# Patient Record
Sex: FEMALE | Race: OTHER | Employment: UNEMPLOYED | URBAN - METROPOLITAN AREA
[De-identification: names, ages, dates, MRNs, and addresses within clinical notes are randomized per-mention and may not be internally consistent; named-entity substitution may affect disease eponyms.]

---

## 2017-01-19 ENCOUNTER — HOSPITAL ENCOUNTER (EMERGENCY)
Age: 57
Discharge: HOME | End: 2017-01-19
Attending: EMERGENCY MEDICINE
Payer: MEDICARE

## 2017-01-19 ENCOUNTER — APPOINTMENT (OUTPATIENT)
Dept: GENERAL RADIOLOGY | Age: 57
End: 2017-01-19
Attending: EMERGENCY MEDICINE
Payer: MEDICARE

## 2017-01-19 VITALS
HEIGHT: 60 IN | HEART RATE: 69 BPM | RESPIRATION RATE: 14 BRPM | WEIGHT: 199 LBS | TEMPERATURE: 98.8 F | DIASTOLIC BLOOD PRESSURE: 54 MMHG | BODY MASS INDEX: 39.07 KG/M2 | SYSTOLIC BLOOD PRESSURE: 180 MMHG | OXYGEN SATURATION: 99 %

## 2017-01-19 DIAGNOSIS — G89.4 CHRONIC PAIN SYNDROME: ICD-10-CM

## 2017-01-19 DIAGNOSIS — J20.9 ACUTE BRONCHITIS, UNSPECIFIED ORGANISM: Primary | ICD-10-CM

## 2017-01-19 DIAGNOSIS — E83.42 HYPOMAGNESEMIA: ICD-10-CM

## 2017-01-19 DIAGNOSIS — R52 BODY ACHES: ICD-10-CM

## 2017-01-19 LAB
ALBUMIN SERPL BCP-MCNC: 3.8 G/DL (ref 3.5–5)
ALBUMIN/GLOB SERPL: 1.4 {RATIO} (ref 1.1–2.2)
ALP SERPL-CCNC: 78 U/L (ref 45–117)
ALT SERPL-CCNC: 16 U/L (ref 12–78)
AMPHET UR QL SCN: NEGATIVE
ANION GAP BLD CALC-SCNC: 7 MMOL/L (ref 5–15)
APPEARANCE UR: CLEAR
AST SERPL W P-5'-P-CCNC: 9 U/L (ref 15–37)
BACTERIA URNS QL MICRO: ABNORMAL /HPF
BARBITURATES UR QL SCN: NEGATIVE
BASOPHILS # BLD AUTO: 0 K/UL (ref 0–0.1)
BASOPHILS # BLD: 0 % (ref 0–1)
BENZODIAZ UR QL: NEGATIVE
BILIRUB SERPL-MCNC: 0.7 MG/DL (ref 0.2–1)
BILIRUB UR QL: NEGATIVE
BUN SERPL-MCNC: 11 MG/DL (ref 6–20)
BUN/CREAT SERPL: 12 (ref 12–20)
CALCIUM SERPL-MCNC: 8.9 MG/DL (ref 8.5–10.1)
CANNABINOIDS UR QL SCN: NEGATIVE
CHLORIDE SERPL-SCNC: 101 MMOL/L (ref 97–108)
CK MB CFR SERPL CALC: 0.9 % (ref 0–2.5)
CK MB SERPL-MCNC: 1.2 NG/ML (ref 5–25)
CK SERPL-CCNC: 136 U/L (ref 26–192)
CO2 SERPL-SCNC: 29 MMOL/L (ref 21–32)
COCAINE UR QL SCN: NEGATIVE
COLOR UR: ABNORMAL
CREAT SERPL-MCNC: 0.94 MG/DL (ref 0.55–1.02)
DRUG SCRN COMMENT,DRGCM: ABNORMAL
EOSINOPHIL # BLD: 0 K/UL (ref 0–0.4)
EOSINOPHIL NFR BLD: 0 % (ref 0–7)
EPITH CASTS URNS QL MICRO: ABNORMAL /LPF
ERYTHROCYTE [DISTWIDTH] IN BLOOD BY AUTOMATED COUNT: 11.7 % (ref 11.5–14.5)
FLUAV AG NPH QL IA: NEGATIVE
FLUBV AG NOSE QL IA: NEGATIVE
GLOBULIN SER CALC-MCNC: 2.8 G/DL (ref 2–4)
GLUCOSE BLD STRIP.AUTO-MCNC: 332 MG/DL (ref 65–100)
GLUCOSE SERPL-MCNC: 365 MG/DL (ref 65–100)
GLUCOSE UR STRIP.AUTO-MCNC: >1000 MG/DL
HCT VFR BLD AUTO: 30.6 % (ref 35–47)
HGB BLD-MCNC: 10.8 G/DL (ref 11.5–16)
HGB UR QL STRIP: ABNORMAL
KETONES SERPL QL: NEGATIVE
KETONES UR QL STRIP.AUTO: NEGATIVE MG/DL
LACTATE SERPL-SCNC: 2 MMOL/L (ref 0.4–2)
LEUKOCYTE ESTERASE UR QL STRIP.AUTO: ABNORMAL
LIPASE SERPL-CCNC: 100 U/L (ref 73–393)
LYMPHOCYTES # BLD AUTO: 33 % (ref 12–49)
LYMPHOCYTES # BLD: 2.1 K/UL (ref 0.8–3.5)
MAGNESIUM SERPL-MCNC: 1.6 MG/DL (ref 1.6–2.4)
MCH RBC QN AUTO: 30.4 PG (ref 26–34)
MCHC RBC AUTO-ENTMCNC: 35.3 G/DL (ref 30–36.5)
MCV RBC AUTO: 86.2 FL (ref 80–99)
METHADONE UR QL: NEGATIVE
MONOCYTES # BLD: 0.4 K/UL (ref 0–1)
MONOCYTES NFR BLD AUTO: 7 % (ref 5–13)
NEUTS SEG # BLD: 3.7 K/UL (ref 1.8–8)
NEUTS SEG NFR BLD AUTO: 60 % (ref 32–75)
NITRITE UR QL STRIP.AUTO: NEGATIVE
OPIATES UR QL: POSITIVE
PCP UR QL: NEGATIVE
PH UR STRIP: 6 [PH] (ref 5–8)
PLATELET # BLD AUTO: 232 K/UL (ref 150–400)
POTASSIUM SERPL-SCNC: 3.7 MMOL/L (ref 3.5–5.1)
PROT SERPL-MCNC: 6.6 G/DL (ref 6.4–8.2)
PROT UR STRIP-MCNC: 30 MG/DL
RBC # BLD AUTO: 3.55 M/UL (ref 3.8–5.2)
RBC #/AREA URNS HPF: ABNORMAL /HPF (ref 0–5)
SERVICE CMNT-IMP: ABNORMAL
SODIUM SERPL-SCNC: 137 MMOL/L (ref 136–145)
SP GR UR REFRACTOMETRY: 1.02 (ref 1–1.03)
TROPONIN I SERPL-MCNC: <0.04 NG/ML
UA: UC IF INDICATED,UAUC: ABNORMAL
UROBILINOGEN UR QL STRIP.AUTO: 1 EU/DL (ref 0.2–1)
WBC # BLD AUTO: 6.2 K/UL (ref 3.6–11)
WBC URNS QL MICRO: ABNORMAL /HPF (ref 0–4)

## 2017-01-19 PROCEDURE — 85025 COMPLETE CBC W/AUTO DIFF WBC: CPT | Performed by: EMERGENCY MEDICINE

## 2017-01-19 PROCEDURE — 87040 BLOOD CULTURE FOR BACTERIA: CPT | Performed by: EMERGENCY MEDICINE

## 2017-01-19 PROCEDURE — 83605 ASSAY OF LACTIC ACID: CPT | Performed by: EMERGENCY MEDICINE

## 2017-01-19 PROCEDURE — 99285 EMERGENCY DEPT VISIT HI MDM: CPT

## 2017-01-19 PROCEDURE — 82962 GLUCOSE BLOOD TEST: CPT

## 2017-01-19 PROCEDURE — 36415 COLL VENOUS BLD VENIPUNCTURE: CPT | Performed by: EMERGENCY MEDICINE

## 2017-01-19 PROCEDURE — 81001 URINALYSIS AUTO W/SCOPE: CPT | Performed by: EMERGENCY MEDICINE

## 2017-01-19 PROCEDURE — 82009 KETONE BODYS QUAL: CPT | Performed by: EMERGENCY MEDICINE

## 2017-01-19 PROCEDURE — 83690 ASSAY OF LIPASE: CPT | Performed by: EMERGENCY MEDICINE

## 2017-01-19 PROCEDURE — 96375 TX/PRO/DX INJ NEW DRUG ADDON: CPT

## 2017-01-19 PROCEDURE — 80053 COMPREHEN METABOLIC PANEL: CPT | Performed by: EMERGENCY MEDICINE

## 2017-01-19 PROCEDURE — 74011250637 HC RX REV CODE- 250/637: Performed by: EMERGENCY MEDICINE

## 2017-01-19 PROCEDURE — 87086 URINE CULTURE/COLONY COUNT: CPT | Performed by: EMERGENCY MEDICINE

## 2017-01-19 PROCEDURE — 87804 INFLUENZA ASSAY W/OPTIC: CPT | Performed by: EMERGENCY MEDICINE

## 2017-01-19 PROCEDURE — 96365 THER/PROPH/DIAG IV INF INIT: CPT

## 2017-01-19 PROCEDURE — 83735 ASSAY OF MAGNESIUM: CPT | Performed by: EMERGENCY MEDICINE

## 2017-01-19 PROCEDURE — 96361 HYDRATE IV INFUSION ADD-ON: CPT

## 2017-01-19 PROCEDURE — 82550 ASSAY OF CK (CPK): CPT | Performed by: EMERGENCY MEDICINE

## 2017-01-19 PROCEDURE — 74011250636 HC RX REV CODE- 250/636: Performed by: EMERGENCY MEDICINE

## 2017-01-19 PROCEDURE — 84484 ASSAY OF TROPONIN QUANT: CPT | Performed by: EMERGENCY MEDICINE

## 2017-01-19 PROCEDURE — 74011636637 HC RX REV CODE- 636/637: Performed by: EMERGENCY MEDICINE

## 2017-01-19 PROCEDURE — 80307 DRUG TEST PRSMV CHEM ANLYZR: CPT | Performed by: EMERGENCY MEDICINE

## 2017-01-19 PROCEDURE — 93005 ELECTROCARDIOGRAM TRACING: CPT

## 2017-01-19 PROCEDURE — 71010 XR CHEST PORT: CPT

## 2017-01-19 RX ORDER — MORPHINE SULFATE 2 MG/ML
2 INJECTION, SOLUTION INTRAMUSCULAR; INTRAVENOUS
Status: COMPLETED | OUTPATIENT
Start: 2017-01-19 | End: 2017-01-19

## 2017-01-19 RX ORDER — PROMETHAZINE HYDROCHLORIDE 25 MG/1
25 TABLET ORAL
Status: COMPLETED | OUTPATIENT
Start: 2017-01-19 | End: 2017-01-19

## 2017-01-19 RX ORDER — PROMETHAZINE HYDROCHLORIDE 6.25 MG/5ML
6.25 SYRUP ORAL
Qty: 80 ML | Refills: 0 | Status: SHIPPED | OUTPATIENT
Start: 2017-01-19 | End: 2017-01-26

## 2017-01-19 RX ORDER — DOXYCYCLINE HYCLATE 100 MG
100 TABLET ORAL
Status: COMPLETED | OUTPATIENT
Start: 2017-01-19 | End: 2017-01-19

## 2017-01-19 RX ORDER — DOXYCYCLINE HYCLATE 100 MG
100 TABLET ORAL 2 TIMES DAILY
Qty: 20 TAB | Refills: 0 | Status: SHIPPED | OUTPATIENT
Start: 2017-01-19 | End: 2017-01-29

## 2017-01-19 RX ORDER — HYDROMORPHONE HYDROCHLORIDE 1 MG/ML
0.5 INJECTION, SOLUTION INTRAMUSCULAR; INTRAVENOUS; SUBCUTANEOUS
Status: COMPLETED | OUTPATIENT
Start: 2017-01-19 | End: 2017-01-19

## 2017-01-19 RX ORDER — HYDROMORPHONE HYDROCHLORIDE 1 MG/ML
1 INJECTION, SOLUTION INTRAMUSCULAR; INTRAVENOUS; SUBCUTANEOUS
Status: DISCONTINUED | OUTPATIENT
Start: 2017-01-19 | End: 2017-01-19

## 2017-01-19 RX ORDER — SODIUM CHLORIDE 9 MG/ML
150 INJECTION, SOLUTION INTRAVENOUS CONTINUOUS
Status: DISCONTINUED | OUTPATIENT
Start: 2017-01-19 | End: 2017-01-19 | Stop reason: HOSPADM

## 2017-01-19 RX ORDER — MAGNESIUM SULFATE 1 G/100ML
1 INJECTION INTRAVENOUS
Status: COMPLETED | OUTPATIENT
Start: 2017-01-19 | End: 2017-01-19

## 2017-01-19 RX ORDER — PROCHLORPERAZINE EDISYLATE 5 MG/ML
5 INJECTION INTRAMUSCULAR; INTRAVENOUS
Status: COMPLETED | OUTPATIENT
Start: 2017-01-19 | End: 2017-01-19

## 2017-01-19 RX ORDER — DIPHENHYDRAMINE HYDROCHLORIDE 50 MG/ML
25 INJECTION, SOLUTION INTRAMUSCULAR; INTRAVENOUS
Status: COMPLETED | OUTPATIENT
Start: 2017-01-19 | End: 2017-01-19

## 2017-01-19 RX ADMIN — PROCHLORPERAZINE EDISYLATE 5 MG: 5 INJECTION INTRAMUSCULAR; INTRAVENOUS at 05:00

## 2017-01-19 RX ADMIN — PROMETHAZINE HYDROCHLORIDE 25 MG: 25 TABLET ORAL at 16:31

## 2017-01-19 RX ADMIN — DOXYCYCLINE HYCLATE 100 MG: 100 TABLET, COATED ORAL at 16:31

## 2017-01-19 RX ADMIN — Medication 2 MG: at 14:49

## 2017-01-19 RX ADMIN — INSULIN HUMAN 5 UNITS: 100 INJECTION, SOLUTION PARENTERAL at 15:24

## 2017-01-19 RX ADMIN — SODIUM CHLORIDE 150 ML/HR: 900 INJECTION, SOLUTION INTRAVENOUS at 14:55

## 2017-01-19 RX ADMIN — DIPHENHYDRAMINE HYDROCHLORIDE 25 MG: 50 INJECTION INTRAMUSCULAR; INTRAVENOUS at 14:50

## 2017-01-19 RX ADMIN — MAGNESIUM SULFATE IN DEXTROSE 1 G: 10 INJECTION, SOLUTION INTRAVENOUS at 15:25

## 2017-01-19 RX ADMIN — HYDROMORPHONE HYDROCHLORIDE 0.5 MG: 1 INJECTION, SOLUTION INTRAMUSCULAR; INTRAVENOUS; SUBCUTANEOUS at 16:32

## 2017-01-19 NOTE — PROGRESS NOTES
Care manager interviewed patient at bedside. RRAT: 28.    The patient is a 60year old female seen in ED for generalized body aches. Medical history includes CHF, DM, HTN, neuropathy. Her three daughters and grandson are present with her in ED. Verified patient's address and phone number. She lives with her . She has [employer] as well as [employer]. PCP is Marry Minor, has appointment scheduled for this coming Monday 1/23. Per patient she has a walker, cane, hospital bed, and manual wheelchair at home. She is interested in obtaining an electric wheelchair as she states that she is home alone most of the day while her  is working, and sometimes has difficulty moving around her apartment. No other questions or concerns noted by patient today. CM will discuss patient's DME request with primary care office. Care Management Interventions  PCP Verified by CM:  Yes (Maikol Graves)  Mode of Transport at Discharge: Self  Transition of Care Consult (CM Consult): Discharge Planning  Current Support Network: Lives with Spouse  Confirm Follow Up Transport: Self  Plan discussed with Pt/Family/Caregiver: Yes  Discharge Location  Discharge Placement: Home with outpatient services    KEN Ren  [phone]

## 2017-01-19 NOTE — ED NOTES
Discharge instructions and 2 prescriptions reviewed with patient by Dr. Kezia Fenton. Patient alert and oriented and ambulatory out of ED in no apparent distress.

## 2017-01-19 NOTE — ED NOTES
Assumed care of patient from EMS. Patient alert and oriented x4. Patient states \"my whole body. I feel like I have the flu\". Reports vomiting since yesterday. Patient reports right leg pain and lower back pain x1 year that became worse yesterday. History of fibromyalgia. Patient states Nohemy Hui have been running tests and dont know what is wrong with me\". Patient with fentanyl patch and states she took oxycodone around 1230 at home. BS for .

## 2017-01-19 NOTE — DISCHARGE INSTRUCTIONS
Bronchitis: Care Instructions  Your Care Instructions    Bronchitis is inflammation of the bronchial tubes, which carry air to the lungs. The tubes swell and produce mucus, or phlegm. The mucus and inflamed bronchial tubes make you cough. You may have trouble breathing. Most cases of bronchitis are caused by viruses like those that cause colds. Antibiotics usually do not help and they may be harmful. Bronchitis usually develops rapidly and lasts about 2 to 3 weeks in otherwise healthy people. Follow-up care is a key part of your treatment and safety. Be sure to make and go to all appointments, and call your doctor if you are having problems. It's also a good idea to know your test results and keep a list of the medicines you take. How can you care for yourself at home? · Take all medicines exactly as prescribed. Call your doctor if you think you are having a problem with your medicine. · Get some extra rest.  · Take an over-the-counter pain medicine, such as acetaminophen (Tylenol), ibuprofen (Advil, Motrin), or naproxen (Aleve) to reduce fever and relieve body aches. Read and follow all instructions on the label. · Do not take two or more pain medicines at the same time unless the doctor told you to. Many pain medicines have acetaminophen, which is Tylenol. Too much acetaminophen (Tylenol) can be harmful. · Take an over-the-counter cough medicine that contains dextromethorphan to help quiet a dry, hacking cough so that you can sleep. Avoid cough medicines that have more than one active ingredient. Read and follow all instructions on the label. · Breathe moist air from a humidifier, hot shower, or sink filled with hot water. The heat and moisture will thin mucus so you can cough it out. · Do not smoke. Smoking can make bronchitis worse. If you need help quitting, talk to your doctor about stop-smoking programs and medicines. These can increase your chances of quitting for good.   When should you call for help? Call 911 anytime you think you may need emergency care. For example, call if:  · You have severe trouble breathing. Call your doctor now or seek immediate medical care if:  · You have new or worse trouble breathing. · You cough up dark brown or bloody mucus (sputum). · You have a new or higher fever. · You have a new rash. Watch closely for changes in your health, and be sure to contact your doctor if:  · You cough more deeply or more often, especially if you notice more mucus or a change in the color of your mucus. · You are not getting better as expected. Where can you learn more? Go to http://emily-janette.info/. Enter H333 in the search box to learn more about \"Bronchitis: Care Instructions. \"  Current as of: May 23, 2016  Content Version: 11.1  © 1865-8241 [employer]. Care instructions adapted under license by [employer] (which disclaims liability or warranty for this information). If you have questions about a medical condition or this instruction, always ask your healthcare professional. [address] any warranty or liability for your use of this information. Chronic Pain: Care Instructions  Your Care Instructions  Chronic pain is pain that lasts a long time (months or even years) and may or may not have a clear cause. It is different from acute pain, which usually does have a clear cause--like an injury or illness--and gets better over time. Chronic pain:  · Lasts over time but may vary from day to day. · Does not go away despite efforts to end it. · May disrupt your sleep and lead to fatigue. · May cause depression or anxiety. · May make your muscles tense, causing more pain. · Can disrupt your work, hobbies, home life, and relationships with friends and family. Chronic pain is a very real condition. It is not just in your head.  Treatment can help and usually includes several methods used together, such as medicines, physical therapy, exercise, and other treatments. Learning how to relax and changing negative thought patterns can also help you cope. Chronic pain is complex. Taking an active role in your treatment will help you better manage your pain. Tell your doctor if you have trouble dealing with your pain. You may have to try several things before you find what works best for you. Follow-up care is a key part of your treatment and safety. Be sure to make and go to all appointments, and call your doctor if you are having problems. Its also a good idea to know your test results and keep a list of the medicines you take. How can you care for yourself at home? · Pace yourself. Break up large jobs into smaller tasks. Save harder tasks for days when you have less pain, or go back and forth between hard tasks and easier ones. Take rest breaks. · Relax, and reduce stress. Relaxation techniques such as deep breathing or meditation can help. · Keep moving. Gentle, daily exercise can help reduce pain over the long run. Try low- or no-impact exercises such as walking, swimming, and stationary biking. Do stretches to stay flexible. · Try heat, cold packs, and massage. · Get enough sleep. Chronic pain can make you tired and drain your energy. Talk with your doctor if you have trouble sleeping because of pain. · Think positive. Your thoughts can affect your pain level. Do things that you enjoy to distract yourself when you have pain instead of focusing on the pain. See a movie, read a book, listen to music, or spend time with a friend. · If you think you are depressed, talk to your doctor about treatment. · Keep a daily pain diary. Record how your moods, thoughts, sleep patterns, activities, and medicine affect your pain. You may find that your pain is worse during or after certain activities or when you are feeling a certain emotion.  Having a record of your pain can help you and your doctor find the best ways to treat your pain. · Take pain medicines exactly as directed. ¨ If the doctor gave you a prescription medicine for pain, take it as prescribed. ¨ If you are not taking a prescription pain medicine, ask your doctor if you can take an over-the-counter medicine. Reducing constipation caused by pain medicine  · Include fruits, vegetables, beans, and whole grains in your diet each day. These foods are high in fiber. · Drink plenty of fluids, enough so that your urine is light yellow or clear like water. If you have kidney, heart, or liver disease and have to limit fluids, talk with your doctor before you increase the amount of fluids you drink. · If your doctor recommends it, get more exercise. Walking is a good choice. Bit by bit, increase the amount you walk every day. Try for at least 30 minutes on most days of the week. · Schedule time each day for a bowel movement. A daily routine may help. Take your time and do not strain when having a bowel movement. When should you call for help? Call your doctor now or seek immediate medical care if:  · Your pain gets worse or is out of control. · You feel down or blue, or you do not enjoy things like you once did. You may be depressed, which is common in people with chronic pain. Depression can be treated. · You have vomiting or cramps for more than 2 hours. Watch closely for changes in your health, and be sure to contact your doctor if:  · You cannot sleep because of pain. · You are very worried or anxious about your pain. · You have trouble taking your pain medicine. · You have any concerns about your pain medicine. · You have trouble with bowel movements, such as:  ¨ No bowel movement in 3 days. ¨ Blood in the anal area, in your stool, or on the toilet paper. ¨ Diarrhea for more than 24 hours. Where can you learn more? Go to http://emily-janette.info/.   Enter N004 in the search box to learn more about \"Chronic Pain: Care Instructions. \"  Current as of: February 19, 2016  Content Version: 11.1  © 6198-8689 [employer], [facility]. Care instructions adapted under license by [employer] (which disclaims liability or warranty for this information). If you have questions about a medical condition or this instruction, always ask your healthcare professional. [address] any warranty or liability for your use of this information. Learning About Magnesium  What is magnesium? Magnesium is a mineral that plays many important roles in your body. For example, magnesium helps keep your blood pressure regular and your heart rhythm steady. It helps build your bones and teeth. When you're sick, magnesium helps your body fight infections. And magnesium helps produce energy for your body to use. Most adults need 300 to 400 milligrams (mg) of magnesium a day. Magnesium is found in foods, especially nuts, whole grains, dark green vegetables, seafood, and cocoa. It's also available as a supplement. Most people can get enough magnesium through a healthy diet that emphasizes unprocessed foods, including whole grain products. What are some foods that contain magnesium? Magnesium is found in many foods in varying amounts. Sometimes the ingredients added to foods make a difference. A plain bagel contains 8 milligrams (mg) of magnesium. But a multi-grain bagel has 69 mg.   This list shows the magnesium levels in a variety of foods and their portion sizes:  · Almonds, ¼ cup = 97 mg  · Artichoke, 1 = 50 mg  · Avocado, ½ cup = 22 mg  · Banana, 1 cup mashed = 61 mg  · Baked beans, ½ cup = 33 mg  · Beef, ground, 3 oz = 17 mg  · Beef, top sirloin, 3 oz = 20 mg  · Bread, wheat bran, 1 oz = 23 mg  · Chocolate, dark, 1 oz = 41 mg  · Hazelnuts, ¼ cup = 47 mg  · Kale, cooked, 1 cup = 23 mg  · Lima beans, large, ½ cup = 41 mg  · Potatoes, russet, baked, 1 cup = 90 mg  · [place], canned, 3 oz = 27 mg  · Spinach, raw, 1 cup = 24 mg  · Tuna, canned, 3 oz = 29 mg  · [place]), ground, 3 oz = 21 mg  Who may need extra magnesium? Some illnesses and medicines may change how much magnesium you can absorb from food. Or they can cause your body to release more magnesium through urine than it should. Examples of these illnesses include Crohn's disease, celiac disease, and type 2 diabetes. People with these conditions may need to increase the amount of magnesium they consume. Your doctor can tell you if you need more magnesium. Where can you learn more? Go to http://emilyWozityoujanette.info/. Enter I924 in the search box to learn more about \"Learning About Magnesium. \"  Current as of: July 26, 2016  Content Version: 11.1  © 1492-7390 [employer]. Care instructions adapted under license by [employer] (which disclaims liability or warranty for this information). If you have questions about a medical condition or this instruction, always ask your healthcare professional. [place] [address] any warranty or liability for your use of this information.

## 2017-01-19 NOTE — ED PROVIDER NOTES
HPI Comments: Sanjay Benitez is a 60 y.o. female with PMhx significant for asthma, HTN, DM, RA, and stroke who presents via EMS to the ED complaining of flu-like symptoms including generalized body aches, cough, nausea, vomiting, and subjective fever since yesterday. Pt denies any known sick contacts with similar symptoms. She reports additional symptoms of acute on chronic lower back pain and right leg pain for which she is currently treated with Fentanyl patches. She denies any diarrhea. PCP: Chepe Edwards,     There are no other complaints, changes or physical findings at this time. The history is provided by the patient. No  was used. Past Medical History:   Diagnosis Date    Asthma     Asthma     Diabetes mellitus type II     Hypercholesteremia     Hyperlipemia     Hypertension     Restless leg     Restless leg     Rheumatoid arthritis(714.0)     Sleep apnea     Sleep disorder     Stroke ([facility] [address]      2010       Past Surgical History:   Procedure Laterality Date    Hx tonsillectomy           Family History:   Problem Relation Age of Onset    Heart Disease Mother     Diabetes Mother     Hypertension Other        Social History     Social History    Marital status: SINGLE     Spouse name: N/A    Number of children: N/A    Years of education: N/A     Occupational History    Not on file. Social History Main Topics    Smoking status: Never Smoker    Smokeless tobacco: Not on file    Alcohol use No    Drug use: No    Sexual activity: No     Other Topics Concern    Not on file     Social History Narrative         ALLERGIES: Albuterol; Aspirin; and Levaquin [levofloxacin]    Review of Systems   Constitutional: Positive for fever. Negative for chills. HENT: Negative for congestion and rhinorrhea. Eyes: Negative for visual disturbance. Respiratory: Positive for cough. Negative for shortness of breath. Cardiovascular: Negative for chest pain. Gastrointestinal: Positive for nausea and vomiting. Negative for abdominal pain and diarrhea. Genitourinary: Negative for difficulty urinating and dysuria. Musculoskeletal: Positive for back pain (chronic) and myalgias (generalized body aches). Negative for arthralgias and neck pain. Positive for chronic right leg pain. Skin: Negative for color change and rash. Neurological: Negative for dizziness, weakness and headaches. Vitals:    01/19/17 1334 01/19/17 1500 01/19/17 1637   BP: 168/70 164/62 180/54   Pulse: 62 73 69   Resp: 20 18 14   Temp: 98.8 °F (37.1 °C)     SpO2: 100% 100% 99%   Weight: 90.3 kg (199 lb)     Height: 5' (1.524 m)              Physical Exam   Constitutional: She is oriented to person, place, and time. She appears well-developed and well-nourished. In moderate distress secondary to pain. HENT:   Head: Normocephalic and atraumatic. Neck: Normal range of motion. Cardiovascular: Normal rate, regular rhythm, normal heart sounds and intact distal pulses. Pulmonary/Chest: Effort normal and breath sounds normal.   Abdominal: Soft. Bowel sounds are normal. There is tenderness in the epigastric area. Musculoskeletal:   No peripheral edema. Neurological: She is alert and oriented to person, place, and time. Skin: Skin is warm and dry. Nursing note and vitals reviewed.        MDM  Number of Diagnoses or Management Options  Diagnosis management comments: DDx: PNA, diabetic complication, viral illness, acute on chronic pain       Amount and/or Complexity of Data Reviewed  Clinical lab tests: ordered and reviewed  Tests in the radiology section of CPT®: reviewed and ordered  Tests in the medicine section of CPT®: ordered and reviewed  Review and summarize past medical records: yes  Independent visualization of images, tracings, or specimens: yes    Patient Progress  Patient progress: stable        Procedures     EKG interpretation: (Preliminary)  Rhythm: sinus bradycardia; and regular . Rate (approx.): 56; Axis: normal; UT interval: normal; QRS interval: normal ; ST/T wave: ST and T wave abnormality; other: Prolonged QT  Written by CHERRY Sargent, as dictated by Dimas Elkins MD    LABORATORY TESTS:  Recent Results (from the past 12 hour(s))   EKG, 12 LEAD, INITIAL    Collection Time: 01/19/17  1:59 PM   Result Value Ref Range    Ventricular Rate 56 BPM    Atrial Rate 56 BPM    P-R Interval 170 ms    QRS Duration 92 ms    Q-T Interval 504 ms    QTC Calculation (Bezet) 486 ms    Calculated P Axis 38 degrees    Calculated R Axis 12 degrees    Calculated T Axis -129 degrees    Diagnosis       Sinus bradycardia  ST & T wave abnormality, consider inferolateral ischemia  Prolonged QT  Abnormal ECG  When compared with ECG of 25-SEP-2016 09:49,  No significant change was found     CBC WITH AUTOMATED DIFF    Collection Time: 01/19/17  2:24 PM   Result Value Ref Range    WBC 6.2 3.6 - 11.0 K/uL    RBC 3.55 (L) 3.80 - 5.20 M/uL    HGB 10.8 (L) 11.5 - 16.0 g/dL    HCT 30.6 (L) 35.0 - 47.0 %    MCV 86.2 80.0 - 99.0 FL    MCH 30.4 26.0 - 34.0 PG    MCHC 35.3 30.0 - 36.5 g/dL    RDW 11.7 11.5 - 14.5 %    PLATELET 200 127 - 237 K/uL    NEUTROPHILS 60 32 - 75 %    LYMPHOCYTES 33 12 - 49 %    MONOCYTES 7 5 - 13 %    EOSINOPHILS 0 0 - 7 %    BASOPHILS 0 0 - 1 %    ABS. NEUTROPHILS 3.7 1.8 - 8.0 K/UL    ABS. LYMPHOCYTES 2.1 0.8 - 3.5 K/UL    ABS. MONOCYTES 0.4 0.0 - 1.0 K/UL    ABS. EOSINOPHILS 0.0 0.0 - 0.4 K/UL    ABS.  BASOPHILS 0.0 0.0 - 0.1 K/UL   LACTIC ACID, PLASMA    Collection Time: 01/19/17  2:24 PM   Result Value Ref Range    Lactic acid 2.0 0.4 - 2.0 MMOL/L   METABOLIC PANEL, COMPREHENSIVE    Collection Time: 01/19/17  2:24 PM   Result Value Ref Range    Sodium 137 136 - 145 mmol/L    Potassium 3.7 3.5 - 5.1 mmol/L    Chloride 101 97 - 108 mmol/L    CO2 29 21 - 32 mmol/L    Anion gap 7 5 - 15 mmol/L    Glucose 365 (H) 65 - 100 mg/dL    BUN 11 6 - 20 MG/DL Creatinine 0.94 0.55 - 1.02 MG/DL    BUN/Creatinine ratio 12 12 - 20      GFR est AA >60 >60 ml/min/1.73m2    GFR est non-AA >60 >60 ml/min/1.73m2    Calcium 8.9 8.5 - 10.1 MG/DL    Bilirubin, total 0.7 0.2 - 1.0 MG/DL    ALT 16 12 - 78 U/L    AST 9 (L) 15 - 37 U/L    Alk.  phosphatase 78 45 - 117 U/L    Protein, total 6.6 6.4 - 8.2 g/dL    Albumin 3.8 3.5 - 5.0 g/dL    Globulin 2.8 2.0 - 4.0 g/dL    A-G Ratio 1.4 1.1 - 2.2     INFLUENZA A & B AG (RAPID TEST)    Collection Time: 01/19/17  2:24 PM   Result Value Ref Range    Influenza A Antigen NEGATIVE  NEG      Influenza B Antigen NEGATIVE  NEG     LIPASE    Collection Time: 01/19/17  2:24 PM   Result Value Ref Range    Lipase 100 73 - 393 U/L   TROPONIN I    Collection Time: 01/19/17  2:24 PM   Result Value Ref Range    Troponin-I, Qt. <0.04 <0.05 ng/mL   CK W/ CKMB & INDEX    Collection Time: 01/19/17  2:24 PM   Result Value Ref Range     26 - 192 U/L    CK - MB 1.2 <3.6 NG/ML    CK-MB Index 0.9 0 - 2.5     ACETONE/KETONE, QL    Collection Time: 01/19/17  2:24 PM   Result Value Ref Range    Acetone/Ketone serum, Ql. NEGATIVE  NEG        MAGNESIUM    Collection Time: 01/19/17  2:30 PM   Result Value Ref Range    Magnesium 1.6 1.6 - 2.4 mg/dL   DRUG SCREEN, URINE    Collection Time: 01/19/17  3:55 PM   Result Value Ref Range    AMPHETAMINE NEGATIVE  NEG      BARBITURATES NEGATIVE  NEG      BENZODIAZEPINE NEGATIVE  NEG      COCAINE NEGATIVE  NEG      METHADONE NEGATIVE  NEG      OPIATES POSITIVE (A) NEG      PCP(PHENCYCLIDINE) NEGATIVE  NEG      THC (TH-CANNABINOL) NEGATIVE  NEG      Drug screen comment (NOTE)    URINALYSIS W/ REFLEX CULTURE    Collection Time: 01/19/17  3:58 PM   Result Value Ref Range    Color YELLOW/STRAW      Appearance CLEAR CLEAR      Specific gravity 1.020 1.003 - 1.030      pH (UA) 6.0 5.0 - 8.0      Protein 30 (A) NEG mg/dL    Glucose >1000 (A) NEG mg/dL    Ketone NEGATIVE  NEG mg/dL    Bilirubin NEGATIVE  NEG      Blood TRACE (A) NEG Urobilinogen 1.0 0.2 - 1.0 EU/dL    Nitrites NEGATIVE  NEG      Leukocyte Esterase SMALL (A) NEG      WBC 10-20 0 - 4 /hpf    RBC 5-10 0 - 5 /hpf    Epithelial cells MODERATE (A) FEW /lpf    Bacteria 1+ (A) NEG /hpf    UA:UC IF INDICATED URINE CULTURE ORDERED (A) CNI     GLUCOSE, POC    Collection Time: 01/19/17  4:37 PM   Result Value Ref Range    Glucose (POC) 332 (H) 65 - 100 mg/dL    Performed by [address]:  CXR Results  (Last 48 hours)               01/19/17 1357  XR CHEST PORT Final result    Impression:  IMPRESSION:    Left basilar opacity suggesting atelectasis/possible airspace disease and/or   possible pleural effusion. Narrative:  INDICATION:  Chest pain, generalized body aches and vomiting x1 day. COMPARISON:  9/25/2016. FINDINGS:     An AP portable view was obtained of the chest.     The heart is enlarged. Left basilar opacity suggesting atelectasis/possible airspace disease and/or   possible pleural effusion. No pulmonary edema is evident. Degenerative change of the spine is noted. MEDICATIONS GIVEN:  Medications   0.9% sodium chloride infusion (0 mL/hr IntraVENous IV Completed 1/19/17 1653)   prochlorperazine (COMPAZINE) injection 5 mg (5 mg IntraVENous Given 1/19/17 0500)   diphenhydrAMINE (BENADRYL) injection 25 mg (25 mg IntraVENous Given 1/19/17 1450)   morphine injection 2 mg (2 mg IntraVENous Given 1/19/17 1449)   magnesium sulfate 1 g/100 ml IVPB (premix or compounded) (0 g IntraVENous IV Completed 1/19/17 1625)   insulin regular (NOVOLIN R, HUMULIN R) injection 5 Units (5 Units IntraVENous Given 1/19/17 1524)   doxycycline (VIBRA-TABS) tablet 100 mg (100 mg Oral Given 1/19/17 1631)   HYDROmorphone (PF) (DILAUDID) injection 0.5 mg (0.5 mg IntraVENous Given 1/19/17 1632)   promethazine (PHENERGAN) tablet 25 mg (25 mg Oral Given 1/19/17 1631)       IMPRESSION:  1. Acute bronchitis, unspecified organism    2. Body aches    3. Chronic pain syndrome    4. Hypomagnesemia        PLAN:  1. Discharge Medication List as of 1/19/2017  4:37 PM      START taking these medications    Details   doxycycline (VIBRA-TABS) 100 mg tablet Take 1 Tab by mouth two (2) times a day for 10 days. , Normal, Disp-20 Tab, R-0      promethazine (PHENERGAN) 6.25 mg/5 mL syrup Take 5 mL by mouth four (4) times daily as needed for Nausea (congestion, cough) for up to 7 days. , Normal, Disp-80 mL, R-0         CONTINUE these medications which have NOT CHANGED    Details   HUMULIN N 100 unit/mL injection Historical Med, TIMO      carisoprodol (SOMA) 250 mg tablet Historical Med      fentaNYL (DURAGESIC) 25 mcg/hr PATCH 1 Patch by TransDERmal route every seventy-two (72) hours. , Historical Med      valsartan (DIOVAN) 320 mg tablet Take 1 Tab by mouth daily. , Normal, Disp-30 Tab, R-12      DULoxetine (CYMBALTA) 60 mg capsule Take 60 mg by mouth daily. , Historical Med      beclomethasone (QVAR) 80 mcg/actuation inhaler Take 1 Puff by inhalation two (2) times a day., Historical Med      oxyCODONE-acetaminophen (PERCOCET) 7.5-325 mg per tablet Take 1 Tab by mouth three (3) times daily as needed for Pain., Historical Med      docusate sodium (STOOL SOFTENER) 100 mg capsule Take 100 mg by mouth daily. , Historical Med      lubiPROStone (AMITIZA) 24 mcg capsule Take 24 mcg by mouth two (2) times daily (with meals). , Historical Med      ondansetron (ZOFRAN ODT) 4 mg disintegrating tablet Take 4 mg by mouth two (2) times daily as needed for Nausea., Historical Med      ibuprofen (MOTRIN) 800 mg tablet Take 1 Tab by mouth every eight (8) hours as needed for Pain. Take it as scheduled for one week only, No Print, Disp-1 Tab, R-0      furosemide (LASIX) 20 mg tablet Take 1 Tab by mouth daily. , No Print, Disp-30 Tab, R-1      potassium chloride SR (KLOR-CON 10) 10 mEq tablet Take 1 Tab by mouth daily. , No Print, Disp-30 Tab, R-1      lidocaine (LIDODERM) 5 % 3 Patches by TransDERmal route every twelve (12) hours. Apply patch to the affected area for 12 hours a day and remove for 12 hours a day., Historical Med      Liraglutide (VICTOZA) 0.6 mg/0.1 mL (18 mg/3 mL) sub-q pen 1.8 mg by SubCUTAneous route daily. , Historical Med      mometasone-formoterol (DULERA) 200-5 mcg/actuation HFA inhaler Take 2 Puffs by inhalation two (2) times a day., Historical Med      atorvastatin (LIPITOR) 40 mg tablet Take 40 mg by mouth daily. , Historical Med      omalizumab (XOLAIR) 150 mg solr solution 150 mg by SubCUTAneous route every fourteen (14) days. , Historical Med      ezetimibe (ZETIA) 10 mg tablet Take 10 mg by mouth daily. , Historical Med      insulin aspart (NOVOLOG) 100 unit/mL injection by SubCUTAneous route. Indications: sliding, Historical Med      montelukast (SINGULAIR) 10 mg tablet Take 10 mg by mouth nightly., Historical Med      levalbuterol tartrate (XOPENEX HFA) 45 mcg/Actuation inhaler Take 2 Puffs by inhalation four (4) times daily. , Historical Med      levalbuterol hcl (XOPENEX) 0.63 mg/3 mL nebulization 0.63 mg by Nebulization route once as needed., Historical Med      diazePAM (VALIUM) 2 mg tablet Take 1 Tab by mouth every eight (8) hours as needed for Anxiety (spasm). Max Daily Amount: 6 mg., Print, Disp-8 Tab, R-0      VGO 40 kris Historical Med, TIMO      DALTON PEN NEEDLE 32 gauge x 5/32\" ndle Historical Med, TIMO      ONE TOUCH DELICA 33 gauge misc Historical Med, TIMO       insulin pump (PATIENT SUPPLIED) [facility] [place] by SubCUTAneous route as needed., Historical Med           2. Follow-up Information     Follow up With Details Comments Contact Info    Louise Hidalgo DO On 1/23/2017  [facility]  [phone]      [facility] EMERGENCY DEPT  If symptoms worsen [address]        Return to ED if worse     Discharge Note:  4:06 PM  The patient has been re-evaluated and is ready for discharge.  Reviewed available results with patient. Counseled patient on diagnosis and care plan. Patient has expressed understanding, and all questions have been answered. Patient agrees with plan and agrees to follow up as recommended, or return to the ED if their symptoms worsen. Discharge instructions have been provided and explained to the patient, along with reasons to return to the ED. Attestation: This note is prepared by Joao Corbett, acting as Scribe for Steven Baumgarten, MD.    Steven Baumgarten, MD: The scribe's documentation has been prepared under my direction and personally reviewed by me in its entirety. I confirm that the note above accurately reflects all work, treatment, procedures, and medical decision making performed by me.

## 2017-01-21 LAB
ATRIAL RATE: 56 BPM
BACTERIA SPEC CULT: NORMAL
CALCULATED P AXIS, ECG09: 38 DEGREES
CALCULATED R AXIS, ECG10: 12 DEGREES
CALCULATED T AXIS, ECG11: -129 DEGREES
CC UR VC: NORMAL
DIAGNOSIS, 93000: NORMAL
P-R INTERVAL, ECG05: 170 MS
Q-T INTERVAL, ECG07: 504 MS
QRS DURATION, ECG06: 92 MS
QTC CALCULATION (BEZET), ECG08: 486 MS
SERVICE CMNT-IMP: NORMAL
VENTRICULAR RATE, ECG03: 56 BPM

## 2017-01-24 LAB
BACTERIA SPEC CULT: NORMAL
SERVICE CMNT-IMP: NORMAL

## 2017-02-25 ENCOUNTER — HOSPITAL ENCOUNTER (EMERGENCY)
Age: 57
Discharge: HOME | End: 2017-02-25
Attending: EMERGENCY MEDICINE
Payer: MEDICARE

## 2017-02-25 ENCOUNTER — APPOINTMENT (OUTPATIENT)
Dept: GENERAL RADIOLOGY | Age: 57
End: 2017-02-25
Attending: EMERGENCY MEDICINE
Payer: MEDICARE

## 2017-02-25 VITALS
OXYGEN SATURATION: 93 % | BODY MASS INDEX: 36.44 KG/M2 | TEMPERATURE: 98.3 F | SYSTOLIC BLOOD PRESSURE: 150 MMHG | DIASTOLIC BLOOD PRESSURE: 49 MMHG | HEIGHT: 61 IN | HEART RATE: 81 BPM | RESPIRATION RATE: 22 BRPM | WEIGHT: 193 LBS

## 2017-02-25 DIAGNOSIS — E11.65 TYPE 2 DIABETES MELLITUS WITH HYPERGLYCEMIA, UNSPECIFIED LONG TERM INSULIN USE STATUS: ICD-10-CM

## 2017-02-25 DIAGNOSIS — J45.21 MILD INTERMITTENT ASTHMA WITH ACUTE EXACERBATION: Primary | ICD-10-CM

## 2017-02-25 DIAGNOSIS — G89.29 OTHER CHRONIC PAIN: ICD-10-CM

## 2017-02-25 DIAGNOSIS — R11.2 NON-INTRACTABLE VOMITING WITH NAUSEA, UNSPECIFIED VOMITING TYPE: ICD-10-CM

## 2017-02-25 DIAGNOSIS — G57.91 NEUROPATHY OF RIGHT LOWER EXTREMITY: ICD-10-CM

## 2017-02-25 LAB
ALBUMIN SERPL BCP-MCNC: 3.8 G/DL (ref 3.5–5)
ALBUMIN/GLOB SERPL: 1.4 {RATIO} (ref 1.1–2.2)
ALP SERPL-CCNC: 83 U/L (ref 45–117)
ALT SERPL-CCNC: 20 U/L (ref 12–78)
ANION GAP BLD CALC-SCNC: 9 MMOL/L (ref 5–15)
APPEARANCE UR: CLEAR
AST SERPL W P-5'-P-CCNC: 11 U/L (ref 15–37)
ATRIAL RATE: 68 BPM
BACTERIA URNS QL MICRO: NEGATIVE /HPF
BASOPHILS # BLD AUTO: 0 K/UL (ref 0–0.1)
BASOPHILS # BLD: 0 % (ref 0–1)
BILIRUB SERPL-MCNC: 0.5 MG/DL (ref 0.2–1)
BILIRUB UR QL: NEGATIVE
BUN SERPL-MCNC: 12 MG/DL (ref 6–20)
BUN/CREAT SERPL: 16 (ref 12–20)
CALCIUM SERPL-MCNC: 8.9 MG/DL (ref 8.5–10.1)
CALCULATED P AXIS, ECG09: 18 DEGREES
CALCULATED R AXIS, ECG10: -3 DEGREES
CALCULATED T AXIS, ECG11: 164 DEGREES
CHLORIDE SERPL-SCNC: 99 MMOL/L (ref 97–108)
CK MB CFR SERPL CALC: 1.3 % (ref 0–2.5)
CK MB SERPL-MCNC: 1.1 NG/ML (ref 5–25)
CK SERPL-CCNC: 85 U/L (ref 26–192)
CO2 SERPL-SCNC: 27 MMOL/L (ref 21–32)
COLOR UR: ABNORMAL
CREAT SERPL-MCNC: 0.75 MG/DL (ref 0.55–1.02)
DIAGNOSIS, 93000: NORMAL
EOSINOPHIL # BLD: 0 K/UL (ref 0–0.4)
EOSINOPHIL NFR BLD: 1 % (ref 0–7)
EPITH CASTS URNS QL MICRO: ABNORMAL /LPF
ERYTHROCYTE [DISTWIDTH] IN BLOOD BY AUTOMATED COUNT: 12.3 % (ref 11.5–14.5)
GLOBULIN SER CALC-MCNC: 2.8 G/DL (ref 2–4)
GLUCOSE BLD STRIP.AUTO-MCNC: 312 MG/DL (ref 65–100)
GLUCOSE SERPL-MCNC: 395 MG/DL (ref 65–100)
GLUCOSE UR STRIP.AUTO-MCNC: >1000 MG/DL
HCT VFR BLD AUTO: 34.2 % (ref 35–47)
HGB BLD-MCNC: 11.7 G/DL (ref 11.5–16)
HGB UR QL STRIP: NEGATIVE
KETONES SERPL QL: NEGATIVE
KETONES UR QL STRIP.AUTO: 15 MG/DL
LEUKOCYTE ESTERASE UR QL STRIP.AUTO: NEGATIVE
LIPASE SERPL-CCNC: 156 U/L (ref 73–393)
LYMPHOCYTES # BLD AUTO: 26 % (ref 12–49)
LYMPHOCYTES # BLD: 2 K/UL (ref 0.8–3.5)
MCH RBC QN AUTO: 30 PG (ref 26–34)
MCHC RBC AUTO-ENTMCNC: 34.2 G/DL (ref 30–36.5)
MCV RBC AUTO: 87.7 FL (ref 80–99)
MONOCYTES # BLD: 0.5 K/UL (ref 0–1)
MONOCYTES NFR BLD AUTO: 6 % (ref 5–13)
NEUTS SEG # BLD: 5.4 K/UL (ref 1.8–8)
NEUTS SEG NFR BLD AUTO: 67 % (ref 32–75)
NITRITE UR QL STRIP.AUTO: NEGATIVE
P-R INTERVAL, ECG05: 150 MS
PH UR STRIP: 6 [PH] (ref 5–8)
PLATELET # BLD AUTO: 216 K/UL (ref 150–400)
POTASSIUM SERPL-SCNC: 3.9 MMOL/L (ref 3.5–5.1)
PROT SERPL-MCNC: 6.6 G/DL (ref 6.4–8.2)
PROT UR STRIP-MCNC: ABNORMAL MG/DL
Q-T INTERVAL, ECG07: 454 MS
QRS DURATION, ECG06: 92 MS
QTC CALCULATION (BEZET), ECG08: 482 MS
RBC # BLD AUTO: 3.9 M/UL (ref 3.8–5.2)
RBC #/AREA URNS HPF: ABNORMAL /HPF (ref 0–5)
SERVICE CMNT-IMP: ABNORMAL
SODIUM SERPL-SCNC: 135 MMOL/L (ref 136–145)
SP GR UR REFRACTOMETRY: 1.01 (ref 1–1.03)
TROPONIN I SERPL-MCNC: <0.04 NG/ML
UA: UC IF INDICATED,UAUC: ABNORMAL
UROBILINOGEN UR QL STRIP.AUTO: 0.2 EU/DL (ref 0.2–1)
VENTRICULAR RATE, ECG03: 68 BPM
WBC # BLD AUTO: 7.9 K/UL (ref 3.6–11)
WBC URNS QL MICRO: ABNORMAL /HPF (ref 0–4)

## 2017-02-25 PROCEDURE — 82009 KETONE BODYS QUAL: CPT | Performed by: EMERGENCY MEDICINE

## 2017-02-25 PROCEDURE — 83690 ASSAY OF LIPASE: CPT | Performed by: EMERGENCY MEDICINE

## 2017-02-25 PROCEDURE — 84484 ASSAY OF TROPONIN QUANT: CPT | Performed by: EMERGENCY MEDICINE

## 2017-02-25 PROCEDURE — 81001 URINALYSIS AUTO W/SCOPE: CPT | Performed by: EMERGENCY MEDICINE

## 2017-02-25 PROCEDURE — A9270 NON-COVERED ITEM OR SERVICE: HCPCS | Performed by: EMERGENCY MEDICINE

## 2017-02-25 PROCEDURE — 74011636637 HC RX REV CODE- 636/637: Performed by: EMERGENCY MEDICINE

## 2017-02-25 PROCEDURE — 82962 GLUCOSE BLOOD TEST: CPT

## 2017-02-25 PROCEDURE — 71010 XR CHEST PORT: CPT

## 2017-02-25 PROCEDURE — 99285 EMERGENCY DEPT VISIT HI MDM: CPT

## 2017-02-25 PROCEDURE — 96375 TX/PRO/DX INJ NEW DRUG ADDON: CPT

## 2017-02-25 PROCEDURE — 85025 COMPLETE CBC W/AUTO DIFF WBC: CPT | Performed by: EMERGENCY MEDICINE

## 2017-02-25 PROCEDURE — 96361 HYDRATE IV INFUSION ADD-ON: CPT

## 2017-02-25 PROCEDURE — 77030013140 HC MSK NEB VYRM -A

## 2017-02-25 PROCEDURE — 80053 COMPREHEN METABOLIC PANEL: CPT | Performed by: EMERGENCY MEDICINE

## 2017-02-25 PROCEDURE — 96374 THER/PROPH/DIAG INJ IV PUSH: CPT

## 2017-02-25 PROCEDURE — 74011000250 HC RX REV CODE- 250: Performed by: EMERGENCY MEDICINE

## 2017-02-25 PROCEDURE — 74011250636 HC RX REV CODE- 250/636: Performed by: EMERGENCY MEDICINE

## 2017-02-25 PROCEDURE — 82550 ASSAY OF CK (CPK): CPT | Performed by: EMERGENCY MEDICINE

## 2017-02-25 PROCEDURE — 93005 ELECTROCARDIOGRAM TRACING: CPT

## 2017-02-25 PROCEDURE — 94640 AIRWAY INHALATION TREATMENT: CPT

## 2017-02-25 PROCEDURE — 36600 WITHDRAWAL OF ARTERIAL BLOOD: CPT

## 2017-02-25 PROCEDURE — 36415 COLL VENOUS BLD VENIPUNCTURE: CPT | Performed by: EMERGENCY MEDICINE

## 2017-02-25 RX ORDER — HYDROMORPHONE HYDROCHLORIDE 1 MG/ML
1 INJECTION, SOLUTION INTRAMUSCULAR; INTRAVENOUS; SUBCUTANEOUS
Status: COMPLETED | OUTPATIENT
Start: 2017-02-25 | End: 2017-02-25

## 2017-02-25 RX ORDER — LEVALBUTEROL INHALATION SOLUTION 1.25 MG/3ML
1.25 SOLUTION RESPIRATORY (INHALATION)
Status: COMPLETED | OUTPATIENT
Start: 2017-02-25 | End: 2017-02-25

## 2017-02-25 RX ORDER — PROMETHAZINE HYDROCHLORIDE 6.25 MG/5ML
6.25 SYRUP ORAL
Qty: 50 ML | Refills: 0 | Status: SHIPPED | OUTPATIENT
Start: 2017-02-25 | End: 2017-03-01

## 2017-02-25 RX ORDER — PROCHLORPERAZINE EDISYLATE 5 MG/ML
5 INJECTION INTRAMUSCULAR; INTRAVENOUS
Status: COMPLETED | OUTPATIENT
Start: 2017-02-25 | End: 2017-02-25

## 2017-02-25 RX ORDER — DIPHENHYDRAMINE HYDROCHLORIDE 50 MG/ML
25 INJECTION, SOLUTION INTRAMUSCULAR; INTRAVENOUS
Status: COMPLETED | OUTPATIENT
Start: 2017-02-25 | End: 2017-02-25

## 2017-02-25 RX ORDER — DIAZEPAM 10 MG/2ML
5 INJECTION INTRAMUSCULAR
Status: COMPLETED | OUTPATIENT
Start: 2017-02-25 | End: 2017-02-25

## 2017-02-25 RX ADMIN — HUMAN INSULIN 6 UNITS: 100 INJECTION, SOLUTION SUBCUTANEOUS at 10:19

## 2017-02-25 RX ADMIN — DIPHENHYDRAMINE HYDROCHLORIDE 25 MG: 50 INJECTION INTRAMUSCULAR; INTRAVENOUS at 10:07

## 2017-02-25 RX ADMIN — HYDROMORPHONE HYDROCHLORIDE 1 MG: 1 INJECTION, SOLUTION INTRAMUSCULAR; INTRAVENOUS; SUBCUTANEOUS at 11:49

## 2017-02-25 RX ADMIN — PROCHLORPERAZINE EDISYLATE 5 MG: 5 INJECTION INTRAMUSCULAR; INTRAVENOUS at 10:08

## 2017-02-25 RX ADMIN — LEVALBUTEROL HYDROCHLORIDE 1.25 MG: 1.25 SOLUTION RESPIRATORY (INHALATION) at 08:21

## 2017-02-25 RX ADMIN — SODIUM CHLORIDE 250 ML: 900 INJECTION, SOLUTION INTRAVENOUS at 10:17

## 2017-02-25 RX ADMIN — DIAZEPAM 5 MG: 5 INJECTION, SOLUTION INTRAMUSCULAR; INTRAVENOUS at 11:49

## 2017-02-25 NOTE — ED PROVIDER NOTES
HPI Comments: Renata Arrieta is a 59 y.o. female with significant PMHx of asthma, DM type 2, and hypertension, presents via EMS to the ED with c/o acute onset of vomiting x 1 day. Pt notes associated symptoms of dizziness and shortness of breath. Pt states she ate \"2 french fries and I like threw up 20\" prior to onset of symptoms. Pt reports she has Zofran at home however did not notice any relief. Pt states she has multiple inhalers at home though did not use any because she worried about vomiting. Per EMS pt's blood glucose was 399 and pt states she did not take insulin today. Pt reports she has her fentanyl patch on. Pt notes she saw her PCP 4 days ago and her fentanyl dosage was increased from 75 mg to 100 mg every 3 days. Pt denies history of abdominal surgeries, history of bowel obstructions, chest pain, any recent sick contact, tobacco use, EtOH use, or history of gastric ulcers. PCP: Kari Nixon DO      There are no other complaints, changes or physical findings at this time. Written by Jose Nuñez ED Scribe, as dictated by Nilesh Fall MD      The history is provided by the patient and the EMS personnel. Past Medical History:   Diagnosis Date    Asthma     Asthma     Diabetes mellitus type II     Hypercholesteremia     Hyperlipemia     Hypertension     Restless leg     Restless leg     Rheumatoid arthritis(714.0)     Sleep apnea     Sleep disorder     Stroke ([facility] [address]     2010       Past Surgical History:   Procedure Laterality Date    HX TONSILLECTOMY           Family History:   Problem Relation Age of Onset    Heart Disease Mother     Diabetes Mother     Hypertension Other        Social History     Social History    Marital status: SINGLE     Spouse name: N/A    Number of children: N/A    Years of education: N/A     Occupational History    Not on file.      Social History Main Topics    Smoking status: Never Smoker    Smokeless tobacco: Not on file    Alcohol use No    Drug use: No    Sexual activity: No     Other Topics Concern    Not on file     Social History Narrative         ALLERGIES: Albuterol; Aspirin; and Levaquin [levofloxacin]    Review of Systems   Constitutional: Negative for chills and fever. HENT: Negative for congestion and rhinorrhea. Eyes: Negative for visual disturbance. Respiratory: Positive for shortness of breath. Negative for cough. Cardiovascular: Negative for chest pain. Gastrointestinal: Positive for vomiting. Negative for abdominal pain. Genitourinary: Negative for difficulty urinating and dysuria. Musculoskeletal: Negative for arthralgias, back pain and neck pain. Skin: Negative for color change and rash. Neurological: Positive for dizziness. Negative for weakness and headaches. All other systems reviewed and are negative. Patient Vitals for the past 12 hrs:   Temp Pulse Resp BP SpO2   02/25/17 1200 - 81 22 150/49 93 %   02/25/17 1145 - 78 14 139/41 96 %   02/25/17 1130 - 76 14 (!) 154/38 96 %   02/25/17 1115 - 81 11 161/46 99 %   02/25/17 1110 - - - 165/48 98 %   02/25/17 1030 - 82 19 (!) 166/38 99 %   02/25/17 1021 - - - 165/54 100 %   02/25/17 0830 - - - 189/58 100 %   02/25/17 0821 - 72 - 165/54 -   02/25/17 0813 98.3 °F (36.8 °C) (!) 58 16 150/54 100 %              Physical Exam   Constitutional: She is oriented to person, place, and time. She appears well-developed and well-nourished. Speaking in full sentences   Neck: Normal range of motion. Cardiovascular: Normal rate, regular rhythm, normal heart sounds and intact distal pulses. Pulmonary/Chest: Effort normal and breath sounds normal. No respiratory distress. Abdominal: Soft. Bowel sounds are normal. There is tenderness in the epigastric area. Musculoskeletal: She exhibits no edema. Neurological: She is alert and oriented to person, place, and time. Skin: Skin is warm and dry. No jaundice   Nursing note and vitals reviewed. MDM  Number of Diagnoses or Management Options  Diagnosis management comments:     DDx: diabetes complication, dehydration, asthma exacerbation, CHF        Amount and/or Complexity of Data Reviewed  Clinical lab tests: ordered and reviewed  Tests in the radiology section of CPT®: ordered and reviewed  Tests in the medicine section of CPT®: ordered and reviewed  Obtain history from someone other than the patient: yes (EMS)  Independent visualization of images, tracings, or specimens: yes    Patient Progress  Patient progress: stable    ED Course       Procedures    EKG interpretation: (Preliminary) 0945  Rhythm: normal sinus rhythm; and regular . Rate (approx.): 68; Axis: left axis deviation; WI interval: normal; QRS interval: normal ; ST/T wave: T wave inverted; in  Lead: I, II, aVL, V4, V5 and V6; Other findings: no acute changes. LABORATORY TESTS:  Recent Results (from the past 12 hour(s))   CBC WITH AUTOMATED DIFF    Collection Time: 02/25/17  9:17 AM   Result Value Ref Range    WBC 7.9 3.6 - 11.0 K/uL    RBC 3.90 3.80 - 5.20 M/uL    HGB 11.7 11.5 - 16.0 g/dL    HCT 34.2 (L) 35.0 - 47.0 %    MCV 87.7 80.0 - 99.0 FL    MCH 30.0 26.0 - 34.0 PG    MCHC 34.2 30.0 - 36.5 g/dL    RDW 12.3 11.5 - 14.5 %    PLATELET 719 417 - 058 K/uL    NEUTROPHILS 67 32 - 75 %    LYMPHOCYTES 26 12 - 49 %    MONOCYTES 6 5 - 13 %    EOSINOPHILS 1 0 - 7 %    BASOPHILS 0 0 - 1 %    ABS. NEUTROPHILS 5.4 1.8 - 8.0 K/UL    ABS. LYMPHOCYTES 2.0 0.8 - 3.5 K/UL    ABS. MONOCYTES 0.5 0.0 - 1.0 K/UL    ABS. EOSINOPHILS 0.0 0.0 - 0.4 K/UL    ABS.  BASOPHILS 0.0 0.0 - 0.1 K/UL   URINALYSIS W/ REFLEX CULTURE    Collection Time: 02/25/17  9:17 AM   Result Value Ref Range    Color YELLOW/STRAW      Appearance CLEAR CLEAR      Specific gravity 1.010 1.003 - 1.030      pH (UA) 6.0 5.0 - 8.0      Protein TRACE (A) NEG mg/dL    Glucose >1000 (A) NEG mg/dL    Ketone 15 (A) NEG mg/dL    Bilirubin NEGATIVE  NEG      Blood NEGATIVE  NEG      Urobilinogen 0.2 0.2 - 1.0 EU/dL    Nitrites NEGATIVE  NEG      Leukocyte Esterase NEGATIVE  NEG      WBC 0-4 0 - 4 /hpf    RBC 0-5 0 - 5 /hpf    Epithelial cells FEW FEW /lpf    Bacteria NEGATIVE  NEG /hpf    UA:UC IF INDICATED CULTURE NOT INDICATED BY UA RESULT CNI     ACETONE/KETONE, QL    Collection Time: 02/25/17  9:17 AM   Result Value Ref Range    Acetone/Ketone serum, Ql. NEGATIVE  NEG        TROPONIN I    Collection Time: 02/25/17  9:17 AM   Result Value Ref Range    Troponin-I, Qt. <0.04 <0.05 ng/mL   CK W/ CKMB & INDEX    Collection Time: 02/25/17  9:17 AM   Result Value Ref Range    CK 85 26 - 192 U/L    CK - MB 1.1 <3.6 NG/ML    CK-MB Index 1.3 0.0 - 2.5     LIPASE    Collection Time: 02/25/17  9:17 AM   Result Value Ref Range    Lipase 156 73 - 770 U/L   METABOLIC PANEL, COMPREHENSIVE    Collection Time: 02/25/17  9:17 AM   Result Value Ref Range    Sodium 135 (L) 136 - 145 mmol/L    Potassium 3.9 3.5 - 5.1 mmol/L    Chloride 99 97 - 108 mmol/L    CO2 27 21 - 32 mmol/L    Anion gap 9 5 - 15 mmol/L    Glucose 395 (H) 65 - 100 mg/dL    BUN 12 6 - 20 MG/DL    Creatinine 0.75 0.55 - 1.02 MG/DL    BUN/Creatinine ratio 16 12 - 20      GFR est AA >60 >60 ml/min/1.73m2    GFR est non-AA >60 >60 ml/min/1.73m2    Calcium 8.9 8.5 - 10.1 MG/DL    Bilirubin, total 0.5 0.2 - 1.0 MG/DL    ALT (SGPT) 20 12 - 78 U/L    AST (SGOT) 11 (L) 15 - 37 U/L    Alk.  phosphatase 83 45 - 117 U/L    Protein, total 6.6 6.4 - 8.2 g/dL    Albumin 3.8 3.5 - 5.0 g/dL    Globulin 2.8 2.0 - 4.0 g/dL    A-G Ratio 1.4 1.1 - 2.2     EKG, 12 LEAD, INITIAL    Collection Time: 02/25/17  9:45 AM   Result Value Ref Range    Ventricular Rate 68 BPM    Atrial Rate 68 BPM    P-R Interval 150 ms    QRS Duration 92 ms    Q-T Interval 454 ms    QTC Calculation (Bezet) 482 ms    Calculated P Axis 18 degrees    Calculated R Axis -3 degrees    Calculated T Axis 164 degrees    Diagnosis       Normal sinus rhythm  ST & T wave abnormality, consider lateral ischemia  Abnormal ECG  When compared with ECG of 19-JAN-2017 13:59,  T wave inversion no longer evident in Inferior leads     GLUCOSE, POC    Collection Time: 02/25/17 11:10 AM   Result Value Ref Range    Glucose (POC) 312 (H) 65 - 100 mg/dL    Performed by Unkown         IMAGING RESULTS:  Clinical history: Chest pain, diabetes, hypertension  INDICATION: Chest pain and diabetes and hypertension     COMPARISON: 1/19/2017     FINDINGS:   AP semiupright view of the chest is obtained . The cardiopericardial silhouette  is stable. There is no pleural effusion, pneumothorax or focal consolidation  present.      IMPRESSION  IMPRESSION:     No acute thoracic disease.       MEDICATIONS GIVEN:  Medications   sodium chloride 0.9 % bolus infusion 250 mL (0 mL IntraVENous IV Completed 2/25/17 1125)   diphenhydrAMINE (BENADRYL) injection 25 mg (25 mg IntraVENous Given 2/25/17 1007)   prochlorperazine (COMPAZINE) injection 5 mg (5 mg IntraVENous Given 2/25/17 1008)   levalbuterol (XOPENEX) nebulizer soln 1.25 mg/3 mL (1.25 mg Nebulization Given 2/25/17 0821)   insulin regular (NOVOLIN R, HUMULIN R) injection 6 Units (6 Units IntraVENous Given 2/25/17 1019)   diazePAM (VALIUM) injection 5 mg (5 mg IntraVENous Given 2/25/17 1149)   HYDROmorphone (PF) (DILAUDID) injection 1 mg (1 mg IntraVENous Given 2/25/17 1149)       IMPRESSION:  1. Mild intermittent asthma with acute exacerbation    2. Type 2 diabetes mellitus with hyperglycemia, unspecified long term insulin use status ([facility])    3. Neuropathy of right lower extremity    4. Non-intractable vomiting with nausea, unspecified vomiting type    5. Other chronic pain        PLAN:  1. Current Discharge Medication List      START taking these medications    Details   promethazine (PHENERGAN) 6.25 mg/5 mL syrup Take 5 mL by mouth four (4) times daily as needed for Nausea (congestion, cough) for up to 4 days.   Qty: 50 mL, Refills: 0         CONTINUE these medications which have NOT CHANGED    Details   DALTON PEN NEEDLE 32 gauge x 5/32\" ndle       ONE TOUCH DELICA 33 gauge misc       HUMULIN N 100 unit/mL injection       carisoprodol (SOMA) 250 mg tablet       fentaNYL (DURAGESIC) 25 mcg/hr PATCH 1 Patch by TransDERmal route every seventy-two (72) hours. valsartan (DIOVAN) 320 mg tablet Take 1 Tab by mouth daily. Qty: 30 Tab, Refills: 12      DULoxetine (CYMBALTA) 60 mg capsule Take 60 mg by mouth daily. beclomethasone (QVAR) 80 mcg/actuation inhaler Take 1 Puff by inhalation two (2) times a day. oxyCODONE-acetaminophen (PERCOCET) 7.5-325 mg per tablet Take 1 Tab by mouth three (3) times daily as needed for Pain. docusate sodium (STOOL SOFTENER) 100 mg capsule Take 100 mg by mouth daily. lubiPROStone (AMITIZA) 24 mcg capsule Take 24 mcg by mouth two (2) times daily (with meals). ondansetron (ZOFRAN ODT) 4 mg disintegrating tablet Take 4 mg by mouth two (2) times daily as needed for Nausea. ibuprofen (MOTRIN) 800 mg tablet Take 1 Tab by mouth every eight (8) hours as needed for Pain. Take it as scheduled for one week only  Qty: 1 Tab, Refills: 0      furosemide (LASIX) 20 mg tablet Take 1 Tab by mouth daily. Qty: 30 Tab, Refills: 1      potassium chloride SR (KLOR-CON 10) 10 mEq tablet Take 1 Tab by mouth daily. Qty: 30 Tab, Refills: 1      lidocaine (LIDODERM) 5 % 3 Patches by TransDERmal route every twelve (12) hours. Apply patch to the affected area for 12 hours a day and remove for 12 hours a day. mometasone-formoterol (DULERA) 200-5 mcg/actuation HFA inhaler Take 2 Puffs by inhalation two (2) times a day. atorvastatin (LIPITOR) 40 mg tablet Take 40 mg by mouth daily. omalizumab (XOLAIR) 150 mg solr solution 150 mg by SubCUTAneous route every fourteen (14) days. ezetimibe (ZETIA) 10 mg tablet Take 10 mg by mouth daily. insulin aspart (NOVOLOG) 100 unit/mL injection by SubCUTAneous route.  Indications: sliding      montelukast (SINGULAIR) 10 mg tablet Take 10 mg by mouth nightly. diazePAM (VALIUM) 2 mg tablet Take 1 Tab by mouth every eight (8) hours as needed for Anxiety (spasm). Max Daily Amount: 6 mg. Qty: 8 Tab, Refills: 0      levalbuterol tartrate (XOPENEX HFA) 45 mcg/Actuation inhaler Take 2 Puffs by inhalation four (4) times daily. levalbuterol hcl (XOPENEX) 0.63 mg/3 mL nebulization 0.63 mg by Nebulization route once as needed. 2.   Follow-up Information     Follow up With Details Comments Contact Info    [facility] EMERGENCY DEPT  If symptoms worsen [address] [facility] [id number] [place] [address], DO Call in 3 days  [address]  [address] [facility]  [phone]      [facility] Schedule an appointment as soon as possible for a visit  [place]  [phone]        Return to ED if worse     DISCHARGE NOTE  12:38 PM  The patient has been re-evaluated and is ready for discharge. Reviewed available results with patient. Counseled pt on diagnosis and care plan. Pt has expressed understanding, and all questions have been answered. Pt agrees with plan and agrees to F/U as recommended, or return to the ED if their sxs worsen. Discharge instructions have been provided and explained to the pt, along with reasons to return to the ED. Written by Adrienne Santizo, ED Scribe, as dictated by Kavita Simon MD.      This note is prepared by Adrienne Santizo, acting as Scribe for Kavita Simon MD.    Kavita Simon MD : The scribe's documentation has been prepared under my direction and personally reviewed by me in its entirety. I confirm that the note above accurately reflects all work, treatment, procedures, and medical decision making performed by me.

## 2017-02-25 NOTE — ED NOTES
Pt is difficult access. Respiratory at bedside performing art stick for labs. Dr. Paula Man made aware of IV access difficulty.  At this time No IV has been established

## 2017-02-25 NOTE — DISCHARGE INSTRUCTIONS
Learning About Asthma Triggers  What are triggers? When you have asthma, certain things can make your symptoms worse. These are called triggers. They include:  · Cigarette smoke or air pollution. · Things you are allergic to, such as:  ¨ Pollen, mold, or dust mites. ¨ Pet hair, skin, or saliva. · Illnesses, like colds, flu, or pneumonia. · Exercise. · Dry, cold air. How do triggers affect asthma? Triggers can make it harder for your lungs to work as they should and can lead to sudden difficulty breathing and other symptoms. When you are around a trigger, an asthma attack is more likely. If your symptoms are severe, you may need emergency treatment or have to go to the hospital for treatment. If you know what your triggers are and can avoid them, you may be able to prevent asthma attacks, reduce how often you have them, and make them less severe. What can you do to avoid triggers? The first thing is to know your triggers. When you are having symptoms, note the things around you that might be causing them. Then look for patterns in what may be triggering your symptoms. Record your triggers on a piece of paper or in an asthma diary. When you have your list of possible triggers, work with your doctor to find ways to avoid them. You also can check how well your lungs are working by measuring your peak expiratory flow (PEF) throughout the day. Your PEF may drop when you are near things that trigger symptoms. Here are some ways to avoid a few common triggers. · Do not smoke or allow others to smoke around you. If you need help quitting, talk to your doctor about stop-smoking programs and medicines. These can increase your chances of quitting for good. · If there is a lot of pollution, pollen, or dust outside, stay at home and keep your windows closed. Use an air conditioner or air filter in your home. Check your local weather report or newspaper for air quality and pollen reports.   · Get a flu shot every year. Talk to your doctor about getting a pneumococcal shot. Wash your hands often to prevent infections. · Avoid exercising outdoors in cold weather. If you are outdoors in cold weather, wear a scarf around your face and breathe through your nose. How can you manage an asthma attack? · If you have an asthma action plan, follow the plan. In general:  ¨ Use your quick-relief inhaler as directed by your doctor. If your symptoms do not get better after you use your medicine, have someone take you to the emergency room. Call an ambulance if needed. ¨ If your doctor has given you other inhaled medicines or steroid pills, take them as directed. Where can you learn more? Go to Har[employer]  Enter M564 in the search box to learn more about \"Learning About Asthma Triggers. \"   © 5591-6238 [employer]. Care instructions adapted under license by [employer] (which disclaims liability or warranty for this information). This care instruction is for use with your licensed healthcare professional. If you have questions about a medical condition or this instruction, always ask your healthcare professional. [place] [address] any warranty or liability for your use of this information. Content Version: 87.5.157543; Last Revised: February 23, 2012                   Diabetes Sick-Day Plan: Care Instructions  Your Care Instructions  If you have diabetes, many other illnesses can make your blood sugar go up. This can be dangerous. When you are sick with the flu or another illness, your body releases hormones to fight infection. These hormones raise blood sugar levels and make it hard for insulin or other medicines to lower your blood sugar. Work with your doctor to make a plan for what to do on days when you are sick. Follow-up care is a key part of your treatment and safety. Be sure to make and go to all appointments, and call your doctor if you are having problems.  It's also a good idea to know your test results and keep a list of the medicines you take. How can you care for yourself at home? · Work with your doctor to write up a sick-day plan for what to do on days when you are sick. Your blood sugar can go up or down, depending on your illness and whether you can keep food down. Call your doctor when you are sick, to see if you need to adjust your pills or insulin. · Write down the diabetes medicines you have been taking and whether you have changed the dose based on your sick-day plan. Have this information ready when you call your doctor. · Eat your normal types and amounts of food. Drink extra fluids, such as water, broth, and fruit juice, to prevent dehydration. ¨ If your blood sugar level is higher than the blood sugar level your doctor recommends (for example, above 240 milligrams per deciliter [mg/dL]), drink extra liquids that do not contain sugar, such as water or sugar-free cola. ¨ If you cannot eat your usual foods, drink extra liquids, such as soup, sports drinks, or milk. You may also eat food that is gentle on the stomach, such as crackers, gelatin dessert, or applesauce. Try to eat or drink 50 grams of carbohydrate every 3 to 4 hours. For example, 6 saltine crackers, 1 cup (8 ounces) of milk, and ½ cup (4 ounces) of orange juice each contain about 15 grams of carbohydrate. · Check your blood sugar at least every 3 to 4 hours. If it goes up fast, check it more often. And check it even through the night. Take insulin if your doctor told you to do so. If you and your doctor did not have a sick-day plan for taking extra insulin, call him or her for advice. · If you take insulin, check your urine or blood for ketones. This is especially important if your blood sugar is high. · Do not take any over-the-counter medicines, such as pain relievers, decongestants, or herbal products or other natural medicines, without talking with your doctor first.  · Do not drive. If you need to see your doctor or go anywhere else, ask a family member or friend to drive you. When should you call for help? Call 911 anytime you think you may need emergency care. For example, call if:  · You passed out (lost consciousness), or you suddenly become very sleepy or confused. (You may have very low blood sugar.)  · You have symptoms of high blood sugar, such as:  ¨ Blurred vision. ¨ Trouble staying awake or being woken up. ¨ Fast, deep breathing. ¨ Breath that smells fruity. ¨ Belly pain, not feeling hungry, and vomiting. ¨ Feeling confused. Call your doctor now or seek immediate medical care if:  · You are sick and cannot control your blood sugar. · You have been vomiting or have had diarrhea for more than 6 hours. · Your blood sugar stays higher than the level your doctor has set for you. · You have symptoms of low blood sugar, such as:  ¨ Sweating. ¨ Feeling nervous, shaky, and weak. ¨ Extreme hunger and slight nausea. ¨ Dizziness and headache. ¨ Blurred vision. ¨ Confusion. Watch closely for changes in your health, and be sure to contact your doctor if:  · You have a hard time knowing when your blood sugar is low. · You have trouble keeping your blood sugar in the target range. · You often have problems controlling your blood sugar. · You have symptoms of long-term diabetes problems, such as:  ¨ New vision changes. ¨ New pain, numbness, or tingling in your hands or feet. ¨ Skin problems. Where can you learn more? Go to http://emily-janette.info/. Enter J771 in the search box to learn more about \"Diabetes Sick-Day Plan: Care Instructions. \"  Current as of: May 23, 2016  Content Version: 11.1  © 0008-0908 [employer]. Care instructions adapted under license by [employer] (which disclaims liability or warranty for this information).  If you have questions about a medical condition or this instruction, always ask your healthcare professional. [address] any warranty or liability for your use of this information. Diabetic Nephropathy: Care Instructions  Your Care Instructions  Finding out that your kidneys have been damaged can be very distressing. It may have taken you by surprise, since damage to kidneys usually does not cause symptoms early on. It is normal to feel upset and afraid. Having diabetic nephropathy means that for some time you have had high blood sugar, which damages the kidneys. Healthy kidneys keep protein in your blood, where it belongs. Damaged kidneys do not work the way they should. Your kidneys are letting protein pass into your urine. Sometimes diabetic kidney disease can lead to kidney failure. Your doctor will tell you how you might be able to slow damage to your kidneys. In many cases, prompt and regular treatment can prevent kidney failure. You will need to take medicine and may need to make a number of changes in your normal routines. If you can keep your blood sugar and blood pressure under control and take certain medicines, you may reduce your chance of kidney failure. Follow-up care is a key part of your treatment and safety. Be sure to make and go to all appointments, and call your doctor if you are having problems. It's also a good idea to know your test results and keep a list of the medicines you take. How can you care for yourself at home? · Take your medicines exactly as prescribed. It is very important that you take your insulin or other diabetes medicine as your doctor tells you. Call your doctor if you think you are having a problem with your medicine. · Try to keep blood sugar in your target range. ¨ Eat a variety of foods, with carbohydrate spread out in your meals. Your doctor may restrict your protein. A dietitian can help you plan meals. ¨ If your doctor recommends it, get more exercise. Walking is a good choice.  Bit by bit, increase the amount you walk every day. Try for at least 30 minutes on most days of the week. ¨ Check your blood sugar as often as your doctor recommends. · Take and record your blood pressure at home if your doctor tells you to. Learn the importance of the two measures of blood pressure (such as 130 over 80, or 130/80). To take your blood pressure at home:  ¨ Ask your doctor to check your blood pressure monitor. He or she can make sure that it is accurate and that the cuff fits you. Also ask your doctor to watch you to make sure that you are using it right. ¨ Do not eat, use tobacco products, or use medicine known to raise blood pressure (such as some nasal decongestant sprays) before taking your blood pressure. ¨ Avoid taking your blood pressure if you have just exercised or are nervous or upset. Rest at least 15 minutes before taking a reading. · Eat a low-salt diet to help keep your blood pressure in your target range. · Do not smoke. Smoking raises your risk of many health problems, including diabetic nephropathy. If you need help quitting, talk to your doctor about stop-smoking programs and medicines. These can increase your chances of quitting for good. · Do not take ibuprofen, naproxen, or similar medicines, unless your doctor tells you to. These medicines may make kidney problems worse. When should you call for help? Call 911 anytime you think you may need emergency care. For example, call if:  · You passed out (lost consciousness). Call your doctor now or seek immediate medical care if:  · You have not urinated at all in the last 24 hours. · You have trouble urinating or can urinate only very small amounts. · You are confused or have trouble thinking clearly. · You are very thirsty, lightheaded, or dizzy, or you feel like you may pass out (lose consciousness). · You have a weak, fast heartbeat. · You have swelling in your hands or feet. · You have blood in your urine. · You are extremely tired or weak.   Watch closely for changes in your health, and be sure to contact your doctor if you have any problems. Where can you learn more? Go to http://emily-janette.info/. Enter P361 in the search box to learn more about \"Diabetic Nephropathy: Care Instructions. \"  Current as of: April 5, 2016  Content Version: 11.1  © 2006-2016 [employer]. Care instructions adapted under license by [employer] (which disclaims liability or warranty for this information). If you have questions about a medical condition or this instruction, always ask your healthcare professional. [place] [address] any warranty or liability for your use of this information. Nausea and Vomiting: Care Instructions  Your Care Instructions    When you are nauseated, you may feel weak and sweaty and notice a lot of saliva in your mouth. Nausea often leads to vomiting. Most of the time you do not need to worry about nausea and vomiting, but they can be signs of other illnesses. Two common causes of nausea and vomiting are stomach flu and food poisoning. Nausea and vomiting from viral stomach flu will usually start to improve within 24 hours. Nausea and vomiting from food poisoning may last from 12 to 48 hours. The doctor has checked you carefully, but problems can develop later. If you notice any problems or new symptoms, get medical treatment right away. Follow-up care is a key part of your treatment and safety. Be sure to make and go to all appointments, and call your doctor if you are having problems. It's also a good idea to know your test results and keep a list of the medicines you take. How can you care for yourself at home? · To prevent dehydration, drink plenty of fluids, enough so that your urine is light yellow or clear like water. Choose water and other caffeine-free clear liquids until you feel better.  If you have kidney, heart, or liver disease and have to limit fluids, talk with your doctor before you increase the amount of fluids you drink. · Rest in bed until you feel better. · When you are able to eat, try clear soups, mild foods, and liquids until all symptoms are gone for 12 to 48 hours. Other good choices include dry toast, crackers, cooked cereal, and gelatin dessert, such as Jell-O. When should you call for help? Call 911 anytime you think you may need emergency care. For example, call if:  · You passed out (lost consciousness). Call your doctor now or seek immediate medical care if:  · You have symptoms of dehydration, such as:  ¨ Dry eyes and a dry mouth. ¨ Passing only a little dark urine. ¨ Feeling thirstier than usual.  · You have new or worsening belly pain. · You have a new or higher fever. · You vomit blood or what looks like coffee grounds. Watch closely for changes in your health, and be sure to contact your doctor if:  · You have ongoing nausea and vomiting. · Your vomiting is getting worse. · Your vomiting lasts longer than 2 days. · You are not getting better as expected. Where can you learn more? Go to http://emily-janette.info/. Enter 25 [id number] in the search box to learn more about \"Nausea and Vomiting: Care Instructions. \"  Current as of: May 27, 2016  Content Version: 11.1  © 5082-1375 [employer]. Care instructions adapted under license by [employer] (which disclaims liability or warranty for this information). If you have questions about a medical condition or this instruction, always ask your healthcare professional. [place] [address] any warranty or liability for your use of this information.

## 2017-02-25 NOTE — ED NOTES
PIV removed intact and pt & family accepted plan of care. Pt W/C out to car and assisted into vehicle by this writer. Patient (s)  given copy of dc instructions and 1 script(s). Patient(s)  verbalized understanding of instructions and script (s). Patient given a current medication reconciliation form and verbalized understanding of their medications. Patient (s) verbalized understanding of the importance of discussing medications with  his or her physician or clinic when they follow up. Patient alert and oriented and in no acute distress. Pt verbalizes pain scale of 3 out of 10. Patient discharged home ambulatory with family.

## 2017-02-25 NOTE — ED NOTES
According to EMS pt c/o vomiting and SOB. Pt also has chronic rt leg pain. Pt accu check was 399 this am. Pt has not taken insulin r/t vomiting. Emergency Department Nursing Plan of Care       The Nursing Plan of Care is developed from the Nursing assessment and Emergency Department Attending provider initial evaluation. The plan of care may be reviewed in the ED Provider note.     The Plan of Care was developed with the following considerations:   Patient / Family readiness to learn indicated by:verbalized understanding  Persons(s) to be included in education: patient  Barriers to Learning/Limitations:No    Signed     Anahy Smith RN    2/25/2017   8:01 AM

## 2017-04-05 ENCOUNTER — HOSPITAL ENCOUNTER (EMERGENCY)
Age: 57
Discharge: HOME | End: 2017-04-05
Attending: EMERGENCY MEDICINE
Payer: MEDICARE

## 2017-04-05 VITALS
BODY MASS INDEX: 36.44 KG/M2 | RESPIRATION RATE: 18 BRPM | SYSTOLIC BLOOD PRESSURE: 166 MMHG | HEIGHT: 61 IN | DIASTOLIC BLOOD PRESSURE: 80 MMHG | OXYGEN SATURATION: 100 % | TEMPERATURE: 98.5 F | HEART RATE: 81 BPM | WEIGHT: 193 LBS

## 2017-04-05 DIAGNOSIS — R11.2 NON-INTRACTABLE VOMITING WITH NAUSEA, UNSPECIFIED VOMITING TYPE: Primary | ICD-10-CM

## 2017-04-05 LAB
ALBUMIN SERPL BCP-MCNC: 4 G/DL (ref 3.5–5)
ALBUMIN/GLOB SERPL: 1.2 {RATIO} (ref 1.1–2.2)
ALP SERPL-CCNC: 79 U/L (ref 45–117)
ALT SERPL-CCNC: 21 U/L (ref 12–78)
ANION GAP BLD CALC-SCNC: 12 MMOL/L (ref 5–15)
APPEARANCE UR: CLEAR
AST SERPL W P-5'-P-CCNC: 16 U/L (ref 15–37)
BACTERIA URNS QL MICRO: NEGATIVE /HPF
BASOPHILS # BLD AUTO: 0 K/UL (ref 0–0.1)
BASOPHILS # BLD: 0 % (ref 0–1)
BILIRUB SERPL-MCNC: 0.7 MG/DL (ref 0.2–1)
BILIRUB UR QL: NEGATIVE
BUN SERPL-MCNC: 14 MG/DL (ref 6–20)
BUN/CREAT SERPL: 17 (ref 12–20)
CALCIUM SERPL-MCNC: 9.6 MG/DL (ref 8.5–10.1)
CHLORIDE SERPL-SCNC: 100 MMOL/L (ref 97–108)
CO2 SERPL-SCNC: 28 MMOL/L (ref 21–32)
COLOR UR: ABNORMAL
CREAT SERPL-MCNC: 0.84 MG/DL (ref 0.55–1.02)
DEPRECATED S PYO AG THROAT QL EIA: NEGATIVE
EOSINOPHIL # BLD: 0 K/UL (ref 0–0.4)
EOSINOPHIL NFR BLD: 0 % (ref 0–7)
EPITH CASTS URNS QL MICRO: ABNORMAL /LPF
ERYTHROCYTE [DISTWIDTH] IN BLOOD BY AUTOMATED COUNT: 11.6 % (ref 11.5–14.5)
FLUAV AG NPH QL IA: NEGATIVE
FLUBV AG NOSE QL IA: NEGATIVE
GLOBULIN SER CALC-MCNC: 3.4 G/DL (ref 2–4)
GLUCOSE SERPL-MCNC: 219 MG/DL (ref 65–100)
GLUCOSE UR STRIP.AUTO-MCNC: 100 MG/DL
HCT VFR BLD AUTO: 35.4 % (ref 35–47)
HGB BLD-MCNC: 12.7 G/DL (ref 11.5–16)
HGB UR QL STRIP: ABNORMAL
KETONES UR QL STRIP.AUTO: 40 MG/DL
LEUKOCYTE ESTERASE UR QL STRIP.AUTO: NEGATIVE
LYMPHOCYTES # BLD AUTO: 25 % (ref 12–49)
LYMPHOCYTES # BLD: 1.6 K/UL (ref 0.8–3.5)
MCH RBC QN AUTO: 31 PG (ref 26–34)
MCHC RBC AUTO-ENTMCNC: 35.9 G/DL (ref 30–36.5)
MCV RBC AUTO: 86.3 FL (ref 80–99)
MONOCYTES # BLD: 0.4 K/UL (ref 0–1)
MONOCYTES NFR BLD AUTO: 7 % (ref 5–13)
NEUTS SEG # BLD: 4.3 K/UL (ref 1.8–8)
NEUTS SEG NFR BLD AUTO: 68 % (ref 32–75)
NITRITE UR QL STRIP.AUTO: NEGATIVE
PH UR STRIP: 7 [PH] (ref 5–8)
PLATELET # BLD AUTO: 226 K/UL (ref 150–400)
POTASSIUM SERPL-SCNC: 3.8 MMOL/L (ref 3.5–5.1)
PROT SERPL-MCNC: 7.4 G/DL (ref 6.4–8.2)
PROT UR STRIP-MCNC: 100 MG/DL
RBC # BLD AUTO: 4.1 M/UL (ref 3.8–5.2)
RBC #/AREA URNS HPF: ABNORMAL /HPF (ref 0–5)
SODIUM SERPL-SCNC: 140 MMOL/L (ref 136–145)
SP GR UR REFRACTOMETRY: 1.02 (ref 1–1.03)
UA: UC IF INDICATED,UAUC: ABNORMAL
UROBILINOGEN UR QL STRIP.AUTO: 1 EU/DL (ref 0.2–1)
WBC # BLD AUTO: 6.3 K/UL (ref 3.6–11)
WBC URNS QL MICRO: ABNORMAL /HPF (ref 0–4)

## 2017-04-05 PROCEDURE — 96374 THER/PROPH/DIAG INJ IV PUSH: CPT

## 2017-04-05 PROCEDURE — 36415 COLL VENOUS BLD VENIPUNCTURE: CPT | Performed by: PHYSICIAN ASSISTANT

## 2017-04-05 PROCEDURE — 96361 HYDRATE IV INFUSION ADD-ON: CPT

## 2017-04-05 PROCEDURE — 81001 URINALYSIS AUTO W/SCOPE: CPT | Performed by: PHYSICIAN ASSISTANT

## 2017-04-05 PROCEDURE — 85025 COMPLETE CBC W/AUTO DIFF WBC: CPT | Performed by: PHYSICIAN ASSISTANT

## 2017-04-05 PROCEDURE — 80053 COMPREHEN METABOLIC PANEL: CPT | Performed by: PHYSICIAN ASSISTANT

## 2017-04-05 PROCEDURE — 96375 TX/PRO/DX INJ NEW DRUG ADDON: CPT

## 2017-04-05 PROCEDURE — 96376 TX/PRO/DX INJ SAME DRUG ADON: CPT

## 2017-04-05 PROCEDURE — 74011250636 HC RX REV CODE- 250/636: Performed by: PHYSICIAN ASSISTANT

## 2017-04-05 PROCEDURE — 87804 INFLUENZA ASSAY W/OPTIC: CPT | Performed by: PHYSICIAN ASSISTANT

## 2017-04-05 PROCEDURE — 87880 STREP A ASSAY W/OPTIC: CPT | Performed by: PHYSICIAN ASSISTANT

## 2017-04-05 PROCEDURE — 74011250636 HC RX REV CODE- 250/636: Performed by: EMERGENCY MEDICINE

## 2017-04-05 PROCEDURE — 87070 CULTURE OTHR SPECIMN AEROBIC: CPT | Performed by: EMERGENCY MEDICINE

## 2017-04-05 PROCEDURE — 99285 EMERGENCY DEPT VISIT HI MDM: CPT

## 2017-04-05 RX ORDER — SODIUM CHLORIDE 0.9 % (FLUSH) 0.9 %
5-10 SYRINGE (ML) INJECTION EVERY 8 HOURS
Status: DISCONTINUED | OUTPATIENT
Start: 2017-04-05 | End: 2017-04-05 | Stop reason: HOSPADM

## 2017-04-05 RX ORDER — GABAPENTIN 300 MG/1
300 CAPSULE ORAL 3 TIMES DAILY
COMMUNITY
End: 2019-08-27 | Stop reason: SDUPTHER

## 2017-04-05 RX ORDER — PROMETHAZINE HYDROCHLORIDE 25 MG/1
25 TABLET ORAL
Qty: 12 TAB | Refills: 0 | Status: SHIPPED | OUTPATIENT
Start: 2017-04-05 | End: 2018-05-16

## 2017-04-05 RX ORDER — PROCHLORPERAZINE EDISYLATE 5 MG/ML
10 INJECTION INTRAMUSCULAR; INTRAVENOUS
Status: COMPLETED | OUTPATIENT
Start: 2017-04-05 | End: 2017-04-05

## 2017-04-05 RX ORDER — HYDROMORPHONE HYDROCHLORIDE 1 MG/ML
0.2 INJECTION, SOLUTION INTRAMUSCULAR; INTRAVENOUS; SUBCUTANEOUS
Status: COMPLETED | OUTPATIENT
Start: 2017-04-05 | End: 2017-04-05

## 2017-04-05 RX ORDER — MORPHINE SULFATE 2 MG/ML
4 INJECTION, SOLUTION INTRAMUSCULAR; INTRAVENOUS
Status: COMPLETED | OUTPATIENT
Start: 2017-04-05 | End: 2017-04-05

## 2017-04-05 RX ORDER — DIPHENHYDRAMINE HYDROCHLORIDE 50 MG/ML
12.5 INJECTION, SOLUTION INTRAMUSCULAR; INTRAVENOUS
Status: COMPLETED | OUTPATIENT
Start: 2017-04-05 | End: 2017-04-05

## 2017-04-05 RX ORDER — DIPHENHYDRAMINE HYDROCHLORIDE 50 MG/ML
25 INJECTION, SOLUTION INTRAMUSCULAR; INTRAVENOUS
Status: COMPLETED | OUTPATIENT
Start: 2017-04-05 | End: 2017-04-05

## 2017-04-05 RX ORDER — HYDROMORPHONE HYDROCHLORIDE 1 MG/ML
0.25 INJECTION, SOLUTION INTRAMUSCULAR; INTRAVENOUS; SUBCUTANEOUS
Status: COMPLETED | OUTPATIENT
Start: 2017-04-05 | End: 2017-04-05

## 2017-04-05 RX ORDER — PROMETHAZINE HYDROCHLORIDE 25 MG/1
25 TABLET ORAL
Qty: 12 TAB | Refills: 0 | Status: SHIPPED | OUTPATIENT
Start: 2017-04-05 | End: 2017-04-05

## 2017-04-05 RX ORDER — SODIUM CHLORIDE 0.9 % (FLUSH) 0.9 %
5-10 SYRINGE (ML) INJECTION AS NEEDED
Status: DISCONTINUED | OUTPATIENT
Start: 2017-04-05 | End: 2017-04-05 | Stop reason: HOSPADM

## 2017-04-05 RX ADMIN — HYDROMORPHONE HYDROCHLORIDE 0.2 MG: 1 INJECTION, SOLUTION INTRAMUSCULAR; INTRAVENOUS; SUBCUTANEOUS at 15:50

## 2017-04-05 RX ADMIN — Medication 4 MG: at 15:26

## 2017-04-05 RX ADMIN — SODIUM CHLORIDE 1000 ML: 900 INJECTION, SOLUTION INTRAVENOUS at 14:26

## 2017-04-05 RX ADMIN — DIPHENHYDRAMINE HYDROCHLORIDE 12.5 MG: 50 INJECTION INTRAMUSCULAR; INTRAVENOUS at 14:26

## 2017-04-05 RX ADMIN — DIPHENHYDRAMINE HYDROCHLORIDE 25 MG: 50 INJECTION INTRAMUSCULAR; INTRAVENOUS at 15:48

## 2017-04-05 RX ADMIN — HYDROMORPHONE HYDROCHLORIDE 0.25 MG: 1 INJECTION, SOLUTION INTRAMUSCULAR; INTRAVENOUS; SUBCUTANEOUS at 17:01

## 2017-04-05 RX ADMIN — PROCHLORPERAZINE EDISYLATE 10 MG: 5 INJECTION INTRAMUSCULAR; INTRAVENOUS at 14:27

## 2017-04-05 NOTE — ED PROVIDER NOTES
Patient is a 59 y.o. female presenting with vomiting and headaches. Vomiting    Associated symptoms include headaches, arthralgias (chronic burning to Right leg. ) and headaches. Pertinent negatives include no chills, no abdominal pain and no diarrhea. Headache    Associated symptoms include vomiting. Pertinent negatives include no palpitations, no shortness of breath, no weakness, no dizziness and no nausea. To ED with complaints of vomiting since last PM. Multiple episodes of clear vomiting and dry heaves. Was not able to keep down her zofran. Has had episodes of similar vomiting in the past. No recent change in medications. Was not able to keep down her chronic percocet which she takes for her chronic Right leg pain. Notes that she has had multiple test for there leg \"but no one can tell what it is from\". No CP, no Fever, no SOB. Wonders if has influenza as her grandson did last week. Slight all over headache. No head trauma. No vision changes. Past Medical History:   Diagnosis Date    Asthma     Asthma     Diabetes mellitus type II     Hypercholesteremia     Hyperlipemia     Hypertension     Restless leg     Restless leg     [facility]umatoid arthritis ([facility] [address]     Sleep apnea     Sleep disorder     Stroke ([facility] [address]     2010       Past Surgical History:   Procedure Laterality Date    HX TONSILLECTOMY           Family History:   Problem Relation Age of Onset    Heart Disease Mother     Diabetes Mother     Hypertension Other        Social History     Social History    Marital status: SINGLE     Spouse name: N/A    Number of children: N/A    Years of education: N/A     Occupational History    Not on file.      Social History Main Topics    Smoking status: Never Smoker    Smokeless tobacco: Not on file    Alcohol use No    Drug use: No    Sexual activity: No     Other Topics Concern    Not on file     Social History Narrative         ALLERGIES: Albuterol; Aspirin; and Levaquin [levofloxacin]    Review of Systems   Constitutional: Negative for activity change, chills, diaphoresis and fatigue. HENT: Negative for congestion, ear pain, nosebleeds, postnasal drip, sore throat, trouble swallowing and voice change. Eyes: Negative for discharge, itching and visual disturbance. Respiratory: Negative for choking, chest tightness, shortness of breath and wheezing. Cardiovascular: Negative for chest pain, palpitations and leg swelling. Gastrointestinal: Positive for vomiting. Negative for abdominal pain, diarrhea and nausea. Genitourinary: Negative for difficulty urinating, dysuria, flank pain, hematuria and urgency. Musculoskeletal: Positive for arthralgias (chronic burning to Right leg. ). Negative for back pain, neck pain and neck stiffness. Skin: Negative for pallor and rash. Neurological: Positive for headaches. Negative for dizziness, tremors, seizures, syncope, facial asymmetry, speech difficulty and weakness. Psychiatric/Behavioral: Negative for agitation, confusion, self-injury, sleep disturbance and suicidal ideas. All other systems reviewed and are negative. Vitals:    04/05/17 1258 04/05/17 1436 04/05/17 1547   BP: 176/54 161/77 (!) 183/103   Pulse: 73 89 89   Resp: 19 18 18   Temp: 98.5 °F (36.9 °C)     SpO2: 98% 99% 100%   Weight: 87.5 kg (193 lb)     Height: 5' 1\" (1.549 m)              Physical Exam   Constitutional: She is oriented to person, place, and time. She appears well-developed and well-nourished. HENT:   Head: Normocephalic. Right Ear: External ear normal.   Left Ear: External ear normal.   Mouth/Throat: Oropharynx is clear and moist.   Eyes: Pupils are equal, round, and reactive to light. Neck: Normal range of motion. Neck supple. Cardiovascular: Normal rate, regular rhythm and normal heart sounds. Pulmonary/Chest: Effort normal. She has no wheezes. She has no rales. She exhibits no tenderness. Abdominal: Soft.  There is no tenderness. There is no rebound and no guarding. Musculoskeletal: Normal range of motion. Right leg:  Diffuse tenderness with very slight touch. No specific calf tenderness. No erythema or skin lesions  Distal NVI with palp pedal pulses. Lymphadenopathy:     She has no cervical adenopathy. Neurological: She is alert and oriented to person, place, and time. No cranial nerve deficit. Coordination normal.   Skin: Skin is warm and dry. Psychiatric:   Anxious affect. Tearful at times. Cries out in pain at times. Nursing note and vitals reviewed. MDM  Number of Diagnoses or Management Options  Non-intractable vomiting with nausea, unspecified vomiting type:   Diagnosis management comments: DDX: gastroparesis, viral syndrome, gastroenteritis, URI, UTI,     ED Course       Procedures      Seen by Dr. Marisol Espinoza of right leg pain   Fentanyl patch off as of this am per pt.        4:41 PM  Feeling \"a tiny bit\" better. No vomiting in ED  Has had some sips, tolerating well. No nausea  Pain slightly better with Dilaudid. D/W Dr. Lisa Reyna. Labs reviewed. Give another dose pain meds, then home. [facility] LABORATORY TESTS:  Recent Results (from the past 12 hour(s))   CBC WITH AUTOMATED DIFF    Collection Time: 04/05/17  2:10 PM   Result Value Ref Range    WBC 6.3 3.6 - 11.0 K/uL    RBC 4.10 3.80 - 5.20 M/uL    HGB 12.7 11.5 - 16.0 g/dL    HCT 35.4 35.0 - 47.0 %    MCV 86.3 80.0 - 99.0 FL    MCH 31.0 26.0 - 34.0 PG    MCHC 35.9 30.0 - 36.5 g/dL    RDW 11.6 11.5 - 14.5 %    PLATELET 941 561 - 626 K/uL    NEUTROPHILS 68 32 - 75 %    LYMPHOCYTES 25 12 - 49 %    MONOCYTES 7 5 - 13 %    EOSINOPHILS 0 0 - 7 %    BASOPHILS 0 0 - 1 %    ABS. NEUTROPHILS 4.3 1.8 - 8.0 K/UL    ABS. LYMPHOCYTES 1.6 0.8 - 3.5 K/UL    ABS. MONOCYTES 0.4 0.0 - 1.0 K/UL    ABS. EOSINOPHILS 0.0 0.0 - 0.4 K/UL    ABS.  BASOPHILS 0.0 0.0 - 0.1 K/UL   METABOLIC PANEL, COMPREHENSIVE    Collection Time: 04/05/17  2:10 PM   Result Value Ref Range    Sodium 140 136 - 145 mmol/L    Potassium 3.8 3.5 - 5.1 mmol/L    Chloride 100 97 - 108 mmol/L    CO2 28 21 - 32 mmol/L    Anion gap 12 5 - 15 mmol/L    Glucose 219 (H) 65 - 100 mg/dL    BUN 14 6 - 20 MG/DL    Creatinine 0.84 0.55 - 1.02 MG/DL    BUN/Creatinine ratio 17 12 - 20      GFR est AA >60 >60 ml/min/1.73m2    GFR est non-AA >60 >60 ml/min/1.73m2    Calcium 9.6 8.5 - 10.1 MG/DL    Bilirubin, total 0.7 0.2 - 1.0 MG/DL    ALT (SGPT) 21 12 - 78 U/L    AST (SGOT) 16 15 - 37 U/L    Alk.  phosphatase 79 45 - 117 U/L    Protein, total 7.4 6.4 - 8.2 g/dL    Albumin 4.0 3.5 - 5.0 g/dL    Globulin 3.4 2.0 - 4.0 g/dL    A-G Ratio 1.2 1.1 - 2.2     URINALYSIS W/ REFLEX CULTURE    Collection Time: 04/05/17  2:10 PM   Result Value Ref Range    Color YELLOW/STRAW      Appearance CLEAR CLEAR      Specific gravity 1.020 1.003 - 1.030      pH (UA) 7.0 5.0 - 8.0      Protein 100 (A) NEG mg/dL    Glucose 100 (A) NEG mg/dL    Ketone 40 (A) NEG mg/dL    Bilirubin NEGATIVE  NEG      Blood TRACE (A) NEG      Urobilinogen 1.0 0.2 - 1.0 EU/dL    Nitrites NEGATIVE  NEG      Leukocyte Esterase NEGATIVE  NEG      WBC 0-4 0 - 4 /hpf    RBC 0-5 0 - 5 /hpf    Epithelial cells MODERATE (A) FEW /lpf    Bacteria NEGATIVE  NEG /hpf    UA:UC IF INDICATED CULTURE NOT INDICATED BY UA RESULT CNI     INFLUENZA A & B AG (RAPID TEST)    Collection Time: 04/05/17  2:10 PM   Result Value Ref Range    Influenza A Antigen NEGATIVE  NEG      Influenza B Antigen NEGATIVE  NEG     STREP AG SCREEN, GROUP A    Collection Time: 04/05/17  2:10 PM   Result Value Ref Range    Group A Strep Ag ID NEGATIVE  NEG         IMAGING RESULTS:  No orders to display       MEDICATIONS GIVEN:  Medications   sodium chloride 0.9 % bolus infusion 1,000 mL (0 mL IntraVENous IV Completed 4/5/17 5548)   prochlorperazine (COMPAZINE) injection 10 mg (10 mg IntraVENous Given 4/5/17 1427)   diphenhydrAMINE (BENADRYL) injection 12.5 mg (12.5 mg IntraVENous Given 4/5/17 5346)   morphine injection 4 mg (4 mg IntraVENous Given 4/5/17 1526)   diphenhydrAMINE (BENADRYL) injection 25 mg (25 mg IntraVENous Given 4/5/17 1548)   HYDROmorphone (PF) (DILAUDID) injection 0.2 mg (0.2 mg IntraVENous Given 4/5/17 1550)   HYDROmorphone (PF) (DILAUDID) injection 0.25 mg (0.25 mg IntraVENous Given 4/5/17 1701)       IMPRESSION:  1. Non-intractable vomiting with nausea, unspecified vomiting type      Chronic R leg pain      PLAN:  1. Discharge Medication List as of 4/5/2017  4:59 PM      START taking these medications    Details   promethazine (PHENERGAN) 25 mg tablet Take 1 Tab by mouth every six (6) hours as needed for Nausea. , Print, Disp-12 Tab, R-0         CONTINUE these medications which have NOT CHANGED    Details   gabapentin (NEURONTIN) 300 mg capsule Take 300 mg by mouth three (3) times daily. , Historical Med      HUMULIN N 100 unit/mL injection Historical Med, TIMO      fentaNYL (DURAGESIC) 25 mcg/hr PATCH 1 Patch by TransDERmal route every seventy-two (72) hours. , Historical Med      valsartan (DIOVAN) 320 mg tablet Take 1 Tab by mouth daily. , Normal, Disp-30 Tab, R-12      oxyCODONE-acetaminophen (PERCOCET) 7.5-325 mg per tablet Take 1 Tab by mouth three (3) times daily as needed for Pain., Historical Med      lubiPROStone (AMITIZA) 24 mcg capsule Take 24 mcg by mouth two (2) times daily (with meals). , Historical Med      ibuprofen (MOTRIN) 800 mg tablet Take 1 Tab by mouth every eight (8) hours as needed for Pain. Take it as scheduled for one week only, No Print, Disp-1 Tab, R-0      furosemide (LASIX) 20 mg tablet Take 1 Tab by mouth daily. , No Print, Disp-30 Tab, R-1      potassium chloride SR (KLOR-CON 10) 10 mEq tablet Take 1 Tab by mouth daily. , No Print, Disp-30 Tab, R-1      atorvastatin (LIPITOR) 40 mg tablet Take 40 mg by mouth daily. , Historical Med      ezetimibe (ZETIA) 10 mg tablet Take 10 mg by mouth daily. , Historical Med      insulin aspart (NOVOLOG) 100 unit/mL injection by SubCUTAneous route. Indications: sliding, Historical Med      montelukast (SINGULAIR) 10 mg tablet Take 10 mg by mouth nightly., Historical Med      diazePAM (VALIUM) 2 mg tablet Take 1 Tab by mouth every eight (8) hours as needed for Anxiety (spasm). Max Daily Amount: 6 mg., Print, Disp-8 Tab, R-0      DALTON PEN NEEDLE 32 gauge x 5/32\" ndle Historical Med, TIMO      ONE TOUCH DELICA 33 gauge misc Historical Med, TIMO      carisoprodol (SOMA) 250 mg tablet Historical Med      DULoxetine (CYMBALTA) 60 mg capsule Take 60 mg by mouth daily. , Historical Med      beclomethasone (QVAR) 80 mcg/actuation inhaler Take 1 Puff by inhalation two (2) times a day., Historical Med      docusate sodium (STOOL SOFTENER) 100 mg capsule Take 100 mg by mouth daily. , Historical Med      ondansetron (ZOFRAN ODT) 4 mg disintegrating tablet Take 4 mg by mouth two (2) times daily as needed for Nausea., Historical Med      lidocaine (LIDODERM) 5 % 3 Patches by TransDERmal route every twelve (12) hours. Apply patch to the affected area for 12 hours a day and remove for 12 hours a day., Historical Med      mometasone-formoterol (DULERA) 200-5 mcg/actuation HFA inhaler Take 2 Puffs by inhalation two (2) times a day., Historical Med      omalizumab (XOLAIR) 150 mg solr solution 150 mg by SubCUTAneous route every fourteen (14) days. , Historical Med      levalbuterol tartrate (XOPENEX HFA) 45 mcg/Actuation inhaler Take 2 Puffs by inhalation four (4) times daily. , Historical Med      levalbuterol hcl (XOPENEX) 0.63 mg/3 mL nebulization 0.63 mg by Nebulization route once as needed., Historical Med           2. Follow-up Information     Follow up With Details Comments [address], [place]  Follow up with your primary care doctor in the next few days.    [address] [address]  [phone]          Return to ED if worse

## 2017-04-05 NOTE — ED NOTES
Discharge instructions and 1 prescriptions reviewed with patient. Patient verbalizes understanding and denies any questions. Patient alert and oriented and by wheelchair with staff out of ED in no apparent distress.

## 2017-04-05 NOTE — ED NOTES
Assumed care of patient from triage. Patient alert and oriented x4. Patient reports generalized body aches with a headaches and chills that began last PM. Patient also reports vomiting. Denies diarrhea. Patient denies any other complaints at this time. Emergency Department Nursing Plan of Care       The Nursing Plan of Care is developed from the Nursing assessment and Emergency Department Attending provider initial evaluation. The plan of care may be reviewed in the ED Provider note.     The Plan of Care was developed with the following considerations:   Patient / Family readiness to learn indicated by:verbalized understanding  Persons(s) to be included in education: patient  Barriers to Learning/Limitations:No    Signed     Bessy Post, RN    4/5/2017   1:07 PM

## 2017-04-05 NOTE — ED NOTES
Patient has been instructed that they have been given morphine* which contains opioids, benzodiazepines, or other sedating drugs. Patient is aware that they  will need to refrain from driving or operating heavy machinery after taking this medication. Patient also instructed that they need to avoid drinking alcohol and using other products containing opioids, benzodiazepines, or other sedating drugs. Patient verbalized understanding.

## 2017-04-07 LAB
BACTERIA SPEC CULT: NORMAL
SERVICE CMNT-IMP: NORMAL

## 2017-07-21 RX ORDER — VALSARTAN 320 MG/1
TABLET ORAL
Qty: 90 TAB | Refills: 0 | Status: SHIPPED | OUTPATIENT
Start: 2017-07-21 | End: 2018-11-13

## 2018-01-09 ENCOUNTER — HOSPITAL ENCOUNTER (EMERGENCY)
Age: 58
Discharge: LEFT BEFORE DISCHARGE | End: 2018-01-09
Attending: INTERNAL MEDICINE
Payer: MEDICARE

## 2018-01-09 ENCOUNTER — APPOINTMENT (OUTPATIENT)
Dept: GENERAL RADIOLOGY | Age: 58
End: 2018-01-09
Attending: INTERNAL MEDICINE
Payer: MEDICARE

## 2018-01-09 VITALS
TEMPERATURE: 98.7 F | DIASTOLIC BLOOD PRESSURE: 58 MMHG | HEART RATE: 68 BPM | RESPIRATION RATE: 34 BRPM | SYSTOLIC BLOOD PRESSURE: 164 MMHG | OXYGEN SATURATION: 99 % | WEIGHT: 217 LBS | BODY MASS INDEX: 40.97 KG/M2 | HEIGHT: 61 IN

## 2018-01-09 DIAGNOSIS — R06.02 SOB (SHORTNESS OF BREATH): Primary | ICD-10-CM

## 2018-01-09 LAB
ALBUMIN SERPL-MCNC: 3.6 G/DL (ref 3.5–5)
ALBUMIN/GLOB SERPL: 1 {RATIO} (ref 1.1–2.2)
ALP SERPL-CCNC: 90 U/L (ref 45–117)
ALT SERPL-CCNC: 41 U/L (ref 12–78)
ANION GAP SERPL CALC-SCNC: 11 MMOL/L (ref 5–15)
APPEARANCE UR: CLEAR
AST SERPL-CCNC: 25 U/L (ref 15–37)
BACTERIA URNS QL MICRO: NEGATIVE /HPF
BASOPHILS # BLD: 0 K/UL (ref 0–0.1)
BASOPHILS NFR BLD: 0 % (ref 0–1)
BILIRUB SERPL-MCNC: 0.6 MG/DL (ref 0.2–1)
BILIRUB UR QL: NEGATIVE
BUN SERPL-MCNC: 14 MG/DL (ref 6–20)
BUN/CREAT SERPL: 16 (ref 12–20)
CALCIUM SERPL-MCNC: 9.2 MG/DL (ref 8.5–10.1)
CHLORIDE SERPL-SCNC: 104 MMOL/L (ref 97–108)
CK MB CFR SERPL CALC: 1.4 % (ref 0–2.5)
CK MB SERPL-MCNC: 4.1 NG/ML (ref 5–25)
CK SERPL-CCNC: 299 U/L (ref 26–192)
CO2 SERPL-SCNC: 26 MMOL/L (ref 21–32)
COLOR UR: ABNORMAL
CREAT SERPL-MCNC: 0.87 MG/DL (ref 0.55–1.02)
EOSINOPHIL # BLD: 0 K/UL (ref 0–0.4)
EOSINOPHIL NFR BLD: 0 % (ref 0–7)
EPITH CASTS URNS QL MICRO: NORMAL /LPF
ERYTHROCYTE [DISTWIDTH] IN BLOOD BY AUTOMATED COUNT: 12.6 % (ref 11.5–14.5)
GLOBULIN SER CALC-MCNC: 3.5 G/DL (ref 2–4)
GLUCOSE SERPL-MCNC: 158 MG/DL (ref 65–100)
GLUCOSE UR STRIP.AUTO-MCNC: NEGATIVE MG/DL
HCT VFR BLD AUTO: 29 % (ref 35–47)
HGB BLD-MCNC: 9.2 G/DL (ref 11.5–16)
HGB UR QL STRIP: ABNORMAL
KETONES UR QL STRIP.AUTO: NEGATIVE MG/DL
LEUKOCYTE ESTERASE UR QL STRIP.AUTO: NEGATIVE
LYMPHOCYTES # BLD: 1.6 K/UL (ref 0.8–3.5)
LYMPHOCYTES NFR BLD: 24 % (ref 12–49)
MAGNESIUM SERPL-MCNC: 1.7 MG/DL (ref 1.6–2.4)
MCH RBC QN AUTO: 28.6 PG (ref 26–34)
MCHC RBC AUTO-ENTMCNC: 31.7 G/DL (ref 30–36.5)
MCV RBC AUTO: 90.1 FL (ref 80–99)
MONOCYTES # BLD: 0.5 K/UL (ref 0–1)
MONOCYTES NFR BLD: 7 % (ref 5–13)
NEUTS SEG # BLD: 4.6 K/UL (ref 1.8–8)
NEUTS SEG NFR BLD: 69 % (ref 32–75)
NITRITE UR QL STRIP.AUTO: NEGATIVE
PH UR STRIP: 6 [PH] (ref 5–8)
PLATELET # BLD AUTO: 219 K/UL (ref 150–400)
POTASSIUM SERPL-SCNC: 3.3 MMOL/L (ref 3.5–5.1)
PROT SERPL-MCNC: 7.1 G/DL (ref 6.4–8.2)
PROT UR STRIP-MCNC: ABNORMAL MG/DL
RBC # BLD AUTO: 3.22 M/UL (ref 3.8–5.2)
RBC #/AREA URNS HPF: NORMAL /HPF (ref 0–5)
SODIUM SERPL-SCNC: 141 MMOL/L (ref 136–145)
SP GR UR REFRACTOMETRY: <1.005 (ref 1–1.03)
TROPONIN I SERPL-MCNC: <0.04 NG/ML
UROBILINOGEN UR QL STRIP.AUTO: 0.2 EU/DL (ref 0.2–1)
WBC # BLD AUTO: 6.7 K/UL (ref 3.6–11)
WBC URNS QL MICRO: NORMAL /HPF (ref 0–4)

## 2018-01-09 PROCEDURE — 83735 ASSAY OF MAGNESIUM: CPT | Performed by: INTERNAL MEDICINE

## 2018-01-09 PROCEDURE — 81001 URINALYSIS AUTO W/SCOPE: CPT | Performed by: INTERNAL MEDICINE

## 2018-01-09 PROCEDURE — 82553 CREATINE MB FRACTION: CPT | Performed by: INTERNAL MEDICINE

## 2018-01-09 PROCEDURE — 84484 ASSAY OF TROPONIN QUANT: CPT | Performed by: INTERNAL MEDICINE

## 2018-01-09 PROCEDURE — 82550 ASSAY OF CK (CPK): CPT | Performed by: INTERNAL MEDICINE

## 2018-01-09 PROCEDURE — 99283 EMERGENCY DEPT VISIT LOW MDM: CPT

## 2018-01-09 PROCEDURE — 36415 COLL VENOUS BLD VENIPUNCTURE: CPT | Performed by: INTERNAL MEDICINE

## 2018-01-09 PROCEDURE — 96374 THER/PROPH/DIAG INJ IV PUSH: CPT

## 2018-01-09 PROCEDURE — 74011250637 HC RX REV CODE- 250/637: Performed by: INTERNAL MEDICINE

## 2018-01-09 PROCEDURE — 85025 COMPLETE CBC W/AUTO DIFF WBC: CPT | Performed by: INTERNAL MEDICINE

## 2018-01-09 PROCEDURE — 80053 COMPREHEN METABOLIC PANEL: CPT | Performed by: INTERNAL MEDICINE

## 2018-01-09 PROCEDURE — 74011250636 HC RX REV CODE- 250/636: Performed by: INTERNAL MEDICINE

## 2018-01-09 PROCEDURE — 93005 ELECTROCARDIOGRAM TRACING: CPT

## 2018-01-09 RX ORDER — LORAZEPAM 1 MG/1
0.5 TABLET ORAL
Status: COMPLETED | OUTPATIENT
Start: 2018-01-09 | End: 2018-01-09

## 2018-01-09 RX ORDER — IPRATROPIUM BROMIDE AND ALBUTEROL SULFATE 2.5; .5 MG/3ML; MG/3ML
3 SOLUTION RESPIRATORY (INHALATION)
Status: DISCONTINUED | OUTPATIENT
Start: 2018-01-09 | End: 2018-01-09 | Stop reason: HOSPADM

## 2018-01-09 RX ADMIN — METHYLPREDNISOLONE SODIUM SUCCINATE 125 MG: 125 INJECTION, POWDER, FOR SOLUTION INTRAMUSCULAR; INTRAVENOUS at 20:17

## 2018-01-09 RX ADMIN — LORAZEPAM 0.5 MG: 1 TABLET ORAL at 20:17

## 2018-01-09 NOTE — ED NOTES
Pt in with c/o SOB and cough. States that she saw her PCP on Sat and was told that she has a URI. Pt with continued c/o SOB on exertion and laying. States that it is difficult to catch her breath. Non productive cough with generalized body aches.

## 2018-01-09 NOTE — ED PROVIDER NOTES
EMERGENCY DEPARTMENT HISTORY AND PHYSICAL EXAM      Date: 1/9/2018  Patient Name: David Campa    History of Presenting Illness     Chief Complaint   Patient presents with    Shortness of Breath       History Provided By: Patient    HPI: David Campa, 59 y.o. female with PMHx significant for HTN, asthma, hypercholesterolemia, DM, and stroke, presents ambulatory to the ED with cc of progressive SOB with associated chest tightness, dry cough, and constant diffuse body aches since 1/6/18, worse since earlier today. Pt rates her pain 5/10, describes as achy, and denies modifying factors. She reports she was evaluated by her PCP yesterday, dx'd with URI, and rx'd an Abx and nebulizer. Pt states she is unsure whether she was rx'd steroids. She reports history of asthma. Pt notes current tobacco use. She specifically denies N/V/D or fever. PCP: Claudia Ramirez, DO    There are no other complaints, changes, or physical findings at this time. Current Outpatient Prescriptions   Medication Sig Dispense Refill    valsartan (DIOVAN) 320 mg tablet TAKE 1 TABLET BY MOUTH ONCE DAILY 90 Tab 0    gabapentin (NEURONTIN) 300 mg capsule Take 300 mg by mouth three (3) times daily.  promethazine (PHENERGAN) 25 mg tablet Take 1 Tab by mouth every six (6) hours as needed for Nausea. 12 Tab 0    diazePAM (VALIUM) 2 mg tablet Take 1 Tab by mouth every eight (8) hours as needed for Anxiety (spasm). Max Daily Amount: 6 mg. 8 Tab 0    DALTON PEN NEEDLE 32 gauge x 5/32\" ndle       ONE TOUCH DELICA 33 gauge misc       HUMULIN N 100 unit/mL injection       carisoprodol (SOMA) 250 mg tablet       fentaNYL (DURAGESIC) 25 mcg/hr PATCH 1 Patch by TransDERmal route every seventy-two (72) hours.  DULoxetine (CYMBALTA) 60 mg capsule Take 60 mg by mouth daily.  beclomethasone (QVAR) 80 mcg/actuation inhaler Take 1 Puff by inhalation two (2) times a day.       oxyCODONE-acetaminophen (PERCOCET) 7.5-325 mg per tablet Take 1 Tab by mouth three (3) times daily as needed for Pain.  docusate sodium (STOOL SOFTENER) 100 mg capsule Take 100 mg by mouth daily.  lubiPROStone (AMITIZA) 24 mcg capsule Take 24 mcg by mouth two (2) times daily (with meals).  ondansetron (ZOFRAN ODT) 4 mg disintegrating tablet Take 4 mg by mouth two (2) times daily as needed for Nausea.  ibuprofen (MOTRIN) 800 mg tablet Take 1 Tab by mouth every eight (8) hours as needed for Pain. Take it as scheduled for one week only 1 Tab 0    furosemide (LASIX) 20 mg tablet Take 1 Tab by mouth daily. 30 Tab 1    potassium chloride SR (KLOR-CON 10) 10 mEq tablet Take 1 Tab by mouth daily. 30 Tab 1    lidocaine (LIDODERM) 5 % 3 Patches by TransDERmal route every twelve (12) hours. Apply patch to the affected area for 12 hours a day and remove for 12 hours a day.  mometasone-formoterol (DULERA) 200-5 mcg/actuation HFA inhaler Take 2 Puffs by inhalation two (2) times a day.  atorvastatin (LIPITOR) 40 mg tablet Take 40 mg by mouth daily.  omalizumab (XOLAIR) 150 mg solr solution 150 mg by SubCUTAneous route every fourteen (14) days.  ezetimibe (ZETIA) 10 mg tablet Take 10 mg by mouth daily.  insulin aspart (NOVOLOG) 100 unit/mL injection by SubCUTAneous route. Indications: sliding      montelukast (SINGULAIR) 10 mg tablet Take 10 mg by mouth nightly.  levalbuterol tartrate (XOPENEX HFA) 45 mcg/Actuation inhaler Take 2 Puffs by inhalation four (4) times daily.  levalbuterol hcl (XOPENEX) 0.63 mg/3 mL nebulization 0.63 mg by Nebulization route once as needed.          Past History     Past Medical History:  Past Medical History:   Diagnosis Date    Asthma     Asthma     Diabetes mellitus type II     Hypercholesteremia     Hyperlipemia     Hypertension     Restless leg     Restless leg     Rheumatoid arthritis(714.0)     Sleep apnea     Sleep disorder     Stroke ([facility] [address]     2010       Past Surgical History:  Past Surgical History:   Procedure Laterality Date    HX TONSILLECTOMY         Family History:  Family History   Problem Relation Age of Onset    Heart Disease Mother     Diabetes Mother     Hypertension Other        Social History:  Social History   Substance Use Topics    Smoking status: Never Smoker    Smokeless tobacco: Never Used    Alcohol use No       Allergies: Allergies   Allergen Reactions    Levaquin [Levofloxacin] Anaphylaxis    Albuterol Anxiety    Aspirin Rash     Pt can take IBUPROFEN         Review of Systems   Review of Systems   Constitutional: Negative for chills and fever. HENT: Negative for congestion, rhinorrhea, sneezing and sore throat. Eyes: Negative for redness and visual disturbance. Respiratory: Positive for cough, chest tightness and shortness of breath. Cardiovascular: Negative for leg swelling. Gastrointestinal: Negative for abdominal pain, nausea and vomiting. Genitourinary: Negative for difficulty urinating and frequency. Musculoskeletal: Positive for myalgias. Negative for neck stiffness. Skin: Negative for rash. Neurological: Negative for dizziness, syncope, weakness and headaches. Hematological: Negative for adenopathy. All other systems reviewed and are negative. Physical Exam   Physical Exam   Constitutional: She is oriented to person, place, and time. She appears well-developed and well-nourished. Obese   HENT:   Head: Normocephalic and atraumatic. Mouth/Throat: Oropharynx is clear and moist.   Eyes: Conjunctivae and EOM are normal. Pupils are equal, round, and reactive to light. Neck: Normal range of motion. Neck supple. Cardiovascular: Normal rate, regular rhythm and normal heart sounds. Exam reveals no gallop and no friction rub. No murmur heard. Pulmonary/Chest: Effort normal. No respiratory distress. She has wheezes (expiratory). She has no rales. Diminished breath sounds   Abdominal: Soft.  Bowel sounds are normal. She exhibits no distension. There is no tenderness. There is no rebound and no guarding. Abdomen is morbidly obese   Musculoskeletal: Normal range of motion. She exhibits no edema or tenderness. Lymphadenopathy:     She has no cervical adenopathy. Neurological: She is alert and oriented to person, place, and time. She has normal strength. No cranial nerve deficit or sensory deficit. She displays a negative Romberg sign. Coordination and gait normal.   Skin: Skin is warm and dry. No ecchymosis, no lesion and no rash noted. Rash is not urticarial. She is not diaphoretic. No erythema. Psychiatric: She has a normal mood and affect. Nursing note and vitals reviewed. Diagnostic Study Results     Labs -     No results found for this or any previous visit (from the past 12 hour(s)). Medical Decision Making   I am the first provider for this patient. I reviewed the vital signs, available nursing notes, past medical history, past surgical history, family history and social history. Vital Signs-Reviewed the patient's vital signs. No data found. Records Reviewed: Nursing Notes and Old Medical Records    Provider Notes (Medical Decision Making):   DDx: PNA, asthma exacerbation, bronchitis. ED Course:   Initial assessment performed. The patients presenting problems have been discussed, and they are in agreement with the care plan formulated and outlined with them. I have encouraged them to ask questions as they arise throughout their visit. Progress Note:  8:00 PM  Unable to read EKG secondary to artifact. Pt will not allow staff to repeat EKG. Written by CHERRY Stacy, as dictated by Marlene Yao MD.    Progress Note:  8:37 PM  Notified by nursing that the pt abruptly left the department without allowing the MD to finish workup and treatment.    Written by CHERRY Stacy, as dictated by Marlene Yao MD.     Disposition:    8:37 PM  Patient is refusing any further workup/evaluation. Patient is of sound mind and knows the risks/benefits of leaving prior to completion of workup or evaluation. She expresses understanding of the risks of signing out AMA. Diagnosis     Clinical Impression:   1. SOB (shortness of breath)        Attestations: This note is prepared by Nabila Polk, acting as Scribe for Shaina Moon MD.    Shaina Moon MD: The scribe's documentation has been prepared under my direction and personally reviewed by me in its entirety. I confirm that the note above accurately reflects all work, treatment, procedures, and medical decision making performed by me.

## 2018-01-10 NOTE — ED NOTES
Pt also has filled pill box with Monday PM and Tuesday AM pills missing. Presumed to have been taken by the patient.

## 2018-01-10 NOTE — ED NOTES
Pt has multiple medications with 2 bottles in her med bag (Gabapentin 300 mg, Levocetirizine 5 mg & Ibuprofen 800 mg). Pt has a full bottle of Doxycycline 100 mg prescribed on 12/6/17. There is a Rx bottle for Furosemide that has not been filled since 2016 per bottle.

## 2018-01-10 NOTE — ED NOTES
Pt is upset w/ provider and states she wants to go home. Pt states she is not willing to stay b/c the provider is not listening to her. Pt is encouraged to stay for improvement of care, pt refused, provider notified.

## 2018-01-11 LAB
ATRIAL RATE: 84 BPM
CALCULATED R AXIS, ECG10: 49 DEGREES
CALCULATED T AXIS, ECG11: -179 DEGREES
DIAGNOSIS, 93000: NORMAL
Q-T INTERVAL, ECG07: 384 MS
QRS DURATION, ECG06: 82 MS
QTC CALCULATION (BEZET), ECG08: 451 MS
VENTRICULAR RATE, ECG03: 83 BPM

## 2018-01-31 NOTE — DISCHARGE INSTRUCTIONS
Shortness of Breath: Care Instructions  Your Care Instructions  Shortness of breath has many causes. Sometimes conditions such as anxiety can lead to shortness of breath. Some people get mild shortness of breath when they exercise. Trouble breathing also can be a symptom of a serious problem, such as asthma, lung disease, emphysema, heart problems, and pneumonia. If your shortness of breath continues, you may need tests and treatment. Watch for any changes in your breathing and other symptoms. Follow-up care is a key part of your treatment and safety. Be sure to make and go to all appointments, and call your doctor if you are having problems. It's also a good idea to know your test results and keep a list of the medicines you take. How can you care for yourself at home? · Do not smoke or allow others to smoke around you. If you need help quitting, talk to your doctor about stop-smoking programs and medicines. These can increase your chances of quitting for good. · Get plenty of rest and sleep. · Take your medicines exactly as prescribed. Call your doctor if you think you are having a problem with your medicine. · Find healthy ways to deal with stress. ¨ Exercise daily. ¨ Get plenty of sleep. ¨ Eat regularly and well. When should you call for help? Call 911 anytime you think you may need emergency care. For example, call if:  ? · You have severe shortness of breath. ? · You have symptoms of a heart attack. These may include:  ¨ Chest pain or pressure, or a strange feeling in the chest.  ¨ Sweating. ¨ Shortness of breath. ¨ Nausea or vomiting. ¨ Pain, pressure, or a strange feeling in the back, neck, jaw, or upper belly or in one or both shoulders or arms. ¨ Lightheadedness or sudden weakness. ¨ A fast or irregular heartbeat. After you call 911, the  may tell you to chew 1 adult-strength or 2 to 4 low-dose aspirin. Wait for an ambulance. Do not try to drive yourself.    ?Call your doctor now or seek immediate medical care if:  ? · Your shortness of breath gets worse or you start to wheeze. Wheezing is a high-pitched sound when you breathe. ? · You wake up at night out of breath or have to prop your head up on several pillows to breathe. ? · You are short of breath after only light activity or while at rest.   ? Watch closely for changes in your health, and be sure to contact your doctor if:  ? · You do not get better over the next 1 to 2 days. Where can you learn more? Go to http://emily-janette.info/. Enter S780 in the search box to learn more about \"Shortness of Breath: Care Instructions. \"  Current as of: May 12, 2017  Content Version: 11.4  © 7538-9952 [employer]. Care instructions adapted under license by [employer] (which disclaims liability or warranty for this information). If you have questions about a medical condition or this instruction, always ask your healthcare professional. [address] any warranty or liability for your use of this information.

## 2018-05-16 ENCOUNTER — APPOINTMENT (OUTPATIENT)
Dept: MRI IMAGING | Age: 58
End: 2018-05-16
Attending: EMERGENCY MEDICINE
Payer: MEDICARE

## 2018-05-16 ENCOUNTER — HOSPITAL ENCOUNTER (EMERGENCY)
Age: 58
Discharge: HOME | End: 2018-05-16
Attending: EMERGENCY MEDICINE
Payer: MEDICARE

## 2018-05-16 VITALS
DIASTOLIC BLOOD PRESSURE: 77 MMHG | WEIGHT: 205 LBS | OXYGEN SATURATION: 98 % | HEART RATE: 64 BPM | HEIGHT: 61 IN | BODY MASS INDEX: 38.71 KG/M2 | RESPIRATION RATE: 14 BRPM | SYSTOLIC BLOOD PRESSURE: 186 MMHG | TEMPERATURE: 97.6 F

## 2018-05-16 DIAGNOSIS — S09.90XA INJURY OF HEAD, INITIAL ENCOUNTER: Primary | ICD-10-CM

## 2018-05-16 DIAGNOSIS — M48.07 SPINAL STENOSIS OF LUMBOSACRAL REGION: ICD-10-CM

## 2018-05-16 DIAGNOSIS — M47.26 OSTEOARTHRITIS OF SPINE WITH RADICULOPATHY, LUMBAR REGION: ICD-10-CM

## 2018-05-16 DIAGNOSIS — N30.00 ACUTE CYSTITIS WITHOUT HEMATURIA: ICD-10-CM

## 2018-05-16 LAB
APPEARANCE UR: CLEAR
BACTERIA URNS QL MICRO: ABNORMAL /HPF
BILIRUB UR QL: NEGATIVE
COLOR UR: ABNORMAL
EPITH CASTS URNS QL MICRO: ABNORMAL /LPF
GLUCOSE BLD STRIP.AUTO-MCNC: 193 MG/DL (ref 65–100)
GLUCOSE UR STRIP.AUTO-MCNC: NEGATIVE MG/DL
HGB UR QL STRIP: ABNORMAL
KETONES UR QL STRIP.AUTO: NEGATIVE MG/DL
LEUKOCYTE ESTERASE UR QL STRIP.AUTO: ABNORMAL
NITRITE UR QL STRIP.AUTO: NEGATIVE
PH UR STRIP: 5.5 [PH] (ref 5–8)
PROT UR STRIP-MCNC: 100 MG/DL
RBC #/AREA URNS HPF: ABNORMAL /HPF (ref 0–5)
SERVICE CMNT-IMP: ABNORMAL
SP GR UR REFRACTOMETRY: 1.02 (ref 1–1.03)
UR CULT HOLD, URHOLD: NORMAL
UROBILINOGEN UR QL STRIP.AUTO: 0.2 EU/DL (ref 0.2–1)
WBC URNS QL MICRO: ABNORMAL /HPF (ref 0–4)

## 2018-05-16 PROCEDURE — 74011250636 HC RX REV CODE- 250/636: Performed by: EMERGENCY MEDICINE

## 2018-05-16 PROCEDURE — 82962 GLUCOSE BLOOD TEST: CPT

## 2018-05-16 PROCEDURE — 74011250637 HC RX REV CODE- 250/637: Performed by: EMERGENCY MEDICINE

## 2018-05-16 PROCEDURE — 87186 SC STD MICRODIL/AGAR DIL: CPT | Performed by: EMERGENCY MEDICINE

## 2018-05-16 PROCEDURE — 81001 URINALYSIS AUTO W/SCOPE: CPT | Performed by: EMERGENCY MEDICINE

## 2018-05-16 PROCEDURE — 72148 MRI LUMBAR SPINE W/O DYE: CPT

## 2018-05-16 PROCEDURE — 96372 THER/PROPH/DIAG INJ SC/IM: CPT

## 2018-05-16 PROCEDURE — 87077 CULTURE AEROBIC IDENTIFY: CPT | Performed by: EMERGENCY MEDICINE

## 2018-05-16 PROCEDURE — 51798 US URINE CAPACITY MEASURE: CPT

## 2018-05-16 PROCEDURE — 87086 URINE CULTURE/COLONY COUNT: CPT | Performed by: EMERGENCY MEDICINE

## 2018-05-16 PROCEDURE — 99284 EMERGENCY DEPT VISIT MOD MDM: CPT

## 2018-05-16 RX ORDER — OXYCODONE HYDROCHLORIDE 5 MG/1
10 TABLET ORAL ONCE
Status: COMPLETED | OUTPATIENT
Start: 2018-05-16 | End: 2018-05-16

## 2018-05-16 RX ORDER — DIAZEPAM 5 MG/1
2.5 TABLET ORAL ONCE
Status: COMPLETED | OUTPATIENT
Start: 2018-05-16 | End: 2018-05-16

## 2018-05-16 RX ORDER — DEXAMETHASONE SODIUM PHOSPHATE 10 MG/ML
10 INJECTION INTRAMUSCULAR; INTRAVENOUS
Status: COMPLETED | OUTPATIENT
Start: 2018-05-16 | End: 2018-05-16

## 2018-05-16 RX ORDER — CEPHALEXIN 500 MG/1
500 CAPSULE ORAL 3 TIMES DAILY
Qty: 21 CAP | Refills: 0 | Status: SHIPPED | OUTPATIENT
Start: 2018-05-16 | End: 2018-05-23

## 2018-05-16 RX ADMIN — OXYCODONE HYDROCHLORIDE 10 MG: 5 TABLET ORAL at 13:08

## 2018-05-16 RX ADMIN — DEXAMETHASONE SODIUM PHOSPHATE 10 MG: 10 INJECTION, SOLUTION INTRAMUSCULAR; INTRAVENOUS at 13:10

## 2018-05-16 RX ADMIN — DIAZEPAM 2.5 MG: 5 TABLET ORAL at 14:57

## 2018-05-16 NOTE — ED NOTES
Patient resting on the stretcher in no distress and currently without complaints. V/S reassessed. Call bell within reach; will continue to monitor.

## 2018-05-16 NOTE — ED NOTES
Patient is ambulating to the [facility] with assistance from the EDT at this time and will change her depends pad

## 2018-05-16 NOTE — ED TRIAGE NOTES
Patient fell and injured left hip this past week; \"The pain goes from my hip to my knee. \" \"I take Oxycodone and Fentanyl patches anyways. \" Patient has been using her walker and cane, but is able to ambulate at home. Patient arrived very tearful and anxious. \"I have had trouble controlling my bowels for years. \"

## 2018-05-16 NOTE — DISCHARGE INSTRUCTIONS
We hope that we have addressed all of your medical concerns. The examination and treatment you received in the Emergency Department were for an emergent problem and were not intended as complete care. It is important that you follow up with your healthcare provider(s) for ongoing care. If your symptoms worsen or do not improve as expected, and you are unable to reach your usual health care provider(s), you should return to the Emergency Department. Today's healthcare is undergoing tremendous change, and patient satisfaction surveys are one of the many tools to assess the quality of medical care. You may receive a survey from the [facility] regarding your experience in the Emergency Department. I hope that your experience has been completely positive, particularly the medical care that I provided. As such, please participate in the survey; anything less than excellent does not meet my expectations or intentions. Thank you for allowing us to provide you with medical care today. We realize that you have many choices for your emergency care needs. Please choose us in the future for any continued health care needs.       Oseas Gaytan MD    [facility], [facility].   Office: [phone]            Recent Results (from the past 24 hour(s))   GLUCOSE, POC    Collection Time: 05/16/18  1:06 PM   Result Value Ref Range    Glucose (POC) 193 (H) 65 - 100 mg/dL    Performed by Daily Cindy Chou W/MICROSCOPIC    Collection Time: 05/16/18  1:07 PM   Result Value Ref Range    Color YELLOW/STRAW      Appearance CLEAR CLEAR      Specific gravity 1.020 1.003 - 1.030      pH (UA) 5.5 5.0 - 8.0      Protein 100 (A) NEG mg/dL    Glucose NEGATIVE  NEG mg/dL    Ketone NEGATIVE  NEG mg/dL    Bilirubin NEGATIVE  NEG      Blood MODERATE (A) NEG      Urobilinogen 0.2 0.2 - 1.0 EU/dL    Nitrites NEGATIVE  NEG      Leukocyte Esterase MODERATE (A) NEG      WBC 20-50 0 - 4 /hpf    RBC 5-10 0 - 5 /hpf    Epithelial cells FEW FEW /lpf    Bacteria 4+ (A) NEG /hpf   URINE CULTURE HOLD SAMPLE    Collection Time: 05/16/18  1:07 PM   Result Value Ref Range    Urine culture hold        URINE ON HOLD IN MICROBIOLOGY DEPT FOR 3 DAYS. IF UNPRESERVED URINE IS SUBMITTED, IT CANNOT BE USED FOR ADDITIONAL TESTING AFTER 24 HRS, RECOLLECTION WILL BE REQUIRED. Mri Lumb Spine Wo Cont    Result Date: 5/16/2018  EXAM:  MRI LUMB SPINE WO CONT INDICATION:  low back pain raditing to left leg with urinary incontinence. COMPARISON: None TECHNIQUE: MR imaging of the lumbar spine was performed using the following sequences: sagittal T1, T2, STIR;  axial T1, T2. CONTRAST:  None. FINDINGS: There is no evidence for bone marrow replacement. No paraspinal mass or fluid collection. The conus appears unremarkable. The alignment is satisfactory. There is a gentle dextroscoliosis of. L1-L2 show no canal or foraminal compromise. L2-L3 shows some mild central canal stenosis, chronic disc degeneration with central protrusion. Mild facet disease and ligament hypertrophy. Mild bilateral foraminal narrowing no lateralization. L3-L4 show more prominent spinal stenosis with disc protrusion and facet hypertrophy and ligamentum thickening. There is some mild left foraminal narrowing. No other findings. L4-5 show some mild spinal stenosis. Disc bulging facet hypertrophy and ligamentum thickening. There is some mild left foraminal narrowing by spur. L5-S1 show prominent spinal stenosis. There is facet hypertrophy and ligamentum thickening. Foraminal narrowing is prominent on the right. impression: Multilevel spinal stenosis from L2 to S1. Asymmetry of foraminal narrowing as above. Learning About a Closed Head Injury  What is a closed head injury? A closed head injury happens when your head gets hit hard. The strong force of the blow causes your brain to shake in your skull.  This movement can cause the brain to bruise, swell, or tear. Sometimes nerves or blood vessels also get damaged. This can cause bleeding in or around the brain. A concussion is a type of closed head injury. What are the symptoms? If you have a mild concussion, you may have a mild headache or feel \"not quite right. \" These symptoms are common. They usually go away over a few days to 4 weeks. But sometimes after a concussion, you feel like you can't function as well as before the injury. And you have new symptoms. This is called postconcussive syndrome. You may:  · Find it harder to solve problems, think, concentrate, or remember. · Have headaches. · Have changes in your sleep patterns, such as not being able to sleep or sleeping all the time. · Have changes in your personality. · Not be interested in your usual activities. · Feel angry or anxious without a clear reason. · Lose your sense of taste or smell. · Be dizzy, lightheaded, or unsteady. It may be hard to stand or walk. How is a closed head injury treated? Any person who may have a concussion needs to see a doctor. Some people have to stay in the hospital to be watched. Others can go home safely. If you go home, follow your doctor's instructions. He or she will tell you if you need someone to watch you closely for the next 24 hours or longer. Rest is the best treatment. Get plenty of sleep at night. And try to rest during the day. · Avoid activities that are physically or mentally demanding. These include housework, exercise, and schoolwork. And don't play video games, send text messages, or use the computer. You may need to change your school or work schedule to be able to avoid these activities. · Ask your doctor when it's okay to drive, ride a bike, or operate machinery. · Take an over-the-counter pain medicine, such as acetaminophen (Tylenol), ibuprofen (Advil, Motrin), or naproxen (Aleve). Be safe with medicines.  Read and follow all instructions on the label.  · Check with your doctor before you use any other medicines for pain. · Do not drink alcohol or use illegal drugs. They can slow recovery. They can also increase your risk of getting a second head injury. Follow-up care is a key part of your treatment and safety. Be sure to make and go to all appointments, and call your doctor if you are having problems. It's also a good idea to know your test results and keep a list of the medicines you take. Where can you learn more? Go to http://emily-janette.info/. Enter E235 in the search box to learn more about \"Learning About a Closed Head Injury. \"  Current as of: October 14, 2016  Content Version: 11.4  © 9401-2145 [employer]. Care instructions adapted under license by [employer] (which disclaims liability or warranty for this information). If you have questions about a medical condition or this instruction, always ask your healthcare professional. [place] [address] any warranty or liability for your use of this information. Urinary Tract Infection in Women: Care Instructions  Your Care Instructions    A urinary tract infection, or UTI, is a general term for an infection anywhere between the kidneys and the urethra (where urine comes out). Most UTIs are bladder infections. They often cause pain or burning when you urinate. UTIs are caused by bacteria and can be cured with antibiotics. Be sure to complete your treatment so that the infection goes away. Follow-up care is a key part of your treatment and safety. Be sure to make and go to all appointments, and call your doctor if you are having problems. It's also a good idea to know your test results and keep a list of the medicines you take. How can you care for yourself at home? · Take your antibiotics as directed. Do not stop taking them just because you feel better. You need to take the full course of antibiotics.   · Drink extra water and other fluids for the next day or two. This may help wash out the bacteria that are causing the infection. (If you have kidney, heart, or liver disease and have to limit fluids, talk with your doctor before you increase your fluid intake.)  · Avoid drinks that are carbonated or have caffeine. They can irritate the bladder. · Urinate often. Try to empty your bladder each time. · To relieve pain, take a hot bath or lay a heating pad set on low over your lower belly or genital area. Never go to sleep with a heating pad in place. To prevent UTIs  · Drink plenty of water each day. This helps you urinate often, which clears bacteria from your system. (If you have kidney, heart, or liver disease and have to limit fluids, talk with your doctor before you increase your fluid intake.)  · Urinate when you need to. · Urinate right after you have sex. · Change sanitary pads often. · Avoid douches, bubble baths, feminine hygiene sprays, and other feminine hygiene products that have deodorants. · After going to the bathroom, wipe from front to back. When should you call for help? Call your doctor now or seek immediate medical care if:  ? · Symptoms such as fever, chills, nausea, or vomiting get worse or appear for the first time. ? · You have new pain in your back just below your rib cage. This is called flank pain. ? · There is new blood or pus in your urine. ? · You have any problems with your antibiotic medicine. ? Watch closely for changes in your health, and be sure to contact your doctor if:  ? · You are not getting better after taking an antibiotic for 2 days. ? · Your symptoms go away but then come back. Where can you learn more? Go to http://emily-janette.info/. Enter Y371 in the search box to learn more about \"Urinary Tract Infection in Women: Care Instructions. \"  Current as of: May 12, 2017  Content Version: 11.4  © 0356-3480 [employer].  Care instructions adapted under license by [employer] (which disclaims liability or warranty for this information). If you have questions about a medical condition or this instruction, always ask your healthcare professional. [place] [address] any warranty or liability for your use of this information. Urinary Tract Infection in Children: Care Instructions  Your Care Instructions    A urinary tract infection, or UTI, is an infection that can occur anywhere between the kidneys and the urethra (where the urine comes out). Most UTIs are in the bladder. They often cause fever and pain when the child urinates. UTIs must be treated right away in infants and children. An infection that is not treated quickly can lead to kidney infection. Children who take medicine to treat the infection usually heal completely. Follow-up care is a key part of your child's treatment and safety. Be sure to make and go to all appointments, and call your doctor if your child is having problems. It's also a good idea to know your child's test results and keep a list of the medicines your child takes. How can you care for your child at home? · If the doctor prescribed antibiotics for your child, give them as directed. Do not stop using them just because your child feels better. Your child needs to take the full course of antibiotics. · The doctor may also give your child a medicine to ease the burning pain of a UTI. This will often turn the urine red or orange. The urine will return to its normal color after your child stops the medicine. · Try to get your child to drink extra fluids for the next 24 hours. This will help flush bacteria out of the bladder. Do not give your child drinks that have caffeine or that are carbonated. They can make the bladder sore. · Tell your child to urinate often and to empty his or her bladder each time. · A warm bath may help your child feel better.   Preventing future UTIs  · Make sure that your child drinks plenty of water each day. This helps your child urinate often, which clears bacteria from the body. · Encourage your child to urinate as soon as he or she needs to. When should you call for help? Call your doctor now or seek immediate medical care if:  ? · Your child is vomiting and cannot keep the medicine down. ? · Your child cannot urinate at all. ? · Your child has a new or higher fever or chills. ? · Your child gets a new pain in the back just below the rib cage. This is called flank pain. (A very young child will not be able to tell you whether he or she has flank pain.)   ? · Your child's symptoms do not improve, or they go away and then return. These symptoms may include pain or burning when your child urinates; cloudy or discolored urine; a bad smell to the urine; or not being able to pass much urine. ? Watch closely for changes in your child's health, and be sure to contact your doctor if:  ? · Your child does not start to get better within 2 days. Where can you learn more? Go to http://emily-janette.info/. Enter A214 in the search box to learn more about \"Urinary Tract Infection in Children: Care Instructions. \"  Current as of: May 12, 2017  Content Version: 11.4  © 1232-7931 [employer]. Care instructions adapted under license by [employer] (which disclaims liability or warranty for this information). If you have questions about a medical condition or this instruction, always ask your healthcare professional. [place] [address] any warranty or liability for your use of this information. Deciding About Surgery for Lumbar Spinal Stenosis  Deciding About Surgery for Lumbar Spinal Stenosis  What is lumbar spinal stenosis? Lumbar spinal stenosis is the narrowing of the spinal canal in the lower back. This occurs when bone and other tissues grow inside the openings in the spinal bones.  This can squeeze the nerves that branch out from the spinal cord. The squeezing can cause pain, numbness, or weakness. It happens most often in the legs, feet, or buttocks. You may choose to have surgery to ease your symptoms. Or you may try other treatments instead. These include medicines, exercise, and physical therapy. What are key points about this decision? · If your symptoms are mild to moderate, then medicine, physical therapy, and exercise may be all you need. · You may want surgery if you have tried other treatment for a while and your symptoms are still so bad that you can't do your normal activities. · Your symptoms may come back after a few years. You may need surgery again. · Surgery will most likely help leg pain. But it may not help back pain as much. Why might you choose to have surgery? · Surgery can relieve pain. It can also improve how well you can walk. · You have tried other treatment for a while and your symptoms are still so bad that you can't do your normal activities. · Without surgery, your symptoms may still bother you. If symptoms are very painful, they most often will not improve on their own. · Your doctor may advise surgery if you can't control your bladder or bowels as well as you used to. Surgery is also an option if you notice sudden changes in how well you can walk or you are more clumsy than before. Why might you choose not to have surgery? · You may try other treatments to help your symptoms. These include medicine, exercise, and physical therapy. These other treatments work best for people with mild to moderate symptoms. · Risks from surgery include nerve injury and tissue tears. And you could have chronic pain, trouble passing urine, and a spine that is not stable. · All surgery has some risks. These include bleeding, infection, and risks from anesthesia. · You may not be able to return to all of your normal activities for many months. · Your symptoms may come back in a few years.  You may need surgery again. Your decision  Thinking about the facts and your feelings can help you make a decision that is right for you. Be sure you understand the benefits and risks of your options, and think about what else you need to do before you make the decision. Where can you learn more? Go to http://emily-janette.info/. Enter K967 in the search box to learn more about \"Deciding About Surgery for Lumbar Spinal Stenosis. \"  Current as of: March 21, 2017  Content Version: 11.4  © 9443-2793 [employer]wise, Incorporated. Care instructions adapted under license by [employer] (which disclaims liability or warranty for this information). If you have questions about a medical condition or this instruction, always ask your healthcare professional. [address] any warranty or liability for your use of this information.

## 2018-05-16 NOTE — ED NOTES
The patient was discharged home by Dr Brendon Juarez in stable condition. The patient is alert and oriented, in no respiratory distress. The patient's diagnosis, condition and treatment were explained. The patient expressed understanding. A discharge plan has been developed. A  was not involved in the process. Aftercare instructions were given. Pt discharged from the ED via w/c by this RN to the family's car.

## 2018-05-16 NOTE — ED NOTES
[facility] communication provided and informed of purposeful rounding to include collaboration of entire care team; patient acknowledged understanding. Awaiting MRI table to be cleared off for her exam. Patient calm and relaxed and in less pain at this time.  Drank 1 bottle of water

## 2018-05-16 NOTE — ED PROVIDER NOTES
HPI Comments: 60 yo female with hx asthma, RA, stroke, diabetes, fibromyalgia and undiagnosed pain presents with c/o pain in left back down left leg. Reports started 5/14 with pain in left back down left lateral leg to level of the knee. States she did have a fall 1.5 weeks ago as she has neuropathy and fell onto both knees. She did hit her head but did not pass out. Never hit her hip or back. 2 days ago this pain shooting into her left leg began. Reports burning in left leg. Has had neuropathy in the right leg for many years but no left leg issue. She reports new weakness in the left leg as well. She saw pcp 2 days ago and was given a steroid shot but no improvement. She denies fever, injections into her spine. She has had and mri of her spine over 10 years ago. Reports seeing neuro dr Odell [facility]gr[facility] who told her she had neuropathy in her right leg in the past. She reports having 2 episodes of urinary incontinence while sitting on the couch this morning. Reports has happened in the past but not this often. She does have a walker at home but is having increasing difficulty. She takes fentanyl patch and oxycodone for pain at night. pcp is managing pain because she reports her last pain management doctor \"was a quack\" and wanted to stop all of her pain medication. Blood sugar was 99 this morning    pcp is her pain management    Patient is a 59 y.o. female presenting with fall and hip pain. The history is provided by the patient. Fall   Associated symptoms include numbness. Pertinent negatives include no fever, no nausea and no vomiting. Hip Injury    Associated symptoms include numbness and back pain.         Past Medical History:   Diagnosis Date    Asthma     Asthma     Diabetes mellitus type II     Hypercholesteremia     Hyperlipemia     Hypertension     Restless leg     Restless leg     Rheumatoid arthritis(714.0)     Sleep apnea     Sleep disorder     Stroke [facility])     2010 Past Surgical History:   Procedure Laterality Date    HX TONSILLECTOMY           Family History:   Problem Relation Age of Onset    Heart Disease Mother     Diabetes Mother     Hypertension Other        Social History     Social History    Marital status: SINGLE     Spouse name: N/A    Number of children: N/A    Years of education: N/A     Occupational History    Not on file. Social History Main Topics    Smoking status: Never Smoker    Smokeless tobacco: Never Used    Alcohol use No    Drug use: No    Sexual activity: No     Other Topics Concern    Not on file     Social History Narrative         ALLERGIES: Levaquin [levofloxacin]; Albuterol; and Aspirin    Review of Systems   Constitutional: Negative for fever. Gastrointestinal: Negative for nausea and vomiting. Musculoskeletal: Positive for back pain. Neurological: Positive for weakness and numbness. All other systems reviewed and are negative. There were no vitals filed for this visit. Physical Exam   Constitutional: She is oriented to person, place, and time. She appears well-developed and well-nourished. No distress. HENT:   Head: Normocephalic and atraumatic. Eyes: Conjunctivae are normal.   Neck: Normal range of motion. Cardiovascular: Normal rate, regular rhythm, normal heart sounds and intact distal pulses. Exam reveals no friction rub. No murmur heard. Pulmonary/Chest: Effort normal and breath sounds normal. No respiratory distress. She has no wheezes. She has no rales. She exhibits no tenderness. Abdominal: Soft. Bowel sounds are normal. She exhibits no distension. There is no tenderness. There is no rebound and no guarding. Musculoskeletal: Normal range of motion. She exhibits no edema or tenderness.    + midline L spine ttp diffusely, no step off; + ttp left si joint; + slr bilateral LE; 5/5 strength b/l LE; palpable pulses b/l LE   Neurological: She is alert and oriented to person, place, and time. Coordination normal.   Skin: Skin is warm and dry. She is not diaphoretic. No pallor. Psychiatric: Her behavior is normal.   Crying; tearful   Nursing note and vitals reviewed. MDM  Number of Diagnoses or Management Options  Acute cystitis without hematuria:   Injury of head, initial encounter:   Osteoarthritis of spine with radiculopathy, lumbar region:   Spinal stenosis of lumbosacral region:   Diagnosis management comments: Does not sound like AAA. Concern for radiculopathy and disc herniation. With bladder incontinence concern for spinal cord compression. Will order MRI    Bladder scan post void via US as bladder scanner down with minimal residual. Check urine for uti       Amount and/or Complexity of Data Reviewed  Clinical lab tests: ordered and reviewed  Tests in the radiology section of CPT®: ordered and reviewed  Obtain history from someone other than the patient: yes (daughter)    Patient Progress  Patient progress: stable        ED Course       Procedures  2:01 PM  Mri machine down here. Called [facility] x 2 to get scheduled mri time. Awaiting for call back    2:48 PM  Mri up here. Pt reports she is claustrophobic will order low dose valium    4:10 PM  Spoke with Dr Fani Sousa- no cord compression. Spoke with pt and daughter at length and discussed findings of spinal stenosis and bulging discs. She can follow up with ortho spine. Treat uti as can cause elevated blood sugars. Since she has trouble with her glucose, she will call her pcp who gave her a sliding scale to discuss further tonight and to discuss her pain mgmt as well as she will not have enough oxycodone to manage her pain if she takes it more than just nightly. Wrote for narcan but she has it at home. She has a walker and wheelchair. We discussed using the wheelchair and she will be staying with her daughter. Also discussed head injury precautions and reasons to return.  List of pain mgmt providers given                 This note will not be viewable in [address]

## 2018-05-18 LAB
BACTERIA SPEC CULT: ABNORMAL
BACTERIA SPEC CULT: ABNORMAL
CC UR VC: ABNORMAL
SERVICE CMNT-IMP: ABNORMAL

## 2018-11-13 ENCOUNTER — OFFICE VISIT (OUTPATIENT)
Dept: FAMILY MEDICINE CLINIC | Age: 58
End: 2018-11-13

## 2018-11-13 VITALS
SYSTOLIC BLOOD PRESSURE: 122 MMHG | HEIGHT: 61 IN | WEIGHT: 202.8 LBS | DIASTOLIC BLOOD PRESSURE: 64 MMHG | RESPIRATION RATE: 20 BRPM | TEMPERATURE: 98 F | OXYGEN SATURATION: 98 % | BODY MASS INDEX: 38.29 KG/M2 | HEART RATE: 68 BPM

## 2018-11-13 DIAGNOSIS — Z95.5 STATUS POST CORONARY ARTERY STENT PLACEMENT: ICD-10-CM

## 2018-11-13 DIAGNOSIS — E66.01 SEVERE OBESITY (HCC): ICD-10-CM

## 2018-11-13 DIAGNOSIS — I11.0 HYPERTENSIVE HEART DISEASE WITH DIASTOLIC HEART FAILURE (HCC): ICD-10-CM

## 2018-11-13 DIAGNOSIS — I50.30 HYPERTENSIVE HEART DISEASE WITH DIASTOLIC HEART FAILURE (HCC): ICD-10-CM

## 2018-11-13 DIAGNOSIS — Z76.89 ESTABLISHING CARE WITH NEW DOCTOR, ENCOUNTER FOR: Primary | ICD-10-CM

## 2018-11-13 RX ORDER — ISOSORBIDE MONONITRATE 60 MG/1
TABLET, EXTENDED RELEASE ORAL
COMMUNITY
End: 2018-11-14

## 2018-11-13 RX ORDER — PRAMIPEXOLE DIHYDROCHLORIDE 0.5 MG/1
0.5 TABLET ORAL 2 TIMES DAILY
COMMUNITY
End: 2019-09-03 | Stop reason: SDUPTHER

## 2018-11-13 RX ORDER — LEVOCETIRIZINE DIHYDROCHLORIDE 5 MG/1
5 TABLET, FILM COATED ORAL DAILY
COMMUNITY
End: 2019-07-26 | Stop reason: SDUPTHER

## 2018-11-13 RX ORDER — METOPROLOL TARTRATE 50 MG/1
50 TABLET ORAL 2 TIMES DAILY
COMMUNITY
End: 2018-12-21 | Stop reason: SDUPTHER

## 2018-11-13 RX ORDER — PROMETHAZINE HYDROCHLORIDE 25 MG/1
25 TABLET ORAL EVERY 12 HOURS
COMMUNITY
End: 2018-11-13 | Stop reason: ALTCHOICE

## 2018-11-13 RX ORDER — GABAPENTIN 600 MG/1
600 TABLET ORAL 3 TIMES DAILY
COMMUNITY
End: 2019-01-14 | Stop reason: SDUPTHER

## 2018-11-13 RX ORDER — LOSARTAN POTASSIUM 25 MG/1
25 TABLET ORAL DAILY
COMMUNITY
End: 2018-12-21 | Stop reason: SDUPTHER

## 2018-11-13 RX ORDER — ESOMEPRAZOLE MAGNESIUM 40 MG/1
40 CAPSULE, DELAYED RELEASE ORAL DAILY
COMMUNITY
End: 2019-07-20

## 2018-11-13 RX ORDER — DULOXETIN HYDROCHLORIDE 60 MG/1
60 CAPSULE, DELAYED RELEASE ORAL DAILY
COMMUNITY
End: 2019-07-26 | Stop reason: SDUPTHER

## 2018-11-13 RX ORDER — HYDROXYZINE 25 MG/1
25 TABLET, FILM COATED ORAL
COMMUNITY
End: 2019-04-12

## 2018-11-13 RX ORDER — CLOPIDOGREL BISULFATE 75 MG/1
75 TABLET ORAL DAILY
COMMUNITY
End: 2018-11-13 | Stop reason: SDUPTHER

## 2018-11-13 RX ORDER — CLOPIDOGREL BISULFATE 75 MG/1
75 TABLET ORAL DAILY
Qty: 90 TAB | Refills: 1 | Status: SHIPPED | OUTPATIENT
Start: 2018-11-13 | End: 2019-02-20 | Stop reason: SDUPTHER

## 2018-11-13 NOTE — PROGRESS NOTES
Identified pt with two pt identifiers(name and ). Chief Complaint Patient presents with  New Patient Establish new PCP Health Maintenance Due Topic  Hepatitis C Screening  FOOT EXAM Q1   
 MICROALBUMIN Q1   
 EYE EXAM RETINAL OR DILATED Q1   
 DTaP/Tdap/Td series (1 - Tdap)  PAP AKA CERVICAL CYTOLOGY  Shingrix Vaccine Age 50> (1 of 2)  BREAST CANCER SCRN MAMMOGRAM   
 FOBT Q 1 YEAR AGE 50-75  HEMOGLOBIN A1C Q6M   
 LIPID PANEL Q1   
 MEDICARE YEARLY EXAM   
 Influenza Age 5 to Adult Wt Readings from Last 3 Encounters:  
18 205 lb (93 kg) 18 217 lb (98.4 kg) 17 193 lb (87.5 kg) Temp Readings from Last 3 Encounters:  
18 97.6 °F (36.4 °C)  
18 98.7 °F (37.1 °C) 17 98.5 °F (36.9 °C) BP Readings from Last 3 Encounters:  
18 186/77  
18 164/58  
17 166/80 Pulse Readings from Last 3 Encounters:  
18 64  
18 68  
17 81 Learning Assessment: 
:  
 
No flowsheet data found. Depression Screening: 
:  
 
PHQ over the last two weeks 2018 Little interest or pleasure in doing things Not at all Feeling down, depressed, irritable, or hopeless Not at all Total Score PHQ 2 0 Fall Risk Assessment: 
:  
 
No flowsheet data found. Abuse Screening: 
:  
 
No flowsheet data found. Coordination of Care Questionnaire: 
:  
 
1) Have you been to an emergency room, urgent care clinic since your last visit? no  
Hospitalized since your last visit? no          
 
2) Have you seen or consulted any other health care providers outside of [facility] since your last visit? no  (Include any pap smears or colon screenings in this section.) 3) Do you have an Advance Directive on file? no 
Are you interested in receiving information about Advance Directives? no 
 
Reviewed record in preparation for visit and have obtained necessary documentation. Medication reconciliation up to date and corrected with patient at this time.

## 2018-11-13 NOTE — PROGRESS NOTES
CHIEF COMPLAINT:  
Chief Complaint Patient presents with  New Patient Establish new PCP HISTORY OF PRESENT ILLNESS:  
58[facility] She is here to establish care. She has a complicated PMH. She had originally been under the care of Dr. Zack Gonzalez in [place]. The family then moved to [facility] and she had an MI there and required stents (September 2018). She had the first stent placed in the OM and CX. She reports that her anginal equivalent was a sensation of pain in her arms and hands. She never had the classic chest grab pain. In addition, she was found to have a very high Pulmonary IGE level with her pulmonologist. She was started on Xolair. However, due to her move and insurance issues, she has not been on it since July 21st. She is scheduled to see a pulmonologist. Needs to have her IGE levels checked and hopefully, restarted on the Xolair. The patient has been in [place] for just 1-week. She reports that she had fasting labs drawn in [facility] in September. She will bring in those values. The patient also reports a chronic pain syndrome. At one point, she had been on narcotics and is no longer on them. PAST MEDICAL HISTORY: 
Past Medical History:  
Diagnosis Date  Asthma  Diabetes mellitus type II   
 Hypercholesteremia  Hyperlipemia  Hypertension  Restless leg  Rheumatoid arthritis(714.0)  Sleep apnea  Stroke ([address] 2010 PAST SURGICAL HISTORY: 
Past Surgical History:  
Procedure Laterality Date  CHEST SURGERY PROCEDURE UNLISTED  HX CORONARY STENT PLACEMENT Left Sept. 8th and Sept 16, 2018  HX TONSILLECTOMY MEDICATIONS: 
Current Outpatient Medications Medication Sig Dispense Refill  losartan (COZAAR) 25 mg tablet Take  by mouth daily.  clopidogrel (PLAVIX) 75 mg tab Take 75 mg by mouth daily.  pramipexole (MIRAPEX) 0.5 mg tablet Take 0.5 mg by mouth two (2) times a day.  levocetirizine (XYZAL) 5 mg tablet Take 5 mg by mouth daily.  esomeprazole (NEXIUM) 40 mg capsule Take  by mouth daily.  metoprolol tartrate (LOPRESSOR) 50 mg tablet Take  by mouth two (2) times a day.  DULoxetine (CYMBALTA) 60 mg capsule Take 60 mg by mouth daily.  hydrOXYzine HCl (ATARAX) 25 mg tablet Take  by mouth three (3) times daily as needed for Itching.  gabapentin (NEURONTIN) 600 mg tablet Take  by mouth three (3) times daily.  isosorbide mononitrate ER (IMDUR) 60 mg CR tablet Take  by mouth every morning.  gabapentin (NEURONTIN) 300 mg capsule Take 300 mg by mouth three (3) times daily.  DALTON PEN NEEDLE 32 gauge x 5/32\" ndle 43 [facility] 33 gauge misc  HUMULIN N 100 unit/mL injection  atorvastatin (LIPITOR) 40 mg tablet Take 40 mg by mouth daily.  omalizumab (XOLAIR) 150 mg solr solution 150 mg by SubCUTAneous route every fourteen (14) days.  ezetimibe (ZETIA) 10 mg tablet Take 10 mg by mouth daily.  insulin aspart (NOVOLOG) 100 unit/mL injection by SubCUTAneous route. Indications: sliding  montelukast (SINGULAIR) 10 mg tablet Take 10 mg by mouth nightly.  levalbuterol tartrate (XOPENEX HFA) 45 mcg/Actuation inhaler Take 2 Puffs by inhalation four (4) times daily.  levalbuterol hcl (XOPENEX) 0.63 mg/3 mL nebulization 0.63 mg by Nebulization route once as needed.  ondansetron (ZOFRAN ODT) 4 mg disintegrating tablet Take 4 mg by mouth two (2) times daily as needed for Nausea.  ibuprofen (MOTRIN) 800 mg tablet Take 1 Tab by mouth every eight (8) hours as needed for Pain. Take it as scheduled for one week only 1 Tab 0  
 potassium chloride SR (KLOR-CON 10) 10 mEq tablet Take 1 Tab by mouth daily. 30 Tab 1 ALLERGIES: 
Allergies Allergen Reactions  Levaquin [Levofloxacin] Anaphylaxis  Albuterol Anxiety  Aspirin Rash Pt can take IBUPROFEN  
 
 
SOCIAL HISTORY:  
Social History Socioeconomic History  Marital status:  Spouse name: Errol Cowden  Number of children: 3  
 Years of education: Currently in [address] [place] [address]  Highest education level: High school - still in college Social Needs  Financial resource strain: Not on file  Food insecurity - worry: Not on file  Food insecurity - inability: Not on file  Transportation needs - medical: Not on file  Transportation needs - non-medical: Not on file Occupational History  Not on file Tobacco Use  Smoking status: Never Smoker/She is a second hand smoker, however ( smokes).  Smokeless tobacco: Never Used Substance and Sexual Activity  Alcohol use: No  
  Alcohol/week: 2.5 oz Types: 5 Cans of beer per week  Drug use: No  
 Sexual activity: No  
 
 
FAMILY HISTORY:  
Family History Problem Relation Age of Onset  Heart Disease Mother  Diabetes Mother  Hypertension Other REVIEW OF SYSTEMS: 
Review of Systems - Negative except for documented in the HPI. PHYSICAL EXAMINATION: 
/64 (BP 1 Location: Right arm, BP Patient Position: Sitting) Comment: Manual  Pulse 68   Temp 98 °F (36.7 °C) (Oral)   Resp 20   Ht 5' 1\" (1.549 m)   Wt 202 lb 12.8 oz (92 kg)   LMP 01/01/2009   SpO2 98%   BMI 38.32 kg/m² General:  Alert, cooperative, no distress. Head:  Normocephalic, without obvious abnormality, atraumatic. Eyes:  Conjunctivae/corneas clear. Pupils equal, round, reactive to light. Extraocular movements intact. Lungs:   Clear to auscultation bilaterally. Chest wall:  No tenderness or deformity. Heart:  Regular rate and rhythm, S1, S2 normal, no murmur, click, rub, or gallop. Abdomen:   Soft, non-tender. Bowel sounds normal. No masses. No organomegaly. Extremities: Extremities normal, atraumatic, no cyanosis or edema. Pulses: 2+ and symmetric all extremities. Skin: Skin color, texture, turgor normal. No rashes or lesions. Lymph nodes: Cervical, supraclavicular, and axillary nodes normal.  
Neurologic: CNII-XII intact. Normal strength, sensation, and reflexes throughout. LABORATORY DATA: 
None available. IMPRESSION AND PLAN:  
  ICD-10-CM ICD-9-CM 1. Establishing care with new doctor, encounter for Z76.89 V65.8 Anticipatory guidance discussed. Immunizations reviewed HM updated. 2. Hypertensive heart disease with diastolic heart failure (HCC) I11.0 402.91 Stable. No change in medications. I50.30 428.30   
  428.0 3. DM type 2, uncontrolled, with neuropathy ([facility] [address] E11.40 250.62 Stable. Recent labs showed no concerns. E11.65 357.2 4. Status post coronary artery stent placement Z95.5 V45.82 REFERRAL TO CARDIOLOGY 5. Severe obesity ([facility] [address] E66.01 278.01   
 
58[facility]. Her history is outlined as above. Will give a referral to the see cardiology to establish and continue ongoing care s/p CAD and stent placement. I have discussed the diagnosis with the patient and the intended treatment plan as seen in the above orders. The patient has received an after-visit summary and questions were answered concerning future plans. Asked to return should symptoms worsen or not improve with treatment. Any pending labs and studies will be relayed to patient when they become available. Pt verbalizes understanding of plan of care and denies further questions or concerns at this time.

## 2018-11-13 NOTE — PATIENT INSTRUCTIONS
Heart Attack: Care Instructions Your Care Instructions A heart attack (myocardial infarction, or MI) occurs when one or more of the coronary arteries, which supply the heart with oxygen-rich blood, is blocked. A blockage usually occurs when plaque inside the artery breaks open and a blood clot forms in the artery. After a heart attack, you may be worried about your future. Over the next several weeks, your heart will start to heal. Though it can be hard to break old habits, you can prevent another heart attack by making some lifestyle changes and by taking medicines. You may use this information for ideas about what to do at home to speed your recovery. Follow-up care is a key part of your treatment and safety. Be sure to make and go to all appointments, and call your doctor if you are having problems. It's also a good idea to know your test results and keep a list of the medicines you take. How can you care for yourself at home? Activity 
  · Until your doctor says it is okay, do not do strenuous exercise. And do not lift, pull, or push anything heavy. Ask your doctor what types of activities are safe for you.  
  · If your doctor has not set you up with a cardiac rehabilitation (rehab) program, talk to him or her about whether that is right for you. Cardiac rehab includes supervised exercise. It also includes help with diet and lifestyle changes and emotional support. It may reduce your risk of future heart problems.  
  · Increase your activities slowly. Take short rest breaks when you get tired.  
  · If your doctor recommends it, get more exercise. Walking is a good choice. Bit by bit, increase the amount you walk every day. Try for at least 30 minutes on most days of the week. You also may want to swim, bike, or do other activities.  Talk with your doctor before you start an exercise program to make sure it is safe for you.  
  · Ask your doctor when you can drive, go back to work, and do other daily activities again.  
  · You can have sex as soon as you feel ready for it. Often this means when you can easily walk around or climb stairs. Talk with your doctor if you have any concerns. If you are taking nitroglycerin, do not take erection-enhancing medicine such as sildenafil (Viagra), tadalafil (Cialis), or vardenafil (Levitra) .  
 Lifestyle changes 
  · Do not smoke. Smoking increases your risk of another heart attack. If you need help quitting, talk to your doctor about stop-smoking programs and medicines. These can increase your chances of quitting for good.  
  · Eat a heart-healthy diet that is low in saturated fat and salt, and is full of fruits, vegetables and whole-grains. Eat at least two servings of fish each week. You may get more details about how to eat healthy. But these tips can help you get started.  
  · Stay at a healthy weight, or lose weight if you need to. Medicines 
  · Be safe with medicines. Take your medicines exactly as prescribed. Call your doctor if you think you are having a problem with your medicine. You will get more details on the specific medicines your doctor prescribes. Do not stop taking your medicine unless your doctor tells you to. Not taking your medicine might raise your risk of having another heart attack.  
  · You may need several medicines to help lower your risk of another heart attack. These include: ? Blood pressure medicines such as angiotensin-converting enzyme (ACE) inhibitors, ARBs (angiotensin II receptor blockers), and beta-blockers. ? Cholesterol medicine called statins. ? Aspirin and other blood thinners. These prevent blood clots that can cause a heart attack.  
  · If your doctor has given you nitroglycerin, keep it with you at all times. If you have angina symptoms, such as chest pain or pressure, sit down and rest. Take the first dose of nitroglycerin as directed.  If symptoms get worse or are not getting better within 5 minutes, call 911 right away. Stay on the phone. The emergency  will tell you what to do.  
  · Do not take any over-the-counter medicines, vitamins, or herbal products without talking to your doctor first.  
 Staying healthy 
  · Manage other health conditions such as high blood pressure and diabetes.  
  · Avoid colds and flu. Get a pneumococcal vaccine shot. If you have had one before, ask your doctor whether you need another dose. Get the flu vaccine every year. If you must be around people with colds or flu, wash your hands often.  
  · Be sure to tell your doctor about any angina symptoms you have had, even if they went away. Pay attention to your angina symptoms. Know what is typical for you and learn how to control it. Know when to call for help.  
  · Talk to your family, friends, or a counselor about your feelings. It is normal to feel frightened, angry, hopeless, helpless, and even guilty. Talking openly about bad feelings can help you cope. If you have symptoms of depression, talk to your doctor. When should you call for help? Call 911 anytime you think you may need emergency care. For example, call if: 
  · You have symptoms of a heart attack. These may include: 
? Chest pain or pressure, or a strange feeling in the chest. 
? Sweating. ? Shortness of breath. ? Nausea or vomiting. ? Pain, pressure, or a strange feeling in the back, neck, jaw, or upper belly or in one or both shoulders or arms. ? Lightheadedness or sudden weakness. ? A fast or irregular heartbeat. After you call 911, the  may tell you to chew 1 adult-strength or 2 to 4 low-dose aspirin. Wait for an ambulance. Do not try to drive yourself.  
  · You have angina symptoms (such as chest pain or pressure) that do not go away with rest or are not getting better within 5 minutes after you take a dose of nitroglycerin.  
  · You passed out (lost consciousness).  
  · You feel like you are having another heart attack.  Call your doctor now or seek immediate medical care if: 
  · You are having angina symptoms, such as chest pain or pressure, more often than usual, or the symptoms are different or worse than usual.  
  · You have new or increased shortness of breath.  
  · You are dizzy or lightheaded, or you feel like you may faint.  
 Watch closely for changes in your health, and be sure to contact your doctor if you have any problems. Where can you learn more? Go to http://emily-janette.info/. Enter [id number] in the search box to learn more about \"Heart Attack: Care Instructions. \" Current as of: December 6, 2017 Content Version: 11.8 © 8405-1792 Healthwise, [employer]TM) by [employer]. Care instructions adapted under license by [employer] (which disclaims liability or warranty for this information). If you have questions about a medical condition or this instruction, always ask your healthcare professional. [address] any warranty or liability for your use of this information.

## 2018-11-14 ENCOUNTER — APPOINTMENT (OUTPATIENT)
Dept: GENERAL RADIOLOGY | Age: 58
End: 2018-11-14
Attending: EMERGENCY MEDICINE
Payer: MEDICARE

## 2018-11-14 ENCOUNTER — HOSPITAL ENCOUNTER (OUTPATIENT)
Age: 58
Setting detail: OBSERVATION
Discharge: HOME | End: 2018-11-15
Attending: EMERGENCY MEDICINE | Admitting: FAMILY MEDICINE
Payer: MEDICARE

## 2018-11-14 DIAGNOSIS — R07.9 ACUTE CHEST PAIN: Primary | ICD-10-CM

## 2018-11-14 PROBLEM — I25.119 CORONARY ARTERY DISEASE INVOLVING NATIVE CORONARY ARTERY OF NATIVE HEART WITH ANGINA PECTORIS (HCC): Status: ACTIVE | Noted: 2018-11-14

## 2018-11-14 PROBLEM — R73.9 HYPERGLYCEMIA: Status: ACTIVE | Noted: 2018-11-14

## 2018-11-14 LAB
ALBUMIN SERPL-MCNC: 3.2 G/DL (ref 3.5–5)
ALBUMIN/GLOB SERPL: 0.9 {RATIO} (ref 1.1–2.2)
ALP SERPL-CCNC: 126 U/L (ref 45–117)
ALT SERPL-CCNC: 16 U/L (ref 12–78)
ANION GAP SERPL CALC-SCNC: 7 MMOL/L (ref 5–15)
AST SERPL-CCNC: 9 U/L (ref 15–37)
ATRIAL RATE: 72 BPM
ATRIAL RATE: 72 BPM
BASOPHILS # BLD: 0 K/UL (ref 0–0.1)
BASOPHILS NFR BLD: 1 % (ref 0–1)
BILIRUB SERPL-MCNC: 0.4 MG/DL (ref 0.2–1)
BUN SERPL-MCNC: 24 MG/DL (ref 6–20)
BUN/CREAT SERPL: 24 (ref 12–20)
CALCIUM SERPL-MCNC: 9 MG/DL (ref 8.5–10.1)
CALCULATED P AXIS, ECG09: 73 DEGREES
CALCULATED P AXIS, ECG09: 99 DEGREES
CALCULATED R AXIS, ECG10: 13 DEGREES
CALCULATED R AXIS, ECG10: 6 DEGREES
CALCULATED T AXIS, ECG11: -176 DEGREES
CALCULATED T AXIS, ECG11: 64 DEGREES
CHLORIDE SERPL-SCNC: 101 MMOL/L (ref 97–108)
CK SERPL-CCNC: 91 U/L (ref 26–192)
CO2 SERPL-SCNC: 27 MMOL/L (ref 21–32)
COMMENT, HOLDF: NORMAL
CREAT SERPL-MCNC: 1.01 MG/DL (ref 0.55–1.02)
DIAGNOSIS, 93000: NORMAL
DIAGNOSIS, 93000: NORMAL
DIFFERENTIAL METHOD BLD: ABNORMAL
EOSINOPHIL # BLD: 0.1 K/UL (ref 0–0.4)
EOSINOPHIL NFR BLD: 1 % (ref 0–7)
ERYTHROCYTE [DISTWIDTH] IN BLOOD BY AUTOMATED COUNT: 12.4 % (ref 11.5–14.5)
GLOBULIN SER CALC-MCNC: 3.7 G/DL (ref 2–4)
GLUCOSE BLD STRIP.AUTO-MCNC: 178 MG/DL (ref 65–100)
GLUCOSE BLD STRIP.AUTO-MCNC: 295 MG/DL (ref 65–100)
GLUCOSE BLD STRIP.AUTO-MCNC: 323 MG/DL (ref 65–100)
GLUCOSE BLD STRIP.AUTO-MCNC: 353 MG/DL (ref 65–100)
GLUCOSE BLD STRIP.AUTO-MCNC: 77 MG/DL (ref 65–100)
GLUCOSE SERPL-MCNC: 432 MG/DL (ref 65–100)
HCT VFR BLD AUTO: 34.1 % (ref 35–47)
HGB BLD-MCNC: 11.8 G/DL (ref 11.5–16)
IMM GRANULOCYTES # BLD: 0 K/UL (ref 0–0.04)
IMM GRANULOCYTES NFR BLD AUTO: 0 % (ref 0–0.5)
LYMPHOCYTES # BLD: 2.5 K/UL (ref 0.8–3.5)
LYMPHOCYTES NFR BLD: 38 % (ref 12–49)
MCH RBC QN AUTO: 29.6 PG (ref 26–34)
MCHC RBC AUTO-ENTMCNC: 34.6 G/DL (ref 30–36.5)
MCV RBC AUTO: 85.7 FL (ref 80–99)
MONOCYTES # BLD: 0.6 K/UL (ref 0–1)
MONOCYTES NFR BLD: 8 % (ref 5–13)
NEUTS SEG # BLD: 3.5 K/UL (ref 1.8–8)
NEUTS SEG NFR BLD: 52 % (ref 32–75)
NRBC # BLD: 0 K/UL (ref 0–0.01)
NRBC BLD-RTO: 0 PER 100 WBC
P-R INTERVAL, ECG05: 130 MS
P-R INTERVAL, ECG05: 152 MS
PLATELET # BLD AUTO: 237 K/UL (ref 150–400)
PMV BLD AUTO: 9.5 FL (ref 8.9–12.9)
POTASSIUM SERPL-SCNC: 4.3 MMOL/L (ref 3.5–5.1)
PROT SERPL-MCNC: 6.9 G/DL (ref 6.4–8.2)
Q-T INTERVAL, ECG07: 432 MS
Q-T INTERVAL, ECG07: 450 MS
QRS DURATION, ECG06: 88 MS
QRS DURATION, ECG06: 96 MS
QTC CALCULATION (BEZET), ECG08: 473 MS
QTC CALCULATION (BEZET), ECG08: 492 MS
RBC # BLD AUTO: 3.98 M/UL (ref 3.8–5.2)
SAMPLES BEING HELD,HOLD: NORMAL
SERVICE CMNT-IMP: ABNORMAL
SERVICE CMNT-IMP: NORMAL
SODIUM SERPL-SCNC: 135 MMOL/L (ref 136–145)
TROPONIN I BLD-MCNC: <0.04 NG/ML (ref 0–0.08)
TROPONIN I SERPL-MCNC: <0.05 NG/ML
TROPONIN I SERPL-MCNC: <0.05 NG/ML
VENTRICULAR RATE, ECG03: 72 BPM
VENTRICULAR RATE, ECG03: 72 BPM
WBC # BLD AUTO: 6.6 K/UL (ref 3.6–11)

## 2018-11-14 PROCEDURE — 74011636637 HC RX REV CODE- 636/637: Performed by: FAMILY MEDICINE

## 2018-11-14 PROCEDURE — 84484 ASSAY OF TROPONIN QUANT: CPT

## 2018-11-14 PROCEDURE — 74011250637 HC RX REV CODE- 250/637: Performed by: FAMILY MEDICINE

## 2018-11-14 PROCEDURE — C8929 TTE W OR WO FOL WCON,DOPPLER: HCPCS

## 2018-11-14 PROCEDURE — 74011250636 HC RX REV CODE- 250/636: Performed by: INTERNAL MEDICINE

## 2018-11-14 PROCEDURE — 94640 AIRWAY INHALATION TREATMENT: CPT

## 2018-11-14 PROCEDURE — 99285 EMERGENCY DEPT VISIT HI MDM: CPT

## 2018-11-14 PROCEDURE — 71045 X-RAY EXAM CHEST 1 VIEW: CPT

## 2018-11-14 PROCEDURE — 85025 COMPLETE CBC W/AUTO DIFF WBC: CPT

## 2018-11-14 PROCEDURE — 99218 HC RM OBSERVATION: CPT

## 2018-11-14 PROCEDURE — 74011000250 HC RX REV CODE- 250: Performed by: FAMILY MEDICINE

## 2018-11-14 PROCEDURE — 74011250636 HC RX REV CODE- 250/636: Performed by: FAMILY MEDICINE

## 2018-11-14 PROCEDURE — 36415 COLL VENOUS BLD VENIPUNCTURE: CPT

## 2018-11-14 PROCEDURE — 93005 ELECTROCARDIOGRAM TRACING: CPT

## 2018-11-14 PROCEDURE — 96372 THER/PROPH/DIAG INJ SC/IM: CPT

## 2018-11-14 PROCEDURE — 94664 DEMO&/EVAL PT USE INHALER: CPT

## 2018-11-14 PROCEDURE — 96361 HYDRATE IV INFUSION ADD-ON: CPT

## 2018-11-14 PROCEDURE — 96374 THER/PROPH/DIAG INJ IV PUSH: CPT

## 2018-11-14 PROCEDURE — 74011250637 HC RX REV CODE- 250/637: Performed by: EMERGENCY MEDICINE

## 2018-11-14 PROCEDURE — 82550 ASSAY OF CK (CPK): CPT

## 2018-11-14 PROCEDURE — 80053 COMPREHEN METABOLIC PANEL: CPT

## 2018-11-14 PROCEDURE — 82962 GLUCOSE BLOOD TEST: CPT

## 2018-11-14 RX ORDER — SODIUM CHLORIDE 0.9 % (FLUSH) 0.9 %
5-10 SYRINGE (ML) INJECTION EVERY 8 HOURS
Status: DISCONTINUED | OUTPATIENT
Start: 2018-11-14 | End: 2018-11-15 | Stop reason: HOSPADM

## 2018-11-14 RX ORDER — ATORVASTATIN CALCIUM 20 MG/1
40 TABLET, FILM COATED ORAL DAILY
Status: DISCONTINUED | OUTPATIENT
Start: 2018-11-14 | End: 2018-11-15 | Stop reason: HOSPADM

## 2018-11-14 RX ORDER — DEXTROSE 50 % IN WATER (D50W) INTRAVENOUS SYRINGE
12.5-25 AS NEEDED
Status: DISCONTINUED | OUTPATIENT
Start: 2018-11-14 | End: 2018-11-15 | Stop reason: HOSPADM

## 2018-11-14 RX ORDER — INSULIN ASPART 100 [IU]/ML
INJECTION, SOLUTION INTRAVENOUS; SUBCUTANEOUS
COMMUNITY
End: 2018-11-15

## 2018-11-14 RX ORDER — ISOSORBIDE MONONITRATE 30 MG/1
30 TABLET, EXTENDED RELEASE ORAL DAILY
COMMUNITY
End: 2018-11-14

## 2018-11-14 RX ORDER — METOPROLOL TARTRATE 50 MG/1
50 TABLET ORAL 2 TIMES DAILY
Status: DISCONTINUED | OUTPATIENT
Start: 2018-11-14 | End: 2018-11-15 | Stop reason: HOSPADM

## 2018-11-14 RX ORDER — MONTELUKAST SODIUM 10 MG/1
10 TABLET ORAL
Status: DISCONTINUED | OUTPATIENT
Start: 2018-11-14 | End: 2018-11-15 | Stop reason: HOSPADM

## 2018-11-14 RX ORDER — CETIRIZINE HCL 10 MG
10 TABLET ORAL DAILY
Status: DISCONTINUED | OUTPATIENT
Start: 2018-11-15 | End: 2018-11-15 | Stop reason: HOSPADM

## 2018-11-14 RX ORDER — MAGNESIUM SULFATE 100 %
4 CRYSTALS MISCELLANEOUS AS NEEDED
Status: DISCONTINUED | OUTPATIENT
Start: 2018-11-14 | End: 2018-11-15 | Stop reason: HOSPADM

## 2018-11-14 RX ORDER — ACETAMINOPHEN 325 MG/1
650 TABLET ORAL
Status: DISCONTINUED | OUTPATIENT
Start: 2018-11-14 | End: 2018-11-15 | Stop reason: HOSPADM

## 2018-11-14 RX ORDER — INSULIN LISPRO 100 [IU]/ML
INJECTION, SOLUTION INTRAVENOUS; SUBCUTANEOUS
Status: DISCONTINUED | OUTPATIENT
Start: 2018-11-14 | End: 2018-11-15 | Stop reason: HOSPADM

## 2018-11-14 RX ORDER — INSULIN LISPRO 100 [IU]/ML
10 INJECTION, SOLUTION INTRAVENOUS; SUBCUTANEOUS
Status: COMPLETED | OUTPATIENT
Start: 2018-11-14 | End: 2018-11-14

## 2018-11-14 RX ORDER — CLOPIDOGREL BISULFATE 75 MG/1
75 TABLET ORAL DAILY
Status: DISCONTINUED | OUTPATIENT
Start: 2018-11-15 | End: 2018-11-15 | Stop reason: HOSPADM

## 2018-11-14 RX ORDER — ASPIRIN 325 MG
1 TABLET, DELAYED RELEASE (ENTERIC COATED) ORAL
COMMUNITY
Start: 2018-11-10 | End: 2018-11-17

## 2018-11-14 RX ORDER — ISOSORBIDE MONONITRATE 30 MG/1
60 TABLET, EXTENDED RELEASE ORAL
Status: DISCONTINUED | OUTPATIENT
Start: 2018-11-14 | End: 2018-11-15 | Stop reason: HOSPADM

## 2018-11-14 RX ORDER — INSULIN GLARGINE 100 [IU]/ML
27 INJECTION, SOLUTION SUBCUTANEOUS
Status: DISCONTINUED | OUTPATIENT
Start: 2018-11-14 | End: 2018-11-14

## 2018-11-14 RX ORDER — FLUCONAZOLE 100 MG/1
150 TABLET ORAL ONCE
Status: COMPLETED | OUTPATIENT
Start: 2018-11-14 | End: 2018-11-14

## 2018-11-14 RX ORDER — PANTOPRAZOLE SODIUM 40 MG/1
40 TABLET, DELAYED RELEASE ORAL
Status: DISCONTINUED | OUTPATIENT
Start: 2018-11-15 | End: 2018-11-15 | Stop reason: HOSPADM

## 2018-11-14 RX ORDER — PRAMIPEXOLE DIHYDROCHLORIDE 0.25 MG/1
0.5 TABLET ORAL 2 TIMES DAILY
Status: DISCONTINUED | OUTPATIENT
Start: 2018-11-14 | End: 2018-11-15 | Stop reason: HOSPADM

## 2018-11-14 RX ORDER — HEPARIN SODIUM 5000 [USP'U]/ML
5000 INJECTION, SOLUTION INTRAVENOUS; SUBCUTANEOUS EVERY 8 HOURS
Status: DISCONTINUED | OUTPATIENT
Start: 2018-11-14 | End: 2018-11-15 | Stop reason: HOSPADM

## 2018-11-14 RX ORDER — LEVALBUTEROL INHALATION SOLUTION 1.25 MG/3ML
1.25 SOLUTION RESPIRATORY (INHALATION)
Status: DISCONTINUED | OUTPATIENT
Start: 2018-11-14 | End: 2018-11-15 | Stop reason: HOSPADM

## 2018-11-14 RX ORDER — DICLOFENAC SODIUM 10 MG/G
2 GEL TOPICAL
COMMUNITY
End: 2019-07-20

## 2018-11-14 RX ORDER — NITROGLYCERIN 0.4 MG/1
0.4 TABLET SUBLINGUAL
COMMUNITY
End: 2018-12-03 | Stop reason: SDUPTHER

## 2018-11-14 RX ORDER — DULOXETIN HYDROCHLORIDE 30 MG/1
60 CAPSULE, DELAYED RELEASE ORAL DAILY
Status: DISCONTINUED | OUTPATIENT
Start: 2018-11-15 | End: 2018-11-15 | Stop reason: HOSPADM

## 2018-11-14 RX ORDER — SODIUM CHLORIDE 9 MG/ML
135 INJECTION, SOLUTION INTRAVENOUS CONTINUOUS
Status: DISCONTINUED | OUTPATIENT
Start: 2018-11-14 | End: 2018-11-15

## 2018-11-14 RX ORDER — CLOPIDOGREL BISULFATE 75 MG/1
75 TABLET ORAL
Status: COMPLETED | OUTPATIENT
Start: 2018-11-14 | End: 2018-11-14

## 2018-11-14 RX ORDER — ONDANSETRON 4 MG/1
4 TABLET, ORALLY DISINTEGRATING ORAL
Status: DISCONTINUED | OUTPATIENT
Start: 2018-11-14 | End: 2018-11-15 | Stop reason: HOSPADM

## 2018-11-14 RX ORDER — GABAPENTIN 300 MG/1
900 CAPSULE ORAL 3 TIMES DAILY
Status: DISCONTINUED | OUTPATIENT
Start: 2018-11-14 | End: 2018-11-15 | Stop reason: HOSPADM

## 2018-11-14 RX ORDER — SODIUM CHLORIDE 0.9 % (FLUSH) 0.9 %
5-10 SYRINGE (ML) INJECTION AS NEEDED
Status: DISCONTINUED | OUTPATIENT
Start: 2018-11-14 | End: 2018-11-15 | Stop reason: HOSPADM

## 2018-11-14 RX ORDER — INSULIN GLARGINE 100 [IU]/ML
30 INJECTION, SOLUTION SUBCUTANEOUS
Status: DISCONTINUED | OUTPATIENT
Start: 2018-11-14 | End: 2018-11-15 | Stop reason: HOSPADM

## 2018-11-14 RX ORDER — ISOSORBIDE MONONITRATE 60 MG/1
60 TABLET, EXTENDED RELEASE ORAL
COMMUNITY
End: 2019-07-30 | Stop reason: SDUPTHER

## 2018-11-14 RX ORDER — HYDROXYZINE 25 MG/1
25 TABLET, FILM COATED ORAL
Status: DISCONTINUED | OUTPATIENT
Start: 2018-11-14 | End: 2018-11-15 | Stop reason: HOSPADM

## 2018-11-14 RX ADMIN — LEVALBUTEROL HYDROCHLORIDE 1.25 MG: 1.25 SOLUTION RESPIRATORY (INHALATION) at 13:00

## 2018-11-14 RX ADMIN — ISOSORBIDE MONONITRATE 60 MG: 30 TABLET, EXTENDED RELEASE ORAL at 12:14

## 2018-11-14 RX ADMIN — INSULIN GLARGINE 30 UNITS: 100 INJECTION, SOLUTION SUBCUTANEOUS at 22:45

## 2018-11-14 RX ADMIN — LEVALBUTEROL HYDROCHLORIDE 1.25 MG: 1.25 SOLUTION RESPIRATORY (INHALATION) at 21:02

## 2018-11-14 RX ADMIN — HEPARIN SODIUM 5000 UNITS: 5000 INJECTION INTRAVENOUS; SUBCUTANEOUS at 14:31

## 2018-11-14 RX ADMIN — DEXTROSE MONOHYDRATE 25 G: 25 INJECTION, SOLUTION INTRAVENOUS at 22:00

## 2018-11-14 RX ADMIN — SODIUM CHLORIDE 500 ML: 900 INJECTION, SOLUTION INTRAVENOUS at 11:27

## 2018-11-14 RX ADMIN — HEPARIN SODIUM 5000 UNITS: 5000 INJECTION INTRAVENOUS; SUBCUTANEOUS at 22:08

## 2018-11-14 RX ADMIN — ACETAMINOPHEN 650 MG: 325 TABLET, FILM COATED ORAL at 19:41

## 2018-11-14 RX ADMIN — SODIUM CHLORIDE 135 ML/HR: 900 INJECTION, SOLUTION INTRAVENOUS at 17:10

## 2018-11-14 RX ADMIN — FLUCONAZOLE 150 MG: 100 TABLET ORAL at 14:33

## 2018-11-14 RX ADMIN — LEVALBUTEROL HYDROCHLORIDE 1.25 MG: 1.25 SOLUTION RESPIRATORY (INHALATION) at 15:34

## 2018-11-14 RX ADMIN — INSULIN LISPRO 10 UNITS: 100 INJECTION, SOLUTION INTRAVENOUS; SUBCUTANEOUS at 14:30

## 2018-11-14 RX ADMIN — PRAMIPEXOLE DIHYDROCHLORIDE 0.5 MG: 0.25 TABLET ORAL at 22:13

## 2018-11-14 RX ADMIN — INSULIN LISPRO 10 UNITS: 100 INJECTION, SOLUTION INTRAVENOUS; SUBCUTANEOUS at 17:32

## 2018-11-14 RX ADMIN — INSULIN HUMAN 10 UNITS: 100 INJECTION, SOLUTION PARENTERAL at 11:31

## 2018-11-14 RX ADMIN — SODIUM CHLORIDE 135 ML/HR: 900 INJECTION, SOLUTION INTRAVENOUS at 10:49

## 2018-11-14 RX ADMIN — PERFLUTREN 2 ML: 6.52 INJECTION, SUSPENSION INTRAVENOUS at 14:10

## 2018-11-14 RX ADMIN — Medication 10 ML: at 22:14

## 2018-11-14 RX ADMIN — GABAPENTIN 900 MG: 300 CAPSULE ORAL at 22:09

## 2018-11-14 RX ADMIN — METOPROLOL TARTRATE 50 MG: 50 TABLET ORAL at 12:15

## 2018-11-14 RX ADMIN — CLOPIDOGREL BISULFATE 75 MG: 75 TABLET ORAL at 10:31

## 2018-11-14 RX ADMIN — METOPROLOL TARTRATE 50 MG: 50 TABLET ORAL at 17:09

## 2018-11-14 RX ADMIN — GABAPENTIN 900 MG: 300 CAPSULE ORAL at 17:09

## 2018-11-14 RX ADMIN — ATORVASTATIN CALCIUM 40 MG: 20 TABLET, FILM COATED ORAL at 12:14

## 2018-11-14 RX ADMIN — MONTELUKAST SODIUM 10 MG: 10 TABLET, COATED ORAL at 22:13

## 2018-11-14 NOTE — CONSULTS
Reason for Consult: Chest pain. Referring: Krystyna Brunson MD    HPI: Richard Parra is a 59 y.o. female with past medical history significant for hypertension, diabetes, dyslipidemia, CAD, recent non-ST elevation MI in September 2018 was in [place] when she received 3 stents. She has been in [facility] for past 1 week. She is been having symptoms of chest pain off and on and was actually referred to see me in the office on the Friday however this morning she woke up with having moderate intensity anterior retrosternal chest pain with right anterior chest pain associated with it. She is also having shortness of breath however she is short of breath at baseline due to bronchial reactive airways. She has been also having separate symptoms of right leg pain and numbness along with right thumb pain and numbness. She has no symptoms of lightheadedness or dizziness. She administered 3 nitroglycerin tablets and the pain in the chest did not improve therefore she came to the ER. EKG in the ER demonstrated normal sinus rhythm with T wave inversions in the lateral leads. This is unchanged from the previous EKG from 8 months ago. Troponin point-of-care enzymes were negative. Plan:    1. Chest pain/unstable angina: Symptoms are atypical of angina. Will since she had recent history of coronary stent placement in the OM and circumflex we will do 3 sets of troponin. Also check echocardiogram.  If the troponins are negative then further evaluation with a stress test which can be performed as an outpatient. Continue aspirin, Plavix. 2.Hypertension: Blood pressure is poorly controlled. She was hypertensive in the ER with blood pressure in 170-180 range. Resume home medications. Titrate medications as needed for a optimal blood pressure control. 3.  Dyslipidemia: Continue Lipitor. 4.  Diabetes: Her blood sugars were significantly elevated on presentation.   Management per primary team.          Past Medical History:   Diagnosis Date    Asthma     Asthma     Diabetes mellitus type II     Hypercholesteremia     Hyperlipemia     Hypertension     Restless leg     Restless leg     Rheumatoid arthritis(714.0)     Sleep apnea     Sleep disorder     Stroke [facility])     2010            Past Surgical History:   Procedure Laterality Date    CHEST SURGERY PROCEDURE UNLISTED      HX CORONARY STENT PLACEMENT Left Sept. 8th and Sept 16, 2018    HX TONSILLECTOMY               Family History   Problem Relation Age of Onset    Heart Disease Mother     Diabetes Mother     Hypertension Other            Social History     Socioeconomic History    Marital status: SINGLE     Spouse name: Not on file    Number of children: Not on file    Years of education: Not on file    Highest education level: Not on file   Social Needs    Financial resource strain: Not on file    Food insecurity - worry: Not on file    Food insecurity - inability: Not on file   [employer] needs - medical: Not on file   [employer] needs - non-medical: Not on file   Occupational History    Not on file   Tobacco Use    Smoking status: Never Smoker    Smokeless tobacco: Never Used   Substance and Sexual Activity    Alcohol use: No     Alcohol/week: 2.5 oz     Types: 5 Cans of beer per week    Drug use: No    Sexual activity: No   Other Topics Concern    Not on file   Social History Narrative    Not on file         Allergies   Allergen Reactions    Levaquin [Levofloxacin] Anaphylaxis    Albuterol Anxiety    Aspirin Rash     Pt can take IBUPROFEN            Current Facility-Administered Medications   Medication Dose Route Frequency Provider Last Rate Last Dose    levalbuterol (XOPENEX) nebulizer soln 1.25 mg/3 mL  1.25 mg Nebulization Q4H RT Daniel Perez MD   1.25 mg at 11/14/18 1300    0.9% sodium chloride infusion  135 mL/hr IntraVENous CONTINUOUS Daniel Perez  mL/hr at 11/14/18 1049 135 mL/hr at 11/14/18 1049    atorvastatin (LIPITOR) tablet 40 mg  40 mg Oral DAILY Lauren Morton MD   40 mg at 11/14/18 1214    isosorbide mononitrate ER (IMDUR) tablet 60 mg  60 mg Oral 7am Lauren Morton MD   60 mg at 11/14/18 1214    metoprolol tartrate (LOPRESSOR) tablet 50 mg  50 mg Oral BID Lauren Morton MD   50 mg at 11/14/18 1215    [START ON 11/15/2018] clopidogrel (PLAVIX) tablet 75 mg  75 mg Oral DAILY Lauren Morton MD        [START ON 11/15/2018] DULoxetine (CYMBALTA) capsule 60 mg  60 mg Oral DAILY Lauren Morton MD        [START ON 11/15/2018] esomeprazole (NEXIUM) capsule 40 mg  40 mg Oral DAILY Lauren Morton MD        hydrOXYzine HCl (ATARAX) tablet 25 mg  25 mg Oral TID PRN Lauren Morton MD        . PHARMACY TO SUBSTITUTE PER PROTOCOL (Reordered from: levocetirizine (XYZAL) 5 mg tablet)    Per Protocol Lauren Morton MD        montelukast (SINGULAIR) tablet 10 mg  10 mg Oral QHS Lauren Morton MD        pramipexole (MIRAPEX) tablet 0.5 mg  0.5 mg Oral BID Lauren Morton MD        sodium chloride (NS) flush 5-10 mL  5-10 mL IntraVENous Q8H Lauren Morton MD        sodium chloride (NS) flush 5-10 mL  5-10 mL IntraVENous PRN Lauren Morton MD        acetaminophen (TYLENOL) tablet 650 mg  650 mg Oral Q6H PRN Lauren Morton MD        heparin (porcine) injection 5,000 Units  5,000 Units SubCUTAneous Q8H Lauren Morton MD        ondansetron (ZOFRAN ODT) tablet 4 mg  4 mg Oral Q6H PRN Lauren Morton MD        gabapentin (NEURONTIN) capsule 900 mg  900 mg Oral TID Lauren Morton MD         Current Outpatient Medications   Medication Sig Dispense Refill    insulin NPH (HUMULIN N NPH U-100 INSULIN) 100 unit/mL injection 30 Units by SubCUTAneous route Daily (before breakfast).       insulin aspart U-100 (NOVOLOG) 100 unit/mL inpn by SubCUTAneous route Before breakfast, lunch, and dinner. Sliding scale in addition to scheduled dose prior to meals      clotrimazole (MYCELEX) 1 % vaginal cream Insert 1 Applicator into vagina nightly. X 7 days started 11/10      diclofenac (VOLTAREN) 1 % gel Apply 2 g to affected area four (4) times daily as needed (hand pain).  nitroglycerin (NITROSTAT) 0.4 mg SL tablet 0.4 mg by SubLINGual route every five (5) minutes as needed for Chest Pain. Up to 3 doses.  insulin NPH (HUMULIN N NPH U-100 INSULIN) 100 unit/mL injection 70 Units by SubCUTAneous route nightly.  isosorbide mononitrate ER (IMDUR) 60 mg CR tablet Take 60 mg by mouth every morning.  losartan (COZAAR) 25 mg tablet Take 25 mg by mouth daily.  pramipexole (MIRAPEX) 0.5 mg tablet Take 0.5 mg by mouth two (2) times a day.  levocetirizine (XYZAL) 5 mg tablet Take 5 mg by mouth daily.  esomeprazole (NEXIUM) 40 mg capsule Take 40 mg by mouth daily.  metoprolol tartrate (LOPRESSOR) 50 mg tablet Take 50 mg by mouth two (2) times a day.  DULoxetine (CYMBALTA) 60 mg capsule Take 60 mg by mouth daily.  hydrOXYzine HCl (ATARAX) 25 mg tablet Take 25 mg by mouth three (3) times daily as needed for Itching.  gabapentin (NEURONTIN) 600 mg tablet Take 600 mg by mouth three (3) times daily. Takes with 300 mg to make a total dose of 900 mg      clopidogrel (PLAVIX) 75 mg tab Take 1 Tab by mouth daily. 90 Tab 1    gabapentin (NEURONTIN) 300 mg capsule Take 300 mg by mouth three (3) times daily. Takes with 600 mg to make a total dose of 900 mg      atorvastatin (LIPITOR) 40 mg tablet Take 40 mg by mouth daily.  insulin aspart (NOVOLOG) 100 unit/mL injection 20 Units by SubCUTAneous route Before breakfast, lunch, and dinner.  montelukast (SINGULAIR) 10 mg tablet Take 10 mg by mouth nightly.       levalbuterol tartrate (XOPENEX HFA) 45 mcg/Actuation inhaler Take 2 Puffs by inhalation every six (6) hours as needed for Wheezing or Shortness of Breath.  levalbuterol hcl (XOPENEX) 0.63 mg/3 mL nebulization 0.63 mg by Nebulization route every four (4) hours as needed (shortness of breath). ROS:  12 point review of systems was performed. All negative except for HPI     Physical Exam:  Visit Vitals  /40   Pulse 77   Resp 12   Ht 5' 1\" (1.549 m)   Wt 202 lb (91.6 kg)   LMP 01/01/2009   SpO2 97%   BMI 38.17 kg/m²       Gen:  Well-developed, well-nourished, in no acute distress  HEENT:  Pink conjunctivae, PERRL, hearing intact to voice, moist mucous membranes  Neck:  Supple, without masses, thyroid non-tender  Resp:  No accessory muscle use. Bilateral scattered wheezing is present. No rales or rhonchi.   Card:  No murmurs, normal S1, S2 without thrills, bruits or peripheral edema  Abd:  Soft, non-tender, non-distended, normoactive bowel sounds are present, no palpable organomegaly and no detectable hernias  Lymph:  No cervical or inguinal adenopathy  Musc:  No cyanosis or clubbing  Skin:  No rashes or ulcers, skin turgor is good  Neuro:  Cranial nerves are grossly intact, no focal motor weakness, follows commands appropriately  Psych:  Good insight, oriented to person, place and time, alert     Labs:     Lab Results   Component Value Date/Time    WBC 6.6 11/14/2018 09:25 AM    HGB 11.8 11/14/2018 09:25 AM    Hemoglobin (POC) 12.9 10/31/2016 04:34 PM    HCT 34.1 (L) 11/14/2018 09:25 AM    Hematocrit (POC) 38 10/31/2016 04:34 PM    PLATELET 703 36/83/1748 09:25 AM    MCV 85.7 11/14/2018 09:25 AM     Lab Results   Component Value Date/Time    Hemoglobin A1c 8.7 (H) 07/31/2016 04:15 AM    Hemoglobin A1c 9.0 (H) 03/10/2015 05:55 AM    Hemoglobin A1c >12.0 (H) 01/04/2011 03:55 AM    Glucose 432 (H) 11/14/2018 09:25 AM    Glucose (POC) 353 (H) 11/14/2018 12:18 PM    LDL, calculated 145.8 (H) 07/31/2016 04:15 AM    Creatinine (POC) 0.7 10/31/2016 04:34 PM    Creatinine 1.01 11/14/2018 09:25 AM      Lab Results   Component Value Date/Time Cholesterol, total 214 (H) 07/31/2016 04:15 AM    HDL Cholesterol 51 07/31/2016 04:15 AM    LDL, calculated 145.8 (H) 07/31/2016 04:15 AM    Triglyceride 86 07/31/2016 04:15 AM    CHOL/HDL Ratio 4.2 07/31/2016 04:15 AM     Lab Results   Component Value Date/Time    ALT (SGPT) 16 11/14/2018 09:25 AM    AST (SGOT) 9 (L) 11/14/2018 09:25 AM    Alk.  phosphatase 126 (H) 11/14/2018 09:25 AM    Bilirubin, direct 0.1 03/10/2015 05:35 AM    Bilirubin, total 0.4 11/14/2018 09:25 AM    Albumin 3.2 (L) 11/14/2018 09:25 AM    Protein, total 6.9 11/14/2018 09:25 AM    INR 1.1 05/31/2010 01:50 AM    Prothrombin time 11.2 (H) 05/31/2010 01:50 AM    PLATELET 794 62/41/5849 09:25 AM     Lab Results   Component Value Date/Time    INR 1.1 05/31/2010 01:50 AM    INR 1.0 05/25/2010 09:27 PM    INR (POC) 0.9 05/25/2010 09:51 PM    Prothrombin time 11.2 (H) 05/31/2010 01:50 AM    Prothrombin time 10.6 05/25/2010 09:27 PM      Lab Results   Component Value Date/Time    GFR est non-AA 56 (L) 11/14/2018 09:25 AM    GFRNA, POC >60 10/31/2016 04:34 PM    GFR est AA >60 11/14/2018 09:25 AM    GFRAA, POC >60 10/31/2016 04:34 PM    Creatinine 1.01 11/14/2018 09:25 AM    Creatinine (POC) 0.7 10/31/2016 04:34 PM    BUN 24 (H) 11/14/2018 09:25 AM    BUN (POC) 21 (H) 10/31/2016 04:34 PM    Sodium 135 (L) 11/14/2018 09:25 AM    Sodium (POC) 133 (L) 10/31/2016 04:34 PM    Potassium 4.3 11/14/2018 09:25 AM    Potassium (POC) 4.5 10/31/2016 04:34 PM    Chloride 101 11/14/2018 09:25 AM    Chloride (POC) 98 10/31/2016 04:34 PM    CO2 27 11/14/2018 09:25 AM    Magnesium 1.7 01/09/2018 08:05 PM    Phosphorus 3.6 03/10/2015 05:35 AM     No results found for: Glenn CARDOSO, PSAR3, [id number], [id number], PSALT  Lab Results   Component Value Date/Time    TSH 1.29 09/27/2016 04:01 AM    T4, Free 1.0 05/26/2010 04:45 AM      Lab Results   Component Value Date/Time    Glucose 432 (H) 11/14/2018 09:25 AM    Glucose (POC) 353 (H) 11/14/2018 12:18 PM      Lab Results   Component Value Date/Time    CK 85 02/25/2017 09:17 AM    CK - MB 4.1 (H) 01/09/2018 08:05 PM    CK-MB Index 1.4 01/09/2018 08:05 PM    Troponin-I, Qt. <0.04 01/09/2018 08:05 PM    BNP 29 01/01/2011 07:55 AM      Lab Results   Component Value Date/Time    BNP 29 01/01/2011 07:55 AM    BNP 84 09/20/2009 07:50 AM    NT pro- (H) 09/25/2016 09:55 AM    NT pro-BNP 1,177 (H) 07/30/2016 11:45 PM    NT pro- (H) 11/23/2011 12:45 AM      Lab Results   Component Value Date/Time    Sodium 135 (L) 11/14/2018 09:25 AM    Potassium 4.3 11/14/2018 09:25 AM    Chloride 101 11/14/2018 09:25 AM    CO2 27 11/14/2018 09:25 AM    Anion gap 7 11/14/2018 09:25 AM    Glucose 432 (H) 11/14/2018 09:25 AM    BUN 24 (H) 11/14/2018 09:25 AM    Creatinine 1.01 11/14/2018 09:25 AM    BUN/Creatinine ratio 24 (H) 11/14/2018 09:25 AM    GFR est AA >60 11/14/2018 09:25 AM    GFR est non-AA 56 (L) 11/14/2018 09:25 AM    Calcium 9.0 11/14/2018 09:25 AM      Lab Results   Component Value Date/Time    Sodium 135 (L) 11/14/2018 09:25 AM    Potassium 4.3 11/14/2018 09:25 AM    Chloride 101 11/14/2018 09:25 AM    CO2 27 11/14/2018 09:25 AM    Anion gap 7 11/14/2018 09:25 AM    Glucose 432 (H) 11/14/2018 09:25 AM    BUN 24 (H) 11/14/2018 09:25 AM    Creatinine 1.01 11/14/2018 09:25 AM    BUN/Creatinine ratio 24 (H) 11/14/2018 09:25 AM    GFR est AA >60 11/14/2018 09:25 AM    GFR est non-AA 56 (L) 11/14/2018 09:25 AM    Calcium 9.0 11/14/2018 09:25 AM    Bilirubin, total 0.4 11/14/2018 09:25 AM    ALT (SGPT) 16 11/14/2018 09:25 AM    AST (SGOT) 9 (L) 11/14/2018 09:25 AM    Alk.  phosphatase 126 (H) 11/14/2018 09:25 AM    Protein, total 6.9 11/14/2018 09:25 AM    Albumin 3.2 (L) 11/14/2018 09:25 AM    Globulin 3.7 11/14/2018 09:25 AM    A-G Ratio 0.9 (L) 11/14/2018 09:25 AM      Lab Results   Component Value Date/Time    Hemoglobin A1c 8.7 (H) 07/31/2016 04:15 AM         No results for input(s): CPK, CKMB, TROIQ in the last 72 hours.    No lab exists for component: CKQMB, CPKMB        Problem List:     Problem List  Date Reviewed: 11/13/2018          Codes Class Noted    * (Principal) Chest pain ICD-10-CM: R07.9  ICD-9-CM: 786.50  11/14/2018        Hyperglycemia ICD-10-CM: R73.9  ICD-9-CM: 790.29  11/14/2018        Coronary artery disease involving native coronary artery of native heart with angina pectoris ([facility] 75.) ICD-10-CM: I25.119  ICD-9-CM: 414.01, 413.9  11/14/2018        Status post coronary artery stent placement (Chronic) ICD-10-CM: Z95.5  ICD-9-CM: V45.82  11/13/2018        Severe obesity ([facility] ICD-10-CM: E66.01  ICD-9-CM: 278.01  11/13/2018        Neuropathy of right lower extremity ICD-10-CM: G57.91  ICD-9-CM: 355.8  9/27/2016        Neuropathy ICD-10-CM: G62.9  ICD-9-CM: 355.9  9/26/2016        Pericardial effusion(moderate-large) with mild cardiac tamponade--via echo 8/1/16 ICD-10-CM: I31.3, I31.4  ICD-9-CM: 423.9, 423.3  8/1/2016        Chest pain, precordial ICD-10-CM: R07.2  ICD-9-CM: 786.51  8/1/2016        Acute on chronic diastolic CHF (congestive heart failure) (HCC) ICD-10-CM: I50.33  ICD-9-CM: 428.33, 428.0  8/1/2016        Hypertensive heart disease with diastolic heart failure (HCC) ICD-10-CM: I11.0, I50.30  ICD-9-CM: 402.91, 428.30, 428.0  8/1/2016        SOB (shortness of breath) ICD-10-CM: R06.02  ICD-9-CM: 786.05  8/1/2016        Acute respiratory insufficiency ICD-10-CM: R06.89  ICD-9-CM: 518.82  8/1/2016        Obstructive sleep apnea ICD-10-CM: G47.33  ICD-9-CM: 327.23  3/10/2015        TIA (transient ischemic attack) ICD-10-CM: G45.9  ICD-9-CM: 435.9  3/9/2015        Essential hypertension with goal blood pressure less than 130/85 ICD-10-CM: I10  ICD-9-CM: 401.9  3/9/2015        DM type 2, uncontrolled, with neuropathy (HCC) (Chronic) ICD-10-CM: E11.40, E11.65  ICD-9-CM: 250.62, 357.2  3/9/2015        Asthma (Chronic) ICD-10-CM: J45.909  ICD-9-CM: 493.90  3/9/2015        Hyperlipidemia (Chronic) ICD-10-CM: E78.5  ICD-9-CM: 272.4  3/9/2015        Restless leg syndrome (Chronic) ICD-10-CM: G25.81  ICD-9-CM: 333.94  3/9/2015                Chevy Calvillo MD, [facility]

## 2018-11-14 NOTE — ED TRIAGE NOTES
Patient arrives via EMS for pain that started in her shoulders then radiated to her substernal chest. She has hx of MI x 2 in September with two stents placed. She took 3 sublingual nitro tabs this morning without relief. She is alert and oriented on arrival. She has not taken her morning meds yet, and EMS reports BG of 411.

## 2018-11-14 NOTE — PROGRESS NOTES
11/14/2018 
1:01 PM 
Case management note Met with patient to discuss discharge planning. Confirmed demographics. Patient lives with  in 2 story home with 2 steps to enter and 13 within. Patient has walker and cane. [facility]. Patient follows Maddi Blanco for medical management. No NN No advance directive OBS educated, letter signed and placed in chart. Reason for Admission:   Chest pain, hyperglyceia RRAT Score:     29 Resources/supports as identified by patient/family:    
             
Top Challenges facing patient (as identified by patient/family and CM): Finances/Medication cost?      Limited funding Transportation? [facility] Support system or lack thereof?   Living arrangements? Lives with  Self-care/ADLs/Cognition? Can do self care, fair health cognition Current Advanced Directive/Advance Care Plan:  Does not have one Plan for utilizing home health:    Not for this admission Likelihood of readmission: high/red Transition of Care Plan:           Unable to determine at this time   Care Management Interventions PCP Verified by CM: Yes(dr. sanna cruz no nn) Mode of Transport at Discharge: Self Transition of Care Consult (CM Consult): Discharge Planning Current Support Network: Lives with Spouse Confirm Follow Up Transport: Family Plan discussed with Pt/Family/Caregiver: Yes Discharge Location Discharge Placement: Unable to determine at this time Makayla Lam, Terri N Aeln Mosley

## 2018-11-14 NOTE — PROGRESS NOTES
[facility]: MED RECONCILIATION Comments/Recommendations:  
? Patient is awake and alert ? Verifies allergies ? The patient wishes for discharge prescriptions to be sent to [facility] ? The patient reports she takes Humulin N 70 units at bedtime and 30 units in the morning. She reports her fasting blood glucose is usually 100 in the morning but admits to sometimes waking up in the middle of the night with low blood sugar. The patient cannot report how frequently this occurs. ? The patient reports to using at least one nitroglycerin daily since September for chest pain. ? Blood pressure is not monitored at home due to lack of a monitor. Medications added:  
 
? Clotrimazole ? Diclofenac 
? Nitroglycerin Medications removed: · Xolair Medications adjusted: 
 
· Humulin N, Isosorbid mononitrate Allergies: Levaquin [levofloxacin]; Albuterol; and Aspirin Prior to Admission Medications:  
Prior to Admission Medications Prescriptions Last Dose Informant Patient Reported? Taking? DULoxetine (CYMBALTA) 60 mg capsule 11/13/2018 at am Self Yes Yes Sig: Take 60 mg by mouth daily. atorvastatin (LIPITOR) 40 mg tablet 11/13/2018 at am Self Yes Yes Sig: Take 40 mg by mouth daily. clopidogrel (PLAVIX) 75 mg tab 11/13/2018 at am Self No Yes Sig: Take 1 Tab by mouth daily. clotrimazole (MYCELEX) 1 % vaginal cream  Self Yes Yes Sig: Insert 1 Applicator into vagina nightly. X 7 days started 11/10  
diclofenac (VOLTAREN) 1 % gel  Self Yes Yes Sig: Apply 2 g to affected area four (4) times daily as needed (hand pain). esomeprazole (NEXIUM) 40 mg capsule 11/13/2018 at am Self Yes Yes Sig: Take 40 mg by mouth daily. gabapentin (NEURONTIN) 300 mg capsule 11/13/2018 at Unknown time Self Yes Yes Sig: Take 300 mg by mouth three (3) times daily. Takes with 600 mg to make a total dose of 900 mg  
gabapentin (NEURONTIN) 600 mg tablet 11/13/2018 at Unknown time Self Yes Yes Sig: Take 600 mg by mouth three (3) times daily. Takes with 300 mg to make a total dose of 900 mg  
hydrOXYzine HCl (ATARAX) 25 mg tablet  Self Yes Yes Sig: Take 25 mg by mouth three (3) times daily as needed for Itching. insulin NPH (HUMULIN N NPH U-100 INSULIN) 100 unit/mL injection 2018 at am Self Yes Yes Si Units by SubCUTAneous route Daily (before breakfast). insulin NPH (HUMULIN N NPH U-100 INSULIN) 100 unit/mL injection 2018 at 2100 Self Yes Yes Si Units by SubCUTAneous route nightly. insulin aspart (NOVOLOG) 100 unit/mL injection 2018 at Unknown time Self Yes Yes Si Units by SubCUTAneous route Before breakfast, lunch, and dinner. insulin aspart U-100 (NOVOLOG) 100 unit/mL inpn 2018 at Unknown time Self Yes Yes Sig: by SubCUTAneous route Before breakfast, lunch, and dinner. Sliding scale in addition to scheduled dose prior to meals  
isosorbide mononitrate ER (IMDUR) 60 mg CR tablet 2018 at Unknown time  Yes Yes Sig: Take 60 mg by mouth every morning. levalbuterol hcl (XOPENEX) 0.63 mg/3 mL nebulization  Self Yes Yes Si.63 mg by Nebulization route every four (4) hours as needed (shortness of breath). levalbuterol tartrate (XOPENEX HFA) 45 mcg/Actuation inhaler 2018 at Unknown time Self Yes Yes Sig: Take 2 Puffs by inhalation every six (6) hours as needed for Wheezing or Shortness of Breath. levocetirizine (XYZAL) 5 mg tablet 2018 at am Self Yes Yes Sig: Take 5 mg by mouth daily. losartan (COZAAR) 25 mg tablet 2018 at am Self Yes Yes Sig: Take 25 mg by mouth daily. metoprolol tartrate (LOPRESSOR) 50 mg tablet 2018 at Unknown time Self Yes Yes Sig: Take 50 mg by mouth two (2) times a day. montelukast (SINGULAIR) 10 mg tablet 2018 at pm Self Yes Yes Sig: Take 10 mg by mouth nightly. nitroglycerin (NITROSTAT) 0.4 mg SL tablet 2018 at 740am Self Yes Yes Si.4 mg by SubLINGual route every five (5) minutes as needed for Chest Pain. Up to 3 doses. pramipexole (MIRAPEX) 0.5 mg tablet 2018 at am Self Yes Yes Sig: Take 0.5 mg by mouth two (2) times a day. Facility-Administered Medications: None Thank you, Janki Sebastian, PharmD, BCPS

## 2018-11-14 NOTE — ED NOTES
Verbal shift change report given to Luisana (oncoming nurse) by Farrukh Fenton (offgoing nurse). Report included the following information SBAR, Kardex, ED Summary, MAR, Recent Results and Cardiac Rhythm NSR.

## 2018-11-14 NOTE — DIABETES MGMT
DTC Progress Note Recommendations/ Comments: Noted Lantus ordered for this evening. Please continue to titrate dose until fasting BG <180mg/dL. Consult received for assessment of home management, will f/u once pt arrives to floor. Current hospital DM medication:  
-Humalog insulin resistant correction 
-Lantus 30 units at bedtime Chart reviewed on [facility] Vega. Patient is a 59 y.o. female with known history of Type 2 Diabetes on NPH 30 units acB and 70 units HS and Novolog SSI at home. A1c:  
Lab Results Component Value Date/Time Hemoglobin A1c 8.7 (H) 07/31/2016 04:15 AM  
 Hemoglobin A1c 9.0 (H) 03/10/2015 05:55 AM  
 
 
Recent Glucose Results:  
Lab Results Component Value Date/Time  (H) 11/14/2018 09:25 AM  
 GLUCPOC 295 (H) 11/14/2018 02:24 PM  
 GLUCPOC 353 (H) 11/14/2018 12:18 PM  
  
 
Lab Results Component Value Date/Time Creatinine 1.01 11/14/2018 09:25 AM  
 
Estimated Creatinine Clearance: 61.1 mL/min (based on SCr of 1.01 mg/dL). Active Orders Diet DIET DIABETIC CONSISTENT CARB Regular PO intake: No data found. Will continue to follow as needed. Thank you Clem Felix RD, CDE [facility] Office: [phone] Pager: [phone]

## 2018-11-14 NOTE — H&P
Sofiya Garza [address] Office [phone], Fax [phone] History & Physical 
 
Date of admission: 11/14/2018 Patient name: Afshin Vega MRN: [id number] YOB: 1960 Age: 59 y.o. Primary care provider:  Darrel Drake MD  
 
Source of Information: patient and medical records Subjective: Chief complain: Chest Pain History of present illness: Ms. Power Landers is a 59 y.o.  female with a history of CAD with MI x 2 and stents x 3 in 9/18, uncontrolled diabetes mellitus type 2, TIA, Neuropathic pain of the right leg, HLD, Asthma, obesity who is admitted with Atypical chest pain and hyperglycemia. Ms. Power Landers presented to the Emergency Department today complaining of severe intermittent stabbing chest pains starting this morning. Patient reports waking with severe right shoulder pain that radiates to the mid/epigastric chest specifically with coughing. No associated with activity. Tried Nitroglycerin x 3 which did not improve pain after which patient came to ED. Despite reporting blood glucose in the 100-180 range at home, endorses Polyphagia, polydipsia, and polyuria over the last several days and noting increased cough over the last 2 days for which has been taking Xopenex. Patient has missed all morning meds. Patient recently living in [place] and in September 2018 patient had 2 hearts attacks and stated had 3 stents placed. Echo in 2016 with EF of 75% with Grade 3 diastolic dysfunction. Emergency Room Course:  
Patient Vitals for the past 12 hrs: 
 Pulse Resp BP SpO2  
11/14/18 1100 74 23 182/82 94 % 11/14/18 1045 79 18 178/70 95 % 11/14/18 1030 76 16 167/70 99 % 11/14/18 1015 71 13 162/71 98 % 11/14/18 1000 71 15 144/57 94 % 11/14/18 0945 70 10 150/61 96 % 11/14/18 0915 72 15 142/67 99 % 11/14/18 0911  22 140/58 100 % 11/14/18 0909   140/58  Labs: remarkable for blood glucose of 432, Crt 1.01 (BL 0.7-0.87), POC troponin < 0.04, CK 91, Alk phos 126 CXR showed No acute process and stable mild cardiomegaly. Pt was treated with Medications  
levalbuterol (XOPENEX) nebulizer soln 1.25 mg/3 mL (not administered) 0.9% sodium chloride infusion (not administered)  
atorvastatin (LIPITOR) tablet 40 mg (not administered)  
isosorbide mononitrate ER (IMDUR) tablet 60 mg (not administered)  
metoprolol tartrate (LOPRESSOR) tablet 50 mg (not administered)  
insulin regular (NOVOLIN R, HUMULIN R) injection 10 Units (not administered)  
sodium chloride 0.9 % bolus infusion 500 mL (not administered) clopidogrel (PLAVIX) tablet 75 mg (75 mg Oral Given 11/14/18 1031) Allergies Allergen Reactions  Levaquin [Levofloxacin] Anaphylaxis  Albuterol Anxiety  Aspirin Rash Pt can take IBUPROFEN Prior to Admission Medications Prescriptions Last Dose Informant Patient Reported? Taking? DULoxetine (CYMBALTA) 60 mg capsule   Yes No  
Sig: Take 60 mg by mouth daily. HUMULIN N 100 unit/mL injection   Yes No  
DALTON PEN NEEDLE 32 gauge x 5/32\" ndle   Yes No  
ONE TOUCH DELICA 33 gauge misc   Yes No  
atorvastatin (LIPITOR) 40 mg tablet   Yes No  
Sig: Take 40 mg by mouth daily. clopidogrel (PLAVIX) 75 mg tab   No No  
Sig: Take 1 Tab by mouth daily. esomeprazole (NEXIUM) 40 mg capsule   Yes No  
Sig: Take  by mouth daily. gabapentin (NEURONTIN) 300 mg capsule   Yes No  
Sig: Take 300 mg by mouth three (3) times daily. gabapentin (NEURONTIN) 600 mg tablet   Yes No  
Sig: Take  by mouth three (3) times daily. hydrOXYzine HCl (ATARAX) 25 mg tablet   Yes No  
Sig: Take  by mouth three (3) times daily as needed for Itching. insulin aspart (NOVOLOG) 100 unit/mL injection   Yes No  
Sig: by SubCUTAneous route. Indications: sliding  
isosorbide mononitrate ER (IMDUR) 60 mg CR tablet   Yes No  
Sig: Take  by mouth every morning. levalbuterol hcl (XOPENEX) 0.63 mg/3 mL nebulization   Yes No  
Si.63 mg by Nebulization route once as needed. levalbuterol tartrate (XOPENEX HFA) 45 mcg/Actuation inhaler   Yes No  
Sig: Take 2 Puffs by inhalation four (4) times daily. levocetirizine (XYZAL) 5 mg tablet   Yes No  
Sig: Take 5 mg by mouth daily. losartan (COZAAR) 25 mg tablet   Yes No  
Sig: Take  by mouth daily. metoprolol tartrate (LOPRESSOR) 50 mg tablet   Yes No  
Sig: Take  by mouth two (2) times a day. montelukast (SINGULAIR) 10 mg tablet   Yes No  
Sig: Take 10 mg by mouth nightly. omalizumab (XOLAIR) 150 mg solr solution   Yes No  
Si mg by SubCUTAneous route every fourteen (14) days. pramipexole (MIRAPEX) 0.5 mg tablet   Yes No  
Sig: Take 0.5 mg by mouth two (2) times a day. Facility-Administered Medications: None Past Medical History:  
Diagnosis Date  Asthma  Asthma  Diabetes mellitus type II   
 Hypercholesteremia  Hyperlipemia  Hypertension  Restless leg  Restless leg  Rheumatoid arthritis(714.0)  Sleep apnea  Sleep disorder  Stroke ([facility] [address]  Past Surgical History:  
Procedure Laterality Date  CHEST SURGERY PROCEDURE UNLISTED  HX CORONARY STENT PLACEMENT Left  and 2018  HX TONSILLECTOMY    
 
 
  
SOCIAL HISTORY 
 
- Alcohol history: Not at all - Smoking history: Former smoker, smoked 1 ppd x 35 years, quit 13 years ago - Illicit drug history: Not at all - Living arrangement: patient lives with their family. - Ambulates: Independently Preventive history: 
Immunization History Administered Date(s) Administered  Influenza Vaccine (Quad) PF 2016  Influenza Vaccine Whole 10/01/2010  ZZZ-RETIRED (DO NOT USE) Pneumococcal Vaccine (Unspecified Type) 2011 CODE STATUS discussed with the patient/caregivers FULL CODE The following are negative unless bolded. General Fever, chills and unexpected weight change. HENT Ear pain, sinus pressure and sore throat. Eyes Visual disturbance. Respiratory Cough, chest tightness, shortness of breath and wheezing. Cardio Chest pain, palpitations and leg swelling. GI Nausea, vomiting, abd pain, diarrhea, constipation and blood in stool.  Dysuria. Vaginal itching Extremities Myalgias and arthralgias. Skin Color change and pallor. Neuro Weakness and headaches. Behavioral Confusion, nervous, anxious. The remainder of the review of systems was reviewed and is noncontributory. Objective:   
 
Patient Vitals for the past 12 hrs: 
 BP Pulse Resp SpO2 Height Weight 11/14/18 1100 182/82 74 23 94 %    
11/14/18 1045 178/70 79 18 95 %    
11/14/18 1030 167/70 76 16 99 %    
11/14/18 1015 162/71 71 13 98 %    
11/14/18 1000 144/57 71 15 94 %    
11/14/18 0945 150/61 70 10 96 %    
11/14/18 0915 142/67 72 15 99 %    
11/14/18 0911 140/58  22 100 % 5' 1\" (1.549 m) 202 lb (91.6 kg)  
11/14/18 0909 140/58      No data recorded. No intake or output data in the 24 hours ending 11/14/18 1121 O2 Device: Room air Physical Exam 
Visit Vitals /82 Pulse 74 Resp 23 Ht 5' 1\" (1.549 m) Wt 202 lb (91.6 kg) LMP 01/01/2009 SpO2 94% BMI 38.17 kg/m² Vitals Reviewed. General Oriented to person, place, and time and well-developed. Appears well-nourished, no distress. Not diaphoretic. HENT Head Normocephalic and atraumatic. Eyes Conjunctivae are normal, no discharge. No scleral icterus. Nose Nose normal, clear turbinates. Oral Oropharynx is clear and moist. No oropharyngeal exudate. Neck No thyromegaly present. No cervical adenopathy. Cardio Normal rate, regular rhythm. Exam reveals no gallop and no friction rub. No murmur heard. Chest wall tenderness in the mid-chest and right shoulder. Pulmonary Effort normal and breath sounds normal. No respiratory distress. No wheezes, no rales. Abdominal Soft. Bowel sounds normal. No distension. No tenderness.  Deferred. Extremities No edema of lower extremities. No tenderness. Distal pulses intact. Neurological Alert and oriented to person, place, and time. Dermatology Skin is warm and dry. No rash noted. No erythema or pallor. Psychiatric Affect and judgment normal.   
 
 
Data Review Laboratory Data Recent Results (from the past 8 hour(s)) EKG, 12 LEAD, INITIAL Collection Time: 11/14/18  9:12 AM  
Result Value Ref Range Ventricular Rate 72 BPM  
 Atrial Rate 72 BPM  
 P-R Interval 152 ms QRS Duration 88 ms Q-T Interval 432 ms QTC Calculation (Bezet) 473 ms Calculated P Axis 73 degrees Calculated R Axis 13 degrees Calculated T Axis -176 degrees Diagnosis Normal sinus rhythm T wave abnormality, consider inferolateral ischemia Prolonged QT Abnormal ECG When compared with ECG of 09-JAN-2018 19:04, 
Previous ECG has undetermined rhythm, needs review CBC WITH AUTOMATED DIFF Collection Time: 11/14/18  9:25 AM  
Result Value Ref Range WBC 6.6 3.6 - 11.0 K/uL  
 RBC 3.98 3.80 - 5.20 M/uL  
 HGB 11.8 11.5 - 16.0 g/dL HCT 34.1 (L) 35.0 - 47.0 % MCV 85.7 80.0 - 99.0 FL  
 MCH 29.6 26.0 - 34.0 PG  
 MCHC 34.6 30.0 - 36.5 g/dL  
 RDW 12.4 11.5 - 14.5 % PLATELET 329 051 - 551 K/uL MPV 9.5 8.9 - 12.9 FL  
 NRBC 0.0 0  WBC ABSOLUTE NRBC 0.00 0.00 - 0.01 K/uL NEUTROPHILS 52 32 - 75 % LYMPHOCYTES 38 12 - 49 % MONOCYTES 8 5 - 13 % EOSINOPHILS 1 0 - 7 % BASOPHILS 1 0 - 1 % IMMATURE GRANULOCYTES 0 0.0 - 0.5 % ABS. NEUTROPHILS 3.5 1.8 - 8.0 K/UL  
 ABS. LYMPHOCYTES 2.5 0.8 - 3.5 K/UL  
 ABS. MONOCYTES 0.6 0.0 - 1.0 K/UL  
 ABS. EOSINOPHILS 0.1 0.0 - 0.4 K/UL  
 ABS. BASOPHILS 0.0 0.0 - 0.1 K/UL  
 ABS. IMM. GRANS. 0.0 0.00 - 0.04 K/UL  
 DF AUTOMATED METABOLIC PANEL, COMPREHENSIVE Collection Time: 11/14/18  9:25 AM  
Result Value Ref Range Sodium 135 (L) 136 - 145 mmol/L Potassium 4.3 3.5 - 5.1 mmol/L Chloride 101 97 - 108 mmol/L  
 CO2 27 21 - 32 mmol/L Anion gap 7 5 - 15 mmol/L Glucose 432 (H) 65 - 100 mg/dL BUN 24 (H) 6 - 20 MG/DL Creatinine 1.01 0.55 - 1.02 MG/DL  
 BUN/Creatinine ratio 24 (H) 12 - 20 GFR est AA >60 >60 ml/min/1.73m2 GFR est non-AA 56 (L) >60 ml/min/1.73m2 Calcium 9.0 8.5 - 10.1 MG/DL Bilirubin, total 0.4 0.2 - 1.0 MG/DL  
 ALT (SGPT) 16 12 - 78 U/L  
 AST (SGOT) 9 (L) 15 - 37 U/L Alk. phosphatase 126 (H) 45 - 117 U/L Protein, total 6.9 6.4 - 8.2 g/dL Albumin 3.2 (L) 3.5 - 5.0 g/dL Globulin 3.7 2.0 - 4.0 g/dL A-G Ratio 0.9 (L) 1.1 - 2.2 CK W/ REFLX CKMB Collection Time: 11/14/18  9:25 AM  
Result Value Ref Range CK 91 26 - 192 U/L  
SAMPLES BEING HELD Collection Time: 11/14/18  9:25 AM  
Result Value Ref Range SAMPLES BEING HELD red,sst,libra COMMENT Add-on orders for these samples will be processed based on acceptable specimen integrity and analyte stability, which may vary by analyte. POC TROPONIN-I Collection Time: 11/14/18  9:25 AM  
Result Value Ref Range Troponin-I (POC) <0.04 0.00 - 0.08 ng/mL Imaging CXR Results  (Last 48 hours)  
          
 11/14/18 0953  XR CHEST PORT Final result Impression:  IMPRESSION:  
No acute cardiopulmonary disease radiographically. . Stable mild cardiomegaly. .  
   
  
 Narrative:  INDICATION:  Chest Pain EXAM: Chest single view. COMPARISON: 2/25/2017. FINDINGS: A single frontal view of the chest at 0951 hours shows clear lungs. The heart, mediastinum and pulmonary vasculature are stable with mild  
cardiomegaly. .  The bony thorax is unremarkable for age. .  
   
  
  
 
CT Results  (Last 48 hours) None ED EKG interpretation: 11:57 AM 
 Rhythm: normal sinus rhythm; and regular . Rate (approx.): 72; Axis: normal; P wave: normal; QRS interval: prolonged; ST/T wave: non-specific changes; Other findings: abnormal ekg. Assessment and Plan Ms. Humaira Fernandez is a 59 y.o.  female with a history of CAD with MI x 2 and stents x 3 in 9/18, uncontrolled diabetes mellitus type 2, TIA, Neuropathic pain of the right leg, HLD, Asthma, obesity who is admitted with Atypical chest pain and hyperglycemia. Atypical Chest Pain: Atypical presentation patient with very significant cardiac history and recent heart attacks with CAD. ACS rule out and telemetry overnight - Admit to remote telemetry - Trend Troponin 
- Echo - Treatment CAD 
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 Patint reports normal BG but also notes symptomatic recently. Unclear on at home regimen as NPH dosing is very atypical.  Starting with weight based dosing now, will increase Insulin as needed. - Holding Home NPH 
- Given 10 units regular insulin with 500 cc bolus NS 
- Lantus 30 units at bedtime. 
- Insulin Sliding Scale Resistant scale with AC&HS glucose checks. - Will restart Mealtime dosing with mealtime scale as needed - Hypoglycemia protocols ordered. - Diabetic teaching consulted CAD/Hypertension/Diastolic Heart Dysfunction: BP initially normal, trending up, missed home medications this AM 
- holding Losartan  
- Continuing Metoprolol 50 mg BID and Imdur 60 mg.  
- Continue Plavix 75 mg 
- Continue Lipitor 40 mg 
- Will continue to monitor at this time and readjust as BP's trend. At Risk MINH: POA crt 1.01, BL ~0.7-0.8 
- Holding Losartan - MIVF Candida Vaginitis: History of \"Yeast Infection\", likely developed with poor BG control.   
- Diflucan 150 mg x 1 Asthma/Environmental allergies: Increased cough, but no current wheezing, normal O2 on RA. Previously on Xolair shots as well, plans to see Pulmonology soon. - Xopenex Scheduled - Continue Singulair - Monitor O2 Requirement HIEN: CPAP at night Neuropathy: Stable, 
- Contiue Gabapentin 900 mg TID Restless Legs: Stable - Continue Mirapex 0.5 mg BID 
 
GERD: Stable 
- Nexium 40 mg 
 
Depression: Stable - Cymbalta 60 mg daily FEN/GI - Diabetic diet. NS at 135 mL/hr. Activity - Ambulate with assistance DVT prophylaxis - Sub Q Heparin GI prophylaxis - Nexium Disposition - Admit to Telemetry. Plan to d/c to Home. Code Status - Full, discussed with patient / caregivers. Patient Jolie Roque has been be discussed Dr. Shama Burk (Attending Physician). 11:21 AM, 11/14/18 Linda Mckenzie MD 
Family Medicine Resident For Billing Chief Complaint Patient presents with  Chest Pain  Cough Hospital Problems  Date Reviewed: 11/13/2018 Codes Class Noted POA * (Principal) Chest pain ICD-10-CM: R07.9 ICD-9-CM: 786.50  11/14/2018 Yes Hyperglycemia ICD-10-CM: R73.9 ICD-9-CM: 790.29  11/14/2018 Yes Coronary artery disease involving native coronary artery of native heart with angina pectoris ([facility] ICD-10-CM: I25.119 ICD-9-CM: 414.01, 413.9  11/14/2018 Yes DM type 2, uncontrolled, with neuropathy (HCC) (Chronic) ICD-10-CM: E11.40, E11.65 ICD-9-CM: 250.62, 357.2  3/9/2015 Yes Asthma (Chronic) ICD-10-CM: J45.909 ICD-9-CM: 493.90  3/9/2015 Yes Essential hypertension with goal blood pressure less than 130/85 ICD-10-CM: I10 
ICD-9-CM: 401.9  3/9/2015 Yes Hyperlipidemia (Chronic) ICD-10-CM: Y24.9 ICD-9-CM: 272.4  3/9/2015 Yes Severe obesity ([facility] [address] ICD-10-CM: E66.01 
ICD-9-CM: 278.01  11/13/2018 Yes Neuropathy of right lower extremity ICD-10-CM: G57.91 
ICD-9-CM: 355.8  9/27/2016 Yes Hypertensive heart disease with diastolic heart failure (HCC) ICD-10-CM: I11.0, I50.30 ICD-9-CM: 402.91, 428.30, 428.0  8/1/2016 Yes Obstructive sleep apnea ICD-10-CM: G47.33 
ICD-9-CM: 327.23  3/10/2015 Yes Restless leg syndrome (Chronic) ICD-10-CM: G25.81 ICD-9-CM: 333.94  3/9/2015 Yes

## 2018-11-14 NOTE — PROGRESS NOTES
1245- TRANSFER - IN REPORT: 
 
Verbal report received from [facility]) on [employer] [facility]  being received from ED(unit) for routine progression of care Report consisted of patients Situation, Background, Assessment and  
Recommendations(SBAR). Information from the following report(s) SBAR, Kardex, ED Summary, [facility] and Recent Results was reviewed with the receiving nurse. Opportunity for questions and clarification was provided. Assessment completed upon patients arrival to unit and care assumed. 1915- Verbal shift change report given to Lea Gupta (oncoming nurse) by Lavelle Canada (offgoing nurse). Report included the following information SBAR, Kardex, ED Summary and MAR.

## 2018-11-14 NOTE — PROGRESS NOTES
[address] Medicine Residency Attending Addendum: 
 
I was NOT present with the resident during the interview & examination of the patient. I personally interviewed the patient & repeated the critical or key portions of the exam.  I agree with the resident's note with the following additions: Hx: 61yo F with hx multiple MIs and stents in the past, recently moved here and working to get established. Normally CP resolves with nitro but didn't this time. Also with increased cough. Noted to have glucoses in 400's on presentation. Exam:  
VS:  
Patient Vitals for the past 4 hrs: 
 Pulse Resp BP SpO2  
11/14/18 1100 74 23 182/82 94 % 11/14/18 1045 79 18 178/70 95 % 11/14/18 1030 76 16 167/70 99 % 11/14/18 1015 71 13 162/71 98 % 11/14/18 1000 71 15 144/57 94 % 11/14/18 0945 70 10 150/61 96 % 11/14/18 0915 72 15 142/67 99 % 11/14/18 0911  22 140/58 100 % 11/14/18 0909   140/58  GEN: NAD, lying in bed comfortably PSYCH: normal mood and affect Assessment/Plan: 1.  CP, ACS r/o in the setting of CAD: CXR normal, EKG unchanged from prior, trending trops, repeat echo, cardiology consulted and following. 2.  DM2, uncontrolled: correcting with insulin, DM education to see her to clarify what she is taking and when 3. At risk MINH: IVF 4. Vaginal yeast infection: Fluconazole Jonathon 81, DO 11/14/2018

## 2018-11-14 NOTE — ED PROVIDER NOTES
59 y.o. female with past medical history significant for h/o MI x 2, s/p stent placement x 3, asthma, HTN, hypercholesterolemia, DM, rheumatoid arthritis, h/o stroke, who presents to the ED via EMS accompanied by spouse, with chief complaint of substernal chest pain and cough. Pt complains of intermittent substernal chest pain and bilateral shoulder pain that started suddenly ~0700 this morning. She states that episodes of pain lasts for ~2-3 minutes. Reports that the pain radiates into her arms, hands and legs. Pt states that she took sublingual NTG x 3 PTA without relief. Pt notes that she has h/o MI x 2 and states that her current pain is similar to pain felt with second MI, but notes that her current chest pain is worse. She states that she is s/p 3 stent placements, all performed at [facility] in [place], [place]. Documentation from stent placement provided by the patient indicate that one was placed on 9/8/18 in OM and two more were placed on 9/15/18 in Cx. Pt notes that she takes Plavix daily since stent placements after 3rd vein was \"40% blocked\". She denies taking Plavix today PTA. Pt also complains of an unproductive cough that started two days ago. She notes that she has asthma and uses a daily inhaler. EMS reports that patient's BG was \"411\" en route. There are no other acute medical concerns at this time. Review of medical records indicate that the patient last visited the patient last visited the ED in May of 2018 for a head injury. Social hx: Negative for Tobacco use; Negative for EtOH use; Negative for Illicit Drug use PCP: Darrel Drake MD 
 
Note written by Renzo Galvez, as dictated by Nereyda Minaya, DO 9:30 AM 
 
 
The history is provided by the patient, the EMS personnel and medical records. No  was used. Past Medical History:  
Diagnosis Date  Asthma  Asthma  Diabetes mellitus type II   
 Hypercholesteremia  Hyperlipemia  Hypertension  Restless leg  Restless leg  Rheumatoid arthritis(714.0)  Sleep apnea  Sleep disorder  [facility] Past Surgical History:  
Procedure Laterality Date  CHEST SURGERY PROCEDURE UNLISTED  HX CORONARY STENT PLACEMENT Left Sept. 8th and Sept 16, 2018  HX TONSILLECTOMY Family History:  
Problem Relation Age of Onset  Heart Disease Mother  Diabetes Mother  Hypertension Other Social History Socioeconomic History  Marital status: SINGLE Spouse name: Not on file  Number of children: Not on file  Years of education: Not on file  Highest education level: Not on file Social Needs  Financial resource strain: Not on file  Food insecurity - worry: Not on file  Food insecurity - inability: Not on file  Transportation needs - medical: Not on file  Transportation needs - non-medical: Not on file Occupational History  Not on file Tobacco Use  Smoking status: Never Smoker  Smokeless tobacco: Never Used Substance and Sexual Activity  Alcohol use: No  
  Alcohol/week: 2.5 oz Types: 5 Cans of beer per week  Drug use: No  
 Sexual activity: No  
Other Topics Concern  Not on file Social History Narrative  Not on file ALLERGIES: Levaquin [levofloxacin]; Albuterol; and Aspirin Review of Systems Respiratory: Positive for cough. Cardiovascular: Positive for chest pain (substernal). Musculoskeletal: Positive for arthralgias (bilateral shoulders). All other systems reviewed and are negative. Vitals:  
 11/14/18 8128 11/14/18 0911 11/14/18 0915 BP: 140/58 140/58 142/67 Pulse:   72 Resp:  22 15 SpO2:  100% 99% Weight:  91.6 kg (202 lb) Height:  5' 1\" (1.549 m) Physical Exam  
  
Constitutional: Pt is awake and alert. Pt appears well-developed and well-nourished. NAD. Looks uncomfortable.  
HENT:  
 Head: Normocephalic and atraumatic. Nose: Nose normal.  
Mouth/Throat: Oropharynx is clear and moist. No oropharyngeal exudate. Eyes: Conjunctivae and extraocular motions are normal. Pupils are equal, round, and reactive to light. Right eye exhibits no discharge. Left eye exhibits no discharge. No scleral icterus. Neck: No tracheal deviation present. Supple neck. Cardiovascular: Normal rate, regular rhythm, normal heart sounds and intact distal pulses. Exam reveals no gallop and no friction rub. No murmur heard. Pulmonary/Chest: Effort normal and breath sounds normal.  Pt  has no wheezes. Pt  has no rales. Abdominal: Soft. Pt  exhibits no distension and no mass. No tenderness. Pt  has no rebound and no guarding. Musculoskeletal:  Pt  exhibits no edema and no tenderness. Ext: Normal ROM in all four extremities; not tender to palpation; distal pulses are normal, no edema. Neurological:  Pt is alert. nonfocal neuro exam. 
Skin: Skin is warm and dry. Pt  is not diaphoretic. Psychiatric:  Pt  has a normal mood and affect. Behavior is normal.  
Note written by Renzo Nunes, as dictated by Kiran Tobias DO 9:30 AM 
 
MDM Number of Diagnoses or Management Options Critical Care Total time providing critical care: 30-74 minutes 30 minutes Procedures ED EKG interpretation:  # 1 Rhythm: normal sinus rhythm; and regular . Rate (approx.): 72 bpm; ST/T wave: inferolateral ischemia, new since 1/9/18. Note written by Renzo Nunes, as dictated by Kiran Tobias DO 9:30 AM 
 
CONSULT NOTE: 
9:56 AM Kiran Tobias DO spoke with Dr. NEISHA WANG MD, Consult for [facility]. Discussed available diagnostic tests and clinical findings. Dr[facility] the patient.  
 
9:57 AM 
Patient is being admitted to the hospital.  The results of their tests and reasons for their admission have been discussed with them and/or available family. They convey agreement and understanding for the need to be admitted and for their admission diagnosis. Consultation will be made now with the inpatient physician for hospitalization. ED EKG interpretation: # 2 Rhythm: normal sinus rhythm; and regular . Rate (approx.): 72 bpm; ST/T wave: inferiolateral ischemia; No acute abnormalities otherwise. Note written by Renzo Owen, as dictated by Carlin Conway DO 9:58 AM 
 
PROGRESS NOTE: 
10:10 AM 
Reviewed Chest XR. CONSULT NOTE: 
10:43 AM Carlin Conway DO spoke with Dr. Sommer Jean MD, Consult for Cardiology. Discussed available diagnostic tests and clinical findings. Dr. Sommer Jean will evaluate the patient. Admit to hospital 
D/w Dr Sommer Jean, cardiology. Recent Results (from the past 12 hour(s)) EKG, 12 LEAD, INITIAL Collection Time: 11/14/18  9:12 AM  
Result Value Ref Range Ventricular Rate 72 BPM  
 Atrial Rate 72 BPM  
 P-R Interval 152 ms QRS Duration 88 ms Q-T Interval 432 ms QTC Calculation (Bezet) 473 ms Calculated P Axis 73 degrees Calculated R Axis 13 degrees Calculated T Axis -176 degrees Diagnosis Normal sinus rhythm T wave abnormality, consider inferolateral ischemia Prolonged QT Abnormal ECG When compared with ECG of 09-JAN-2018 19:04, 
Previous ECG has undetermined rhythm, needs review CBC WITH AUTOMATED DIFF Collection Time: 11/14/18  9:25 AM  
Result Value Ref Range WBC 6.6 3.6 - 11.0 K/uL  
 RBC 3.98 3.80 - 5.20 M/uL  
 HGB 11.8 11.5 - 16.0 g/dL HCT 34.1 (L) 35.0 - 47.0 % MCV 85.7 80.0 - 99.0 FL  
 MCH 29.6 26.0 - 34.0 PG  
 MCHC 34.6 30.0 - 36.5 g/dL  
 RDW 12.4 11.5 - 14.5 % PLATELET 550 978 - 944 K/uL MPV 9.5 8.9 - 12.9 FL  
 NRBC 0.0 0  WBC ABSOLUTE NRBC 0.00 0.00 - 0.01 K/uL NEUTROPHILS 52 32 - 75 % LYMPHOCYTES 38 12 - 49 % MONOCYTES 8 5 - 13 % EOSINOPHILS 1 0 - 7 % BASOPHILS 1 0 - 1 % IMMATURE GRANULOCYTES 0 0.0 - 0.5 % ABS. NEUTROPHILS 3.5 1.8 - 8.0 K/UL  
 ABS. LYMPHOCYTES 2.5 0.8 - 3.5 K/UL  
 ABS. MONOCYTES 0.6 0.0 - 1.0 K/UL  
 ABS. EOSINOPHILS 0.1 0.0 - 0.4 K/UL  
 ABS. BASOPHILS 0.0 0.0 - 0.1 K/UL  
 ABS. IMM. GRANS. 0.0 0.00 - 0.04 K/UL  
 DF AUTOMATED METABOLIC PANEL, COMPREHENSIVE Collection Time: 11/14/18  9:25 AM  
Result Value Ref Range Sodium 135 (L) 136 - 145 mmol/L Potassium 4.3 3.5 - 5.1 mmol/L Chloride 101 97 - 108 mmol/L  
 CO2 27 21 - 32 mmol/L Anion gap 7 5 - 15 mmol/L Glucose 432 (H) 65 - 100 mg/dL BUN 24 (H) 6 - 20 MG/DL Creatinine 1.01 0.55 - 1.02 MG/DL  
 BUN/Creatinine ratio 24 (H) 12 - 20 GFR est AA >60 >60 ml/min/1.73m2 GFR est non-AA 56 (L) >60 ml/min/1.73m2 Calcium 9.0 8.5 - 10.1 MG/DL Bilirubin, total 0.4 0.2 - 1.0 MG/DL  
 ALT (SGPT) 16 12 - 78 U/L  
 AST (SGOT) 9 (L) 15 - 37 U/L Alk. phosphatase 126 (H) 45 - 117 U/L Protein, total 6.9 6.4 - 8.2 g/dL Albumin 3.2 (L) 3.5 - 5.0 g/dL Globulin 3.7 2.0 - 4.0 g/dL A-G Ratio 0.9 (L) 1.1 - 2.2 CK W/ REFLX CKMB Collection Time: 11/14/18  9:25 AM  
Result Value Ref Range CK 91 26 - 192 U/L  
SAMPLES BEING HELD Collection Time: 11/14/18  9:25 AM  
Result Value Ref Range SAMPLES BEING HELD red,sst,libra COMMENT Add-on orders for these samples will be processed based on acceptable specimen integrity and analyte stability, which may vary by analyte. POC TROPONIN-I Collection Time: 11/14/18  9:25 AM  
Result Value Ref Range Troponin-I (POC) <0.04 0.00 - 0.08 ng/mL

## 2018-11-15 VITALS
OXYGEN SATURATION: 100 % | WEIGHT: 208.34 LBS | SYSTOLIC BLOOD PRESSURE: 150 MMHG | HEIGHT: 61 IN | BODY MASS INDEX: 39.33 KG/M2 | TEMPERATURE: 98 F | DIASTOLIC BLOOD PRESSURE: 72 MMHG | RESPIRATION RATE: 18 BRPM | HEART RATE: 77 BPM

## 2018-11-15 LAB
ALBUMIN SERPL-MCNC: 2.8 G/DL (ref 3.5–5)
ALBUMIN/GLOB SERPL: 0.9 {RATIO} (ref 1.1–2.2)
ALP SERPL-CCNC: 86 U/L (ref 45–117)
ALT SERPL-CCNC: 14 U/L (ref 12–78)
ANION GAP SERPL CALC-SCNC: 7 MMOL/L (ref 5–15)
AST SERPL-CCNC: 20 U/L (ref 15–37)
BILIRUB SERPL-MCNC: 0.4 MG/DL (ref 0.2–1)
BUN SERPL-MCNC: 22 MG/DL (ref 6–20)
BUN/CREAT SERPL: 28 (ref 12–20)
CALCIUM SERPL-MCNC: 8.3 MG/DL (ref 8.5–10.1)
CHLORIDE SERPL-SCNC: 108 MMOL/L (ref 97–108)
CHOLEST SERPL-MCNC: 104 MG/DL
CO2 SERPL-SCNC: 26 MMOL/L (ref 21–32)
COMMENT, HOLDF: NORMAL
CREAT SERPL-MCNC: 0.78 MG/DL (ref 0.55–1.02)
ERYTHROCYTE [DISTWIDTH] IN BLOOD BY AUTOMATED COUNT: 12.6 % (ref 11.5–14.5)
EST. AVERAGE GLUCOSE BLD GHB EST-MCNC: 255 MG/DL
GLOBULIN SER CALC-MCNC: 3.1 G/DL (ref 2–4)
GLUCOSE BLD STRIP.AUTO-MCNC: 238 MG/DL (ref 65–100)
GLUCOSE BLD STRIP.AUTO-MCNC: 401 MG/DL (ref 65–100)
GLUCOSE SERPL-MCNC: 220 MG/DL (ref 65–100)
HBA1C MFR BLD: 10.5 % (ref 4.2–6.3)
HCT VFR BLD AUTO: 29.9 % (ref 35–47)
HDLC SERPL-MCNC: 38 MG/DL
HDLC SERPL: 2.7 {RATIO} (ref 0–5)
HGB BLD-MCNC: 9.9 G/DL (ref 11.5–16)
LDLC SERPL CALC-MCNC: 32.8 MG/DL (ref 0–100)
LIPID PROFILE,FLP: ABNORMAL
MCH RBC QN AUTO: 29.4 PG (ref 26–34)
MCHC RBC AUTO-ENTMCNC: 33.1 G/DL (ref 30–36.5)
MCV RBC AUTO: 88.7 FL (ref 80–99)
NRBC # BLD: 0 K/UL (ref 0–0.01)
NRBC BLD-RTO: 0 PER 100 WBC
PLATELET # BLD AUTO: 179 K/UL (ref 150–400)
PMV BLD AUTO: 9.4 FL (ref 8.9–12.9)
POTASSIUM SERPL-SCNC: 4.1 MMOL/L (ref 3.5–5.1)
PROT SERPL-MCNC: 5.9 G/DL (ref 6.4–8.2)
RBC # BLD AUTO: 3.37 M/UL (ref 3.8–5.2)
SAMPLES BEING HELD,HOLD: NORMAL
SERVICE CMNT-IMP: ABNORMAL
SERVICE CMNT-IMP: ABNORMAL
SODIUM SERPL-SCNC: 141 MMOL/L (ref 136–145)
TRIGL SERPL-MCNC: 166 MG/DL (ref ?–150)
VLDLC SERPL CALC-MCNC: 33.2 MG/DL
WBC # BLD AUTO: 6 K/UL (ref 3.6–11)

## 2018-11-15 PROCEDURE — 90686 IIV4 VACC NO PRSV 0.5 ML IM: CPT | Performed by: FAMILY MEDICINE

## 2018-11-15 PROCEDURE — 96361 HYDRATE IV INFUSION ADD-ON: CPT

## 2018-11-15 PROCEDURE — 82962 GLUCOSE BLOOD TEST: CPT

## 2018-11-15 PROCEDURE — 74011636637 HC RX REV CODE- 636/637: Performed by: FAMILY MEDICINE

## 2018-11-15 PROCEDURE — 74011250636 HC RX REV CODE- 250/636: Performed by: FAMILY MEDICINE

## 2018-11-15 PROCEDURE — 74011250637 HC RX REV CODE- 250/637: Performed by: FAMILY MEDICINE

## 2018-11-15 PROCEDURE — 99218 HC RM OBSERVATION: CPT

## 2018-11-15 PROCEDURE — 83036 HEMOGLOBIN GLYCOSYLATED A1C: CPT

## 2018-11-15 PROCEDURE — 94640 AIRWAY INHALATION TREATMENT: CPT

## 2018-11-15 PROCEDURE — 74011250637 HC RX REV CODE- 250/637: Performed by: STUDENT IN AN ORGANIZED HEALTH CARE EDUCATION/TRAINING PROGRAM

## 2018-11-15 PROCEDURE — 85027 COMPLETE CBC AUTOMATED: CPT

## 2018-11-15 PROCEDURE — 36415 COLL VENOUS BLD VENIPUNCTURE: CPT

## 2018-11-15 PROCEDURE — 80053 COMPREHEN METABOLIC PANEL: CPT

## 2018-11-15 PROCEDURE — 74011000250 HC RX REV CODE- 250: Performed by: FAMILY MEDICINE

## 2018-11-15 PROCEDURE — 80061 LIPID PANEL: CPT

## 2018-11-15 PROCEDURE — 90471 IMMUNIZATION ADMIN: CPT

## 2018-11-15 PROCEDURE — 96372 THER/PROPH/DIAG INJ SC/IM: CPT

## 2018-11-15 RX ORDER — METFORMIN HYDROCHLORIDE 500 MG/1
500 TABLET, EXTENDED RELEASE ORAL
Qty: 30 TAB | Refills: 0 | Status: SHIPPED | OUTPATIENT
Start: 2018-11-15 | End: 2018-11-15 | Stop reason: SDUPTHER

## 2018-11-15 RX ORDER — LOSARTAN POTASSIUM 25 MG/1
25 TABLET ORAL DAILY
Status: DISCONTINUED | OUTPATIENT
Start: 2018-11-15 | End: 2018-11-15 | Stop reason: HOSPADM

## 2018-11-15 RX ORDER — INSULIN GLARGINE 100 [IU]/ML
30 INJECTION, SOLUTION SUBCUTANEOUS
Qty: 1 PEN | Refills: 0 | Status: SHIPPED | OUTPATIENT
Start: 2018-11-15 | End: 2018-11-15 | Stop reason: SDUPTHER

## 2018-11-15 RX ORDER — INSULIN GLARGINE 100 [IU]/ML
30 INJECTION, SOLUTION SUBCUTANEOUS
Qty: 3 PEN | Refills: 0 | Status: SHIPPED | OUTPATIENT
Start: 2018-11-15 | End: 2018-12-10 | Stop reason: SDUPTHER

## 2018-11-15 RX ORDER — METFORMIN HYDROCHLORIDE 500 MG/1
500 TABLET, EXTENDED RELEASE ORAL
Qty: 30 TAB | Refills: 0 | Status: SHIPPED | OUTPATIENT
Start: 2018-11-15 | End: 2018-12-10 | Stop reason: SDUPTHER

## 2018-11-15 RX ADMIN — CETIRIZINE HYDROCHLORIDE 10 MG: 10 TABLET, FILM COATED ORAL at 09:04

## 2018-11-15 RX ADMIN — DULOXETINE HYDROCHLORIDE 60 MG: 30 CAPSULE, DELAYED RELEASE ORAL at 09:04

## 2018-11-15 RX ADMIN — HEPARIN SODIUM 5000 UNITS: 5000 INJECTION INTRAVENOUS; SUBCUTANEOUS at 12:29

## 2018-11-15 RX ADMIN — INSULIN LISPRO 12 UNITS: 100 INJECTION, SOLUTION INTRAVENOUS; SUBCUTANEOUS at 11:30

## 2018-11-15 RX ADMIN — INFLUENZA VIRUS VACCINE 0.5 ML: 15; 15; 15; 15 SUSPENSION INTRAMUSCULAR at 14:25

## 2018-11-15 RX ADMIN — ACETAMINOPHEN 650 MG: 325 TABLET, FILM COATED ORAL at 05:37

## 2018-11-15 RX ADMIN — Medication 10 ML: at 05:38

## 2018-11-15 RX ADMIN — LEVALBUTEROL HYDROCHLORIDE 1.25 MG: 1.25 SOLUTION RESPIRATORY (INHALATION) at 03:08

## 2018-11-15 RX ADMIN — INSULIN LISPRO 4 UNITS: 100 INJECTION, SOLUTION INTRAVENOUS; SUBCUTANEOUS at 09:04

## 2018-11-15 RX ADMIN — LEVALBUTEROL HYDROCHLORIDE 1.25 MG: 1.25 SOLUTION RESPIRATORY (INHALATION) at 00:58

## 2018-11-15 RX ADMIN — ISOSORBIDE MONONITRATE 60 MG: 30 TABLET, EXTENDED RELEASE ORAL at 09:04

## 2018-11-15 RX ADMIN — ATORVASTATIN CALCIUM 40 MG: 20 TABLET, FILM COATED ORAL at 09:04

## 2018-11-15 RX ADMIN — SODIUM CHLORIDE 135 ML/HR: 900 INJECTION, SOLUTION INTRAVENOUS at 03:45

## 2018-11-15 RX ADMIN — METOPROLOL TARTRATE 50 MG: 50 TABLET ORAL at 09:04

## 2018-11-15 RX ADMIN — LOSARTAN POTASSIUM 25 MG: 25 TABLET, FILM COATED ORAL at 09:04

## 2018-11-15 RX ADMIN — PRAMIPEXOLE DIHYDROCHLORIDE 0.5 MG: 0.25 TABLET ORAL at 09:04

## 2018-11-15 RX ADMIN — LEVALBUTEROL HYDROCHLORIDE 1.25 MG: 1.25 SOLUTION RESPIRATORY (INHALATION) at 09:26

## 2018-11-15 RX ADMIN — PANTOPRAZOLE SODIUM 40 MG: 40 TABLET, DELAYED RELEASE ORAL at 09:04

## 2018-11-15 RX ADMIN — HEPARIN SODIUM 5000 UNITS: 5000 INJECTION INTRAVENOUS; SUBCUTANEOUS at 05:33

## 2018-11-15 RX ADMIN — CLOPIDOGREL BISULFATE 75 MG: 75 TABLET ORAL at 09:04

## 2018-11-15 RX ADMIN — GABAPENTIN 900 MG: 300 CAPSULE ORAL at 09:03

## 2018-11-15 RX ADMIN — LEVALBUTEROL HYDROCHLORIDE 1.25 MG: 1.25 SOLUTION RESPIRATORY (INHALATION) at 11:51

## 2018-11-15 NOTE — ROUTINE PROCESS
Bedside and Verbal shift change report given to Steve Grant RN (oncoming nurse) by Akira Das RN (offgoing nurse). Report included the following information SBAR, Kardex, Intake/Output, MAR, Accordion, Recent Results, Cardiac Rhythm SB/SR and Alarm Parameters .

## 2018-11-15 NOTE — DIABETES MGMT
DTC Consult Note Recommendations/ Comments: If appropriate, please consider increasing Lantus dose to 35 units at bedtime. Also please consider adding prandial insulin Humalog 7 units ac tid. Pt has required 34 units of correction over the past 24 hours. Upon further discussion with pt she frequently missed doses of NPH at home, please d/c pt home on Lantus once daily in the morning to aid in compliance. Pt will need the following new rx's at d/c: 
-Lantus solostar insulin pen 
-BD Ultrafine Pen needles [id number] #100 Current hospital DM medication:  
 -Lantus 30 units at bedtime 
-Humalog insulin resistant correction Consult received for:  [x]             Assessment of home management 
              [x]      Medication Recommendations []             Meter/monitoring 
   [x]             Insulin instruction []             New diagnosis []             Outpatient education []             Insulin pump patient []             Insulin infusion 
   []             DKA/HHS Chart reviewed and initial evaluation complete on Huog Bills. Patient is a 59 y.o. female with known history of Type 2 Diabetes on Humulin N 30 units Qam and 70 units Qpm and Novolog SSI + 20 units ac tid at home. Met with pt and spouse for consult. Pt reported that she typically has hypoglycemia 4 out of 7 nights of the week. She reported she has a new PCP but has not had her insulin regimen adjusted. Reviewed hypoglycemia signs/symptoms and treatment with pt as she was not treating her lows appropriately. Pt also stated that there are days where she is \"tired of giving injections\" and will just skip doses. Pt reported she would be more likely to consistently give insulin injections if prescribed long-acting basal insulin in the morning.  Also discussed role of prandial insulin and pt reported she needs to work on giving those injections during the day. Reviewed rotating injection sites and storage of insulin. Pt also reported her current home situation is very stressful as she is currently living with her daughter. Discussed how stress can impact BG control. Reviewed meal planing and decreasing portion sizes. Pt reported that her  also has DM but is very poorly controlled and makes very poor food choices. She stated that this adds to her stress level. Pt and spouse scheduled for outpatient education 12/12/18 @ 9am.  
 
Assessed and instructed patient on the following:  
·  interpretation of lab results, blood sugar goals, complications of diabetes mellitus, hypoglycemia prevention and treatment, illness management, SMBG skills, nutrition, referred to Diabetes Educator, site rotation and use of insulin pen Encouraged the following:  
· dietary modifications: well balanced meals, decreasing portion sizes, eliminating sugar-sweetened beverages · regular blood sugar monitoring: prior to insulin injections · Taking medication as prescribed Provided patient with the following: [x]             Survival skills education materials [x]             Insulin education materials []             CHO counting education materials [x]             Outpatient DTC contact number 
             []             Glucometer Discussed with patient and/or family need for follow up appointment for diabetes management after discharge - scheduled for Survival Skills class 12/12/18 @ 9am.  
  
A1c:  
Lab Results Component Value Date/Time Hemoglobin A1c 10.5 (H) 11/15/2018 03:56 AM  
 
 
Recent Glucose Results:  
Lab Results Component Value Date/Time  (H) 11/15/2018 03:56 AM  
 GLUCPOC 238 (H) 11/15/2018 07:46 AM  
 GLUCPOC 178 (H) 11/14/2018 10:18 PM  
 GLUCPOC 77 11/14/2018 09:56 PM  
  
 
Lab Results Component Value Date/Time  Creatinine 0.78 11/15/2018 03:56 AM  
 
 Estimated Creatinine Clearance: 80.5 mL/min (based on SCr of 0.78 mg/dL). Active Orders Diet DIET DIABETIC CONSISTENT CARB Regular PO intake: No data found. Will continue to follow as needed. Thank you. Julio Mcintyre RD, CDE [facility] Office: [phone] Pager: [phone]

## 2018-11-15 NOTE — PROGRESS NOTES
Problem: Falls - Risk of 
Goal: *Absence of Falls Document Patrice Perez Fall Risk and appropriate interventions in the flowsheet. Outcome: Progressing Towards Goal 
Fall Risk Interventions: 
Mobility Interventions: Assess mobility with egress test, Bed/chair exit alarm, Communicate number of staff needed for ambulation/transfer, OT consult for ADLs, Patient to call before getting OOB, PT Consult for mobility concerns, PT Consult for assist device competence Medication Interventions: Assess postural VS orthostatic hypotension, Bed/chair exit alarm, Evaluate medications/consider consulting pharmacy, Patient to call before getting OOB, Teach patient to arise slowly History of Falls Interventions: Bed/chair exit alarm, Consult care management for discharge planning, Door open when patient unattended, Evaluate medications/consider consulting pharmacy, Investigate reason for fall, Room close to nurse's station

## 2018-11-15 NOTE — PROGRESS NOTES
Cardiology Progress Note [address], [place], [address] [facility] Phone [phone]; Fax [phone] 
 
 
 
11/15/2018 9:51 AM  
 
Admit Date:           2018 Admit Diagnosis:  Chest pain Hyperglycemia :          1960 MRN:          [id number] ASSESSMENT/RECOMMENDATION:  
CAD s/p coronary stents (OM and circumflex): neg troponin x 3, echo shows pEF, will small pericardial effusion. 
- will obtain records from [address] [place] [address] Road plavix/BB/statin and ASA 
-stress test OP  
-consult cardiac rehab for education on diet/exercise Chest pain: resolved, as above Small pericardial effusion noted on echo, no hemodynamic compromise.  
-euvolemic on exam 
 
Hypertension: Blood pressure better today, ranging 130-170. Losartan added 
-cont imdur /BB 
-wt loss Dyslipidemia: Continue Lipitor. Diabetes: Her blood sugars were significantly elevated on presentation. Management per primary team. 
hgba1c 10 Needs tight BP control and Blood glucose control Follow up with Dr Fan Diaz in 2 weeks - will arrange appt Cardiology Attending: 
 
Patient personally seen and examined. All the elements of history and examination were personally performed. Assessment and plan was discussed and agree as written above. Chest pain improved without intervention. Trop negative. Recent full revascularization. Pericardial effusion is small and incidental.  
Stress test can be performed as OP. I will sign off. Chevy Diaz MD, [facility] Future Appointments Date Time Provider Carl Chaoisti 2018  1:40 PM Leti Dotson MD [address] [facility] No intake/output data recorded. Last 3 Recorded Weights in this Encounter 18 0911 18 2315 11/15/18 5753 Weight: 202 lb (91.6 kg) 218 lb 11.1 oz (99.2 kg) 208 lb 5.4 oz (94.5 kg)  
 
 
 
11/13 1901 - 11/15 0700 In: 2532 [P.O.:370; I.V.:2162] Out: 900 [Urine:900] SUBJECTIVE Alcira Ringer denies palpitations, irregular heart beat, SOB, chest pain or LE edema. No lightheadedness or dizziness + right arm and hand pain-chronic Current Facility-Administered Medications Medication Dose Route Frequency  losartan (COZAAR) tablet 25 mg  25 mg Oral DAILY  levalbuterol (XOPENEX) nebulizer soln 1.25 mg/3 mL  1.25 mg Nebulization Q4H RT  
 atorvastatin (LIPITOR) tablet 40 mg  40 mg Oral DAILY  isosorbide mononitrate ER (IMDUR) tablet 60 mg  60 mg Oral 7am  
 metoprolol tartrate (LOPRESSOR) tablet 50 mg  50 mg Oral BID  clopidogrel (PLAVIX) tablet 75 mg  75 mg Oral DAILY  DULoxetine (CYMBALTA) capsule 60 mg  60 mg Oral DAILY  pantoprazole (PROTONIX) tablet 40 mg  40 mg Oral ACB  hydrOXYzine HCl (ATARAX) tablet 25 mg  25 mg Oral TID PRN  
 cetirizine (ZYRTEC) tablet 10 mg  10 mg Oral DAILY  montelukast (SINGULAIR) tablet 10 mg  10 mg Oral QHS  pramipexole (MIRAPEX) tablet 0.5 mg  0.5 mg Oral BID  sodium chloride (NS) flush 5-10 mL  5-10 mL IntraVENous Q8H  
 sodium chloride (NS) flush 5-10 mL  5-10 mL IntraVENous PRN  
 acetaminophen (TYLENOL) tablet 650 mg  650 mg Oral Q6H PRN  
 heparin (porcine) injection 5,000 Units  5,000 Units SubCUTAneous Q8H  
 ondansetron (ZOFRAN ODT) tablet 4 mg  4 mg Oral Q6H PRN  
 gabapentin (NEURONTIN) capsule 900 mg  900 mg Oral TID  insulin lispro (HUMALOG) injection   SubCUTAneous AC&HS  
 glucose chewable tablet 16 g  4 Tab Oral PRN  
 dextrose (D50W) injection syrg 12.5-25 g  12.5-25 g IntraVENous PRN  
 glucagon (GLUCAGEN) injection 1 mg  1 mg IntraMUSCular PRN  
 insulin glargine (LANTUS) injection 30 Units  30 Units SubCUTAneous QHS  influenza vaccine 2018-19 (6 mos+)(PF) (FLUARIX QUAD/FLULAVAL QUAD) injection 0.5 mL  0.5 mL IntraMUSCular PRIOR TO DISCHARGE OBJECTIVE Intake/Output Summary (Last 24 hours) at 11/15/2018 6294 Last data filed at 11/15/2018 3359 Gross per 24 hour Intake 2532 ml Output 900 ml Net 1632 ml Review of Systems - History obtained from the patient AS PER  HPI Telemetry NSR 
 
PHYSICAL EXAM  
  
 
Visit Vitals /76 Pulse 79 Temp 98 °F (36.7 °C) Resp 18 Ht 5' 1\" (1.549 m) Wt 208 lb 5.4 oz (94.5 kg) LMP 01/01/2009 SpO2 98% Breastfeeding? No  
BMI 39.36 kg/m² Gen: Well-developed, morbid obesity , in no acute distress  alert and oriented x 3 HEENT:  Pink conjunctivae, Hearing grossly normal.No scleral icterus or conjunctival, moist mucous membranes Neck: Supple,No JVD Resp: No accessory muscle use, Clear breath sounds, No rales or rhonchi- exam limited d/t body habitus Card: Regular Rate,Rythm,No murmurs, rubs or gallop. No thrills. GI:          soft, non-tender MSK: No cyanosis or clubbing, good capillary refill Skin: No rashes or ulcers, no bruising Neuro:  Cranial nerves are grossly intact, moving all four extremities, no focal deficit, follows commands appropriately Psych:  Good insight, oriented to person, place and time, alert, Nml Affect LE: No edema DATA REVIEW Laboratory and Imaging have been reviewed by me and are notable for Recent Labs 11/14/18 
2150 11/14/18 2815 [facility] TROIQ <0.05 <0.05 Recent Labs 11/15/18 
0356 11/14/18 
0021  135* K 4.1 4.3 CO2 26 27 BUN 22* 24* CREA 0.78 1.01  
* 432* WBC 6.0 6.6 HGB 9.9* 11.8 HCT 29.9* 34.1*  
 237 Jordan Bloom NP

## 2018-11-15 NOTE — ROUTINE PROCESS
TRANSFER - IN REPORT: 
 
Verbal report received from Robbi Hernandez [facility] Igor (name) on Maria E Ponce  being received from ER(unit) for routine progression of care Report consisted of patients Situation, Background, Assessment and  
Recommendations(SBAR). Information from the following report(s) SBAR, Kardex, ED Summary, Intake/Output, MAR, Accordion, Recent Results, Cardiac Rhythm SB/SR and Alarm Parameters  was reviewed with the receiving nurse. Opportunity for questions and clarification was provided. Assessment completed upon patients arrival to unit and care assumed.

## 2018-11-15 NOTE — PROGRESS NOTES
TRANSFER - OUT REPORT: 
 
Verbal report given to Chana Lozoya RN (name) on Lorn Hence  being transferred to [id number] (unit) for routine progression of care Report consisted of patients Situation, Background, Assessment and  
Recommendations(SBAR). Information from the following report(s) SBAR, Kardex, Intake/Output, MAR, Recent Results and Cardiac Rhythm SR was reviewed with the receiving nurse. Lines:  
Peripheral IV 11/14/18 Right Hand (Active) Site Assessment Clean, dry, & intact 11/15/2018 12:00 AM  
Phlebitis Assessment 0 11/15/2018 12:00 AM  
Infiltration Assessment 0 11/15/2018 12:00 AM  
Dressing Status Clean, dry, & intact 11/15/2018 12:00 AM  
Dressing Type Other (comments); Tape 11/14/2018  9:02 PM  
Hub Color/Line Status Blue; Infusing 11/15/2018 12:00 AM  
Action Taken Open ports on tubing capped 11/15/2018 12:00 AM  
Alcohol Cap Used Yes 11/15/2018 12:00 AM  
  
 
Opportunity for questions and clarification was provided. Patient transported with: 
 Monitor Registered Nurse Tech

## 2018-11-15 NOTE — DISCHARGE INSTRUCTIONS
HOME DISCHARGE INSTRUCTIONS    [facility] / [id number] : 1960    Admission date: 2018 Discharge date: 11/15/2018     Please bring this form with you to show your care provider at your follow-up appointment. Primary care provider:  Krystyna Brunson MD    Discharging provider:  Jesika Trotter MD  - Family Medicine Resident  Moose Hamlin DO - Attending, Family Medicine     You have been admitted to the hospital with the following diagnoses:    ACUTE DIAGNOSES:  Chest pain  Hyperglycemia  . . . . . . . . . . . . . . . . . . . . . . . . . . . . . . . . . . . . . . . . . . . . . . . . . . . . . . . . . . . . . . . . . . . . . . . Raza Choudhury FOLLOW-UP CARE RECOMMENDATIONS:    Appointments  Follow-up Information     Follow up With Specialties Details Why Contact Info Additional Information    Eda Philip MD Cardiology, Internal Medicine On 2018 340 pm [place] [phone] [facility]       Dwaine Siemens, NP Nurse Practitioner Go on 2018 Go to appointment at 1:00PM with Aurora Lopes for hospital follow up and Diabetes Management [facility]  [phone]       [facility] DIABETIC TREATMENT Diabetes Go on 2018 Follow up Diabetes Education class 18 @ [address] [phone] Located in the [facility] (off site, off of [facility]). MEDICATION CHANGES:  1. You were prescribed metformin  mg, Please take one tablet daily with breakfast, Discuss with your PCP increasing this medication. 2. You were prescribed Basaglar (Lantus pen) for insulin. Take 30 units before bed. 3. Stop taking NPH and your mealtime Novolog/Aspart    Please follow up with your PCP regardin. Chest pain resolution  2. Diabetic management - New Metformin, New Insulln  3. Consider referral to endocrinologist for diabetic control  4.  Consider cardiac rehab OP for exercise and diet plan  5. You will need to follow up with cardiology for an outpatient stress test    Follow-up tests needed: Blood glucose    Pending test results: At the time of your discharge the following test results are still pending: None. Please make sure you review these results with your outpatient follow-up provider(s). Specific symptoms to watch for: chest pain, shortness of breath, fever, chills, nausea, vomiting, diarrhea, change in mentation, falling, weakness, bleeding. DIET/what to eat:  Diabetic Diet    ACTIVITY:  Activity as tolerated    Wound care: none    Equipment needed:  none    What to do if new or unexpected symptoms occur? If you experience any of the above symptoms (or should other concerns or questions arise after discharge) please call your primary care physician. Return to the emergency room if you cannot get hold of your doctor. · It is very important that you keep your follow-up appointment(s). · Please bring discharge papers, medication list (and/or medication bottles) to your follow-up appointments for review by your outpatient provider(s). · Please check the list of medications and be sure it includes every medication (even non-prescription medications) that your provider wants you to take. · It is important that you take the medication exactly as they are prescribed. · Keep your medication in the bottles provided by the pharmacist and keep a list of the medication names, dosages, and times to be taken in your wallet. · Do not take other medications without consulting your doctor. · If you have any questions about your medications or other instructions, please talk to your nurse or care provider before you leave the hospital.     Information obtained by:     I understand that if any problems occur once I am at home I am to contact my physician. These instructions were explained to me and I had the opportunity to ask questions.     I understand and acknowledge receipt of the instructions indicated above.

## 2018-11-15 NOTE — PROGRESS NOTES
Updated AVS MAR with last dose given. Dr. Clau Rojas provided RX for lantus insulin and metformin ER. All Care Plans and Education are complete. Provided a Medication and Side Effects Guide for patient education.

## 2018-11-15 NOTE — PROGRESS NOTES
Bedside and Verbal shift change report given to Shantal (oncoming nurse) by Alvin Esquivel (offgoing nurse). Report included the following information SBAR, Kardex and Cardiac Rhythm SR/sinus sterling. 0900 patient seen by cardiology, plan for stress patient outpatient. 1100 rounded on patient, patient resting quietly in bed and watching tv.  
 
1150 Patient had a blood sugar of 402, called to notify Family practice Dr Alberto David. Per Dr. Albetro David, give 12 units. 1350 discharge instructions reviewed with patient, IVs discontinued, tele pack taken off and tele notified. 2 prescriptions given to patient. Time for questions and answers provided. Flu shot given to patient.

## 2018-11-15 NOTE — PROGRESS NOTES
[address]ofiya [address]ely [address] Office [phone] Fax [phone] Assessment and Plan Ms. Ty Christiansen is a 59 y.o. female w/ PMHx of CAD with MI x 2 and stents x 3 in 9/18, uncontrolled diabetes mellitus type 2, TIA, Neuropathy, HLD, Asthma, and obesity who is admitted with Atypical chest pain and hyperglycemia. 
  
Atypical Chest Pain: Atypical presentation patient with significant cardiac history and recent heart attacks with CAD. ACS rule out. 
- Telemetry - Troponins negx3, discontinue trend 
- ECHO with EF 55-60% w/ grade 2 dystolic dysfunction 
- Continue plavix - Cardiology Consulted, appreciate recs 
  
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 Home regimen: NPH 30 units ACB and 70 units HS. Hyperglycemic symptoms. 
- Holding Home NPH 
- A1c, lipid panel pending - Lantus 30 units at bedtime - Will develop safe insulin regimen for home given hyperglycemia with hypoglycemia at night 
- Insulin Sliding Scale Resistant scale with AC&HS glucose checks, required 10 units in 24 hours - Hypoglycemia protocols ordered. - Diabetic teaching consulted, appreciate recs 
  
CAD/Hypertension/Diastolic Heart Dysfunction: BP normal POA. This morning 150/53. 
- holding Losartan  
- Continue home Metoprolol and Imdur - Restart losartan now that elevated creatinine resolved. - Continue Plavix, lipitor 
- lipid panel pending - Will continue to monitor at this time and readjust as BP's trend. 
  
At Risk MINH: Resolved. POA crt 1.01, BL ~0.7-0.8. This AM 0.78. 
- Restart Losartan - MIVF 
  
Candida Vaginitis: History of \"Yeast Infection\", likely developed with poor BG control. S/p diflucan dose. 
  
Asthma/Environmental allergies: Increased cough, but no current wheezing, normal O2 on RA. Previously on Omalizumab 
 shots as well, plans to see Pulmonology soon. - Levalbuterol q4h 
- Continue Singulair - Monitor O2 Requirement 
  
HIEN: CPAP at night 
  
Neuropathy: Stable. - Contiue Gabapentin 900 mg TID 
  
Restless Legs: Stable - Continue Mirapex 0.5 mg BID 
  
GERD: Stable 
- hold home nexium - pantoprazole daily while inpatient 
  
Depression: Stable - Cymbalta 60 mg daily 
  
FEN/GI - Diabetic diet. NS at 135 mL/hr. Activity - Ambulate with assistance DVT prophylaxis - Sub Q Heparin GI prophylaxis - Nexium Disposition - Admit to Telemetry. Plan to d/c to Home. Code Status - Full, discussed with patient / caregivers. 
  
Patient Maria E Big Stone has been be discussed Dr. Chaya Ellison, supervising physician. I appreciate the opportunity to participate in the care of this patient, Marianne Fenton MD 
Family Medicine Resident Subjective / Objective Subjective She reports that she is not experiencing chest pain anymore. She complains of hand pain from her IV. Her vaginal itching has improved mildly. Temp (24hrs), Av.2 °F (36.8 °C), Min:98 °F (36.7 °C), Max:98.4 °F (36.9 °C) Objective Respiratory:   O2 Device: Room air Visit Vitals /53 (BP 1 Location: Right arm, BP Patient Position: At rest;Hip tilt, left) Pulse 74 Temp 98 °F (36.7 °C) Resp 18 Ht 5' 1\" (1.549 m) Wt 208 lb 5.4 oz (94.5 kg) SpO2 98% Breastfeeding? No  
BMI 39.36 kg/m² General: No acute distress. Alert. Cooperative. Head: Normocephalic. Atraumatic. Eyes:              Conjunctiva pink. Sclera white. PERRL. Neck: Supple. Normal ROM. No stiffness. Respiratory: CTAB. No w/r/r/c.  
Cardiovascular: RRR. Normal S1,S2. No m/r/g. Pulses 2+ throughout. GI: + bowel sounds. Nontender. No rebound tenderness or guarding. Nondistended. Extremities: No BLE edema. Musculoskeletal: Full ROM in all extremities. Distal pulses intact. Skin: Warm, dry. No rashes. I/O: 
Date 18 07 - 11/15/18 3336 11/15/18 07 - 18 3259 Shift 0332-4722 7268-7498 24 Hour Total 5675-3759 9629-0486 24 Hour Total  
INTAKE  
P.O.  370 370     
  P. O.  370 370     
I. V.(mL/kg/hr)  2162 2162 I.V.  2162 2162 Shift Total(mL/kg)  8804(34.4) 1891(38.9) OUTPUT Urine(mL/kg/hr) 400(0.4) 500 900 Urine Voided 400 500 900 Shift Total(mL/kg) 400(4.4) 500(5.3) 900(9.5) NET -400 2032 1632 Weight (kg) 91.6 94.5 94.5 94.5 94.5 94.5  
 
 
CBC: 
Recent Labs 11/15/18 
0356 11/14/18 
6844 WBC 6.0 6.6 HGB 9.9* 11.8 HCT 29.9* 34.1*  
 237 Metabolic Panel: 
Recent Labs 11/15/18 
0356 11/14/18 
0352  135* K 4.1 4.3  101 CO2 26 27 BUN 22* 24* CREA 0.78 1.01  
* 432* CA 8.3* 9.0 ALB 2.8* 3.2* SGOT 20 9* ALT 14 16 For Billing Chief Complaint Patient presents with  Chest Pain  Cough Hospital Problems  Date Reviewed: 11/13/2018 Codes Class Noted POA * (Principal) Chest pain ICD-10-CM: R07.9 ICD-9-CM: 786.50  11/14/2018 Yes Hyperglycemia ICD-10-CM: R73.9 ICD-9-CM: 790.29  11/14/2018 Yes Coronary artery disease involving native coronary artery of native heart with angina pectoris [facility] ICD-10-CM: I25.119 ICD-9-CM: 414.01, 413.9  11/14/2018 Yes Severe obesity ([facility] ICD-10-CM: E66.01 
ICD-9-CM: 278.01  11/13/2018 Yes Neuropathy of right lower extremity ICD-10-CM: G57.91 
ICD-9-CM: 355.8  9/27/2016 Yes Hypertensive heart disease with diastolic heart failure (HCC) ICD-10-CM: I11.0, I50.30 ICD-9-CM: 402.91, 428.30, 428.0  8/1/2016 Yes Obstructive sleep apnea ICD-10-CM: G47.33 
ICD-9-CM: 327.23  3/10/2015 Yes Essential hypertension with goal blood pressure less than 130/85 ICD-10-CM: I10 
ICD-9-CM: 401.9  3/9/2015 Yes DM type 2, uncontrolled, with neuropathy (HCC) (Chronic) ICD-10-CM: E11.40, E11.65 ICD-9-CM: 250.62, 357.2  3/9/2015 Yes Asthma (Chronic) ICD-10-CM: J45.909 ICD-9-CM: 493.90  3/9/2015 Yes Hyperlipidemia (Chronic) ICD-10-CM: Y08.4 ICD-9-CM: 272.4  3/9/2015 Yes Restless leg syndrome (Chronic) ICD-10-CM: G25.81 ICD-9-CM: 333.94  3/9/2015 Yes

## 2018-11-16 ENCOUNTER — PATIENT OUTREACH (OUTPATIENT)
Dept: FAMILY MEDICINE CLINIC | Age: 58
End: 2018-11-16

## 2018-11-16 NOTE — PROGRESS NOTES
Hospital Discharge Follow-Up Date/Time:  2018 10:11 AM 
 
Patient was admitted to [place] [address] on 18 and discharged on 11/15/18 for chest pain. The physician discharge summary was available at the time of outreach. Patient was contacted within 1 business days of discharge. Top Challenges reviewed with the provider  
none Method of communication with provider :none Inpatient RRAT score: 29 Was this a readmission? no  
Patient stated reason for the readmission: N/A Nurse Navigator (NN) contacted the patient by telephone to perform post hospital discharge assessment. Verified name and  with patient as identifiers. Provided introduction to self, and explanation of the Nurse Navigator role. Reviewed discharge instructions and red flags with patient who verbalized understanding. Patient given an opportunity to ask questions and does not have any further questions or concerns at this time. The patient agrees to contact the PCP office for questions related to their healthcare. NN provided contact information for future reference. Disease Specific:   N/A Summary of patient's top problems: 
1. Uncontrolled diabetes 2. Chest pain 
 
Home Health orders at discharge: none Home Health company: N/A Date of initial visit: N/A Durable Medical Equipment ordered/company: N/A Durable Medical Equipment received: N/A Barriers to care? transportation Advance Care Planning:  
Does patient have an Advance Directive:  not on file Medication(s):  
New Medications at Discharge: lantus, metformin Changed Medications at Discharge: none Discontinued Medications at Discharge:  
 
Medication reconciliation was performed with patient, who verbalizes understanding of administration of home medications. There were no barriers to obtaining medications identified at this time. Referral to Pharm D needed: no  
 
Current Outpatient Medications Medication Sig  
  insulin glargine (LANTUS,BASAGLAR) 100 unit/mL (3 mL) inpn 30 Units by SubCUTAneous route nightly for 30 days.  metFORMIN ER (GLUCOPHAGE XR) 500 mg tablet Take 1 Tab by mouth daily (with breakfast) for 30 days.  clotrimazole (MYCELEX) 1 % vaginal cream Insert 1 Applicator into vagina nightly. X 7 days started 11/10  
 diclofenac (VOLTAREN) 1 % gel Apply 2 g to affected area four (4) times daily as needed (hand pain).  nitroglycerin (NITROSTAT) 0.4 mg SL tablet 0.4 mg by SubLINGual route every five (5) minutes as needed for Chest Pain. Up to 3 doses.  isosorbide mononitrate ER (IMDUR) 60 mg CR tablet Take 60 mg by mouth every morning.  losartan (COZAAR) 25 mg tablet Take 25 mg by mouth daily.  pramipexole (MIRAPEX) 0.5 mg tablet Take 0.5 mg by mouth two (2) times a day.  levocetirizine (XYZAL) 5 mg tablet Take 5 mg by mouth daily.  esomeprazole (NEXIUM) 40 mg capsule Take 40 mg by mouth daily.  metoprolol tartrate (LOPRESSOR) 50 mg tablet Take 50 mg by mouth two (2) times a day.  DULoxetine (CYMBALTA) 60 mg capsule Take 60 mg by mouth daily.  hydrOXYzine HCl (ATARAX) 25 mg tablet Take 25 mg by mouth three (3) times daily as needed for Itching.  gabapentin (NEURONTIN) 600 mg tablet Take 600 mg by mouth three (3) times daily. Takes with 300 mg to make a total dose of 900 mg  clopidogrel (PLAVIX) 75 mg tab Take 1 Tab by mouth daily.  gabapentin (NEURONTIN) 300 mg capsule Take 300 mg by mouth three (3) times daily. Takes with 600 mg to make a total dose of 900 mg  
 atorvastatin (LIPITOR) 40 mg tablet Take 40 mg by mouth daily.  montelukast (SINGULAIR) 10 mg tablet Take 10 mg by mouth nightly.  levalbuterol tartrate (XOPENEX HFA) 45 mcg/Actuation inhaler Take 2 Puffs by inhalation every six (6) hours as needed for Wheezing or Shortness of Breath.   
 levalbuterol hcl (XOPENEX) 0.63 mg/3 mL nebulization 0.63 mg by Nebulization route every four (4) hours as needed (shortness of breath). No current facility-administered medications for this visit. There are no discontinued medications. [facility] follow up appointment(s):  
Future Appointments Date Time Provider Carl Josephine 11/19/2018 11:00 AM Isac Gold NP [facility] SCHED  
11/28/2018  3:40 PM Vince Dotson MD [facility] SCHED  
12/12/2018  9:00 AM SURVIVAL SKILLS CLASS [facility] follow up appointment(s): none [facility]:  n/a  
 
 
Goals None

## 2018-11-16 NOTE — DISCHARGE SUMMARY
[facility] [address] [place] [address]   Office [phone]  Fax [phone]       Discharge / Transfer / Off-Service Note     Name: Kyle Truong MRN: [id number]  Sex: Female   YOB: 1960  Age: 59 y.o. PCP: Lexis Machuca MD     Date of admission: 11/14/2018  Date of discharge/transfer: 11/15/2018    Attending physician at admission: Dr. Amador Dewitt    Attending physician at discharge/transfer: Dr. Amador Dewitt    Resident physician at discharge/transfer: Alley Chavarria MD     Consultants during hospitalization  Dr. López Sun, Cardiology       Admission diagnoses   Chest pain  Hyperglycemia    Recommended follow-up after discharge  1. PCP  2. Endocrinologist  3. Cardiology    Things to follow up on with PCP:  1. Chest pain resolution  2. Diabetic management - New Metformin, New Insulin  3. Consider referral to endocrinologist for diabetic control  4. Consider cardiac rehab OP for exercise and diet plan  5. You will need to follow up with cardiology for an outpatient stress test     Medication Changes:  1. New Medications: metformin  mg at breakfast, Basaglar (lantus pen) 30 units at night  2. Modified Medications: none  3. Discontinued Medications: NPH insulin    History of Present Illness  Per admitting provider, Ms. Wendy Hines is a 59 y.o.  female with a history of CAD with MI x 2 and stents x 3 in 9/18, uncontrolled diabetes mellitus type 2, TIA, Neuropathic pain of the right leg, HLD, Asthma, obesity who is admitted with Atypical chest pain and hyperglycemia. Ms. Wendy Hines presented to the Emergency Department today complaining of severe intermittent stabbing chest pains starting this morning. Patient reports waking with severe right shoulder pain that radiates to the mid/epigastric chest specifically with coughing. No associated with activity. Tried Nitroglycerin x 3 which did not improve pain after which patient came to ED.   Despite reporting blood glucose in the 100-180 range at home, endorses Polyphagia, polydipsia, and polyuria over the last several days and noting increased cough over the last 2 days for which has been taking Xopenex. Patient has missed all morning meds.     Patient recently living in [place] and in September 2018 patient had 2 hearts attacks and stated had 3 stents placed. Echo in 2016 with EF of 75% with Grade 3 diastolic dysfunction. HOSPITAL COURSE    Atypical Chest Pain: Atypical presentation patient with significant cardiac history and recent heart attacks with CAD with stents placed in 9/18.  Admitted for ACS rule out. She was monitored on telemetry while inpatient. Her EKG was with NSR with a T wave abnormality in lateral leads. She had three negative troponin values. She had an ECHO with an EF of 55-60% with grade 2 diastolic dysfunction. Her home plavix was continued while inpatient. Cardiology was consulted and recommended obtaining a stress test outpatient. They also recommended for her to attend OP cardiac rehab for diet and exercise training.     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 Home regimen: NPH 30 units ACB and 70 units HS. Hyperglycemic symptoms. Her home regimen was not entirely clear, but based on history, she felt that she was also taking mealtime insulin NPH sometimes as well. She admitted to not taking her NPH regularly. Her NPH was held while inpatient and discontinued at discharge because she reported multiple hypoglycemic episodes at night. She given 30 units of Lantus at night with a Lispro sliding correction scale. She was given a prescription for metformin  mg to take daily with breakfast. PCP can slowly increase this as needed. She was also prescribed 30 units basaglar (lantus pen) to take at night because this was free at her local [facility]. This regimen is less likely to cause hypoglycemic episodes in the face of intermittent hyperglycemia.    CAD/Hypertension/Diastolic Heart Dysfunction: BP normal POA. Morning of discharge it was 150/53. Her home losartan was initially held due to elevated creatinine, but this was restarted on day of discharge. Her home metoprolol and imdur were continued while inpatient. Her home plavix and lipitor were continued as well. Her lipid panel was WNL other than mildly elevated TG at 166.     At Risk MINH: Resolved. POA crt 1.01, BL ~0.7-0.8. Morning of discharge it was 0.78. Her losartan was initially held due to this, but was restarted day of discharge. She was treated with maintenance fluids.     Candida Vaginitis: History of \"Yeast Infection\", likely developed with poor BG control. She was treated with one dose of diflucan.     Asthma/Environmental allergies: Increased cough, but no current wheezing, normal O2 on RA.  Previously on Omalizumab shots as well, plans to see Pulmonology soon. She was treated with levalbuterol q4h while inpatient. Her home singulair was continued.     HIEN: CPAP at night while inpatient     Neuropathy: Stable. Her home gabapentin was continued while inpatient.     Restless Legs: Stable. Her home mirapex was continued while inpatient.     GERD: Stable. Her home nexium was held and she was given pantoprazole daily while inpatient. Nexium was restarted at discharge.     Depression: Stable. Her home cymbalta was continued while inpatient. Physical exam at discharge:    Vitals Visit Vitals  /72   Pulse 77   Temp 98 °F (36.7 °C)   Resp 18   Ht 5' 1\" (1.549 m)   Wt 208 lb 5.4 oz (94.5 kg)   LMP 01/01/2009   SpO2 100%   Breastfeeding? No   BMI 39.36 kg/m²          Condition at discharge: Stable.     Labs  Recent Labs     11/15/18  0356 11/14/18  0925   WBC 6.0 6.6   HGB 9.9* 11.8   HCT 29.9* 34.1*    237     Recent Labs     11/15/18  0356 11/14/18  0925    135*   K 4.1 4.3    101   CO2 26 27   BUN 22* 24*   CREA 0.78 1.01   * 432*   CA 8.3* 9.0     Recent Labs 11/15/18  0356 11/14/18  0925   SGOT 20 9*   ALT 14 16   AP 86 126*   TBILI 0.4 0.4   TP 5.9* 6.9   ALB 2.8* 3.2*   GLOB 3.1 3.7     Recent Labs     11/15/18  1138 11/15/18  0746 11/14/18  2218 11/14/18  2156 11/14/18  2150 11/14/18  1709 11/14/18  1537  11/14/18  0925   TNIPOC  --   --   --   --   --   --   --   --  <0.04   TROIQ  --   --   --   --  <0.05  --  <0.05  --   --    GLUCPOC 401* 238* 178* 77  --  323*  --    < >  --     < > = values in this interval not displayed. Cultures  · none    Procedures / Diagnostic Studies  · none    Imaging  Results from Hospital Encounter encounter on 11/14/18   XR CHEST PORT    Narrative INDICATION:  Chest Pain     EXAM: Chest single view. COMPARISON: 2/25/2017. FINDINGS: A single frontal view of the chest at 0951 hours shows clear lungs. The heart, mediastinum and pulmonary vasculature are stable with mild  cardiomegaly. .  The bony thorax is unremarkable for age. .      Impression IMPRESSION:  No acute cardiopulmonary disease radiographically. . Stable mild cardiomegaly. Sundeep Cancer Results from [place] encounter on 12/05/10   [address] Mach[address]shmi Evans    Narrative Final Report       ICD Codes / Adm. Diagnosis: 799472   / Wheezing    Examination:  ABDOMEN  - [id number] - Dec  5 2010  7:15PM  Accession No:  [id number]  Reason:  pain      REPORT:  INDICATION: See above. COMPARISON: None. The patient was studied with real time ultrasound in the supine and   decubitus positions. The gallbladder demonstrates no abnormality. The liver is increased in echotexture consistent fatty infiltration or   inflammation. Entire liver cannot be visualized. There is no biliary   dilatation. .  Pancreas cannot be visualized. Renal parenchyma is normal in thickness and echotexture. No hydronephrosis   is seen. The right kidney is 10.9 cm in length. The left kidney is 11.9 cm   in length. The proximal to mid abdominal aorta is visualized and tapers normally.    The spleen is normal.        IMPRESSION:    No evidence of gallstones. The liver is increased in echotexture consistent   fatty infiltration or inflammation. Entire liver cannot be visualized. There is no biliary dilatation. Interpreting/Reading Doctor: Maddi Teresa ([id number])  Transcribed:  on 12/05/2010  Approved: Maddi Teresa ([id number])  12/05/2010             Distribution:  Attending Doctor: Halle Vines                    Results from [place] encounter on 07/30/16   CTA CHEST W WO CONT    Narrative **Final Report**       ICD Codes / Adm. Diagnosis: 924175   / Shortness of Breath  SOB  Examination:  CT CHEST ANGIOGRAPHY  - [id number] - Jul 31 2016  1:23AM  Accession No:  [id number]  Reason:  rule out pulm emb. REPORT:  EXAM:  CT CHEST ANGIOGRAPHY    INDICATION:  Shortness of breath for 3 days. COMPARISON: CT chest angiography on 3/31/2010    TECHNIQUE: Helical thin section chest CT following uneventful intravenous   administration of nonionic contrast 90 mL of isovue 370 according to   departmental PE protocol. Coronal and sagittal reformats were performed. 3D   post processing was performed. CT dose reduction was achieved through the   use of a standardized protocol tailored for this examination and automatic   exposure control for dose modulation. FINDINGS: This is a good quality study for the evaluation of pulmonary   embolism to the first subsegmental arterial level. There is no pulmonary   embolism to this level. Aorta is atherosclerotic without aneurysm. Coronary artery calcific   atherosclerosis is visible. Cardiac size is within normal limits. Small   pericardial effusion is increased. No lymphadenopathy by imaging size   criteria. Small bilateral pleural effusions are new. Bilateral interlobular septal   thickening is greatest in the upper lobes and smooth, most compatible with   interstitial edema.  Mild compressive atelectasis is greatest in the right   lower lobe. No evidence of pneumonia. No pneumothorax. Central airways are   unremarkable. Limited images of the upper abdomen are within normal limits. Osseous   structures are intact. IMPRESSION:   1. No pulmonary embolism. 2. Increased now small pericardial effusion. Consider pericarditis. 2. Small bilateral pleural effusions and mild interstitial pulmonary edema. Signing/Reading Doctor: Vamshi Chris ([id number])    Approved: Vamshi Chris ([id number])  Jul 31 2016  1:35AM                                           No procedure found.       Chronic diagnoses   Problem List as of 11/15/2018 Date Reviewed: 11/13/2018          Codes Class Noted - Resolved    * (Principal) Chest pain ICD-10-CM: R07.9  ICD-9-CM: 786.50  11/14/2018 - Present        Hyperglycemia ICD-10-CM: R73.9  ICD-9-CM: 790.29  11/14/2018 - Present        Coronary artery disease involving native coronary artery of native heart with angina pectoris [facility] ICD-10-CM: I25.119  ICD-9-CM: 414.01, 413.9  11/14/2018 - Present        Status post coronary artery stent placement (Chronic) ICD-10-CM: Z95.5  ICD-9-CM: V45.82  11/13/2018 - Present        Severe obesity ([facility] ICD-10-CM: E66.01  ICD-9-CM: 278.01  11/13/2018 - Present        Neuropathy of right lower extremity ICD-10-CM: G57.91  ICD-9-CM: 355.8  9/27/2016 - Present        Neuropathy ICD-10-CM: G62.9  ICD-9-CM: 355.9  9/26/2016 - Present        Pericardial effusion(moderate-large) with mild cardiac tamponade--via echo 8/1/16 ICD-10-CM: I31.3, I31.4  ICD-9-CM: 423.9, 423.3  8/1/2016 - Present        Chest pain, precordial ICD-10-CM: R07.2  ICD-9-CM: 786.51  8/1/2016 - Present        Acute on chronic diastolic CHF (congestive heart failure) (HCC) ICD-10-CM: I50.33  ICD-9-CM: 428.33, 428.0  8/1/2016 - Present        Hypertensive heart disease with diastolic heart failure (HCC) ICD-10-CM: I11.0, I50.30  ICD-9-CM: 402.91, 428.30, 428.0  8/1/2016 - Present        SOB (shortness of breath) ICD-10-CM: R06.02  ICD-9-CM: 786.05  8/1/2016 - Present        Acute respiratory insufficiency ICD-10-CM: R06.89  ICD-9-CM: 518.82  8/1/2016 - Present        Obstructive sleep apnea ICD-10-CM: G47.33  ICD-9-CM: 327.23  3/10/2015 - Present        TIA (transient ischemic attack) ICD-10-CM: G45.9  ICD-9-CM: 435.9  3/9/2015 - Present        Essential hypertension with goal blood pressure less than 130/85 ICD-10-CM: I10  ICD-9-CM: 401.9  3/9/2015 - Present        DM type 2, uncontrolled, with neuropathy (HCC) (Chronic) ICD-10-CM: E11.40, E11.65  ICD-9-CM: 250.62, 357.2  3/9/2015 - Present        Asthma (Chronic) ICD-10-CM: J45.909  ICD-9-CM: 493.90  3/9/2015 - Present        Hyperlipidemia (Chronic) ICD-10-CM: E78.5  ICD-9-CM: 272.4  3/9/2015 - Present        Restless leg syndrome (Chronic) ICD-10-CM: G25.81  ICD-9-CM: 333.94  3/9/2015 - Present              Discharge/Transfer Medications  Discharge Medication List as of 11/15/2018  2:31 PM      CONTINUE these medications which have CHANGED    Details   insulin glargine (LANTUS,BASAGLAR) 100 unit/mL (3 mL) inpn 30 Units by SubCUTAneous route nightly for 30 days. , Print, Disp-3 Pen, R-0      metFORMIN ER (GLUCOPHAGE XR) 500 mg tablet Take 1 Tab by mouth daily (with breakfast) for 30 days. , Print, Disp-30 Tab, R-0         CONTINUE these medications which have NOT CHANGED    Details   clotrimazole (MYCELEX) 1 % vaginal cream Insert 1 Applicator into vagina nightly. X 7 days started 11/10, Historical Med      diclofenac (VOLTAREN) 1 % gel Apply 2 g to affected area four (4) times daily as needed (hand pain). , Historical Med      nitroglycerin (NITROSTAT) 0.4 mg SL tablet 0.4 mg by SubLINGual route every five (5) minutes as needed for Chest Pain. Up to 3 doses. , Historical Med      isosorbide mononitrate ER (IMDUR) 60 mg CR tablet Take 60 mg by mouth every morning., Historical Med      losartan (COZAAR) 25 mg tablet Take 25 mg by mouth daily. , Historical Med      pramipexole (MIRAPEX) 0.5 mg tablet Take 0.5 mg by mouth two (2) times a day., Historical Med      levocetirizine (XYZAL) 5 mg tablet Take 5 mg by mouth daily. , Historical Med      esomeprazole (NEXIUM) 40 mg capsule Take 40 mg by mouth daily. , Historical Med      metoprolol tartrate (LOPRESSOR) 50 mg tablet Take 50 mg by mouth two (2) times a day., Historical Med      DULoxetine (CYMBALTA) 60 mg capsule Take 60 mg by mouth daily. , Historical Med      hydrOXYzine HCl (ATARAX) 25 mg tablet Take 25 mg by mouth three (3) times daily as needed for Itching., Historical Med      gabapentin (NEURONTIN) 600 mg tablet Take 600 mg by mouth three (3) times daily. Takes with 300 mg to make a total dose of 900 mg, Historical Med      clopidogrel (PLAVIX) 75 mg tab Take 1 Tab by mouth daily. , Normal, Disp-90 Tab, R-1      gabapentin (NEURONTIN) 300 mg capsule Take 300 mg by mouth three (3) times daily. Takes with 600 mg to make a total dose of 900 mg, Historical Med      atorvastatin (LIPITOR) 40 mg tablet Take 40 mg by mouth daily. , Historical Med      montelukast (SINGULAIR) 10 mg tablet Take 10 mg by mouth nightly., Historical Med      levalbuterol tartrate (XOPENEX HFA) 45 mcg/Actuation inhaler Take 2 Puffs by inhalation every six (6) hours as needed for Wheezing or Shortness of Breath., Historical Med      levalbuterol hcl (XOPENEX) 0.63 mg/3 mL nebulization 0.63 mg by Nebulization route every four (4) hours as needed (shortness of breath). , Historical Med         STOP taking these medications       insulin NPH (HUMULIN N NPH U-100 INSULIN) 100 unit/mL injection Comments:   Reason for Stopping:         insulin aspart U-100 (NOVOLOG) 100 unit/mL inpn Comments:   Reason for Stopping:         insulin NPH (HUMULIN N NPH U-100 INSULIN) 100 unit/mL injection Comments:   Reason for Stopping:         insulin aspart (NOVOLOG) 100 unit/mL injection Comments:   Reason for Stopping:                Diet:  Diabetic diet.     Activity:  As tolerated    Disposition: Home or Self Care    Discharge instructions to patient/family  Please seek medical attention for any new or worsening symptoms particularly fever, chest pain, shortness of breath, abdominal pain, nausea, vomiting    Follow up plans/appointments  Follow-up Information     Follow up With Specialties Details Why Contact Info Additional Information    Lisa Rascon MD Cardiology, Internal Medicine On 11/28/2018 340 pm 310 [facility] [address] [facility]       Pita Stiles NP Nurse Practitioner Go on 11/16/2018 Go to appointment at 1:00PM with Franki Resendez for hospital follow up and Diabetes Management [facility]  [phone]       [facility] DIABETIC TREATMENT Diabetes Go on 12/12/2018 Follow up Diabetes Education class 12/12/18 @ [address] [place], [place] [id number]  [phone] Located in the [place] (off site, off of [facility]).     Flori Ambrosio MD Pediatrics, [place] [id number] [facility] MD Raul  Family Medicine Resident       For Billing    Chief Complaint   Patient presents with    Chest Pain   [address] [place] [address] Problems  Date Reviewed: 11/13/2018          Codes Class Noted POA    * (Principal) Chest pain ICD-10-CM: R07.9  ICD-9-CM: 786.50  11/14/2018 Yes        Hyperglycemia ICD-10-CM: R73.9  ICD-9-CM: 790.29  11/14/2018 Yes        Coronary artery disease involving native coronary artery of native heart with angina pectoris [facility]) ICD-10-CM: I25.119  ICD-9-CM: 414.01, 413.9  11/14/2018 Yes        Severe obesity ([facility] ICD-10-CM: E66.01  ICD-9-CM: 278.01  11/13/2018 Yes        Neuropathy of right lower extremity ICD-10-CM: G57.91  ICD-9-CM: 355.8  9/27/2016 Yes        Hypertensive heart disease with diastolic heart failure ([facility] ICD-10-CM: I11.0, I50.30  ICD-9-CM: 402.91, 428.30, 428.0  8/1/2016 Yes        Obstructive sleep apnea ICD-10-CM: G47.33  ICD-9-CM: 327.23  3/10/2015 Yes        Essential hypertension with goal blood pressure less than 130/85 ICD-10-CM: I10  ICD-9-CM: 401.9  3/9/2015 Yes        DM type 2, uncontrolled, with neuropathy (HCC) (Chronic) ICD-10-CM: E11.40, E11.65  ICD-9-CM: 250.62, 357.2  3/9/2015 Yes        Asthma (Chronic) ICD-10-CM: J45.909  ICD-9-CM: 493.90  3/9/2015 Yes        Hyperlipidemia (Chronic) ICD-10-CM: E78.5  ICD-9-CM: 272.4  3/9/2015 Yes        Restless leg syndrome (Chronic) ICD-10-CM: G25.81  ICD-9-CM: 333.94  3/9/2015 Yes

## 2018-11-19 ENCOUNTER — OFFICE VISIT (OUTPATIENT)
Dept: FAMILY MEDICINE CLINIC | Age: 58
End: 2018-11-19

## 2018-11-19 VITALS
BODY MASS INDEX: 39.84 KG/M2 | OXYGEN SATURATION: 97 % | RESPIRATION RATE: 22 BRPM | SYSTOLIC BLOOD PRESSURE: 127 MMHG | WEIGHT: 211 LBS | TEMPERATURE: 98.1 F | HEIGHT: 61 IN | DIASTOLIC BLOOD PRESSURE: 43 MMHG | HEART RATE: 63 BPM

## 2018-11-19 DIAGNOSIS — Z09 HOSPITAL DISCHARGE FOLLOW-UP: ICD-10-CM

## 2018-11-19 DIAGNOSIS — Z12.11 SCREENING FOR COLON CANCER: ICD-10-CM

## 2018-11-19 DIAGNOSIS — J45.909 UNCOMPLICATED ASTHMA, UNSPECIFIED ASTHMA SEVERITY, UNSPECIFIED WHETHER PERSISTENT: Chronic | ICD-10-CM

## 2018-11-19 DIAGNOSIS — Z11.59 ENCOUNTER FOR HEPATITIS C SCREENING TEST FOR LOW RISK PATIENT: ICD-10-CM

## 2018-11-19 PROBLEM — E11.21 TYPE 2 DIABETES WITH NEPHROPATHY (HCC): Status: ACTIVE | Noted: 2018-11-19

## 2018-11-19 LAB
ALBUMIN UR QL STRIP: 150 MG/L
CREATININE, URINE POC: 200 MG/DL
MICROALBUMIN/CREAT RATIO POC: NORMAL MG/G

## 2018-11-19 RX ORDER — NEBULIZER AND COMPRESSOR
1 EACH MISCELLANEOUS AS NEEDED
Qty: 1 EACH | Refills: 0 | Status: SHIPPED | OUTPATIENT
Start: 2018-11-19

## 2018-11-19 RX ORDER — PEN NEEDLE, DIABETIC 32GX 5/32"
NEEDLE, DISPOSABLE MISCELLANEOUS
Refills: 0 | COMMUNITY
Start: 2018-09-09 | End: 2019-01-21 | Stop reason: SDUPTHER

## 2018-11-19 RX ORDER — INSULIN PUMP SYRINGE, 3 ML
EACH MISCELLANEOUS
Qty: 1 KIT | Refills: 0 | Status: SHIPPED | OUTPATIENT
Start: 2018-11-19 | End: 2018-12-05

## 2018-11-19 RX ORDER — LANCETS
EACH MISCELLANEOUS
Qty: 1 EACH | Refills: 11 | Status: SHIPPED | OUTPATIENT
Start: 2018-11-19

## 2018-11-19 NOTE — PATIENT INSTRUCTIONS

## 2018-11-19 NOTE — PROGRESS NOTES
HISTORY OF PRESENT ILLNESS  Henrik Suarez is a 59 y.o. female. HPI   Pt presents with \"transition of care\"  Pt was admitted to [address] [place] [address] from 11/14- 11/15, due to chest pain and hyperglycemia. Hospital Course:  Atypical Chest Pain:   Admitted for ACS rule out. She was monitored on telemetry while inpatient. Her EKG was with NSR with a T wave abnormality in lateral leads. She had three negative troponin values. She had an ECHO with an EF of 55-60% with grade 2 diastolic dysfunction. Her home plavix was continued while inpatient. Cardiology was consulted and recommended obtaining a stress test outpatient.      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 Home regimen: NPH 30 units ACB and 70 units HS. Hyperglycemic symptoms. Her home regimen was not entirely clear. She was started on 30 units of Lantus at night with a Lispro sliding correction scale. She was given a prescription for metformin  mg to take daily with breakfast. She was also prescribed 30 units basaglar (lantus pen) to take at night.      CAD/Hypertension/Diastolic Heart Dysfunction: BP normal POA. Morning of discharge it was 150/53. Her home losartan was initially held due to elevated creatinine, but this was restarted on day of discharge. Her home metoprolol and imdur were continued while inpatient. Her home plavix and lipitor were continued as well. Her lipid panel was WNL other than mildly elevated TG at 166.        Asthma/Environmental allergies: Increased cough, but no current wheezing, normal O2 on RA.  Previously on Omalizumab shots as well, plans to see Pulmonology soon. She was treated with levalbuterol q4h while inpatient. Her home singulair was continued.     Pt states that she has been doing pretty well since being home. No chest pain has been noted. She has been taking new medications as prescribed, with no negative side effects.   Pt is unsure how blood sugars have been running, as she does not have a blood glucose meter anymore. She is requesting new order for this at this time. Pt continues to deal with leg nerve pain, and now notes that she is having pain in her hands. Pt has an appointment with cardiology and pulmnology this month. Review of Systems   Constitutional: Negative for fever. HENT: Negative for congestion. Respiratory: Negative for cough. Gastrointestinal: Negative for diarrhea and vomiting. Musculoskeletal: Positive for joint pain. Physical Exam   Constitutional: She is oriented to person, place, and time. She appears well-developed and well-nourished. HENT:   Head: Normocephalic and atraumatic. Neck: Normal range of motion. Neck supple. Cardiovascular: Normal rate, regular rhythm and normal heart sounds. Pulmonary/Chest: Effort normal and breath sounds normal.   Lymphadenopathy:     She has no cervical adenopathy. Neurological: She is alert and oriented to person, place, and time. Skin: Skin is warm and dry. Psychiatric: She has a normal mood and affect. Her behavior is normal.       ASSESSMENT and PLAN    ICD-10-CM ICD-9-CM    1. Encounter for hepatitis C screening test for low risk patient Z11.59 V73.89 HEPATITIS C AB   2. Screening for colon cancer Z12.11 V76.51 OCCULT BLOOD IMMUNOASSAY,DIAGNOSTIC   3. DM type 2, uncontrolled, with neuropathy (HCC) E11.40 250.62 AMB POC URINE, MICROALBUMIN, SEMIQUANT (3 RESULTS)    E11.65 357.2 CBC WITH AUTOMATED DIFF      METABOLIC PANEL, COMPREHENSIVE      Lancets misc      glucose blood VI test strips (ASCENSIA AUTODISC VI, ONE TOUCH ULTRA TEST VI) strip      Blood-Glucose Meter monitoring kit   4. Uncomplicated asthma, unspecified asthma severity, unspecified whether persistent J45.909 493.90 Nebulizer & Compressor machine     Informed patient that we will notify her when labs return.   Educated about continuing medications as prescribed and emphasized the importance of monitoring blood sugars. Educated that I have sent new monitor and lancets to pharmacy, and she should monitor as prescribed. Emphasized importance of following up with pulmonology and cardiology, as previously scheduled in a couple of weeks. Pt informed to return to office with worsening of symptoms, or PRN with any questions or concerns. Pt verbalizes understanding of plan of care and denies further questions or concerns at this time.

## 2018-11-19 NOTE — PROGRESS NOTES
Identified pt with two pt identifiers(name and ). Chief Complaint   Patient presents with   [facility] Follow Up     was admitted to [facility] from -11/15/18 for hyperglycemia and chest pain    Hand Pain     10/10 pain in right hand that has been occurring since she was admitted to the hospital        Health Maintenance Due   Topic    Hepatitis C Screening     FOOT EXAM Q1     MICROALBUMIN Q1     EYE EXAM RETINAL OR DILATED Q1     DTaP/Tdap/Td series (1 - Tdap)    PAP AKA CERVICAL CYTOLOGY     Shingrix Vaccine Age 50> (1 of 2)    BREAST CANCER SCRN MAMMOGRAM     FOBT Q 1 YEAR AGE 50-75     MEDICARE YEARLY EXAM        Wt Readings from Last 3 Encounters:   18 211 lb (95.7 kg)   11/15/18 208 lb 5.4 oz (94.5 kg)   18 202 lb 12.8 oz (92 kg)     Temp Readings from Last 3 Encounters:   18 98.1 °F (36.7 °C) (Oral)   11/15/18 98 °F (36.7 °C)   18 98 °F (36.7 °C) (Oral)     BP Readings from Last 3 Encounters:   18 127/43   11/15/18 150/72   18 122/64     Pulse Readings from Last 3 Encounters:   18 63   11/15/18 77   18 68         Learning Assessment:  :     No flowsheet data found. Depression Screening:  :     PHQ over the last two weeks 2018   Little interest or pleasure in doing things Not at all   Feeling down, depressed, irritable, or hopeless Not at all   Total Score PHQ 2 0       Fall Risk Assessment:  :     No flowsheet data found. Abuse Screening:  :     No flowsheet data found. Coordination of Care Questionnaire:  :     1) Have you been to an emergency room, urgent care clinic since your last visit? yes   Hospitalized since your last visit?  yes, was admitted to [facility] from -11/15/18 for hyperglycemia and chest pain             2) Have you seen or consulted any other health care providers outside of [address] [place] [address] since your last visit? no  (Include any pap smears or colon screenings in this section.)    3) Do you have an Advance Directive on file? no  Are you interested in receiving information about Advance Directives? no    Patient is accompanied by self. Reviewed record in preparation for visit and have obtained necessary documentation. Medication reconciliation up to date and corrected with patient at this time.

## 2018-11-20 LAB
ALBUMIN SERPL-MCNC: 3.9 G/DL (ref 3.6–4.8)
ALBUMIN/GLOB SERPL: 1.7 {RATIO} (ref 1.2–2.2)
ALP SERPL-CCNC: 94 IU/L (ref 39–117)
ALT SERPL-CCNC: 23 IU/L (ref 0–32)
AST SERPL-CCNC: 12 IU/L (ref 0–40)
BASOPHILS # BLD AUTO: 0 X10E3/UL (ref 0–0.2)
BASOPHILS NFR BLD AUTO: 0 %
BILIRUB SERPL-MCNC: 0.3 MG/DL (ref 0–1.2)
BUN SERPL-MCNC: 17 MG/DL (ref 8–27)
BUN/CREAT SERPL: 18 (ref 12–28)
CALCIUM SERPL-MCNC: 8.9 MG/DL (ref 8.7–10.3)
CHLORIDE SERPL-SCNC: 102 MMOL/L (ref 96–106)
CO2 SERPL-SCNC: 28 MMOL/L (ref 20–29)
CREAT SERPL-MCNC: 0.94 MG/DL (ref 0.57–1)
EOSINOPHIL # BLD AUTO: 0.1 X10E3/UL (ref 0–0.4)
EOSINOPHIL NFR BLD AUTO: 1 %
ERYTHROCYTE [DISTWIDTH] IN BLOOD BY AUTOMATED COUNT: 14.3 % (ref 12.3–15.4)
GLOBULIN SER CALC-MCNC: 2.3 G/DL (ref 1.5–4.5)
GLUCOSE SERPL-MCNC: 337 MG/DL (ref 65–99)
HCT VFR BLD AUTO: 34.6 % (ref 34–46.6)
HCV AB S/CO SERPL IA: <0.1 S/CO RATIO (ref 0–0.9)
HGB BLD-MCNC: 11.6 G/DL (ref 11.1–15.9)
IMM GRANULOCYTES # BLD: 0 X10E3/UL (ref 0–0.1)
IMM GRANULOCYTES NFR BLD: 0 %
LYMPHOCYTES # BLD AUTO: 2.2 X10E3/UL (ref 0.7–3.1)
LYMPHOCYTES NFR BLD AUTO: 38 %
MCH RBC QN AUTO: 29.7 PG (ref 26.6–33)
MCHC RBC AUTO-ENTMCNC: 33.5 G/DL (ref 31.5–35.7)
MCV RBC AUTO: 89 FL (ref 79–97)
MONOCYTES # BLD AUTO: 0.4 X10E3/UL (ref 0.1–0.9)
MONOCYTES NFR BLD AUTO: 7 %
NEUTROPHILS # BLD AUTO: 3.1 X10E3/UL (ref 1.4–7)
NEUTROPHILS NFR BLD AUTO: 54 %
PLATELET # BLD AUTO: 238 X10E3/UL (ref 150–379)
POTASSIUM SERPL-SCNC: 4.3 MMOL/L (ref 3.5–5.2)
PROT SERPL-MCNC: 6.2 G/DL (ref 6–8.5)
RBC # BLD AUTO: 3.9 X10E6/UL (ref 3.77–5.28)
SODIUM SERPL-SCNC: 142 MMOL/L (ref 134–144)
WBC # BLD AUTO: 5.8 X10E3/UL (ref 3.4–10.8)

## 2018-11-20 NOTE — PROGRESS NOTES
Patient was advised and verbalized understanding. She reports she is taking her insulin now since she now has a meter.

## 2018-11-20 NOTE — PROGRESS NOTES
Please call patient and let her know that her labs returned. Glucose is still VERY high! I know she stated that she was not doing her insulin as prescribed, as she did not have a blood sugar meter. Please ensure that she is taking all her medications as prescribed including insulin. Should monitor blood sugar, and should return if blood sugars are remaining elevated.   Should return in 3 months for office visit and fasting labs  Thanks

## 2018-11-21 ENCOUNTER — TELEPHONE (OUTPATIENT)
Dept: FAMILY MEDICINE CLINIC | Age: 58
End: 2018-11-21

## 2018-11-21 NOTE — TELEPHONE ENCOUNTER
Forwarded from call center. .. Pt advises that [facility] cannot fill the order for a nebulizer. [facility] advised pt to send Rx to a medical supply store. Pt would like Rx sent to [facility] (fax: [phone]). [facility] requested the Rx as well as visit notes to fill the order.

## 2018-11-25 LAB — HEMOCCULT STL QL IA: NEGATIVE

## 2018-11-28 ENCOUNTER — APPOINTMENT (OUTPATIENT)
Dept: GENERAL RADIOLOGY | Age: 58
End: 2018-11-28
Attending: EMERGENCY MEDICINE
Payer: MEDICARE

## 2018-11-28 ENCOUNTER — OFFICE VISIT (OUTPATIENT)
Dept: CARDIOLOGY CLINIC | Age: 58
End: 2018-11-28

## 2018-11-28 ENCOUNTER — HOSPITAL ENCOUNTER (EMERGENCY)
Age: 58
Discharge: HOME | End: 2018-11-28
Attending: EMERGENCY MEDICINE
Payer: MEDICARE

## 2018-11-28 VITALS
DIASTOLIC BLOOD PRESSURE: 71 MMHG | HEART RATE: 74 BPM | SYSTOLIC BLOOD PRESSURE: 188 MMHG | OXYGEN SATURATION: 100 % | RESPIRATION RATE: 18 BRPM

## 2018-11-28 VITALS
HEART RATE: 68 BPM | HEIGHT: 61 IN | BODY MASS INDEX: 40.4 KG/M2 | WEIGHT: 214 LBS | DIASTOLIC BLOOD PRESSURE: 60 MMHG | OXYGEN SATURATION: 98 % | SYSTOLIC BLOOD PRESSURE: 130 MMHG

## 2018-11-28 DIAGNOSIS — I50.33 ACUTE ON CHRONIC DIASTOLIC CHF (CONGESTIVE HEART FAILURE) (HCC): ICD-10-CM

## 2018-11-28 DIAGNOSIS — I25.119 CORONARY ARTERY DISEASE INVOLVING NATIVE CORONARY ARTERY OF NATIVE HEART WITH ANGINA PECTORIS (HCC): ICD-10-CM

## 2018-11-28 DIAGNOSIS — I50.30 HYPERTENSIVE HEART DISEASE WITH DIASTOLIC HEART FAILURE (HCC): ICD-10-CM

## 2018-11-28 DIAGNOSIS — I10 ESSENTIAL HYPERTENSION WITH GOAL BLOOD PRESSURE LESS THAN 130/85: ICD-10-CM

## 2018-11-28 DIAGNOSIS — R06.02 SOB (SHORTNESS OF BREATH): Primary | ICD-10-CM

## 2018-11-28 DIAGNOSIS — J20.9 ACUTE BRONCHITIS, UNSPECIFIED ORGANISM: Primary | ICD-10-CM

## 2018-11-28 DIAGNOSIS — I11.0 HYPERTENSIVE HEART DISEASE WITH DIASTOLIC HEART FAILURE (HCC): ICD-10-CM

## 2018-11-28 LAB
ALBUMIN SERPL-MCNC: 3.3 G/DL (ref 3.5–5)
ALBUMIN/GLOB SERPL: 0.9 {RATIO} (ref 1.1–2.2)
ALP SERPL-CCNC: 107 U/L (ref 45–117)
ALT SERPL-CCNC: 18 U/L (ref 12–78)
ANION GAP SERPL CALC-SCNC: 7 MMOL/L (ref 5–15)
AST SERPL-CCNC: 12 U/L (ref 15–37)
BASOPHILS # BLD: 0 K/UL (ref 0–0.1)
BASOPHILS NFR BLD: 1 % (ref 0–1)
BILIRUB SERPL-MCNC: 0.3 MG/DL (ref 0.2–1)
BUN SERPL-MCNC: 21 MG/DL (ref 6–20)
BUN/CREAT SERPL: 19 (ref 12–20)
CALCIUM SERPL-MCNC: 9.1 MG/DL (ref 8.5–10.1)
CHLORIDE SERPL-SCNC: 103 MMOL/L (ref 97–108)
CO2 SERPL-SCNC: 30 MMOL/L (ref 21–32)
CREAT SERPL-MCNC: 1.13 MG/DL (ref 0.55–1.02)
DIFFERENTIAL METHOD BLD: ABNORMAL
EOSINOPHIL # BLD: 0.1 K/UL (ref 0–0.4)
EOSINOPHIL NFR BLD: 1 % (ref 0–7)
ERYTHROCYTE [DISTWIDTH] IN BLOOD BY AUTOMATED COUNT: 13.1 % (ref 11.5–14.5)
GLOBULIN SER CALC-MCNC: 3.8 G/DL (ref 2–4)
GLUCOSE SERPL-MCNC: 244 MG/DL (ref 65–100)
HCT VFR BLD AUTO: 34.2 % (ref 35–47)
HGB BLD-MCNC: 11.6 G/DL (ref 11.5–16)
IMM GRANULOCYTES # BLD: 0 K/UL (ref 0–0.04)
IMM GRANULOCYTES NFR BLD AUTO: 1 % (ref 0–0.5)
LYMPHOCYTES # BLD: 2.5 K/UL (ref 0.8–3.5)
LYMPHOCYTES NFR BLD: 38 % (ref 12–49)
MCH RBC QN AUTO: 29.9 PG (ref 26–34)
MCHC RBC AUTO-ENTMCNC: 33.9 G/DL (ref 30–36.5)
MCV RBC AUTO: 88.1 FL (ref 80–99)
MONOCYTES # BLD: 0.5 K/UL (ref 0–1)
MONOCYTES NFR BLD: 8 % (ref 5–13)
NEUTS SEG # BLD: 3.4 K/UL (ref 1.8–8)
NEUTS SEG NFR BLD: 52 % (ref 32–75)
NRBC # BLD: 0 K/UL (ref 0–0.01)
NRBC BLD-RTO: 0 PER 100 WBC
PLATELET # BLD AUTO: 217 K/UL (ref 150–400)
PMV BLD AUTO: 8.9 FL (ref 8.9–12.9)
POTASSIUM SERPL-SCNC: 4 MMOL/L (ref 3.5–5.1)
PROT SERPL-MCNC: 7.1 G/DL (ref 6.4–8.2)
RBC # BLD AUTO: 3.88 M/UL (ref 3.8–5.2)
SODIUM SERPL-SCNC: 140 MMOL/L (ref 136–145)
TROPONIN I SERPL-MCNC: <0.05 NG/ML
WBC # BLD AUTO: 6.5 K/UL (ref 3.6–11)

## 2018-11-28 PROCEDURE — 96360 HYDRATION IV INFUSION INIT: CPT

## 2018-11-28 PROCEDURE — 71046 X-RAY EXAM CHEST 2 VIEWS: CPT

## 2018-11-28 PROCEDURE — 36415 COLL VENOUS BLD VENIPUNCTURE: CPT

## 2018-11-28 PROCEDURE — 77030013140 HC MSK NEB VYRM -A

## 2018-11-28 PROCEDURE — 99284 EMERGENCY DEPT VISIT MOD MDM: CPT

## 2018-11-28 PROCEDURE — 80053 COMPREHEN METABOLIC PANEL: CPT

## 2018-11-28 PROCEDURE — 84484 ASSAY OF TROPONIN QUANT: CPT

## 2018-11-28 PROCEDURE — 85025 COMPLETE CBC W/AUTO DIFF WBC: CPT

## 2018-11-28 PROCEDURE — 94640 AIRWAY INHALATION TREATMENT: CPT

## 2018-11-28 PROCEDURE — 74011000250 HC RX REV CODE- 250: Performed by: PHYSICIAN ASSISTANT

## 2018-11-28 PROCEDURE — 74011250636 HC RX REV CODE- 250/636: Performed by: PHYSICIAN ASSISTANT

## 2018-11-28 PROCEDURE — 93005 ELECTROCARDIOGRAM TRACING: CPT

## 2018-11-28 RX ORDER — BENZONATATE 100 MG/1
100 CAPSULE ORAL
Qty: 20 CAP | Refills: 0 | Status: SHIPPED | OUTPATIENT
Start: 2018-11-28 | End: 2018-12-20 | Stop reason: ALTCHOICE

## 2018-11-28 RX ORDER — LEVALBUTEROL INHALATION SOLUTION 1.25 MG/3ML
1.25 SOLUTION RESPIRATORY (INHALATION)
Status: COMPLETED | OUTPATIENT
Start: 2018-11-28 | End: 2018-11-28

## 2018-11-28 RX ORDER — LEVALBUTEROL TARTRATE 45 UG/1
2 AEROSOL, METERED ORAL
Qty: 1 INHALER | Refills: 0 | Status: SHIPPED | OUTPATIENT
Start: 2018-11-28 | End: 2018-12-13 | Stop reason: SDUPTHER

## 2018-11-28 RX ORDER — IPRATROPIUM BROMIDE AND ALBUTEROL SULFATE 2.5; .5 MG/3ML; MG/3ML
3 SOLUTION RESPIRATORY (INHALATION)
Status: DISCONTINUED | OUTPATIENT
Start: 2018-11-28 | End: 2018-11-28 | Stop reason: HOSPADM

## 2018-11-28 RX ADMIN — SODIUM CHLORIDE 1000 ML: 900 INJECTION, SOLUTION INTRAVENOUS at 17:56

## 2018-11-28 RX ADMIN — LEVALBUTEROL HYDROCHLORIDE 1.25 MG: 1.25 SOLUTION RESPIRATORY (INHALATION) at 18:21

## 2018-11-28 NOTE — ED TRIAGE NOTES
Pt has history of asthma, reports a hacking dry cough and wheezing that has worsened over the past several days. Denies fever. +history of asthma

## 2018-11-28 NOTE — ED PROVIDER NOTES
59 y.o. female with past medical history significant for CAD (3 stents placed 09/2018), Asthma, HTN, hypercholesteremia, DM, sleep apnea, and CVA who presents from home with chief complaint of wheezing and coughing. Patient complains of nonproductive cough described as \"hacking\", wheezing, shortness of breath, and chest pain. She has been taking her inhaler with no relief over past three days. She spoke with Dr. Bella Corrales who thinks patient needs a nebulizer treatment. Patient denies any recent steroid use or past intubation. She says she has been admitted to ICU for asthma treatment about three years ago. Patient denies any fever. There are no other acute medical concerns at this time. Social hx: Patient denies use of tobacco, EtOH, or illicit drugs. PCP: Yaz Garcia MD 
 
Note written by Renzo Romero, as dictated by Corey Barry, DO 3:49 PM 
 
 
 
 
  
 
Past Medical History:  
Diagnosis Date  Asthma  Asthma  Diabetes mellitus type II   
 Hypercholesteremia  Hyperlipemia  Hypertension  Restless leg  Restless leg  Rheumatoid arthritis(714.0)  Sleep apnea  Sleep disorder  Stroke ([facility] [address] 2010 Past Surgical History:  
Procedure Laterality Date  CHEST SURGERY PROCEDURE UNLISTED  HX CORONARY STENT PLACEMENT Left Sept. 8th and Sept 16, 2018  HX TONSILLECTOMY Family History:  
Problem Relation Age of Onset  Heart Disease Mother  Diabetes Mother  Hypertension Other  No Known Problems Father Social History Socioeconomic History  Marital status: SINGLE Spouse name: Not on file  Number of children: Not on file  Years of education: Not on file  Highest education level: Not on file Social Needs  Financial resource strain: Not on file  Food insecurity - worry: Not on file  Food insecurity - inability: Not on file  Transportation needs - medical: Not on file  Transportation needs - non-medical: Not on file Occupational History  Not on file Tobacco Use  Smoking status: Never Smoker  Smokeless tobacco: Never Used Substance and Sexual Activity  Alcohol use: No  
  Alcohol/week: 2.5 oz Types: 5 Cans of beer per week  Drug use: No  
 Sexual activity: No  
Other Topics Concern  Not on file Social History Narrative  Not on file ALLERGIES: Levaquin [levofloxacin]; Albuterol; and Aspirin Review of Systems Constitutional: Negative for activity change, appetite change, chills and fever. HENT: Negative for congestion, rhinorrhea, sinus pressure, sneezing and sore throat. Eyes: Negative for photophobia and visual disturbance. Respiratory: Positive for cough, shortness of breath and wheezing. Cardiovascular: Positive for chest pain. Gastrointestinal: Negative for abdominal pain, blood in stool, constipation, diarrhea, nausea and vomiting. Genitourinary: Negative for difficulty urinating, dysuria, flank pain, frequency, hematuria, menstrual problem, urgency, vaginal bleeding and vaginal discharge. Musculoskeletal: Negative for arthralgias, back pain, myalgias and neck pain. Skin: Negative for rash and wound. Neurological: Negative for syncope, weakness, numbness and headaches. Psychiatric/Behavioral: Negative for self-injury and suicidal ideas. All other systems reviewed and are negative. There were no vitals filed for this visit. Physical Exam  
Constitutional: She is oriented to person, place, and time. She appears well-developed and well-nourished. She is active. Non-toxic appearance. No distress. HENT:  
Head: Normocephalic and atraumatic. Eyes: Conjunctivae are normal. Pupils are equal, round, and reactive to light. Right eye exhibits no discharge. Left eye exhibits no discharge. Neck: Normal range of motion and full passive range of motion without pain. No tracheal tenderness present. Cardiovascular: Normal rate, regular rhythm, normal heart sounds, intact distal pulses and normal pulses. Exam reveals no gallop and no friction rub. No murmur heard. Pulmonary/Chest: Effort normal and breath sounds normal. No respiratory distress. She has no wheezes. She has no rales. She exhibits no tenderness. No increased wob Abdominal: Soft. Bowel sounds are normal. She exhibits no distension. There is no tenderness. There is no rebound and no guarding. Musculoskeletal: Normal range of motion. She exhibits no edema or tenderness. Neurological: She is alert and oriented to person, place, and time. She has normal strength. No cranial nerve deficit or sensory deficit. Coordination normal.  
Skin: Skin is warm, dry and intact. Capillary refill takes less than 2 seconds. No abrasion and no rash noted. She is not diaphoretic. No erythema. Psychiatric: She has a normal mood and affect. Her speech is normal and behavior is normal. Cognition and memory are normal.  
Nursing note and vitals reviewed. MDM Number of Diagnoses or Management Options Acute bronchitis, unspecified organism:  
Diagnosis management comments:  
Ddx: bronchitis, acute URI, viral syndrome, asthma with exacerbation Amount and/or Complexity of Data Reviewed Clinical lab tests: ordered and reviewed Tests in the radiology section of CPT®: reviewed and ordered Review and summarize past medical records: yes Discuss the patient with other providers: yes Patient Progress Patient progress: stable Procedures The patient was seen by the supervising physician who was the provider in triage. I discussed patient's PMH, exam findings as well as careplan with the attending who agrees with care plan. Nae Alonzo PA-C Patient feels better after Xopenex neb; still no wheezing, no increased wob. She is inquiring about discharge.  D/W Dr. Kathryn Hauser, due to fluctuating blood sugars and only on insulin nightly, no wheezing, O2 sats 100%, do not feel it is justified to start prednisone at this time. She is well-appearing. Will refill Xopenex inhaler, give tessalon perles and have PCP f/u. Renay Chavis PA-C 
 
 
LABORATORY TESTS: 
Recent Results (from the past 12 hour(s)) CBC WITH AUTOMATED DIFF Collection Time: 11/28/18  4:04 PM  
Result Value Ref Range WBC 6.5 3.6 - 11.0 K/uL  
 RBC 3.88 3.80 - 5.20 M/uL  
 HGB 11.6 11.5 - 16.0 g/dL HCT 34.2 (L) 35.0 - 47.0 % MCV 88.1 80.0 - 99.0 FL  
 MCH 29.9 26.0 - 34.0 PG  
 MCHC 33.9 30.0 - 36.5 g/dL  
 RDW 13.1 11.5 - 14.5 % PLATELET 675 379 - 420 K/uL MPV 8.9 8.9 - 12.9 FL  
 NRBC 0.0 0  WBC ABSOLUTE NRBC 0.00 0.00 - 0.01 K/uL NEUTROPHILS 52 32 - 75 % LYMPHOCYTES 38 12 - 49 % MONOCYTES 8 5 - 13 % EOSINOPHILS 1 0 - 7 % BASOPHILS 1 0 - 1 % IMMATURE GRANULOCYTES 1 (H) 0.0 - 0.5 % ABS. NEUTROPHILS 3.4 1.8 - 8.0 K/UL  
 ABS. LYMPHOCYTES 2.5 0.8 - 3.5 K/UL  
 ABS. MONOCYTES 0.5 0.0 - 1.0 K/UL  
 ABS. EOSINOPHILS 0.1 0.0 - 0.4 K/UL  
 ABS. BASOPHILS 0.0 0.0 - 0.1 K/UL  
 ABS. IMM. GRANS. 0.0 0.00 - 0.04 K/UL  
 DF AUTOMATED METABOLIC PANEL, COMPREHENSIVE Collection Time: 11/28/18  4:04 PM  
Result Value Ref Range Sodium 140 136 - 145 mmol/L Potassium 4.0 3.5 - 5.1 mmol/L Chloride 103 97 - 108 mmol/L  
 CO2 30 21 - 32 mmol/L Anion gap 7 5 - 15 mmol/L Glucose 244 (H) 65 - 100 mg/dL BUN 21 (H) 6 - 20 MG/DL Creatinine 1.13 (H) 0.55 - 1.02 MG/DL  
 BUN/Creatinine ratio 19 12 - 20 GFR est AA 60 (L) >60 ml/min/1.73m2 GFR est non-AA 49 (L) >60 ml/min/1.73m2 Calcium 9.1 8.5 - 10.1 MG/DL Bilirubin, total 0.3 0.2 - 1.0 MG/DL  
 ALT (SGPT) 18 12 - 78 U/L  
 AST (SGOT) 12 (L) 15 - 37 U/L Alk. phosphatase 107 45 - 117 U/L Protein, total 7.1 6.4 - 8.2 g/dL Albumin 3.3 (L) 3.5 - 5.0 g/dL Globulin 3.8 2.0 - 4.0 g/dL A-G Ratio 0.9 (L) 1.1 - 2.2 TROPONIN I Collection Time: 11/28/18  4:04 PM  
Result Value Ref Range Troponin-I, Qt. <0.05 <0.05 ng/mL EKG, 12 LEAD, INITIAL Collection Time: 11/28/18  5:27 PM  
Result Value Ref Range Ventricular Rate 62 BPM  
 Atrial Rate 62 BPM  
 P-R Interval 158 ms QRS Duration 86 ms  
 Q-T Interval 456 ms  
 QTC Calculation (Bezet) 462 ms Calculated P Axis 99 degrees Calculated R Axis 29 degrees Calculated T Axis -98 degrees Diagnosis Normal sinus rhythm ST & T wave abnormality, consider inferolateral ischemia Prolonged QT Abnormal ECG When compared with ECG of 14-NOV-2018 09:54, Inverted T waves have replaced nonspecific T wave abnormality in Inferior  
leads MEDICATIONS GIVEN: 
Medications  
albuterol-ipratropium (DUO-NEB) 2.5 MG-0.5 MG/3 ML (3 mL Nebulization Refused 11/28/18 1728) levalbuterol (XOPENEX) nebulizer soln 1.25 mg/3 mL (1.25 mg Nebulization Given 11/28/18 1821)  
sodium chloride 0.9 % bolus infusion 1,000 mL (1,000 mL IntraVENous New Bag 11/28/18 1756) DISCHARGE NOTE: 
7:49 PM 
The patient's results have been reviewed with them and/or available family. Patient and/or family verbally conveyed their understanding and agreement of the patient's signs, symptoms, diagnosis, treatment and prognosis and additionally agree to follow up as recommended in the discharge instructions or to return to the Emergency Room should their condition change prior to their follow-up appointment. The patient/family verbally agrees with the care-plan and verbally conveys that all of their questions have been answered. The discharge instructions have also been provided to the patient and/or family with some educational information regarding the patient's diagnosis as well a list of reasons why the patient would want to return to the ER prior to their follow-up appointment, should their condition change. Plan: 
1. F/U with PCP 2. Rx xopenx, tessalon 3. Return precautions discussed and advised to return to ER if worse

## 2018-11-28 NOTE — PROGRESS NOTES
Patient has been coughing due to asthma. Every time she coughs her chest heart.    Shortness of breath     Visit Vitals  /60 (BP 1 Location: Left arm, BP Patient Position: Sitting)   Pulse 68   Ht 5' 1\" (1.549 m)   Wt 214 lb (97.1 kg)   SpO2 98%   BMI 40.43 kg/m²

## 2018-11-28 NOTE — PROGRESS NOTES
Office Follow-up    NAME: Brian Almeida   :  1960  MRM:  [id number]    Date:  2018            Assessment:     Problem List  Date Reviewed: 2018          Codes Class Noted    Type 2 diabetes with nephropathy ([facility] ICD-10-CM: E11.21  ICD-9-CM: 250.40, 583.81  2018        Chest pain ICD-10-CM: R07.9  ICD-9-CM: 786.50  2018        Hyperglycemia ICD-10-CM: R73.9  ICD-9-CM: 790.29  2018        Coronary artery disease involving native coronary artery of native heart with angina pectoris ([facility] ICD-10-CM: I25.119  ICD-9-CM: 414.01, 413.9  2018        Status post coronary artery stent placement (Chronic) ICD-10-CM: Z95.5  ICD-9-CM: V45.82  2018        Severe obesity ([facility] ICD-10-CM: E66.01  ICD-9-CM: 278.01  2018        Neuropathy of right lower extremity ICD-10-CM: G57.91  ICD-9-CM: 355.8  2016        Neuropathy ICD-10-CM: G62.9  ICD-9-CM: 355.9  2016        Pericardial effusion(moderate-large) with mild cardiac tamponade--via echo 16 ICD-10-CM: I31.3, I31.4  ICD-9-CM: 423.9, 423.3  2016        Chest pain, precordial ICD-10-CM: R07.2  ICD-9-CM: 786.51  2016        Acute on chronic diastolic CHF (congestive heart failure) (HCC) ICD-10-CM: I50.33  ICD-9-CM: 428.33, 428.0  2016        Hypertensive heart disease with diastolic heart failure (HCC) ICD-10-CM: I11.0, I50.30  ICD-9-CM: 402.91, 428.30, 428.0  2016        SOB (shortness of breath) ICD-10-CM: R06.02  ICD-9-CM: 786.05  2016        Acute respiratory insufficiency ICD-10-CM: R06.89  ICD-9-CM: 518.82  2016        Obstructive sleep apnea ICD-10-CM: G47.33  ICD-9-CM: 327.23  3/10/2015        TIA (transient ischemic attack) ICD-10-CM: G45.9  ICD-9-CM: 435.9  3/9/2015        Essential hypertension with goal blood pressure less than 130/85 ICD-10-CM: I10  ICD-9-CM: 401.9  3/9/2015        DM type 2, uncontrolled, with neuropathy (HCC) (Chronic) ICD-10-CM: E11.40, E11.65  ICD-9-CM: 250.62, 357.2  3/9/2015        Asthma (Chronic) ICD-10-CM: J45.909  ICD-9-CM: 493.90  3/9/2015        Hyperlipidemia (Chronic) ICD-10-CM: E78.5  ICD-9-CM: 272.4  3/9/2015        Restless leg syndrome (Chronic) ICD-10-CM: G25.81  ICD-9-CM: 333.94  3/9/2015                 Plan:     1. Cough/ SOB: Likely URI and Asthma related. She will need to be evaluated in ER therefore we are sending her toER. 2. CAD/ S/p Stent OM, LCX [place]: Medical management. Continue ASA, Plavix, Bb, statins. Stress test once she is recovered from SOB/ Asthma exacerbation. 3. Small pericardial Effusoin: Eucolemic on exam.   4. HTN: BP better after adding Losartan. 5. Dyslipidemia: Continue Lipitor. 6. See Dr. Ronaldo Hamlin in 1 month. Subjective:     Darius Soria, a 59y.o. year-old with past medical history significant for hypertension, diabetes, dyslipidemia, CAD, recent non-ST elevation MI in September 2018 was in [place] when she received 3 stents. She was recently admitted to the [facility] 2 weeks ago with symptoms of chest pain and uncontrolled HTN. Her chest pain was felt to be MSK related. Her BP was controlled and she was [facility] home. She returns today for f/u. She has been having chest pain off and on at rest and at exertion worse with coughing. The pain is focal and anterior left upper chest. From past 3 days she is having coughing and is SOB. Inhalers are not working. Exam:     Physical Exam:  Visit Vitals  /60 (BP 1 Location: Left arm, BP Patient Position: Sitting)   Pulse 68   Ht 5' 1\" (1.549 m)   Wt 214 lb (97.1 kg)   LMP 01/01/2009   SpO2 98%   BMI 40.43 kg/m²     General appearance - alert, Coughing, SOB, Tearful due to severe coughing.    Mental status - affect appropriate to mood  Eyes - sclera anicteric, moist mucous membranes  Neck - supple, no significant adenopathy  Chest - clear to auscultation, no wheezes, rales or rhonchi  Heart - normal rate, regular rhythm, normal S1, S2, no murmurs, rubs, clicks or gallops  Abdomen - soft, nontender, nondistended, no masses or organomegaly  Extremities - peripheral pulses normal, no pedal edema  Skin - normal coloration  no rashes    Medications:     Current Outpatient Medications   Medication Sig    DALTON PEN NEEDLE 32 gauge x 5/32\" ndle USE TWICE DAILY    Lancets misc To use to check blood sugar three times a day. Dx:E11.40    glucose blood VI test strips (ASCENSIA AUTODISC VI, ONE TOUCH ULTRA TEST VI) strip To use to check blood sugar three times a day. Dx: E11.40    Blood-Glucose Meter monitoring kit To use to check blood sugar three times a day. Dx: E11.40    insulin glargine (LANTUS,BASAGLAR) 100 unit/mL (3 mL) inpn 30 Units by SubCUTAneous route nightly for 30 days.  metFORMIN ER (GLUCOPHAGE XR) 500 mg tablet Take 1 Tab by mouth daily (with breakfast) for 30 days.  diclofenac (VOLTAREN) 1 % gel Apply 2 g to affected area four (4) times daily as needed (hand pain).  nitroglycerin (NITROSTAT) 0.4 mg SL tablet 0.4 mg by SubLINGual route every five (5) minutes as needed for Chest Pain. Up to 3 doses.  isosorbide mononitrate ER (IMDUR) 60 mg CR tablet Take 60 mg by mouth every morning.  losartan (COZAAR) 25 mg tablet Take 25 mg by mouth daily.  pramipexole (MIRAPEX) 0.5 mg tablet Take 0.5 mg by mouth two (2) times a day.  levocetirizine (XYZAL) 5 mg tablet Take 5 mg by mouth daily.  esomeprazole (NEXIUM) 40 mg capsule Take 40 mg by mouth daily.  metoprolol tartrate (LOPRESSOR) 50 mg tablet Take 50 mg by mouth two (2) times a day.  DULoxetine (CYMBALTA) 60 mg capsule Take 60 mg by mouth daily.  hydrOXYzine HCl (ATARAX) 25 mg tablet Take 25 mg by mouth three (3) times daily as needed for Itching.  gabapentin (NEURONTIN) 600 mg tablet Take 600 mg by mouth three (3) times daily. Takes with 300 mg to make a total dose of 900 mg    clopidogrel (PLAVIX) 75 mg tab Take 1 Tab by mouth daily.     gabapentin (NEURONTIN) 300 mg capsule Take 300 mg by mouth three (3) times daily. Takes with 600 mg to make a total dose of 900 mg    atorvastatin (LIPITOR) 40 mg tablet Take 40 mg by mouth daily.  montelukast (SINGULAIR) 10 mg tablet Take 10 mg by mouth nightly.  levalbuterol tartrate (XOPENEX HFA) 45 mcg/Actuation inhaler Take 2 Puffs by inhalation every six (6) hours as needed for Wheezing or Shortness of Breath.  Nebulizer & Compressor machine 1 Each by Does Not Apply route as needed.  levalbuterol hcl (XOPENEX) 0.63 mg/3 mL nebulization 0.63 mg by Nebulization route every four (4) hours as needed (shortness of breath). No current facility-administered medications for this visit. Diagnostic Data Review:       No specialty comments available. Lab Review:     Lab Results   Component Value Date/Time    Cholesterol, total 104 11/15/2018 12:40 AM    HDL Cholesterol 38 11/15/2018 12:40 AM    LDL, calculated 32.8 11/15/2018 12:40 AM    Triglyceride 166 (H) 11/15/2018 12:40 AM    CHOL/HDL Ratio 2.7 11/15/2018 12:40 AM     Lab Results   Component Value Date/Time    Creatinine (POC) 0.7 10/31/2016 04:34 PM    Creatinine 0.94 11/19/2018 11:43 AM     Lab Results   Component Value Date/Time    BUN 17 11/19/2018 11:43 AM    BUN (POC) 21 (H) 10/31/2016 04:34 PM     Lab Results   Component Value Date/Time    Potassium 4.3 11/19/2018 11:43 AM     Lab Results   Component Value Date/Time    Hemoglobin A1c 10.5 (H) 11/15/2018 03:56 AM     Lab Results   Component Value Date/Time    Hemoglobin (POC) 12.9 10/31/2016 04:34 PM    HGB 11.6 11/19/2018 11:43 AM     Lab Results   Component Value Date/Time    PLATELET 943 16/01/0890 11:43 AM     No results for input(s): CPK, CKMB, TROIQ in the last 72 hours. No lab exists for component: CKQMB, CPKMB             ___________________________________________________    Maria Guadalupe Vang.  Kyra Seth MD, [facility]

## 2018-11-29 LAB
ATRIAL RATE: 62 BPM
CALCULATED P AXIS, ECG09: 99 DEGREES
CALCULATED R AXIS, ECG10: 29 DEGREES
CALCULATED T AXIS, ECG11: -98 DEGREES
DIAGNOSIS, 93000: NORMAL
P-R INTERVAL, ECG05: 158 MS
Q-T INTERVAL, ECG07: 456 MS
QRS DURATION, ECG06: 86 MS
QTC CALCULATION (BEZET), ECG08: 462 MS
VENTRICULAR RATE, ECG03: 62 BPM

## 2018-11-29 NOTE — DISCHARGE INSTRUCTIONS
Bronchitis: Care Instructions  Your Care Instructions    Bronchitis is inflammation of the bronchial tubes, which carry air to the lungs. The tubes swell and produce mucus, or phlegm. The mucus and inflamed bronchial tubes make you cough. You may have trouble breathing. Most cases of bronchitis are caused by viruses like those that cause colds. Antibiotics usually do not help and they may be harmful. Bronchitis usually develops rapidly and lasts about 2 to 3 weeks in otherwise healthy people. Follow-up care is a key part of your treatment and safety. Be sure to make and go to all appointments, and call your doctor if you are having problems. It's also a good idea to know your test results and keep a list of the medicines you take. How can you care for yourself at home? · Take all medicines exactly as prescribed. Call your doctor if you think you are having a problem with your medicine. · Get some extra rest.  · Take an over-the-counter pain medicine, such as acetaminophen (Tylenol), ibuprofen (Advil, Motrin), or naproxen (Aleve) to reduce fever and relieve body aches. Read and follow all instructions on the label. · Do not take two or more pain medicines at the same time unless the doctor told you to. Many pain medicines have acetaminophen, which is Tylenol. Too much acetaminophen (Tylenol) can be harmful. · Take an over-the-counter cough medicine that contains dextromethorphan to help quiet a dry, hacking cough so that you can sleep. Avoid cough medicines that have more than one active ingredient. Read and follow all instructions on the label. · Breathe moist air from a humidifier, hot shower, or sink filled with hot water. The heat and moisture will thin mucus so you can cough it out. · Do not smoke. Smoking can make bronchitis worse. If you need help quitting, talk to your doctor about stop-smoking programs and medicines. These can increase your chances of quitting for good.   When should you call for help? Call 911 anytime you think you may need emergency care. For example, call if:    · You have severe trouble breathing.    Call your doctor now or seek immediate medical care if:    · You have new or worse trouble breathing.     · You cough up dark brown or bloody mucus (sputum).     · You have a new or higher fever.     · You have a new rash.    Watch closely for changes in your health, and be sure to contact your doctor if:    · You cough more deeply or more often, especially if you notice more mucus or a change in the color of your mucus.     · You are not getting better as expected. Where can you learn more? Go to http://emily-janette.info/. Enter H333 in the search box to learn more about \"Bronchitis: Care Instructions. \"  Current as of: December 6, 2017  Content Version: 11.8  © 0854-8062 [employer]. Care instructions adapted under license by [employer] (which disclaims liability or warranty for this information). If you have questions about a medical condition or this instruction, always ask your healthcare professional. [address] any warranty or liability for your use of this information.

## 2018-12-03 DIAGNOSIS — J45.40 MODERATE PERSISTENT ASTHMA WITHOUT COMPLICATION: Primary | Chronic | ICD-10-CM

## 2018-12-03 RX ORDER — NEBULIZER AND COMPRESSOR
1 EACH MISCELLANEOUS AS NEEDED
Qty: 1 EACH | Refills: 0 | Status: SHIPPED | OUTPATIENT
Start: 2018-12-03 | End: 2018-12-04 | Stop reason: SDUPTHER

## 2018-12-04 DIAGNOSIS — J45.40 MODERATE PERSISTENT ASTHMA WITHOUT COMPLICATION: Chronic | ICD-10-CM

## 2018-12-04 RX ORDER — NEBULIZER AND COMPRESSOR
EACH MISCELLANEOUS
Qty: 1 EACH | Refills: 0 | Status: SHIPPED | OUTPATIENT
Start: 2018-12-04 | End: 2018-12-05 | Stop reason: SDUPTHER

## 2018-12-04 RX ORDER — NITROGLYCERIN 0.4 MG/1
0.4 TABLET SUBLINGUAL
Qty: 30 TAB | Refills: 2 | Status: SHIPPED | OUTPATIENT
Start: 2018-12-04 | End: 2019-04-15 | Stop reason: SDUPTHER

## 2018-12-04 NOTE — TELEPHONE ENCOUNTER
Sourav Lopez [phone] and [phone] and representative asked that we fax over script noting frequency and duration of need, the chart notes and demographics. They will handle it from there and will contact the pt once is it approved.

## 2018-12-05 ENCOUNTER — OFFICE VISIT (OUTPATIENT)
Dept: FAMILY MEDICINE CLINIC | Age: 58
End: 2018-12-05

## 2018-12-05 VITALS
WEIGHT: 218 LBS | BODY MASS INDEX: 41.16 KG/M2 | RESPIRATION RATE: 20 BRPM | TEMPERATURE: 98.4 F | SYSTOLIC BLOOD PRESSURE: 147 MMHG | HEART RATE: 68 BPM | OXYGEN SATURATION: 98 % | HEIGHT: 61 IN | DIASTOLIC BLOOD PRESSURE: 68 MMHG

## 2018-12-05 DIAGNOSIS — H92.01 RIGHT EAR PAIN: ICD-10-CM

## 2018-12-05 DIAGNOSIS — J40 BRONCHITIS: Primary | ICD-10-CM

## 2018-12-05 RX ORDER — CEFDINIR 300 MG/1
300 CAPSULE ORAL 2 TIMES DAILY
Qty: 20 CAP | Refills: 0 | Status: SHIPPED | OUTPATIENT
Start: 2018-12-05 | End: 2018-12-15

## 2018-12-05 NOTE — PATIENT INSTRUCTIONS
Bronchitis: Care Instructions  Your Care Instructions    Bronchitis is inflammation of the bronchial tubes, which carry air to the lungs. The tubes swell and produce mucus, or phlegm. The mucus and inflamed bronchial tubes make you cough. You may have trouble breathing. Most cases of bronchitis are caused by viruses like those that cause colds. Antibiotics usually do not help and they may be harmful. Bronchitis usually develops rapidly and lasts about 2 to 3 weeks in otherwise healthy people. Follow-up care is a key part of your treatment and safety. Be sure to make and go to all appointments, and call your doctor if you are having problems. It's also a good idea to know your test results and keep a list of the medicines you take. How can you care for yourself at home? · Take all medicines exactly as prescribed. Call your doctor if you think you are having a problem with your medicine. · Get some extra rest.  · Take an over-the-counter pain medicine, such as acetaminophen (Tylenol), ibuprofen (Advil, Motrin), or naproxen (Aleve) to reduce fever and relieve body aches. Read and follow all instructions on the label. · Do not take two or more pain medicines at the same time unless the doctor told you to. Many pain medicines have acetaminophen, which is Tylenol. Too much acetaminophen (Tylenol) can be harmful. · Take an over-the-counter cough medicine that contains dextromethorphan to help quiet a dry, hacking cough so that you can sleep. Avoid cough medicines that have more than one active ingredient. Read and follow all instructions on the label. · Breathe moist air from a humidifier, hot shower, or sink filled with hot water. The heat and moisture will thin mucus so you can cough it out. · Do not smoke. Smoking can make bronchitis worse. If you need help quitting, talk to your doctor about stop-smoking programs and medicines. These can increase your chances of quitting for good.   When should you call for help? Call 911 anytime you think you may need emergency care. For example, call if:    · You have severe trouble breathing.    Call your doctor now or seek immediate medical care if:    · You have new or worse trouble breathing.     · You cough up dark brown or bloody mucus (sputum).     · You have a new or higher fever.     · You have a new rash.    Watch closely for changes in your health, and be sure to contact your doctor if:    · You cough more deeply or more often, especially if you notice more mucus or a change in the color of your mucus.     · You are not getting better as expected. Where can you learn more? Go to http://emily-janette.info/. Enter H333 in the search box to learn more about \"Bronchitis: Care Instructions. \"  Current as of: December 6, 2017  Content Version: 11.8  © 2074-1543 [employer]. Care instructions adapted under license by [employer] (which disclaims liability or warranty for this information). If you have questions about a medical condition or this instruction, always ask your healthcare professional. [address] any warranty or liability for your use of this information.

## 2018-12-05 NOTE — PROGRESS NOTES
Identified pt with two pt identifiers(name and ). Chief Complaint   Patient presents with    Ear Pain     right ear    Headache    Cough     Sx occurring over the past week. Reports she was advised it was bronchitis but was not prescribed antibx to tx sx and only prescribed a cough medication that would cost her $10 out of pocket. Pt did not  cough medication as she did not have the money the day it was prescribed.  Shortness of Breath        Health Maintenance Due   Topic    FOOT EXAM Q1     EYE EXAM RETINAL OR DILATED Q1     DTaP/Tdap/Td series (1 - Tdap)    PAP AKA CERVICAL CYTOLOGY     Shingrix Vaccine Age 50> (1 of 2)    BREAST CANCER SCRN MAMMOGRAM     MEDICARE YEARLY EXAM        Wt Readings from Last 3 Encounters:   18 218 lb (98.9 kg)   18 214 lb (97.1 kg)   18 211 lb (95.7 kg)     Temp Readings from Last 3 Encounters:   18 98.4 °F (36.9 °C) (Oral)   18 98.1 °F (36.7 °C) (Oral)   11/15/18 98 °F (36.7 °C)     BP Readings from Last 3 Encounters:   18 147/68   18 188/71   18 130/60     Pulse Readings from Last 3 Encounters:   18 68   18 74   18 68         Learning Assessment:  :     No flowsheet data found. Depression Screening:  :     PHQ over the last two weeks 2018   Little interest or pleasure in doing things Not at all   Feeling down, depressed, irritable, or hopeless Not at all   Total Score PHQ 2 0       Fall Risk Assessment:  :     No flowsheet data found. Abuse Screening:  :     No flowsheet data found. Coordination of Care Questionnaire:  :     1) Have you been to an emergency room, urgent care clinic since your last visit?  yes   Hospitalized since your last visit? no             2) Have you seen or consulted any other health care providers outside of [facility] since your last visit? no  (Include any pap smears or colon screenings in this section.)    3) Do you have an Advance Directive on file? no  Are you interested in receiving information about Advance Directives? no    Patient is accompanied by self. Reviewed record in preparation for visit and have obtained necessary documentation. Medication reconciliation up to date and corrected with patient at this time.

## 2018-12-05 NOTE — PROGRESS NOTES
HISTORY OF PRESENT ILLNESS  Sarah Haider is a 59 y.o. female. HPI  Pt presents with \"ear pain and headache\"    Symptoms started on 11/28, with a cough, for which she went to the ER for. She was told that her symptoms were viral, and would improve with time. They gave her a neb treatment, and cough medicine. Pt states that she continues to cough, and feels like Harika Blackwell is hacking up mucous. Nasal congestion  Right ear pain  Headache  NO fever    OTC: none  Review of Systems   Constitutional: Negative for fever. HENT: Positive for congestion and ear pain. Respiratory: Positive for cough and sputum production. Gastrointestinal: Negative for diarrhea and vomiting. Physical Exam   Constitutional: She is oriented to person, place, and time. She appears well-developed and well-nourished. HENT:   Head: Normocephalic and atraumatic. Right Ear: Hearing, external ear and ear canal normal. Tympanic membrane is erythematous. Left Ear: Hearing, tympanic membrane, external ear and ear canal normal.   Nose: Mucosal edema and rhinorrhea present. Mouth/Throat: Oropharynx is clear and moist.   Neck: Normal range of motion. Neck supple. Cardiovascular: Normal rate, regular rhythm and normal heart sounds. Pulmonary/Chest: Effort normal. She has no wheezes. She has rhonchi in the right lower field and the left lower field. She has rales. Lymphadenopathy:     She has no cervical adenopathy. Neurological: She is alert and oriented to person, place, and time. Skin: Skin is warm and dry. Psychiatric: She has a normal mood and affect. Her behavior is normal.       ASSESSMENT and PLAN    ICD-10-CM ICD-9-CM    1. Bronchitis J40 490 cefdinir (OMNICEF) 300 mg capsule   2. Right ear pain H92.01 388.70 cefdinir (OMNICEF) 300 mg capsule     Informed patient that I have sent medication to the pharmacy, and she should take as prescribed.   Educated about staying well hydrated, and treating fever as needed. Should use nebulizer as prescribed. Pt informed to return to office with worsening of symptoms, or PRN with any questions or concerns. Pt verbalizes understanding of plan of care and denies further questions or concerns at this time.

## 2018-12-12 ENCOUNTER — HOSPITAL ENCOUNTER (OUTPATIENT)
Dept: DIABETES SERVICES | Age: 58
Discharge: HOME | End: 2018-12-12
Payer: MEDICARE

## 2018-12-12 DIAGNOSIS — E11.9 DIABETES MELLITUS WITHOUT COMPLICATION (HCC): ICD-10-CM

## 2018-12-12 PROCEDURE — G0109 DIAB MANAGE TRN IND/GROUP: HCPCS | Performed by: DIETITIAN, REGISTERED

## 2018-12-12 RX ORDER — METFORMIN HYDROCHLORIDE 500 MG/1
500 TABLET, EXTENDED RELEASE ORAL
Qty: 30 TAB | Refills: 0 | Status: SHIPPED | OUTPATIENT
Start: 2018-12-12 | End: 2019-01-11

## 2018-12-12 RX ORDER — INSULIN GLARGINE 100 [IU]/ML
30 INJECTION, SOLUTION SUBCUTANEOUS
Qty: 3 PEN | Refills: 0 | Status: SHIPPED | OUTPATIENT
Start: 2018-12-12 | End: 2019-01-19 | Stop reason: SDUPTHER

## 2018-12-13 RX ORDER — METFORMIN HYDROCHLORIDE 500 MG/1
500 TABLET, EXTENDED RELEASE ORAL
Qty: 30 TAB | Refills: 0 | OUTPATIENT
Start: 2018-12-13

## 2018-12-13 RX ORDER — LEVALBUTEROL TARTRATE 45 UG/1
2 AEROSOL, METERED ORAL
Qty: 3 INHALER | Refills: 0 | Status: SHIPPED | OUTPATIENT
Start: 2018-12-13 | End: 2019-02-20 | Stop reason: SDUPTHER

## 2018-12-13 NOTE — TELEPHONE ENCOUNTER
Dr. Jose Francisco Barger you erx the metformin for qty# 27 yesterday, however, it has been one month since she rec'd the inhaler.

## 2018-12-18 RX ORDER — GLUCOSAM/CHON-MSM1/C/MANG/BOSW 500-416.6
TABLET ORAL
Qty: 100 LANCET | Refills: 2 | Status: SHIPPED | OUTPATIENT
Start: 2018-12-18 | End: 2019-02-20

## 2018-12-20 ENCOUNTER — OFFICE VISIT (OUTPATIENT)
Dept: FAMILY MEDICINE CLINIC | Age: 58
End: 2018-12-20

## 2018-12-20 VITALS
HEART RATE: 64 BPM | BODY MASS INDEX: 41.16 KG/M2 | OXYGEN SATURATION: 97 % | TEMPERATURE: 98.1 F | HEIGHT: 61 IN | RESPIRATION RATE: 22 BRPM | DIASTOLIC BLOOD PRESSURE: 80 MMHG | WEIGHT: 218 LBS | SYSTOLIC BLOOD PRESSURE: 148 MMHG

## 2018-12-20 DIAGNOSIS — N64.4 BREAST PAIN, RIGHT: ICD-10-CM

## 2018-12-20 DIAGNOSIS — Z23 NEED FOR TDAP VACCINATION: ICD-10-CM

## 2018-12-20 DIAGNOSIS — J45.40 MODERATE PERSISTENT ASTHMA WITHOUT COMPLICATION: Primary | Chronic | ICD-10-CM

## 2018-12-20 DIAGNOSIS — E11.21 TYPE 2 DIABETES WITH NEPHROPATHY (HCC): ICD-10-CM

## 2018-12-20 DIAGNOSIS — Z23 NEED FOR SHINGLES VACCINE: ICD-10-CM

## 2018-12-20 RX ORDER — LEVALBUTEROL INHALATION SOLUTION 0.63 MG/3ML
0.63 SOLUTION RESPIRATORY (INHALATION)
Qty: 100 NEBULE | Refills: 2 | Status: SHIPPED | OUTPATIENT
Start: 2018-12-20

## 2018-12-20 NOTE — PATIENT INSTRUCTIONS
Breast Pain: Care Instructions  Your Care Instructions    Breast tenderness and pain may come and go with your monthly periods (cyclic), or it may not follow any pattern (noncyclic). Breast pain is rarely caused by a serious health problem. You may need tests to find the cause. Follow-up care is a key part of your treatment and safety. Be sure to make and go to all appointments, and call your doctor if you are having problems. It's also a good idea to know your test results and keep a list of the medicines you take. How can you care for yourself at home? · If your doctor gave you medicine, take it exactly as prescribed. Call your doctor if you think you are having a problem with your medicine. · Take an over-the-counter pain medicine, such as acetaminophen (Tylenol), ibuprofen (Advil, Motrin), or naproxen (Aleve), to relieve pain and swelling. Read and follow all instructions on the label. · Do not take two or more pain medicines at the same time unless the doctor told you to. Many pain medicines have acetaminophen, which is Tylenol. Too much acetaminophen (Tylenol) can be harmful. · Wear a supportive bra, such as a sports bra or a jog bra. · Cut down on the amount of fat in your diet. If you need help planning healthy meals, see a dietitian. · Get at least 30 minutes of exercise on most days of the week. Walking is a good choice. You also may want to do other activities, such as running, swimming, cycling, or playing tennis or team sports. · Keep a healthy sleep pattern. Go to bed at the same time every night, and get up at the same time every day. When should you call for help? Call your doctor now or seek immediate medical care if:    · You have new changes in a breast, such as:  ? A lump or thickening in your breast or armpit. ? A change in the breast's size or shape. ? Skin changes, such as dimples or puckers. ? Nipple discharge.   ? A change in the color or feel of the skin of your breast or the darker area around the nipple (areola). ? A change in the shape of the nipple (it may look like it's being pulled into the breast).     · You have symptoms of a breast infection, such as:  ? Increased pain, swelling, redness, or warmth around a breast.  ? Red streaks extending from the breast.  ? Pus draining from a breast.  ? A fever.    Watch closely for changes in your health, and be sure to contact your doctor if:    · Your breast pain does not get better after 1 week.     · You have a lump or thickening in your breast or armpit. Where can you learn more? Go to http://emily-janette.info/. Enter Z102 in the search box to learn more about \"Breast Pain: Care Instructions. \"  Current as of: November 20, 2017  Content Version: 11.8  © 2879-5175 [employer]. Care instructions adapted under license by [employer] (which disclaims liability or warranty for this information). If you have questions about a medical condition or this instruction, always ask your healthcare professional. [address] any warranty or liability for your use of this information.

## 2018-12-20 NOTE — PROGRESS NOTES
HISTORY OF PRESENT ILLNESS  Junior Vee is a 59 y.o. female. HPI  Pt presents with \"breast and hand pain\"    Pt states that the breast pain came on all of a sudden, 3 days ago, in her right breast.  Pt states that the pain is a sharp pain. Right breast is tender to the touch  Nipple is painful in the right breast  There is no nipple discharge, or redness to the breast  Pt states that it has been over a year since she had a mammogram.  No fever    In addition, patient continues to have right hand pain. This has been a chronic issue, and patient is on multiple medications to aid in neurologic pain. Review of Systems   Constitutional: Negative for fever. HENT: Negative for congestion. Respiratory: Negative for cough. Gastrointestinal: Negative for diarrhea and vomiting. Musculoskeletal: Positive for joint pain. Physical Exam   Constitutional: She is oriented to person, place, and time. She appears well-developed and well-nourished. HENT:   Head: Normocephalic and atraumatic. Cardiovascular: Normal rate, regular rhythm and normal heart sounds. Pulmonary/Chest: Effort normal and breath sounds normal. Right breast exhibits tenderness. Right breast exhibits no inverted nipple, no mass, no nipple discharge and no skin change. Breasts are symmetrical.       Neurological: She is alert and oriented to person, place, and time. Skin: Skin is warm and dry. Psychiatric: She has a normal mood and affect. Her behavior is normal.       ASSESSMENT and PLAN    ICD-10-CM ICD-9-CM    1. Moderate persistent asthma without complication J13.36 221.29 levalbuterol (XOPENEX) 0.63 mg/3 mL nebu   2. Breast pain, right N64.4 611.71 YUMIKO MAMMO RT DX INCL CAD      US BREAST RT COMPLETE 4 QUAD   3. Type 2 diabetes with nephropathy (HCC) E11.21 250.40 REFERRAL TO OPHTHALMOLOGY     583.81    4. Need for shingles vaccine Z23 V04.89 varicella-zoster recombinant, PF, (SHINGRIX, PF,) 50 mcg/0.5 mL susr injection   5.  Need for Tdap vaccination Z23 V06.1 diph,Pertuss,Acell,,Tet Vac-PF (ADACEL) 2 Lf-(2.5-5-3-5 mcg)-5Lf/0.5 mL susp     Informed patient that we need to figure out what is causing breast pain, and tests ordered. Educated about calling for this today. Educated about following up in our office in 1-2 months, fasting, for office visit and labs. May need to refer to rheum for multiple joint pain? Pt informed to return to office with worsening of symptoms, or PRN with any questions or concerns. Pt verbalizes understanding of plan of care and denies further questions or concerns at this time.

## 2018-12-26 RX ORDER — METOPROLOL TARTRATE 50 MG/1
TABLET ORAL
Qty: 60 TAB | Refills: 1 | Status: SHIPPED | OUTPATIENT
Start: 2018-12-26 | End: 2019-03-13 | Stop reason: SDUPTHER

## 2018-12-26 RX ORDER — LOSARTAN POTASSIUM 25 MG/1
TABLET ORAL
Qty: 30 TAB | Refills: 1 | Status: SHIPPED | OUTPATIENT
Start: 2018-12-26 | End: 2019-02-20

## 2018-12-31 ENCOUNTER — HOSPITAL ENCOUNTER (OUTPATIENT)
Dept: MAMMOGRAPHY | Age: 58
Discharge: HOME | End: 2018-12-31
Attending: NURSE PRACTITIONER
Payer: MEDICARE

## 2018-12-31 DIAGNOSIS — N64.4 BREAST PAIN, RIGHT: ICD-10-CM

## 2018-12-31 PROCEDURE — 77067 SCR MAMMO BI INCL CAD: CPT

## 2018-12-31 PROCEDURE — 77066 DX MAMMO INCL CAD BI: CPT

## 2019-01-01 ENCOUNTER — HOSPITAL ENCOUNTER (EMERGENCY)
Age: 59
Discharge: HOME | End: 2019-01-01
Attending: EMERGENCY MEDICINE
Payer: MEDICARE

## 2019-01-01 ENCOUNTER — APPOINTMENT (OUTPATIENT)
Dept: GENERAL RADIOLOGY | Age: 59
End: 2019-01-01
Attending: EMERGENCY MEDICINE
Payer: MEDICARE

## 2019-01-01 VITALS
HEIGHT: 60 IN | RESPIRATION RATE: 18 BRPM | WEIGHT: 210 LBS | TEMPERATURE: 98 F | BODY MASS INDEX: 41.23 KG/M2 | SYSTOLIC BLOOD PRESSURE: 134 MMHG | OXYGEN SATURATION: 98 % | HEART RATE: 82 BPM | DIASTOLIC BLOOD PRESSURE: 98 MMHG

## 2019-01-01 DIAGNOSIS — R73.9 HYPERGLYCEMIA: ICD-10-CM

## 2019-01-01 DIAGNOSIS — N64.4 BREAST PAIN IN FEMALE: Primary | ICD-10-CM

## 2019-01-01 LAB
ALBUMIN SERPL-MCNC: 3.2 G/DL (ref 3.5–5)
ALBUMIN/GLOB SERPL: 0.9 {RATIO} (ref 1.1–2.2)
ALP SERPL-CCNC: 123 U/L (ref 45–117)
ALT SERPL-CCNC: <6 U/L (ref 12–78)
ANION GAP SERPL CALC-SCNC: 8 MMOL/L (ref 5–15)
AST SERPL-CCNC: 8 U/L (ref 15–37)
BASOPHILS # BLD: 0 K/UL (ref 0–0.1)
BASOPHILS NFR BLD: 0 % (ref 0–1)
BILIRUB SERPL-MCNC: 0.5 MG/DL (ref 0.2–1)
BUN SERPL-MCNC: 20 MG/DL (ref 6–20)
BUN/CREAT SERPL: 19 (ref 12–20)
CALCIUM SERPL-MCNC: 9.1 MG/DL (ref 8.5–10.1)
CHLORIDE SERPL-SCNC: 100 MMOL/L (ref 97–108)
CO2 SERPL-SCNC: 28 MMOL/L (ref 21–32)
CREAT SERPL-MCNC: 1.03 MG/DL (ref 0.55–1.02)
D DIMER PPP FEU-MCNC: 0.34 MG/L FEU (ref 0–0.65)
DIFFERENTIAL METHOD BLD: ABNORMAL
EOSINOPHIL # BLD: 0.1 K/UL (ref 0–0.4)
EOSINOPHIL NFR BLD: 1 % (ref 0–7)
ERYTHROCYTE [DISTWIDTH] IN BLOOD BY AUTOMATED COUNT: 12.6 % (ref 11.5–14.5)
GLOBULIN SER CALC-MCNC: 3.7 G/DL (ref 2–4)
GLUCOSE SERPL-MCNC: 477 MG/DL (ref 65–100)
HCT VFR BLD AUTO: 34 % (ref 35–47)
HGB BLD-MCNC: 11.8 G/DL (ref 11.5–16)
LYMPHOCYTES # BLD: 2.2 K/UL (ref 0.8–3.5)
LYMPHOCYTES NFR BLD: 37 % (ref 12–49)
MCH RBC QN AUTO: 29.8 PG (ref 26–34)
MCHC RBC AUTO-ENTMCNC: 34.7 G/DL (ref 30–36.5)
MCV RBC AUTO: 85.9 FL (ref 80–99)
MONOCYTES # BLD: 0.5 K/UL (ref 0–1)
MONOCYTES NFR BLD: 9 % (ref 5–13)
NEUTS SEG # BLD: 3.1 K/UL (ref 1.8–8)
NEUTS SEG NFR BLD: 53 % (ref 32–75)
PLATELET # BLD AUTO: 206 K/UL (ref 150–400)
PMV BLD AUTO: 9.4 FL (ref 8.9–12.9)
POTASSIUM SERPL-SCNC: 3.6 MMOL/L (ref 3.5–5.1)
PROT SERPL-MCNC: 6.9 G/DL (ref 6.4–8.2)
RBC # BLD AUTO: 3.96 M/UL (ref 3.8–5.2)
SODIUM SERPL-SCNC: 136 MMOL/L (ref 136–145)
TROPONIN I SERPL-MCNC: <0.05 NG/ML
WBC # BLD AUTO: 5.9 K/UL (ref 3.6–11)
XXWBCSUS: 0

## 2019-01-01 PROCEDURE — 96374 THER/PROPH/DIAG INJ IV PUSH: CPT

## 2019-01-01 PROCEDURE — 85379 FIBRIN DEGRADATION QUANT: CPT

## 2019-01-01 PROCEDURE — 74011250636 HC RX REV CODE- 250/636: Performed by: EMERGENCY MEDICINE

## 2019-01-01 PROCEDURE — 36415 COLL VENOUS BLD VENIPUNCTURE: CPT

## 2019-01-01 PROCEDURE — 74011000250 HC RX REV CODE- 250: Performed by: EMERGENCY MEDICINE

## 2019-01-01 PROCEDURE — 71045 X-RAY EXAM CHEST 1 VIEW: CPT

## 2019-01-01 PROCEDURE — 80053 COMPREHEN METABOLIC PANEL: CPT

## 2019-01-01 PROCEDURE — 99285 EMERGENCY DEPT VISIT HI MDM: CPT

## 2019-01-01 PROCEDURE — 84484 ASSAY OF TROPONIN QUANT: CPT

## 2019-01-01 PROCEDURE — 96361 HYDRATE IV INFUSION ADD-ON: CPT

## 2019-01-01 PROCEDURE — 96375 TX/PRO/DX INJ NEW DRUG ADDON: CPT

## 2019-01-01 PROCEDURE — 85025 COMPLETE CBC W/AUTO DIFF WBC: CPT

## 2019-01-01 RX ORDER — DIPHENHYDRAMINE HYDROCHLORIDE 50 MG/ML
12.5 INJECTION, SOLUTION INTRAMUSCULAR; INTRAVENOUS ONCE
Status: COMPLETED | OUTPATIENT
Start: 2019-01-01 | End: 2019-01-01

## 2019-01-01 RX ORDER — PROCHLORPERAZINE EDISYLATE 5 MG/ML
10 INJECTION INTRAMUSCULAR; INTRAVENOUS
Status: COMPLETED | OUTPATIENT
Start: 2019-01-01 | End: 2019-01-01

## 2019-01-01 RX ORDER — ONDANSETRON 2 MG/ML
4 INJECTION INTRAMUSCULAR; INTRAVENOUS
Status: COMPLETED | OUTPATIENT
Start: 2019-01-01 | End: 2019-01-01

## 2019-01-01 RX ORDER — KETOROLAC TROMETHAMINE 30 MG/ML
30 INJECTION, SOLUTION INTRAMUSCULAR; INTRAVENOUS
Status: COMPLETED | OUTPATIENT
Start: 2019-01-01 | End: 2019-01-01

## 2019-01-01 RX ORDER — LIDOCAINE 4 G/100G
1 PATCH TOPICAL EVERY 24 HOURS
Status: DISCONTINUED | OUTPATIENT
Start: 2019-01-01 | End: 2019-01-01 | Stop reason: HOSPADM

## 2019-01-01 RX ADMIN — ONDANSETRON 4 MG: 2 INJECTION INTRAMUSCULAR; INTRAVENOUS at 04:31

## 2019-01-01 RX ADMIN — PROCHLORPERAZINE EDISYLATE 10 MG: 5 INJECTION INTRAMUSCULAR; INTRAVENOUS at 05:27

## 2019-01-01 RX ADMIN — KETOROLAC TROMETHAMINE 30 MG: 30 INJECTION, SOLUTION INTRAMUSCULAR at 04:32

## 2019-01-01 RX ADMIN — SODIUM CHLORIDE 500 ML: 900 INJECTION, SOLUTION INTRAVENOUS at 04:27

## 2019-01-01 RX ADMIN — DIPHENHYDRAMINE HYDROCHLORIDE 12.5 MG: 50 INJECTION INTRAMUSCULAR; INTRAVENOUS at 05:26

## 2019-01-01 NOTE — ED PROVIDER NOTES
Thomas Moura is a 58 yo F with right breast pain. Patient states that pain started 2 weeks ago. She saw her PCP on 12/20 for this and was referred for Mammogram which she had yesterday. Patient states that the pain is sharp, is located in her right nipple and radiates to her back and comes and goes but has been constant since yesterday morning and it is causing her to have a headache and nausea and is keeping her from being able to sleep. Patient is diabetic and is on Lantus which she is supposed to use at bedtime but she states that she has not used her lantus in the past 2 days because she has not been able to sleep. Past Medical History:  
Diagnosis Date  Asthma  Asthma  Diabetes mellitus type II   
 Hypercholesteremia  Hyperlipemia  Hypertension  Restless leg  Restless leg  Rheumatoid arthritis(714.0)  Sleep apnea  Sleep disorder  Stroke ([facility] [address] 2010 Past Surgical History:  
Procedure Laterality Date  CHEST SURGERY PROCEDURE UNLISTED  HX CORONARY STENT PLACEMENT Left Sept. 8th and Sept 16, 2018  HX TONSILLECTOMY Family History:  
Problem Relation Age of Onset  Heart Disease Mother  Diabetes Mother  Hypertension Other  No Known Problems Father Social History Socioeconomic History  Marital status: SINGLE Spouse name: Not on file  Number of children: Not on file  Years of education: Not on file  Highest education level: Not on file Social Needs  Financial resource strain: Not on file  Food insecurity - worry: Not on file  Food insecurity - inability: Not on file  Transportation needs - medical: Not on file  Transportation needs - non-medical: Not on file Occupational History  Not on file Tobacco Use  Smoking status: Never Smoker  Smokeless tobacco: Never Used Substance and Sexual Activity  Alcohol use: No  
  Alcohol/week: 2.5 oz  
 Types: 5 Cans of beer per week  Drug use: No  
 Sexual activity: No  
Other Topics Concern  Not on file Social History Narrative  Not on file ALLERGIES: Levaquin [levofloxacin]; Albuterol; and Aspirin Review of Systems Constitutional: Negative for fever. HENT: Negative for sore throat. Eyes: Negative for visual disturbance. Respiratory: Negative for cough. Cardiovascular: Positive for chest pain (right breast). Gastrointestinal: Negative for abdominal pain. Genitourinary: Negative for dysuria. Musculoskeletal: Negative for back pain. Skin: Negative for rash. Neurological: Negative for headaches. Vitals:  
 01/01/19 0422 BP: 185/63 Pulse: 87 Resp: 24 Temp: 99.4 °F (37.4 °C) SpO2: 100% Weight: 95.3 kg (210 lb) Height: 5' (1.524 m) Physical Exam  
Constitutional: She appears well-developed and well-nourished. She appears distressed (due to pain, very tearful, hunching over). HENT:  
Head: Normocephalic and atraumatic. Mouth/Throat: Oropharynx is clear and moist.  
Eyes: Conjunctivae and EOM are normal.  
Neck: Normal range of motion and phonation normal.  
Cardiovascular: Normal rate, regular rhythm and intact distal pulses. Pulses: 
     Dorsalis pedis pulses are 2+ on the right side, and 2+ on the left side. Pulmonary/Chest: Effort normal. No respiratory distress. She has no wheezes. She has no rhonchi. She has no rales. Right breast exhibits tenderness (superior medial quadrant, no erythema). Right breast exhibits no inverted nipple, no mass, no nipple discharge and no skin change. Left breast exhibits no inverted nipple, no mass, no nipple discharge and no skin change. Breasts are symmetrical. There is no breast swelling. Abdominal: She exhibits no distension. Musculoskeletal: Normal range of motion. She exhibits no tenderness. Neurological: She is alert. She is not disoriented. She exhibits normal muscle tone. Skin: Skin is warm and dry. Capillary refill takes less than 2 seconds. No erythema. Nursing note and vitals reviewed. ED EKG interpretation: 
Rhythm: normal sinus rhythm; and regular . Rate (approx.): 84; Axis: normal; P wave: normal; QRS interval: normal ; ST/T wave: T wave inverted; in  Lead: aVL , V5  and V6 ; Other findings: unchanged from previous ekg on 11/28/2018. This EKG was interpreted by Jayla Sneed MD,ED Provider. MDM 
  
5:15 AM 
Patient reassessed and states that headache and right breast pain somewhat improved after toradol and zofran but remains painful. Lidocaine patch, compazine and benadryl ordered. Discussed with patient need to take lantus as prescribed and not skip doses. Procedures

## 2019-01-01 NOTE — ED TRIAGE NOTES
Pt presents with family with complaint of rt sided chest pain x 2 wks. Pt arrived to the room by wheelchair holding her chest. Pt states that her chest pain worsen yesterday. Pt had cardiac stents placed in September. Pt states that she gets a headache and nausea when she has chest painDr. Margarete Hobby at bedside. Call bell within reach.

## 2019-01-01 NOTE — DISCHARGE INSTRUCTIONS
Breast Pain: Care Instructions  Your Care Instructions    Breast tenderness and pain may come and go with your monthly periods (cyclic), or it may not follow any pattern (noncyclic). Breast pain is rarely caused by a serious health problem. You may need tests to find the cause. Follow-up care is a key part of your treatment and safety. Be sure to make and go to all appointments, and call your doctor if you are having problems. It's also a good idea to know your test results and keep a list of the medicines you take. How can you care for yourself at home? · If your doctor gave you medicine, take it exactly as prescribed. Call your doctor if you think you are having a problem with your medicine. · Take an over-the-counter pain medicine, such as acetaminophen (Tylenol), ibuprofen (Advil, Motrin), or naproxen (Aleve), to relieve pain and swelling. Read and follow all instructions on the label. · Do not take two or more pain medicines at the same time unless the doctor told you to. Many pain medicines have acetaminophen, which is Tylenol. Too much acetaminophen (Tylenol) can be harmful. · Wear a supportive bra, such as a sports bra or a jog bra. · Cut down on the amount of fat in your diet. If you need help planning healthy meals, see a dietitian. · Get at least 30 minutes of exercise on most days of the week. Walking is a good choice. You also may want to do other activities, such as running, swimming, cycling, or playing tennis or team sports. · Keep a healthy sleep pattern. Go to bed at the same time every night, and get up at the same time every day. When should you call for help? Call your doctor now or seek immediate medical care if:    · You have new changes in a breast, such as:  ? A lump or thickening in your breast or armpit. ? A change in the breast's size or shape. ? Skin changes, such as dimples or puckers. ? Nipple discharge.   ? A change in the color or feel of the skin of your breast or the darker area around the nipple (areola). ? A change in the shape of the nipple (it may look like it's being pulled into the breast).     · You have symptoms of a breast infection, such as:  ? Increased pain, swelling, redness, or warmth around a breast.  ? Red streaks extending from the breast.  ? Pus draining from a breast.  ? A fever.    Watch closely for changes in your health, and be sure to contact your doctor if:    · Your breast pain does not get better after 1 week.     · You have a lump or thickening in your breast or armpit. Where can you learn more? Go to http://emily-janette.info/. Enter O019 in the search box to learn more about \"Breast Pain: Care Instructions. \"  Current as of: November 20, 2017  Content Version: 11.8  © 9121-7757 [employer]. Care instructions adapted under license by [employer] (which disclaims liability or warranty for this information). If you have questions about a medical condition or this instruction, always ask your healthcare professional. [address] any warranty or liability for your use of this information.

## 2019-01-01 NOTE — ED NOTES
IV Access established and blood samples sent to lab for ordered testing by Venkat Richards RN. Patient tolerated well. Patient updated regarding plan of care and associated time constraints. Patient verbalized good understanding. Will continue to monitor. Call bell within reach.

## 2019-01-01 NOTE — ED NOTES
The patient was discharged home by   in stable condition. The patient is alert and oriented, in no respiratory distress and discharge vital signs obtained. The patient's diagnosis, condition and treatment were explained. The patient expressed understanding. No prescriptions given. No work/school note given. A discharge plan has been developed. A  was not involved in the process. Aftercare instructions were given. Pt ambulatory out of the ED with steady gait. All personal items and discharge papers in hand upon departure from ED. pts  will be driving home.

## 2019-01-01 NOTE — ED NOTES
Pt resting comfortably at this time. Given warm blankets and repositioned for comfort. Rating pain 5/10 and reports to be feeling better.  at bedside providing emotional support.

## 2019-01-14 ENCOUNTER — OFFICE VISIT (OUTPATIENT)
Dept: FAMILY MEDICINE CLINIC | Age: 59
End: 2019-01-14

## 2019-01-14 VITALS
RESPIRATION RATE: 20 BRPM | WEIGHT: 206.4 LBS | TEMPERATURE: 98 F | OXYGEN SATURATION: 99 % | BODY MASS INDEX: 40.52 KG/M2 | HEIGHT: 60 IN | SYSTOLIC BLOOD PRESSURE: 160 MMHG | HEART RATE: 98 BPM | DIASTOLIC BLOOD PRESSURE: 66 MMHG

## 2019-01-14 DIAGNOSIS — E11.65 TYPE 2 DIABETES MELLITUS WITH HYPERGLYCEMIA, UNSPECIFIED WHETHER LONG TERM INSULIN USE (HCC): Primary | ICD-10-CM

## 2019-01-14 DIAGNOSIS — G47.33 OSA (OBSTRUCTIVE SLEEP APNEA): ICD-10-CM

## 2019-01-14 DIAGNOSIS — R19.8 CHANGE IN BOWEL MOVEMENT: ICD-10-CM

## 2019-01-14 DIAGNOSIS — F41.9 ANXIETY AND DEPRESSION: ICD-10-CM

## 2019-01-14 DIAGNOSIS — M79.2 NEUROPATHIC PAIN: ICD-10-CM

## 2019-01-14 DIAGNOSIS — F32.A ANXIETY AND DEPRESSION: ICD-10-CM

## 2019-01-14 LAB — HBA1C MFR BLD HPLC: 11.9 %

## 2019-01-14 RX ORDER — ACETAMINOPHEN AND CODEINE PHOSPHATE 300; 30 MG/1; MG/1
1 TABLET ORAL
Qty: 7 TAB | Refills: 0 | Status: SHIPPED | OUTPATIENT
Start: 2019-01-14 | End: 2019-07-20

## 2019-01-14 RX ORDER — SERTRALINE HYDROCHLORIDE 25 MG/1
25 TABLET, FILM COATED ORAL DAILY
Qty: 90 TAB | Refills: 1 | Status: SHIPPED | OUTPATIENT
Start: 2019-01-14 | End: 2019-02-20

## 2019-01-14 RX ORDER — METFORMIN HYDROCHLORIDE 1000 MG/1
1000 TABLET ORAL 2 TIMES DAILY WITH MEALS
Qty: 180 TAB | Refills: 1 | Status: SHIPPED | OUTPATIENT
Start: 2019-01-14 | End: 2019-02-20

## 2019-01-14 NOTE — PROGRESS NOTES
Identified pt with two pt identifiers(name and ). Chief Complaint   Patient presents with    Generalized Body Aches     Complains of pain all over her body and not sleeping. Health Maintenance Due   Topic    FOOT EXAM Q1     EYE EXAM RETINAL OR DILATED     PAP AKA CERVICAL CYTOLOGY     Shingrix Vaccine Age 50> (1 of 2)    MEDICARE YEARLY EXAM        Wt Readings from Last 3 Encounters:   19 210 lb (95.3 kg)   18 218 lb (98.9 kg)   18 218 lb (98.9 kg)     Temp Readings from Last 3 Encounters:   19 98 °F (36.7 °C)   18 98.1 °F (36.7 °C) (Oral)   18 98.4 °F (36.9 °C) (Oral)     BP Readings from Last 3 Encounters:   19 (!) 134/98   18 148/80   18 147/68     Pulse Readings from Last 3 Encounters:   19 82   18 64   18 68         Learning Assessment:  :     Learning Assessment 2018   PRIMARY LEARNER Patient   HIGHEST LEVEL OF EDUCATION - PRIMARY LEARNER  SOME COLLEGE   BARRIERS PRIMARY LEARNER VISUAL   CO-LEARNER CAREGIVER No   PRIMARY LANGUAGE ENGLISH   LEARNER PREFERENCE PRIMARY DEMONSTRATION     LISTENING     READING   ANSWERED BY patient   RELATIONSHIP SELF       Depression Screening:  :     PHQ over the last two weeks 2019   Little interest or pleasure in doing things Nearly every day   Feeling down, depressed, irritable, or hopeless Several days   Total Score PHQ 2 4       Fall Risk Assessment:  :     No flowsheet data found. Abuse Screening:  :     No flowsheet data found. Coordination of Care Questionnaire:  :     1) Have you been to an emergency room, urgent care clinic since your last visit? yes   Hospitalized since your last visit? no             2) Have you seen or consulted any other health care providers outside of [address] [place] [address] since your last visit? no  (Include any pap smears or colon screenings in this section.)    3) Do you have an Advance Directive on file?  yes  Are you interested in receiving information about Advance Directives? no    Reviewed record in preparation for visit and have obtained necessary documentation. Medication reconciliation up to date and corrected with patient at this time.

## 2019-01-14 NOTE — PROGRESS NOTES
Chief Complaint:  Chief Complaint   Patient presents with    Generalized Body Aches     Complains of pain all over her body and not sleeping. History of Present Illness:  She is a 59 y.o. female who presents with worsening pain especially in her hands and entire body. Not sleeping well. She has HIEN and had a CPAP machine, but because of the move, she has not been using it. She also has type II DM. She does not check her BS. She has been on Metformin 500 mgs EXR and Lantus 30 U at bedtime. I note that she was admitted to [facility] 1-day after her first visit with us for chest pain and body aches. The patient has not follow up with us post hospitalization. The patient is tearful and stating that \"no body talks to me nicely in my home. My daughters are always nasty to me. \" She is tearful and has some flight of idea. \"Lately, I don't go to the ER because they make mistakes and I am the one who has to pay for it. \"    She reports that she has been diagnosed with anxiety in the past. She denies being depressed. She had been on Wellbutrin in the past. She did not feel like it worked. The patient reports that she was in [facility] for 4-days. She apparently had a psychotic episode. Because she was taking Neurontin and Lyrica at the same time. Lyrica was stopped and she is now on Neurontin. Reviewed PmHx, RxHx, FmHx, SocHx, AllgHx and updated and dated in the chart.     Patient Active Problem List    Diagnosis    Type 2 diabetes with nephropathy ([address]    Chest pain    Hyperglycemia    Coronary artery disease involving native coronary artery of native heart with angina pectoris ([address]    Status post coronary artery stent placement    Severe obesity (HCC)    Neuropathy of right lower extremity    Neuropathy    Pericardial effusion(moderate-large) with mild cardiac tamponade--via echo 8/1/16    Chest pain, precordial    Acute on chronic diastolic CHF (congestive heart failure) ([address]    Hypertensive heart disease with diastolic heart failure (HCC)    SOB (shortness of breath)    Acute respiratory insufficiency    Obstructive sleep apnea    TIA (transient ischemic attack)    Essential hypertension with goal blood pressure less than 130/85    DM type 2, uncontrolled, with neuropathy (HCC)    Asthma    Hyperlipidemia    Restless leg syndrome       Review of Systems - negative except as listed above in the HPI    Objective:     Vitals:    01/14/19 1322   BP: 160/66   Pulse: 98   Resp: 20   Temp: 98 °F (36.7 °C)   TempSrc: Oral   SpO2: 99%   Weight: 206 lb 6.4 oz (93.6 kg)   Height: 5' (1.524 m)     Physical Examination:   General appearance - overweight, anxious and crying  Mental status - depressed mood  Chest - clear to auscultation, no wheezes, rales or rhonchi, symmetric air entry  Heart - normal rate, regular rhythm, normal S1, S2, no murmurs, rubs, clicks or gallops  Neurological - alert, oriented, normal speech, no focal findings or movement disorder noted  Musculoskeletal - abnormal active range of motion of upper extremities and lower extremeties, abnormal passive range of motion of upper and lower body. Extremities - peripheral pulses normal, no pedal edema, no clubbing or cyanosis  Skin - normal coloration and turgor, no rashes, no suspicious skin lesions noted    LABS:  Results for orders placed or performed in visit on 01/14/19   AMB POC HEMOGLOBIN A1C   Result Value Ref Range    Hemoglobin A1c (POC) 11.9 %       Assessment/ Plan:   58F with type II DM, poorly controlled. She also has HIEN and morbid obesity. The patient is not compliant with checking her BS and does not have a CPAP machine. Cannot find it since the move. The patient is crying and repeatedly discussing concerns in her family and the hardships that she has had all of her life. The patient is clearly anxious and depressed and there is a psychiatric overlay present as well. We discussed compliance with medication.  I have increased her Metformin to 1000 mgs BID and continued with the Lantus 30 U. She needs to be seen by endocrinology, sleep medicine and GI. ICD-10-CM ICD-9-CM    1. Type 2 diabetes mellitus with hyperglycemia, unspecified whether long term insulin use (HCC) E11.65 250.00 AMB POC HEMOGLOBIN A1C      REFERRAL TO ENDOCRINOLOGY   2. Change in bowel movement R19.8 787.99 REFERRAL TO GASTROENTEROLOGY   3. Neuropathic pain M79.2 729.2 acetaminophen-codeine (TYLENOL #3) 300-30 mg per tablet   4. Anxiety and depression F41.9 300.00 sertraline (ZOLOFT) 25 mg tablet    F32.9 311    5. HIEN (obstructive sleep apnea) G47.33 327.23 REFERRAL TO SLEEP STUDIES     There have been some probable medication, dietary and lifestyle compliance issues here. I have discussed with her the great importance of following the treatment plan exactly as directed in order to achieve a good medical outcome. reviewed diet, exercise and weight control  cardiovascular risk and specific lipid/LDL goals reviewed  reviewed medications and side effects in detail  specific diabetic recommendations: low cholesterol diet, weight control and daily exercise discussed, home glucose monitoring emphasized, foot care discussed and Podiatry visits discussed, annual eye examinations at Ophthalmology discussed, glycohemoglobin and other lab monitoring discussed and long term diabetic complications discussed.'  I have discussed the diagnosis with the patient and the intended treatment plan as seen in the above orders. The patient has received an after-visit summary and questions were answered concerning future plans. Asked to return should symptoms worsen or not improve with treatment. Any pending labs and studies will be relayed to patient when they become available. Pt verbalizes understanding of plan of care and denies further questions or concerns at this time.      Follow-up Disposition:  Return in about 1 month (around 2/14/2019), or if symptoms worsen or fail to improve.     Akhil Lopez MD

## 2019-01-21 RX ORDER — INSULIN GLARGINE 100 [IU]/ML
INJECTION, SOLUTION SUBCUTANEOUS
Qty: 5 ADJUSTABLE DOSE PRE-FILLED PEN SYRINGE | Refills: 3 | Status: SHIPPED | OUTPATIENT
Start: 2019-01-21 | End: 2019-02-20 | Stop reason: SDUPTHER

## 2019-01-21 RX ORDER — PEN NEEDLE, DIABETIC 32GX 5/32"
NEEDLE, DISPOSABLE MISCELLANEOUS
Qty: 100 PEN NEEDLE | Refills: 1 | Status: SHIPPED | OUTPATIENT
Start: 2019-01-21 | End: 2019-03-22 | Stop reason: SDUPTHER

## 2019-01-29 ENCOUNTER — HOSPITAL ENCOUNTER (OUTPATIENT)
Dept: DIABETES SERVICES | Age: 59
Discharge: HOME | End: 2019-01-29
Payer: MEDICARE

## 2019-01-29 DIAGNOSIS — E11.9 DIABETES MELLITUS WITHOUT COMPLICATION (HCC): ICD-10-CM

## 2019-01-29 PROCEDURE — G0109 DIAB MANAGE TRN IND/GROUP: HCPCS | Performed by: DIETITIAN, REGISTERED

## 2019-01-29 NOTE — DIABETES MGMT
01/29/19 Thank you for your kind referral. Your patient Luciano Andrews, attended Session #1 at [address] [place] [address] where the following topics were covered today . * Describing diabetes disease process and treatment options * Incorporating nutrition management into their lifestyle * Monitoring blood glucose and other parameters and interpreting and using the results       for self management decision making. * Preventing detecting and treating acute complications * Incorporating physical activity into their lifestyle * Using medications safely and for the maximum therapeutic effectiveness * Developing personal strategies to promote health and behavior changes * Developing personal strategies to address psychosocial issues and concerns Data from first visit: 
Weight: 1/29/2019 202.2# HgbA1c: 11/15/2018 10.5% 1/14/2019 11.9% Increased risk for diabetes: 5.7-6.4%, Diabetes >6.4% Glycemic control for adults with diabetes: < 7% Elderly or multiple medical conditions <8% Random blood glucose: 1/29/2019 2 Hrs Post-Lunch 204 mg/dl Meter given: owns a [employer] Goal(s) set : Goal 1: Make better food choices - Cut down on my coke to 3 a day Pt expresses concern about high BS but has stopped checking her BS due to frustration. She was encouraged to resume checking BS and needs to d/c all sweet beverages. She reports  also has DM and his A1c is also elevated so no good support system. She seems willing to make changes. She may benefit from an oral agent to help further bring her BS down such as glimepiride if her renal function allows Your patient will have two (2) additional appointments to complete the ordered education. Their next visit is scheduled for 2/11/2019 We look forward to assisting your patient in meeting their self- management goals. If you have any questions please do not hesitate to call the [facility] at [phone] Sincerely, Lesly Pritchett RD , CDE 
 
[address] [place]arda gustafson [facility]  
[phone]

## 2019-02-07 ENCOUNTER — TELEPHONE (OUTPATIENT)
Dept: FAMILY MEDICINE CLINIC | Age: 59
End: 2019-02-07

## 2019-02-07 NOTE — TELEPHONE ENCOUNTER
Rachel, with [facility] is stating the patient has been changed to Metformin twice a day. Pt has had diarrhea for two weeks and now pt has stopped taking it. She would like a call back to discuss a possible change.  Phone: [phone] ext: [phone]

## 2019-02-08 ENCOUNTER — TELEPHONE (OUTPATIENT)
Dept: FAMILY MEDICINE CLINIC | Age: 59
End: 2019-02-08

## 2019-02-08 NOTE — TELEPHONE ENCOUNTER
Tha Copeland is calling to check status of [employer] for incontinence supplies.   Please call at [phone]

## 2019-02-08 NOTE — TELEPHONE ENCOUNTER
[place] will know if this has been sent. She is out of the office today and will return on Monday.

## 2019-02-11 ENCOUNTER — HOSPITAL ENCOUNTER (OUTPATIENT)
Dept: DIABETES SERVICES | Age: 59
Discharge: HOME | End: 2019-02-11
Attending: INTERNAL MEDICINE
Payer: MEDICARE

## 2019-02-11 DIAGNOSIS — E11.9 DIABETES MELLITUS WITHOUT COMPLICATION (HCC): ICD-10-CM

## 2019-02-11 PROCEDURE — G0109 DIAB MANAGE TRN IND/GROUP: HCPCS | Performed by: DIETITIAN, REGISTERED

## 2019-02-14 NOTE — TELEPHONE ENCOUNTER
Mikie ramon/ [facility] called to check on the status of incontinence supplies for pt. Best contact: [phone] (verbal order may be done).     Fax: [phone]

## 2019-02-19 NOTE — TELEPHONE ENCOUNTER
Voice message left at [facility] extension, relayed message from MD.  Request a call back if she has further questions after 5pm today.

## 2019-02-20 ENCOUNTER — OFFICE VISIT (OUTPATIENT)
Dept: FAMILY MEDICINE CLINIC | Age: 59
End: 2019-02-20

## 2019-02-20 VITALS
HEIGHT: 60 IN | TEMPERATURE: 98.2 F | DIASTOLIC BLOOD PRESSURE: 76 MMHG | RESPIRATION RATE: 20 BRPM | HEART RATE: 65 BPM | SYSTOLIC BLOOD PRESSURE: 134 MMHG | WEIGHT: 202.8 LBS | BODY MASS INDEX: 39.82 KG/M2 | OXYGEN SATURATION: 98 %

## 2019-02-20 DIAGNOSIS — E55.9 VITAMIN D DEFICIENCY: ICD-10-CM

## 2019-02-20 DIAGNOSIS — I25.119 CORONARY ARTERY DISEASE INVOLVING NATIVE CORONARY ARTERY OF NATIVE HEART WITH ANGINA PECTORIS (HCC): ICD-10-CM

## 2019-02-20 DIAGNOSIS — I50.33 ACUTE ON CHRONIC DIASTOLIC CHF (CONGESTIVE HEART FAILURE) (HCC): ICD-10-CM

## 2019-02-20 DIAGNOSIS — J45.40 MODERATE PERSISTENT ASTHMA WITHOUT COMPLICATION: ICD-10-CM

## 2019-02-20 DIAGNOSIS — I50.30 HYPERTENSIVE HEART DISEASE WITH DIASTOLIC HEART FAILURE (HCC): ICD-10-CM

## 2019-02-20 DIAGNOSIS — E11.21 TYPE 2 DIABETES WITH NEPHROPATHY (HCC): Primary | ICD-10-CM

## 2019-02-20 DIAGNOSIS — I11.0 HYPERTENSIVE HEART DISEASE WITH DIASTOLIC HEART FAILURE (HCC): ICD-10-CM

## 2019-02-20 RX ORDER — INSULIN GLARGINE 100 [IU]/ML
30 INJECTION, SOLUTION SUBCUTANEOUS
Qty: 15 ADJUSTABLE DOSE PRE-FILLED PEN SYRINGE | Refills: 3 | Status: SHIPPED | OUTPATIENT
Start: 2019-02-20 | End: 2019-03-22 | Stop reason: SDUPTHER

## 2019-02-20 RX ORDER — TRIAMTERENE CAPSULES 50 MG/1
50 CAPSULE ORAL 2 TIMES DAILY
Qty: 180 CAP | Refills: 1 | Status: SHIPPED | OUTPATIENT
Start: 2019-02-20 | End: 2019-03-22 | Stop reason: SDUPTHER

## 2019-02-20 RX ORDER — LEVALBUTEROL TARTRATE 45 UG/1
2 AEROSOL, METERED ORAL
Qty: 3 INHALER | Refills: 3 | Status: SHIPPED | OUTPATIENT
Start: 2019-02-20 | End: 2019-03-22 | Stop reason: SDUPTHER

## 2019-02-20 RX ORDER — SERTRALINE HYDROCHLORIDE 25 MG/1
TABLET, FILM COATED ORAL
Refills: 1 | COMMUNITY
Start: 2019-01-14 | End: 2019-02-20 | Stop reason: SDUPTHER

## 2019-02-20 RX ORDER — CLOPIDOGREL BISULFATE 75 MG/1
75 TABLET ORAL DAILY
Qty: 90 TAB | Refills: 1 | Status: SHIPPED | OUTPATIENT
Start: 2019-02-20 | End: 2019-03-22 | Stop reason: SDUPTHER

## 2019-02-20 RX ORDER — SERTRALINE HYDROCHLORIDE 25 MG/1
25 TABLET, FILM COATED ORAL DAILY
Qty: 90 TAB | Refills: 1 | Status: SHIPPED | OUTPATIENT
Start: 2019-02-20 | End: 2019-03-22 | Stop reason: SDUPTHER

## 2019-02-20 NOTE — ASSESSMENT & PLAN NOTE
Stable, based on history, physical exam and review of pertinent labs, studies and medications; meds reconciled; continue current treatment plan. Key Antihyperglycemic Medications   
    
  
 insulin glargine (LANTUS SOLOSTAR U-100 INSULIN) 100 unit/mL (3 mL) inpn  (Taking) 30 Units by SubCUTAneous route nightly for 30 days. dapagliflozin (FARXIGA) 10 mg tab tablet  (Taking) Take 1 Tab by mouth daily for 30 days. Other Key Diabetic Medications   
    
  
 gabapentin (NEURONTIN) 300 mg capsule  (Taking) Take 300 mg by mouth three (3) times daily. Takes with 600 mg to make a total dose of 900 mg  
 atorvastatin (LIPITOR) 40 mg tablet  (Taking) Take 40 mg by mouth daily. Lab Results Component Value Date/Time Hemoglobin A1c 10.5 11/15/2018 03:56 AM  
 Hemoglobin A1c (POC) 11.9 01/14/2019 02:05 PM  
 Glucose 477 01/01/2019 04:24 AM  
 Creatinine 1.03 01/01/2019 04:24 AM  
 Microalbumin/creat ratio (POC)  11/19/2018 01:18 PM  
 Cholesterol, total 104 11/15/2018 12:40 AM  
 HDL Cholesterol 38 11/15/2018 12:40 AM  
 LDL, calculated 32.8 11/15/2018 12:40 AM  
 Triglyceride 166 11/15/2018 12:40 AM  
 
Diabetic Foot and Eye Exam HM Status Topic Date Due  
 Diabetic Foot Care  02/19/1968 [facility]  02/19/1970

## 2019-02-20 NOTE — ASSESSMENT & PLAN NOTE
Key CAD CHF Meds   
    
  
 clopidogrel (PLAVIX) 75 mg tab  (Taking) Take 1 Tab by mouth daily. triamterene (DYRENIUM) 50 mg capsule  (Taking) Take 1 Cap by mouth two (2) times a day for 30 days. metoprolol tartrate (LOPRESSOR) 50 mg tablet  (Taking) TAKE ONE TABLET BY MOUTH TWICE DAILY  
 nitroglycerin (NITROSTAT) 0.4 mg SL tablet  (Taking) 1 Tab by SubLINGual route every five (5) minutes as needed for Chest Pain. Up to 3 doses. isosorbide mononitrate ER (IMDUR) 60 mg CR tablet  (Taking) Take 60 mg by mouth every morning. atorvastatin (LIPITOR) 40 mg tablet  (Taking) Take 40 mg by mouth daily. Lab Results Component Value Date/Time  Sodium 136 01/01/2019 04:24 AM  
 Potassium 3.6 01/01/2019 04:24 AM  
 Cholesterol, total 104 11/15/2018 12:40 AM  
 HDL Cholesterol 38 11/15/2018 12:40 AM  
 LDL, calculated 32.8 11/15/2018 12:40 AM  
 Triglyceride 166 11/15/2018 12:40 AM

## 2019-02-20 NOTE — ASSESSMENT & PLAN NOTE
Stable, based on history, physical exam and review of pertinent labs, studies and medications; meds reconciled; continue current treatment plan. Key CAD CHF Meds   
    
  
 clopidogrel (PLAVIX) 75 mg tab  (Taking) Take 1 Tab by mouth daily. triamterene (DYRENIUM) 50 mg capsule  (Taking) Take 1 Cap by mouth two (2) times a day for 30 days. metoprolol tartrate (LOPRESSOR) 50 mg tablet  (Taking) TAKE ONE TABLET BY MOUTH TWICE DAILY  
 nitroglycerin (NITROSTAT) 0.4 mg SL tablet  (Taking) 1 Tab by SubLINGual route every five (5) minutes as needed for Chest Pain. Up to 3 doses. isosorbide mononitrate ER (IMDUR) 60 mg CR tablet  (Taking) Take 60 mg by mouth every morning. atorvastatin (LIPITOR) 40 mg tablet  (Taking) Take 40 mg by mouth daily. Lab Results Component Value Date/Time  Sodium 136 01/01/2019 04:24 AM  
 Potassium 3.6 01/01/2019 04:24 AM  
 Cholesterol, total 104 11/15/2018 12:40 AM  
 HDL Cholesterol 38 11/15/2018 12:40 AM  
 LDL, calculated 32.8 11/15/2018 12:40 AM  
 Triglyceride 166 11/15/2018 12:40 AM

## 2019-02-20 NOTE — PATIENT INSTRUCTIONS
Diabetes Foot Health: Care Instructions Your Care Instructions When you have diabetes, your feet need extra care and attention. Diabetes can damage the nerve endings and blood vessels in your feet, making you less likely to notice when your feet are injured. Diabetes also limits your body's ability to fight infection and get blood to areas that need it. If you get a minor foot injury, it could become an ulcer or a serious infection. With good foot care, you can prevent most of these problems. Caring for your feet can be quick and easy. Most of the care can be done when you are bathing or getting ready for bed. Follow-up care is a key part of your treatment and safety. Be sure to make and go to all appointments, and call your doctor if you are having problems. It's also a good idea to know your test results and keep a list of the medicines you take. How can you care for yourself at home? · Keep your blood sugar close to normal by watching what and how much you eat, monitoring blood sugar, taking medicines if prescribed, and getting regular exercise. · Do not smoke. Smoking affects blood flow and can make foot problems worse. If you need help quitting, talk to your doctor about stop-smoking programs and medicines. These can increase your chances of quitting for good. · Eat a diet that is low in fats. High fat intake can cause fat to build up in your blood vessels and decrease blood flow. · Inspect your feet daily for blisters, cuts, cracks, or sores. If you cannot see well, use a mirror or have someone help you. · Take care of your feet: 
? Wash your feet every day. Use warm (not hot) water. Check the water temperature with your wrists or other part of your body, not your feet. ? Dry your feet well. Pat them dry. Do not rub the skin on your feet too hard. Dry well between your toes. If the skin on your feet stays moist, bacteria or a fungus can grow, which can lead to infection. ? Keep your skin soft. Use moisturizing skin cream to keep the skin on your feet soft and prevent calluses and cracks. But do not put the cream between your toes, and stop using any cream that causes a rash. ? Clean underneath your toenails carefully. Do not use a sharp object to clean underneath your toenails. Use the blunt end of a nail file or other rounded tool. ? Trim and file your toenails straight across to prevent ingrown toenails. Use a nail clipper, not scissors. Use an emery board to smooth the edges. · Change socks daily. Socks without seams are best, because seams often rub the feet. You can find socks for people with diabetes from specialty catalogs. · Look inside your shoes every day for things like gravel or torn linings, which could cause blisters or sores. · Buy shoes that fit well: 
? Look for shoes that have plenty of space around the toes. This helps prevent bunions and blisters. ? Try on shoes while wearing the kind of socks you will usually wear with the shoes. ? Avoid plastic shoes. They may rub your feet and cause blisters. Good shoes should be made of materials that are flexible and breathable, such as leather or cloth. ? Break in new shoes slowly by wearing them for no more than an hour a day for several days. Take extra time to check your feet for red areas, blisters, or other problems after you wear new shoes. · Do not go barefoot. Do not wear sandals, and do not wear shoes with very thin soles. Thin soles are easy to puncture. They also do not protect your feet from hot pavement or cold weather. · Have your doctor check your feet during each visit. If you have a foot problem, see your doctor. Do not try to treat an early foot problem at home. Home remedies or treatments that you can buy without a prescription (such as corn removers) can be harmful. · Always get early treatment for foot problems. A minor irritation can lead to a major problem if not properly cared for early. When should you call for help? Call your doctor now or seek immediate medical care if: 
  · You have a foot sore, an ulcer or break in the skin that is not healing after 4 days, bleeding corns or calluses, or an ingrown toenail.  
  · You have blue or black areas, which can mean bruising or blood flow problems.  
  · You have peeling skin or tiny blisters between your toes or cracking or oozing of the skin.  
  · You have a fever for more than 24 hours and a foot sore.  
  · You have new numbness or tingling in your feet that does not go away after you move your feet or change positions.  
  · You have unexplained or unusual swelling of the foot or ankle.  
 Watch closely for changes in your health, and be sure to contact your doctor if: 
  · You cannot do proper foot care. Where can you learn more? Go to http://emily-janette.info/. Enter A739 in the search box to learn more about \"Diabetes Foot Health: Care Instructions. \" Current as of: July 25, 2018 Content Version: 11.9 © 2016-0751 [employer]. Care instructions adapted under license by [employer] (which disclaims liability or warranty for this information). If you have questions about a medical condition or this instruction, always ask your healthcare professional. [address] any warranty or liability for your use of this information.

## 2019-02-20 NOTE — PROGRESS NOTES
Chief Complaint: 
Chief Complaint Patient presents with  Follow Up Chronic Condition Follow up for diabetes History of Present Illness: 
She is a 62 y.o. female who presents for follow up of her type II DM. I note that she also has seen GI for ongoing issues with diarrhea and stool changes. Unfortunately, the metformin exacerbated this and so she just can't take it. We will need to switch to a different diabetic medication. In addition, she has been evaluated by Ophthalmology and has cataracts. Plan is for extraction sometime in March or April. Also, she does have a sleep study scheduled for March. Today, she needs some follow up labs and a repeat HBA1C. Reviewed PmHx, RxHx, FmHx, SocHx, AllgHx and updated and dated in the chart. Patient Active Problem List  
 Diagnosis  Type 2 diabetes with nephropathy ([address]  Chest pain  Hyperglycemia  Coronary artery disease involving native coronary artery of native heart with angina pectoris ([address]  Status post coronary artery stent placement  Severe obesity ([address]  Neuropathy of right lower extremity  Neuropathy  Pericardial effusion(moderate-large) with mild cardiac tamponade--via echo 8/1/16  Chest pain, precordial  
 Acute on chronic diastolic CHF (congestive heart failure) ([address]  Hypertensive heart disease with diastolic heart failure ([address]  SOB (shortness of breath)  Acute respiratory insufficiency  Obstructive sleep apnea  TIA (transient ischemic attack)  Essential hypertension with goal blood pressure less than 130/85  DM type 2, uncontrolled, with neuropathy ([address]  Asthma  Hyperlipidemia  Restless leg syndrome Review of Systems - negative except as listed above in the HPI Objective:  
 
Vitals:  
 02/20/19 6622 BP: 134/76 Pulse: 65 Resp: 20 Temp: 98.2 °F (36.8 °C) TempSrc: Oral  
SpO2: 98% Weight: 202 lb 12.8 oz (92 kg) Height: 5' (1.524 m) Physical Examination:  
General appearance - alert, well appearing, and in no distress and overweight Mental status - alert, oriented to person, place, and time Chest - clear to auscultation, no wheezes, rales or rhonchi, symmetric air entry Heart - normal rate, regular rhythm, normal S1, S2, no murmurs, rubs, clicks or gallops Neurological - alert, oriented, normal speech, no focal findings or movement disorder noted Musculoskeletal - no joint tenderness, deformity or swelling Extremities - peripheral pulses normal, no pedal edema, no clubbing or cyanosis Skin - normal coloration and turgor, no rashes, no suspicious skin lesions noted Assessment/ Plan:  
Diagnoses and all orders for this visit: 
 
1. Type 2 diabetes with nephropathy (HCC) 
-     insulin glargine (LANTUS SOLOSTAR U-100 INSULIN) 100 unit/mL (3 mL) inpn; 30 Units by SubCUTAneous route nightly for 30 days. -     dapagliflozin (FARXIGA) 10 mg tab tablet; Take 1 Tab by mouth daily for 30 days. 
-     HEMOGLOBIN A1C WITH EAG 
-     CBC WITH AUTOMATED DIFF 
-     METABOLIC PANEL, COMPREHENSIVE 2. Coronary artery disease involving native coronary artery of native heart with angina pectoris ([facility] -     clopidogrel (PLAVIX) 75 mg tab; Take 1 Tab by mouth daily. 
-     LIPID PANEL 3. Moderate persistent asthma without complication 
-     levalbuterol tartrate (XOPENEX HFA) 45 mcg/actuation inhaler; Take 2 Puffs by inhalation every four (4) hours as needed for Wheezing or Shortness of Breath. 4. Vitamin D deficiency 
-     VITAMIN D, 25 HYDROXY 5. DM type 2, uncontrolled, with neuropathy ([facility] Assessment & Plan: 
Stable, based on history, physical exam and review of pertinent labs, studies and medications; meds reconciled; continue current treatment plan. Key Antihyperglycemic Medications   
    
  
 insulin glargine (LANTUS SOLOSTAR U-100 INSULIN) 100 unit/mL (3 mL) inpn  (Taking) 30 Units by SubCUTAneous route nightly for 30 days. dapagliflozin (FARXIGA) 10 mg tab tablet  (Taking) Take 1 Tab by mouth daily for 30 days. Other Key Diabetic Medications   
    
  
 gabapentin (NEURONTIN) 300 mg capsule  (Taking) Take 300 mg by mouth three (3) times daily. Takes with 600 mg to make a total dose of 900 mg  
 atorvastatin (LIPITOR) 40 mg tablet  (Taking) Take 40 mg by mouth daily. Lab Results Component Value Date/Time Hemoglobin A1c 10.5 11/15/2018 03:56 AM  
 Hemoglobin A1c (POC) 11.9 01/14/2019 02:05 PM  
 Glucose 477 01/01/2019 04:24 AM  
 Creatinine 1.03 01/01/2019 04:24 AM  
 Microalbumin/creat ratio (POC)  11/19/2018 01:18 PM  
 Cholesterol, total 104 11/15/2018 12:40 AM  
 HDL Cholesterol 38 11/15/2018 12:40 AM  
 LDL, calculated 32.8 11/15/2018 12:40 AM  
 Triglyceride 166 11/15/2018 12:40 AM  
 
Diabetic Foot and Eye Exam HM Status Topic Date Due  
 Diabetic Foot Care  02/19/1968 [facility] Exam  02/19/1968 6. Acute on chronic diastolic CHF (congestive heart failure) ([facility] Assessment & Plan: 
Stable, based on history, physical exam and review of pertinent labs, studies and medications; meds reconciled; continue current treatment plan. Key CAD CHF Meds   
    
  
 clopidogrel (PLAVIX) 75 mg tab  (Taking) Take 1 Tab by mouth daily. triamterene (DYRENIUM) 50 mg capsule  (Taking) Take 1 Cap by mouth two (2) times a day for 30 days. metoprolol tartrate (LOPRESSOR) 50 mg tablet  (Taking) TAKE ONE TABLET BY MOUTH TWICE DAILY  
 nitroglycerin (NITROSTAT) 0.4 mg SL tablet  (Taking) 1 Tab by SubLINGual route every five (5) minutes as needed for Chest Pain. Up to 3 doses. isosorbide mononitrate ER (IMDUR) 60 mg CR tablet  (Taking) Take 60 mg by mouth every morning. atorvastatin (LIPITOR) 40 mg tablet  (Taking) Take 40 mg by mouth daily. Lab Results Component Value Date/Time  Sodium 136 01/01/2019 04:24 AM  
 Potassium 3.6 01/01/2019 04:24 AM  
 Cholesterol, total 104 11/15/2018 12:40 AM  
 HDL Cholesterol 38 11/15/2018 12:40 AM  
 LDL, calculated 32.8 11/15/2018 12:40 AM  
 Triglyceride 166 11/15/2018 12:40 AM  
 
 
 
7. Hypertensive heart disease with diastolic heart failure ([facility] Assessment & Plan: 
 
Key CAD CHF Meds   
    
  
 clopidogrel (PLAVIX) 75 mg tab  (Taking) Take 1 Tab by mouth daily. triamterene (DYRENIUM) 50 mg capsule  (Taking) Take 1 Cap by mouth two (2) times a day for 30 days. metoprolol tartrate (LOPRESSOR) 50 mg tablet  (Taking) TAKE ONE TABLET BY MOUTH TWICE DAILY  
 nitroglycerin (NITROSTAT) 0.4 mg SL tablet  (Taking) 1 Tab by SubLINGual route every five (5) minutes as needed for Chest Pain. Up to 3 doses. isosorbide mononitrate ER (IMDUR) 60 mg CR tablet  (Taking) Take 60 mg by mouth every morning. atorvastatin (LIPITOR) 40 mg tablet  (Taking) Take 40 mg by mouth daily. Lab Results Component Value Date/Time Sodium 136 01/01/2019 04:24 AM  
 Potassium 3.6 01/01/2019 04:24 AM  
 Cholesterol, total 104 11/15/2018 12:40 AM  
 HDL Cholesterol 38 11/15/2018 12:40 AM  
 LDL, calculated 32.8 11/15/2018 12:40 AM  
 Triglyceride 166 11/15/2018 12:40 AM  
 
 
 
Other orders 
-     triamterene (DYRENIUM) 50 mg capsule; Take 1 Cap by mouth two (2) times a day for 30 days. -     sertraline (ZOLOFT) 25 mg tablet; Take 1 Tab by mouth daily for 90 days. -     CVD REPORT 
-     DIABETES PATIENT EDUCATION Follow-up Disposition: 
Return in about 4 months (around 6/20/2019), or if symptoms worsen or fail to improve. Tristin Márquez I have discussed the diagnosis with the patient and the intended treatment plan as seen in the above orders. The patient has received an after-visit summary and questions were answered concerning future plans. Asked to return should symptoms worsen or not improve with treatment. Any pending labs and studies will be relayed to patient when they become available.   
 
Pt verbalizes understanding of plan of care and denies further questions or concerns at this time. Deb Reilly MD 
 
Patient Instructions Diabetes Foot Health: Care Instructions Your Care Instructions When you have diabetes, your feet need extra care and attention. Diabetes can damage the nerve endings and blood vessels in your feet, making you less likely to notice when your feet are injured. Diabetes also limits your body's ability to fight infection and get blood to areas that need it. If you get a minor foot injury, it could become an ulcer or a serious infection. With good foot care, you can prevent most of these problems. Caring for your feet can be quick and easy. Most of the care can be done when you are bathing or getting ready for bed. Follow-up care is a key part of your treatment and safety. Be sure to make and go to all appointments, and call your doctor if you are having problems. It's also a good idea to know your test results and keep a list of the medicines you take. How can you care for yourself at home? · Keep your blood sugar close to normal by watching what and how much you eat, monitoring blood sugar, taking medicines if prescribed, and getting regular exercise. · Do not smoke. Smoking affects blood flow and can make foot problems worse. If you need help quitting, talk to your doctor about stop-smoking programs and medicines. These can increase your chances of quitting for good. · Eat a diet that is low in fats. High fat intake can cause fat to build up in your blood vessels and decrease blood flow. · Inspect your feet daily for blisters, cuts, cracks, or sores. If you cannot see well, use a mirror or have someone help you. · Take care of your feet: 
? Wash your feet every day. Use warm (not hot) water. Check the water temperature with your wrists or other part of your body, not your feet. ? Dry your feet well. Pat them dry. Do not rub the skin on your feet too hard. Dry well between your toes.  If the skin on your feet stays moist, bacteria or a fungus can grow, which can lead to infection. ? Keep your skin soft. Use moisturizing skin cream to keep the skin on your feet soft and prevent calluses and cracks. But do not put the cream between your toes, and stop using any cream that causes a rash. ? Clean underneath your toenails carefully. Do not use a sharp object to clean underneath your toenails. Use the blunt end of a nail file or other rounded tool. ? Trim and file your toenails straight across to prevent ingrown toenails. Use a nail clipper, not scissors. Use an emery board to smooth the edges. · Change socks daily. Socks without seams are best, because seams often rub the feet. You can find socks for people with diabetes from specialty catalogs. · Look inside your shoes every day for things like gravel or torn linings, which could cause blisters or sores. · Buy shoes that fit well: 
? Look for shoes that have plenty of space around the toes. This helps prevent bunions and blisters. ? Try on shoes while wearing the kind of socks you will usually wear with the shoes. ? Avoid plastic shoes. They may rub your feet and cause blisters. Good shoes should be made of materials that are flexible and breathable, such as leather or cloth. ? Break in new shoes slowly by wearing them for no more than an hour a day for several days. Take extra time to check your feet for red areas, blisters, or other problems after you wear new shoes. · Do not go barefoot. Do not wear sandals, and do not wear shoes with very thin soles. Thin soles are easy to puncture. They also do not protect your feet from hot pavement or cold weather. · Have your doctor check your feet during each visit. If you have a foot problem, see your doctor. Do not try to treat an early foot problem at home. Home remedies or treatments that you can buy without a prescription (such as corn removers) can be harmful. · Always get early treatment for foot problems. A minor irritation can lead to a major problem if not properly cared for early. When should you call for help? Call your doctor now or seek immediate medical care if: 
  · You have a foot sore, an ulcer or break in the skin that is not healing after 4 days, bleeding corns or calluses, or an ingrown toenail.  
  · You have blue or black areas, which can mean bruising or blood flow problems.  
  · You have peeling skin or tiny blisters between your toes or cracking or oozing of the skin.  
  · You have a fever for more than 24 hours and a foot sore.  
  · You have new numbness or tingling in your feet that does not go away after you move your feet or change positions.  
  · You have unexplained or unusual swelling of the foot or ankle.  
 Watch closely for changes in your health, and be sure to contact your doctor if: 
  · You cannot do proper foot care. Where can you learn more? Go to http://emily-janette.info/. Enter A739 in the search box to learn more about \"Diabetes Foot Health: Care Instructions. \" Current as of: July 25, 2018 Content Version: 11.9 © 7687-4425 [employer]. Care instructions adapted under license by [employer] (which disclaims liability or warranty for this information). If you have questions about a medical condition or this instruction, always ask your healthcare professional. [place] [address] any warranty or liability for your use of this information.

## 2019-02-20 NOTE — PROGRESS NOTES
Identified pt with two pt identifiers(name and ). Chief Complaint Patient presents with  Follow Up Chronic Condition Follow up for diabetes Health Maintenance Due Topic  FOOT EXAM Q1   
 EYE EXAM RETINAL OR DILATED  PAP AKA CERVICAL CYTOLOGY  Shingrix Vaccine Age 50> (1 of 2)  MEDICARE YEARLY EXAM   
 
 
Wt Readings from Last 3 Encounters:  
19 206 lb 6.4 oz (93.6 kg) 19 210 lb (95.3 kg) 18 218 lb (98.9 kg) Temp Readings from Last 3 Encounters:  
19 98 °F (36.7 °C) (Oral) 19 98 °F (36.7 °C)  
18 98.1 °F (36.7 °C) (Oral) BP Readings from Last 3 Encounters:  
19 160/66  
19 (!) 134/98  
18 148/80 Pulse Readings from Last 3 Encounters:  
19 98  
19 82  
18 64 Learning Assessment: 
:  
 
Learning Assessment 2018 PRIMARY LEARNER Patient HIGHEST LEVEL OF EDUCATION - PRIMARY LEARNER  SOME COLLEGE  
BARRIERS PRIMARY LEARNER VISUAL  
CO-LEARNER CAREGIVER No  
PRIMARY LANGUAGE ENGLISH  
LEARNER PREFERENCE PRIMARY DEMONSTRATION  
  LISTENING  
  READING  
ANSWERED BY patient RELATIONSHIP SELF Depression Screening: 
:  
 
3 most recent PHQ Screens 2019 Little interest or pleasure in doing things Not at all Feeling down, depressed, irritable, or hopeless Not at all Total Score PHQ 2 0 Fall Risk Assessment: 
:  
 
No flowsheet data found. Abuse Screening: 
:  
 
No flowsheet data found. Coordination of Care Questionnaire: 
:  
 
1) Have you been to an emergency room, urgent care clinic since your last visit? no  
Hospitalized since your last visit? no          
 
2) Have you seen or consulted any other health care providers outside of [address] [place] [address] since your last visit? yes Ophthalmology and GI  (Include any pap smears or colon screenings in this section.) 3) Do you have an Advance Directive on file? yes Are you interested in receiving information about Advance Directives? no 
 
Reviewed record in preparation for visit and have obtained necessary documentation. Medication reconciliation up to date and corrected with patient at this time.

## 2019-02-21 LAB
25(OH)D3+25(OH)D2 SERPL-MCNC: 9.8 NG/ML (ref 30–100)
ALBUMIN SERPL-MCNC: 3.6 G/DL (ref 3.6–4.8)
ALBUMIN/GLOB SERPL: 1.5 {RATIO} (ref 1.2–2.2)
ALP SERPL-CCNC: 84 IU/L (ref 39–117)
ALT SERPL-CCNC: 10 IU/L (ref 0–32)
AST SERPL-CCNC: 12 IU/L (ref 0–40)
BASOPHILS # BLD AUTO: 0 X10E3/UL (ref 0–0.2)
BASOPHILS NFR BLD AUTO: 0 %
BILIRUB SERPL-MCNC: 0.3 MG/DL (ref 0–1.2)
BUN SERPL-MCNC: 10 MG/DL (ref 8–27)
BUN/CREAT SERPL: 13 (ref 12–28)
CALCIUM SERPL-MCNC: 9.2 MG/DL (ref 8.7–10.3)
CHLORIDE SERPL-SCNC: 102 MMOL/L (ref 96–106)
CHOLEST SERPL-MCNC: 129 MG/DL (ref 100–199)
CO2 SERPL-SCNC: 27 MMOL/L (ref 20–29)
CREAT SERPL-MCNC: 0.75 MG/DL (ref 0.57–1)
EOSINOPHIL # BLD AUTO: 0.1 X10E3/UL (ref 0–0.4)
EOSINOPHIL NFR BLD AUTO: 1 %
ERYTHROCYTE [DISTWIDTH] IN BLOOD BY AUTOMATED COUNT: 14 % (ref 12.3–15.4)
EST. AVERAGE GLUCOSE BLD GHB EST-MCNC: 209 MG/DL
GLOBULIN SER CALC-MCNC: 2.4 G/DL (ref 1.5–4.5)
GLUCOSE SERPL-MCNC: 82 MG/DL (ref 65–99)
HBA1C MFR BLD: 8.9 % (ref 4.8–5.6)
HCT VFR BLD AUTO: 35.9 % (ref 34–46.6)
HDLC SERPL-MCNC: 42 MG/DL
HGB BLD-MCNC: 11.5 G/DL (ref 11.1–15.9)
IMM GRANULOCYTES # BLD AUTO: 0 X10E3/UL (ref 0–0.1)
IMM GRANULOCYTES NFR BLD AUTO: 0 %
INTERPRETATION, 910389: NORMAL
LDLC SERPL CALC-MCNC: 66 MG/DL (ref 0–99)
LYMPHOCYTES # BLD AUTO: 2.2 X10E3/UL (ref 0.7–3.1)
LYMPHOCYTES NFR BLD AUTO: 43 %
Lab: NORMAL
MCH RBC QN AUTO: 28.4 PG (ref 26.6–33)
MCHC RBC AUTO-ENTMCNC: 32 G/DL (ref 31.5–35.7)
MCV RBC AUTO: 89 FL (ref 79–97)
MONOCYTES # BLD AUTO: 0.4 X10E3/UL (ref 0.1–0.9)
MONOCYTES NFR BLD AUTO: 8 %
NEUTROPHILS # BLD AUTO: 2.5 X10E3/UL (ref 1.4–7)
NEUTROPHILS NFR BLD AUTO: 48 %
PLATELET # BLD AUTO: 305 X10E3/UL (ref 150–379)
POTASSIUM SERPL-SCNC: 4.1 MMOL/L (ref 3.5–5.2)
PROT SERPL-MCNC: 6 G/DL (ref 6–8.5)
RBC # BLD AUTO: 4.05 X10E6/UL (ref 3.77–5.28)
SODIUM SERPL-SCNC: 145 MMOL/L (ref 134–144)
TRIGL SERPL-MCNC: 105 MG/DL (ref 0–149)
VLDLC SERPL CALC-MCNC: 21 MG/DL (ref 5–40)
WBC # BLD AUTO: 5.3 X10E3/UL (ref 3.4–10.8)

## 2019-03-01 ENCOUNTER — OFFICE VISIT (OUTPATIENT)
Dept: CARDIOLOGY CLINIC | Age: 59
End: 2019-03-01

## 2019-03-01 VITALS
BODY MASS INDEX: 40.44 KG/M2 | DIASTOLIC BLOOD PRESSURE: 78 MMHG | HEIGHT: 60 IN | RESPIRATION RATE: 16 BRPM | HEART RATE: 78 BPM | SYSTOLIC BLOOD PRESSURE: 180 MMHG | WEIGHT: 206 LBS

## 2019-03-01 DIAGNOSIS — I25.119 CORONARY ARTERY DISEASE INVOLVING NATIVE CORONARY ARTERY OF NATIVE HEART WITH ANGINA PECTORIS (HCC): ICD-10-CM

## 2019-03-01 DIAGNOSIS — I50.30 HYPERTENSIVE HEART DISEASE WITH DIASTOLIC HEART FAILURE (HCC): ICD-10-CM

## 2019-03-01 DIAGNOSIS — I11.0 HYPERTENSIVE HEART DISEASE WITH DIASTOLIC HEART FAILURE (HCC): ICD-10-CM

## 2019-03-01 DIAGNOSIS — I50.33 ACUTE ON CHRONIC DIASTOLIC CHF (CONGESTIVE HEART FAILURE) (HCC): Primary | Chronic | ICD-10-CM

## 2019-03-01 DIAGNOSIS — R07.9 CHEST PAIN, UNSPECIFIED TYPE: Primary | ICD-10-CM

## 2019-03-01 RX ORDER — MELATONIN
1000 DAILY
COMMUNITY
Start: 2019-07-26

## 2019-03-01 RX ORDER — LANOLIN ALCOHOL/MO/W.PET/CERES
CREAM (GRAM) TOPICAL
COMMUNITY
End: 2019-08-30 | Stop reason: SDUPTHER

## 2019-03-01 NOTE — PROGRESS NOTES
Office Follow-up    NAME: Paula Cullen   :  1960  MRM:  [id number]    Date:  3/1/2019            Assessment:     Problem List  Date Reviewed: 3/1/2019          Codes Class Noted    Type 2 diabetes with nephropathy ([facility] ICD-10-CM: E11.21  ICD-9-CM: 250.40, 583.81  2018        Chest pain ICD-10-CM: R07.9  ICD-9-CM: 786.50  2018        Hyperglycemia ICD-10-CM: R73.9  ICD-9-CM: 790.29  2018        Coronary artery disease involving native coronary artery of native heart with angina pectoris ([facility] ICD-10-CM: I25.119  ICD-9-CM: 414.01, 413.9  2018        Status post coronary artery stent placement (Chronic) ICD-10-CM: Z95.5  ICD-9-CM: V45.82  2018        Severe obesity ([facility] ICD-10-CM: E66.01  ICD-9-CM: 278.01  2018        Neuropathy of right lower extremity ICD-10-CM: G57.91  ICD-9-CM: 355.8  2016        Neuropathy ICD-10-CM: G62.9  ICD-9-CM: 355.9  2016        Pericardial effusion(moderate-large) with mild cardiac tamponade--via echo 16 ICD-10-CM: I31.3, I31.4  ICD-9-CM: 423.9, 423.3  2016        Chest pain, precordial ICD-10-CM: R07.2  ICD-9-CM: 786.51  2016        Acute on chronic diastolic CHF (congestive heart failure) (HCC) (Chronic) ICD-10-CM: I50.33  ICD-9-CM: 428.33, 428.0  2016        Hypertensive heart disease with diastolic heart failure (HCC) ICD-10-CM: I11.0, I50.30  ICD-9-CM: 402.91, 428.30, 428.0  2016        SOB (shortness of breath) ICD-10-CM: R06.02  ICD-9-CM: 786.05  2016        Acute respiratory insufficiency ICD-10-CM: R06.89  ICD-9-CM: 518.82  2016        Obstructive sleep apnea ICD-10-CM: G47.33  ICD-9-CM: 327.23  3/10/2015        TIA (transient ischemic attack) ICD-10-CM: G45.9  ICD-9-CM: 435.9  3/9/2015        Essential hypertension with goal blood pressure less than 130/85 ICD-10-CM: I10  ICD-9-CM: 401.9  3/9/2015        DM type 2, uncontrolled, with neuropathy (HCC) ICD-10-CM: E11.40, E11.65  ICD-9-CM: 250.62, 357.2  3/9/2015        Asthma (Chronic) ICD-10-CM: J45.909  ICD-9-CM: 493.90  3/9/2015        Hyperlipidemia (Chronic) ICD-10-CM: E78.5  ICD-9-CM: 272.4  3/9/2015        Restless leg syndrome (Chronic) ICD-10-CM: G25.81  ICD-9-CM: 333.94  3/9/2015                 Plan:     1. CAD/ S/p Stent OM, LCX [place] 2018/chest pain: Now having symptoms of chest pain as well as in the prior admission she had chest pain as well at which time we want to do a stress test however could not be done because of significant asthma exacerbation. We will proceed with a Lexiscan stress nuclear study if she can do that. Continue aspirin, Plavix, beta-blockers, statins. 2. Small pericardial Effusoin: This was on a previous echocardiogram study of November 14, 2018. Currently euvolemic on exam.   3. HTN: BP better after adding Losartan. 4. Dyslipidemia: Continue Lipitor. 5. Preop evaluation: Further recommendations of preop clearance for colonoscopy after the stress test results. Subjective:     Mayela Wolfe, a 62y.o. year-old with past medical history significant for hypertension, diabetes, dyslipidemia, CAD, recent non-ST elevation MI in September 2018 was in [place] when she received 3 stents. She is here for evaluation and follow-up. She also needs preop clearance for her colonoscopy. Last night she had mild chest pain lasting for a few minutes. The pain was anterior chest wall nonradiating, not associated with any dyspnea or diaphoresis. She has pretty significant history of asthma and required hospital admission in November 2018. EKG in our office today demonstrated normal sinus rhythm with septal infarct pattern with inferior lateral T wave inversions consider possible anterolateral ischemia and inferolateral ischemia. These are unchanged from the previous EKG of November 2018.     Exam:     Physical Exam:  Visit Vitals  /78 (BP 1 Location: Right arm, BP Patient Position: Sitting)   Pulse 78   Resp 16   Ht 5' (1.524 m)   Wt 206 lb (93.4 kg)   LMP 01/01/2009 (Within Months)   BMI 40.23 kg/m²     General appearance - alert, \  Mental status - affect appropriate to mood  Eyes - sclera anicteric, moist mucous membranes  Neck - supple, no significant adenopathy  Chest - clear to auscultation, no wheezes, rales or rhonchi  Heart - normal rate, regular rhythm, normal S1, S2, no murmurs, rubs, clicks or gallops  Abdomen - soft, nontender, nondistended, no masses or organomegaly  Extremities - peripheral pulses normal, no pedal edema  Skin - normal coloration  no rashes    Medications:     Current Outpatient Medications   Medication Sig    melatonin 3 mg tablet Take  by mouth.  cholecalciferol (VITAMIN D3) 1,000 unit tablet Take  by mouth daily.  clopidogrel (PLAVIX) 75 mg tab Take 1 Tab by mouth daily.  insulin glargine (LANTUS SOLOSTAR U-100 INSULIN) 100 unit/mL (3 mL) inpn 30 Units by SubCUTAneous route nightly for 30 days.  levalbuterol tartrate (XOPENEX HFA) 45 mcg/actuation inhaler Take 2 Puffs by inhalation every four (4) hours as needed for Wheezing or Shortness of Breath.  dapagliflozin (FARXIGA) 10 mg tab tablet Take 1 Tab by mouth daily for 30 days.  sertraline (ZOLOFT) 25 mg tablet Take 1 Tab by mouth daily for 90 days.  glucose blood VI test strips (TRUE METRIX GLUCOSE TEST STRIP) strip TO USE TO CHECK BLOOD SUGAR THREE TIMES A DAY. DX: E11.40    DALTON PEN NEEDLE 32 gauge x 5/32\" ndle USE TWICE DAILY    metoprolol tartrate (LOPRESSOR) 50 mg tablet TAKE ONE TABLET BY MOUTH TWICE DAILY    levalbuterol (XOPENEX) 0.63 mg/3 mL nebu 3 mL by Nebulization route every four (4) hours as needed (shortness of breath).  nitroglycerin (NITROSTAT) 0.4 mg SL tablet 1 Tab by SubLINGual route every five (5) minutes as needed for Chest Pain. Up to 3 doses.  Lancets misc To use to check blood sugar three times a day.   Dx:E11.40    Nebulizer & Compressor machine 1 Each by Does Not Apply route as needed.  diclofenac (VOLTAREN) 1 % gel Apply 2 g to affected area four (4) times daily as needed (hand pain).  isosorbide mononitrate ER (IMDUR) 60 mg CR tablet Take 60 mg by mouth every morning.  pramipexole (MIRAPEX) 0.5 mg tablet Take 0.5 mg by mouth two (2) times a day.  levocetirizine (XYZAL) 5 mg tablet Take 5 mg by mouth daily.  DULoxetine (CYMBALTA) 60 mg capsule Take 60 mg by mouth daily.  hydrOXYzine HCl (ATARAX) 25 mg tablet Take 25 mg by mouth three (3) times daily as needed for Itching.  gabapentin (NEURONTIN) 300 mg capsule Take 300 mg by mouth three (3) times daily. Takes with 600 mg to make a total dose of 900 mg    atorvastatin (LIPITOR) 40 mg tablet Take 40 mg by mouth daily.  montelukast (SINGULAIR) 10 mg tablet Take 10 mg by mouth nightly.  triamterene (DYRENIUM) 50 mg capsule Take 1 Cap by mouth two (2) times a day for 30 days. (Patient not taking: Reported on 3/1/2019)    acetaminophen-codeine (TYLENOL #3) 300-30 mg per tablet Take 1 Tab by mouth every four (4) hours as needed for Pain. Max Daily Amount: 6 Tabs. (Patient not taking: Reported on 3/1/2019)    esomeprazole (NEXIUM) 40 mg capsule Take 40 mg by mouth daily. No current facility-administered medications for this visit. Diagnostic Data Review:       No specialty comments available.       Lab Review:     Lab Results   Component Value Date/Time    Cholesterol, total 129 02/20/2019 10:44 AM    HDL Cholesterol 42 02/20/2019 10:44 AM    LDL, calculated 66 02/20/2019 10:44 AM    Triglyceride 105 02/20/2019 10:44 AM    CHOL/HDL Ratio 2.7 11/15/2018 12:40 AM     Lab Results   Component Value Date/Time    Creatinine (POC) 0.7 10/31/2016 04:34 PM    Creatinine 0.75 02/20/2019 10:44 AM     Lab Results   Component Value Date/Time    BUN 10 02/20/2019 10:44 AM    BUN (POC) 21 (H) 10/31/2016 04:34 PM     Lab Results   Component Value Date/Time    Potassium 4.1 02/20/2019 10:44 AM     Lab Results   Component Value Date/Time    Hemoglobin A1c 8.9 (H) 02/20/2019 10:44 AM     Lab Results   Component Value Date/Time    Hemoglobin (POC) 12.9 10/31/2016 04:34 PM    HGB 11.5 02/20/2019 10:44 AM     Lab Results   Component Value Date/Time    PLATELET 324 80/20/3404 10:44 AM     No results for input(s): CPK, CKMB, TROIQ in the last 72 hours. No lab exists for component: CKQMB, CPKMB             ___________________________________________________    Nat Rosario.  Trisha Louie MD, [facility]

## 2019-03-01 NOTE — PROGRESS NOTES
Chief Complaint   Patient presents with    Surgical Clearance     Colonoscopy, [facility] - Dr. Deborah Gunn - Date TBD     Visit Vitals  /78 (BP 1 Location: Right arm, BP Patient Position: Sitting)   Pulse 78   Resp 16   Ht 5' (1.524 m)   Wt 206 lb (93.4 kg)   LMP 01/01/2009 (Within Months)   BMI 40.23 kg/m²

## 2019-03-07 ENCOUNTER — TELEPHONE (OUTPATIENT)
Dept: FAMILY MEDICINE CLINIC | Age: 59
End: 2019-03-07

## 2019-03-07 NOTE — TELEPHONE ENCOUNTER
Pt stated glucose blood VI test strips (TRUE METRIX GLUCOSE TEST STRIP) is wrong and requesting the ONE TOUCH ULTRA instead

## 2019-03-13 RX ORDER — METOPROLOL TARTRATE 50 MG/1
TABLET ORAL
Qty: 60 TAB | Refills: 5 | Status: SHIPPED | OUTPATIENT
Start: 2019-03-13 | End: 2019-03-22 | Stop reason: SDUPTHER

## 2019-03-14 ENCOUNTER — OFFICE VISIT (OUTPATIENT)
Dept: SLEEP MEDICINE | Age: 59
End: 2019-03-14

## 2019-03-14 VITALS
HEIGHT: 61 IN | DIASTOLIC BLOOD PRESSURE: 66 MMHG | BODY MASS INDEX: 38.21 KG/M2 | WEIGHT: 202.4 LBS | SYSTOLIC BLOOD PRESSURE: 139 MMHG | OXYGEN SATURATION: 94 % | HEART RATE: 64 BPM

## 2019-03-14 DIAGNOSIS — G47.33 OSA (OBSTRUCTIVE SLEEP APNEA): Primary | ICD-10-CM

## 2019-03-14 DIAGNOSIS — F51.01 PRIMARY INSOMNIA: ICD-10-CM

## 2019-03-14 RX ORDER — LANOLIN ALCOHOL/MO/W.PET/CERES
1000 CREAM (GRAM) TOPICAL DAILY
Status: ON HOLD | COMMUNITY
End: 2019-07-20 | Stop reason: CLARIF

## 2019-03-14 NOTE — PROGRESS NOTES
[address] [facility]. [place], [address]  Tel.  [phone]  Fax. [address] [facility] [address]  [place], [address] [facility]  Tel.  [phone]  Fax. [phone] [address] [facility] Ricarda Singer   Tel.  [phone]  Fax. [phone]       Chief Complaint       Chief Complaint   Patient presents with    Sleep Problem     NO_ref by Dr. Drew Mccoy for air       HPI      Natacha Longoria is a  62 y. o.female seen for evaluation of a sleep disorder  . She has a history of difficulty with sleep initiation and maintenance. She reports having problems falling asleep and notes that she may stay awake for, potentially, several days. Recently she has been taking melatonin at bedtime with apparent benefit. She continues to experience problems with frequent awakening. He has been told of snoring; she may awaken with a gasp or snort. Often if she awakened she has difficulty returning to sleep. In general, she is tired on awakening and throughout the day. When she is able to sleep she will awaken frequently. She notes that she may doze if she is seated and inactive such as reading or watching TV, as a passenger, after lunch. She notes she may awaken with a gasp or snort, she reports leg kicking or twitching, vivid dreaming, sleep talking. She denies sleep paralysis or cataplexy. The patient has not undergone diagnostic testing for the current problems. [place] Sleepiness Score: 20       Allergies   Allergen Reactions    Levaquin [Levofloxacin] Anaphylaxis    Albuterol Anxiety    Aspirin Rash     Pt can take IBUPROFEN    Metformin Diarrhea       Current Outpatient Medications   Medication Sig Dispense Refill    cyanocobalamin (VITAMIN B-12) 1,000 mcg tablet Take 1,000 mcg by mouth daily.       metoprolol tartrate (LOPRESSOR) 50 mg tablet TAKE ONE TABLET BY MOUTH TWICE DAILY 60 Tab 5    glucose blood VI test strips (ASCENSIA AUTODISC VI, ONE TOUCH ULTRA TEST VI) strip Check BS 2 x per day. E11.9 Type II DM with hyperglycemia. 100 Strip 3    melatonin 3 mg tablet Take  by mouth.  clopidogrel (PLAVIX) 75 mg tab Take 1 Tab by mouth daily. 90 Tab 1    insulin glargine (LANTUS SOLOSTAR U-100 INSULIN) 100 unit/mL (3 mL) inpn 30 Units by SubCUTAneous route nightly for 30 days. 15 Adjustable Dose Pre-filled Pen Syringe 3    levalbuterol tartrate (XOPENEX HFA) 45 mcg/actuation inhaler Take 2 Puffs by inhalation every four (4) hours as needed for Wheezing or Shortness of Breath. 3 Inhaler 3    dapagliflozin (FARXIGA) 10 mg tab tablet Take 1 Tab by mouth daily for 30 days. 90 Tab 1    sertraline (ZOLOFT) 25 mg tablet Take 1 Tab by mouth daily for 90 days. 90 Tab 1    DALTON PEN NEEDLE 32 gauge x 5/32\" ndle USE TWICE DAILY 100 Pen Needle 1    acetaminophen-codeine (TYLENOL #3) 300-30 mg per tablet Take 1 Tab by mouth every four (4) hours as needed for Pain. Max Daily Amount: 6 Tabs. 7 Tab 0    levalbuterol (XOPENEX) 0.63 mg/3 mL nebu 3 mL by Nebulization route every four (4) hours as needed (shortness of breath). 100 Nebule 2    nitroglycerin (NITROSTAT) 0.4 mg SL tablet 1 Tab by SubLINGual route every five (5) minutes as needed for Chest Pain. Up to 3 doses. 30 Tab 2    Lancets misc To use to check blood sugar three times a day. Dx:E11.40 1 Each 11    Nebulizer & Compressor machine 1 Each by Does Not Apply route as needed. 1 Each 0    isosorbide mononitrate ER (IMDUR) 60 mg CR tablet Take 60 mg by mouth every morning.  pramipexole (MIRAPEX) 0.5 mg tablet Take 0.5 mg by mouth two (2) times a day.  levocetirizine (XYZAL) 5 mg tablet Take 5 mg by mouth daily.  esomeprazole (NEXIUM) 40 mg capsule Take 40 mg by mouth daily.  DULoxetine (CYMBALTA) 60 mg capsule Take 60 mg by mouth daily.  hydrOXYzine HCl (ATARAX) 25 mg tablet Take 25 mg by mouth three (3) times daily as needed for Itching.       gabapentin (NEURONTIN) 300 mg capsule Take 300 mg by mouth three (3) times daily. Takes with 600 mg to make a total dose of 900 mg      atorvastatin (LIPITOR) 40 mg tablet Take 40 mg by mouth daily.  montelukast (SINGULAIR) 10 mg tablet Take 10 mg by mouth nightly.  cholecalciferol (VITAMIN D3) 1,000 unit tablet Take  by mouth daily.  triamterene (DYRENIUM) 50 mg capsule Take 1 Cap by mouth two (2) times a day for 30 days. (Patient not taking: Reported on 3/1/2019) 180 Cap 1    diclofenac (VOLTAREN) 1 % gel Apply 2 g to affected area four (4) times daily as needed (hand pain). She  has a past medical history of Asthma, Asthma, Asthma, Borderline high cholesterol, Cataracts, both eyes, Diabetes mellitus type II, Heart disease, Hypercholesteremia, Hyperlipemia, Hypertension, Neuropathy, Restless leg, Restless leg, Rheumatoid arthritis(714.0), Sleep apnea, Sleep disorder, and Stroke ([facility] [address] She  has a past surgical history that includes hx tonsillectomy; pr chest surgery procedure unlisted; and hx coronary stent placement (Left, Sept. 8th and Sept 16, 2018). She family history includes Diabetes in her mother; Heart Disease in her mother; Hypertension in an other family member; No Known Problems in her father. She  reports that  has never smoked. she has never used smokeless tobacco. She reports that she does not drink alcohol or use drugs. Review of Systems:  Review of Systems   Constitutional: Negative for chills and fever. HENT: Negative for hearing loss. Eyes: Positive for blurred vision. Negative for double vision. Respiratory: Negative for cough and shortness of breath. Cardiovascular: Negative for chest pain and palpitations. Gastrointestinal: Negative for abdominal pain and heartburn. Genitourinary: Negative for frequency and urgency. Musculoskeletal: Positive for back pain, joint pain and neck pain. Skin: Negative for itching and rash. Neurological: Negative for dizziness and headaches. Psychiatric/Behavioral: Positive for depression. The patient is nervous/anxious. Objective:     Visit Vitals  /66 (BP 1 Location: Left arm)   Pulse 64   Ht 5' 1\" (1.549 m)   Wt 202 lb 6.4 oz (91.8 kg)   LMP 01/01/2009 (Within Months)   SpO2 94%   BMI 38.24 kg/m²     Body mass index is 38.24 kg/m². General:   Conversant, cooperative   Eyes:  Pupils equal and reactive, no nystagmus   Oropharynx:   Mallampati score IV,  tongue scalloped   Tonsils:      Neck:   No carotid bruits; Neck circ. in \"inches\": 15.5   Chest/Lungs:  Clear on auscultation    CVS:  Normal rate, regular rhythm   Skin:  Warm to touch; no obvious rashes   Neuro:  Speech fluent, face symmetrical, tongue movement normal   Psych:  Normal affect,  normal countenance        Assessment:       ICD-10-CM ICD-9-CM    1. HIEN (obstructive sleep apnea) G47.33 327.23 POLYSOMNOGRAPHY 1 NIGHT   2. Primary insomnia F51.01 307.42      History of difficulties with sleep initiation and maintenance. She notes sleep initiation has become less of a problem since starting melatonin. She reports snoring, frequent awakening, nonrestorative sleep and daytime fatigue consistent with sleep disordered breathing. She does have a small oral airway. She will be evaluated with a sleep study. Results will be reviewed with her. Plan:     Orders Placed This Encounter    [address] POLYSOMNOGRAPHY (1st NIGHT STUDY) SPLIT IF MEETS CRITERIA     Standing Status:   Future     Standing Expiration Date:   9/14/2019     Order Specific Question:   Reason for Exam     Answer:   Snoring, nonrestorative sleep, daytime fatigue       * Patient has a history and examination consistent with the diagnosis of sleep apnea. * Sleep testing was ordered for initial evaluation. * She was provided information on sleep apnea including corresponding risk factors and the importance of proper treatment. * Treatment options if indicated were reviewed today.       Instructions:  o The patient would benefit from weight reduction measures. o Do not engage in activities requiring a normal degree of alertness if fatigue is present. o The patient understands that untreated or undertreated sleep apnea could impair judgement and the ability to function normally during the day.  o Call or return if symptoms worsen or persist.          Raad Rivera MD, [facility]  Electronically signed 03/14/19       This note was created using voice recognition software. Despite editing, there may be syntax errors. This note will not be viewable in [address]

## 2019-03-14 NOTE — PATIENT INSTRUCTIONS

## 2019-03-22 DIAGNOSIS — I25.119 CORONARY ARTERY DISEASE INVOLVING NATIVE CORONARY ARTERY OF NATIVE HEART WITH ANGINA PECTORIS (HCC): ICD-10-CM

## 2019-03-22 DIAGNOSIS — E11.21 TYPE 2 DIABETES WITH NEPHROPATHY (HCC): ICD-10-CM

## 2019-03-22 DIAGNOSIS — J45.40 MODERATE PERSISTENT ASTHMA WITHOUT COMPLICATION: ICD-10-CM

## 2019-03-22 RX ORDER — TRIAMTERENE CAPSULES 50 MG/1
50 CAPSULE ORAL 2 TIMES DAILY
Qty: 180 CAP | Refills: 1 | Status: SHIPPED | OUTPATIENT
Start: 2019-03-22 | End: 2019-04-21

## 2019-03-22 RX ORDER — LEVALBUTEROL TARTRATE 45 UG/1
2 AEROSOL, METERED ORAL
Qty: 3 INHALER | Refills: 3 | Status: SHIPPED | OUTPATIENT
Start: 2019-03-22

## 2019-03-22 RX ORDER — INSULIN GLARGINE 100 [IU]/ML
30 INJECTION, SOLUTION SUBCUTANEOUS
Qty: 15 ADJUSTABLE DOSE PRE-FILLED PEN SYRINGE | Refills: 3 | Status: SHIPPED | OUTPATIENT
Start: 2019-03-22 | End: 2019-04-21

## 2019-03-22 RX ORDER — SERTRALINE HYDROCHLORIDE 25 MG/1
25 TABLET, FILM COATED ORAL DAILY
Qty: 90 TAB | Refills: 1 | Status: SHIPPED | OUTPATIENT
Start: 2019-03-22 | End: 2019-06-20

## 2019-03-22 RX ORDER — PEN NEEDLE, DIABETIC 32GX 5/32"
NEEDLE, DISPOSABLE MISCELLANEOUS
Qty: 100 PEN NEEDLE | Refills: 1 | Status: SHIPPED | OUTPATIENT
Start: 2019-03-22 | End: 2019-09-20 | Stop reason: SDUPTHER

## 2019-03-22 RX ORDER — CLOPIDOGREL BISULFATE 75 MG/1
75 TABLET ORAL DAILY
Qty: 90 TAB | Refills: 1 | Status: SHIPPED | OUTPATIENT
Start: 2019-03-22 | End: 2020-01-08

## 2019-03-22 RX ORDER — METOPROLOL TARTRATE 50 MG/1
TABLET ORAL
Qty: 60 TAB | Refills: 5 | Status: SHIPPED | OUTPATIENT
Start: 2019-03-22

## 2019-03-28 ENCOUNTER — HOSPITAL ENCOUNTER (OUTPATIENT)
Dept: DIABETES SERVICES | Age: 59
Discharge: HOME | End: 2019-03-28
Attending: INTERNAL MEDICINE
Payer: MEDICARE

## 2019-03-28 DIAGNOSIS — E11.9 DIABETES MELLITUS WITHOUT COMPLICATION (HCC): ICD-10-CM

## 2019-03-28 PROCEDURE — G0109 DIAB MANAGE TRN IND/GROUP: HCPCS | Performed by: DIETITIAN, REGISTERED

## 2019-03-28 RX ORDER — METOPROLOL TARTRATE 50 MG/1
TABLET ORAL
Qty: 60 TAB | Refills: 1 | Status: SHIPPED | OUTPATIENT
Start: 2019-03-28 | End: 2019-07-18 | Stop reason: SDUPTHER

## 2019-03-28 NOTE — DIABETES MGMT
03/28/19 Thank you for your kind referral. Your patient, Vero Pringle has completed his/her personal initial comprehensive education plan. The education plan included the following topics: Healthy Eating, Being Active, Taking Medicines, Monitoring, Reducing Risks, Healthy Coping and Problem Solving. Data from visit: 
 
Weight: 1/29/2019 202.2#; 3/28/2019 204.6 # HgbA1c: 11/15/2018 10.5%, 3/28/2019 7.8 % Increased risk for diabetes: 5.7-6.4 %, Diabetes: >6.4% Glycemic control for adults with diabetes: <7% Elderly or multiple medical conditions: <8% Your patient achieved and continued the following goal(s) from their first class: Goal 1: Make better food choices - Cut down on my coke to 3 a day Education Learning Outcomes for All Education Topics(see above): Demonstrated Competency Your patient has been invited to attend Class 4 six months from now to continue learning about their diabetes. This class will focus on heart healthy eating. If you have any questions, please do not hesitate to call the [facility] at [phone] Sincerely, Sergei Barrera CDE 
 
[address] [place], [place] Phone: [phone] Fax [phone]

## 2019-04-12 RX ORDER — DILTIAZEM HYDROCHLORIDE 120 MG/1
120 CAPSULE, COATED, EXTENDED RELEASE ORAL DAILY
Qty: 30 CAP | Refills: 0 | Status: SHIPPED | OUTPATIENT
Start: 2019-04-12 | End: 2019-04-12

## 2019-04-15 ENCOUNTER — TELEPHONE (OUTPATIENT)
Dept: CARDIOLOGY CLINIC | Age: 59
End: 2019-04-15

## 2019-04-15 NOTE — TELEPHONE ENCOUNTER
Patient would like discuss why Dr. Denisha Friedman prescribed Diltiazem if she is already taking bp medication. Please call back at [phone].

## 2019-04-16 RX ORDER — LANCETS 30 GAUGE
EACH MISCELLANEOUS
Qty: 100 LANCET | Refills: 5 | Status: SHIPPED | OUTPATIENT
Start: 2019-04-16

## 2019-04-16 RX ORDER — NITROGLYCERIN 0.4 MG/1
TABLET SUBLINGUAL
Qty: 30 TAB | Refills: 0 | Status: SHIPPED | OUTPATIENT
Start: 2019-04-16 | End: 2019-05-15 | Stop reason: SDUPTHER

## 2019-04-17 NOTE — TELEPHONE ENCOUNTER
Return call placed to pt (IDx2). Advised that Dr. Nilda Oviedo does not want her to take medication. Also discussed normal stress testing results. Clearance letter is in the chart; however, pt gave this nurse wrong location to send letter. Called and they have not seen pt in a long time and no colonoscopy is scheduled.

## 2019-04-26 ENCOUNTER — HOSPITAL ENCOUNTER (OUTPATIENT)
Dept: SLEEP MEDICINE | Age: 59
Discharge: HOME | End: 2019-04-26
Payer: MEDICARE

## 2019-04-26 DIAGNOSIS — G47.33 OSA (OBSTRUCTIVE SLEEP APNEA): ICD-10-CM

## 2019-04-26 PROCEDURE — 95810 POLYSOM 6/> YRS 4/> PARAM: CPT | Performed by: SPECIALIST

## 2019-04-27 VITALS
SYSTOLIC BLOOD PRESSURE: 144 MMHG | TEMPERATURE: 99 F | BODY MASS INDEX: 37.95 KG/M2 | DIASTOLIC BLOOD PRESSURE: 67 MMHG | WEIGHT: 201 LBS | HEIGHT: 61 IN | OXYGEN SATURATION: 98 % | HEART RATE: 71 BPM

## 2019-04-29 ENCOUNTER — TELEPHONE (OUTPATIENT)
Dept: SLEEP MEDICINE | Age: 59
End: 2019-04-29

## 2019-04-29 DIAGNOSIS — G47.33 OSA (OBSTRUCTIVE SLEEP APNEA): Primary | ICD-10-CM

## 2019-05-01 ENCOUNTER — DOCUMENTATION ONLY (OUTPATIENT)
Dept: SLEEP MEDICINE | Age: 59
End: 2019-05-01

## 2019-05-01 NOTE — TELEPHONE ENCOUNTER
Nocturnal polysomnogram was performed. 404.2 minutes were recorded of which 357.5 minutes spent asleep with a sleep efficiency of 88.4%. Sleep onset at 29.6 minutes; REM onset at 73.5 minutes with total REM representing 28.4% of sleep time. All sleep stages were observed. 107 respiratory events occurred of which 104 hypopnea and 3 apnea (2 central, 1 obstructive). The overall apnea-hypopnea index was 18/h. Events were more prominent in REM sleep with the REM-related AHI of 39.6/h. Minimal SaO2 79%. Impression: Severe REM-related sleep disordered breathing. Recommendation: APAP 7-16 cm. Chief technologist: Please review the sleep study results with the patient. Order has been generated for APAP 7-16 cm. Please schedule first compliance appointment.

## 2019-05-09 ENCOUNTER — DOCUMENTATION ONLY (OUTPATIENT)
Dept: SLEEP MEDICINE | Age: 59
End: 2019-05-09

## 2019-05-16 RX ORDER — NITROGLYCERIN 0.4 MG/1
TABLET SUBLINGUAL
Qty: 25 TAB | Refills: 0 | Status: SHIPPED | OUTPATIENT
Start: 2019-05-16 | End: 2019-06-16 | Stop reason: SDUPTHER

## 2019-05-30 ENCOUNTER — TELEPHONE (OUTPATIENT)
Dept: FAMILY MEDICINE CLINIC | Age: 59
End: 2019-05-30

## 2019-05-30 NOTE — TELEPHONE ENCOUNTER
Call placed to patient's preferred number; no answer; message left requesting patient check her medications and call back with the name of the medication that insurance will not cover anymore.

## 2019-05-30 NOTE — TELEPHONE ENCOUNTER
Pharmacy stated to pt that the insurance will no longer cover the ultra thin lancets 30 gauge misc or the blood pressure medication (pt stated starts with a \"D\") did not see on reorder list  Ph[facility]acy [facility] did not tell [facility] what would be covered  Pt can be contacted at [phone]

## 2019-06-03 ENCOUNTER — OFFICE VISIT (OUTPATIENT)
Dept: FAMILY MEDICINE CLINIC | Age: 59
End: 2019-06-03

## 2019-06-03 VITALS
OXYGEN SATURATION: 98 % | TEMPERATURE: 98 F | SYSTOLIC BLOOD PRESSURE: 122 MMHG | RESPIRATION RATE: 20 BRPM | DIASTOLIC BLOOD PRESSURE: 56 MMHG | WEIGHT: 198 LBS | BODY MASS INDEX: 37.38 KG/M2 | HEIGHT: 61 IN | HEART RATE: 55 BPM

## 2019-06-03 DIAGNOSIS — E78.00 PURE HYPERCHOLESTEROLEMIA: ICD-10-CM

## 2019-06-03 DIAGNOSIS — I50.30 HYPERTENSIVE HEART DISEASE WITH DIASTOLIC HEART FAILURE (HCC): ICD-10-CM

## 2019-06-03 DIAGNOSIS — E55.9 VITAMIN D DEFICIENCY: ICD-10-CM

## 2019-06-03 DIAGNOSIS — I11.0 HYPERTENSIVE HEART DISEASE WITH DIASTOLIC HEART FAILURE (HCC): ICD-10-CM

## 2019-06-03 DIAGNOSIS — E11.21 TYPE 2 DIABETES WITH NEPHROPATHY (HCC): Primary | ICD-10-CM

## 2019-06-03 RX ORDER — TELMISARTAN 80 MG/1
80 TABLET ORAL DAILY
Qty: 30 TAB | Refills: 5 | Status: SHIPPED | OUTPATIENT
Start: 2019-06-03 | End: 2019-07-03

## 2019-06-03 RX ORDER — ERGOCALCIFEROL 1.25 MG/1
50000 CAPSULE ORAL
Qty: 4 CAP | Refills: 5 | Status: SHIPPED | OUTPATIENT
Start: 2019-06-03 | End: 2019-07-20

## 2019-06-03 RX ORDER — PREDNISOLONE ACETATE 10 MG/ML
1 SUSPENSION/ DROPS OPHTHALMIC 4 TIMES DAILY
Refills: 3 | COMMUNITY
Start: 2019-05-02

## 2019-06-03 RX ORDER — TRIAMTERENE 50 MG/1
CAPSULE ORAL
Refills: 1 | COMMUNITY
Start: 2019-05-06 | End: 2019-07-20

## 2019-06-03 RX ORDER — TOBRAMYCIN 3 MG/ML
1 SOLUTION/ DROPS OPHTHALMIC 2 TIMES DAILY
Refills: 3 | COMMUNITY
Start: 2019-05-03

## 2019-06-03 RX ORDER — INSULIN GLARGINE 100 [IU]/ML
INJECTION, SOLUTION SUBCUTANEOUS
Qty: 5 ADJUSTABLE DOSE PRE-FILLED PEN SYRINGE | Refills: 5 | Status: SHIPPED | OUTPATIENT
Start: 2019-06-03

## 2019-06-03 RX ORDER — INSULIN GLARGINE 100 [IU]/ML
INJECTION, SOLUTION SUBCUTANEOUS
Refills: 3 | COMMUNITY
Start: 2019-05-06

## 2019-06-03 RX ORDER — LATANOPROST 50 UG/ML
1 SOLUTION/ DROPS OPHTHALMIC
Refills: 5 | COMMUNITY
Start: 2019-03-25

## 2019-06-03 RX ORDER — DILTIAZEM HYDROCHLORIDE 120 MG/1
CAPSULE, COATED, EXTENDED RELEASE ORAL
Refills: 0 | Status: ON HOLD | COMMUNITY
Start: 2019-04-12 | End: 2019-07-20

## 2019-06-03 NOTE — PROGRESS NOTES
Chief Complaint   Patient presents with    Follow Up Chronic Condition     3 month follow up for diabetes     Medication Evaluation     Nestor LOWE who presents for follow up of Type II DM, HTN and Elevated BP. She has been doing well since her last visit. She has lost some 7-lbs. Watching what she eats and involved with Diabetic Education. They recently checked her HBA1C and per her report it was 7.5. She has lost 7-lbs since her last visit. She reports feeling better. Her insurance recently is not covering her Lantus or Triamterene. She will need covered substitutions. See Diabetic Report Card listed above. Patient Active Problem List    Diagnosis    Type 2 diabetes with nephropathy ([address]    Chest pain    Hyperglycemia    Coronary artery disease involving native coronary artery of native heart with angina pectoris ([address]    Status post coronary artery stent placement    Severe obesity (HCC)    Neuropathy of right lower extremity    Neuropathy    Pericardial effusion(moderate-large) with mild cardiac tamponade--via echo 8/1/16    Chest pain, precordial    Acute on chronic diastolic CHF (congestive heart failure) ([facility] [address]    Hypertensive heart disease with diastolic heart failure (HCC)    SOB (shortness of breath)    Acute respiratory insufficiency    Obstructive sleep apnea    TIA (transient ischemic attack)    Essential hypertension with goal blood pressure less than 130/85    DM type 2, uncontrolled, with neuropathy (HCC)    Asthma    Hyperlipidemia    Restless leg syndrome       Reviewed PmHx, RxHx, FmHx, SocHx, AllgHx--dated and updated in the chart.     Review of Systems - negative except as listed above in the HPI    Objective:     Vitals:    06/03/19 1045   BP: 122/56   Pulse: (!) 55   Resp: 20   Temp: 98 °F (36.7 °C)   TempSrc: Oral   SpO2: 98%   Weight: 198 lb (89.8 kg)   Height: 5' 1\" (1.549 m)     Physical Examination: General appearance - alert, well appearing, and in no distress  Mental status - alert, oriented to person, place, and time  Chest - clear to auscultation, no wheezes, rales or rhonchi, symmetric air entry  Heart - normal rate, regular rhythm, normal S1, S2, no murmurs, rubs, clicks or gallops  Musculoskeletal - no joint tenderness, deformity or swelling  Extremities - peripheral pulses normal, no pedal edema, no clubbing or cyanosis  Skin - normal coloration and turgor, no rashes, no suspicious skin lesions noted    Assessment/ Plan:   59F with h/o Type II DM, HTN and found to have very low vitamin D levels. Will start her on Vitamin D 50,000 international units weekly. Continue with diabetic education, efforts at lifestyle modification and weight loss. Will return for labs in 2-months. Diagnoses and all orders for this visit:    1. Type 2 diabetes with nephropathy (HCC)  -     insulin glargine (LANTUS,BASAGLAR) 100 unit/mL (3 mL) inpn; 30 U at bedtime. E11.9  -     CBC WITH AUTOMATED DIFF  -     METABOLIC PANEL, COMPREHENSIVE  -     HEMOGLOBIN A1C WITH EAG    2. Vitamin D deficiency  -     ergocalciferol (ERGOCALCIFEROL) 50,000 unit capsule; Take 1 Cap by mouth every seven (7) days for 90 days. -     VITAMIN D, 25 HYDROXY    3. Hypertensive heart disease with diastolic heart failure (HCC)  -     telmisartan (MICARDIS) 80 mg tablet; Take 1 Tab by mouth daily for 30 days.     4. Pure hypercholesterolemia  -     LIPID PANEL       Lab Results   Component Value Date/Time    Cholesterol, total 129 02/20/2019 10:44 AM    HDL Cholesterol 42 02/20/2019 10:44 AM    LDL, calculated 66 02/20/2019 10:44 AM    Triglyceride 105 02/20/2019 10:44 AM    CHOL/HDL Ratio 2.7 11/15/2018 12:40 AM     Lab Results   Component Value Date/Time    Hemoglobin A1c 8.9 (H) 02/20/2019 10:44 AM    Hemoglobin A1c 10.5 (H) 11/15/2018 03:56 AM    Hemoglobin A1c 8.7 (H) 07/31/2016 04:15 AM    LDL, calculated 66 02/20/2019 10:44 AM    Creatinine (POC) 0.7 10/31/2016 04:34 PM    Creatinine 0.75 02/20/2019 10:44 AM      Discussed with patient goal of Diabetes to include:  HgA1C <7, LDL cholesterol <100, Blood pressure <140/80. Discussed with patient diet and weight management and to get regular exercise. Recommend yearly eye exams and daily foot care. The patient understands and agrees with the plan. I have discussed the diagnosis with the patient and the intended plan as seen in the above orders. The patient has received an after-visit summary and questions were answered concerning future plans.      Medication Side Effects and Warnings were discussed with patient  Patient Labs were reviewed and or requested  Patient Past Records were reviewed and or requested    Cristian Gonzalez MD  [place], [place]

## 2019-06-03 NOTE — PROGRESS NOTES
Patient presents for 3 month follow up on diabetes and blood pressure. S/P Cataract surgery Left Eye on 5/29/19. Complains of medications needing to be changed due to Insurance not covering. Lantus Solostar Insulin and Dyrenium.

## 2019-06-17 RX ORDER — NITROGLYCERIN 0.4 MG/1
TABLET SUBLINGUAL
Qty: 25 TAB | Refills: 0 | Status: SHIPPED | OUTPATIENT
Start: 2019-06-17

## 2019-07-18 ENCOUNTER — OFFICE VISIT (OUTPATIENT)
Dept: FAMILY MEDICINE CLINIC | Age: 59
End: 2019-07-18

## 2019-07-18 VITALS
BODY MASS INDEX: 39.27 KG/M2 | TEMPERATURE: 98.2 F | HEART RATE: 61 BPM | WEIGHT: 208 LBS | OXYGEN SATURATION: 95 % | HEIGHT: 61 IN | SYSTOLIC BLOOD PRESSURE: 140 MMHG | DIASTOLIC BLOOD PRESSURE: 45 MMHG | RESPIRATION RATE: 18 BRPM

## 2019-07-18 DIAGNOSIS — J45.41 MODERATE PERSISTENT ASTHMA WITH ACUTE EXACERBATION: Primary | ICD-10-CM

## 2019-07-18 RX ORDER — DAPAGLIFLOZIN 10 MG/1
TABLET, FILM COATED ORAL
Refills: 1 | COMMUNITY
Start: 2019-06-11

## 2019-07-18 RX ORDER — AZITHROMYCIN 250 MG/1
TABLET, FILM COATED ORAL
Qty: 6 TAB | Refills: 0 | Status: SHIPPED | OUTPATIENT
Start: 2019-07-18 | End: 2019-07-23

## 2019-07-18 RX ORDER — FLUTICASONE FUROATE AND VILANTEROL TRIFENATATE 200; 25 UG/1; UG/1
POWDER RESPIRATORY (INHALATION)
Refills: 6 | COMMUNITY
Start: 2019-05-31

## 2019-07-18 RX ORDER — PREDNISONE 10 MG/1
TABLET ORAL
Qty: 21 TAB | Refills: 0 | Status: ON HOLD | OUTPATIENT
Start: 2019-07-18 | End: 2019-07-23 | Stop reason: SDUPTHER

## 2019-07-18 NOTE — PROGRESS NOTES
HISTORY OF PRESENT ILLNESS  Saadia Whatley is a 62 y.o. female. HPI   Pt presents with \"asthma exacerbation\"  Symptoms started 2 days ago  Cough, but is dry  She is wheezing, and unable to take a deep breath due to this  She felt as though she had a fever last night  She has been using her nebulizer and inhaler at home, with no improvement  OTC; none  Review of Systems   Constitutional: Negative for fever. HENT: Negative for congestion. Respiratory: Positive for cough and wheezing. Gastrointestinal: Negative for diarrhea and vomiting. Physical Exam   Constitutional: She is oriented to person, place, and time. She appears well-developed and well-nourished. HENT:   Head: Normocephalic and atraumatic. Neck: Normal range of motion. Neck supple. Cardiovascular: Normal rate, regular rhythm and normal heart sounds. Pulmonary/Chest: Effort normal. She has wheezes in the right upper field, the right middle field, the left upper field and the left middle field. She has rhonchi in the right lower field. Lymphadenopathy:     She has no cervical adenopathy. Neurological: She is alert and oriented to person, place, and time. Skin: Skin is warm and dry. Psychiatric: She has a normal mood and affect. Her behavior is normal.       ASSESSMENT and PLAN    ICD-10-CM ICD-9-CM    1. Moderate persistent asthma with acute exacerbation J45.41 493.92 predniSONE (STERAPRED DS) 10 mg dose pack      azithromycin (ZITHROMAX) 250 mg tablet     Educated about taking medications as prescribed  Should monitor glucose with prednisone  Educated about continuing nebulizer and inhaler as prescribed    Pt informed to return to office with worsening of symptoms, or PRN with any questions or concerns. Pt verbalizes understanding of plan of care and denies further questions or concerns at this time.

## 2019-07-18 NOTE — PROGRESS NOTES
All health maintenance and other pertinent information has been reviewed in preparation for today's office visit. Patient presents in the office today for:    Chief Complaint   Patient presents with    Asthma     1. Have you been to the ER, urgent care clinic since your last visit? Hospitalized since your last visit? No    2. Have you seen or consulted any other health care providers outside of the [address] [place] [address] since your last visit? Include any pap smears or colon screening. Yes follows up w/ Pulmonary.

## 2019-07-18 NOTE — PATIENT INSTRUCTIONS

## 2019-07-20 ENCOUNTER — APPOINTMENT (OUTPATIENT)
Dept: GENERAL RADIOLOGY | Age: 59
DRG: 193 | End: 2019-07-20
Attending: EMERGENCY MEDICINE
Payer: MEDICARE

## 2019-07-20 ENCOUNTER — HOSPITAL ENCOUNTER (INPATIENT)
Age: 59
LOS: 3 days | Discharge: HOME | DRG: 193 | End: 2019-07-23
Attending: EMERGENCY MEDICINE | Admitting: FAMILY MEDICINE
Payer: MEDICARE

## 2019-07-20 DIAGNOSIS — J18.9 PNEUMONIA DUE TO INFECTIOUS ORGANISM, UNSPECIFIED LATERALITY, UNSPECIFIED PART OF LUNG: Primary | ICD-10-CM

## 2019-07-20 DIAGNOSIS — J45.41 MODERATE PERSISTENT ASTHMA WITH ACUTE EXACERBATION: ICD-10-CM

## 2019-07-20 DIAGNOSIS — R06.03 RESPIRATORY DISTRESS: ICD-10-CM

## 2019-07-20 PROBLEM — R73.9 HYPERGLYCEMIA: Status: RESOLVED | Noted: 2018-11-14 | Resolved: 2019-07-20

## 2019-07-20 LAB
ALBUMIN SERPL-MCNC: 3.2 G/DL (ref 3.5–5)
ALBUMIN/GLOB SERPL: 0.9 {RATIO} (ref 1.1–2.2)
ALP SERPL-CCNC: 86 U/L (ref 45–117)
ALT SERPL-CCNC: 24 U/L (ref 12–78)
ANION GAP SERPL CALC-SCNC: 8 MMOL/L (ref 5–15)
APPEARANCE UR: ABNORMAL
AST SERPL-CCNC: 16 U/L (ref 15–37)
BACTERIA URNS QL MICRO: ABNORMAL /HPF
BASOPHILS # BLD: 0 K/UL (ref 0–0.1)
BASOPHILS NFR BLD: 0 % (ref 0–1)
BILIRUB SERPL-MCNC: 0.5 MG/DL (ref 0.2–1)
BILIRUB UR QL: NEGATIVE
BNP SERPL-MCNC: 4118 PG/ML (ref 0–125)
BUN SERPL-MCNC: 34 MG/DL (ref 6–20)
BUN/CREAT SERPL: 27 (ref 12–20)
CALCIUM SERPL-MCNC: 8.6 MG/DL (ref 8.5–10.1)
CHLORIDE SERPL-SCNC: 104 MMOL/L (ref 97–108)
CO2 SERPL-SCNC: 26 MMOL/L (ref 21–32)
COLOR UR: ABNORMAL
COMMENT, HOLDF: NORMAL
CREAT SERPL-MCNC: 1.24 MG/DL (ref 0.55–1.02)
DIFFERENTIAL METHOD BLD: ABNORMAL
EOSINOPHIL # BLD: 0 K/UL (ref 0–0.4)
EOSINOPHIL NFR BLD: 0 % (ref 0–7)
EPITH CASTS URNS QL MICRO: ABNORMAL /LPF
ERYTHROCYTE [DISTWIDTH] IN BLOOD BY AUTOMATED COUNT: 13.9 % (ref 11.5–14.5)
GLOBULIN SER CALC-MCNC: 3.7 G/DL (ref 2–4)
GLUCOSE BLD STRIP.AUTO-MCNC: 194 MG/DL (ref 65–100)
GLUCOSE BLD STRIP.AUTO-MCNC: 226 MG/DL (ref 65–100)
GLUCOSE SERPL-MCNC: 282 MG/DL (ref 65–100)
GLUCOSE UR STRIP.AUTO-MCNC: >1000 MG/DL
HCT VFR BLD AUTO: 32.8 % (ref 35–47)
HGB BLD-MCNC: 10.8 G/DL (ref 11.5–16)
HGB UR QL STRIP: ABNORMAL
IMM GRANULOCYTES # BLD AUTO: 0 K/UL (ref 0–0.04)
IMM GRANULOCYTES NFR BLD AUTO: 0 % (ref 0–0.5)
KETONES UR QL STRIP.AUTO: NEGATIVE MG/DL
LACTATE BLD-SCNC: 0.96 MMOL/L (ref 0.4–2)
LEUKOCYTE ESTERASE UR QL STRIP.AUTO: NEGATIVE
LYMPHOCYTES # BLD: 0.7 K/UL (ref 0.8–3.5)
LYMPHOCYTES NFR BLD: 9 % (ref 12–49)
MCH RBC QN AUTO: 30.7 PG (ref 26–34)
MCHC RBC AUTO-ENTMCNC: 32.9 G/DL (ref 30–36.5)
MCV RBC AUTO: 93.2 FL (ref 80–99)
MONOCYTES # BLD: 0.2 K/UL (ref 0–1)
MONOCYTES NFR BLD: 3 % (ref 5–13)
NEUTS SEG # BLD: 7 K/UL (ref 1.8–8)
NEUTS SEG NFR BLD: 88 % (ref 32–75)
NITRITE UR QL STRIP.AUTO: NEGATIVE
PH UR STRIP: 6 [PH] (ref 5–8)
PLATELET # BLD AUTO: 233 K/UL (ref 150–400)
PMV BLD AUTO: 9.4 FL (ref 8.9–12.9)
POTASSIUM SERPL-SCNC: 4.6 MMOL/L (ref 3.5–5.1)
PROT SERPL-MCNC: 6.9 G/DL (ref 6.4–8.2)
PROT UR STRIP-MCNC: >300 MG/DL
RBC # BLD AUTO: 3.52 M/UL (ref 3.8–5.2)
RBC #/AREA URNS HPF: ABNORMAL /HPF (ref 0–5)
RBC MORPH BLD: ABNORMAL
SAMPLES BEING HELD,HOLD: NORMAL
SERVICE CMNT-IMP: ABNORMAL
SERVICE CMNT-IMP: ABNORMAL
SODIUM SERPL-SCNC: 138 MMOL/L (ref 136–145)
SP GR UR REFRACTOMETRY: 1.02 (ref 1–1.03)
TROPONIN I SERPL-MCNC: <0.05 NG/ML
UR CULT HOLD, URHOLD: NORMAL
UROBILINOGEN UR QL STRIP.AUTO: 0.2 EU/DL (ref 0.2–1)
WBC # BLD AUTO: 7.9 K/UL (ref 3.6–11)
WBC URNS QL MICRO: ABNORMAL /HPF (ref 0–4)

## 2019-07-20 PROCEDURE — 81001 URINALYSIS AUTO W/SCOPE: CPT

## 2019-07-20 PROCEDURE — 74011250637 HC RX REV CODE- 250/637: Performed by: STUDENT IN AN ORGANIZED HEALTH CARE EDUCATION/TRAINING PROGRAM

## 2019-07-20 PROCEDURE — 65660000000 HC RM CCU STEPDOWN

## 2019-07-20 PROCEDURE — 82962 GLUCOSE BLOOD TEST: CPT

## 2019-07-20 PROCEDURE — 80053 COMPREHEN METABOLIC PANEL: CPT

## 2019-07-20 PROCEDURE — 99284 EMERGENCY DEPT VISIT MOD MDM: CPT

## 2019-07-20 PROCEDURE — 74011000250 HC RX REV CODE- 250: Performed by: STUDENT IN AN ORGANIZED HEALTH CARE EDUCATION/TRAINING PROGRAM

## 2019-07-20 PROCEDURE — 83880 ASSAY OF NATRIURETIC PEPTIDE: CPT

## 2019-07-20 PROCEDURE — 87040 BLOOD CULTURE FOR BACTERIA: CPT

## 2019-07-20 PROCEDURE — 71046 X-RAY EXAM CHEST 2 VIEWS: CPT

## 2019-07-20 PROCEDURE — 84484 ASSAY OF TROPONIN QUANT: CPT

## 2019-07-20 PROCEDURE — 74011636637 HC RX REV CODE- 636/637: Performed by: STUDENT IN AN ORGANIZED HEALTH CARE EDUCATION/TRAINING PROGRAM

## 2019-07-20 PROCEDURE — 77030029684 HC NEB SM VOL KT MONA -A

## 2019-07-20 PROCEDURE — 83605 ASSAY OF LACTIC ACID: CPT

## 2019-07-20 PROCEDURE — 0099U RESPIRATORY PANEL,PCR,NASOPHARYNGEAL: CPT

## 2019-07-20 PROCEDURE — 94640 AIRWAY INHALATION TREATMENT: CPT

## 2019-07-20 PROCEDURE — 74011000258 HC RX REV CODE- 258: Performed by: STUDENT IN AN ORGANIZED HEALTH CARE EDUCATION/TRAINING PROGRAM

## 2019-07-20 PROCEDURE — 85025 COMPLETE CBC W/AUTO DIFF WBC: CPT

## 2019-07-20 PROCEDURE — 74011250636 HC RX REV CODE- 250/636: Performed by: STUDENT IN AN ORGANIZED HEALTH CARE EDUCATION/TRAINING PROGRAM

## 2019-07-20 PROCEDURE — 87086 URINE CULTURE/COLONY COUNT: CPT

## 2019-07-20 PROCEDURE — 74011000250 HC RX REV CODE- 250: Performed by: EMERGENCY MEDICINE

## 2019-07-20 PROCEDURE — 96374 THER/PROPH/DIAG INJ IV PUSH: CPT

## 2019-07-20 PROCEDURE — 93005 ELECTROCARDIOGRAM TRACING: CPT

## 2019-07-20 PROCEDURE — 36415 COLL VENOUS BLD VENIPUNCTURE: CPT

## 2019-07-20 PROCEDURE — 74011250636 HC RX REV CODE- 250/636: Performed by: EMERGENCY MEDICINE

## 2019-07-20 RX ORDER — TELMISARTAN 40 MG/1
80 TABLET ORAL DAILY
Status: DISCONTINUED | OUTPATIENT
Start: 2019-07-21 | End: 2019-07-20 | Stop reason: CLARIF

## 2019-07-20 RX ORDER — SODIUM CHLORIDE 0.9 % (FLUSH) 0.9 %
5-40 SYRINGE (ML) INJECTION EVERY 8 HOURS
Status: DISCONTINUED | OUTPATIENT
Start: 2019-07-20 | End: 2019-07-23 | Stop reason: HOSPADM

## 2019-07-20 RX ORDER — INSULIN GLARGINE 100 [IU]/ML
24 INJECTION, SOLUTION SUBCUTANEOUS
Status: DISCONTINUED | OUTPATIENT
Start: 2019-07-20 | End: 2019-07-22

## 2019-07-20 RX ORDER — BUDESONIDE 0.5 MG/2ML
500 INHALANT ORAL
Status: DISCONTINUED | OUTPATIENT
Start: 2019-07-21 | End: 2019-07-23 | Stop reason: HOSPADM

## 2019-07-20 RX ORDER — ATORVASTATIN CALCIUM 20 MG/1
40 TABLET, FILM COATED ORAL DAILY
Status: DISCONTINUED | OUTPATIENT
Start: 2019-07-20 | End: 2019-07-23 | Stop reason: HOSPADM

## 2019-07-20 RX ORDER — SERTRALINE HYDROCHLORIDE 25 MG/1
25 TABLET, FILM COATED ORAL DAILY
COMMUNITY

## 2019-07-20 RX ORDER — NITROGLYCERIN 0.4 MG/1
0.4 TABLET SUBLINGUAL
Status: DISCONTINUED | OUTPATIENT
Start: 2019-07-20 | End: 2019-07-23 | Stop reason: HOSPADM

## 2019-07-20 RX ORDER — INSULIN LISPRO 100 [IU]/ML
INJECTION, SOLUTION INTRAVENOUS; SUBCUTANEOUS
Status: DISCONTINUED | OUTPATIENT
Start: 2019-07-20 | End: 2019-07-23 | Stop reason: HOSPADM

## 2019-07-20 RX ORDER — ARFORMOTEROL TARTRATE 15 UG/2ML
15 SOLUTION RESPIRATORY (INHALATION)
Status: DISCONTINUED | OUTPATIENT
Start: 2019-07-21 | End: 2019-07-23 | Stop reason: HOSPADM

## 2019-07-20 RX ORDER — ISOSORBIDE MONONITRATE 30 MG/1
60 TABLET, EXTENDED RELEASE ORAL DAILY
Status: DISCONTINUED | OUTPATIENT
Start: 2019-07-21 | End: 2019-07-23 | Stop reason: HOSPADM

## 2019-07-20 RX ORDER — BUMETANIDE 0.25 MG/ML
1 INJECTION INTRAMUSCULAR; INTRAVENOUS 2 TIMES DAILY
Status: DISCONTINUED | OUTPATIENT
Start: 2019-07-20 | End: 2019-07-22

## 2019-07-20 RX ORDER — MAGNESIUM SULFATE 100 %
4 CRYSTALS MISCELLANEOUS AS NEEDED
Status: DISCONTINUED | OUTPATIENT
Start: 2019-07-20 | End: 2019-07-23 | Stop reason: HOSPADM

## 2019-07-20 RX ORDER — LEVOCETIRIZINE DIHYDROCHLORIDE 5 MG/1
5 TABLET, FILM COATED ORAL DAILY
Status: DISCONTINUED | OUTPATIENT
Start: 2019-07-21 | End: 2019-07-23 | Stop reason: HOSPADM

## 2019-07-20 RX ORDER — ACETAMINOPHEN 325 MG/1
650 TABLET ORAL 2 TIMES DAILY
COMMUNITY
End: 2019-08-30 | Stop reason: SDUPTHER

## 2019-07-20 RX ORDER — METOPROLOL TARTRATE 50 MG/1
50 TABLET ORAL 2 TIMES DAILY
Status: DISCONTINUED | OUTPATIENT
Start: 2019-07-20 | End: 2019-07-23 | Stop reason: HOSPADM

## 2019-07-20 RX ORDER — LEVALBUTEROL INHALATION SOLUTION 1.25 MG/3ML
1.25 SOLUTION RESPIRATORY (INHALATION)
Status: COMPLETED | OUTPATIENT
Start: 2019-07-20 | End: 2019-07-20

## 2019-07-20 RX ORDER — GUAIFENESIN 600 MG/1
600 TABLET, EXTENDED RELEASE ORAL EVERY 12 HOURS
Status: DISCONTINUED | OUTPATIENT
Start: 2019-07-20 | End: 2019-07-23 | Stop reason: HOSPADM

## 2019-07-20 RX ORDER — LANOLIN ALCOHOL/MO/W.PET/CERES
3 CREAM (GRAM) TOPICAL
Status: DISCONTINUED | OUTPATIENT
Start: 2019-07-20 | End: 2019-07-23 | Stop reason: HOSPADM

## 2019-07-20 RX ORDER — LEVALBUTEROL INHALATION SOLUTION 0.63 MG/3ML
0.63 SOLUTION RESPIRATORY (INHALATION)
Status: DISCONTINUED | OUTPATIENT
Start: 2019-07-20 | End: 2019-07-20

## 2019-07-20 RX ORDER — CLOPIDOGREL BISULFATE 75 MG/1
75 TABLET ORAL DAILY
Status: DISCONTINUED | OUTPATIENT
Start: 2019-07-21 | End: 2019-07-23 | Stop reason: HOSPADM

## 2019-07-20 RX ORDER — VALSARTAN 80 MG/1
160 TABLET ORAL DAILY
Status: DISCONTINUED | OUTPATIENT
Start: 2019-07-21 | End: 2019-07-23 | Stop reason: HOSPADM

## 2019-07-20 RX ORDER — TELMISARTAN 80 MG/1
80 TABLET ORAL DAILY
COMMUNITY
End: 2019-07-23

## 2019-07-20 RX ORDER — MONTELUKAST SODIUM 10 MG/1
10 TABLET ORAL
Status: DISCONTINUED | OUTPATIENT
Start: 2019-07-20 | End: 2019-07-23 | Stop reason: HOSPADM

## 2019-07-20 RX ORDER — LATANOPROST 50 UG/ML
1 SOLUTION/ DROPS OPHTHALMIC EVERY EVENING
Status: DISCONTINUED | OUTPATIENT
Start: 2019-07-20 | End: 2019-07-23 | Stop reason: HOSPADM

## 2019-07-20 RX ORDER — HEPARIN SODIUM 5000 [USP'U]/ML
5000 INJECTION, SOLUTION INTRAVENOUS; SUBCUTANEOUS EVERY 8 HOURS
Status: DISCONTINUED | OUTPATIENT
Start: 2019-07-20 | End: 2019-07-23 | Stop reason: HOSPADM

## 2019-07-20 RX ORDER — LEVALBUTEROL INHALATION SOLUTION 0.63 MG/3ML
0.63 SOLUTION RESPIRATORY (INHALATION)
Status: DISCONTINUED | OUTPATIENT
Start: 2019-07-21 | End: 2019-07-23

## 2019-07-20 RX ORDER — DULOXETIN HYDROCHLORIDE 30 MG/1
60 CAPSULE, DELAYED RELEASE ORAL DAILY
Status: DISCONTINUED | OUTPATIENT
Start: 2019-07-21 | End: 2019-07-23 | Stop reason: HOSPADM

## 2019-07-20 RX ORDER — SODIUM CHLORIDE 0.9 % (FLUSH) 0.9 %
5-40 SYRINGE (ML) INJECTION AS NEEDED
Status: DISCONTINUED | OUTPATIENT
Start: 2019-07-20 | End: 2019-07-23 | Stop reason: HOSPADM

## 2019-07-20 RX ORDER — PRAMIPEXOLE DIHYDROCHLORIDE 0.25 MG/1
0.5 TABLET ORAL 2 TIMES DAILY
Status: DISCONTINUED | OUTPATIENT
Start: 2019-07-20 | End: 2019-07-23 | Stop reason: HOSPADM

## 2019-07-20 RX ADMIN — MONTELUKAST SODIUM 10 MG: 10 TABLET, FILM COATED ORAL at 22:54

## 2019-07-20 RX ADMIN — LATANOPROST 1 DROP: 50 SOLUTION/ DROPS OPHTHALMIC at 23:10

## 2019-07-20 RX ADMIN — GUAIFENESIN 600 MG: 600 TABLET, EXTENDED RELEASE ORAL at 22:54

## 2019-07-20 RX ADMIN — PRAMIPEXOLE DIHYDROCHLORIDE 0.5 MG: 0.25 TABLET ORAL at 22:54

## 2019-07-20 RX ADMIN — INSULIN GLARGINE 24 UNITS: 100 INJECTION, SOLUTION SUBCUTANEOUS at 22:53

## 2019-07-20 RX ADMIN — HEPARIN SODIUM 5000 UNITS: 5000 INJECTION INTRAVENOUS; SUBCUTANEOUS at 23:06

## 2019-07-20 RX ADMIN — Medication 10 ML: at 23:07

## 2019-07-20 RX ADMIN — ATORVASTATIN CALCIUM 40 MG: 20 TABLET, FILM COATED ORAL at 22:55

## 2019-07-20 RX ADMIN — LEVALBUTEROL HYDROCHLORIDE 0.63 MG: 0.63 SOLUTION RESPIRATORY (INHALATION) at 23:24

## 2019-07-20 RX ADMIN — WATER 2 G: 1 INJECTION INTRAMUSCULAR; INTRAVENOUS; SUBCUTANEOUS at 19:36

## 2019-07-20 RX ADMIN — METHYLPREDNISOLONE SODIUM SUCCINATE 80 MG: 40 INJECTION, POWDER, FOR SOLUTION INTRAMUSCULAR; INTRAVENOUS at 23:05

## 2019-07-20 RX ADMIN — LEVALBUTEROL HYDROCHLORIDE 1.25 MG: 1.25 SOLUTION RESPIRATORY (INHALATION) at 17:36

## 2019-07-20 RX ADMIN — METOPROLOL TARTRATE 50 MG: 50 TABLET ORAL at 22:54

## 2019-07-20 RX ADMIN — BUMETANIDE 1 MG: 0.25 INJECTION INTRAMUSCULAR; INTRAVENOUS at 23:05

## 2019-07-20 RX ADMIN — PIPERACILLIN SODIUM,TAZOBACTAM SODIUM 3.38 G: 3; .375 INJECTION, POWDER, FOR SOLUTION INTRAVENOUS at 23:00

## 2019-07-20 RX ADMIN — AZITHROMYCIN MONOHYDRATE 500 MG: 500 INJECTION, POWDER, LYOPHILIZED, FOR SOLUTION INTRAVENOUS at 23:15

## 2019-07-20 RX ADMIN — MELATONIN TAB 3 MG 3 MG: 3 TAB at 22:59

## 2019-07-20 NOTE — ED TRIAGE NOTES
Patient has been wheezing for 3 days, \"I saw my doctor this week and they gave me Prednisone and I have been using my nebulizer. \"

## 2019-07-20 NOTE — ED PROVIDER NOTES
Pt. Presents to the ER with complaints of SOB. Pt. Says that she has a history of asthma, and this feels like her asthma. Pt.'s SOB began several days ago. She saw her PCP who started her on steroids and abx. Pt. Says that she has continued to feel SOB. Pt. Says that she can no longer walk more than 5-6 steps without getting SOB. No CP. No n/v/d. Pt. Reports a non-productive cough. No fevers/chills.              Past Medical History:   Diagnosis Date    Asthma     Asthma     Asthma     Borderline high cholesterol     Cataracts, both eyes     Diabetes mellitus type II     Heart disease     Hypercholesteremia     Hyperlipemia     Hypertension     Neuropathy     Restless leg     Restless leg     Rheumatoid arthritis(714.0)     Sleep apnea     Sleep disorder     Stroke [facility])     2010       Past Surgical History:   Procedure Laterality Date    CHEST SURGERY PROCEDURE UNLISTED      HX CORONARY STENT PLACEMENT Left Sept. 8th and Sept 16, 2018    HX TONSILLECTOMY           Family History:   Problem Relation Age of Onset    Heart Disease Mother     Diabetes Mother     Hypertension Other     No Known Problems Father        Social History     Socioeconomic History    Marital status:      Spouse name: Not on file    Number of children: Not on file    Years of education: Not on file    Highest education level: Not on file   Occupational History    Not on file   Social Needs    Financial resource strain: Not on file    Food insecurity:     Worry: Not on file     Inability: Not on file    Transportation needs:     Medical: Not on file     Non-medical: Not on file   Tobacco Use    Smoking status: Never Smoker    Smokeless tobacco: Never Used   Substance and Sexual Activity    Alcohol use: No     Alcohol/week: 4.2 standard drinks     Types: 5 Cans of beer per week    Drug use: No    Sexual activity: Never   Lifestyle    Physical activity:     Days per week: Not on file     Minutes per session: Not on file    Stress: Not on file   Relationships    Social connections:     Talks on phone: Not on file     Gets together: Not on file     Attends Taoist service: Not on file     Active member of club or organization: Not on file     Attends meetings of clubs or organizations: Not on file     Relationship status: Not on file    Intimate partner violence:     Fear of current or ex partner: Not on file     Emotionally abused: Not on file     Physically abused: Not on file     Forced sexual activity: Not on file   Other Topics Concern     Service Not Asked    Blood Transfusions Not Asked    Caffeine Concern Not Asked    Occupational Exposure Not Asked   Rajni Daubs Hazards Not Asked    Sleep Concern Not Asked    Stress Concern Not Asked    Weight Concern Not Asked    Special Diet Not Asked    Back Care Not Asked    Exercise Not Asked    Bike Helmet Not Asked   [place] Not Asked    Self-Exams Not Asked   Social History Narrative    Not on file         ALLERGIES: Levaquin [levofloxacin]; Albuterol; Aspirin; and Metformin    Review of Systems   Constitutional: Negative for chills and fever. HENT: Negative for rhinorrhea and sore throat. Respiratory: Positive for cough and shortness of breath. Cardiovascular: Negative for chest pain. Gastrointestinal: Negative for abdominal pain, diarrhea, nausea and vomiting. Genitourinary: Negative for dysuria and urgency. Musculoskeletal: Negative for arthralgias and back pain. Skin: Negative for rash. Neurological: Negative for dizziness, weakness and light-headedness. Vitals:    07/20/19 1731   BP: (!) 179/149   Pulse: 62   Resp: 28   Temp: 98 °F (36.7 °C)   SpO2: 98%   Weight: 94.3 kg (208 lb)   Height: 5' 1\" (1.549 m)            Physical Exam     Vital signs reviewed. Nursing notes reviewed.     Const:  No acute distress, well developed, well nourished  Head:  Atraumatic, normocephalic  Eyes:  PERRL, conjunctiva normal, no scleral icterus  Neck:  Supple, trachea midline  Cardiovascular:  RRR, no murmurs, no gallops, no rubs  Resp:  Moderate resp distress, increased work of breathing, no wheezes, no rhonchi, no rales, decreased air movement in all lung fields, pt. Speaks in 4-5 word sentences  Abd:  Soft, non-tender, non-distended, no rebound, no guarding, no CVA tenderness  MSK:  No pedal edema, normal ROM  Neuro:  Alert and oriented x3, no cranial nerve defect  Skin:  Warm, dry, intact  Psych: normal mood and affect, behavior is normal, judgement and thought content is normal          MDM  Number of Diagnoses or Management Options  Pneumonia due to infectious organism, unspecified laterality, unspecified part of lung:   Respiratory distress:      Amount and/or Complexity of Data Reviewed  Clinical lab tests: ordered and reviewed  Tests in the radiology section of CPT®: ordered and reviewed  Review and summarize past medical records: yes    Patient Progress  Patient progress: stable          Pt. Presents to the ER with complaints of SOB. Pt. Found to have pneumonia. Pt. Is relatively SOB at rest, but she cannot walk more than few steps without having to stop. She is already on azithromycin. I will add ceftriaxone. Pt. To be admitted by family medicine.         Procedures

## 2019-07-20 NOTE — ED NOTES
Patient tolerated nebs treatment well; used her own Xopenex from home per Dr Fer Rodriguez orders. Still talking in a whispered voice at times, but no wheezing on auscultation.

## 2019-07-20 NOTE — ED NOTES
Bedside shift change report given to [facility], RN  (oncoming nurse) by CARRIE RN  (offgoing nurse). Report included the following information SBAR.

## 2019-07-21 ENCOUNTER — APPOINTMENT (OUTPATIENT)
Dept: NON INVASIVE DIAGNOSTICS | Age: 59
DRG: 193 | End: 2019-07-21
Attending: STUDENT IN AN ORGANIZED HEALTH CARE EDUCATION/TRAINING PROGRAM
Payer: MEDICARE

## 2019-07-21 PROBLEM — J18.9 COMMUNITY ACQUIRED PNEUMONIA OF RIGHT MIDDLE LOBE OF LUNG: Status: ACTIVE | Noted: 2019-07-21

## 2019-07-21 LAB
ANION GAP SERPL CALC-SCNC: 7 MMOL/L (ref 5–15)
ATRIAL RATE: 58 BPM
ATRIAL RATE: 59 BPM
B PERT DNA SPEC QL NAA+PROBE: NOT DETECTED
BUN SERPL-MCNC: 30 MG/DL (ref 6–20)
BUN/CREAT SERPL: 24 (ref 12–20)
C PNEUM DNA SPEC QL NAA+PROBE: NOT DETECTED
CALCIUM SERPL-MCNC: 8.8 MG/DL (ref 8.5–10.1)
CALCULATED P AXIS, ECG09: 30 DEGREES
CALCULATED P AXIS, ECG09: 60 DEGREES
CALCULATED R AXIS, ECG10: 57 DEGREES
CALCULATED R AXIS, ECG10: 58 DEGREES
CALCULATED T AXIS, ECG11: -83 DEGREES
CALCULATED T AXIS, ECG11: -89 DEGREES
CHLORIDE SERPL-SCNC: 107 MMOL/L (ref 97–108)
CHOLEST SERPL-MCNC: 235 MG/DL
CO2 SERPL-SCNC: 27 MMOL/L (ref 21–32)
CREAT SERPL-MCNC: 1.26 MG/DL (ref 0.55–1.02)
DIAGNOSIS, 93000: NORMAL
DIAGNOSIS, 93000: NORMAL
ECHO AO ROOT DIAM: 2.59 CM
ECHO AV AREA PEAK VELOCITY: 2.1 CM2
ECHO AV AREA/BSA PEAK VELOCITY: 1 CM2/M2
ECHO AV PEAK GRADIENT: 6.8 MMHG
ECHO AV PEAK VELOCITY: 130.2 CM/S
ECHO EST RA PRESSURE: 3 MMHG
ECHO LA AREA 4C: 14.6 CM2
ECHO LA MAJOR AXIS: 4.99 CM
ECHO LA TO AORTIC ROOT RATIO: 1.93
ECHO LA VOL 2C: 69.96 ML (ref 22–52)
ECHO LA VOL 4C: 36.71 ML (ref 22–52)
ECHO LA VOL BP: 56.1 ML (ref 22–52)
ECHO LA VOL/BSA BIPLANE: 28.97 ML/M2 (ref 16–28)
ECHO LA VOLUME INDEX A2C: 36.12 ML/M2 (ref 16–28)
ECHO LA VOLUME INDEX A4C: 18.95 ML/M2 (ref 16–28)
ECHO LV E' LATERAL VELOCITY: 4.2 CENTIMETER/SECOND
ECHO LV E' SEPTAL VELOCITY: 4.56 CENTIMETER/SECOND
ECHO LV INTERNAL DIMENSION DIASTOLIC: 4.96 CM (ref 3.9–5.3)
ECHO LV INTERNAL DIMENSION SYSTOLIC: 2.67 CM
ECHO LV IVSD: 1.42 CM (ref 0.6–0.9)
ECHO LV MASS 2D: 302.9 G (ref 67–162)
ECHO LV MASS INDEX 2D: 156.4 G/M2 (ref 43–95)
ECHO LV POSTERIOR WALL DIASTOLIC: 1.14 CM (ref 0.6–0.9)
ECHO LVOT DIAM: 1.84 CM
ECHO LVOT PEAK GRADIENT: 4.3 MMHG
ECHO LVOT PEAK VELOCITY: 103.61 CM/S
ECHO MV A VELOCITY: 46.67 CM/S
ECHO MV AREA PHT: 3.8 CM2
ECHO MV E DECELERATION TIME (DT): 200.2 MS
ECHO MV E VELOCITY: 65.12 CM/S
ECHO MV E/A RATIO: 1.4
ECHO MV PRESSURE HALF TIME (PHT): 58.1 MS
ECHO MV REGURGITANT PEAK GRADIENT: 74.1 MMHG
ECHO MV REGURGITANT PEAK VELOCITY: 430.37 CM/S
ECHO PULMONARY ARTERY SYSTOLIC PRESSURE (PASP): 28.5 MMHG
ECHO PV MAX VELOCITY: 91.97 CM/S
ECHO PV PEAK GRADIENT: 3.4 MMHG
ECHO RIGHT VENTRICULAR SYSTOLIC PRESSURE (RVSP): 28.5 MMHG
ECHO RV INTERNAL DIMENSION: 3.09 CM
ECHO RV TAPSE: 1.84 CM (ref 1.5–2)
ECHO TV REGURGITANT MAX VELOCITY: 252.56 CM/S
ECHO TV REGURGITANT PEAK GRADIENT: 25.5 MMHG
ERYTHROCYTE [DISTWIDTH] IN BLOOD BY AUTOMATED COUNT: 13.9 % (ref 11.5–14.5)
EST. AVERAGE GLUCOSE BLD GHB EST-MCNC: 148 MG/DL
FLUAV H1 2009 PAND RNA SPEC QL NAA+PROBE: NOT DETECTED
FLUAV H1 RNA SPEC QL NAA+PROBE: NOT DETECTED
FLUAV H3 RNA SPEC QL NAA+PROBE: NOT DETECTED
FLUAV SUBTYP SPEC NAA+PROBE: NOT DETECTED
FLUBV RNA SPEC QL NAA+PROBE: NOT DETECTED
GLUCOSE BLD STRIP.AUTO-MCNC: 244 MG/DL (ref 65–100)
GLUCOSE BLD STRIP.AUTO-MCNC: 254 MG/DL (ref 65–100)
GLUCOSE BLD STRIP.AUTO-MCNC: 340 MG/DL (ref 65–100)
GLUCOSE BLD STRIP.AUTO-MCNC: 359 MG/DL (ref 65–100)
GLUCOSE SERPL-MCNC: 294 MG/DL (ref 65–100)
HADV DNA SPEC QL NAA+PROBE: NOT DETECTED
HBA1C MFR BLD: 6.8 % (ref 4.2–6.3)
HCOV 229E RNA SPEC QL NAA+PROBE: NOT DETECTED
HCOV HKU1 RNA SPEC QL NAA+PROBE: NOT DETECTED
HCOV NL63 RNA SPEC QL NAA+PROBE: NOT DETECTED
HCOV OC43 RNA SPEC QL NAA+PROBE: NOT DETECTED
HCT VFR BLD AUTO: 33.5 % (ref 35–47)
HDLC SERPL-MCNC: 58 MG/DL
HDLC SERPL: 4.1 {RATIO} (ref 0–5)
HGB BLD-MCNC: 10.7 G/DL (ref 11.5–16)
HMPV RNA SPEC QL NAA+PROBE: NOT DETECTED
HPIV1 RNA SPEC QL NAA+PROBE: NOT DETECTED
HPIV2 RNA SPEC QL NAA+PROBE: NOT DETECTED
HPIV3 RNA SPEC QL NAA+PROBE: NOT DETECTED
HPIV4 RNA SPEC QL NAA+PROBE: NOT DETECTED
LDLC SERPL CALC-MCNC: 137.8 MG/DL (ref 0–100)
LIPID PROFILE,FLP: ABNORMAL
M PNEUMO DNA SPEC QL NAA+PROBE: NOT DETECTED
MAGNESIUM SERPL-MCNC: 2.5 MG/DL (ref 1.6–2.4)
MCH RBC QN AUTO: 29.9 PG (ref 26–34)
MCHC RBC AUTO-ENTMCNC: 31.9 G/DL (ref 30–36.5)
MCV RBC AUTO: 93.6 FL (ref 80–99)
NRBC # BLD: 0 K/UL (ref 0–0.01)
NRBC BLD-RTO: 0 PER 100 WBC
P-R INTERVAL, ECG05: 160 MS
P-R INTERVAL, ECG05: 162 MS
PHOSPHATE SERPL-MCNC: 3.8 MG/DL (ref 2.6–4.7)
PLATELET # BLD AUTO: 231 K/UL (ref 150–400)
PMV BLD AUTO: 9.4 FL (ref 8.9–12.9)
POTASSIUM SERPL-SCNC: 3.9 MMOL/L (ref 3.5–5.1)
PROCALCITONIN SERPL-MCNC: <0.1 NG/ML
Q-T INTERVAL, ECG07: 466 MS
Q-T INTERVAL, ECG07: 474 MS
QRS DURATION, ECG06: 86 MS
QRS DURATION, ECG06: 86 MS
QTC CALCULATION (BEZET), ECG08: 457 MS
QTC CALCULATION (BEZET), ECG08: 469 MS
RBC # BLD AUTO: 3.58 M/UL (ref 3.8–5.2)
RSV RNA SPEC QL NAA+PROBE: NOT DETECTED
RV+EV RNA SPEC QL NAA+PROBE: NOT DETECTED
SERVICE CMNT-IMP: ABNORMAL
SODIUM SERPL-SCNC: 141 MMOL/L (ref 136–145)
TRIGL SERPL-MCNC: 196 MG/DL (ref ?–150)
TROPONIN I SERPL-MCNC: <0.05 NG/ML
TROPONIN I SERPL-MCNC: <0.05 NG/ML
TSH SERPL DL<=0.05 MIU/L-ACNC: 1 UIU/ML (ref 0.36–3.74)
VENTRICULAR RATE, ECG03: 58 BPM
VENTRICULAR RATE, ECG03: 59 BPM
VLDLC SERPL CALC-MCNC: 39.2 MG/DL
WBC # BLD AUTO: 7 K/UL (ref 3.6–11)

## 2019-07-21 PROCEDURE — 97161 PT EVAL LOW COMPLEX 20 MIN: CPT | Performed by: PHYSICAL THERAPIST

## 2019-07-21 PROCEDURE — 83735 ASSAY OF MAGNESIUM: CPT

## 2019-07-21 PROCEDURE — 36415 COLL VENOUS BLD VENIPUNCTURE: CPT

## 2019-07-21 PROCEDURE — 74011250636 HC RX REV CODE- 250/636: Performed by: STUDENT IN AN ORGANIZED HEALTH CARE EDUCATION/TRAINING PROGRAM

## 2019-07-21 PROCEDURE — 82962 GLUCOSE BLOOD TEST: CPT

## 2019-07-21 PROCEDURE — 97116 GAIT TRAINING THERAPY: CPT | Performed by: PHYSICAL THERAPIST

## 2019-07-21 PROCEDURE — 94640 AIRWAY INHALATION TREATMENT: CPT

## 2019-07-21 PROCEDURE — 80048 BASIC METABOLIC PNL TOTAL CA: CPT

## 2019-07-21 PROCEDURE — 84484 ASSAY OF TROPONIN QUANT: CPT

## 2019-07-21 PROCEDURE — 74011250636 HC RX REV CODE- 250/636: Performed by: FAMILY MEDICINE

## 2019-07-21 PROCEDURE — 74011250637 HC RX REV CODE- 250/637: Performed by: STUDENT IN AN ORGANIZED HEALTH CARE EDUCATION/TRAINING PROGRAM

## 2019-07-21 PROCEDURE — 85027 COMPLETE CBC AUTOMATED: CPT

## 2019-07-21 PROCEDURE — 97165 OT EVAL LOW COMPLEX 30 MIN: CPT

## 2019-07-21 PROCEDURE — 74011000250 HC RX REV CODE- 250: Performed by: STUDENT IN AN ORGANIZED HEALTH CARE EDUCATION/TRAINING PROGRAM

## 2019-07-21 PROCEDURE — 80061 LIPID PANEL: CPT

## 2019-07-21 PROCEDURE — C8929 TTE W OR WO FOL WCON,DOPPLER: HCPCS

## 2019-07-21 PROCEDURE — 84145 PROCALCITONIN (PCT): CPT

## 2019-07-21 PROCEDURE — 84443 ASSAY THYROID STIM HORMONE: CPT

## 2019-07-21 PROCEDURE — 74011636637 HC RX REV CODE- 636/637: Performed by: STUDENT IN AN ORGANIZED HEALTH CARE EDUCATION/TRAINING PROGRAM

## 2019-07-21 PROCEDURE — 84100 ASSAY OF PHOSPHORUS: CPT

## 2019-07-21 PROCEDURE — 65660000000 HC RM CCU STEPDOWN

## 2019-07-21 PROCEDURE — 83036 HEMOGLOBIN GLYCOSYLATED A1C: CPT

## 2019-07-21 PROCEDURE — 97535 SELF CARE MNGMENT TRAINING: CPT

## 2019-07-21 PROCEDURE — 74011000258 HC RX REV CODE- 258: Performed by: STUDENT IN AN ORGANIZED HEALTH CARE EDUCATION/TRAINING PROGRAM

## 2019-07-21 RX ORDER — ACETAMINOPHEN 325 MG/1
650 TABLET ORAL
Status: DISCONTINUED | OUTPATIENT
Start: 2019-07-21 | End: 2019-07-23 | Stop reason: HOSPADM

## 2019-07-21 RX ORDER — SERTRALINE HYDROCHLORIDE 25 MG/1
25 TABLET, FILM COATED ORAL DAILY
Status: DISCONTINUED | OUTPATIENT
Start: 2019-07-21 | End: 2019-07-23 | Stop reason: HOSPADM

## 2019-07-21 RX ORDER — HYDRALAZINE HYDROCHLORIDE 20 MG/ML
10 INJECTION INTRAMUSCULAR; INTRAVENOUS
Status: DISCONTINUED | OUTPATIENT
Start: 2019-07-21 | End: 2019-07-23 | Stop reason: HOSPADM

## 2019-07-21 RX ADMIN — PRAMIPEXOLE DIHYDROCHLORIDE 0.5 MG: 0.25 TABLET ORAL at 08:38

## 2019-07-21 RX ADMIN — ATORVASTATIN CALCIUM 40 MG: 20 TABLET, FILM COATED ORAL at 21:37

## 2019-07-21 RX ADMIN — LEVALBUTEROL HYDROCHLORIDE 0.63 MG: 0.63 SOLUTION RESPIRATORY (INHALATION) at 23:50

## 2019-07-21 RX ADMIN — METOPROLOL TARTRATE 50 MG: 50 TABLET ORAL at 21:43

## 2019-07-21 RX ADMIN — INSULIN LISPRO 4 UNITS: 100 INJECTION, SOLUTION INTRAVENOUS; SUBCUTANEOUS at 17:29

## 2019-07-21 RX ADMIN — LEVALBUTEROL HYDROCHLORIDE 0.63 MG: 0.63 SOLUTION RESPIRATORY (INHALATION) at 07:49

## 2019-07-21 RX ADMIN — METHYLPREDNISOLONE SODIUM SUCCINATE 80 MG: 40 INJECTION, POWDER, FOR SOLUTION INTRAMUSCULAR; INTRAVENOUS at 21:42

## 2019-07-21 RX ADMIN — BUDESONIDE 500 MCG: 0.5 INHALANT RESPIRATORY (INHALATION) at 19:19

## 2019-07-21 RX ADMIN — LEVALBUTEROL HYDROCHLORIDE 0.63 MG: 0.63 SOLUTION RESPIRATORY (INHALATION) at 15:57

## 2019-07-21 RX ADMIN — PIPERACILLIN SODIUM,TAZOBACTAM SODIUM 3.38 G: 3; .375 INJECTION, POWDER, FOR SOLUTION INTRAVENOUS at 06:38

## 2019-07-21 RX ADMIN — GUAIFENESIN 600 MG: 600 TABLET, EXTENDED RELEASE ORAL at 21:39

## 2019-07-21 RX ADMIN — INSULIN LISPRO 5 UNITS: 100 INJECTION, SOLUTION INTRAVENOUS; SUBCUTANEOUS at 21:41

## 2019-07-21 RX ADMIN — GUAIFENESIN 600 MG: 600 TABLET, EXTENDED RELEASE ORAL at 08:38

## 2019-07-21 RX ADMIN — BUDESONIDE 500 MCG: 0.5 INHALANT RESPIRATORY (INHALATION) at 07:50

## 2019-07-21 RX ADMIN — HYDRALAZINE HYDROCHLORIDE 10 MG: 20 INJECTION INTRAMUSCULAR; INTRAVENOUS at 01:43

## 2019-07-21 RX ADMIN — HEPARIN SODIUM 5000 UNITS: 5000 INJECTION INTRAVENOUS; SUBCUTANEOUS at 21:39

## 2019-07-21 RX ADMIN — LEVALBUTEROL HYDROCHLORIDE 0.63 MG: 0.63 SOLUTION RESPIRATORY (INHALATION) at 19:19

## 2019-07-21 RX ADMIN — BUMETANIDE 1 MG: 0.25 INJECTION INTRAMUSCULAR; INTRAVENOUS at 08:44

## 2019-07-21 RX ADMIN — METOPROLOL TARTRATE 50 MG: 50 TABLET ORAL at 08:37

## 2019-07-21 RX ADMIN — BUMETANIDE 1 MG: 0.25 INJECTION INTRAMUSCULAR; INTRAVENOUS at 17:28

## 2019-07-21 RX ADMIN — PRAMIPEXOLE DIHYDROCHLORIDE 0.5 MG: 0.25 TABLET ORAL at 21:47

## 2019-07-21 RX ADMIN — DULOXETINE HYDROCHLORIDE 60 MG: 30 CAPSULE, DELAYED RELEASE ORAL at 08:38

## 2019-07-21 RX ADMIN — HEPARIN SODIUM 5000 UNITS: 5000 INJECTION INTRAVENOUS; SUBCUTANEOUS at 06:00

## 2019-07-21 RX ADMIN — INSULIN LISPRO 3 UNITS: 100 INJECTION, SOLUTION INTRAVENOUS; SUBCUTANEOUS at 12:31

## 2019-07-21 RX ADMIN — HEPARIN SODIUM 5000 UNITS: 5000 INJECTION INTRAVENOUS; SUBCUTANEOUS at 13:29

## 2019-07-21 RX ADMIN — SERTRALINE HYDROCHLORIDE 25 MG: 25 TABLET ORAL at 11:00

## 2019-07-21 RX ADMIN — PIPERACILLIN SODIUM,TAZOBACTAM SODIUM 3.38 G: 3; .375 INJECTION, POWDER, FOR SOLUTION INTRAVENOUS at 13:31

## 2019-07-21 RX ADMIN — AZITHROMYCIN MONOHYDRATE 500 MG: 500 INJECTION, POWDER, LYOPHILIZED, FOR SOLUTION INTRAVENOUS at 21:47

## 2019-07-21 RX ADMIN — INSULIN GLARGINE 24 UNITS: 100 INJECTION, SOLUTION SUBCUTANEOUS at 21:40

## 2019-07-21 RX ADMIN — Medication 10 ML: at 06:39

## 2019-07-21 RX ADMIN — MELATONIN TAB 3 MG 3 MG: 3 TAB at 22:35

## 2019-07-21 RX ADMIN — MONTELUKAST SODIUM 10 MG: 10 TABLET, FILM COATED ORAL at 21:46

## 2019-07-21 RX ADMIN — LATANOPROST 1 DROP: 50 SOLUTION/ DROPS OPHTHALMIC at 21:41

## 2019-07-21 RX ADMIN — CLOPIDOGREL BISULFATE 75 MG: 75 TABLET ORAL at 08:38

## 2019-07-21 RX ADMIN — ISOSORBIDE MONONITRATE 60 MG: 30 TABLET, EXTENDED RELEASE ORAL at 08:38

## 2019-07-21 RX ADMIN — LEVALBUTEROL HYDROCHLORIDE 0.63 MG: 0.63 SOLUTION RESPIRATORY (INHALATION) at 11:53

## 2019-07-21 RX ADMIN — PERFLUTREN 2 ML: 6.52 INJECTION, SUSPENSION INTRAVENOUS at 08:22

## 2019-07-21 RX ADMIN — METHYLPREDNISOLONE SODIUM SUCCINATE 80 MG: 40 INJECTION, POWDER, FOR SOLUTION INTRAMUSCULAR; INTRAVENOUS at 08:44

## 2019-07-21 RX ADMIN — Medication 10 ML: at 21:47

## 2019-07-21 RX ADMIN — VALSARTAN 160 MG: 80 TABLET, FILM COATED ORAL at 08:38

## 2019-07-21 RX ADMIN — LEVALBUTEROL HYDROCHLORIDE 0.63 MG: 0.63 SOLUTION RESPIRATORY (INHALATION) at 04:02

## 2019-07-21 RX ADMIN — PIPERACILLIN SODIUM,TAZOBACTAM SODIUM 3.38 G: 3; .375 INJECTION, POWDER, FOR SOLUTION INTRAVENOUS at 23:19

## 2019-07-21 RX ADMIN — Medication 10 ML: at 13:31

## 2019-07-21 RX ADMIN — INSULIN LISPRO 2 UNITS: 100 INJECTION, SOLUTION INTRAVENOUS; SUBCUTANEOUS at 08:45

## 2019-07-21 NOTE — PROGRESS NOTES
0115  /86, HR 62. Call to family practice. Order rec'd for hydralazine IV prn    0143  Hydralazine given per prn order    0244  164/63 HR 60    0730  Bedside and Verbal shift change report given to Iris Crane (oncoming nurse) by Sohan Ortiz (offgoing nurse). Report included the following information SBAR, Kardex, Accordion and Recent Results.

## 2019-07-21 NOTE — H&P
[address] [place] [address]   Office [phone]  Fax [phone]       Admission H&P     Name: David Holm MRN: [id number]  Sex: Female   YOB: 1960  Age: 62 y.o. PCP: Yaw Chaudhary MD     Source of Information: patient, medical records    Chief complaint: Shortness of breath    History of Present Illness  David Holm is a 62 y.o. female with PMH of CHF (EF 55-60%, 2018), asthma, CAD, hypercholesterolemia, DM with chronic nephropathy, HTN, sleep apnea, neuropathy, restless leg syndrome and obesity, presented to [facility] THE [facility] ED for complaints of SOB for 5 days (since Tuesday 7/16). Pt saw her PCP 2 days later (Thursday) who prescribed prednisone and Azithromycin. She presented to the ED today after her shortness of breath persisted. Pt has tried nebs and her inhaler w/o relief. She endorses SOB when walking 3-5 steps, 1x fever & chills (Tmax 101.1, Thursday), a non-productive cough and worsened orthopnea (3 pillows instead of baseline 1). On Tuesday, she babysat her 3 yo grandson who has been sick with a possible viral infection. She denies wheezing/N/V/D/dysuria/leg swelling and endorses chronic stool incontinence. She has been eating salty foods lately (chinese food last night and canned salmon today) but she remains compliant with her medications. Her cardiologist is Dr. Shantell Johnson (neg nuclear stress test on 4/19, LVEF 52%). She was on Lasix in the past was it was d/c'd by PCP last year. ER course:   vitals - bp 179/149, P62, RR28, T98F, SpO2 98% on RA  EKG- sinus bradycardia w/o changes from previous (11/18), QTc 457  Labs- Trop neg, proBNP 4118, lactate 0.96, BUN 34, Cr 1.24 (baseline 1.03,1.13), CBC unremarkable, H/H stable, UA (1+ bacteria, moderate epithelial cells)  Imaging: CXR- enlarged cardiac silhouette, new lingular R midd lobe airspace disease    Treatment: 1x neb (levalbuterol 1.25 mg) and ceftriaxone was added to azithromycin regimen.     Past Medical History:   Diagnosis Date    Asthma     Asthma     Asthma     Borderline high cholesterol     Cataracts, both eyes     Diabetes mellitus type II     Heart disease     Hypercholesteremia     Hyperlipemia     Hypertension     Neuropathy     Restless leg     Restless leg     Rheumatoid arthritis(714.0)     Sleep apnea     Sleep disorder     Stroke [facility])     2010      Patient Vitals for the past 12 hrs:   Temp Pulse Resp BP SpO2   07/20/19 2040 97.9 °F (36.6 °C)  20 183/87 99 %   07/20/19 2020  60 16 172/68 99 %   07/20/19 2000  (!) 59 15 183/63 97 %   07/20/19 1945  (!) 59 16 176/64 98 %   07/20/19 1934  (!) 59 18 177/63 97 %   07/20/19 1906  60 20 187/83 98 %   07/20/19 1736  (!) 58  169/53    07/20/19 1731 98 °F (36.7 °C) 62 28 (!) 179/149 98 %       Home Medications   Prior to Admission medications    Medication Sig Start Date End Date Taking? Authorizing Provider   telmisartan (MICARDIS) 80 mg tablet Take 80 mg by mouth daily. Yes Other, Phys, MD   FARXIGA 10 mg tab tablet TAKE 1 TABLET BY MOUTH EVERY DAY 6/11/19  Yes Provider, Historical   BREO ELLIPTA 200-25 mcg/dose inhaler TAKE 1 PUFF BY MOUTH EVERY DAY 5/31/19  Yes Provider, Historical   predniSONE (STERAPRED DS) 10 mg dose pack See administration instruction per 10mg dose pack 7/18/19  Yes Roxy Gold R, MARY   azithromycin (ZITHROMAX) 250 mg tablet Take 2 tablets today, then take 1 tablet daily 7/18/19 7/23/19 Yes Roxy Gold NP   LANTUS SOLOSTAR U-100 INSULIN 100 unit/mL (3 mL) inpn INJECT 30 UNITS BY SUBCUTANEOUS ROUTE NIGHTLY FOR 30 DAYS. 5/6/19  Yes Provider, Historical   dilTIAZem CD (CARDIZEM CD) 120 mg ER capsule TAKE 1 CAPSULE BY MOUTH EVERY DAY 4/12/19  Yes Provider, Historical   latanoprost (XALATAN) 0.005 % ophthalmic solution INSTILL 1 DROP INTO BOTH EYES AT BEDTIME 3/25/19  Yes Provider, Historical   insulin glargine (LANTUS,BASAGLAR) 100 unit/mL (3 mL) inpn 30 U at bedtime.  E11.9 6/3/19  Yes Melissa Jurado, MD   ULTRA THIN LANCETS 30 gauge misc TO USE TO CHECK BLOOD SUGAR THREE TIMES A DAY. DX:E11.40 4/16/19  Yes Darius Moreira MD   DALTON PEN NEEDLE 32 gauge x 5/32\" ndle USE TWICE DAILY 3/22/19  Yes Darius Moreira MD   clopidogrel (PLAVIX) 75 mg tab Take 1 Tab by mouth daily. Indications: treatment to prevent a heart attack 3/22/19  Yes Darius Moreira MD   levalbuterol tartrate [facility]) 45 mcg/actuation inhaler Take 2 Puffs by inhalation every four (4) hours as needed for Wheezing or Shortness of Breath. 3/22/19  Yes Darius Moreira MD   metoprolol tartrate (LOPRESSOR) 50 mg tablet TAKE ONE TABLET BY MOUTH TWICE DAILY 3/22/19  Yes Darius Moreira MD   cyanocobalamin (VITAMIN B-12) 1,000 mcg tablet Take 1,000 mcg by mouth daily. Yes Provider, Historical   glucose blood VI test strips (ASCENSIA AUTODISC VI, ONE TOUCH ULTRA TEST VI) strip Check BS 2 x per day. E11.9 Type II DM with hyperglycemia. 3/8/19  Yes Darius Moreira MD   melatonin 3 mg tablet Take  by mouth. Yes Provider, Historical   cholecalciferol (VITAMIN D3) 1,000 unit tablet Take  by mouth daily. Yes Provider, Historical   levalbuterol (XOPENEX) 0.63 mg/3 mL nebu 3 mL by Nebulization route every four (4) hours as needed (shortness of breath). 12/20/18  Yes Mel Gold NP   Lancets misc To use to check blood sugar three times a day. Dx:E11.40 11/19/18  Yes Mel Gold NP   Nebulizer & Compressor machine 1 Each by Does Not Apply route as needed. 11/19/18  Yes Roxy Gold NP   isosorbide mononitrate ER (IMDUR) 60 mg CR tablet Take 60 mg by mouth every morning. Yes Provider, Historical   pramipexole (MIRAPEX) 0.5 mg tablet Take 0.5 mg by mouth two (2) times a day. Yes Provider, Historical   levocetirizine (XYZAL) 5 mg tablet Take 5 mg by mouth daily. Yes Provider, Historical   DULoxetine (CYMBALTA) 60 mg capsule Take 60 mg by mouth daily.    Yes Provider, Historical   gabapentin (NEURONTIN) 300 mg capsule Take 300 mg by mouth three (3) times daily. Takes with 600 mg to make a total dose of 900 mg   Yes Other, MD Daya   atorvastatin (LIPITOR) 40 mg tablet Take 40 mg by mouth daily. Yes Rajni, MD Daya   montelukast (SINGULAIR) 10 mg tablet Take 10 mg by mouth nightly.    Yes Provider, Historical   nitroglycerin (NITROSTAT) 0.4 mg SL tablet DISSOLVE 1 TABLET UNDER TONGUE EVERY 5 MIN AS NEEDED FOR CHEST PAIN FOR UP TO 3 DOSES 6/17/19   Maria Alejandra Feldman MD   tobramycin (TOBREX) 0.3 % ophthalmic solution USE 1 DROP INTO AFFECTED EYE FOUR TIMES A DAY AS DIRECTED START 3 DAYS BEFORE AND MORNING OF SURGERY 5/3/19   Provider, Historical   prednisoLONE acetate (PRED FORTE) 1 % ophthalmic suspension USE 1 DROP IN AFFECTED EYE EVERY 2 HOURS WHILE AWAKE START 2 HOURS AFTER SURGERY SHAKE BEFORE USE 5/2/19   Provider, Historical       Allergies  Allergies   Allergen Reactions    Levaquin [Levofloxacin] Anaphylaxis    Albuterol Anxiety    Aspirin Rash     Pt can take IBUPROFEN    Metformin Diarrhea       Past Medical History:   Diagnosis Date    Asthma     Asthma     Asthma     Borderline high cholesterol     Cataracts, both eyes     Diabetes mellitus type II     Heart disease     Hypercholesteremia     Hyperlipemia     Hypertension     Neuropathy     Restless leg     Restless leg     Rheumatoid arthritis(714.0)     Sleep apnea     Sleep disorder     Stroke [facility])     2010       Past Surgical History:   Procedure Laterality Date    CHEST SURGERY PROCEDURE UNLISTED      HX CORONARY STENT PLACEMENT Left Sept. 8th and Sept 16, 2018    HX TONSILLECTOMY         Family History   Problem Relation Age of Onset    Heart Disease Mother     Diabetes Mother     Hypertension Other     No Known Problems Father        Social History  Social History     Socioeconomic History    Marital status:      Spouse name: Not on file    Number of children: Not on file    Years of education: Not on file    Highest education level: Not on file   Occupational History    Not on file   Social Needs    Financial resource strain: Not on file    Food insecurity:     Worry: Not on file     Inability: Not on file    Transportation needs:     Medical: Not on file     Non-medical: Not on file   Tobacco Use    Smoking status: Never Smoker    Smokeless tobacco: Never Used   Substance and Sexual Activity    Alcohol use: No     Alcohol/week: 4.2 standard drinks     Types: 5 Cans of beer per week    Drug use: No    Sexual activity: Never   Lifestyle    Physical activity:     Days per week: Not on file     Minutes per session: Not on file    Stress: Not on file   Relationships    Social connections:     Talks on phone: Not on file     Gets together: Not on file     Attends Religion service: Not on file     Active member of club or organization: Not on file     Attends meetings of clubs or organizations: Not on file     Relationship status: Not on file    Intimate partner violence:     Fear of current or ex partner: Not on file     Emotionally abused: Not on file     Physically abused: Not on file     Forced sexual activity: Not on file   Other Topics Concern     Service Not Asked    Blood Transfusions Not Asked    Caffeine Concern Not Asked    Occupational Exposure Not Asked   Juani Puller Hazards Not Asked    Sleep Concern Not Asked    Stress Concern Not Asked    Weight Concern Not Asked    Special Diet Not Asked    Back Care Not Asked    Exercise Not Asked    Bike Helmet Not Asked   [address] Not Asked    Self-Exams Not Asked   Social History Narrative    Not on file       Alcohol history: Social  Smoking history: Non-smoker  Illicit drug history: Not at all    Living arrangement: patient lives with their family.   Ambulates: Independently     Review of Systems: (bolded are positive):   General +fever, chills (on Thursday), changes in weight, changes in appetite   HEENT Negative for hearing loss, earache, congestion, sore throat   CV Negative for chest pain, palpitations, edema   Respiratory + dry cough, +shortness of breath, neg wheezing   GI Negative for change in bowel habits, abdominal pain, black or bloody stools, nausea or vomiting    Negative for frequency, dysuria, hematuria, vaginal discharge   MSK +B/L leg pain (R>L)   Breast Negative for breast lumps, nipple discharge, galactorrhea   Skin Negative for itching, rash, hives   Neuro Negative for dizziness, headache, confusion, weakness   Psych Negative for anxiety, depression, change in mood   Heme/lymph Negative for bleeding, bruising, pallor     Physical Exam  Objective    O2 Device: Room air     General:   Alert, cooperative, no acute distress   Head:   Atraumatic   Eyes:   Conjunctivae clear   ENT:  Oral mucosa normal   Neck:  Supple, trachea midline, no adenopathy   No JVD   Back:    No CVA tenderness    Lungs:   fair air movement B/L, bibasilar rales, no wheezes/rhonchi    Heart:   Regular rhythm, no murmur   Abdomen:    Soft, non-tender   No masses or organomegaly    Extremities:  No edema or DVT signs +RLE neuropathy    Skin:  Warm and dry    No rashes or lesions   Neurologic:  Oriented   No focal deficits         Laboratory Data  Recent Results (from the past 8 hour(s))   CBC WITH AUTOMATED DIFF    Collection Time: 07/20/19  5:41 PM   Result Value Ref Range    WBC 7.9 3.6 - 11.0 K/uL    RBC 3.52 (L) 3.80 - 5.20 M/uL    HGB 10.8 (L) 11.5 - 16.0 g/dL    HCT 32.8 (L) 35.0 - 47.0 %    MCV 93.2 80.0 - 99.0 FL    MCH 30.7 26.0 - 34.0 PG    MCHC 32.9 30.0 - 36.5 g/dL    RDW 13.9 11.5 - 14.5 %    PLATELET 978 509 - 394 K/uL    MPV 9.4 8.9 - 12.9 FL    NEUTROPHILS 88 (H) 32 - 75 %    LYMPHOCYTES 9 (L) 12 - 49 %    MONOCYTES 3 (L) 5 - 13 %    EOSINOPHILS 0 0 - 7 %    BASOPHILS 0 0 - 1 %    IMMATURE GRANULOCYTES 0 0.0 - 0.5 %    ABS. NEUTROPHILS 7.0 1.8 - 8.0 K/UL    ABS. LYMPHOCYTES 0.7 (L) 0.8 - 3.5 K/UL    ABS. MONOCYTES 0.2 0.0 - 1.0 K/UL    ABS.  EOSINOPHILS 0.0 0.0 - 0.4 K/UL    ABS. BASOPHILS 0.0 0.0 - 0.1 K/UL    ABS. IMM. GRANS. 0.0 0.00 - 0.04 K/UL    DF MANUAL      RBC COMMENTS ANISOCYTOSIS  1+       METABOLIC PANEL, COMPREHENSIVE    Collection Time: 07/20/19  5:41 PM   Result Value Ref Range    Sodium 138 136 - 145 mmol/L    Potassium 4.6 3.5 - 5.1 mmol/L    Chloride 104 97 - 108 mmol/L    CO2 26 21 - 32 mmol/L    Anion gap 8 5 - 15 mmol/L    Glucose 282 (H) 65 - 100 mg/dL    BUN 34 (H) 6 - 20 MG/DL    Creatinine 1.24 (H) 0.55 - 1.02 MG/DL    BUN/Creatinine ratio 27 (H) 12 - 20      GFR est AA 53 (L) >60 ml/min/1.73m2    GFR est non-AA 44 (L) >60 ml/min/1.73m2    Calcium 8.6 8.5 - 10.1 MG/DL    Bilirubin, total 0.5 0.2 - 1.0 MG/DL    ALT (SGPT) 24 12 - 78 U/L    AST (SGOT) 16 15 - 37 U/L    Alk.  phosphatase 86 45 - 117 U/L    Protein, total 6.9 6.4 - 8.2 g/dL    Albumin 3.2 (L) 3.5 - 5.0 g/dL    Globulin 3.7 2.0 - 4.0 g/dL    A-G Ratio 0.9 (L) 1.1 - 2.2     NT-PRO BNP    Collection Time: 07/20/19  5:41 PM   Result Value Ref Range    NT pro-BNP 4,118 (H) 0 - 125 PG/ML   TROPONIN I    Collection Time: 07/20/19  5:41 PM   Result Value Ref Range    Troponin-I, Qt. <0.05 <0.05 ng/mL   EKG, 12 LEAD, INITIAL    Collection Time: 07/20/19  5:45 PM   Result Value Ref Range    Ventricular Rate 58 BPM    Atrial Rate 58 BPM    P-R Interval 160 ms    QRS Duration 86 ms    Q-T Interval 466 ms    QTC Calculation (Bezet) 457 ms    Calculated P Axis 30 degrees    Calculated R Axis 57 degrees    Calculated T Axis -83 degrees    Diagnosis       Sinus bradycardia  Septal infarct , age undetermined  T wave abnormality, consider inferolateral ischemia  Abnormal ECG  When compared with ECG of 28-NOV-2018 17:27,  No significant change was found     SAMPLES BEING HELD    Collection Time: 07/20/19  5:47 PM   Result Value Ref Range    SAMPLES BEING HELD 1 RED 1 BLUE     COMMENT        Add-on orders for these samples will be processed based on acceptable specimen integrity and analyte stability, which may vary by analyte. POC LACTIC ACID    Collection Time: 07/20/19  7:12 PM   Result Value Ref Range    Lactic Acid (POC) 0.96 0.40 - 2.00 mmol/L   URINALYSIS W/MICROSCOPIC    Collection Time: 07/20/19  7:28 PM   Result Value Ref Range    Color Light Yellow      Appearance HAZY (A) CLEAR      Specific gravity 1.020 1.003 - 1.030      pH (UA) 6.0 5.0 - 8.0      Protein >300 (A) NEG mg/dL    Glucose >1,000 (A) NEG mg/dL    Ketone NEGATIVE  NEG mg/dL    Bilirubin NEGATIVE  NEG      Blood MODERATE (A) NEG      Urobilinogen 0.2 0.2 - 1.0 EU/dL    Nitrites NEGATIVE  NEG      Leukocyte Esterase NEGATIVE  NEG      WBC 0-4 0 - 4 /hpf    RBC 5-10 0 - 5 /hpf    Epithelial cells MODERATE (A) FEW /lpf    Bacteria 1+ (A) NEG /hpf   URINE CULTURE HOLD SAMPLE    Collection Time: 07/20/19  7:28 PM   Result Value Ref Range    Urine culture hold        URINE ON HOLD IN MICROBIOLOGY DEPT FOR 3 DAYS. IF UNPRESERVED URINE IS SUBMITTED, IT CANNOT BE USED FOR ADDITIONAL TESTING AFTER 24 HRS, RECOLLECTION WILL BE REQUIRED. GLUCOSE, POC    Collection Time: 07/20/19  8:19 PM   Result Value Ref Range    Glucose (POC) 226 (H) 65 - 100 mg/dL    Performed by Donovan Luo        Imaging  CXR Results  (Last 48 hours)               07/20/19 1832  XR CHEST PA LAT Final result    Impression:  Impression:   1. Enlarged cardiac silhouette, new lingular and right middle lobe airspace   disease           Narrative:  INDICATION:  sob        EXAM: Chest 2 views. Comparison:1/1/2019       Findings: Cardiac silhouette is enlarged. Pulmonary vasculature is not engorged. 2. Bibasilar airspace disease within the lingula and right middle lobe. No   pneumothorax. No effusion.                    Assessment and Plan     Taina Hightower is a 62 y.o. female w/ PMH of CHF (EF 55-60%, 2018), asthma, CAD, hypercholesterolemia, DM with chronic nephropathy, HTN, sleep apnea, neuropathy, restless leg syndrome and obesity, who is admitted for Acute non specified respiratory failure 2/2 CAP vs acute HFpEF exacerbation vs Asthma exacerbation. 1. CAP:   -admit to inpatient on tele, w/ strict I/O, VS per unit   -IV abx (zosyn + azithromycin)   -PRN O2 for hypoxia, PRN mucinex   -aspiration and droplet precautions   -RVP, procalcitonin   -pending urine, blood, and sputum cx   -daily AM labs    2. Acute CHF Exacerbation: EF 55-65% in 11/2018 likely 2/2 to non compliance.    -Cardiology consulted. f/u recomendations    -Diurese with bumex 1mg BID   -strict I/O w/ daily weights.   -pending ECHO,Trending trop, TSH, mag and Phos   -hold diltiazem for now   -continue isosorbide mononitrate, valsartan and metropolol   -Monitor BP and Cr closely    -daily AM labs     3. Acute Exacerbation of moderate persistent asthma: likely 2/2 to PNA s/p 2 days of tx with prednisone and Azithromycin   -solumedrol 80 BID and Xopenex q4hrs RT   -continue home Singulair, Xyzal, Breo (substituted with brovan pulmicort)   -PRN O2 to maintain sats >90%    4. DM w/ Nephropathy: stable    -POA glucose 226   -POC glucose checks, SSI ACHS   -BUN 34, Cr 1.24 (baseline ~1)   -continue to monitor strict I/O   -daily AM labs   -pending HA1c     5. HTN: stable   -metoprolol 50 mg   -continue valsartan 160 mg   -continue to monitor vitals      6. CAD: stable, stent placement in Sept. 2018   -continue clopidogrel 75 mg   -continue metoprolol and valsartan     7. HLD: stable    -continue atorvastatin   -pending lipid panel      8. Neuropathy: stable   -continue Cymbalta 60 mg    9. Sleep Apnea: stable     10. Restless Leg Syndrome: stable   -continue pramipexole     11. Cataracts: stable, recent eye surgery in June   -continue Latanoprost   -Tobramycin and prednisolone eye drops     12. Obesity Body mass index is 39.3 kg/m². -Encouraging lifestyle modifications and further follow up outpatient. FEN/GI - Cardiac diet.   Activity - Ambulate with assistance  DVT prophylaxis - Sub Q Heparin  GI prophylaxis - Not indicated at this time  Fall prophylaxis - Not indicated at this time. Disposition - Admit to Telemetry. Plan to d/c to Home. Consulting PT/OT  Code Status - Full, discussed with patient / caregivers.   Next of Kady Martin Name and Contact: Denice Anaya (daughter) [phone]    Patient Leon Ward will be discussed Dr. Kari Evans.     9:01 PM, 07/20/19  Racheal Estrada MD  Family Medicine Resident       For Billing    Chief Complaint   Patient presents with    [facility]  Date Reviewed: 7/18/2019          Codes Class Noted POA    CAP (community acquired pneumonia) ICD-10-CM: J18.9  ICD-9-CM: 486  7/20/2019 Unknown        Type 2 diabetes with nephropathy ([facility] ICD-10-CM: E11.21  ICD-9-CM: 250.40, 583.81  11/19/2018 Yes        Chest pain ICD-10-CM: R07.9  ICD-9-CM: 786.50  11/14/2018 Yes        Coronary artery disease involving native coronary artery of native heart with angina pectoris ([facility] ICD-10-CM: I25.119  ICD-9-CM: 414.01, 413.9  11/14/2018 Yes        Status post coronary artery stent placement (Chronic) ICD-10-CM: Z95.5  ICD-9-CM: V45.82  11/13/2018 Yes        Severe obesity ([facility] ICD-10-CM: E66.01  ICD-9-CM: 278.01  11/13/2018 Yes        Neuropathy of right lower extremity ICD-10-CM: G57.91  ICD-9-CM: 355.8  9/27/2016 Yes        Pericardial effusion(moderate-large) with mild cardiac tamponade--via echo 8/1/16 ICD-10-CM: I31.3, I31.4  ICD-9-CM: 423.9, 423.3  8/1/2016 Yes        Acute on chronic diastolic CHF (congestive heart failure) ([facility] (Chronic) ICD-10-CM: I50.33  ICD-9-CM: 428.33, 428.0  8/1/2016 Yes        Obstructive sleep apnea ICD-10-CM: G47.33  ICD-9-CM: 327.23  3/10/2015 Yes        TIA (transient ischemic attack) ICD-10-CM: G45.9  ICD-9-CM: 435.9  3/9/2015 Yes        Essential hypertension with goal blood pressure less than 130/85 ICD-10-CM: I10  ICD-9-CM: 401.9  3/9/2015 Yes        DM type 2, uncontrolled, with neuropathy ([facility] ICD-10-CM: E11.40, E11.65  ICD-9-CM: 250.62, 357.2  3/9/2015 Yes        Asthma (Chronic) ICD-10-CM: J45.909  ICD-9-CM: 493.90  3/9/2015 Yes        Hyperlipidemia (Chronic) ICD-10-CM: E78.5  ICD-9-CM: 272.4  3/9/2015 Yes        Restless leg syndrome (Chronic) ICD-10-CM: G25.81  ICD-9-CM: 333.94  3/9/2015 Yes

## 2019-07-21 NOTE — PROGRESS NOTES
0700- Bedside and Verbal shift change report given to CINDY Akers (oncoming nurse) by Mayra Hankins RN (offgoing nurse). Report included the following information SBAR, Kardex, ED Summary, Intake/Output, MAR, Recent Results, Med Rec Status and Cardiac Rhythm NSR.     0800- ECHO at bedside    0824- ECHO complete    0846- Passed morning meds, patient states she also takes sertraline 25mg and Farxiga 10mg in the morning. Will follow up with [facility]. 4515- Per FP, will not start Fentress while inpatient but will continue SSI to manage blood sugars. Most likely will resume sertraline after FP reviews meds. Awaiting new orders    1000- Sertraline added to [facility]        1900- Bedside and Verbal shift change report given to Marci (oncoming nurse) by Chris Thao RN (offgoing nurse). Report included the following information SBAR, Kardex, ED Summary, Intake/Output, MAR, Recent Results, Med Rec Status and Cardiac Rhythm NSR.

## 2019-07-21 NOTE — PROGRESS NOTES
Problem: Mobility Impaired (Adult and Pediatric)  Goal: *Acute Goals and Plan of Care (Insert Text)  Description  Physical Therapy Goals  Initiated 7/21/2019  1. Patient will move from supine to sit and sit to supine  in bed with modified independence within 7 day(s). 2.  Patient will transfer from bed to chair and chair to bed with modified independence using the least restrictive device within 7 day(s). 3.  Patient will perform sit to stand with modified independence within 7 day(s). 4.  Patient will ambulate with modified independence for 150 feet with the least restrictive device within 7 day(s). 5.  Patient will ascend/descend 1 flight of stairs with single handrail(s) with minimal assistance/contact guard assist within 7 day(s). Outcome: Progressing Towards Goal  PHYSICAL THERAPY EVALUATION  Patient: Carlotta Schmitt (33 y.o. female)  Date: 7/21/2019  Primary Diagnosis: CAP (community acquired pneumonia) [J18.9]  CAP (community acquired pneumonia) [J18.9]        Precautions: Visual impairment s/p recent cataract sx, Droplet Precautions  Contact, Other (comment)    ASSESSMENT :  Based on the objective data described below, the patient presents with visual impairment due to recent eye sx, with difficulty reading fine print, but able to see large print, TV, and clock without difficulty. Has not noted difficulty in depth discrepancies. She reports that she has 8 hr personal care assistance, and has several DME to include scooter, rollator, SPC. She is received ad mahnaz up in her room after completing toileting on BSC. Proceeded with ambulation in the corridors initially with HHA, but required RW which we secured for latter distance return to her room. Limited by general fatigue, and ORTEGA ( 9/10) with low level activity. Vital signs hemodynamically acceptable HR 67--70 bpm and /69-->176/78 . Returned to bedside without incidence, all needs met.  Anticipate no further DME or skilled services requirement at discharge as long as pt progressing toward her functional baseline. Patient will benefit from skilled intervention to address the above impairments. Patients rehabilitation potential is considered to be Good  Factors which may influence rehabilitation potential include:   ? None noted  ? Mental ability/status  ? Medical condition  ? Home/family situation and support systems  ? Safety awareness  ? Pain tolerance/management  ? Other: Temporary Vision impairment     PLAN :  Recommendations and Planned Interventions:  ?           Bed Mobility Training             ? Neuromuscular Re-Education  ? Transfer Training                   ? Orthotic/Prosthetic Training  ? Gait Training                         ? Modalities  ? Therapeutic Exercises           ? Edema Management/Control  ? Therapeutic Activities            ? Patient and Family Training/Education  ? Other (comment):    Frequency/Duration: Patient will be followed by physical therapy  5 times a week to address goals.   Discharge Recommendations: Home   Further Equipment Recommendations for Discharge: None new      SUBJECTIVE:   Patient stated My eye sight and shortness of breath are the most limiting right now    OBJECTIVE DATA SUMMARY:   HISTORY:    Past Medical History:   Diagnosis Date    Asthma     Asthma     Asthma     Borderline high cholesterol     Cataracts, both eyes     Diabetes mellitus type II     Heart disease     Hypercholesteremia     Hyperlipemia     Hypertension     Neuropathy     Restless leg     Restless leg     Rheumatoid arthritis(714.0)     Sleep apnea     Sleep disorder     Stroke [facility])     2010     Past Surgical History:   Procedure Laterality Date    CHEST SURGERY PROCEDURE UNLISTED      HX CORONARY STENT PLACEMENT Left Sept. 8th and Sept 16, 2018    HX TONSILLECTOMY       Prior Level of Function/Home Situation: Mod Independent with in home care x 8 hr daily. Personal factors and/or comorbidities impacting plan of care: See above. Home Situation  Home Environment: Private residence  # Steps to Enter: 0  Wheelchair Ramp: No  One/Two Story Residence: Two story  # of Interior Steps: 14  Lift Chair Available: No  Living Alone: No  Support Systems: Family member(s), Spouse/Significant Other/Partner  Patient Expects to be Discharged to[de-identified] Private residence  Current DME Used/Available at Home: [employer], rollator, Cane, straight, Wheelchair, Wheelchair, power  Tub or Shower Type: Tub/Shower combination    EXAMINATION/PRESENTATION/DECISION MAKING:   Critical Behavior:  Neurologic State: Alert  Orientation Level: Oriented X4  Cognition: Follows commands, Appropriate for age attention/concentration, Appropriate decision making  Safety/Judgement: Awareness of environment  Hearing: Auditory  Auditory Impairment: None  Skin:  intact exposed areas  Edema: non significant  Range Of Motion: WFL's   Strength:  WFL's    Tone & Sensation: Inact      Vision: Impaired as noted due to recent eye surgery     Functional Mobility:  Bed Mobility: Received ad mahnaz standing in her room on arrival, pt left at the EOB sitting with comfort. Scooting: Independent  Transfers:  Sit to Stand: Contact guard assistance;Stand-by assistance  Stand to Sit: Contact guard assistance     Balance:   Sitting: Intact  Standing: Intact; With support  Ambulation/Gait Training:  Distance (ft): 80 Feet (ft)  Assistive Device: Gait belt;Walker, rolling  Ambulation - Level of Assistance: Moderate assistance;Contact guard assistance     Gait Description (WDL): Exceptions to WDL  Gait Abnormalities: Step to gait  Base of Support: Narrowed   Speed/Kathreine: Pace decreased (<100 feet/min); Slow  Step Length: Left shortened;Right shortened      Stairs:TBA         Functional Measure:  Barthel Index:    Bathin  Bladder: 10  Bowels: 10  Groomin  Dressing: 10  Feeding: 10  Mobility: 10  Stairs: 5  Toilet Use: 10  Transfer (Bed to Chair and Back): 15  Total: 90/100       Percentage of impairment   0%   1-19%   20-39%   40-59%   60-79%   80-99%   100%   Barthel Score 0-100 100 99-80 79-60 59-40 20-39 1-19   0     The Barthel ADL Index: Guidelines  1. The index should be used as a record of what a patient does, not as a record of what a patient could do. 2. The main aim is to establish degree of independence from any help, physical or verbal, however minor and for whatever reason. 3. The need for supervision renders the patient not independent. 4. A patient's performance should be established using the best available evidence. Asking the patient, friends/relatives and nurses are the usual sources, but direct observation and common sense are also important. However direct testing is not needed. 5. Usually the patient's performance over the preceding 24-48 hours is important, but occasionally longer periods will be relevant. 6. Middle categories imply that the patient supplies over 50 per cent of the effort. 7. Use of aids to be independent is allowed. Ellis Miller., Barthel, D.W. (0860). Functional evaluation: the Barthel Index. [address] [facility] (14)2. Asael Leyva julia Michael, [email or web address], Tanna Lamb., [facility]., [place], [address] [place] [address] (1999). Measuring the change indisability after inpatient rehabilitation; comparison of the responsiveness of the Barthel Index and Functional [place] Measure. Journal of Neurology, Neurosurgery, and Psychiatry, 66(4), 987-617. Petty Botello, N.J.A, JAYDA Nunes, & Domo Davis, MNateA. (2004.) Assessment of post-stroke quality of life in cost-effectiveness studies: The usefulness of the Barthel Index and the EuroQoL-5D.  Quality of Life Research, 15, 195-           Physical Therapy Evaluation Charge Determination   History Examination Presentation Decision-Making   LOW Complexity : Zero comorbidities / personal factors that will impact the outcome / POC MEDIUM Complexity : 3 Standardized tests and measures addressing body structure, function, activity limitation and / or participation in recreation  MEDIUM Complexity : Evolving with changing characteristics  MEDIUM Complexity : FOTO score of 26-74      Based on the above components, the patient evaluation is determined to be of the following complexity level: MEDIUM    Pain:  Pain Scale 1: Numeric (0 - 10)  Pain Intensity 1: 0     Activity Tolerance: Tolerated activity fairly with early onset of fatigue due to limited mobility and elevated dyspnea with low level activity participation. Please refer to the flowsheet for vital signs taken during this treatment. After treatment:   ?         Patient left in no apparent distress sitting up at EOB  ? Patient left in no apparent distress in bed  ? Call bell left within reach  ? Nursing notified  ? Caregiver present  ? Bed alarm activated    COMMUNICATION/EDUCATION:   The patients plan of care was discussed with: Registered Nurse. ?         Fall prevention education was provided and the patient/caregiver indicated understanding. ? Patient/family have participated as able in goal setting and plan of care. ?         Patient/family agree to work toward stated goals and plan of care. ?         Patient understands intent and goals of therapy, but is neutral about his/her participation. ? Patient is unable to participate in goal setting and plan of care.     Thank you for this referral.  Albertina Lopez, PT   Time Calculation: 20 mins

## 2019-07-21 NOTE — CONSULTS
Reason for Consult: CHF    HPI: Ritu Serra is a 62 y.o. female with past medical history significant for hypertension, diabetes, dyslipidemia, CAD, history of non-ST elevation MI in September 2018 in [place], 3 stents, history of significant asthma, sleep apnea, diabetes with chronic neuropathy, restless leg syndrome and morbid obesity. She presented to the ER with symptoms of shortness of breath from last 5 to 6 days. She was given prednisone and azithromycin by Dr. Austin Painter but symptoms did not improve. Symptoms are associated with fever and chills. There is non-productive cough with orthopnea. She is been noncompliant with the diet but has been compliant with the medications. EKG at the time of presentation demonstrated sinus rhythm with septal Q waves with inferolateral T wave inversions. Nuclear stress test in April 2019 demonstrated no significant ischemia with LVEF of 52%. Echocardiogram from November 2018 demonstrated EF of 55 to 60%, with LVH and small circumferential pericardial effusion. Chest x-ray on this admission demonstrated enlarged cardiac silhouette with new lingular and right middle lobe airspace disease. Echocardiogram on today's admission demonstrated large pericardial effusion mostly posterior without any chamber compression or tamponade. Next      Serum creatinine is 1.26, potassium 3.9, troponins were negative. Hemoglobin 10.7, platelet 527. NT proBNP was 4111. Plan:     1. Diastolic congestive heart failure: Known LVEF is normal.  Blood pressure has been significantly high. Tight blood pressure control will help with diastolic heart failure. Compliant with medications however not compliant with diet. Low-salt diet is recommended. At home she was not taking Bumex. She has been started on Bumex 1 mg IV twice daily. Continue. Continue Diovan, Lopressor and isosorbide mononitrate. Titrate medications. Potentially can increase the Diovan to 320 mg p.o. daily.  Monitor creatinine. 2.  Large pericardial effusion: This is a new finding on today's echocardiogram.  She had a small pericardial effusion previously. There is no chamber compression. There is no tamponade features. Likely reactive to the pneumonia as well as heart failure. We will continue to monitor. No indication for pericardiocentesis. 3.  CAD status post PCI 3 stents in September 2018: Continue Plavix. Continue isosorbide mononitrate. She will also be on aspirin. 4.  Hypertension: Blood pressure is poorly controlled. Uptitrating medications as above. Past Medical History:   Diagnosis Date    Asthma     Asthma     Asthma     Borderline high cholesterol     Cataracts, both eyes     Diabetes mellitus type II     Heart disease     Hypercholesteremia     Hyperlipemia     Hypertension     Neuropathy     Restless leg     Restless leg     Rheumatoid arthritis(714.0)     Sleep apnea     Sleep disorder     Stroke [facility])     2010            Past Surgical History:   Procedure Laterality Date    CHEST SURGERY PROCEDURE UNLISTED      HX CORONARY STENT PLACEMENT Left Sept. 8th and Sept 16, 2018    HX TONSILLECTOMY               Family History   Problem Relation Age of Onset    Heart Disease Mother     Diabetes Mother     Hypertension Other     No Known Problems Father            Social History     Socioeconomic History    Marital status:      Spouse name: Not on file    Number of children: Not on file    Years of education: Not on file    Highest education level: Not on file   Occupational History    Not on file   Social Needs    Financial resource strain: Not on file    Food insecurity:     Worry: Not on file     Inability: Not on file    Transportation needs:     Medical: Not on file     Non-medical: Not on file   Tobacco Use    Smoking status: Never Smoker    Smokeless tobacco: Never Used   Substance and Sexual Activity    Alcohol use:  No Alcohol/week: 4.2 standard drinks     Types: 5 Cans of beer per week    Drug use: No    Sexual activity: Never   Lifestyle    Physical activity:     Days per week: Not on file     Minutes per session: Not on file    Stress: Not on file   Relationships    Social connections:     Talks on phone: Not on file     Gets together: Not on file     Attends Zoroastrianism service: Not on file     Active member of club or organization: Not on file     Attends meetings of clubs or organizations: Not on file     Relationship status: Not on file    Intimate partner violence:     Fear of current or ex partner: Not on file     Emotionally abused: Not on file     Physically abused: Not on file     Forced sexual activity: Not on file   Other Topics Concern     Service Not Asked    Blood Transfusions Not Asked    Caffeine Concern Not Asked    Occupational Exposure Not Asked   Carol Larisa Hazards Not Asked    Sleep Concern Not Asked    Stress Concern Not Asked    Weight Concern Not Asked    Special Diet Not Asked    Back Care Not Asked    Exercise Not Asked    Bike Helmet Not Asked    Seat Belt Not Asked    Self-Exams Not Asked   Social History Narrative    Not on file         Allergies   Allergen Reactions    Levaquin [Levofloxacin] Anaphylaxis    Albuterol Anxiety    Aspirin Rash     Pt can take IBUPROFEN    Metformin Diarrhea            Current Facility-Administered Medications   Medication Dose Route Frequency Provider Last Rate Last Dose    hydrALAZINE (APRESOLINE) 20 mg/mL injection 10 mg  10 mg IntraVENous Q6H PRN Drea Torre MD   10 mg at 07/21/19 0143    acetaminophen (TYLENOL) tablet 650 mg  650 mg Oral Q6H PRN Filomena Mena MD        sertraline (ZOLOFT) tablet 25 mg  25 mg Oral DAILY Filomena Mena MD        atorvastatin (LIPITOR) tablet 40 mg  40 mg Oral DAILY Johan Tolentino MD   40 mg at 07/20/19 7809    clopidogrel (PLAVIX) tablet 75 mg  75 mg Oral DAILY Vinita Rendon MD 75 mg at 07/21/19 0838    DULoxetine (CYMBALTA) capsule 60 mg  60 mg Oral DAILY Johan Tolentino MD   60 mg at 07/21/19 0838    insulin glargine (LANTUS) injection 24 Units  24 Units SubCUTAneous QHS Johan Tolentino MD   24 Units at 07/20/19 2253    isosorbide mononitrate ER (IMDUR) tablet 60 mg  60 mg Oral DAILY Johan Tolentino MD   60 mg at 07/21/19 0838    latanoprost (XALATAN) 0.005 % ophthalmic solution 1 Drop  1 Drop Both Eyes QPM Johan Tolentino MD   1 Drop at 07/20/19 2310    levocetirizine (XYZAL) tablet 5 mg (Patient Supplied)  5 mg Oral DAILY Johan Tolentino MD   Stopped at 07/21/19 0900    melatonin tablet 3 mg  3 mg Oral QHS PRN Johan Tolentino MD   3 mg at 07/20/19 2259    metoprolol tartrate (LOPRESSOR) tablet 50 mg  50 mg Oral BID Johan Tolentino MD   50 mg at 07/21/19 0837    montelukast (SINGULAIR) tablet 10 mg  10 mg Oral QHS Johan Tolentino MD   10 mg at 07/20/19 2254    nitroglycerin (NITROSTAT) tablet 0.4 mg  0.4 mg SubLINGual Q5MIN PRN Johan Tolentino MD        pramipexole (MIRAPEX) tablet 0.5 mg  0.5 mg Oral BID Johan Tolentino MD   0.5 mg at 07/21/19 0838    sodium chloride (NS) flush 5-40 mL  5-40 mL IntraVENous Q8H Johan Tolentino MD   10 mL at 07/21/19 0639    sodium chloride (NS) flush 5-40 mL  5-40 mL IntraVENous PRN Johan Tolentino MD        piperacillin-tazobactam (ZOSYN) 3.375 g in 0.9% sodium chloride (MBP/ADV) 100 mL  3.375 g IntraVENous Q8H Johan Tolentino MD 25 mL/hr at 07/21/19 0638 3.375 g at 07/21/19 0638    azithromycin (ZITHROMAX) 500 mg in 0.9% sodium chloride (MBP/ADV) 250 mL  500 mg IntraVENous Q24H Johan Tolentino  mL/hr at 07/20/19 2315 500 mg at 07/20/19 2315    insulin lispro (HUMALOG) injection   SubCUTAneous QID WITH MEALS Johan Tolentino MD   2 Units at 07/21/19 0845    glucose chewable tablet 16 g  4 Tab Oral PRN Johan Tolentino MD        dextrose 10 % infusion   IntraVENous PRN Carlota Higgins MD        glucagon (GLUCAGEN) injection 1 mg  1 mg IntraMUSCular PRN Johan Tolentino MD        heparin (porcine) injection 5,000 Units  5,000 Units SubCUTAneous Q8H Johan Tolentino MD   5,000 Units at 07/21/19 0600    arformoterol (BROVANA) neb solution 15 mcg  15 mcg Nebulization BID RT Johan Tolentino MD        And    budesonide (PULMICORT) 500 mcg/2 ml nebulizer suspension  500 mcg Nebulization BID RT Johan Tolentino MD   500 mcg at 07/21/19 0750    valsartan (DIOVAN) tablet 160 mg  160 mg Oral DAILY Johan Tolentino MD   160 mg at 07/21/19 0838    guaiFENesin ER (MUCINEX) tablet 600 mg  600 mg Oral Q12H Johan Tolentino MD   600 mg at 07/21/19 0838    methylPREDNISolone (PF) (Solu-MEDROL) injection 80 mg  80 mg IntraVENous Q12H Drea Torre MD   80 mg at 07/21/19 0844    levalbuterol (XOPENEX) nebulizer soln 0.63 mg/3 mL  0.63 mg Nebulization Q4H RT Drea Torre MD   0.63 mg at 07/21/19 0749    bumetanide (BUMEX) injection 1 mg  1 mg IntraVENous BID Drea Torre MD   1 mg at 07/21/19 0844        ROS:  12 point review of systems was performed. All negative except for HPI     Physical Exam:  Visit Vitals  /65 (BP 1 Location: Right arm, BP Patient Position: Post activity)   Pulse 66   Temp 97.6 °F (36.4 °C)   Resp 18   Ht 5' 1\" (1.549 m)   Wt 212 lb 1.3 oz (96.2 kg)   LMP 01/01/2009 (Within Months)   SpO2 97%   Breastfeeding?  Unknown   BMI 40.07 kg/m²       Gen:  Well-developed, well-nourished, in no acute distress  HEENT:  Pink conjunctivae, PERRL, hearing intact to voice, moist mucous membranes  Neck:  Supple, without masses, thyroid non-tender  Resp:  No accessory muscle use, clear breath sounds without wheezes rales or rhonchi  Card:  No murmurs, normal S1, S2 without thrills, bruits or peripheral edema  Abd:  Soft, non-tender, non-distended, normoactive bowel sounds are present, no palpable organomegaly and no detectable hernias  Lymph:  No cervical or inguinal adenopathy  Musc:  No cyanosis or clubbing  Skin:  No rashes or ulcers, skin turgor is good  Neuro:  Cranial nerves are grossly intact, no focal motor weakness, follows commands appropriately  Psych:  Good insight, oriented to person, place and time, alert     Labs:     Lab Results   Component Value Date/Time    WBC 7.0 07/21/2019 12:05 AM    HGB 10.7 (L) 07/21/2019 12:05 AM    Hemoglobin (POC) 12.9 10/31/2016 04:34 PM    HCT 33.5 (L) 07/21/2019 12:05 AM    Hematocrit (POC) 38 10/31/2016 04:34 PM    PLATELET 138 24/95/4247 12:05 AM    MCV 93.6 07/21/2019 12:05 AM     Lab Results   Component Value Date/Time    Hemoglobin A1c 6.8 (H) 07/21/2019 12:05 AM    Hemoglobin A1c 8.9 (H) 02/20/2019 10:44 AM    Hemoglobin A1c 10.5 (H) 11/15/2018 03:56 AM    Glucose 294 (H) 07/21/2019 12:05 AM    Glucose (POC) 244 (H) 07/21/2019 07:13 AM    LDL, calculated 137.8 (H) 07/21/2019 12:05 AM    Creatinine (POC) 0.7 10/31/2016 04:34 PM    Creatinine 1.26 (H) 07/21/2019 12:05 AM      Lab Results   Component Value Date/Time    Cholesterol, total 235 (H) 07/21/2019 12:05 AM    HDL Cholesterol 58 07/21/2019 12:05 AM    LDL, calculated 137.8 (H) 07/21/2019 12:05 AM    Triglyceride 196 (H) 07/21/2019 12:05 AM    CHOL/HDL Ratio 4.1 07/21/2019 12:05 AM     Lab Results   Component Value Date/Time    ALT (SGPT) 24 07/20/2019 05:41 PM    AST (SGOT) 16 07/20/2019 05:41 PM    Alk.  phosphatase 86 07/20/2019 05:41 PM    Bilirubin, direct 0.1 03/10/2015 05:35 AM    Bilirubin, total 0.5 07/20/2019 05:41 PM    Albumin 3.2 (L) 07/20/2019 05:41 PM    Protein, total 6.9 07/20/2019 05:41 PM    INR 1.1 05/31/2010 01:50 AM    Prothrombin time 11.2 (H) 05/31/2010 01:50 AM    PLATELET 604 85/78/5607 12:05 AM     Lab Results   Component Value Date/Time    INR 1.1 05/31/2010 01:50 AM    INR 1.0 05/25/2010 09:27 PM    INR (POC) 0.9 05/25/2010 09:51 PM    Prothrombin time 11.2 (H) 05/31/2010 01:50 AM    Prothrombin time 10.6 05/25/2010 09:27 PM      Lab Results   Component Value Date/Time    GFR est non-AA 43 (L) 07/21/2019 12:05 AM    GFRNA, POC >60 10/31/2016 04:34 PM    GFR est AA 52 (L) 07/21/2019 12:05 AM    GFRAA, POC >60 10/31/2016 04:34 PM    Creatinine 1.26 (H) 07/21/2019 12:05 AM    Creatinine (POC) 0.7 10/31/2016 04:34 PM    BUN 30 (H) 07/21/2019 12:05 AM    BUN (POC) 21 (H) 10/31/2016 04:34 PM    Sodium 141 07/21/2019 12:05 AM    Sodium (POC) 133 (L) 10/31/2016 04:34 PM    Potassium 3.9 07/21/2019 12:05 AM    Potassium (POC) 4.5 10/31/2016 04:34 PM    Chloride 107 07/21/2019 12:05 AM    Chloride (POC) 98 10/31/2016 04:34 PM    CO2 27 07/21/2019 12:05 AM    Magnesium 2.5 (H) 07/21/2019 12:05 AM    Phosphorus 3.8 07/21/2019 12:05 AM     No results found for: PSA, Miriam Garcia, PSAR3, [id number], [id number], PSALT  Lab Results   Component Value Date/Time    TSH 1.00 07/21/2019 12:05 AM    T4, Free 1.0 05/26/2010 04:45 AM      Lab Results   Component Value Date/Time    Glucose 294 (H) 07/21/2019 12:05 AM    Glucose (POC) 244 (H) 07/21/2019 07:13 AM      Lab Results   Component Value Date/Time    CK 85 02/25/2017 09:17 AM    CK - MB 4.1 (H) 01/09/2018 08:05 PM    CK-MB Index 1.4 01/09/2018 08:05 PM    Troponin-I, Qt. <0.05 07/21/2019 06:50 AM    BNP 29 01/01/2011 07:55 AM      Lab Results   Component Value Date/Time    BNP 29 01/01/2011 07:55 AM    BNP 84 09/20/2009 07:50 AM    NT pro-BNP 4,118 (H) 07/20/2019 05:41 PM    NT pro- (H) 09/25/2016 09:55 AM    NT pro-BNP 1,177 (H) 07/30/2016 11:45 PM    NT pro- (H) 11/23/2011 12:45 AM      Lab Results   Component Value Date/Time    Sodium 141 07/21/2019 12:05 AM    Potassium 3.9 07/21/2019 12:05 AM    Chloride 107 07/21/2019 12:05 AM    CO2 27 07/21/2019 12:05 AM    Anion gap 7 07/21/2019 12:05 AM    Glucose 294 (H) 07/21/2019 12:05 AM    BUN 30 (H) 07/21/2019 12:05 AM    Creatinine 1.26 (H) 07/21/2019 12:05 AM    BUN/Creatinine ratio 24 (H) 07/21/2019 12:05 AM    GFR est AA 52 (L) 07/21/2019 12:05 AM    GFR est non-AA 43 (L) 07/21/2019 12:05 AM    Calcium 8.8 07/21/2019 12:05 AM      Lab Results   Component Value Date/Time    Sodium 141 07/21/2019 12:05 AM    Potassium 3.9 07/21/2019 12:05 AM    Chloride 107 07/21/2019 12:05 AM    CO2 27 07/21/2019 12:05 AM    Anion gap 7 07/21/2019 12:05 AM    Glucose 294 (H) 07/21/2019 12:05 AM    BUN 30 (H) 07/21/2019 12:05 AM    Creatinine 1.26 (H) 07/21/2019 12:05 AM    BUN/Creatinine ratio 24 (H) 07/21/2019 12:05 AM    GFR est AA 52 (L) 07/21/2019 12:05 AM    GFR est non-AA 43 (L) 07/21/2019 12:05 AM    Calcium 8.8 07/21/2019 12:05 AM    Bilirubin, total 0.5 07/20/2019 05:41 PM    ALT (SGPT) 24 07/20/2019 05:41 PM    AST (SGOT) 16 07/20/2019 05:41 PM    Alk.  phosphatase 86 07/20/2019 05:41 PM    Protein, total 6.9 07/20/2019 05:41 PM    Albumin 3.2 (L) 07/20/2019 05:41 PM    Globulin 3.7 07/20/2019 05:41 PM    A-G Ratio 0.9 (L) 07/20/2019 05:41 PM      Lab Results   Component Value Date/Time    Hemoglobin A1c 6.8 (H) 07/21/2019 12:05 AM    Hemoglobin A1c (POC) 11.9 01/14/2019 02:05 PM         Recent Labs     07/21/19  0650   TROIQ <0.05           Problem List:     Problem List  Date Reviewed: 7/18/2019          Codes Class Noted    Community acquired pneumonia of right middle lobe of lung ([facility] ICD-10-CM: J18.1  ICD-9-CM: 032  7/21/2019        * (Principal) CAP (community acquired pneumonia) ICD-10-CM: J18.9  ICD-9-CM: 486  7/20/2019        Type 2 diabetes with nephropathy ([facility] ICD-10-CM: E11.21  ICD-9-CM: 250.40, 583.81  11/19/2018        Chest pain ICD-10-CM: R07.9  ICD-9-CM: 786.50  11/14/2018        Coronary artery disease involving native coronary artery of native heart with angina pectoris ([facility] ICD-10-CM: I25.119  ICD-9-CM: 414.01, 413.9  11/14/2018        Status post coronary artery stent placement (Chronic) ICD-10-CM: Z95.5  ICD-9-CM: V45.82  11/13/2018        Severe obesity ([facility] ICD-10-CM: E66.01  ICD-9-CM: 278.01  11/13/2018        Neuropathy of right lower extremity ICD-10-CM: G57.91  ICD-9-CM: 355.8  9/27/2016        Pericardial effusion(moderate-large) with mild cardiac tamponade--via echo 8/1/16 ICD-10-CM: I31.3, I31.4  ICD-9-CM: 423.9, 423.3  8/1/2016        Acute on chronic diastolic CHF (congestive heart failure) (HCC) (Chronic) ICD-10-CM: I50.33  ICD-9-CM: 428.33, 428.0  8/1/2016        Obstructive sleep apnea ICD-10-CM: G47.33  ICD-9-CM: 327.23  3/10/2015        TIA (transient ischemic attack) ICD-10-CM: G45.9  ICD-9-CM: 435.9  3/9/2015        Essential hypertension with goal blood pressure less than 130/85 ICD-10-CM: I10  ICD-9-CM: 401.9  3/9/2015        DM type 2, uncontrolled, with neuropathy ([facility] ICD-10-CM: E11.40, E11.65  ICD-9-CM: 250.62, 357.2  3/9/2015        Asthma (Chronic) ICD-10-CM: J45.909  ICD-9-CM: 493.90  3/9/2015        Hyperlipidemia (Chronic) ICD-10-CM: E78.5  ICD-9-CM: 272.4  3/9/2015        Restless leg syndrome (Chronic) ICD-10-CM: G25.81  ICD-9-CM: 333.94  3/9/2015                Chevy Ingram MD, [facility]

## 2019-07-21 NOTE — PROGRESS NOTES
[address] [place] [address]   Office [phone]  Fax [phone]          Assessment and Plan     Kee Smith is a 62 y.o. female with PMH of CHF (EF 55-60%, 2018), asthma, CAD, hypercholesterolemia, DM with chronic nephropathy, HTN, sleep apnea, neuropathy, restless leg syndrome and obesity, admitted for acute non specified respiratory failure 2/2 CAP vs acute HFpEF exacerbation vs Asthma exacerbation. She has spent 1 night(s) in the hospital.    24 Hour Events: No acute events. 1. CAP:              -admit to inpatient on tele, w/ strict I/O, VS per unit              -IV abx (zosyn + azithromycin)              -PRN O2 for hypoxia, PRN mucinex              -aspiration and droplet precautions              -RVP, procalcitonin              -pending urine, blood, and sputum cx              -daily AM labs     2. Acute CHF Exacerbation: EF 55-65% in 11/2018 likely 2/2 to non compliance.               -Cardiology consulted. f/u recomendations               -Diurese with bumex 1mg BID              -strict I/O w/ daily weights.              -pending ECHO,Trending trop, TSH, mag and Phos              -hold diltiazem for now              -continue isosorbide mononitrate, valsartan and metropolol              -Monitor BP and Creatinine closely               -daily AM labs      3. Acute Exacerbation of moderate persistent asthma: likely 2/2 to PNA s/p 2 days of tx with prednisone and Azithromycin              - solumedrol 80 BID and Xopenex q4hrs RT              - continue home Singulair, Xyzal, Breo (substituted with brovan pulmicort)              -PRN O2 to maintain sats >90%     4. DM w/ Nephropathy: stable               -POA glucose 226              -POC glucose checks, SSI ACHS              -BUN 34, Cr 1.24 (baseline ~1)              -continue to monitor strict I/O              -daily AM labs              -pending HA1c      5.  HTN: stable              -metoprolol 50 mg              -continue valsartan 160 mg              -continue to monitor vitals       6. CAD: stable, stent placement in 2018              -continue clopidogrel 75 mg   -continue metoprolol and valsartan      7. HLD: stable               -continue atorvastatin              -pending lipid panel       8. Neuropathy: stable              -continue Cymbalta 60 mg     9. Sleep Apnea: stable      10. Restless Leg Syndrome: stable              -continue pramipexole      11. Cataracts: stable, recent eye surgery in               -continue Latanoprost              -Tobramycin and prednisolone eye drops      12. Obesity Body mass index is 39.3 kg/m². -Encouraging lifestyle modifications and further follow up outpatient. FEN/GI - Cardiac diet. Activity - Ambulate with assistance  DVT prophylaxis - Sub Q Heparin  GI prophylaxis - Not indicated at this time  Fall prophylaxis - Not indicated at this time. Unit - Telemetry  Admission Status - Admitted  Disposition - Plan to d/c to Home. PT/OT  Code Status - Full    I appreciate the opportunity to participate in the care of this patient,  Moose Leonard MD  Family Medicine Resident         Subjective / Objective     Subjective : Pt is doing well this AM. SOB better than before. No acute complaints. No CP/palp/wheezing/fever/chills/dysuria/weakness.     Temp (24hrs), Av.9 °F (36.6 °C), Min:97.9 °F (36.6 °C), Max:98 °F (36.7 °C)     Objective  Respiratory:   O2 Device: Room air     I/O:  Date 19 07 - 19 0659 19 07 - 19 0659   Shift 1195-5353 6717-7788 24 Hour Total 0359-7627 9858-1941 24 Hour Total   INTAKE   I.V.(mL/kg/hr)  100 100        Volume (piperacillin-tazobactam (ZOSYN) 3.375 g in 0.9% sodium chloride (MBP/ADV) 100 mL)  100 100      Shift Total(mL/kg)  100(1) 100(1)      OUTPUT   Urine(mL/kg/hr)  2400 2400        Urine Voided  2400 2400      Shift Total(mL/kg)  2400(24.8) 2400(24.8)      NET  -2300 -2300      Weight (kg) 94.3 96.7 96.7 96.7 96.7 96.7       Inpatient Medications  Current Facility-Administered Medications   Medication Dose Route Frequency    hydrALAZINE (APRESOLINE) 20 mg/mL injection 10 mg  10 mg IntraVENous Q6H PRN    atorvastatin (LIPITOR) tablet 40 mg  40 mg Oral DAILY    clopidogrel (PLAVIX) tablet 75 mg  75 mg Oral DAILY    DULoxetine (CYMBALTA) capsule 60 mg  60 mg Oral DAILY    insulin glargine (LANTUS) injection 24 Units  24 Units SubCUTAneous QHS    isosorbide mononitrate ER (IMDUR) tablet 60 mg  60 mg Oral DAILY    latanoprost (XALATAN) 0.005 % ophthalmic solution 1 Drop  1 Drop Both Eyes QPM    levocetirizine (XYZAL) tablet 5 mg (Patient Supplied)  5 mg Oral DAILY    melatonin tablet 3 mg  3 mg Oral QHS PRN    metoprolol tartrate (LOPRESSOR) tablet 50 mg  50 mg Oral BID    montelukast (SINGULAIR) tablet 10 mg  10 mg Oral QHS    nitroglycerin (NITROSTAT) tablet 0.4 mg  0.4 mg SubLINGual Q5MIN PRN    pramipexole (MIRAPEX) tablet 0.5 mg  0.5 mg Oral BID    sodium chloride (NS) flush 5-40 mL  5-40 mL IntraVENous Q8H    sodium chloride (NS) flush 5-40 mL  5-40 mL IntraVENous PRN    piperacillin-tazobactam (ZOSYN) 3.375 g in 0.9% sodium chloride (MBP/ADV) 100 mL  3.375 g IntraVENous Q8H    azithromycin (ZITHROMAX) 500 mg in 0.9% sodium chloride (MBP/ADV) 250 mL  500 mg IntraVENous Q24H    insulin lispro (HUMALOG) injection   SubCUTAneous QID WITH MEALS    glucose chewable tablet 16 g  4 Tab Oral PRN    dextrose 10 % infusion   IntraVENous PRN    glucagon (GLUCAGEN) injection 1 mg  1 mg IntraMUSCular PRN    heparin (porcine) injection 5,000 Units  5,000 Units SubCUTAneous Q8H    arformoterol (BROVANA) neb solution 15 mcg  15 mcg Nebulization BID RT    And    budesonide (PULMICORT) 500 mcg/2 ml nebulizer suspension  500 mcg Nebulization BID RT    valsartan (DIOVAN) tablet 160 mg  160 mg Oral DAILY    guaiFENesin ER (MUCINEX) tablet 600 mg  600 mg Oral Q12H    methylPREDNISolone (PF) (Solu-MEDROL) injection 80 mg  80 mg IntraVENous Q12H    levalbuterol (XOPENEX) nebulizer soln 0.63 mg/3 mL  0.63 mg Nebulization Q4H RT    bumetanide (BUMEX) injection 1 mg  1 mg IntraVENous BID         Allergies  Allergies   Allergen Reactions    Levaquin [Levofloxacin] Anaphylaxis    Albuterol Anxiety    Aspirin Rash     Pt can take IBUPROFEN    Metformin Diarrhea         CBC:  Recent Labs     07/21/19  0005 07/20/19  1741   WBC 7.0 7.9   HGB 10.7* 10.8*   HCT 33.5* 32.8*    500       Metabolic Panel:  Recent Labs     07/21/19  0005 07/20/19  1741    138   K 3.9 4.6    104   CO2 27 26   BUN 30* 34*   CREA 1.26* 1.24*   * 282*   CA 8.8 8.6   MG 2.5*  --    PHOS 3.8  --    ALB  --  3.2*   SGOT  --  16   ALT  --  24         Physical Exam  General:   Alert, cooperative, no acute distress   Head:   Atraumatic   Eyes:   Conjunctivae clear   ENT:  Oral mucosa normal   Neck:  Supple, trachea midline, no adenopathy   No JVD   Back:    No CVA tenderness    Lungs:   Clear to auscultation bilaterally    Heart:   Regular rhythm, no murmur   Abdomen:    Soft, non-tender   No masses or organomegaly    Extremities:  No edema or DVT signs   Skin:  Warm and dry    No rashes or lesions   Neurologic:  Oriented   No focal deficits         Imaging/procedures: CXR indicates \"Enlarged cardiac silhouette, new lingular and right middle lobe airspace disease\". EKG: sinus bradycardia, QTc 457 ms.  Unchanged EKG from previous       For Billing    Chief Complaint   Patient presents with    Shortness of [address] Problems  Date Reviewed: 7/18/2019          Codes Class Noted POA    CAP (community acquired pneumonia) ICD-10-CM: J18.9  ICD-9-CM: 486  7/20/2019 Unknown        Type 2 diabetes with nephropathy ([facility] ICD-10-CM: E11.21  ICD-9-CM: 250.40, 583.81  11/19/2018 Yes        Chest pain ICD-10-CM: R07.9  ICD-9-CM: 786.50  11/14/2018 Yes        Coronary artery disease involving native coronary artery of native heart with angina pectoris [facility]) ICD-10-CM: I25.119  ICD-9-CM: 414.01, 413.9  11/14/2018 Yes        Status post coronary artery stent placement (Chronic) ICD-10-CM: Z95.5  ICD-9-CM: V45.82  11/13/2018 Yes        Severe obesity ([facility] ICD-10-CM: E66.01  ICD-9-CM: 278.01  11/13/2018 Yes        Neuropathy of right lower extremity ICD-10-CM: G57.91  ICD-9-CM: 355.8  9/27/2016 Yes        Pericardial effusion(moderate-large) with mild cardiac tamponade--via echo 8/1/16 ICD-10-CM: I31.3, I31.4  ICD-9-CM: 423.9, 423.3  8/1/2016 Yes        Acute on chronic diastolic CHF (congestive heart failure) (HCC) (Chronic) ICD-10-CM: I50.33  ICD-9-CM: 428.33, 428.0  8/1/2016 Yes        Obstructive sleep apnea ICD-10-CM: G47.33  ICD-9-CM: 327.23  3/10/2015 Yes        TIA (transient ischemic attack) ICD-10-CM: G45.9  ICD-9-CM: 435.9  3/9/2015 Yes        Essential hypertension with goal blood pressure less than 130/85 ICD-10-CM: I10  ICD-9-CM: 401.9  3/9/2015 Yes        DM type 2, uncontrolled, with neuropathy ([facility] ICD-10-CM: E11.40, E11.65  ICD-9-CM: 250.62, 357.2  3/9/2015 Yes        Asthma (Chronic) ICD-10-CM: J45.909  ICD-9-CM: 493.90  3/9/2015 Yes        Hyperlipidemia (Chronic) ICD-10-CM: E78.5  ICD-9-CM: 272.4  3/9/2015 Yes        Restless leg syndrome (Chronic) ICD-10-CM: G25.81  ICD-9-CM: 333.94  3/9/2015 Yes

## 2019-07-21 NOTE — ROUTINE PROCESS
TRANSFER - OUT REPORT:    Verbal report given to Rich(name) on An Li  being transferred to [facility] (unit) for routine progression of care       Report consisted of patients Situation, Background, Assessment and   Recommendations(SBAR). Information from the following report(s) SBAR, Kardex, ED Summary, Intake/Output, MAR, Accordion, Recent Results, Med Rec Status and Cardiac Rhythm SB was reviewed with the receiving nurse. Lines:   Peripheral IV 07/20/19 Right Wrist (Active)   Site Assessment Clean, dry, & intact 7/20/2019  5:53 PM   Phlebitis Assessment 1 7/20/2019  5:53 PM   Infiltration Assessment 0 7/20/2019  5:53 PM   Dressing Type Transparent 7/20/2019  5:53 PM   Hub Color/Line Status Flushed 7/20/2019  5:53 PM       Peripheral IV 07/20/19 Left Hand (Active)   Site Assessment Clean, dry, & intact 7/20/2019  7:21 PM   Phlebitis Assessment 0 7/20/2019  7:21 PM   Infiltration Assessment 0 7/20/2019  7:21 PM   Dressing Status Clean, dry, & intact 7/20/2019  7:21 PM   Dressing Type Transparent 7/20/2019  7:21 PM   Hub Color/Line Status Pink;Flushed 7/20/2019  7:21 PM   Action Taken Blood drawn 7/20/2019  7:21 PM   Alcohol Cap Used Yes 7/20/2019  7:21 PM        Opportunity for questions and clarification was provided.       Awaiting patient arrival.

## 2019-07-21 NOTE — PROGRESS NOTES
[address] [facility] [address] [place] [address]   Office [phone]  Fax [phone]          Assessment and Plan     Taina Hightower is a 62 y.o. female with PMH of CHF (EF 55-60%, 2018), asthma, CAD, hypercholesterolemia, DM with chronic nephropathy, HTN, sleep apnea, neuropathy, restless leg syndrome and obesity, admitted for acute non specified respiratory failure 2/2 CAP vs acute HFpEF exacerbation vs Asthma exacerbation. She has spent 2 night(s) in the hospital.    24 Hour Events: No acute events. Acute CHF Exacerbation 2/2 CAP: EF 55-65% in 11/2018 likely 2/2 to non compliance. BNP 4118. Trop <0.05 x 3, TSH 1.00, Mg 2.4, Phos 4.0  -Cardiology consulted, appreciate recs - rec continue bumex 1mg IV BID, continue diovan, lopressor, and isosorbide mononitrate  -Diurese with bumex 1mg BID  -strict I/O  -daily weights  -ECHO showed LV EF 61-65%, no regional wall motion abnormality; pericardial effusion measuring 22mm, no tamponade or compression (cardio rec watching for now)  -hold diltiazem for now   -continue isosorbide mononitrate, valsartan and metropolol  -Monitor BP and Creatinine closely   -daily AM labs     CAP: CXR on admission showed new lingular and R middle lobe PNA. RVP negative, procalcitonin <0.1  -IV abx - received 2 doses of zosyn and azithromycin - will discontinue zosyn today  -PRN O2 for hypoxia, PRN mucinex  -blood culture NGTD  -pending urine culture and sputum cx  -daily AM labs    Loose stools  Pt complains of 4-5 loose stools per day that are not preceded by any urgency.  She states this affects her life.  -try immodium and probiotic today     Acute Exacerbation of moderate persistent asthma: likely 2/2 to PNA s/p 2 days of tx with prednisone and Azithromycin  - solumedrol 80 BID and Xopenex q4hrs RT  - continue home Singulair, Xyzal, Breo (substituted with brovan pulmicort)  -PRN O2 to maintain sats >90%     DM w/ Nephropathy: stable, Cr 1.24 POA (baseline ~1) Cr 1.43  -POA glucose 226  -POC glucose checks, SSI ACHS  -continue to monitor strict I/O  -daily AM labs  -HgA1C 6.8     HTN: (stable) elevated this morning - /73  -metoprolol 50 mg  -continue valsartan 160 mg  -continue to monitor vitals       CAD: stable, stent placement in 2018  -continue clopidogrel 75 mg  -continue metoprolol and valsartan      HLD: stable   -continue atorvastatin  -pending lipid panel       Neuropathy: stable  -continue Cymbalta 60 mg     Restless Leg Syndrome: stable  -continue pramipexole      Cataracts: stable, recent eye surgery in   -continue Latanoprost  -Tobramycin and prednisolone eye drops      Obesity Body mass index is 39.3 kg/m². -Encouraging lifestyle modifications and further follow up outpatient. FEN/GI - Cardiac diet. Activity - Ambulate with assistance  DVT prophylaxis - Sub Q Heparin  GI prophylaxis - Not indicated at this time  Fall prophylaxis - Not indicated at this time. Disposition - Plan to d/c to Home. PT/OT  Code Status - Full    I appreciate the opportunity to participate in the care of this patient,  Sunshine Martines DO  Family Medicine Resident         Subjective / Objective     Subjective:  Symptoms:  Improved. She reports shortness of breath, cough and diarrhea. No chest pain or chest pressure. Diet:  No nausea or vomiting. Pt is doing well this AM. SOB better than before. No acute complaints. No CP/palp/wheezing/fever/chills/dysuria/weakness. Temp (24hrs), Av °F (36.7 °C), Min:97.6 °F (36.4 °C), Max:98.2 °F (36.8 °C)     Objective:  General Appearance:  Comfortable and in no acute distress. Vital signs: (most recent): Blood pressure 162/73, pulse 65, temperature 98 °F (36.7 °C), resp. rate 18, height 5' 1\" (1.549 m), weight 212 lb 1.3 oz (96.2 kg), last menstrual period 2009, SpO2 97 %, unknown if currently breastfeeding. No fever. Lungs:  Breath sounds clear to auscultation. She is not in respiratory distress. No wheezes.     Heart: Normal rate. Regular rhythm. S1 normal and S2 normal.    Abdomen: Abdomen is soft and non-distended. There is no abdominal tenderness. Respiratory:   O2 Device: Room air     I/O:  Date 07/21/19 0700 - 07/22/19 0659 07/22/19 0700 - 07/23/19 0659   Shift 8710-0418 9202-5771 24 Hour Total 8372-1670 0182-7786 24 Hour Total   INTAKE   P.O.  480 480        P. O.  480 480      I. V.(mL/kg/hr)  200(0.2) 200(0.1) 450  450     Volume (dextrose 10 % infusion)  0 0 0  0     Volume (azithromycin (ZITHROMAX) 500 mg in 0.9% sodium chloride (MBP/ADV) 250 mL)  0 0 250  250     Volume (piperacillin-tazobactam (ZOSYN) 3.375 g in 0.9% sodium chloride (MBP/ADV) 100 mL)  200 200 200  200   Shift Total(mL/kg)  680(7.1) 680(7.1) 450(4.7)  450(4.7)   OUTPUT   Urine(mL/kg/hr) 400(0.3) 2000(1.7) 2400(1)        Urine Voided 400 2000 2400        Urine Occurrence(s) 2 x  2 x      Stool           Stool Occurrence(s) 2 x  2 x      Shift Total(mL/kg) 400(4.2) 2000(20.8) 2400(24.9)      NET -400 -1320 -1720 450  450   Weight (kg) 96.2 96.2 96.2 95 95 95       Inpatient Medications  Current Facility-Administered Medications   Medication Dose Route Frequency    loperamide (IMODIUM) capsule 4 mg  4 mg Oral ONCE PRN    hydrALAZINE (APRESOLINE) 20 mg/mL injection 10 mg  10 mg IntraVENous Q6H PRN    acetaminophen (TYLENOL) tablet 650 mg  650 mg Oral Q6H PRN    sertraline (ZOLOFT) tablet 25 mg  25 mg Oral DAILY    atorvastatin (LIPITOR) tablet 40 mg  40 mg Oral DAILY    clopidogrel (PLAVIX) tablet 75 mg  75 mg Oral DAILY    DULoxetine (CYMBALTA) capsule 60 mg  60 mg Oral DAILY    insulin glargine (LANTUS) injection 24 Units  24 Units SubCUTAneous QHS    isosorbide mononitrate ER (IMDUR) tablet 60 mg  60 mg Oral DAILY    latanoprost (XALATAN) 0.005 % ophthalmic solution 1 Drop  1 Drop Both Eyes QPM    levocetirizine (XYZAL) tablet 5 mg (Patient Supplied)  5 mg Oral DAILY    melatonin tablet 3 mg  3 mg Oral QHS PRN    metoprolol tartrate (LOPRESSOR) tablet 50 mg  50 mg Oral BID    montelukast (SINGULAIR) tablet 10 mg  10 mg Oral QHS    nitroglycerin (NITROSTAT) tablet 0.4 mg  0.4 mg SubLINGual Q5MIN PRN    pramipexole (MIRAPEX) tablet 0.5 mg  0.5 mg Oral BID    sodium chloride (NS) flush 5-40 mL  5-40 mL IntraVENous Q8H    sodium chloride (NS) flush 5-40 mL  5-40 mL IntraVENous PRN    piperacillin-tazobactam (ZOSYN) 3.375 g in 0.9% sodium chloride (MBP/ADV) 100 mL  3.375 g IntraVENous Q8H    azithromycin (ZITHROMAX) 500 mg in 0.9% sodium chloride (MBP/ADV) 250 mL  500 mg IntraVENous Q24H    insulin lispro (HUMALOG) injection   SubCUTAneous QID WITH MEALS    glucose chewable tablet 16 g  4 Tab Oral PRN    dextrose 10 % infusion   IntraVENous PRN    glucagon (GLUCAGEN) injection 1 mg  1 mg IntraMUSCular PRN    heparin (porcine) injection 5,000 Units  5,000 Units SubCUTAneous Q8H    arformoterol (BROVANA) neb solution 15 mcg  15 mcg Nebulization BID RT    And    budesonide (PULMICORT) 500 mcg/2 ml nebulizer suspension  500 mcg Nebulization BID RT    valsartan (DIOVAN) tablet 160 mg  160 mg Oral DAILY    guaiFENesin ER (MUCINEX) tablet 600 mg  600 mg Oral Q12H    methylPREDNISolone (PF) (Solu-MEDROL) injection 80 mg  80 mg IntraVENous Q12H    levalbuterol (XOPENEX) nebulizer soln 0.63 mg/3 mL  0.63 mg Nebulization Q4H RT    bumetanide (BUMEX) injection 1 mg  1 mg IntraVENous BID     Allergies  Allergies   Allergen Reactions    Levaquin [Levofloxacin] Anaphylaxis    Albuterol Anxiety    Aspirin Rash     Pt can take IBUPROFEN    Metformin Diarrhea     CBC:  Recent Labs     07/22/19  0031 07/21/19  0005 07/20/19  1741   WBC 8.1 7.0 7.9   HGB 10.3* 10.7* 10.8*   HCT 31.8* 33.5* 32.8*    231 850       Metabolic Panel:  Recent Labs     07/22/19  0031 07/21/19  0005 07/20/19  1741    141 138   K 3.9 3.9 4.6    107 104   CO2 27 27 26   BUN 38* 30* 34*   CREA 1.43* 1.26* 1.24*   * 294* 282*   CA 8.5 8.8 8.6   MG 2.4 2.5*  --    PHOS 4.0 3.8  --    ALB  --   --  3.2*   SGOT  --   --  16   ALT  --   --  24          For Billing    Chief Complaint   Patient presents with    Shortness of [address] Problems  Date Reviewed: 7/18/2019          Codes Class Noted POA    Community acquired pneumonia of right middle lobe of lung ([facility] ICD-10-CM: J18.1  ICD-9-CM: 311  7/21/2019 Unknown        * (Principal) CAP (community acquired pneumonia) ICD-10-CM: J18.9  ICD-9-CM: 486  7/20/2019 Unknown        Type 2 diabetes with nephropathy ([facility] ICD-10-CM: E11.21  ICD-9-CM: 250.40, 583.81  11/19/2018 Yes        Chest pain ICD-10-CM: R07.9  ICD-9-CM: 786.50  11/14/2018 Yes        Coronary artery disease involving native coronary artery of native heart with angina pectoris ([facility] ICD-10-CM: I25.119  ICD-9-CM: 414.01, 413.9  11/14/2018 Yes        Status post coronary artery stent placement (Chronic) ICD-10-CM: Z95.5  ICD-9-CM: V45.82  11/13/2018 Yes        Severe obesity ([facility] ICD-10-CM: E66.01  ICD-9-CM: 278.01  11/13/2018 Yes        Neuropathy of right lower extremity ICD-10-CM: G57.91  ICD-9-CM: 355.8  9/27/2016 Yes        Pericardial effusion(moderate-large) with mild cardiac tamponade--via echo 8/1/16 ICD-10-CM: I31.3, I31.4  ICD-9-CM: 423.9, 423.3  8/1/2016 Yes        Acute on chronic diastolic CHF (congestive heart failure) (HCC) (Chronic) ICD-10-CM: I50.33  ICD-9-CM: 428.33, 428.0  8/1/2016 Yes        Obstructive sleep apnea ICD-10-CM: G47.33  ICD-9-CM: 327.23  3/10/2015 Yes        TIA (transient ischemic attack) ICD-10-CM: G45.9  ICD-9-CM: 435.9  3/9/2015 Yes        Essential hypertension with goal blood pressure less than 130/85 ICD-10-CM: I10  ICD-9-CM: 401.9  3/9/2015 Yes        DM type 2, uncontrolled, with neuropathy ([facility] ICD-10-CM: E11.40, E11.65  ICD-9-CM: 250.62, 357.2  3/9/2015 Yes        Asthma (Chronic) ICD-10-CM: J45.909  ICD-9-CM: 493.90  3/9/2015 Yes        Hyperlipidemia (Chronic) ICD-10-CM: E78.5  ICD-9-CM: 272.4  3/9/2015 Yes        Restless leg syndrome (Chronic) ICD-10-CM: G25.81  ICD-9-CM: 333.94  3/9/2015 Yes

## 2019-07-21 NOTE — ED NOTES
Bedside verbal report given to [facility] with [facility]. Transfer Assessment: Patient A&O x4 and in no distress. Physical re-examination reveals improvement in pts condition with reassessment of vital signs completed at the time of admission transfer.

## 2019-07-21 NOTE — ED NOTES
[facility] arrived. Pt c/o mid chest tightness 5/10. Dr Adrianna Savage at bedside and EKG done and POC Accu check. Pt stated tightness resolved and lasted ~ 5 minutes.

## 2019-07-21 NOTE — PROGRESS NOTES
Change of Shift Report:    1920: Bedside and Verbal shift change report given to [address] [place] [address] Avenue, RN (oncoming nurse) by Shanice Gibson RN (offgoing nurse). Report included the following information SBAR, Kardex, MAR, Accordion, Recent Results and Cardiac Rhythm NSR. Shift Summary:    1929:  Patient's vitals taken and patient assessment completed. Patient AOX4. No complaints of pain. 2123:  Spoke to Dr. Velasquez Self regarding patient's elevated BG of 359. MD stated to give 5 units. 2147:  Patient's scheduled meds given. 2325:  Patient's vitals taken. 0031:  Patient's labs drawn and sent to lab. 0410:  Patient's vitals taken. Patient complaining of 4/10 pain in hands. Patient given 650 mg PRN Tylenol. End of Shift Report:    0720: Bedside and Verbal shift change report given to Barron Levin RN and [facility], RN (oncoming nurse) by [facility] [address], RN (offgoing nurse). Report included the following information SBAR, Kardex, ED Summary, MAR, Accordion, Recent Results and Cardiac Rhythm NSR.

## 2019-07-21 NOTE — PROGRESS NOTES
[facility]   Senior Resident Admission Note    CC: SOB     HPI:  Sarah Rashid is a 62 y.o. female withh/o HFpEF and asthma who presents to the ER complaining of SOB  associated with a non productive cough for 6days. Tmax 101 with chills 5 days ago. Gets SOB with walking up 5 steps. Tonio Szymanski has a viral infection 7 days ago. She has beeing eating more salty ( chinese food today), but compliant with medication. Patient saw her PCP on 7/18 treated with asthma exacerbation  ROS positive orthopnea, PND. Neg for leg swelling or CP    [facility] ED course : 98F, 169/53, 62, 28, 98% RA  CXR with cardiomegaly and right middle lobe and lingular airspace disease  WBC: 7.9, Cr: 1.24 ( BL ~ 1.0). UA 1+ bacteria, moderate epithelial cess  ProBNP 4118, trop neg, ECG unchanged. QTc:457  Patient was treated with Ceftriaxone 2 g and Xopenex    Patient seen at bedsite    PE :   in mild acute respiratory distress, but very pleasant, laughing, ambulates fro and back thebathroom  Heart : RRR, no m/g/r, no leg swelling, no JVD  Lung : Fair air movement, bibasilar rales but no wheeze or ronchi    Patient is admitted for Acute non specified respiratory failure 2/2 CAP vs acute HFpEF exacerbation vs Asthma exacerbation . Chart reviewed. A/P:     Community acquired Pneumonia:   - Admit to telemetry IV antibiotics: zosyn and azithro, o2 prn for hypoxia,mucinex, aspiration and droplet precautions. RVP, procalcitonin, blood culture times, sputum gram stain and culture    Acute exacerbation with HFpEF: EF 55-65% in 11/2018 likely secondary to non compliance. - Cardiology consulted; follow up Travessa Circe 852 with bumex 1mg bid, strict IO Daily weights. - Obtain ECHO,Trend troponins, TSH, mag and Phos  - hold diltiazem for now, continue Telmisartan and Coreg.  Monitor BP and Creatinine closely     Acute Exacerbation of moderate persistent asthma: likely 2/2 to PNA s/p 2 days of treatment with prednisone and Zpack    Solumedrol 80 bid  and Xopenex q4hrs RT, continue home Singulair, juaquin, Breo ( substituted with brovan pulmicort)  - Oxygen prn to maintain sats >90%      Patient seen, examined, and discussed with Dr. Mikel Morales (PGY-1). For the remaining assessment and plan of other medical problems please refer to Dr. Lowell Echols H&P for more details. Pt discussed with on-call attending physician    Leo Torre MD  Family Medicine Resident  9:31 PM

## 2019-07-21 NOTE — PROGRESS NOTES
Problem: Risk for Spread of Infection  Goal: Prevent transmission of infectious organism to others  Description  Prevent the transmission of infectious organisms to other patients, staff members, and visitors.   Outcome: Progressing Towards Goal     Problem: Patient Education:  Go to Education Activity  Goal: Patient/Family Education  Outcome: Progressing Towards Goal     Problem: Patient Education: Go to Patient Education Activity  Goal: Patient/Family Education  Outcome: Progressing Towards Goal     Problem: Heart Failure: Day 1  Goal: Activity/Safety  Outcome: Progressing Towards Goal  Goal: Consults, if ordered  Outcome: Progressing Towards Goal  Goal: Diagnostic Test/Procedures  Outcome: Progressing Towards Goal  Goal: Nutrition/Diet  Outcome: Progressing Towards Goal  Goal: Medications  Outcome: Progressing Towards Goal  Goal: Respiratory  Outcome: Progressing Towards Goal  Goal: Treatments/Interventions/Procedures  Outcome: Progressing Towards Goal  Goal: Psychosocial  Outcome: Progressing Towards Goal  Goal: *Oxygen saturation within defined limits  Outcome: Progressing Towards Goal  Goal: *Hemodynamically stable  Outcome: Progressing Towards Goal  Goal: *Optimal pain control at patient's stated goal  Outcome: Progressing Towards Goal  Goal: *Anxiety reduced or absent  Outcome: Progressing Towards Goal

## 2019-07-21 NOTE — PROGRESS NOTES
Problem: Risk for Spread of Infection  Goal: Prevent transmission of infectious organism to others  Description  Prevent the transmission of infectious organisms to other patients, staff members, and visitors.   Outcome: Progressing Towards Goal     Problem: Patient Education:  Go to Education Activity  Goal: Patient/Family Education  Outcome: Progressing Towards Goal     Problem: Patient Education: Go to Patient Education Activity  Goal: Patient/Family Education  Outcome: Progressing Towards Goal     Problem: Heart Failure: Day 1  Goal: Off Pathway (Use only if patient is Off Pathway)  Outcome: Progressing Towards Goal  Goal: Activity/Safety  Outcome: Progressing Towards Goal  Goal: Consults, if ordered  Outcome: Progressing Towards Goal  Goal: Diagnostic Test/Procedures  Outcome: Progressing Towards Goal  Goal: Nutrition/Diet  Outcome: Progressing Towards Goal  Goal: Discharge Planning  Outcome: Progressing Towards Goal  Goal: Medications  Outcome: Progressing Towards Goal  Goal: Respiratory  Outcome: Progressing Towards Goal  Goal: Treatments/Interventions/Procedures  Outcome: Progressing Towards Goal  Goal: Psychosocial  Outcome: Progressing Towards Goal  Goal: *Oxygen saturation within defined limits  Outcome: Progressing Towards Goal  Goal: *Hemodynamically stable  Outcome: Progressing Towards Goal  Goal: *Optimal pain control at patient's stated goal  Outcome: Progressing Towards Goal  Goal: *Anxiety reduced or absent  Outcome: Progressing Towards Goal     Problem: Heart Failure: Day 2  Goal: Off Pathway (Use only if patient is Off Pathway)  Outcome: Progressing Towards Goal  Goal: Activity/Safety  Outcome: Progressing Towards Goal  Goal: Consults, if ordered  Outcome: Progressing Towards Goal  Goal: Diagnostic Test/Procedures  Outcome: Progressing Towards Goal  Goal: Nutrition/Diet  Outcome: Progressing Towards Goal  Goal: Discharge Planning  Outcome: Progressing Towards Goal  Goal: Medications  Outcome: Progressing Towards Goal  Goal: Respiratory  Outcome: Progressing Towards Goal  Goal: Treatments/Interventions/Procedures  Outcome: Progressing Towards Goal  Goal: Psychosocial  Outcome: Progressing Towards Goal  Goal: *Oxygen saturation within defined limits  Outcome: Progressing Towards Goal  Goal: *Hemodynamically stable  Outcome: Progressing Towards Goal  Goal: *Optimal pain control at patient's stated goal  Outcome: Progressing Towards Goal  Goal: *Anxiety reduced or absent  Outcome: Progressing Towards Goal  Goal: *Demonstrates progressive activity  Outcome: Progressing Towards Goal     Problem: Heart Failure: Day 3  Goal: Off Pathway (Use only if patient is Off Pathway)  Outcome: Progressing Towards Goal  Goal: Activity/Safety  Outcome: Progressing Towards Goal  Goal: Diagnostic Test/Procedures  Outcome: Progressing Towards Goal  Goal: Nutrition/Diet  Outcome: Progressing Towards Goal  Goal: Discharge Planning  Outcome: Progressing Towards Goal  Goal: Medications  Outcome: Progressing Towards Goal  Goal: Respiratory  Outcome: Progressing Towards Goal  Goal: Treatments/Interventions/Procedures  Outcome: Progressing Towards Goal  Goal: Psychosocial  Outcome: Progressing Towards Goal  Goal: *Oxygen saturation within defined limits  Outcome: Progressing Towards Goal  Goal: *Hemodynamically stable  Outcome: Progressing Towards Goal  Goal: *Optimal pain control at patient's stated goal  Outcome: Progressing Towards Goal  Goal: *Anxiety reduced or absent  Outcome: Progressing Towards Goal  Goal: *Demonstrates progressive activity  Outcome: Progressing Towards Goal     Problem: Heart Failure: Day 4  Goal: Off Pathway (Use only if patient is Off Pathway)  Outcome: Progressing Towards Goal  Goal: Activity/Safety  Outcome: Progressing Towards Goal  Goal: Diagnostic Test/Procedures  Outcome: Progressing Towards Goal  Goal: Nutrition/Diet  Outcome: Progressing Towards Goal  Goal: Discharge Planning  Outcome: Progressing Towards Goal  Goal: Medications  Outcome: Progressing Towards Goal  Goal: Respiratory  Outcome: Progressing Towards Goal  Goal: Treatments/Interventions/Procedures  Outcome: Progressing Towards Goal  Goal: Psychosocial  Outcome: Progressing Towards Goal  Goal: *Oxygen saturation within defined limits  Outcome: Progressing Towards Goal  Goal: *Hemodynamically stable  Outcome: Progressing Towards Goal  Goal: *Optimal pain control at patient's stated goal  Outcome: Progressing Towards Goal  Goal: *Anxiety reduced or absent  Outcome: Progressing Towards Goal  Goal: *Demonstrates progressive activity  Outcome: Progressing Towards Goal     Problem: Heart Failure: Day 5  Goal: Off Pathway (Use only if patient is Off Pathway)  Outcome: Progressing Towards Goal  Goal: Activity/Safety  Outcome: Progressing Towards Goal  Goal: Diagnostic Test/Procedures  Outcome: Progressing Towards Goal  Goal: Nutrition/Diet  Outcome: Progressing Towards Goal  Goal: Discharge Planning  Outcome: Progressing Towards Goal  Goal: Medications  Outcome: Progressing Towards Goal  Goal: Respiratory  Outcome: Progressing Towards Goal  Goal: Treatments/Interventions/Procedures  Outcome: Progressing Towards Goal  Goal: Psychosocial  Outcome: Progressing Towards Goal     Problem: Heart Failure: Discharge Outcomes  Goal: *Demonstrates ability to perform prescribed activity without shortness of breath or discomfort  Outcome: Progressing Towards Goal  Goal: *Left ventricular function assessment completed prior to or during stay, or planned for post-discharge  Outcome: Progressing Towards Goal  Goal: *ACEI prescribed if LVEF less than 40% and no contraindications or ARB prescribed  Outcome: Progressing Towards Goal  Goal: *Verbalizes understanding and describes prescribed diet  Outcome: Progressing Towards Goal  Goal: *Verbalizes understanding/describes prescribed medications  Outcome: Progressing Towards Goal  Goal: *Describes available resources and support systems  Description  (eg: Home Health, Palliative Care, Advanced Medical Directive)  Outcome: Progressing Towards Goal  Goal: *Describes smoking cessation resources  Outcome: Progressing Towards Goal  Goal: *Understands and describes signs and symptoms to report to providers(Stroke Metric)  Outcome: Progressing Towards Goal  Goal: *Describes/verbalizes understanding of follow-up/return appt  Description  (eg: to physicians, diabetes treatment coordinator, and other resources  Outcome: Progressing Towards Goal  Goal: *Describes importance of continuing daily weights and changes to report to physician  Outcome: Progressing Towards Goal

## 2019-07-22 LAB
ANION GAP SERPL CALC-SCNC: 6 MMOL/L (ref 5–15)
BACTERIA SPEC CULT: NORMAL
BUN SERPL-MCNC: 38 MG/DL (ref 6–20)
BUN/CREAT SERPL: 27 (ref 12–20)
CALCIUM SERPL-MCNC: 8.5 MG/DL (ref 8.5–10.1)
CC UR VC: NORMAL
CHLORIDE SERPL-SCNC: 105 MMOL/L (ref 97–108)
CO2 SERPL-SCNC: 27 MMOL/L (ref 21–32)
CREAT SERPL-MCNC: 1.43 MG/DL (ref 0.55–1.02)
ERYTHROCYTE [DISTWIDTH] IN BLOOD BY AUTOMATED COUNT: 13.6 % (ref 11.5–14.5)
GLUCOSE BLD STRIP.AUTO-MCNC: 268 MG/DL (ref 65–100)
GLUCOSE BLD STRIP.AUTO-MCNC: 281 MG/DL (ref 65–100)
GLUCOSE BLD STRIP.AUTO-MCNC: 292 MG/DL (ref 65–100)
GLUCOSE BLD STRIP.AUTO-MCNC: 403 MG/DL (ref 65–100)
GLUCOSE SERPL-MCNC: 288 MG/DL (ref 65–100)
HCT VFR BLD AUTO: 31.8 % (ref 35–47)
HGB BLD-MCNC: 10.3 G/DL (ref 11.5–16)
MAGNESIUM SERPL-MCNC: 2.4 MG/DL (ref 1.6–2.4)
MCH RBC QN AUTO: 30.2 PG (ref 26–34)
MCHC RBC AUTO-ENTMCNC: 32.4 G/DL (ref 30–36.5)
MCV RBC AUTO: 93.3 FL (ref 80–99)
NRBC # BLD: 0 K/UL (ref 0–0.01)
NRBC BLD-RTO: 0 PER 100 WBC
PHOSPHATE SERPL-MCNC: 4 MG/DL (ref 2.6–4.7)
PLATELET # BLD AUTO: 228 K/UL (ref 150–400)
PMV BLD AUTO: 9.2 FL (ref 8.9–12.9)
POTASSIUM SERPL-SCNC: 3.9 MMOL/L (ref 3.5–5.1)
RBC # BLD AUTO: 3.41 M/UL (ref 3.8–5.2)
SERVICE CMNT-IMP: ABNORMAL
SERVICE CMNT-IMP: NORMAL
SODIUM SERPL-SCNC: 138 MMOL/L (ref 136–145)
WBC # BLD AUTO: 8.1 K/UL (ref 3.6–11)

## 2019-07-22 PROCEDURE — 74011250637 HC RX REV CODE- 250/637: Performed by: STUDENT IN AN ORGANIZED HEALTH CARE EDUCATION/TRAINING PROGRAM

## 2019-07-22 PROCEDURE — 94640 AIRWAY INHALATION TREATMENT: CPT

## 2019-07-22 PROCEDURE — 84100 ASSAY OF PHOSPHORUS: CPT

## 2019-07-22 PROCEDURE — 80048 BASIC METABOLIC PNL TOTAL CA: CPT

## 2019-07-22 PROCEDURE — 74011636637 HC RX REV CODE- 636/637: Performed by: STUDENT IN AN ORGANIZED HEALTH CARE EDUCATION/TRAINING PROGRAM

## 2019-07-22 PROCEDURE — 74011000250 HC RX REV CODE- 250: Performed by: STUDENT IN AN ORGANIZED HEALTH CARE EDUCATION/TRAINING PROGRAM

## 2019-07-22 PROCEDURE — 74011250636 HC RX REV CODE- 250/636: Performed by: STUDENT IN AN ORGANIZED HEALTH CARE EDUCATION/TRAINING PROGRAM

## 2019-07-22 PROCEDURE — 82962 GLUCOSE BLOOD TEST: CPT

## 2019-07-22 PROCEDURE — 74011636637 HC RX REV CODE- 636/637: Performed by: FAMILY MEDICINE

## 2019-07-22 PROCEDURE — 97530 THERAPEUTIC ACTIVITIES: CPT

## 2019-07-22 PROCEDURE — 36415 COLL VENOUS BLD VENIPUNCTURE: CPT

## 2019-07-22 PROCEDURE — 65660000000 HC RM CCU STEPDOWN

## 2019-07-22 PROCEDURE — 74011000258 HC RX REV CODE- 258: Performed by: STUDENT IN AN ORGANIZED HEALTH CARE EDUCATION/TRAINING PROGRAM

## 2019-07-22 PROCEDURE — 85027 COMPLETE CBC AUTOMATED: CPT

## 2019-07-22 PROCEDURE — 94760 N-INVAS EAR/PLS OXIMETRY 1: CPT

## 2019-07-22 PROCEDURE — 74011250637 HC RX REV CODE- 250/637: Performed by: NURSE PRACTITIONER

## 2019-07-22 PROCEDURE — 83735 ASSAY OF MAGNESIUM: CPT

## 2019-07-22 RX ORDER — INSULIN LISPRO 100 [IU]/ML
9 INJECTION, SOLUTION INTRAVENOUS; SUBCUTANEOUS ONCE
Status: COMPLETED | OUTPATIENT
Start: 2019-07-22 | End: 2019-07-22

## 2019-07-22 RX ORDER — INSULIN GLARGINE 100 [IU]/ML
30 INJECTION, SOLUTION SUBCUTANEOUS
Status: DISCONTINUED | OUTPATIENT
Start: 2019-07-22 | End: 2019-07-23 | Stop reason: HOSPADM

## 2019-07-22 RX ORDER — BUMETANIDE 1 MG/1
1 TABLET ORAL DAILY
Status: DISCONTINUED | OUTPATIENT
Start: 2019-07-23 | End: 2019-07-23 | Stop reason: HOSPADM

## 2019-07-22 RX ORDER — LOPERAMIDE HYDROCHLORIDE 2 MG/1
4 CAPSULE ORAL
Status: COMPLETED | OUTPATIENT
Start: 2019-07-22 | End: 2019-07-22

## 2019-07-22 RX ORDER — AMLODIPINE BESYLATE 5 MG/1
5 TABLET ORAL DAILY
Status: DISCONTINUED | OUTPATIENT
Start: 2019-07-22 | End: 2019-07-23 | Stop reason: HOSPADM

## 2019-07-22 RX ADMIN — METOPROLOL TARTRATE 50 MG: 50 TABLET ORAL at 21:33

## 2019-07-22 RX ADMIN — INSULIN LISPRO 3 UNITS: 100 INJECTION, SOLUTION INTRAVENOUS; SUBCUTANEOUS at 17:44

## 2019-07-22 RX ADMIN — INSULIN LISPRO 2 UNITS: 100 INJECTION, SOLUTION INTRAVENOUS; SUBCUTANEOUS at 21:34

## 2019-07-22 RX ADMIN — CLOPIDOGREL BISULFATE 75 MG: 75 TABLET ORAL at 08:59

## 2019-07-22 RX ADMIN — PIPERACILLIN SODIUM,TAZOBACTAM SODIUM 3.38 G: 3; .375 INJECTION, POWDER, FOR SOLUTION INTRAVENOUS at 05:01

## 2019-07-22 RX ADMIN — VALSARTAN 160 MG: 80 TABLET, FILM COATED ORAL at 08:59

## 2019-07-22 RX ADMIN — LEVALBUTEROL HYDROCHLORIDE 0.63 MG: 0.63 SOLUTION RESPIRATORY (INHALATION) at 20:23

## 2019-07-22 RX ADMIN — HEPARIN SODIUM 5000 UNITS: 5000 INJECTION INTRAVENOUS; SUBCUTANEOUS at 21:33

## 2019-07-22 RX ADMIN — AMLODIPINE BESYLATE 5 MG: 5 TABLET ORAL at 12:09

## 2019-07-22 RX ADMIN — INSULIN LISPRO 3 UNITS: 100 INJECTION, SOLUTION INTRAVENOUS; SUBCUTANEOUS at 09:00

## 2019-07-22 RX ADMIN — BUDESONIDE 500 MCG: 0.5 INHALANT RESPIRATORY (INHALATION) at 20:23

## 2019-07-22 RX ADMIN — METHYLPREDNISOLONE SODIUM SUCCINATE 80 MG: 40 INJECTION, POWDER, FOR SOLUTION INTRAMUSCULAR; INTRAVENOUS at 09:00

## 2019-07-22 RX ADMIN — LEVALBUTEROL HYDROCHLORIDE 0.63 MG: 0.63 SOLUTION RESPIRATORY (INHALATION) at 04:22

## 2019-07-22 RX ADMIN — Medication 10 ML: at 21:35

## 2019-07-22 RX ADMIN — Medication 1 CAPSULE: at 08:59

## 2019-07-22 RX ADMIN — MELATONIN TAB 3 MG 3 MG: 3 TAB at 21:45

## 2019-07-22 RX ADMIN — LEVALBUTEROL HYDROCHLORIDE 0.63 MG: 0.63 SOLUTION RESPIRATORY (INHALATION) at 07:56

## 2019-07-22 RX ADMIN — LOPERAMIDE HYDROCHLORIDE 4 MG: 2 CAPSULE ORAL at 08:59

## 2019-07-22 RX ADMIN — INSULIN LISPRO 9 UNITS: 100 INJECTION, SOLUTION INTRAVENOUS; SUBCUTANEOUS at 12:09

## 2019-07-22 RX ADMIN — MONTELUKAST SODIUM 10 MG: 10 TABLET, FILM COATED ORAL at 21:33

## 2019-07-22 RX ADMIN — AZITHROMYCIN MONOHYDRATE 500 MG: 500 INJECTION, POWDER, LYOPHILIZED, FOR SOLUTION INTRAVENOUS at 21:32

## 2019-07-22 RX ADMIN — LATANOPROST 1 DROP: 50 SOLUTION/ DROPS OPHTHALMIC at 21:35

## 2019-07-22 RX ADMIN — Medication 10 ML: at 05:05

## 2019-07-22 RX ADMIN — GUAIFENESIN 600 MG: 600 TABLET, EXTENDED RELEASE ORAL at 21:33

## 2019-07-22 RX ADMIN — ATORVASTATIN CALCIUM 40 MG: 20 TABLET, FILM COATED ORAL at 21:33

## 2019-07-22 RX ADMIN — ACETAMINOPHEN 650 MG: 325 TABLET ORAL at 04:32

## 2019-07-22 RX ADMIN — HEPARIN SODIUM 5000 UNITS: 5000 INJECTION INTRAVENOUS; SUBCUTANEOUS at 05:05

## 2019-07-22 RX ADMIN — INSULIN GLARGINE 30 UNITS: 100 INJECTION, SOLUTION SUBCUTANEOUS at 21:34

## 2019-07-22 RX ADMIN — SERTRALINE HYDROCHLORIDE 25 MG: 25 TABLET ORAL at 08:59

## 2019-07-22 RX ADMIN — BUDESONIDE 500 MCG: 0.5 INHALANT RESPIRATORY (INHALATION) at 07:56

## 2019-07-22 RX ADMIN — HEPARIN SODIUM 5000 UNITS: 5000 INJECTION INTRAVENOUS; SUBCUTANEOUS at 14:41

## 2019-07-22 RX ADMIN — PRAMIPEXOLE DIHYDROCHLORIDE 0.5 MG: 0.25 TABLET ORAL at 08:59

## 2019-07-22 RX ADMIN — PRAMIPEXOLE DIHYDROCHLORIDE 0.5 MG: 0.25 TABLET ORAL at 21:33

## 2019-07-22 RX ADMIN — Medication 10 ML: at 14:41

## 2019-07-22 RX ADMIN — DULOXETINE HYDROCHLORIDE 60 MG: 30 CAPSULE, DELAYED RELEASE ORAL at 09:00

## 2019-07-22 RX ADMIN — BUMETANIDE 1 MG: 0.25 INJECTION INTRAMUSCULAR; INTRAVENOUS at 09:00

## 2019-07-22 RX ADMIN — ISOSORBIDE MONONITRATE 60 MG: 30 TABLET, EXTENDED RELEASE ORAL at 08:59

## 2019-07-22 RX ADMIN — LEVALBUTEROL HYDROCHLORIDE 0.63 MG: 0.63 SOLUTION RESPIRATORY (INHALATION) at 11:27

## 2019-07-22 RX ADMIN — METOPROLOL TARTRATE 50 MG: 50 TABLET ORAL at 08:59

## 2019-07-22 RX ADMIN — GUAIFENESIN 600 MG: 600 TABLET, EXTENDED RELEASE ORAL at 09:00

## 2019-07-22 NOTE — PROGRESS NOTES
Cardiology Progress Note                             [address] [place] [address]. [address], [address] [facility]                                 Phone [phone]; Fax [phone]        2019 10:33 AM     Admit Date:           2019  Admit Diagnosis:  CAP (community acquired pneumonia) [J18.9]  CAP (community acquired pneumonia) [J18.9]  :          1960   MRN:          [id number]   ASSESSMENT/RECOMMENDATION:   Diastolic congestive heart failure: Known LVEF is normal.  Blood pressure has been significantly high. Tight blood pressure control will help with diastolic heart failure. Compliant with medications however not compliant with diet. Low-salt diet is recommended. At home she was not taking Bumex.    -She has been started on Bumex 1 mg IV twice daily,  -2.4 L, creatinine up to 1.4 - will hold this evenings. Start PO tomorrow.    -Continue Diovan, Lopressor and isosorbide mononitrate. Titrate medications. will hold on increasing Diovan w/ rise in creatinine. Add norvasc for now.        Large pericardial effusion: New finding on today's echocardiogram.  She had a small pericardial effusion previously. There is no chamber compression. There is no tamponade features. Likely reactive to the pneumonia as well as heart failure. We will continue to monitor. No indication for pericardiocentesis. Hemodynamically stable.      CAD status post PCI 3 stents in 2018: Continue Plavix/statin. Continue isosorbide mononitrate. ASA allergy?     Hypertension: Blood pressure 160's this AM- plan as above . Uptitrating medications as above. Cardiology Attending:    Patient personally seen and examined. All the elements of history and examination were personally performed. Assessment and plan was discussed and agree as written above. Chevy Dotson MD, [facility] [place]                  2076 - 1900  In: 069 [P.O.:460]  Out: -     Last 3 Recorded Weights in this Encounter    07/21/19 0449 07/21/19 0731 07/22/19 0701   Weight: 212 lb (96.2 kg) 212 lb 1.3 oz (96.2 kg) 209 lb 6.4 oz (95 kg)         07/20 1901 - 07/22 0700  In: 2080 [P.O.:1080; I.V.:1000]  Out: 5300 [Urine:5300]    SUBJECTIVE               Safia Donny denies palpitations, irregular heart beat,  chest pain or LE edema.   + SOB / fatigue   No lightheadedness or dizziness        Current Facility-Administered Medications   Medication Dose Route Frequency    lactobac ac& pc-s.therm-b.anim (DAMARIS Q/RISAQUAD)  1 Cap Oral DAILY    hydrALAZINE (APRESOLINE) 20 mg/mL injection 10 mg  10 mg IntraVENous Q6H PRN    acetaminophen (TYLENOL) tablet 650 mg  650 mg Oral Q6H PRN    sertraline (ZOLOFT) tablet 25 mg  25 mg Oral DAILY    atorvastatin (LIPITOR) tablet 40 mg  40 mg Oral DAILY    clopidogrel (PLAVIX) tablet 75 mg  75 mg Oral DAILY    DULoxetine (CYMBALTA) capsule 60 mg  60 mg Oral DAILY    insulin glargine (LANTUS) injection 24 Units  24 Units SubCUTAneous QHS    isosorbide mononitrate ER (IMDUR) tablet 60 mg  60 mg Oral DAILY    latanoprost (XALATAN) 0.005 % ophthalmic solution 1 Drop  1 Drop Both Eyes QPM    levocetirizine (XYZAL) tablet 5 mg (Patient Supplied)  5 mg Oral DAILY    melatonin tablet 3 mg  3 mg Oral QHS PRN    metoprolol tartrate (LOPRESSOR) tablet 50 mg  50 mg Oral BID    montelukast (SINGULAIR) tablet 10 mg  10 mg Oral QHS    nitroglycerin (NITROSTAT) tablet 0.4 mg  0.4 mg SubLINGual Q5MIN PRN    pramipexole (MIRAPEX) tablet 0.5 mg  0.5 mg Oral BID    sodium chloride (NS) flush 5-40 mL  5-40 mL IntraVENous Q8H    sodium chloride (NS) flush 5-40 mL  5-40 mL IntraVENous PRN    azithromycin (ZITHROMAX) 500 mg in 0.9% sodium chloride (MBP/ADV) 250 mL  500 mg IntraVENous Q24H    insulin lispro (HUMALOG) injection   SubCUTAneous QID WITH MEALS    glucose chewable tablet 16 g  4 Tab Oral PRN    dextrose 10 % infusion IntraVENous PRN    glucagon (GLUCAGEN) injection 1 mg  1 mg IntraMUSCular PRN    heparin (porcine) injection 5,000 Units  5,000 Units SubCUTAneous Q8H    arformoterol (BROVANA) neb solution 15 mcg  15 mcg Nebulization BID RT    And    budesonide (PULMICORT) 500 mcg/2 ml nebulizer suspension  500 mcg Nebulization BID RT    valsartan (DIOVAN) tablet 160 mg  160 mg Oral DAILY    guaiFENesin ER (MUCINEX) tablet 600 mg  600 mg Oral Q12H    levalbuterol (XOPENEX) nebulizer soln 0.63 mg/3 mL  0.63 mg Nebulization Q4H RT    bumetanide (BUMEX) injection 1 mg  1 mg IntraVENous BID      OBJECTIVE               Intake/Output Summary (Last 24 hours) at 7/22/2019 1033  Last data filed at 7/22/2019 0859  Gross per 24 hour   Intake 1590 ml   Output 2000 ml   Net -410 ml       Review of Systems - History obtained from the patient AS PER  HPI    Telemetry NSR    PHYSICAL EXAM        Visit Vitals  /70 (BP 1 Location: Left arm, BP Patient Position: At rest)   Pulse 76   Temp 97.7 °F (36.5 °C)   Resp 20   Ht 5' 1\" (1.549 m)   Wt 209 lb 6.4 oz (95 kg)   LMP 01/01/2009 (Within Months)   SpO2 99%   Breastfeeding? Unknown   BMI 39.57 kg/m²       Gen: Well-developed, obese, in no acute distress  alert and oriented x 3  HEENT:  Pink conjunctivae, Hearing grossly normal.No scleral icterus or conjunctival, moist mucous membranes  Neck: Supple,No JVD  Resp: No accessory muscle use, diminished bases/poor air movement   Card: Regular Rate,Rythm, No murmurs, rubs or gallop. No thrills.    GI:          soft, non-tender   MSK: No cyanosis or clubbing, good capillary refill  Skin: No rashes or ulcers, no bruising  Neuro:  Cranial nerves are grossly intact, moving all four extremities, no focal deficit, follows commands appropriately  Psych:  Good insight, oriented to person, place and time, alert, Nml Affect  LE: No edema       DATA REVIEW            Laboratory and Imaging have been reviewed by me and are notable for  Recent Labs 07/21/19  0650 07/21/19  0005 07/20/19  1741   TROIQ <0.05 <0.05 <0.05     Recent Labs     07/22/19  0031 07/21/19  0005 07/20/19  1741    141 138   K 3.9 3.9 4.6   CO2 27 27 26   BUN 38* 30* 34*   CREA 1.43* 1.26* 1.24*   * 294* 282*   PHOS 4.0 3.8  --    MG 2.4 2.5*  --    WBC 8.1 7.0 7.9   HGB 10.3* 10.7* 10.8*   HCT 31.8* 33.5* 32.8*    [address], NP

## 2019-07-22 NOTE — DISCHARGE SUMMARY
[address] [facility] [address] [place] [address]   Office [phone]  Fax [phone]       Discharge / Transfer / Off-Service Note     Name: Terence Barker MRN: [id number]  Sex: Female   YOB: 1960  Age: 62 y.o. PCP: Barry Armendariz MD     Date of admission: 7/20/2019  Date of discharge/transfer: 7/23/2019    Attending physician at admission: Dr. Bibiana Hampton    Attending physician at discharge/transfer: Dr. Bibiana Hampton    Resident physician at discharge/transfer: Maryam Smiley DO, PGY1     Consultants during hospitalization  Cardiology     Admission diagnoses   CAP (community acquired pneumonia) [J18.9]  CAP (community acquired pneumonia) [J18.9]    Recommended follow-up after discharge  1. PCP  2. Cardiology  3. Gastroenterology    Things to follow up on with PCP:  · Coordination of care with cardiology for your congestive heart failure  · Coordination of care with gastroenterology for your chronic loose stools     Medication Changes:  1. New Medications:  · bumex 1mg daily  · mucinex 600mg BID prn  · norvasc 5mg daily  · Valsartan 160mg daily  2. Modified Medications: None  3. Discontinued Medications:  · micardis 80mg tablet    History of Present Illness  Per admitting provider, \"Martine Sloan is a 62 y.o. female with PMH of CHF (EF 55-60%, 2018), asthma, CAD, hypercholesterolemia, DM with chronic nephropathy, HTN, sleep apnea, neuropathy, restless leg syndrome and obesity, presented to [facility] ED for complaints of SOB for 5 days (since Tuesday 7/16). Pt saw her PCP 2 days later (Thursday) who prescribed prednisone and Azithromycin. She presented to the ED today after her shortness of breath persisted. Pt has tried nebs and her inhaler w/o relief. She endorses SOB when walking 3-5 steps, 1x fever & chills (Tmax 101.1, Thursday), a non-productive cough and worsened orthopnea (3 pillows instead of baseline 1).  On Tuesday, she babysat her 3 yo grandson who has been sick with a possible viral infection. She denies wheezing/N/V/D/dysuria/leg swelling and endorses chronic stool incontinence. She has been eating salty foods lately (chinese food last night and canned salmon today) but she remains compliant with her medications. Her cardiologist is Dr. Genny Conde (neg nuclear stress test on 4/19, LVEF 52%). She was on Lasix in the past was it was d/c'd by PCP last year. \"    Yoly Kc is a 59yo female with a PMH of CHF, asthma, CAD, hypercholesterolemia, DM with chronic nephropathy, HTN, sleep apnea, neuropathy, restless leg syndrome, and obesity was admitted into the Family Medicine Service from 7/20/2019 to 7/23/2019 for shortness of breath. During the course of this admission, the following conditions were addressed/managed; Acute CHF Exacerbation 2/2 CAP: (improved) NYHA Class 3. BNP POA 4118. Trop <0.05 x 3, TSH 1.00, Mg 2.4, Phos 4.0. ECHO (7/21/2019) showed LV EF 61-65%, no regional wall motion abnormality; pericardial effusion measuring 22mm, no tamponade or compression. Cardio recommended close monitoring and OP repeat ECHO. Pt diuresed with bumex 1mg IV BID. Continued home Imdur 60mg daily and lopressor 50mg BID. Discontinued home micardis 80mg tablet. -start bumex 1mg daily  -start valsartan 160mg tablet daily  -start norvasc 5mg daily  -follow up with cardiology for repeat ECHO     CAP: (improved) CXR on admission showed new lingular and R middle lobe PNA. RVP negative, procalcitonin <0.1. Blood culture showed no growth so far. Urine culture showed no significant growth. Given 2 doses of zosyn and 3 doses of azithromycin 500mg for a total of 3 days of treatment. Patient feeling a lot better on day of discharge.  -continue mucinex 600mg BID prn     Loose stools (stable) Pt complains of 4-5 loose stools per day that are not preceded by any urgency. She states this affects her life. Tried immodium and probiotic without success.   -follow up with gastroenterology outpatient for further workup and management.     Acute Exacerbation of moderate persistent asthma (improved): likely 2/2 to PNA s/p 2 days of tx with Prednisone and Azithromycin. Continued home singulair, xyzal, and breo. Given Xopenex q4hrs RT. Patient's breathing improved throughout hospital course. -Continue home prednisone dose pack. -follow up with PCP to ensure continued improvement. DM (chronic) w/ Nephropathy (improved), POA glucose 226. Glucose on discharge 185. HgA1C (7/21/2019) 6.8. Cr 1.24 POA (baseline ~1) Cr on discharge 1. 18.     HTN: (stable) /149 POA. BP on discharge 136/50. Continued home Imdur 60mg daily and lopressor 50mg BID. -start valsartan 160mg tablet daily  -start norvasc 5mg daily    CAD: (stable) Stent placement in Sept. 2018. Continued home clopidogrel 75 mg.     HLD: (stable) Lipid panel (7/21/2019): Total Cholesterol 235,Triglyceride 196, , HDL 58. Continued home atorvastatin 40mg daily. Neuropathy: (stable) Continued home Cymbalta 60 mg.     Restless Leg Syndrome: (stable) Continued pramipexole.      Cataracts: (stable) Recent eye surgery in June. Continue Latanoprost, Tobramycin, and prednisolone eye drops.     Obesity Body mass index is 39.3 kg/m². -Encouraging lifestyle modifications and further follow up outpatient. Physical exam at discharge:    Vitals Reviewed. General Oriented to person, place, and time and well-developed. Appears well-nourished, no distress. Not diaphoretic. HENT Head Normocephalic and atraumatic. Eyes Conjunctivae are normal, no discharge. No scleral icterus. Nose Nose normal, clear turbinates. Oral Oropharynx is clear and moist. No oropharyngeal exudate. Neck No thyromegaly present. No cervical adenopathy. Cardio Normal rate, regular rhythm. Exam reveals no gallop and no friction rub. No murmur heard. No chest wall tenderness. Pulmonary Effort normal and breath sounds normal. No respiratory distress. No wheezes, no rales. Abdominal Soft. Bowel sounds normal. No distension. No tenderness.  Deferred. Extremities No edema of lower extremities. No tenderness. Distal pulses intact. Neurological Alert and oriented to person, place, and time. Dermatology Skin is warm and dry. No rash noted. No erythema or pallor. Psychiatric Affect and judgment normal.        Condition at discharge: Stable. Labs  Recent Labs     07/23/19  0357 07/22/19  0031 07/21/19  0005   WBC 9.2 8.1 7.0   HGB 10.5* 10.3* 10.7*   HCT 32.5* 31.8* 33.5*    228 231     Recent Labs     07/23/19  0357 07/22/19  0031 07/21/19  0005    138 141   K 4.9 3.9 3.9    105 107   CO2 29 27 27   BUN 39* 38* 30*   CREA 1.18* 1.43* 1.26*   * 288* 294*   CA 8.5 8.5 8.8   MG 2.6* 2.4 2.5*   PHOS 3.7 4.0 3.8     Recent Labs     07/20/19  1741   SGOT 16   ALT 24   AP 86   TBILI 0.5   TP 6.9   ALB 3.2*   GLOB 3.7     Recent Labs     07/23/19  1113 07/23/19  0619 07/22/19  2031 07/22/19  1603 07/22/19  1140  07/21/19  0650 07/21/19  0005  07/20/19  1741   TROIQ  --   --   --   --   --   --  <0.05 <0.05  --  <0.05   GLUCPOC 261* 156* 292* 281* 403*   < >  --   --    < >  --     < > = values in this interval not displayed. Cultures  · Blood culture (7/20/2019) - NGTD  · Urine culture (7/20/2019) - no significant growth  · Respiratory Viral Panel (7/20/2019) - negative    Procedures / Diagnostic Studies  · None    Imaging  Results from [place] encounter on 07/20/19   XR CHEST PA LAT    Narrative INDICATION:  sob     EXAM: Chest 2 views. Comparison:1/1/2019    Findings: Cardiac silhouette is enlarged. Pulmonary vasculature is not engorged. 2. Bibasilar airspace disease within the lingula and right middle lobe. No  pneumothorax. No effusion. Impression Impression:  1.  Enlarged cardiac silhouette, new lingular and right middle lobe airspace  disease            Chronic diagnoses   Problem List as of 7/23/2019 Date Reviewed: 7/18/2019          Codes Class Noted - Resolved    Community acquired pneumonia of right middle lobe of lung ([facility] ICD-10-CM: J18.1  ICD-9-CM: 413  7/21/2019 - Present        * (Principal) CAP (community acquired pneumonia) ICD-10-CM: J18.9  ICD-9-CM: 486  7/20/2019 - Present        Type 2 diabetes with nephropathy ([facility] ICD-10-CM: E11.21  ICD-9-CM: 250.40, 583.81  11/19/2018 - Present        Chest pain ICD-10-CM: R07.9  ICD-9-CM: 786.50  11/14/2018 - Present        Coronary artery disease involving native coronary artery of native heart with angina pectoris ([facility] ICD-10-CM: I25.119  ICD-9-CM: 414.01, 413.9  11/14/2018 - Present        Status post coronary artery stent placement (Chronic) ICD-10-CM: Z95.5  ICD-9-CM: V45.82  11/13/2018 - Present        Severe obesity ([facility] ICD-10-CM: E66.01  ICD-9-CM: 278.01  11/13/2018 - Present        Neuropathy of right lower extremity ICD-10-CM: G57.91  ICD-9-CM: 355.8  9/27/2016 - Present        Pericardial effusion(moderate-large) with mild cardiac tamponade--via echo 8/1/16 ICD-10-CM: I31.3, I31.4  ICD-9-CM: 423.9, 423.3  8/1/2016 - Present        Acute on chronic diastolic CHF (congestive heart failure) (HCC) (Chronic) ICD-10-CM: I50.33  ICD-9-CM: 428.33, 428.0  8/1/2016 - Present        Obstructive sleep apnea ICD-10-CM: G47.33  ICD-9-CM: 327.23  3/10/2015 - Present        TIA (transient ischemic attack) ICD-10-CM: G45.9  ICD-9-CM: 435.9  3/9/2015 - Present        Essential hypertension with goal blood pressure less than 130/85 ICD-10-CM: I10  ICD-9-CM: 401.9  3/9/2015 - Present        DM type 2, uncontrolled, with neuropathy (HCC) ICD-10-CM: E11.40, E11.65  ICD-9-CM: 250.62, 357.2  3/9/2015 - Present        Asthma (Chronic) ICD-10-CM: J45.909  ICD-9-CM: 493.90  3/9/2015 - Present        Hyperlipidemia (Chronic) ICD-10-CM: E78.5  ICD-9-CM: 272.4  3/9/2015 - Present        Restless leg syndrome (Chronic) ICD-10-CM: G25.81  ICD-9-CM: 333.94  3/9/2015 - Present        RESOLVED: Hyperglycemia ICD-10-CM: R73.9  ICD-9-CM: 790.29  11/14/2018 - 7/20/2019        RESOLVED: Neuropathy ICD-10-CM: G62.9  ICD-9-CM: 355.9  9/26/2016 - 7/20/2019        RESOLVED: Chest pain, precordial ICD-10-CM: R07.2  ICD-9-CM: 786.51  8/1/2016 - 7/20/2019        RESOLVED: Hypertensive heart disease with diastolic heart failure (HCC) ICD-10-CM: I11.0, I50.30  ICD-9-CM: 402.91, 428.30, 428.0  8/1/2016 - 7/20/2019        RESOLVED: SOB (shortness of breath) ICD-10-CM: R06.02  ICD-9-CM: 786.05  8/1/2016 - 7/20/2019        RESOLVED: Acute respiratory insufficiency ICD-10-CM: R06.89  ICD-9-CM: 518.82  8/1/2016 - 7/20/2019              Discharge/Transfer Medications  Current Discharge Medication List      START taking these medications    Details   bumetanide (BUMEX) 1 mg tablet Take 1 Tab by mouth daily. Qty: 30 Tab, Refills: 0      guaiFENesin ER (MUCINEX) 600 mg ER tablet Take 1 Tab by mouth every twelve (12) hours. Qty: 20 Tab, Refills: 0      amLODIPine (NORVASC) 5 mg tablet Take 1 Tab by mouth daily. Qty: 30 Tab, Refills: 0      valsartan (DIOVAN) 160 mg tablet Take 1 Tab by mouth daily. Qty: 30 Tab, Refills: 0         CONTINUE these medications which have NOT CHANGED    Details   acetaminophen (TYLENOL) 325 mg tablet Take 650 mg by mouth two (2) times a day. Indications: pain associated with arthritis      sertraline (ZOLOFT) 25 mg tablet Take 25 mg by mouth daily. Indications: major depressive disorder      FARXIGA 10 mg tab tablet TAKE 1 TABLET BY MOUTH EVERY DAY  Refills: 1      BREO ELLIPTA 200-25 mcg/dose inhaler TAKE 1 PUFF BY MOUTH EVERY DAY  Refills: 6      !! LANTUS SOLOSTAR U-100 INSULIN 100 unit/mL (3 mL) inpn INJECT 30 UNITS BY SUBCUTANEOUS ROUTE NIGHTLY FOR 30 DAYS. Refills: 3      prednisoLONE acetate (PRED FORTE) 1 % ophthalmic suspension Administer 1 Drop to both eyes four (4) times daily.   Refills: 3      latanoprost (XALATAN) 0.005 % ophthalmic solution Administer 1 Drop to left eye nightly. Refills: 5      !! insulin glargine (LANTUS,BASAGLAR) 100 unit/mL (3 mL) inpn 30 U at bedtime. E11.9  Qty: 5 Adjustable Dose Pre-filled Pen Syringe, Refills: 5    Associated Diagnoses: Type 2 diabetes with nephropathy ([address]      ! ! ULTRA THIN LANCETS 30 gauge misc TO USE TO CHECK BLOOD SUGAR THREE TIMES A DAY. DX:E11.40  Qty: 100 Lancet, Refills: 5      DALTON PEN NEEDLE 32 gauge x 5/32\" ndle USE TWICE DAILY  Qty: 100 Pen Needle, Refills: 1      clopidogrel (PLAVIX) 75 mg tab Take 1 Tab by mouth daily. Indications: treatment to prevent a heart attack  Qty: 90 Tab, Refills: 1    Associated Diagnoses: Coronary artery disease involving native coronary artery of native heart with angina pectoris (HCC)      levalbuterol tartrate (XOPENEX HFA) 45 mcg/actuation inhaler Take 2 Puffs by inhalation every four (4) hours as needed for Wheezing or Shortness of Breath. Qty: 3 Inhaler, Refills: 3    Associated Diagnoses: Moderate persistent asthma without complication      metoprolol tartrate (LOPRESSOR) 50 mg tablet TAKE ONE TABLET BY MOUTH TWICE DAILY  Qty: 60 Tab, Refills: 5      glucose blood VI test strips (ASCENSIA AUTODISC VI, ONE TOUCH ULTRA TEST VI) strip Check BS 2 x per day. E11.9 Type II DM with hyperglycemia. Qty: 100 Strip, Refills: 3      melatonin 3 mg tablet Take  by mouth. cholecalciferol (VITAMIN D3) 1,000 unit tablet Take  by mouth daily. levalbuterol (XOPENEX) 0.63 mg/3 mL nebu 3 mL by Nebulization route every four (4) hours as needed (shortness of breath). Qty: 100 Nebule, Refills: 2    Associated Diagnoses: Moderate persistent asthma without complication      !! Lancets misc To use to check blood sugar three times a day. Dx:E11.40  Qty: 1 Each, Refills: 11    Associated Diagnoses: DM type 2, uncontrolled, with neuropathy ([facility]      Nebulizer & Compressor machine 1 Each by Does Not Apply route as needed.   Qty: 1 Each, Refills: 0    Associated Diagnoses: Uncomplicated asthma, unspecified asthma severity, unspecified whether persistent      isosorbide mononitrate ER (IMDUR) 60 mg CR tablet Take 60 mg by mouth every morning. pramipexole (MIRAPEX) 0.5 mg tablet Take 0.5 mg by mouth two (2) times a day. levocetirizine (XYZAL) 5 mg tablet Take 5 mg by mouth daily. DULoxetine (CYMBALTA) 60 mg capsule Take 60 mg by mouth daily. gabapentin (NEURONTIN) 300 mg capsule Take 300 mg by mouth three (3) times daily. Takes with 600 mg to make a total dose of 900 mg      atorvastatin (LIPITOR) 40 mg tablet Take 40 mg by mouth daily. montelukast (SINGULAIR) 10 mg tablet Take 10 mg by mouth nightly. nitroglycerin (NITROSTAT) 0.4 mg SL tablet DISSOLVE 1 TABLET UNDER TONGUE EVERY 5 MIN AS NEEDED FOR CHEST PAIN FOR UP TO 3 DOSES  Qty: 25 Tab, Refills: 0      tobramycin (TOBREX) 0.3 % ophthalmic solution Administer 1 Drop to both eyes two (2) times a day. Refills: 3       !! - Potential duplicate medications found. Please discuss with provider. STOP taking these medications       telmisartan (MICARDIS) 80 mg tablet Comments:   Reason for Stopping:         azithromycin (ZITHROMAX) 250 mg tablet Comments:   Reason for Stopping:         predniSONE (STERAPRED DS) 10 mg dose pack Comments:   Reason for Stopping:                Diet:  Cardiac diet.     Activity:  As tolerated    Disposition: Home or Self Care    Discharge instructions to patient/family  Please seek medical attention for any new or worsening symptoms particularly fever, chest pain, shortness of breath, abdominal pain, nausea, vomiting    Follow up plans/appointments  Follow-up Information     Follow up With Specialties Details Why Contact Info    Tonia Cohen MD Pediatrics, Family Practice On 7/25/2019 Follow up with PCP on 7/25/2019 at [address] [place], [place] [phone]      Pt own medication   Please return to patient at discharge     Ritesh Stiles MD Gastroenterology In 2 weeks Follow up for outpatient gastroenterology workup for loose stools.  [address] Gibbons DO  Family Medicine Resident     For Billing    Chief Complaint   Patient presents with    Shortness of [facility] Problems  Date Reviewed: 7/18/2019          Codes Class Noted POA    Community acquired pneumonia of right middle lobe of lung ([facility] ICD-10-CM: J18.1  ICD-9-CM: 257  7/21/2019 Unknown        * (Principal) CAP (community acquired pneumonia) ICD-10-CM: J18.9  ICD-9-CM: 486  7/20/2019 Unknown        Type 2 diabetes with nephropathy ([facility] ICD-10-CM: E11.21  ICD-9-CM: 250.40, 583.81  11/19/2018 Yes        Chest pain ICD-10-CM: R07.9  ICD-9-CM: 786.50  11/14/2018 Yes        Coronary artery disease involving native coronary artery of native heart with angina pectoris ([facility] ICD-10-CM: I25.119  ICD-9-CM: 414.01, 413.9  11/14/2018 Yes        Status post coronary artery stent placement (Chronic) ICD-10-CM: Z95.5  ICD-9-CM: V45.82  11/13/2018 Yes        Severe obesity ([facility] ICD-10-CM: E66.01  ICD-9-CM: 278.01  11/13/2018 Yes        Neuropathy of right lower extremity ICD-10-CM: G57.91  ICD-9-CM: 355.8  9/27/2016 Yes        Pericardial effusion(moderate-large) with mild cardiac tamponade--via echo 8/1/16 ICD-10-CM: I31.3, I31.4  ICD-9-CM: 423.9, 423.3  8/1/2016 Yes        Acute on chronic diastolic CHF (congestive heart failure) (HCC) (Chronic) ICD-10-CM: I50.33  ICD-9-CM: 428.33, 428.0  8/1/2016 Yes        Obstructive sleep apnea ICD-10-CM: G47.33  ICD-9-CM: 327.23  3/10/2015 Yes        TIA (transient ischemic attack) ICD-10-CM: G45.9  ICD-9-CM: 435.9  3/9/2015 Yes        Essential hypertension with goal blood pressure less than 130/85 ICD-10-CM: I10  ICD-9-CM: 401.9  3/9/2015 Yes        DM type 2, uncontrolled, with neuropathy ([facility] ICD-10-CM: E11.40, E11.65  ICD-9-CM: 250.62, 357.2  3/9/2015 Yes        Asthma (Chronic) ICD-10-CM: J45.909  ICD-9-CM: 493.90  3/9/2015 Yes        Hyperlipidemia (Chronic) ICD-10-CM: E78.5  ICD-9-CM: 272.4  3/9/2015 Yes        Restless leg syndrome (Chronic) ICD-10-CM: G25.81  ICD-9-CM: 333.94  3/9/2015 Yes

## 2019-07-22 NOTE — PROGRESS NOTES
Physical  Therapy    1000 chart reviewed, spoke with RN. Pt received sitting EOB, pt reports increase SOB after walking into BR, requesting PT return at later time. O2 sat 96% on room air, dyspnea noted    Radha Landaverde.  Rafaela Jean-Baptiste

## 2019-07-22 NOTE — DIABETES MGMT
[facility]    [facility] Progress Note    Recommendations/ Comments: Chart reviewed for extreme hyperglycemia, likely due to IV steroids. Noted last dose of Solu-medrol administered this morning at 0900.  mg/dL today; pre-lunch 403 mg/dL. Patient has received 21 units of lispro correction in past 24 hours. Noted Lantus increasing tonight to home dose. If appropriate, please consider: Add consistent carb to current diet order for postprandial BG management. Current hospital DM medication:   Lispro high sensitivity correction scale  Lantus 30 units nightly, dose increased today    Chart reviewed on [facility]. Patient is a 62 y.o. female with known Type 2 Diabetes on oral agent (monotherapy): Farxiga 10 mg daily  insulin injections: Lantus : 30 units nightly at home. A1c:   Lab Results   Component Value Date/Time    Hemoglobin A1c 6.8 (H) 07/21/2019 12:05 AM    Hemoglobin A1c 8.9 (H) 02/20/2019 10:44 AM       Recent Glucose Results:   Lab Results   Component Value Date/Time     (H) 07/22/2019 12:31 AM    GLUCPOC 403 (H) 07/22/2019 11:40 AM    GLUCPOC 268 (H) 07/22/2019 06:15 AM    GLUCPOC 359 (H) 07/21/2019 08:41 PM        Lab Results   Component Value Date/Time    Creatinine 1.43 (H) 07/22/2019 12:31 AM     Estimated Creatinine Clearance: 43.5 mL/min (A) (based on SCr of 1.43 mg/dL (H)). Active Orders   Diet    DIET CARDIAC Regular        PO intake:   Patient Vitals for the past 72 hrs:   % Diet Eaten   07/22/19 1441 30 %   07/22/19 0859 100 %       Will continue to follow as needed.     Thank you  Niesha Ramos, RN  Office [phone]  Pager [phone]          Time spent: 6 min

## 2019-07-22 NOTE — PROGRESS NOTES
Problem: Mobility Impaired (Adult and Pediatric)  Goal: *Acute Goals and Plan of Care (Insert Text)  Description  Physical Therapy Goals  Initiated 7/21/2019  1. Patient will move from supine to sit and sit to supine  in bed with modified independence within 7 day(s). 2.  Patient will transfer from bed to chair and chair to bed with modified independence using the least restrictive device within 7 day(s). 3.  Patient will perform sit to stand with modified independence within 7 day(s). 4.  Patient will ambulate with modified independence for 150 feet with the least restrictive device within 7 day(s). 5.  Patient will ascend/descend 1 flight of stairs with single handrail(s) with minimal assistance/contact guard assist within 7 day(s). Outcome: Progressing Towards Goal  Note:   PHYSICAL THERAPY TREATMENT  Patient: Goldy Clinton (31 y.o. female)  Date: 7/22/2019  Diagnosis: CAP (community acquired pneumonia) [J18.9]  CAP (community acquired pneumonia) [J18.9] CAP (community acquired pneumonia)       Precautions: Contact, Other (comment)  Chart, physical therapy assessment, plan of care and goals were reviewed. ASSESSMENT:  Pt received sitting EOB, using tablet, reporting unchanged vision, requesting BR. CGA for functional tranfers and gait training using RW. Increased SOB and cough with limited activity. Somewhat impulsive with tranfers. Pt declined additional gait training secondary to SOB and requesting to return to bed. O2 sat  on room air. Progression toward goals:  ?    Improving appropriately and progressing toward goals  ? Improving slowly and progressing toward goals  ? Not making progress toward goals and plan of care will be adjusted     PLAN:  Patient continues to benefit from skilled intervention to address the above impairments. Continue treatment per established plan of care.   Discharge Recommendations:  Home Health vs none with caregiver assist  Further Equipment Recommendations for Discharge:  none      SUBJECTIVE:   Patient stated i get so breathless.     OBJECTIVE DATA SUMMARY:   Critical Behavior:  Neurologic State: Alert  Orientation Level: Oriented X4  Cognition: Appropriate decision making, Follows commands  Safety/Judgement: Awareness of environment  Functional Mobility Training:  Bed Mobility:        Sit to Supine: Modified independent  Scooting: Independent        Transfers:  Sit to Stand: Contact guard assistance;Stand-by assistance  Stand to Sit: Stand-by assistance                             Balance:  Sitting: Intact  Standing: With support; Intact  Ambulation/Gait Training:  Distance (ft): 20 Feet (ft)  Assistive Device: Walker, rolling;Gait belt  Ambulation - Level of Assistance: Contact guard assistance        Gait Abnormalities: Decreased step clearance        Base of Support: Narrowed     Speed/Katherine: Pace decreased (<100 feet/min)                       Stairs:              Neuro Re-Education:    Therapeutic Exercises:   Pain:  Pain Scale 1: Numeric (0 - 10)  Pain Intensity 1: 0  Pain Location 1: Hand  Pain Orientation 1: Left;Right  Pain Description 1: Aching;Cramping     Activity Tolerance:   fair  Please refer to the flowsheet for vital signs taken during this treatment. After treatment:   ?    Patient left in no apparent distress sitting up in chair  ? Patient left in no apparent distress in bed  ? Call bell left within reach  ? Nursing notified  ? Caregiver present  ?     Bed alarm activated    COMMUNICATION/COLLABORATION:   The patients plan of care was discussed with: Registered Nurse    Karin Aschoff   Time Calculation: 14 mins

## 2019-07-22 NOTE — PROGRESS NOTES
Occupational Therapy Note:  Chart reviewed. Attempted to see patient however she declined d/t increased fatigue. She was requesting to rest and agreeable to OT follow-up next tx day. Thank you.   Moose Tripp, OTR/L

## 2019-07-22 NOTE — PROGRESS NOTES
0730 - Bedside and Verbal shift change report given to [employer] (oncoming nurse) by Juan F Guy RN (offgoing nurse). Report included the following information SBAR, Kardex, MAR, Accordion, Recent Results and Cardiac Rhythm NSR.     1158 - Family practice notified about blood sugar of 403 prior to lunch.  Dr. Ariana Crawley ordered 9 units humalog Order RB&V.

## 2019-07-22 NOTE — NURSE NAVIGATOR
Chart reviewed by Heart Failure Nurse Navigator. Heart Failure database completed. EF:   61 to 65% moderately dilated LA, large pericardial effusion. ACEi/ARB/ARNi: Valsartan 160 mg daily. BB: Metoprolol tartrate 50 mg BID. Aldosterone Antagonist: Not indicated. Imdur 60 mg daily. Obstructive Sleep Apnea Screening:   STOP-BANG score:   Referred to Sleep Medicine:     CRT Not indicated. NYHA Functional Class Documentation of NYHA class requested via provider message on [employer]. Heart Failure Teach Back in Patient Education. Heart Failure Avoiding Triggers on Discharge Instructions. Cardiologist: Dr. Evelyne Donahue discharge follow up phone call to be made within 48-72 hours of discharge.

## 2019-07-22 NOTE — PROGRESS NOTES
Bedside and Verbal shift change report given to Fan Martinez RN(oncoming nurse) by Cecily Barillas(offgoing nurse). Report included the following information SBAR, Kardex, Intake/Output, Accordion and Recent Results. SHIFT REPORT:            Bedside and Verbal shift change report given to ---- (oncoming nurse) by Fan Martinez RN  (offgoing nurse). Report included the following information SBAR, Kardex, Intake/Output, Accordion and Recent Results.

## 2019-07-22 NOTE — PROGRESS NOTES
7/22/2019  12:28 PM  Reason for Admission:  SOB    Pt  is a 62 y.o. female with PMH of CHF (EF 55-60%, 2018), asthma, CAD, hypercholesterolemia, DM with chronic nephropathy, HTN, sleep apnea, neuropathy, restless leg syndrome and obesity, presented to [facility] IN THE [facility] ED for complaints of SOB for 5 days (since Tuesday 7/16). Pt saw her PCP 2 days later (Thursday) who prescribed prednisone and Azithromycin. She presented to the ED today after her shortness of breath persisted. RRAT Score: 35                 Resources/supports as identified by patient/family:   Pt has supportive family she lies with, has personal care aides that assit 8 hours/day, 7 days/week                Top Challenges facing patient (as identified by patient/family and CM): Finances/Medication cost?    Coverage through [employer] [facility], [place]                [place]?  relies on medicaid transport or family              Support system or lack thereof? Pt has supportive family who she lives with, has aides  8 hours/day 7 days/week                     Living arrangements? Pt lives w/ 3 Dtrs, NACHO and 3 grandson sin tri-level home w/ basement bed/bath           Self-care/ADLs/Cognition? Pt has PC aides 8 hours/Day , 7 Days/Week          Current Advanced Directive/Advance Care Plan:  Pt has ACP, Dtr Иван Ca (C) [phone] is surrogate decision maker                          Plan for utilizing home health: pt has not had recent [facility]                      Transition of Care Plan:                [address]; admission for medical management  2. CM to follow  3. D/C when stable to home Plan A : Home Health w/ caregiver, family assist and PCP f/u  Plan B: Home when stable w/ caregiver and family assist, PCP f/u.     Care Management Interventions  PCP Verified by CM: Geoffrey Donahue MD, no NN)  Last Visit to PCP: 07/18/19  Palliative Care Criteria Met (RRAT>21 & CHF Dx)?: No  Mode of Transport at Discharge: Self(Family will transport)  Physical Therapy Consult: Yes  Occupational Therapy Consult: Yes  Speech Therapy Consult: No  Current Support Network: Relative's Home(pt lives w/ 3 Dtrs in pvt residence, reports to be ambulatory w/ rollator, requires assistance w/ all ADLs, relies on Medicaid or family for trnasport)  Confirm Follow Up Transport: Other (see comment)(Medicare transport or family)  Discharge Location  Discharge Placement: Home with home health    CM met w/ pt to begin d/c planning, charted demographics verified, pt lives w/ her 3 Dtrs, and 3 grandsons, NACHO   PCP: Tj May MD, no NN  [facility]: No recent history  DME: nebulizer, rollator, CPaP  Rx: has coverage through [address] Medicaid uses [facility] [address]

## 2019-07-23 VITALS
SYSTOLIC BLOOD PRESSURE: 164 MMHG | TEMPERATURE: 97.9 F | HEIGHT: 61 IN | DIASTOLIC BLOOD PRESSURE: 64 MMHG | HEART RATE: 79 BPM | BODY MASS INDEX: 37.84 KG/M2 | OXYGEN SATURATION: 98 % | WEIGHT: 200.4 LBS | RESPIRATION RATE: 18 BRPM

## 2019-07-23 LAB
ANION GAP SERPL CALC-SCNC: 2 MMOL/L (ref 5–15)
BUN SERPL-MCNC: 39 MG/DL (ref 6–20)
BUN/CREAT SERPL: 33 (ref 12–20)
CALCIUM SERPL-MCNC: 8.5 MG/DL (ref 8.5–10.1)
CHLORIDE SERPL-SCNC: 106 MMOL/L (ref 97–108)
CO2 SERPL-SCNC: 29 MMOL/L (ref 21–32)
CREAT SERPL-MCNC: 1.18 MG/DL (ref 0.55–1.02)
ERYTHROCYTE [DISTWIDTH] IN BLOOD BY AUTOMATED COUNT: 13.9 % (ref 11.5–14.5)
GLUCOSE BLD STRIP.AUTO-MCNC: 156 MG/DL (ref 65–100)
GLUCOSE BLD STRIP.AUTO-MCNC: 261 MG/DL (ref 65–100)
GLUCOSE BLD STRIP.AUTO-MCNC: 376 MG/DL (ref 65–100)
GLUCOSE SERPL-MCNC: 185 MG/DL (ref 65–100)
HCT VFR BLD AUTO: 32.5 % (ref 35–47)
HGB BLD-MCNC: 10.5 G/DL (ref 11.5–16)
MAGNESIUM SERPL-MCNC: 2.6 MG/DL (ref 1.6–2.4)
MCH RBC QN AUTO: 30.1 PG (ref 26–34)
MCHC RBC AUTO-ENTMCNC: 32.3 G/DL (ref 30–36.5)
MCV RBC AUTO: 93.1 FL (ref 80–99)
NRBC # BLD: 0 K/UL (ref 0–0.01)
NRBC BLD-RTO: 0 PER 100 WBC
PHOSPHATE SERPL-MCNC: 3.7 MG/DL (ref 2.6–4.7)
PLATELET # BLD AUTO: 251 K/UL (ref 150–400)
PMV BLD AUTO: 9.2 FL (ref 8.9–12.9)
POTASSIUM SERPL-SCNC: 4.9 MMOL/L (ref 3.5–5.1)
RBC # BLD AUTO: 3.49 M/UL (ref 3.8–5.2)
SERVICE CMNT-IMP: ABNORMAL
SODIUM SERPL-SCNC: 137 MMOL/L (ref 136–145)
WBC # BLD AUTO: 9.2 K/UL (ref 3.6–11)

## 2019-07-23 PROCEDURE — 74011000250 HC RX REV CODE- 250: Performed by: FAMILY MEDICINE

## 2019-07-23 PROCEDURE — 74011250637 HC RX REV CODE- 250/637: Performed by: NURSE PRACTITIONER

## 2019-07-23 PROCEDURE — 74011250637 HC RX REV CODE- 250/637: Performed by: STUDENT IN AN ORGANIZED HEALTH CARE EDUCATION/TRAINING PROGRAM

## 2019-07-23 PROCEDURE — 74011000250 HC RX REV CODE- 250: Performed by: STUDENT IN AN ORGANIZED HEALTH CARE EDUCATION/TRAINING PROGRAM

## 2019-07-23 PROCEDURE — 83735 ASSAY OF MAGNESIUM: CPT

## 2019-07-23 PROCEDURE — 74011636637 HC RX REV CODE- 636/637: Performed by: STUDENT IN AN ORGANIZED HEALTH CARE EDUCATION/TRAINING PROGRAM

## 2019-07-23 PROCEDURE — 94760 N-INVAS EAR/PLS OXIMETRY 1: CPT

## 2019-07-23 PROCEDURE — 84100 ASSAY OF PHOSPHORUS: CPT

## 2019-07-23 PROCEDURE — 36415 COLL VENOUS BLD VENIPUNCTURE: CPT

## 2019-07-23 PROCEDURE — 94640 AIRWAY INHALATION TREATMENT: CPT

## 2019-07-23 PROCEDURE — 82962 GLUCOSE BLOOD TEST: CPT

## 2019-07-23 PROCEDURE — 85027 COMPLETE CBC AUTOMATED: CPT

## 2019-07-23 PROCEDURE — 80048 BASIC METABOLIC PNL TOTAL CA: CPT

## 2019-07-23 PROCEDURE — 74011636637 HC RX REV CODE- 636/637: Performed by: FAMILY MEDICINE

## 2019-07-23 PROCEDURE — 74011250636 HC RX REV CODE- 250/636: Performed by: STUDENT IN AN ORGANIZED HEALTH CARE EDUCATION/TRAINING PROGRAM

## 2019-07-23 RX ORDER — VALSARTAN 160 MG/1
160 TABLET ORAL DAILY
Qty: 30 TAB | Refills: 0 | Status: SHIPPED | OUTPATIENT
Start: 2019-07-24 | End: 2019-08-14 | Stop reason: SDUPTHER

## 2019-07-23 RX ORDER — AZITHROMYCIN 250 MG/1
500 TABLET, FILM COATED ORAL DAILY
Status: DISCONTINUED | OUTPATIENT
Start: 2019-07-23 | End: 2019-07-23 | Stop reason: HOSPADM

## 2019-07-23 RX ORDER — PREDNISONE 10 MG/1
TABLET ORAL
Qty: 21 TAB | Refills: 0 | Status: SHIPPED | OUTPATIENT
Start: 2019-07-23 | End: 2019-07-23

## 2019-07-23 RX ORDER — INSULIN LISPRO 100 [IU]/ML
8 INJECTION, SOLUTION INTRAVENOUS; SUBCUTANEOUS ONCE
Status: COMPLETED | OUTPATIENT
Start: 2019-07-23 | End: 2019-07-23

## 2019-07-23 RX ORDER — AMLODIPINE BESYLATE 5 MG/1
5 TABLET ORAL DAILY
Qty: 30 TAB | Refills: 0 | Status: SHIPPED | OUTPATIENT
Start: 2019-07-24 | End: 2019-08-14 | Stop reason: SDUPTHER

## 2019-07-23 RX ORDER — PREDNISONE 20 MG/1
60 TABLET ORAL
Status: COMPLETED | OUTPATIENT
Start: 2019-07-23 | End: 2019-07-23

## 2019-07-23 RX ORDER — LEVALBUTEROL INHALATION SOLUTION 0.63 MG/3ML
0.63 SOLUTION RESPIRATORY (INHALATION)
Status: DISCONTINUED | OUTPATIENT
Start: 2019-07-23 | End: 2019-07-23 | Stop reason: HOSPADM

## 2019-07-23 RX ORDER — BUMETANIDE 1 MG/1
1 TABLET ORAL DAILY
Qty: 30 TAB | Refills: 0 | Status: SHIPPED | OUTPATIENT
Start: 2019-07-24 | End: 2019-08-14 | Stop reason: SDUPTHER

## 2019-07-23 RX ORDER — GUAIFENESIN 600 MG/1
600 TABLET, EXTENDED RELEASE ORAL EVERY 12 HOURS
Qty: 20 TAB | Refills: 0 | Status: SHIPPED | OUTPATIENT
Start: 2019-07-23

## 2019-07-23 RX ADMIN — SERTRALINE HYDROCHLORIDE 25 MG: 25 TABLET ORAL at 08:28

## 2019-07-23 RX ADMIN — DULOXETINE HYDROCHLORIDE 60 MG: 30 CAPSULE, DELAYED RELEASE ORAL at 08:27

## 2019-07-23 RX ADMIN — PREDNISONE 60 MG: 20 TABLET ORAL at 08:28

## 2019-07-23 RX ADMIN — CLOPIDOGREL BISULFATE 75 MG: 75 TABLET ORAL at 08:27

## 2019-07-23 RX ADMIN — INSULIN LISPRO 3 UNITS: 100 INJECTION, SOLUTION INTRAVENOUS; SUBCUTANEOUS at 12:05

## 2019-07-23 RX ADMIN — HEPARIN SODIUM 5000 UNITS: 5000 INJECTION INTRAVENOUS; SUBCUTANEOUS at 05:41

## 2019-07-23 RX ADMIN — LEVALBUTEROL HYDROCHLORIDE 0.63 MG: 0.63 SOLUTION RESPIRATORY (INHALATION) at 13:33

## 2019-07-23 RX ADMIN — Medication 10 ML: at 17:01

## 2019-07-23 RX ADMIN — Medication 1 CAPSULE: at 09:10

## 2019-07-23 RX ADMIN — BUDESONIDE 500 MCG: 0.5 INHALANT RESPIRATORY (INHALATION) at 07:33

## 2019-07-23 RX ADMIN — Medication 10 ML: at 05:41

## 2019-07-23 RX ADMIN — INSULIN LISPRO 8 UNITS: 100 INJECTION, SOLUTION INTRAVENOUS; SUBCUTANEOUS at 17:00

## 2019-07-23 RX ADMIN — VALSARTAN 160 MG: 80 TABLET, FILM COATED ORAL at 08:27

## 2019-07-23 RX ADMIN — METOPROLOL TARTRATE 50 MG: 50 TABLET ORAL at 08:28

## 2019-07-23 RX ADMIN — LEVALBUTEROL HYDROCHLORIDE 0.63 MG: 0.63 SOLUTION RESPIRATORY (INHALATION) at 00:18

## 2019-07-23 RX ADMIN — BUMETANIDE 1 MG: 1 TABLET ORAL at 08:27

## 2019-07-23 RX ADMIN — AMLODIPINE BESYLATE 5 MG: 5 TABLET ORAL at 08:27

## 2019-07-23 RX ADMIN — HYDRALAZINE HYDROCHLORIDE 10 MG: 20 INJECTION INTRAMUSCULAR; INTRAVENOUS at 05:47

## 2019-07-23 RX ADMIN — PRAMIPEXOLE DIHYDROCHLORIDE 0.5 MG: 0.25 TABLET ORAL at 08:28

## 2019-07-23 RX ADMIN — ISOSORBIDE MONONITRATE 60 MG: 30 TABLET, EXTENDED RELEASE ORAL at 08:28

## 2019-07-23 RX ADMIN — GUAIFENESIN 600 MG: 600 TABLET, EXTENDED RELEASE ORAL at 08:27

## 2019-07-23 RX ADMIN — LEVALBUTEROL HYDROCHLORIDE 0.63 MG: 0.63 SOLUTION RESPIRATORY (INHALATION) at 07:33

## 2019-07-23 NOTE — PROGRESS NOTES
07/23/19     Have been working on find a [facility] that takes patient's ins. Dinh . [facility], [facility], [facility], [facility], [facility], [facility]. do not accept patient's Ins. Sent to [facility] who are now running the ins to see if they are in network.     KEN Barnes

## 2019-07-23 NOTE — PROGRESS NOTES
[address] [facility] [address] [place] [address]   Office [phone]  Fax [phone]          Assessment and Plan     An Li is a 62 y.o. female with PMH of CHF (EF 55-60%, 2018), asthma, CAD, hypercholesterolemia, DM with chronic nephropathy, HTN, sleep apnea, neuropathy, restless leg syndrome and obesity, admitted for acute non specified respiratory failure 2/2 CAP vs acute HFpEF exacerbation vs Asthma exacerbation. She has spent 3 night(s) in the hospital.    24 Hour Events: No acute events. Acute CHF Exacerbation 2/2 CAP: NYHA Class 3. BNP 4118. Trop <0.05 x 3, TSH 1.00, Mg 2.4, Phos 4.0 ECHO showed LV EF 61-65%, no regional wall motion abnormality; pericardial effusion measuring 22mm, no tamponade or compression (cardio rec watching for now). -Cardiology consulted, appreciate recs - rec continue bumex 1mg IV BID, continue diovan, lopressor, and isosorbide mononitrate  -Diuresed with bumex 1mg BID; switch to bumex 1mg po once daily today  -strict I/O; daily weights  -hold diltiazem for now   -continue isosorbide mononitrate, valsartan and metropolol  -Add norvasc  -Monitor BP and Creatinine closely   -daily AM labs     CAP: CXR on admission showed new lingular and R middle lobe PNA. RVP negative, procalcitonin <0.1, Blood culture NGTD. -IV abx - received 2 doses of zosyn and azithromycin - discontinued zosyn  -PRN O2 for hypoxia, PRN mucinex  -urine culture showed <10,000 colonies, no growth  -pending sputum cx  -daily AM labs    Loose stools  Pt complains of 4-5 loose stools per day that are not preceded by any urgency.  She states this affects her life.  -continue immodium and probiotic     Acute Exacerbation of moderate persistent asthma: likely 2/2 to PNA s/p 2 days of tx with prednisone and Azithromycin  - prednisone 60mg once today - will do long taper  -continue Xopenex q4hrs RT  - continue home Singulair, Xyzal, Breo (substituted with brovan pulmicort)  -PRN O2 to maintain sats >90%     DM w/ Nephropathy: stable, Cr 1.24 POA (baseline ~1) Cr 1.18 today (improving)  -POA glucose 226  -POC glucose checks, SSI ACHS  -continue to monitor strict I/O  -daily AM labs  -HgA1C 6.8     HTN: (stable) elevated this morning - /70  -metoprolol 50 mg  -continue valsartan 160 mg  -continue to monitor vitals       CAD: stable, stent placement in 2018  -continue clopidogrel 75 mg  -continue metoprolol and valsartan      HLD: stable   -continue atorvastatin  -pending lipid panel       Neuropathy: stable  -continue Cymbalta 60 mg     Restless Leg Syndrome: stable  -continue pramipexole      Cataracts: stable, recent eye surgery in   -continue Latanoprost  -Tobramycin and prednisolone eye drops      Obesity Body mass index is 39.3 kg/m². -Encouraging lifestyle modifications and further follow up outpatient. FEN/GI - Cardiac diet. Activity - Ambulate with assistance  DVT prophylaxis - Sub Q Heparin  GI prophylaxis - Not indicated at this time  Fall prophylaxis - Not indicated at this time. Disposition - Plan to d/c to Home. PT/OT  Code Status - Full    I appreciate the opportunity to participate in the care of this patient,  Ritu Munoz DO  Family Medicine Resident         Subjective / Objective     Subjective:  Symptoms:  Improved. She reports shortness of breath and diarrhea. No cough, chest pain or chest pressure. (Improving). Diet:  No nausea or vomiting. Pt is doing well this AM. SOB better than before. No acute complaints. No CP/palp/wheezing/fever/chills/dysuria/weakness. Temp (24hrs), Av.9 °F (36.6 °C), Min:97.6 °F (36.4 °C), Max:98.4 °F (36.9 °C)     Objective:  General Appearance:  Comfortable and in no acute distress. Vital signs: (most recent): Blood pressure 171/70, pulse 67, temperature 97.9 °F (36.6 °C), resp. rate 18, height 5' 1\" (1.549 m), weight 209 lb 6.4 oz (95 kg), last menstrual period 2009, SpO2 96 %, unknown if currently breastfeeding.   No fever.    Lungs:  Breath sounds clear to auscultation. She is not in respiratory distress. No wheezes. Heart: Normal rate. Regular rhythm. S1 normal and S2 normal.    Abdomen: Abdomen is soft and non-distended. There is no abdominal tenderness. Respiratory:   O2 Device: Room air     I/O:  Date 07/22/19 0700 - 07/23/19 0659 07/23/19 0700 - 07/24/19 0659   Shift 6629-3107 2089-4731 24 Hour Total 1467-1672 8400-0502 24 Hour Total   INTAKE   P.O. 1020  1020        P. O. 1020  1020      I. V.(mL/kg/hr) 450(0.4)  450        Volume (dextrose 10 % infusion) 0  0        Volume (azithromycin (ZITHROMAX) 500 mg in 0.9% sodium chloride (MBP/ADV) 250 mL) 250  250        Volume (piperacillin-tazobactam (ZOSYN) 3.375 g in 0.9% sodium chloride (MBP/ADV) 100 mL) 200  200      Shift Total(mL/kg) 1470(15.5)  1470(15.5)      OUTPUT   Urine(mL/kg/hr) 2000(1.8)  2000        Urine Voided 2000 2000        Urine Occurrence(s) 1 x  1 x      Stool           Stool Occurrence(s) 5 x  5 x      Shift Total(mL/kg) 2000(21.1)  2000(21.1)      NET -530  -530      Weight (kg) 95 95 95 95 95 95       Inpatient Medications  Current Facility-Administered Medications   Medication Dose Route Frequency    levalbuterol (XOPENEX) nebulizer soln 0.63 mg/3 mL  0.63 mg Nebulization Q6H RT    predniSONE (DELTASONE) tablet 60 mg  60 mg Oral DAILY WITH BREAKFAST    lactobac ac& pc-s.therm-b.anim (DAMARIS Q/RISAQUAD)  1 Cap Oral DAILY    amLODIPine (NORVASC) tablet 5 mg  5 mg Oral DAILY    bumetanide (BUMEX) tablet 1 mg  1 mg Oral DAILY    insulin glargine (LANTUS) injection 30 Units  30 Units SubCUTAneous QHS    hydrALAZINE (APRESOLINE) 20 mg/mL injection 10 mg  10 mg IntraVENous Q6H PRN    acetaminophen (TYLENOL) tablet 650 mg  650 mg Oral Q6H PRN    sertraline (ZOLOFT) tablet 25 mg  25 mg Oral DAILY    atorvastatin (LIPITOR) tablet 40 mg  40 mg Oral DAILY    clopidogrel (PLAVIX) tablet 75 mg  75 mg Oral DAILY    DULoxetine (CYMBALTA) capsule 60 mg  60 mg Oral DAILY    isosorbide mononitrate ER (IMDUR) tablet 60 mg  60 mg Oral DAILY    latanoprost (XALATAN) 0.005 % ophthalmic solution 1 Drop  1 Drop Both Eyes QPM    levocetirizine (XYZAL) tablet 5 mg (Patient Supplied)  5 mg Oral DAILY    melatonin tablet 3 mg  3 mg Oral QHS PRN    metoprolol tartrate (LOPRESSOR) tablet 50 mg  50 mg Oral BID    montelukast (SINGULAIR) tablet 10 mg  10 mg Oral QHS    nitroglycerin (NITROSTAT) tablet 0.4 mg  0.4 mg SubLINGual Q5MIN PRN    pramipexole (MIRAPEX) tablet 0.5 mg  0.5 mg Oral BID    sodium chloride (NS) flush 5-40 mL  5-40 mL IntraVENous Q8H    sodium chloride (NS) flush 5-40 mL  5-40 mL IntraVENous PRN    azithromycin (ZITHROMAX) 500 mg in 0.9% sodium chloride (MBP/ADV) 250 mL  500 mg IntraVENous Q24H    insulin lispro (HUMALOG) injection   SubCUTAneous QID WITH MEALS    glucose chewable tablet 16 g  4 Tab Oral PRN    dextrose 10 % infusion   IntraVENous PRN    glucagon (GLUCAGEN) injection 1 mg  1 mg IntraMUSCular PRN    heparin (porcine) injection 5,000 Units  5,000 Units SubCUTAneous Q8H    arformoterol (BROVANA) neb solution 15 mcg  15 mcg Nebulization BID RT    And    budesonide (PULMICORT) 500 mcg/2 ml nebulizer suspension  500 mcg Nebulization BID RT    valsartan (DIOVAN) tablet 160 mg  160 mg Oral DAILY    guaiFENesin ER (MUCINEX) tablet 600 mg  600 mg Oral Q12H     Allergies  Allergies   Allergen Reactions    Levaquin [Levofloxacin] Anaphylaxis    Albuterol Anxiety    Aspirin Rash     Pt can take IBUPROFEN    Metformin Diarrhea     CBC:  Recent Labs     07/23/19  0357 07/22/19  0031 07/21/19  0005   WBC 9.2 8.1 7.0   HGB 10.5* 10.3* 10.7*   HCT 32.5* 31.8* 33.5*    228 552       Metabolic Panel:  Recent Labs     07/23/19  0357 07/22/19  0031 07/21/19  0005 07/20/19  1741    138 141 138   K 4.9 3.9 3.9 4.6    105 107 104   CO2 29 27 27 26   BUN 39* 38* 30* 34*   CREA 1.18* 1.43* 1.26* 1.24*   * 288* 294* 282*   CA 8.5 8.5 8.8 8.6   MG 2.6* 2.4 2.5*  --    PHOS 3.7 4.0 3.8  --    ALB  --   --   --  3.2*   SGOT  --   --   --  16   ALT  --   --   --  24          For Billing    Chief Complaint   Patient presents with    Shortness of [address] Problems  Date Reviewed: 7/18/2019          Codes Class Noted POA    Community acquired pneumonia of right middle lobe of lung ([facility] ICD-10-CM: J18.1  ICD-9-CM: 211  7/21/2019 Unknown        * (Principal) CAP (community acquired pneumonia) ICD-10-CM: J18.9  ICD-9-CM: 118  7/20/2019 Unknown        Type 2 diabetes with nephropathy ([facility].) ICD-10-CM: E11.21  ICD-9-CM: 250.40, 583.81  11/19/2018 Yes        Chest pain ICD-10-CM: R07.9  ICD-9-CM: 786.50  11/14/2018 Yes        Coronary artery disease involving native coronary artery of native heart with angina pectoris ([facility] ICD-10-CM: I25.119  ICD-9-CM: 414.01, 413.9  11/14/2018 Yes        Status post coronary artery stent placement (Chronic) ICD-10-CM: Z95.5  ICD-9-CM: V45.82  11/13/2018 Yes        Severe obesity ([place] [address].) ICD-10-CM: E66.01  ICD-9-CM: 278.01  11/13/2018 Yes        Neuropathy of right lower extremity ICD-10-CM: G57.91  ICD-9-CM: 355.8  9/27/2016 Yes        Pericardial effusion(moderate-large) with mild cardiac tamponade--via echo 8/1/16 ICD-10-CM: I31.3, I31.4  ICD-9-CM: 423.9, 423.3  8/1/2016 Yes        Acute on chronic diastolic CHF (congestive heart failure) ([facility] (Chronic) ICD-10-CM: I50.33  ICD-9-CM: 428.33, 428.0  8/1/2016 Yes        Obstructive sleep apnea ICD-10-CM: G47.33  ICD-9-CM: 327.23  3/10/2015 Yes        TIA (transient ischemic attack) ICD-10-CM: G45.9  ICD-9-CM: 435.9  3/9/2015 Yes        Essential hypertension with goal blood pressure less than 130/85 ICD-10-CM: I10  ICD-9-CM: 401.9  3/9/2015 Yes        DM type 2, uncontrolled, with neuropathy ([facility] ICD-10-CM: E11.40, E11.65  ICD-9-CM: 250.62, 357.2  3/9/2015 Yes        Asthma (Chronic) ICD-10-CM: J45.909  ICD-9-CM: 493.90  3/9/2015 Yes Hyperlipidemia (Chronic) ICD-10-CM: E78.5  ICD-9-CM: 272.4  3/9/2015 Yes        Restless leg syndrome (Chronic) ICD-10-CM: G25.81  ICD-9-CM: 333.94  3/9/2015 Yes

## 2019-07-23 NOTE — PROGRESS NOTES
1300 Bedside shift change report given to JOSE Bey RN (oncoming nurse) by IGGY Dorado RN (offgoing nurse). Report included the following information SBAR, Kardex, ED Summary, Procedure Summary, Intake/Output, MAR, Accordion, Recent Results, Med Rec Status and Cardiac Rhythm NSR. Primary Nurse Tapan Frazier RN and IGGY Hager performed a dual skin assessment on this patient No impairment noted  Alejandro score is 21.  1640 MD notified of patient's blood sugar of 367. New orders were given to give a total of 8 units to cover patient. [phone] Patient and family were given discharge instructions and verbalized understanding. IV were removed and patient e-signed discharge instructions. I copy of patient's discharge instructions were given to her along with written prescriptions. [address] Patient discharged home with family at this time.

## 2019-07-23 NOTE — DISCHARGE INSTRUCTIONS
Patient Education    Avoiding Triggers With Heart Failure: Care Instructions  Your Care Instructions    Triggers are anything that make your heart failure flare up. A flare-up is also called \"sudden heart failure\" or \"acute heart failure. \" When you have a flare-up, fluid builds up in your lungs, and you have problems breathing. You might need to go to the hospital. By watching for changes in your condition and avoiding triggers, you can prevent heart failure flare-ups. Follow-up care is a key part of your treatment and safety. Be sure to make and go to all appointments, and call your doctor if you are having problems. It's also a good idea to know your test results and keep a list of the medicines you take. How can you care for yourself at home? Watch for changes in your weight and condition  · Weigh yourself without clothing at the same time each day. Record your weight. Call your doctor if you have sudden weight gain, such as more than 2 to 3 pounds in a day or 5 pounds in a week. (Your doctor may suggest a different range of weight gain.) A sudden weight gain may mean that your heart failure is getting worse. · Keep a daily record of your symptoms. Write down any changes in how you feel, such as new shortness of breath, cough, or problems eating. Also record if your ankles are more swollen than usual and if you feel more tired than usual. Note anything that you ate or did that could have triggered these changes. Limit sodium  Sodium causes your body to hold on to extra water. This may cause your heart failure symptoms to get worse. People get most of their sodium from processed foods. Fast food and restaurant meals also tend to be very high in sodium. · Your doctor may suggest that you limit sodium to 2,000 milligrams (mg) a day or less. That is less than 1 teaspoon of salt a day, including all the salt you eat in cooking or in packaged foods. · Read food labels on cans and food packages.  They tell you how much sodium you get in one serving. Check the serving size. If you eat more than one serving, you are getting more sodium. · Be aware that sodium can come in forms other than salt, including monosodium glutamate (MSG), sodium citrate, and sodium bicarbonate (baking soda). MSG is often added to Asian food. You can sometimes ask for food without MSG or salt. · Slowly reducing salt will help you adjust to the taste. Take the salt shaker off the table. · Flavor your food with garlic, lemon juice, onion, vinegar, herbs, and spices instead of salt. Do not use soy sauce, steak sauce, onion salt, garlic salt, mustard, or ketchup on your food, unless it is labeled \"low-sodium\" or \"low-salt. \"  · Make your own salad dressings, sauces, and ketchup without adding salt. · Use fresh or frozen ingredients, instead of canned ones, whenever you can. Choose low-sodium canned goods. · Eat less processed food and food from restaurants, including fast food. Exercise as directed  Moderate, regular exercise is very good for your heart. It improves your blood flow and helps control your weight. But too much exercise can stress your heart and cause a heart failure flare-up. · Check with your doctor before you start an exercise program.  · Walking is an easy way to get exercise. Start out slowly. Gradually increase the length and pace of your walk. Swimming, riding a bike, and using a treadmill are also good forms of exercise. · When you exercise, watch for signs that your heart is working too hard. You are pushing yourself too hard if you cannot talk while you are exercising. If you become short of breath or dizzy or have chest pain, stop, sit down, and rest.  · Do not exercise when you do not feel well. Take medicines correctly  · Take your medicines exactly as prescribed. Call your doctor if you think you are having a problem with your medicine. · Make a list of all the medicines you take.  Include those prescribed to you by other doctors and any over-the-counter medicines, vitamins, or supplements you take. Take this list with you when you go to any doctor. · Take your medicines at the same time every day. It may help you to post a list of all the medicines you take every day and what time of day you take them. · Make taking your medicine as simple as you can. Plan times to take your medicines when you are doing other things, such as eating a meal or getting ready for bed. This will make it easier to remember to take your medicines. · Get organized. Use helpful tools, such as daily or weekly pill containers. When should you call for help? Call 911 if you have symptoms of sudden heart failure such as:    · You have severe trouble breathing.     · You cough up pink, foamy mucus.     · You have a new irregular or rapid heartbeat.    Call your doctor now or seek immediate medical care if:    · You have new or increased shortness of breath.     · You are dizzy or lightheaded, or you feel like you may faint.     · You have sudden weight gain, such as more than 2 to 3 pounds in a day or 5 pounds in a week. (Your doctor may suggest a different range of weight gain.)     · You have increased swelling in your legs, ankles, or feet.     · You are suddenly so tired or weak that you cannot do your usual activities.    Watch closely for changes in your health, and be sure to contact your doctor if you develop new symptoms. Where can you learn more? Go to http://emily-janette.info/. Enter B249 in the search box to learn more about \"Avoiding Triggers With Heart Failure: Care Instructions. \"  Current as of: July 22, 2018  Content Version: 12.1  © 0824-3349 [employer]. Care instructions adapted under license by [employer] (which disclaims liability or warranty for this information).  If you have questions about a medical condition or this instruction, always ask your healthcare professional. [employer] disclaims any warranty or liability for your use of this information. HOME DISCHARGE INSTRUCTIONS    Lane Bneitez / [id number] : 1960    Admission date: 2019 Discharge date: 2019     Please bring this form with you to show your care provider at your follow-up appointment. Primary care provider:  Isidra Choi MD    Discharging provider:  Carmelina Mills DO  - Family Medicine Resident  Dr. Arpit Mcadams - Attending, Family Medicine     You have been admitted to the hospital with the following diagnoses:    ACUTE DIAGNOSES:  CAP (community acquired pneumonia) [J18.9]  CAP (community acquired pneumonia) [J18.9]  . . . . . . . . . . . . . . . . . . . . . . . . . . . . . . . . . . . . . . . . . . . . . . . . . . . . . . . . . . . . . . . . . . . . . . . [place] FOLLOW-UP CARE RECOMMENDATIONS:    Appointments  Follow-up Information     Follow up With Specialties Details Why Contact Info    Isidra Choi MD Pediatrics, Family Practice On 2019 Follow up with PCP on 2019 at [address] [place] [address]  [phone]      Pt own medication   Please return to patient at discharge     Siria Ervin MD Gastroenterology In 2 weeks Follow up for outpatient gastroenterology workup for loose stools. [address] [place] [address]  [phone]             Please follow up with your PCP regarding:  · Coordination of care with cardiology for your congestive heart failure  · Coordination of care with gastroenterology for your chronic loose stools      MEDICATION CHANGES:  1. START BUMEX 1MG (1 TABLET) DAILY  2. START MUCINEX 600MG (1 TABLET) TWICE DAILY AS NEEDED  3. START NORVASC 5MG (1 TABLET) DAILY  4. START VALSARTAN 160MG (1 TABLET) DAILY  5. STOP TAKING MICARDIS 80MG TABLET  6. CONTINUE OTHER HOME MEDICATIONS AS PRESCRIBED    Follow-up tests needed: None    Pending test results:    At the time of your discharge the following test results are still pending: blood culture final result. Please make sure you review these results with your outpatient follow-up provider(s). Specific symptoms to watch for: chest pain, shortness of breath, fever, chills, nausea, vomiting, diarrhea, change in mentation, falling, weakness, bleeding. DIET/what to eat:  Cardiac Diet    ACTIVITY:  Activity as tolerated    Wound care: None    Equipment needed:  None    What to do if new or unexpected symptoms occur? If you experience any of the above symptoms (or should other concerns or questions arise after discharge) please call your primary care physician. Return to the emergency room if you cannot get hold of your doctor. · It is very important that you keep your follow-up appointment(s). · Please bring discharge papers, medication list (and/or medication bottles) to your follow-up appointments for review by your outpatient provider(s). · Please check the list of medications and be sure it includes every medication (even non-prescription medications) that your provider wants you to take. · It is important that you take the medication exactly as they are prescribed. · Keep your medication in the bottles provided by the pharmacist and keep a list of the medication names, dosages, and times to be taken in your wallet. · Do not take other medications without consulting your doctor. · If you have any questions about your medications or other instructions, please talk to your nurse or care provider before you leave the hospital.     Information obtained by:     I understand that if any problems occur once I am at home I am to contact my physician. These instructions were explained to me and I had the opportunity to ask questions. I understand and acknowledge receipt of the instructions indicated above.

## 2019-07-23 NOTE — PROGRESS NOTES
Leverette Cogan, MD    [address] [place], [address] [facility]    Office [phone],FWW [phone]  Pager [phone]    2019     Admit Date: 2019    Admit Diagnosis: CAP (community acquired pneumonia) [J18.9];CAP (community acquired pneumonia) [J18.9]    NAME: Corine Marc   :  1960     MRN: [id number]     Assessment/Plan:    Pneumonia  Acute on chronic heart failure with preserved ejection fraction  Pericardial effusion-no echo/clinical signs of tamponade physiology. History of CAD-PCI/stent placement 2018  Hypertension/diabetes mellitus/sleep apnea    Plan:  On p.o. Bumex 1mg daily. On Diovan/metoprolol/Imdur/Norvasc. Will need repeat echo as outpatient. Followed by [facility] - advised to make appt with primary cardiologist on DC           19   ECHO ADULT COMPLETE 2019    Narrative · Left Ventricle: Normal cavity size and systolic function (ejection   fraction normal). Moderate concentric hypertrophy. Estimated left   ventricular ejection fraction is 61 - 65%. No regional wall motion   abnormality noted. · Left Atrium: Moderately dilated left atrium. · Pericardium: Large circumferential but mostly posterior pericardial   effusion measuring 22 mm. No chamner compression. No echo features of   temponade. Signed by: Bell John MD     Will see prn. Plz call if needed      Please do not hesitate to contact us with questions or concerns. See note below for details.     Leverette Cogan, MD      Subjective:    Dyspnea improved    ROS:  (bold if positive, if negative)             Current Facility-Administered Medications   Medication Dose Route Frequency    levalbuterol (XOPENEX) nebulizer soln 0.63 mg/3 mL  0.63 mg Nebulization Q6H RT    lactobac ac& pc-s.therm-b.anim (DAMARIS Q/RISAQUAD)  1 Cap Oral DAILY    amLODIPine (NORVASC) tablet 5 mg  5 mg Oral DAILY    bumetanide (BUMEX) tablet 1 mg  1 mg Oral DAILY    insulin glargine (LANTUS) injection 30 Units  30 Units SubCUTAneous QHS    hydrALAZINE (APRESOLINE) 20 mg/mL injection 10 mg  10 mg IntraVENous Q6H PRN    acetaminophen (TYLENOL) tablet 650 mg  650 mg Oral Q6H PRN    sertraline (ZOLOFT) tablet 25 mg  25 mg Oral DAILY    atorvastatin (LIPITOR) tablet 40 mg  40 mg Oral DAILY    clopidogrel (PLAVIX) tablet 75 mg  75 mg Oral DAILY    DULoxetine (CYMBALTA) capsule 60 mg  60 mg Oral DAILY    isosorbide mononitrate ER (IMDUR) tablet 60 mg  60 mg Oral DAILY    latanoprost (XALATAN) 0.005 % ophthalmic solution 1 Drop  1 Drop Both Eyes QPM    levocetirizine (XYZAL) tablet 5 mg (Patient Supplied)  5 mg Oral DAILY    melatonin tablet 3 mg  3 mg Oral QHS PRN    metoprolol tartrate (LOPRESSOR) tablet 50 mg  50 mg Oral BID    montelukast (SINGULAIR) tablet 10 mg  10 mg Oral QHS    nitroglycerin (NITROSTAT) tablet 0.4 mg  0.4 mg SubLINGual Q5MIN PRN    pramipexole (MIRAPEX) tablet 0.5 mg  0.5 mg Oral BID    sodium chloride (NS) flush 5-40 mL  5-40 mL IntraVENous Q8H    sodium chloride (NS) flush 5-40 mL  5-40 mL IntraVENous PRN    azithromycin (ZITHROMAX) 500 mg in 0.9% sodium chloride (MBP/ADV) 250 mL  500 mg IntraVENous Q24H    insulin lispro (HUMALOG) injection   SubCUTAneous QID WITH MEALS    glucose chewable tablet 16 g  4 Tab Oral PRN    dextrose 10 % infusion   IntraVENous PRN    glucagon (GLUCAGEN) injection 1 mg  1 mg IntraMUSCular PRN    heparin (porcine) injection 5,000 Units  5,000 Units SubCUTAneous Q8H    arformoterol (BROVANA) neb solution 15 mcg  15 mcg Nebulization BID RT    And    budesonide (PULMICORT) 500 mcg/2 ml nebulizer suspension  500 mcg Nebulization BID RT    valsartan (DIOVAN) tablet 160 mg  160 mg Oral DAILY    guaiFENesin ER (MUCINEX) tablet 600 mg  600 mg Oral Q12H       Physical Exam:  Visit Vitals  /52 (BP 1 Location: Left arm, BP Patient Position: At rest) Comment: Simultaneous filing.  User may not have seen previous data.   Pulse 64 Comment: Simultaneous filing. User may not have seen previous data. Temp 97.8 °F (36.6 °C)   Resp 18 Comment: Simultaneous filing. User may not have seen previous data. Ht 5' 1\" (1.549 m)   Wt 200 lb 6.4 oz (90.9 kg)   LMP 01/01/2009 (Within Months)   SpO2 97%   Breastfeeding? Unknown   BMI 37.87 kg/m²      O2 Device: Room air      Telemetry:     Gen: Well-developed, well-nourished, in no acute distress  Neck: Supple,No JVD, No Carotid Bruit,   Resp: No accessory muscle use, Clear breath sounds, No rales or rhonchi  Card: Regular Rate,Rythm,Normal S1, S2, No murmurs, rubs or gallop. No thrills.    Abd:  Soft, non-tender, non-distended,BS+,   LE: No edema    EKG:        Cxray:    LABS:        Lab Results   Component Value Date/Time    WBC 9.2 07/23/2019 03:57 AM    HGB 10.5 (L) 07/23/2019 03:57 AM    HCT 32.5 (L) 07/23/2019 03:57 AM    PLATELET 566 41/81/3304 03:57 AM    MCV 93.1 07/23/2019 03:57 AM     Lab Results   Component Value Date/Time    Sodium 137 07/23/2019 03:57 AM    Potassium 4.9 07/23/2019 03:57 AM    Chloride 106 07/23/2019 03:57 AM    CO2 29 07/23/2019 03:57 AM    Anion gap 2 (L) 07/23/2019 03:57 AM    Glucose 185 (H) 07/23/2019 03:57 AM    BUN 39 (H) 07/23/2019 03:57 AM    Creatinine 1.18 (H) 07/23/2019 03:57 AM    BUN/Creatinine ratio 33 (H) 07/23/2019 03:57 AM    GFR est AA 56 (L) 07/23/2019 03:57 AM    GFR est non-AA 47 (L) 07/23/2019 03:57 AM    Calcium 8.5 07/23/2019 03:57 AM     Lab Results   Component Value Date/Time    CK 85 02/25/2017 09:17 AM    CK-MB Index 1.4 01/09/2018 08:05 PM    Troponin-I, Qt. <0.05 07/21/2019 06:50 AM    BNP 29 01/01/2011 07:55 AM     Lab Results   Component Value Date/Time    aPTT 29.7 05/31/2010 01:50 AM     Lab Results   Component Value Date/Time    INR 1.1 05/31/2010 01:50 AM    INR 1.0 05/25/2010 09:27 PM    INR (POC) 0.9 05/25/2010 09:51 PM    Prothrombin time 11.2 (H) 05/31/2010 01:50 AM    Prothrombin time 10.6 05/25/2010 09:27 PM     Lab Results   Component Value Date/Time    BNP 29 01/01/2011 07:55 AM    BNP 84 09/20/2009 07:50 AM       Care Plan discussed with:   Angelina Castillo MD

## 2019-07-23 NOTE — PROGRESS NOTES
Bedside and Verbal shift change report given to Venecia Thomas RN (oncoming nurse) by Nurys Dumont RN (offgoing nurse). Report included the following information. SBAR, Kardex, MAR and Cardiac Rhythm nsr    0700 - Patient in bed watching TV on her laptop. Patient without complaints of pain. Encouraged patient to call for assistance. Bedside and Verbal shift change report given to Stefano Underwood RN (oncoming nurse) by Neel Boyd RN (offgoing nurse). Report included the following information SBAR, Kardex, MAR and Cardiac Rhythm nsr   .

## 2019-07-23 NOTE — PROGRESS NOTES
Discharge order written. All Care Plans and Education are complete. CM is working on setting up home health. Dr. Charito Lucero provided RX for diovan, norvasc, mucinex, and bumex. Provided a Medication and Side Effects Guide for patient education. Ride is expected by 7 p.m.

## 2019-07-23 NOTE — PROGRESS NOTES
4:36 PM  [employer] has accepted the patient. Patient has been notified. Medicare letter has been signed and is on the pt's chart. Pt is ready for discharge from cm standpoint. KEN Vasquez      4:24 PM  [facility] does not accept the pt's insurance. I have sent a referral to [address] [place] [address] in [employer]. KEN Vasquez    1:15 PM  Patient has chosen [facility]. Order and AVS have been sent in [employer]. I have not heard back yet if they can accept. KEN Vasquez    I met with the patient and discussed home health consult. Patient would like to discuss with her daughter to make sure that it is \"ok\" with her. I will follow up with her later.  KEN Vasquez

## 2019-07-23 NOTE — PROGRESS NOTES
[facility] Dosing Services: 7/23/2019      Summary:  Does the patient meet all criteria for IV to PO switch as listed below? Y/N Y  If no, list:      Is the patient excluded from IV to PO automatic switch based on criteria listed below? Y/N Y  If yes, list:      Is change from IV to PO indicated and completed with documentation in progress note using smart[employer]?  Y/N Y    Criteria for IV to PO switch - Antibiotics  Received IV therapy for at least 48 hours AND meets criteria for both 2 and 3 below  Functioning GI tract  Taking scheduled oral medications  Tolerating tube feeds at goal rate or a full liquid, soft, or regular diet   Clinically Stable  Afebrile (temperature < 100.4°F or 38°C) for at least 24 hours  White blood count is trending down toward normal range      Exclusion Criteria  Patient is being treated for endocarditis, osteomyelitis, fungemia, mediastinitis, meningitis, brain abscess, orbital cellulitis, endophthalimitis, necrotizing facititis, or bacteremia  (antibiotics only)   NG tube with continous suction  Severe/persistent nausea or vomiting  Malabsorption syndromes, partial or total gastrectomy, short bowel syndrome, ileus, or GI obstruction  Active GI bleeding  Hemodynamically unstable (e.g. receiving vasopressor therapy)  Patients with neutropenia (ANC < 1500)  Organ transplant patients  Patients with HIV/AIDS undergoing treatment for PCP (for sulfamethoxazole/trimethoprim)   Patients < 25years old      Medication(s) Azithromycin   Oral Meds?   (Y/N) Y   Diet:  Soft  OR  Reg?  (Y/N)    TEMP 97.8 °F (36.6 °C)   WBC Lab Results   Component Value Date/Time    WBC 9.2 07/23/2019 03:57 AM               No S/S Infection   Pt able to swallow    Hemodynamically  stable                    (Y/N) Y   Pharmacist Notes     Will give 2 more days to complete 5 days of therapy

## 2019-07-23 NOTE — DIABETES MGMT
[facility]    DTC Progress Note    Recommendations/ Comments: Chart reviewed for variable BG. Hyperglycemia yesterday consistently above 250 mg/dL, likely due to last dose IV steroids. Patient transitioned to oral prednisone today.  mg/dL today, with pre-lunch hyperglycemia excursion of 261 mg/dL. Patient is on a cardiac diet. If appropriate, please consider:   1. Add consistent carb to current diet order for postprandial BG management. 2. DTC will monitor trends on oral steroid course. Current hospital DM medication:   Lispro high sensitivity correction scale  Lantus 30 units nightly    Chart reviewed on [facility] Porter. Patient is a 62 y.o. female with known Type 2 Diabetes on oral agent (monotherapy): Farxiga 10 mg daily  insulin injections: Lantus : 30 units nightly at home. A1c:   Lab Results   Component Value Date/Time    Hemoglobin A1c 6.8 (H) 07/21/2019 12:05 AM    Hemoglobin A1c 8.9 (H) 02/20/2019 10:44 AM       Recent Glucose Results:   Lab Results   Component Value Date/Time     (H) 07/23/2019 03:57 AM    GLUCPOC 261 (H) 07/23/2019 11:13 AM    GLUCPOC 156 (H) 07/23/2019 06:19 AM    GLUCPOC 292 (H) 07/22/2019 08:31 PM        Lab Results   Component Value Date/Time    Creatinine 1.18 (H) 07/23/2019 03:57 AM     Estimated Creatinine Clearance: 51.4 mL/min (A) (based on SCr of 1.18 mg/dL (H)). Active Orders   Diet    DIET CARDIAC Regular        PO intake:   Patient Vitals for the past 72 hrs:   % Diet Eaten   07/22/19 1743 100 %   07/22/19 1441 30 %   07/22/19 0859 100 %       Will continue to follow as needed.     Thank you  Sandoval Torres, RN  Office [phone]  Pager [phone]          Time spent: 6 min

## 2019-07-23 NOTE — NURSE NAVIGATOR
Met with patient at bedside. Introduced self and role of HF NN. Assessed patient current understanding of her medical issues. Patient has good basic understanding of her asthma, pericardial effusion, and previous MI. Described diastolic heart failure, symptoms of HF, and relationship to fluid congestion. Patient identified with most of the symptoms on the HF magnet. Patient states she has been on furosemide in the past and is familiar with daily weight. Reviewed weight parameters and early notification of provider with patient. Reviewed medications-new medication bumex, and medication safety. Patient uses weekly pill box and has aide assistance at home. Patient knows name and purpose of her medications. Reviewed additional HF self care measures including sodium limited diet and guidelines for fluid intake. Patient identified fluid intake as her challenge and noted she has been drinking 12 diet sodas a day as well as up to 6 bottled anton. Additional time spent on relationship of this amount of fluid intake on fluid retention and symptoms. Patient states she keeps a dry mouth and we discussed non fluid ways to alleviate this. Patient given Living with HF Eduction Book, HF calendar, and HF magnet. Opportunity for additional questions given. Patient confirmed she sees Dr. Savannah Moore for cardiology-which chart reflects. She needs 5 days to arrange Medicaid transportation.   Appointment scheduled and placed on AVS.

## 2019-07-23 NOTE — PROGRESS NOTES
Physical therapy    1430 Pt received in bed, anticipating discharge and declining to participate with physical therapy at this time. Pt reports she will have her caregiver 8 hr/ per day. Recommend HHPT to improve functional mobility and improve activity toleracne  To maximize independence. Maco Jean-Baptiste

## 2019-07-23 NOTE — PROGRESS NOTES
07/23/19 0251   Chart and Patient  Assessment   Pulmonary History 0   Surgical History 1   Chest X-ray 2   Respiratory Pattern 0   Mental Status 0   Breath Sounds 2   Cough 0   Level of Activity/Mobility 1   Respiratory Assessment Total Score 6

## 2019-07-23 NOTE — PROGRESS NOTES
Patient would like communication of their results via:        Chau    Cell Phone:   Telephone Information:   Mobile [phone]     Okay to leave a message containing results? Yes     SHIFT CHANGE:  1930 Bedside and Verbal shift change report given to Melissa HARMAN (oncoming nurse) by Reina Hardy (offgoing nurse). Report included the following information SBAR, Kardex, MAR and Recent Results. SHIFT SUMMARY:  7161  Patient's blood pressure 815 systolic, hydralazine given as prescribed. Blood pressure reduced upon rechecks. Patient Vitals for the past 4 hrs:   Temp Pulse Resp BP SpO2   07/23/19 0733     97 %   07/23/19 0709 97.8 °F (36.6 °C) 64 18 151/52 94 %   07/23/19 0615  65  153/68    07/23/19 0600  65  109/76    07/23/19 0550  60  144/70    07/23/19 0547    181/67    07/23/19 0544  59  181/67          0740  Spoke with family practice about patient's reported 9 incidents of loose stool. Patient was in tears this morning in frustration. Bulk laxative suggested, MD's will follow up with evaluation. END OF SHIFT REPORT:  1023  Bedside and Verbal shift change report given to Norberto Malone (oncoming nurse) by Nataly Terry (offgoing nurse). Report included the following information SBAR, Kardex, MAR and Recent Results.

## 2019-07-24 ENCOUNTER — PATIENT OUTREACH (OUTPATIENT)
Dept: FAMILY MEDICINE CLINIC | Age: 59
End: 2019-07-24

## 2019-07-24 ENCOUNTER — HOME HEALTH ADMISSION (OUTPATIENT)
Dept: HOME HEALTH SERVICES | Facility: HOME HEALTH | Age: 59
End: 2019-07-24
Payer: MEDICARE

## 2019-07-24 RX ORDER — GABAPENTIN 300 MG/1
300 CAPSULE ORAL 3 TIMES DAILY
Status: CANCELLED | OUTPATIENT
Start: 2019-07-24

## 2019-07-25 ENCOUNTER — PATIENT OUTREACH (OUTPATIENT)
Dept: FAMILY MEDICINE CLINIC | Age: 59
End: 2019-07-25

## 2019-07-25 ENCOUNTER — HOSPITAL ENCOUNTER (OUTPATIENT)
Dept: GENERAL RADIOLOGY | Age: 59
Discharge: HOME | End: 2019-07-25
Attending: PHYSICIAN ASSISTANT
Payer: MEDICAID

## 2019-07-25 DIAGNOSIS — J18.9 UNRESOLVED PNEUMONIA: ICD-10-CM

## 2019-07-25 PROCEDURE — 71046 X-RAY EXAM CHEST 2 VIEWS: CPT

## 2019-07-25 NOTE — PROGRESS NOTES
Hospital Discharge Follow-Up      Date/Time:  2019 9:47 AM    Patient was admitted to [facility] on 19 and discharged on 19 for CAP. The physician discharge summary was available at the time of outreach. Patient was contacted within 2 business days of discharge. Top Challenges reviewed with the provider   Coordination of care with cardiology for congestive hear failure  Coordination of care with gastroenterology for chronic loose stools       Method of communication with provider :none    Inpatient RRAT score:35  Was this a readmission? no   Patient stated reason for the readmission: N/A    Nurse Navigator (NN) contacted the patient by telephone to perform post hospital discharge assessment. Verified name and  with patient as identifiers. Provided introduction to self, and explanation of the Nurse Navigator role. Reviewed discharge instructions and red flags with patient who verbalized understanding. Patient given an opportunity to ask questions and does not have any further questions or concerns at this time. The patient agrees to contact the PCP office for questions related to their healthcare. NN provided contact information for future reference. Disease Specific:   N/A    Summary of patient's top problems: PER MD NOTES    1. Acute CHF Exacerbation 2/2 CAP: (improved) NYHA Class 3. BNP POA 4118. Trop <0.05 x 3, TSH 1.00, Mg 2.4, Phos 4.0. ECHO (2019) showed LV EF 61-65%, no regional wall motion abnormality; pericardial effusion measuring 22mm, no tamponade or compression. Cardio recommended close monitoring and OP repeat ECHO. Pt diuresed with bumex 1mg IV BID. Continued home Imdur 60mg daily and lopressor 50mg BID. Discontinued home micardis 80mg tablet. -start bumex 1mg daily, start valsartan 160mg tablet daily, -start norvasc 5mg daily  -follow up with cardiology for repeat ECHO     2.  CAP: (improved) CXR on admission showed new lingular and R middle lobe PNA. RVP negative, procalcitonin <0.1. Blood culture showed no growth so far. Urine culture showed no significant growth. Given 2 doses of zosyn and 3 doses of azithromycin 500mg for a total of 3 days of treatment. Patient feeling a lot better on day of discharge.  -continue mucinex 600mg BID prn    3. Loose stools (stable) Pt complains of 4-5 loose stools per day that are not preceded by any urgency. She states this affects her life. Tried immodium and probiotic without success. -follow up with gastroenterology outpatient for further workup and management.  [employer] orders at discharge:  PT, will evaluated for OT needs  [address]: [facility]  Date of initial visit: 7/25/19    Durable Medical Equipment ordered/company: none  Durable Medical Equipment received: none    Barriers to care? transportation    Advance Care Planning:   Does patient have an Advance Directive:  reviewed and current     Medication(s):   New Medications at Discharge: mucinex,  Norvasc, diovan  Changed Medications at Discharge: none  Discontinued Medications at Discharge: Micardis, zithromax, prednisone dose pack    Medication reconciliation was performed with patient, who verbalizes understanding of administration of home medications. There were barriers to obtaining medications identified at this time. Insurance would not cover pts mucinex, pt states she has other cough medicine if needed. Referral to Pharm D needed: no     Current Outpatient Medications   Medication Sig    bumetanide (BUMEX) 1 mg tablet Take 1 Tab by mouth daily.  amLODIPine (NORVASC) 5 mg tablet Take 1 Tab by mouth daily.  valsartan (DIOVAN) 160 mg tablet Take 1 Tab by mouth daily.  acetaminophen (TYLENOL) 325 mg tablet Take 650 mg by mouth two (2) times a day. Indications: pain associated with arthritis    sertraline (ZOLOFT) 25 mg tablet Take 25 mg by mouth daily.  Indications: major depressive disorder    FARXIGA 10 mg tab tablet TAKE 1 TABLET BY MOUTH EVERY DAY    BREO ELLIPTA 200-25 mcg/dose inhaler TAKE 1 PUFF BY MOUTH EVERY DAY    nitroglycerin (NITROSTAT) 0.4 mg SL tablet DISSOLVE 1 TABLET UNDER TONGUE EVERY 5 MIN AS NEEDED FOR CHEST PAIN FOR UP TO 3 DOSES    tobramycin (TOBREX) 0.3 % ophthalmic solution Administer 1 Drop to both eyes two (2) times a day.  prednisoLONE acetate (PRED FORTE) 1 % ophthalmic suspension Administer 1 Drop to both eyes four (4) times daily.  latanoprost (XALATAN) 0.005 % ophthalmic solution Administer 1 Drop to left eye nightly.  insulin glargine (LANTUS,BASAGLAR) 100 unit/mL (3 mL) inpn 30 U at bedtime. E11.9    DALTON PEN NEEDLE 32 gauge x 5/32\" ndle USE TWICE DAILY    clopidogrel (PLAVIX) 75 mg tab Take 1 Tab by mouth daily. Indications: treatment to prevent a heart attack    levalbuterol tartrate (XOPENEX HFA) 45 mcg/actuation inhaler Take 2 Puffs by inhalation every four (4) hours as needed for Wheezing or Shortness of Breath.  metoprolol tartrate (LOPRESSOR) 50 mg tablet TAKE ONE TABLET BY MOUTH TWICE DAILY    melatonin 3 mg tablet Take  by mouth.  cholecalciferol (VITAMIN D3) 1,000 unit tablet Take  by mouth daily.  levalbuterol (XOPENEX) 0.63 mg/3 mL nebu 3 mL by Nebulization route every four (4) hours as needed (shortness of breath).  Lancets misc To use to check blood sugar three times a day. Dx:E11.40    Nebulizer & Compressor machine 1 Each by Does Not Apply route as needed.  isosorbide mononitrate ER (IMDUR) 60 mg CR tablet Take 60 mg by mouth every morning.  pramipexole (MIRAPEX) 0.5 mg tablet Take 0.5 mg by mouth two (2) times a day.  levocetirizine (XYZAL) 5 mg tablet Take 5 mg by mouth daily.  DULoxetine (CYMBALTA) 60 mg capsule Take 60 mg by mouth daily.  gabapentin (NEURONTIN) 300 mg capsule Take 300 mg by mouth three (3) times daily.  Takes with 600 mg to make a total dose of 900 mg    atorvastatin (LIPITOR) 40 mg tablet Take 40 mg by mouth daily.  montelukast (SINGULAIR) 10 mg tablet Take 10 mg by mouth nightly.  guaiFENesin ER (MUCINEX) 600 mg ER tablet Take 1 Tab by mouth every twelve (12) hours.  LANTUS SOLOSTAR U-100 INSULIN 100 unit/mL (3 mL) inpn INJECT 30 UNITS BY SUBCUTANEOUS ROUTE NIGHTLY FOR 30 DAYS.  ULTRA THIN LANCETS 30 gauge misc TO USE TO CHECK BLOOD SUGAR THREE TIMES A DAY. DX:E11.40    glucose blood VI test strips (ASCENSIA AUTODISC VI, ONE TOUCH ULTRA TEST VI) strip Check BS 2 x per day. E11.9 Type II DM with hyperglycemia. No current facility-administered medications for this visit. There are no discontinued medications. [facility] follow up appointment(s):   Future Appointments   Date Time Provider Carl Burton   7/31/2019  2:40 PM Lyric Dotson MD [facility] SCHED   8/1/2019 10:45 AM Mya Mosher MD [address] [address]   8/6/2019 11:00 AM Oseas Tamayo  [facility]   9/12/2019  9:00 AM DIABETES CLASS #4 [facility]      Non-[facility] follow up appointment(s):   [facility]:  information provided as a resource       Goals      Understands red flags post discharge. 7/24/19- pt aware to monitor for fever, SOB, cough with increased sputum or change of color, will contact pulmonologist or PCP with concerns.  CW

## 2019-07-25 NOTE — PROGRESS NOTES
2803- CTN called  associated to make appointment for pt,  8/6/19 MARY Castillo- at [facility] Location- [address],  [address] [address] [phone]- This NN contacted [facility] to see when pts f/u appointment was today and who pt was seeing, was informed to that pt only had a scheduled appointment for an injection. 1055- CTN contacted [facility] to see if pt was in their coverage area, and pt is and they would be able to see pt today. 1057- CTN contacted pt who states she just had her injection and was still at pulmonary [facility], pt states she is feeling SOB CTN could hear pt was SOB. This CTN advised pt to speak with them as they have same day appointments to see if she could get in to see them now since she was already there and feeling SOB. Pt was able to get an appointment right away with MARY Delcid. Will follow up with pt later today.

## 2019-07-26 ENCOUNTER — HOME CARE VISIT (OUTPATIENT)
Dept: SCHEDULING | Facility: HOME HEALTH | Age: 59
End: 2019-07-26
Payer: MEDICARE

## 2019-07-26 ENCOUNTER — PATIENT OUTREACH (OUTPATIENT)
Dept: FAMILY MEDICINE CLINIC | Age: 59
End: 2019-07-26

## 2019-07-26 PROCEDURE — G0151 HHCP-SERV OF PT,EA 15 MIN: HCPCS

## 2019-07-26 PROCEDURE — 400013 HH SOC

## 2019-07-27 LAB
BACTERIA SPEC CULT: NORMAL
SERVICE CMNT-IMP: NORMAL

## 2019-07-28 VITALS
DIASTOLIC BLOOD PRESSURE: 68 MMHG | SYSTOLIC BLOOD PRESSURE: 142 MMHG | TEMPERATURE: 98.2 F | HEART RATE: 60 BPM | RESPIRATION RATE: 18 BRPM | OXYGEN SATURATION: 98 %

## 2019-07-29 ENCOUNTER — PATIENT OUTREACH (OUTPATIENT)
Dept: FAMILY MEDICINE CLINIC | Age: 59
End: 2019-07-29

## 2019-07-30 ENCOUNTER — HOME CARE VISIT (OUTPATIENT)
Dept: SCHEDULING | Facility: HOME HEALTH | Age: 59
End: 2019-07-30
Payer: MEDICARE

## 2019-07-30 VITALS
OXYGEN SATURATION: 97 % | HEART RATE: 57 BPM | RESPIRATION RATE: 18 BRPM | TEMPERATURE: 98.2 F | DIASTOLIC BLOOD PRESSURE: 68 MMHG | SYSTOLIC BLOOD PRESSURE: 130 MMHG

## 2019-07-30 PROCEDURE — G0157 HHC PT ASSISTANT EA 15: HCPCS

## 2019-07-30 RX ORDER — ISOSORBIDE MONONITRATE 60 MG/1
60 TABLET, EXTENDED RELEASE ORAL
Qty: 90 TAB | Refills: 1 | Status: SHIPPED | OUTPATIENT
Start: 2019-07-30 | End: 2019-08-01 | Stop reason: SDUPTHER

## 2019-07-30 RX ORDER — ATORVASTATIN CALCIUM 40 MG/1
TABLET, FILM COATED ORAL
Qty: 90 TAB | Refills: 3 | Status: SHIPPED | OUTPATIENT
Start: 2019-07-30

## 2019-07-30 RX ORDER — DULOXETIN HYDROCHLORIDE 60 MG/1
CAPSULE, DELAYED RELEASE ORAL
Qty: 90 CAP | Refills: 3 | Status: SHIPPED | OUTPATIENT
Start: 2019-07-30

## 2019-07-30 RX ORDER — LEVOCETIRIZINE DIHYDROCHLORIDE 5 MG/1
TABLET, FILM COATED ORAL
Qty: 90 TAB | Refills: 0 | Status: SHIPPED | OUTPATIENT
Start: 2019-07-30 | End: 2019-08-01 | Stop reason: SDUPTHER

## 2019-07-31 ENCOUNTER — OFFICE VISIT (OUTPATIENT)
Dept: CARDIOLOGY CLINIC | Age: 59
End: 2019-07-31

## 2019-07-31 ENCOUNTER — TELEPHONE (OUTPATIENT)
Dept: FAMILY MEDICINE CLINIC | Age: 59
End: 2019-07-31

## 2019-07-31 VITALS
HEART RATE: 63 BPM | BODY MASS INDEX: 38.51 KG/M2 | DIASTOLIC BLOOD PRESSURE: 60 MMHG | OXYGEN SATURATION: 98 % | SYSTOLIC BLOOD PRESSURE: 110 MMHG | RESPIRATION RATE: 18 BRPM | HEIGHT: 61 IN | WEIGHT: 204 LBS

## 2019-07-31 DIAGNOSIS — I25.119 CORONARY ARTERY DISEASE INVOLVING NATIVE CORONARY ARTERY OF NATIVE HEART WITH ANGINA PECTORIS (HCC): ICD-10-CM

## 2019-07-31 DIAGNOSIS — I50.33 ACUTE ON CHRONIC DIASTOLIC CHF (CONGESTIVE HEART FAILURE) (HCC): ICD-10-CM

## 2019-07-31 DIAGNOSIS — M79.2 NEUROPATHIC PAIN: Primary | ICD-10-CM

## 2019-07-31 DIAGNOSIS — I31.39 PERICARDIAL EFFUSION: Primary | ICD-10-CM

## 2019-07-31 RX ORDER — GABAPENTIN 600 MG/1
TABLET ORAL
Qty: 90 TAB | Refills: 1 | Status: SHIPPED | OUTPATIENT
Start: 2019-07-31 | End: 2019-08-01 | Stop reason: SDUPTHER

## 2019-07-31 NOTE — PROGRESS NOTES
Office Follow-up    NAME: Alcira Irizarry   :  1960  MRM:  [id number]    Date:  2019            Assessment:     Problem List  Date Reviewed: 2019          Codes Class Noted    Community acquired pneumonia of right middle lobe of lung ([facility] ICD-10-CM: J18.1  ICD-9-CM: 823  2019        CAP (community acquired pneumonia) ICD-10-CM: J18.9  ICD-9-CM: 424  2019        Type 2 diabetes with nephropathy ([facility] ICD-10-CM: E11.21  ICD-9-CM: 250.40, 583.81  2018        Chest pain ICD-10-CM: R07.9  ICD-9-CM: 786.50  2018        Coronary artery disease involving native coronary artery of native heart with angina pectoris ([facility] ICD-10-CM: I25.119  ICD-9-CM: 414.01, 413.9  2018        Status post coronary artery stent placement (Chronic) ICD-10-CM: Z95.5  ICD-9-CM: V45.82  2018        Severe obesity ([facility] ICD-10-CM: E66.01  ICD-9-CM: 278.01  2018        Neuropathy of right lower extremity ICD-10-CM: G57.91  ICD-9-CM: 355.8  2016        Pericardial effusion(moderate-large) with mild cardiac tamponade--via echo 16 ICD-10-CM: I31.3, I31.4  ICD-9-CM: 423.9, 423.3  2016        Acute on chronic diastolic CHF (congestive heart failure) (HCC) (Chronic) ICD-10-CM: I50.33  ICD-9-CM: 428.33, 428.0  2016        Obstructive sleep apnea ICD-10-CM: G47.33  ICD-9-CM: 327.23  3/10/2015        TIA (transient ischemic attack) ICD-10-CM: G45.9  ICD-9-CM: 435.9  3/9/2015        Essential hypertension with goal blood pressure less than 130/85 ICD-10-CM: I10  ICD-9-CM: 401.9  3/9/2015        DM type 2, uncontrolled, with neuropathy ([facility] ICD-10-CM: E11.40, E11.65  ICD-9-CM: 250.62, 357.2  3/9/2015        Asthma (Chronic) ICD-10-CM: J45.909  ICD-9-CM: 493.90  3/9/2015        Hyperlipidemia (Chronic) ICD-10-CM: E78.5  ICD-9-CM: 272.4  3/9/2015        Restless leg syndrome (Chronic) ICD-10-CM: G25.81  ICD-9-CM: 333.94  3/9/2015                 Plan:     1. Dyspnea on exertion:  This is multifactorial.  She had recent pneumonia and is currently recovering. She also had acute on chronic diastolic congestive heart failure. She is currently on Bumex. She has been diuresing. Continue to monitor. 2. CAD/ S/p Stent OM, LCX [place] Sept 2018/chest pain: Continue aspirin, Plavix, beta-blockers, statins. 3. S pericardial effusion: This used to be small but most recent echocardiogram from July 21 in the hospital demonstrated large 22 mm pericardial effusion in the posterior region. No tamponade features. We will continue to monitor. Performed a limited echocardiogram in 1 month. T  4. HTN: Blood pressure controlled. Continue isosorbide mononitrate, Bumex, amlodipine, Diovan. 5. Dyslipidemia: Continue Lipitor. 6. Preop evaluation: See me again in 1 month with same-day limited echocardiogram.                        Subjective:     Felipe Recinos, a 62y.o. year-old with past medical history significant for hypertension, diabetes, dyslipidemia, CAD, recent non-ST elevation MI in September 2018 was in [place] when she received 3 stents. She has pretty significant history of asthma and required hospital admission in November 2018. She was admitted to the hospital last week with symptoms of increasing shortness of breath, possible diastolic congestive heart failure, hypertension and pneumonia. She was treated with antibiotics. While in the hospital she had an echocardiogram which demonstrated a large posterior pericardial effusion. She is known to have chronic small pericardial effusion. She did not have any significant chamber compression or tamponade. She is returning today for follow-up. She has improved but she continues to have coughing spells.     Exam:     Physical Exam:  Visit Vitals  /60 (BP 1 Location: Left arm, BP Patient Position: Sitting)   Pulse 63   Resp 18   Ht 5' 1\" (1.549 m)   Wt 204 lb (92.5 kg)   LMP 01/01/2009 (Within Months)   SpO2 98%   BMI 38.55 kg/m² General appearance - alert, \  Mental status - affect appropriate to mood  Eyes - sclera anicteric, moist mucous membranes  Neck - supple, no significant adenopathy  Chest - clear to auscultation, no wheezes, rales or rhonchi  Heart - normal rate, regular rhythm, normal S1, S2, no murmurs, rubs, clicks or gallops  Abdomen - soft, nontender, nondistended, no masses or organomegaly  Extremities - peripheral pulses normal, no pedal edema  Skin - normal coloration  no rashes    Medications:     Current Outpatient Medications   Medication Sig    gabapentin (NEURONTIN) 600 mg tablet TAKE 1 TABLET BY MOUTH THREE TIMES A DAY    atorvastatin (LIPITOR) 40 mg tablet TAKE 1 TABLET BY MOUTH EVERY DAY    DULoxetine (CYMBALTA) 60 mg capsule TAKE 1 CAPSULE BY MOUTH EVERY DAY    levocetirizine (XYZAL) 5 mg tablet TAKE 1 TABLET BY MOUTH IN THE EVENING    isosorbide mononitrate ER (IMDUR) 60 mg CR tablet Take 1 Tab by mouth every morning.  melatonin 10 mg tab Take 1 Tab by mouth nightly.  acetaminophen (TYLENOL EXTRA STRENGTH) 500 mg tablet Take 1,000 mg by mouth two (2) times daily as needed for Pain.  nepafenac (ILEVRO) 0.3 % drps Apply 1 Drop to eye nightly. to left eye    bumetanide (BUMEX) 1 mg tablet Take 1 Tab by mouth daily.  amLODIPine (NORVASC) 5 mg tablet Take 1 Tab by mouth daily.  valsartan (DIOVAN) 160 mg tablet Take 1 Tab by mouth daily.  sertraline (ZOLOFT) 25 mg tablet Take 25 mg by mouth daily. Indications: major depressive disorder    FARXIGA 10 mg tab tablet TAKE 1 TABLET BY MOUTH EVERY DAY    BREO ELLIPTA 200-25 mcg/dose inhaler TAKE 1 PUFF BY MOUTH EVERY DAY    nitroglycerin (NITROSTAT) 0.4 mg SL tablet DISSOLVE 1 TABLET UNDER TONGUE EVERY 5 MIN AS NEEDED FOR CHEST PAIN FOR UP TO 3 DOSES    tobramycin (TOBREX) 0.3 % ophthalmic solution Administer 1 Drop to both eyes two (2) times a day.     prednisoLONE acetate (PRED FORTE) 1 % ophthalmic suspension Administer 1 Drop to both eyes four (4) times daily.  latanoprost (XALATAN) 0.005 % ophthalmic solution Administer 1 Drop to left eye nightly.  insulin glargine (LANTUS,BASAGLAR) 100 unit/mL (3 mL) inpn 30 U at bedtime. E11.9    ULTRA THIN LANCETS 30 gauge misc TO USE TO CHECK BLOOD SUGAR THREE TIMES A DAY. DX:E11.40    DALTON PEN NEEDLE 32 gauge x 5/32\" ndle USE TWICE DAILY    clopidogrel (PLAVIX) 75 mg tab Take 1 Tab by mouth daily. Indications: treatment to prevent a heart attack    levalbuterol tartrate (XOPENEX HFA) 45 mcg/actuation inhaler Take 2 Puffs by inhalation every four (4) hours as needed for Wheezing or Shortness of Breath.  metoprolol tartrate (LOPRESSOR) 50 mg tablet TAKE ONE TABLET BY MOUTH TWICE DAILY    glucose blood VI test strips (ASCENSIA AUTODISC VI, ONE TOUCH ULTRA TEST VI) strip Check BS 2 x per day. E11.9 Type II DM with hyperglycemia.  cholecalciferol (VITAMIN D3) 1,000 unit tablet Take 1,000 Units by mouth daily.  levalbuterol (XOPENEX) 0.63 mg/3 mL nebu 3 mL by Nebulization route every four (4) hours as needed (shortness of breath).  Lancets misc To use to check blood sugar three times a day. Dx:E11.40    Nebulizer & Compressor machine 1 Each by Does Not Apply route as needed.  pramipexole (MIRAPEX) 0.5 mg tablet Take 0.5 mg by mouth two (2) times a day.  montelukast (SINGULAIR) 10 mg tablet Take 10 mg by mouth nightly.  gabapentin (NEURONTIN) 300 mg capsule Take 600 mg by mouth three (3) times daily.  guaiFENesin ER (MUCINEX) 600 mg ER tablet Take 1 Tab by mouth every twelve (12) hours. (Patient not taking: Reported on 7/31/2019)    acetaminophen (TYLENOL) 325 mg tablet Take 650 mg by mouth two (2) times a day. Indications: pain associated with arthritis    LANTUS SOLOSTAR U-100 INSULIN 100 unit/mL (3 mL) inpn INJECT 30 UNITS BY SUBCUTANEOUS ROUTE NIGHTLY FOR 30 DAYS.  melatonin 3 mg tablet Take  by mouth.     gabapentin (NEURONTIN) 300 mg capsule Take 300 mg by mouth three (3) times daily. Takes with 600 mg to make a total dose of 900 mg     No current facility-administered medications for this visit. Diagnostic Data Review:       7/21/19: ECHO- LVEF 61-65% , NWMA, Mod concentric hypertrophy; Large circumferential but mostly posterior pericardial effusion measuring 22 mm. No chamner compression. No echo features of temponade. 4/10/19: NUC STRESS- Nml without ischemia or infarction on pharmacological stress test; LVEF 52%. No EKG changes of ischemia on pharmacological stress test.    11/14/18: ECHO- LVEF 55 % to 60 %, NWMA, G2DD; small pericardial effusion    9/16/18: CATH- One-vessel arteriosclerotic heart disease. Nml LV. No MR. Stent x1 circumflex. Stent x1 Obtuse marginal 1. Syntax = 6. Lab Review:     Lab Results   Component Value Date/Time    Cholesterol, total 235 (H) 07/21/2019 12:05 AM    HDL Cholesterol 58 07/21/2019 12:05 AM    LDL, calculated 137.8 (H) 07/21/2019 12:05 AM    Triglyceride 196 (H) 07/21/2019 12:05 AM    CHOL/HDL Ratio 4.1 07/21/2019 12:05 AM     Lab Results   Component Value Date/Time    Creatinine (POC) 0.7 10/31/2016 04:34 PM    Creatinine 1.18 (H) 07/23/2019 03:57 AM     Lab Results   Component Value Date/Time    BUN 39 (H) 07/23/2019 03:57 AM    BUN (POC) 21 (H) 10/31/2016 04:34 PM     Lab Results   Component Value Date/Time    Potassium 4.9 07/23/2019 03:57 AM     Lab Results   Component Value Date/Time    Hemoglobin A1c 6.8 (H) 07/21/2019 12:05 AM     Lab Results   Component Value Date/Time    Hemoglobin (POC) 12.9 10/31/2016 04:34 PM    HGB 10.5 (L) 07/23/2019 03:57 AM     Lab Results   Component Value Date/Time    PLATELET 944 01/26/0984 03:57 AM     No results for input(s): CPK, CKMB, TROIQ in the last 72 hours. No lab exists for component: CKQMB, CPKMB             ___________________________________________________    Haja Cortez.  Nevin Logan MD, [facility]

## 2019-07-31 NOTE — PROGRESS NOTES
Identified pt with two pt identifiers(name and ). Reviewed record in preparation for visit and have obtained necessary documentation. Chief Complaint   Patient presents with    CHF     f/u         Visit Vitals  /60 (BP 1 Location: Left arm, BP Patient Position: Sitting)   Pulse 63   Resp 18   Ht 5' 1\" (1.549 m)   Wt 204 lb (92.5 kg)   SpO2 98%   BMI 38.55 kg/m²       Health Maintenance Due   Topic    FOOT EXAM Q1     EYE EXAM RETINAL OR DILATED     PAP AKA CERVICAL CYTOLOGY     Shingrix Vaccine Age 50> (1 of 2)    Pneumococcal 0-64 years (1 of 1 - PPSV23)    MEDICARE YEARLY EXAM        Med Reconciliation: Completed    Coordination of Care Questionnaire:  :   1) Have you been to an emergency room, urgent care, or hospitalized since your last visit? If yes, where when, and reason for visit? Yes, Pt admitted to [facility] on 19, please see encounters. 2. Have seen or consulted any other health care provider since your last visit? If yes, where when, and reason for visit? No         Patient is accompanied by self I have received verbal consent from Kevin Benson to discuss any/all medical information while they are present in the room.

## 2019-08-01 ENCOUNTER — OFFICE VISIT (OUTPATIENT)
Dept: FAMILY MEDICINE CLINIC | Age: 59
End: 2019-08-01

## 2019-08-01 VITALS
BODY MASS INDEX: 38.93 KG/M2 | HEIGHT: 61 IN | SYSTOLIC BLOOD PRESSURE: 108 MMHG | RESPIRATION RATE: 20 BRPM | HEART RATE: 55 BPM | TEMPERATURE: 98 F | WEIGHT: 206.2 LBS | OXYGEN SATURATION: 96 % | DIASTOLIC BLOOD PRESSURE: 50 MMHG

## 2019-08-01 DIAGNOSIS — R21 RASH OF PERINEUM: ICD-10-CM

## 2019-08-01 DIAGNOSIS — R30.9 PAIN WITH URINATION: ICD-10-CM

## 2019-08-01 DIAGNOSIS — E11.65 CONTROLLED TYPE 2 DIABETES MELLITUS WITH HYPERGLYCEMIA, UNSPECIFIED WHETHER LONG TERM INSULIN USE (HCC): ICD-10-CM

## 2019-08-01 DIAGNOSIS — I50.30 HYPERTENSIVE HEART DISEASE WITH DIASTOLIC HEART FAILURE (HCC): Primary | ICD-10-CM

## 2019-08-01 DIAGNOSIS — B37.2 CANDIDAL INTERTRIGO: ICD-10-CM

## 2019-08-01 DIAGNOSIS — I11.0 HYPERTENSIVE HEART DISEASE WITH DIASTOLIC HEART FAILURE (HCC): Primary | ICD-10-CM

## 2019-08-01 DIAGNOSIS — J45.40 MODERATE PERSISTENT ASTHMA WITHOUT COMPLICATION: ICD-10-CM

## 2019-08-01 DIAGNOSIS — M79.2 NEUROPATHIC PAIN: ICD-10-CM

## 2019-08-01 LAB
ALBUMIN UR QL STRIP: 150 MG/L
BILIRUB UR QL STRIP: NEGATIVE
CREATININE, URINE POC: 10 MG/DL
GLUCOSE UR-MCNC: ABNORMAL MG/DL
KETONES P FAST UR STRIP-MCNC: NEGATIVE MG/DL
MICROALBUMIN/CREAT RATIO POC: >300 MG/G
PH UR STRIP: 5.5 [PH] (ref 4.6–8)
PROT UR QL STRIP: ABNORMAL
SP GR UR STRIP: 1.01 (ref 1–1.03)
UA UROBILINOGEN AMB POC: ABNORMAL (ref 0.2–1)
URINALYSIS CLARITY POC: CLEAR
URINALYSIS COLOR POC: YELLOW
URINE BLOOD POC: ABNORMAL
URINE LEUKOCYTES POC: ABNORMAL
URINE NITRITES POC: NEGATIVE

## 2019-08-01 RX ORDER — GABAPENTIN 600 MG/1
TABLET ORAL
Qty: 90 TAB | Refills: 1 | Status: SHIPPED | OUTPATIENT
Start: 2019-08-01 | End: 2019-09-25 | Stop reason: SDUPTHER

## 2019-08-01 RX ORDER — NYSTATIN 100000 [USP'U]/G
POWDER TOPICAL 4 TIMES DAILY
Qty: 60 G | Refills: 5 | Status: SHIPPED | OUTPATIENT
Start: 2019-08-01

## 2019-08-01 RX ORDER — FLUCONAZOLE 150 MG/1
150 TABLET ORAL DAILY
Qty: 1 TAB | Refills: 0 | Status: SHIPPED | OUTPATIENT
Start: 2019-08-01 | End: 2019-08-02

## 2019-08-01 RX ORDER — LEVOCETIRIZINE DIHYDROCHLORIDE 5 MG/1
TABLET, FILM COATED ORAL
Qty: 90 TAB | Refills: 0 | Status: SHIPPED | OUTPATIENT
Start: 2019-08-01

## 2019-08-01 RX ORDER — ISOSORBIDE MONONITRATE 60 MG/1
60 TABLET, EXTENDED RELEASE ORAL
Qty: 90 TAB | Refills: 1 | Status: SHIPPED | OUTPATIENT
Start: 2019-08-01

## 2019-08-01 NOTE — PATIENT INSTRUCTIONS
Avoiding Triggers With Heart Failure: Care Instructions Your Care Instructions Triggers are anything that make your heart failure flare up. A flare-up is also called \"sudden heart failure\" or \"acute heart failure. \" When you have a flare-up, fluid builds up in your lungs, and you have problems breathing. You might need to go to the hospital. By watching for changes in your condition and avoiding triggers, you can prevent heart failure flare-ups. Follow-up care is a key part of your treatment and safety. Be sure to make and go to all appointments, and call your doctor if you are having problems. It's also a good idea to know your test results and keep a list of the medicines you take. How can you care for yourself at home? Watch for changes in your weight and condition · Weigh yourself without clothing at the same time each day. Record your weight. Call your doctor if you have sudden weight gain, such as more than 2 to 3 pounds in a day or 5 pounds in a week. (Your doctor may suggest a different range of weight gain.) A sudden weight gain may mean that your heart failure is getting worse. · Keep a daily record of your symptoms. Write down any changes in how you feel, such as new shortness of breath, cough, or problems eating. Also record if your ankles are more swollen than usual and if you feel more tired than usual. Note anything that you ate or did that could have triggered these changes. Limit sodium Sodium causes your body to hold on to extra water. This may cause your heart failure symptoms to get worse. People get most of their sodium from processed foods. Fast food and restaurant meals also tend to be very high in sodium. · Your doctor may suggest that you limit sodium to 2,000 milligrams (mg) a day or less. That is less than 1 teaspoon of salt a day, including all the salt you eat in cooking or in packaged foods. · Read food labels on cans and food packages.  They tell you how much sodium you get in one serving. Check the serving size. If you eat more than one serving, you are getting more sodium. · Be aware that sodium can come in forms other than salt, including monosodium glutamate (MSG), sodium citrate, and sodium bicarbonate (baking soda). MSG is often added to Asian food. You can sometimes ask for food without MSG or salt. · Slowly reducing salt will help you adjust to the taste. Take the salt shaker off the table. · Flavor your food with garlic, lemon juice, onion, vinegar, herbs, and spices instead of salt. Do not use soy sauce, steak sauce, onion salt, garlic salt, mustard, or ketchup on your food, unless it is labeled \"low-sodium\" or \"low-salt. \" 
· Make your own salad dressings, sauces, and ketchup without adding salt. · Use fresh or frozen ingredients, instead of canned ones, whenever you can. Choose low-sodium canned goods. · Eat less processed food and food from restaurants, including fast food. Exercise as directed Moderate, regular exercise is very good for your heart. It improves your blood flow and helps control your weight. But too much exercise can stress your heart and cause a heart failure flare-up. · Check with your doctor before you start an exercise program. 
· Walking is an easy way to get exercise. Start out slowly. Gradually increase the length and pace of your walk. Swimming, riding a bike, and using a treadmill are also good forms of exercise. · When you exercise, watch for signs that your heart is working too hard. You are pushing yourself too hard if you cannot talk while you are exercising. If you become short of breath or dizzy or have chest pain, stop, sit down, and rest. 
· Do not exercise when you do not feel well. Take medicines correctly · Take your medicines exactly as prescribed. Call your doctor if you think you are having a problem with your medicine. · Make a list of all the medicines you take.  Include those prescribed to you by other doctors and any over-the-counter medicines, vitamins, or supplements you take. Take this list with you when you go to any doctor. · Take your medicines at the same time every day. It may help you to post a list of all the medicines you take every day and what time of day you take them. · Make taking your medicine as simple as you can. Plan times to take your medicines when you are doing other things, such as eating a meal or getting ready for bed. This will make it easier to remember to take your medicines. · Get organized. Use helpful tools, such as daily or weekly pill containers. When should you call for help? Call 911 if you have symptoms of sudden heart failure such as: 
  · You have severe trouble breathing.  
  · You cough up pink, foamy mucus.  
  · You have a new irregular or rapid heartbeat.  
 Call your doctor now or seek immediate medical care if: 
  · You have new or increased shortness of breath.  
  · You are dizzy or lightheaded, or you feel like you may faint.  
  · You have sudden weight gain, such as more than 2 to 3 pounds in a day or 5 pounds in a week. (Your doctor may suggest a different range of weight gain.)  
  · You have increased swelling in your legs, ankles, or feet.  
  · You are suddenly so tired or weak that you cannot do your usual activities.  
 Watch closely for changes in your health, and be sure to contact your doctor if you develop new symptoms. Where can you learn more? Go to http://emily-janette.info/. Enter Q845 in the search box to learn more about \"Avoiding Triggers With Heart Failure: Care Instructions. \" Current as of: July 22, 2018 Content Version: 12.1 © 4917-8785 [employer]. Care instructions adapted under license by [employer] (which disclaims liability or warranty for this information).  If you have questions about a medical condition or this instruction, always ask your healthcare professional. [place] [address] any warranty or liability for your use of this information.

## 2019-08-01 NOTE — PROGRESS NOTES
Patient presents for hospital follow up and complains of continued coughing; severe rash to periarea that causes much burning pain with urination. Urine specimen collected.

## 2019-08-02 ENCOUNTER — HOME CARE VISIT (OUTPATIENT)
Dept: SCHEDULING | Facility: HOME HEALTH | Age: 59
End: 2019-08-02
Payer: MEDICARE

## 2019-08-02 PROCEDURE — G0157 HHC PT ASSISTANT EA 15: HCPCS

## 2019-08-02 NOTE — PROGRESS NOTES
Goals      Understands red flags post discharge. 7/29/19-spoke with pt today,  Still continues to have a lot of coughing, states \"I feel like I am using my inhaler more than usual, will continue to closely monitor pt. CW    7/26/19- [facility],  Per pt x-ray was taken and lungs are clear, pt states she is still not feeling well has cough,  Is taking Tesson DM,  Is resting. This CTN has given pt direct number for [facility] if she is not improving,  Has a PCP appointment for 8/1/19. Pt has my direct contact information. Will follow up with pt on 7/29/19 to see if she is improving. CW    7/24/19- pt aware to monitor for fever, SOB, cough with increased sputum or change of color, will contact pulmonologist or PCP with concerns.  CW

## 2019-08-04 VITALS
SYSTOLIC BLOOD PRESSURE: 124 MMHG | TEMPERATURE: 97.8 F | RESPIRATION RATE: 18 BRPM | OXYGEN SATURATION: 97 % | DIASTOLIC BLOOD PRESSURE: 60 MMHG | HEART RATE: 83 BPM

## 2019-08-05 ENCOUNTER — TELEPHONE (OUTPATIENT)
Dept: FAMILY MEDICINE CLINIC | Age: 59
End: 2019-08-05

## 2019-08-05 ENCOUNTER — HOME CARE VISIT (OUTPATIENT)
Dept: SCHEDULING | Facility: HOME HEALTH | Age: 59
End: 2019-08-05
Payer: MEDICARE

## 2019-08-05 VITALS
TEMPERATURE: 97 F | HEART RATE: 63 BPM | OXYGEN SATURATION: 95 % | SYSTOLIC BLOOD PRESSURE: 138 MMHG | RESPIRATION RATE: 16 BRPM | DIASTOLIC BLOOD PRESSURE: 72 MMHG

## 2019-08-05 DIAGNOSIS — Z92.89 HOSPITALIZATION WITHIN LAST 30 DAYS: ICD-10-CM

## 2019-08-05 DIAGNOSIS — F41.9 ANXIETY AND DEPRESSION: ICD-10-CM

## 2019-08-05 DIAGNOSIS — F32.A ANXIETY AND DEPRESSION: ICD-10-CM

## 2019-08-05 DIAGNOSIS — M79.2 NEUROPATHIC PAIN: Primary | ICD-10-CM

## 2019-08-05 LAB
ALBUMIN SERPL-MCNC: 4 G/DL (ref 3.6–4.8)
ALBUMIN/GLOB SERPL: 1.6 {RATIO} (ref 1.2–2.2)
ALP SERPL-CCNC: 81 IU/L (ref 39–117)
ALT SERPL-CCNC: 20 IU/L (ref 0–32)
AST SERPL-CCNC: 14 IU/L (ref 0–40)
BACTERIA UR CULT: ABNORMAL
BASOPHILS # BLD AUTO: 0.1 X10E3/UL (ref 0–0.2)
BASOPHILS NFR BLD AUTO: 1 %
BILIRUB SERPL-MCNC: 0.4 MG/DL (ref 0–1.2)
BUN SERPL-MCNC: 23 MG/DL (ref 8–27)
BUN/CREAT SERPL: 23 (ref 12–28)
CALCIUM SERPL-MCNC: 9.3 MG/DL (ref 8.7–10.3)
CHLORIDE SERPL-SCNC: 103 MMOL/L (ref 96–106)
CO2 SERPL-SCNC: 26 MMOL/L (ref 20–29)
CREAT SERPL-MCNC: 1.01 MG/DL (ref 0.57–1)
EOSINOPHIL # BLD AUTO: 0.1 X10E3/UL (ref 0–0.4)
EOSINOPHIL NFR BLD AUTO: 1 %
ERYTHROCYTE [DISTWIDTH] IN BLOOD BY AUTOMATED COUNT: 14.1 % (ref 12.3–15.4)
GLOBULIN SER CALC-MCNC: 2.5 G/DL (ref 1.5–4.5)
GLUCOSE SERPL-MCNC: 116 MG/DL (ref 65–99)
HCT VFR BLD AUTO: 37.9 % (ref 34–46.6)
HGB BLD-MCNC: 11.9 G/DL (ref 11.1–15.9)
IMM GRANULOCYTES # BLD AUTO: 0 X10E3/UL (ref 0–0.1)
IMM GRANULOCYTES NFR BLD AUTO: 0 %
LYMPHOCYTES # BLD AUTO: 1.9 X10E3/UL (ref 0.7–3.1)
LYMPHOCYTES NFR BLD AUTO: 27 %
MCH RBC QN AUTO: 29.6 PG (ref 26.6–33)
MCHC RBC AUTO-ENTMCNC: 31.4 G/DL (ref 31.5–35.7)
MCV RBC AUTO: 94 FL (ref 79–97)
MONOCYTES # BLD AUTO: 0.6 X10E3/UL (ref 0.1–0.9)
MONOCYTES NFR BLD AUTO: 8 %
NEUTROPHILS # BLD AUTO: 4.5 X10E3/UL (ref 1.4–7)
NEUTROPHILS NFR BLD AUTO: 63 %
OTHER ANTIBIOTIC SUSC ISLT: ABNORMAL
PLATELET # BLD AUTO: 253 X10E3/UL (ref 150–450)
POTASSIUM SERPL-SCNC: 4.3 MMOL/L (ref 3.5–5.2)
PROT SERPL-MCNC: 6.5 G/DL (ref 6–8.5)
RBC # BLD AUTO: 4.02 X10E6/UL (ref 3.77–5.28)
SODIUM SERPL-SCNC: 143 MMOL/L (ref 134–144)
WBC # BLD AUTO: 7.1 X10E3/UL (ref 3.4–10.8)

## 2019-08-05 PROCEDURE — G0157 HHC PT ASSISTANT EA 15: HCPCS

## 2019-08-05 NOTE — TELEPHONE ENCOUNTER
mirlande bueno with [facility] would like a order for pt to get medical social work to come out an meet with pt about community resources and help with problems    Send order to Fax # [phone]

## 2019-08-06 ENCOUNTER — PATIENT OUTREACH (OUTPATIENT)
Dept: FAMILY MEDICINE CLINIC | Age: 59
End: 2019-08-06

## 2019-08-07 ENCOUNTER — PATIENT OUTREACH (OUTPATIENT)
Dept: FAMILY MEDICINE CLINIC | Age: 59
End: 2019-08-07

## 2019-08-07 ENCOUNTER — HOME CARE VISIT (OUTPATIENT)
Dept: SCHEDULING | Facility: HOME HEALTH | Age: 59
End: 2019-08-07
Payer: MEDICARE

## 2019-08-07 ENCOUNTER — OFFICE VISIT (OUTPATIENT)
Dept: FAMILY MEDICINE CLINIC | Age: 59
End: 2019-08-07

## 2019-08-07 VITALS
BODY MASS INDEX: 39.27 KG/M2 | HEIGHT: 61 IN | RESPIRATION RATE: 20 BRPM | WEIGHT: 208 LBS | SYSTOLIC BLOOD PRESSURE: 130 MMHG | DIASTOLIC BLOOD PRESSURE: 82 MMHG | HEART RATE: 88 BPM

## 2019-08-07 DIAGNOSIS — T14.8XXA ABRASION: Primary | ICD-10-CM

## 2019-08-07 PROCEDURE — G0157 HHC PT ASSISTANT EA 15: HCPCS

## 2019-08-07 RX ORDER — MUPIROCIN 20 MG/G
OINTMENT TOPICAL DAILY
Qty: 22 G | Refills: 0 | Status: SHIPPED | OUTPATIENT
Start: 2019-08-07

## 2019-08-07 NOTE — PROGRESS NOTES
Goals      Understands red flags post discharge. 8/6/19- pt called today crying stating that she will miss her GI appointment as arranged medicaid transportation did not come to pick her up. This CTN contacted [facility] to advise and rebook pts appointment. [facility] and [facility] to make an appointment. Pt states that due to continued frequent stool incontinence she is in severe pain not alleviated by any over the counter treatments. Will continue to monitor. Pt has no additional symptoms of PNA. CW    7/29/19-spoke with pt today,  Still continues to have a lot of coughing, states \"I feel like I am using my inhaler more than usual, will continue to closely monitor pt. CW    7/26/19- [facility],  Per pt x-ray was taken and lungs are clear, pt states she is still not feeling well has cough,  Is taking Tesson DM,  Is resting. This CTN has given pt direct number for [facility] if she is not improving,  Has a PCP appointment for 8/1/19. Pt has my direct contact information. Will follow up with pt on 7/29/19 to see if she is improving. CW    7/24/19- pt aware to monitor for fever, SOB, cough with increased sputum or change of color, will contact pulmonologist or PCP with concerns. Pt stating that she is also dealing with fecal incontinence that has been an ongoing issue, will be following up with [facility].   FIDE saleh

## 2019-08-07 NOTE — PROGRESS NOTES
HISTORY OF PRESENT ILLNESS  Amelia Anderson is a 62 y.o. female. HPI  Pt presents with \"sore on buttocks\"    Pt states that she was seen by Dr Magdy Elizondo about a week ago, after being in the hospital. Dr Magdy Elizondo diagnosed her with a yeast infection, which she believed to be caused by all of the antibiotics that she was on in the hospital.  Pt states that this rash has improved, but patient now has a sore in the crack of her buttocks. Pt states that she has been having loose stools, and believes that this could be cause for the sore. Pt has [facility] PT coming to the house, and they looked at the area and informed patient that she should get [facility] Nurse to come to house, to assess and treat the sore as needed. Pt is here today requesting this order. Review of Systems   Constitutional: Negative for fever. HENT: Negative for congestion. Gastrointestinal: Negative for diarrhea and vomiting. Physical Exam   Constitutional: She is oriented to person, place, and time. She appears well-developed and well-nourished. HENT:   Head: Normocephalic and atraumatic. Cardiovascular: Normal rate, regular rhythm and normal heart sounds. Pulmonary/Chest: Effort normal and breath sounds normal.   Neurological: She is alert and oriented to person, place, and time. Skin: Skin is warm and dry. Rash noted. Psychiatric: She has a normal mood and affect. Her behavior is normal.       ASSESSMENT and PLAN    ICD-10-CM ICD-9-CM    1. Abrasion T14. 8XXA 919.0 REFERRAL TO HOME HEALTH      mupirocin (BACTROBAN) 2 % ointment     Educated about applying ointment as prescribed  Order placed for nursing, per patient request    Pt informed to return to office with worsening of symptoms, or PRN with any questions or concerns. Pt verbalizes understanding of plan of care and denies further questions or concerns at this time.

## 2019-08-07 NOTE — PATIENT INSTRUCTIONS
Scrapes (Abrasions): Care Instructions  Your Care Instructions  Scrapes (abrasions) are wounds where your skin has been rubbed or torn off. Most scrapes do not go deep into the skin, but some may remove several layers of skin. Scrapes usually don't bleed much, but they may ooze pinkish fluid. Scrapes on the head or face may appear worse than they are. They may bleed a lot because of the good blood supply to this area. Most scrapes heal well and may not need a bandage. They usually heal within 3 to 7 days. A large, deep scrape may take 1 to 2 weeks or longer to heal. A scab may form on some scrapes. Follow-up care is a key part of your treatment and safety. Be sure to make and go to all appointments, and call your doctor if you are having problems. It's also a good idea to know your test results and keep a list of the medicines you take. How can you care for yourself at home? · If your doctor told you how to care for your wound, follow your doctor's instructions. If you did not get instructions, follow this general advice:  ? Wash the scrape with clean water 2 times a day. Don't use hydrogen peroxide or alcohol, which can slow healing. ? You may cover the scrape with a thin layer of petroleum jelly, such as Vaseline, and a nonstick bandage. ? Apply more petroleum jelly and replace the bandage as needed. · Prop up the injured area on a pillow anytime you sit or lie down during the next 3 days. Try to keep it above the level of your heart. This will help reduce swelling. · Be safe with medicines. Take pain medicines exactly as directed. ? If the doctor gave you a prescription medicine for pain, take it as prescribed. ? If you are not taking a prescription pain medicine, ask your doctor if you can take an over-the-counter medicine. When should you call for help?   Call your doctor now or seek immediate medical care if:    · You have signs of infection, such as:  ? Increased pain, swelling, warmth, or redness around the scrape. ? Red streaks leading from the scrape. ? Pus draining from the scrape. ? A fever.     · The scrape starts to bleed, and blood soaks through the bandage. Oozing small amounts of blood is normal.    Watch closely for changes in your health, and be sure to contact your doctor if the scrape is not getting better each day. Where can you learn more? Go to http://emily-janette.info/. Enter A374 in the search box to learn more about \"Scrapes (Abrasions): Care Instructions. \"  Current as of: September 23, 2018  Content Version: 12.1  © 2096-5723 [employer], [employer]. Care instructions adapted under license by [employer] (which disclaims liability or warranty for this information). If you have questions about a medical condition or this instruction, always ask your healthcare professional. [place] [address] any warranty or liability for your use of this information.

## 2019-08-08 VITALS
DIASTOLIC BLOOD PRESSURE: 68 MMHG | RESPIRATION RATE: 18 BRPM | OXYGEN SATURATION: 99 % | SYSTOLIC BLOOD PRESSURE: 120 MMHG | HEART RATE: 57 BPM | TEMPERATURE: 97.4 F

## 2019-08-10 ENCOUNTER — HOME CARE VISIT (OUTPATIENT)
Dept: SCHEDULING | Facility: HOME HEALTH | Age: 59
End: 2019-08-10
Payer: MEDICARE

## 2019-08-10 PROCEDURE — G0299 HHS/HOSPICE OF RN EA 15 MIN: HCPCS

## 2019-08-11 VITALS
TEMPERATURE: 98.7 F | HEART RATE: 62 BPM | SYSTOLIC BLOOD PRESSURE: 128 MMHG | OXYGEN SATURATION: 98 % | RESPIRATION RATE: 16 BRPM | DIASTOLIC BLOOD PRESSURE: 60 MMHG

## 2019-08-12 ENCOUNTER — HOME CARE VISIT (OUTPATIENT)
Dept: HOME HEALTH SERVICES | Facility: HOME HEALTH | Age: 59
End: 2019-08-12
Payer: MEDICARE

## 2019-08-12 ENCOUNTER — HOME CARE VISIT (OUTPATIENT)
Dept: SCHEDULING | Facility: HOME HEALTH | Age: 59
End: 2019-08-12
Payer: MEDICARE

## 2019-08-12 VITALS
RESPIRATION RATE: 16 BRPM | SYSTOLIC BLOOD PRESSURE: 110 MMHG | HEART RATE: 58 BPM | TEMPERATURE: 98.9 F | DIASTOLIC BLOOD PRESSURE: 68 MMHG | OXYGEN SATURATION: 98 %

## 2019-08-12 PROCEDURE — G0157 HHC PT ASSISTANT EA 15: HCPCS

## 2019-08-12 PROCEDURE — A4216 STERILE WATER/SALINE, 10 ML: HCPCS

## 2019-08-12 PROCEDURE — G0155 HHCP-SVS OF CSW,EA 15 MIN: HCPCS

## 2019-08-12 PROCEDURE — A6212 FOAM DRG <=16 SQ IN W/BORDER: HCPCS

## 2019-08-13 ENCOUNTER — HOME CARE VISIT (OUTPATIENT)
Dept: SCHEDULING | Facility: HOME HEALTH | Age: 59
End: 2019-08-13
Payer: MEDICARE

## 2019-08-13 ENCOUNTER — DOCUMENTATION ONLY (OUTPATIENT)
Dept: FAMILY MEDICINE CLINIC | Age: 59
End: 2019-08-13

## 2019-08-13 PROCEDURE — G0300 HHS/HOSPICE OF LPN EA 15 MIN: HCPCS

## 2019-08-13 NOTE — PROGRESS NOTES
Fax received from [facility] AND [facility] for wound care supplies. Chart reviewed as patient is unknown to me. Per chart review she has [facility] wound care for buttock wound and supplies were ordered by [facility]. Will sign order as Dr. Erika Pennington is out of the office.

## 2019-08-14 ENCOUNTER — PATIENT OUTREACH (OUTPATIENT)
Dept: FAMILY MEDICINE CLINIC | Age: 59
End: 2019-08-14

## 2019-08-14 ENCOUNTER — TELEPHONE (OUTPATIENT)
Dept: FAMILY MEDICINE CLINIC | Age: 59
End: 2019-08-14

## 2019-08-14 DIAGNOSIS — N30.00 ACUTE CYSTITIS WITHOUT HEMATURIA: Primary | ICD-10-CM

## 2019-08-14 RX ORDER — AMLODIPINE BESYLATE 5 MG/1
TABLET ORAL
Qty: 30 TAB | Refills: 0 | Status: SHIPPED | OUTPATIENT
Start: 2019-08-14 | End: 2019-08-24 | Stop reason: SDUPTHER

## 2019-08-14 RX ORDER — AMOXICILLIN 500 MG/1
500 CAPSULE ORAL 2 TIMES DAILY
Qty: 20 CAP | Refills: 0 | Status: SHIPPED | OUTPATIENT
Start: 2019-08-14 | End: 2019-08-24

## 2019-08-14 RX ORDER — VALSARTAN 160 MG/1
TABLET ORAL
Qty: 30 TAB | Refills: 0 | Status: SHIPPED | OUTPATIENT
Start: 2019-08-14 | End: 2019-08-24 | Stop reason: SDUPTHER

## 2019-08-14 RX ORDER — BUMETANIDE 1 MG/1
TABLET ORAL
Qty: 30 TAB | Refills: 0 | Status: SHIPPED | OUTPATIENT
Start: 2019-08-14 | End: 2019-08-24 | Stop reason: SDUPTHER

## 2019-08-14 NOTE — TELEPHONE ENCOUNTER
Please advise pt her urine CX from 8/1 still + for bacteria in urine. I will order amoxicillin for this.

## 2019-08-15 ENCOUNTER — HOME CARE VISIT (OUTPATIENT)
Dept: SCHEDULING | Facility: HOME HEALTH | Age: 59
End: 2019-08-15
Payer: MEDICARE

## 2019-08-15 VITALS
HEART RATE: 77 BPM | DIASTOLIC BLOOD PRESSURE: 76 MMHG | OXYGEN SATURATION: 98 % | TEMPERATURE: 98.2 F | SYSTOLIC BLOOD PRESSURE: 160 MMHG | RESPIRATION RATE: 18 BRPM

## 2019-08-15 VITALS — TEMPERATURE: 98.2 F | RESPIRATION RATE: 16 BRPM | OXYGEN SATURATION: 99 % | HEART RATE: 65 BPM

## 2019-08-15 PROCEDURE — G0300 HHS/HOSPICE OF LPN EA 15 MIN: HCPCS

## 2019-08-15 NOTE — TELEPHONE ENCOUNTER
Spoke with patient and relayed message. Voiced that pharmacist said she has an allergy to Amoxicillin. Patient states, she has taken it before and never had a reaction to it.   There is no mention of it in her allergy list.

## 2019-08-16 ENCOUNTER — HOME CARE VISIT (OUTPATIENT)
Dept: SCHEDULING | Facility: HOME HEALTH | Age: 59
End: 2019-08-16
Payer: MEDICARE

## 2019-08-16 VITALS
OXYGEN SATURATION: 99 % | DIASTOLIC BLOOD PRESSURE: 64 MMHG | HEART RATE: 59 BPM | RESPIRATION RATE: 16 BRPM | TEMPERATURE: 97.9 F | SYSTOLIC BLOOD PRESSURE: 120 MMHG

## 2019-08-16 PROCEDURE — G0157 HHC PT ASSISTANT EA 15: HCPCS

## 2019-08-17 ENCOUNTER — HOME CARE VISIT (OUTPATIENT)
Dept: SCHEDULING | Facility: HOME HEALTH | Age: 59
End: 2019-08-17
Payer: MEDICARE

## 2019-08-17 VITALS
DIASTOLIC BLOOD PRESSURE: 70 MMHG | HEART RATE: 61 BPM | OXYGEN SATURATION: 98 % | SYSTOLIC BLOOD PRESSURE: 130 MMHG | TEMPERATURE: 98.2 F | RESPIRATION RATE: 18 BRPM

## 2019-08-17 PROCEDURE — G0300 HHS/HOSPICE OF LPN EA 15 MIN: HCPCS

## 2019-08-19 ENCOUNTER — HOME CARE VISIT (OUTPATIENT)
Dept: SCHEDULING | Facility: HOME HEALTH | Age: 59
End: 2019-08-19
Payer: MEDICARE

## 2019-08-19 VITALS
OXYGEN SATURATION: 99 % | SYSTOLIC BLOOD PRESSURE: 133 MMHG | HEART RATE: 57 BPM | WEIGHT: 206 LBS | BODY MASS INDEX: 38.92 KG/M2 | RESPIRATION RATE: 16 BRPM | DIASTOLIC BLOOD PRESSURE: 68 MMHG | TEMPERATURE: 98.1 F

## 2019-08-19 PROCEDURE — G0299 HHS/HOSPICE OF RN EA 15 MIN: HCPCS

## 2019-08-21 ENCOUNTER — HOME CARE VISIT (OUTPATIENT)
Dept: SCHEDULING | Facility: HOME HEALTH | Age: 59
End: 2019-08-21
Payer: MEDICARE

## 2019-08-21 VITALS
HEART RATE: 63 BPM | OXYGEN SATURATION: 97 % | DIASTOLIC BLOOD PRESSURE: 60 MMHG | RESPIRATION RATE: 16 BRPM | SYSTOLIC BLOOD PRESSURE: 120 MMHG | TEMPERATURE: 98.1 F

## 2019-08-21 VITALS
OXYGEN SATURATION: 99 % | DIASTOLIC BLOOD PRESSURE: 68 MMHG | SYSTOLIC BLOOD PRESSURE: 136 MMHG | TEMPERATURE: 98.2 F | HEART RATE: 64 BPM | RESPIRATION RATE: 16 BRPM

## 2019-08-21 PROCEDURE — G0300 HHS/HOSPICE OF LPN EA 15 MIN: HCPCS

## 2019-08-21 PROCEDURE — G0157 HHC PT ASSISTANT EA 15: HCPCS

## 2019-08-22 ENCOUNTER — HOME CARE VISIT (OUTPATIENT)
Dept: SCHEDULING | Facility: HOME HEALTH | Age: 59
End: 2019-08-22
Payer: MEDICARE

## 2019-08-22 VITALS
SYSTOLIC BLOOD PRESSURE: 160 MMHG | OXYGEN SATURATION: 99 % | DIASTOLIC BLOOD PRESSURE: 72 MMHG | RESPIRATION RATE: 16 BRPM | HEART RATE: 66 BPM | TEMPERATURE: 97.9 F

## 2019-08-22 PROCEDURE — G0151 HHCP-SERV OF PT,EA 15 MIN: HCPCS

## 2019-08-23 ENCOUNTER — HOME CARE VISIT (OUTPATIENT)
Dept: SCHEDULING | Facility: HOME HEALTH | Age: 59
End: 2019-08-23
Payer: MEDICARE

## 2019-08-23 VITALS
RESPIRATION RATE: 16 BRPM | BODY MASS INDEX: 39.49 KG/M2 | TEMPERATURE: 98 F | DIASTOLIC BLOOD PRESSURE: 71 MMHG | OXYGEN SATURATION: 99 % | HEART RATE: 54 BPM | WEIGHT: 209 LBS | SYSTOLIC BLOOD PRESSURE: 136 MMHG

## 2019-08-23 PROCEDURE — G0300 HHS/HOSPICE OF LPN EA 15 MIN: HCPCS

## 2019-08-27 ENCOUNTER — HOME CARE VISIT (OUTPATIENT)
Dept: SCHEDULING | Facility: HOME HEALTH | Age: 59
End: 2019-08-27
Payer: MEDICARE

## 2019-08-27 VITALS
TEMPERATURE: 98.2 F | HEART RATE: 66 BPM | DIASTOLIC BLOOD PRESSURE: 70 MMHG | SYSTOLIC BLOOD PRESSURE: 136 MMHG | RESPIRATION RATE: 16 BRPM

## 2019-08-27 DIAGNOSIS — G25.81 RESTLESS LEG SYNDROME: Primary | Chronic | ICD-10-CM

## 2019-08-27 PROCEDURE — G0300 HHS/HOSPICE OF LPN EA 15 MIN: HCPCS

## 2019-08-27 RX ORDER — VALSARTAN 160 MG/1
TABLET ORAL
Qty: 30 TAB | Refills: 0 | Status: SHIPPED | OUTPATIENT
Start: 2019-08-27

## 2019-08-27 RX ORDER — GABAPENTIN 300 MG/1
300 CAPSULE ORAL 3 TIMES DAILY
Qty: 90 CAP | Refills: 1 | Status: SHIPPED | OUTPATIENT
Start: 2019-08-27 | End: 2019-11-25

## 2019-08-27 RX ORDER — BUMETANIDE 1 MG/1
TABLET ORAL
Qty: 30 TAB | Refills: 0 | Status: SHIPPED | OUTPATIENT
Start: 2019-08-27

## 2019-08-27 RX ORDER — AMLODIPINE BESYLATE 5 MG/1
TABLET ORAL
Qty: 30 TAB | Refills: 0 | Status: SHIPPED | OUTPATIENT
Start: 2019-08-27 | End: 2019-09-24 | Stop reason: SDUPTHER

## 2019-08-27 NOTE — TELEPHONE ENCOUNTER
[facility] faxed stating \"insurance does NOT cover true metrix. Asking for One Touch in replacement. \"

## 2019-08-29 ENCOUNTER — HOME CARE VISIT (OUTPATIENT)
Dept: SCHEDULING | Facility: HOME HEALTH | Age: 59
End: 2019-08-29
Payer: MEDICARE

## 2019-08-29 ENCOUNTER — HOME CARE VISIT (OUTPATIENT)
Dept: HOME HEALTH SERVICES | Facility: HOME HEALTH | Age: 59
End: 2019-08-29
Payer: MEDICARE

## 2019-08-29 PROCEDURE — G0300 HHS/HOSPICE OF LPN EA 15 MIN: HCPCS

## 2019-08-30 ENCOUNTER — OFFICE VISIT (OUTPATIENT)
Dept: CARDIOLOGY CLINIC | Age: 59
End: 2019-08-30

## 2019-08-30 VITALS
HEART RATE: 60 BPM | HEIGHT: 61 IN | SYSTOLIC BLOOD PRESSURE: 126 MMHG | WEIGHT: 212 LBS | RESPIRATION RATE: 16 BRPM | BODY MASS INDEX: 40.02 KG/M2 | DIASTOLIC BLOOD PRESSURE: 68 MMHG | OXYGEN SATURATION: 98 %

## 2019-08-30 DIAGNOSIS — I25.119 CORONARY ARTERY DISEASE INVOLVING NATIVE CORONARY ARTERY OF NATIVE HEART WITH ANGINA PECTORIS (HCC): ICD-10-CM

## 2019-08-30 DIAGNOSIS — E66.01 SEVERE OBESITY (HCC): ICD-10-CM

## 2019-08-30 DIAGNOSIS — I31.39 PERICARDIAL EFFUSION WITH CARDIAC TAMPONADE: Primary | ICD-10-CM

## 2019-08-30 DIAGNOSIS — I31.4 PERICARDIAL EFFUSION WITH CARDIAC TAMPONADE: Primary | ICD-10-CM

## 2019-08-30 DIAGNOSIS — I26.99 PULMONARY EMBOLISM WITHOUT ACUTE COR PULMONALE, UNSPECIFIED CHRONICITY, UNSPECIFIED PULMONARY EMBOLISM TYPE (HCC): ICD-10-CM

## 2019-08-30 DIAGNOSIS — E11.21 TYPE 2 DIABETES WITH NEPHROPATHY (HCC): ICD-10-CM

## 2019-08-30 NOTE — PROGRESS NOTES
Chief Complaint   Patient presents with    Follow-up    Coronary Artery Disease     Stent    Shortness of Breath     ORTEGA    Hypertension     Visit Vitals  /68   Pulse 60   Resp 16   Ht 5' 1\" (1.549 m)   Wt 212 lb (96.2 kg)   SpO2 98%   BMI 40.06 kg/m²     Patient presents in office without complaint.      Recent Hospital Visit 7/20-7/23 CAP at [facility]

## 2019-08-30 NOTE — PROGRESS NOTES
Office Follow-up    NAME: Lane Benitez   :  1960  MRM:  [id number]    Date:  2019            Assessment:     Problem List  Date Reviewed: 2019          Codes Class Noted    Community acquired pneumonia of right middle lobe of lung ([facility] ICD-10-CM: J18.1  ICD-9-CM: 975  2019        CAP (community acquired pneumonia) ICD-10-CM: J18.9  ICD-9-CM: 470  2019        Type 2 diabetes with nephropathy ([facility] ICD-10-CM: E11.21  ICD-9-CM: 250.40, 583.81  2018        Chest pain ICD-10-CM: R07.9  ICD-9-CM: 786.50  2018        Coronary artery disease involving native coronary artery of native heart with angina pectoris ([facility] ICD-10-CM: I25.119  ICD-9-CM: 414.01, 413.9  2018        Status post coronary artery stent placement (Chronic) ICD-10-CM: Z95.5  ICD-9-CM: V45.82  2018        Severe obesity ([facility] ICD-10-CM: E66.01  ICD-9-CM: 278.01  2018        Neuropathy of right lower extremity ICD-10-CM: G57.91  ICD-9-CM: 355.8  2016        Pericardial effusion(moderate-large) with mild cardiac tamponade--via echo 16 ICD-10-CM: I31.3, I31.4  ICD-9-CM: 423.9, 423.3  2016        Acute on chronic diastolic CHF (congestive heart failure) (HCC) (Chronic) ICD-10-CM: I50.33  ICD-9-CM: 428.33, 428.0  2016        Obstructive sleep apnea ICD-10-CM: G47.33  ICD-9-CM: 327.23  3/10/2015        TIA (transient ischemic attack) ICD-10-CM: G45.9  ICD-9-CM: 435.9  3/9/2015        Essential hypertension with goal blood pressure less than 130/85 ICD-10-CM: I10  ICD-9-CM: 401.9  3/9/2015        DM type 2, uncontrolled, with neuropathy ([facility] ICD-10-CM: E11.40, E11.65  ICD-9-CM: 250.62, 357.2  3/9/2015        Asthma (Chronic) ICD-10-CM: J45.909  ICD-9-CM: 493.90  3/9/2015        Hyperlipidemia (Chronic) ICD-10-CM: E78.5  ICD-9-CM: 272.4  3/9/2015        Restless leg syndrome (Chronic) ICD-10-CM: G25.81  ICD-9-CM: 333.94  3/9/2015                 Plan:     1. Dyspnea on exertion:  This is multifactorial.  This could be due to chronic diastolic congestive heart failure, history of asthma, obesity, deconditioning. Continue diuretics. We will also check pulmonary embolism with a CT angiogram which was not performed anytime recently. 2. CAD/ S/p Stent OM, LCX [place] Sept 2018/chest pain: Continue aspirin, Plavix, beta-blockers, statins. 3. pericardial effusion: No change in pericardial effusion size compared to July 2019. Continue to observe. 4. HTN: Blood pressure controlled. Continue isosorbide mononitrate, Bumex, amlodipine, Diovan. 5. Dyslipidemia: Continue Lipitor. 6. Follow-up: See me again in 3 months. Subjective:     Terence Barker, a 62y.o. year-old with past medical history significant for hypertension, diabetes, dyslipidemia, CAD, recent non-ST elevation MI in September 2018 was in [place] when she received 3 stents. She has significant history of asthma requiring recurrent hospitalization in the past.  Most recently she was hospitalized about 2 or 3 months ago with a pneumonia. She has been taking Solaice IV infusions and pulmonary associates and has kept her asthma exacerbations. While she was in the hospital recently in July 2019 she underwent an echocardiogram which demonstrated moderate to large pericardial effusion in the posterior segment. She is known to have chronic small pericardial effusion in the past.  She is returning today for follow-up. Continues to have shortness of breath on exertion. Denies any chest pain. Her oxygen saturations on rest are normal.  She had a limited echocardiogram today. She wants to fly to [place] next week.         Exam:     Physical Exam:  Visit Vitals  /68   Pulse 60   Resp 16   Ht 5' 1\" (1.549 m)   Wt 212 lb (96.2 kg)   LMP 01/01/2009 (Within Months)   SpO2 98%   BMI 40.06 kg/m²     General appearance - alert, \  Mental status - affect appropriate to mood  Eyes - sclera anicteric, moist mucous membranes  Neck - supple, no significant adenopathy  Chest - clear to auscultation, no wheezes, rales or rhonchi  Heart - normal rate, regular rhythm, normal S1, S2, no murmurs, rubs, clicks or gallops  Abdomen - soft, nontender, nondistended, no masses or organomegaly  Extremities - peripheral pulses normal, no pedal edema  Skin - normal coloration  no rashes    Medications:     Current Outpatient Medications   Medication Sig    amLODIPine (NORVASC) 5 mg tablet TAKE 1 TABLET BY MOUTH EVERY DAY    valsartan (DIOVAN) 160 mg tablet TAKE 1 TABLET BY MOUTH EVERY DAY    bumetanide (BUMEX) 1 mg tablet TAKE 1 TABLET BY MOUTH EVERY DAY    gabapentin (NEURONTIN) 300 mg capsule Take 1 Cap by mouth three (3) times daily for 90 days. Max Daily Amount: 900 mg. Takes with 600 mg to make a total dose of 900 mg    mupirocin (BACTROBAN) 2 % ointment Apply  to affected area daily.  gabapentin (NEURONTIN) 600 mg tablet TAKE 1 TABLET BY MOUTH THREE TIMES A DAY    isosorbide mononitrate ER (IMDUR) 60 mg CR tablet Take 1 Tab by mouth every morning.  levocetirizine (XYZAL) 5 mg tablet TAKE 1 TABLET BY MOUTH IN THE EVENING    nystatin (MYCOSTATIN) powder Apply  to affected area four (4) times daily.  atorvastatin (LIPITOR) 40 mg tablet TAKE 1 TABLET BY MOUTH EVERY DAY    DULoxetine (CYMBALTA) 60 mg capsule TAKE 1 CAPSULE BY MOUTH EVERY DAY    gabapentin (NEURONTIN) 300 mg capsule Take 600 mg by mouth three (3) times daily.  melatonin 10 mg tab Take 1 Tab by mouth nightly.  acetaminophen (TYLENOL EXTRA STRENGTH) 500 mg tablet Take 1,000 mg by mouth two (2) times daily as needed for Pain.  nepafenac (ILEVRO) 0.3 % drps Apply 1 Drop to eye nightly. to left eye    guaiFENesin ER (MUCINEX) 600 mg ER tablet Take 1 Tab by mouth every twelve (12) hours.  sertraline (ZOLOFT) 25 mg tablet Take 25 mg by mouth daily.  Indications: major depressive disorder    FARXIGA 10 mg tab tablet TAKE 1 TABLET BY MOUTH EVERY DAY    BREO ELLIPTA 200-25 mcg/dose inhaler TAKE 1 PUFF BY MOUTH EVERY DAY    nitroglycerin (NITROSTAT) 0.4 mg SL tablet DISSOLVE 1 TABLET UNDER TONGUE EVERY 5 MIN AS NEEDED FOR CHEST PAIN FOR UP TO 3 DOSES    LANTUS SOLOSTAR U-100 INSULIN 100 unit/mL (3 mL) inpn INJECT 30 UNITS BY SUBCUTANEOUS ROUTE NIGHTLY FOR 30 DAYS.  tobramycin (TOBREX) 0.3 % ophthalmic solution Administer 1 Drop to both eyes two (2) times a day.  prednisoLONE acetate (PRED FORTE) 1 % ophthalmic suspension Administer 1 Drop to both eyes four (4) times daily.  latanoprost (XALATAN) 0.005 % ophthalmic solution Administer 1 Drop to left eye nightly.  insulin glargine (LANTUS,BASAGLAR) 100 unit/mL (3 mL) inpn 30 U at bedtime. E11.9    ULTRA THIN LANCETS 30 gauge misc TO USE TO CHECK BLOOD SUGAR THREE TIMES A DAY. DX:E11.40    DALTON PEN NEEDLE 32 gauge x 5/32\" ndle USE TWICE DAILY    clopidogrel (PLAVIX) 75 mg tab Take 1 Tab by mouth daily. Indications: treatment to prevent a heart attack    levalbuterol tartrate (XOPENEX HFA) 45 mcg/actuation inhaler Take 2 Puffs by inhalation every four (4) hours as needed for Wheezing or Shortness of Breath.  metoprolol tartrate (LOPRESSOR) 50 mg tablet TAKE ONE TABLET BY MOUTH TWICE DAILY    glucose blood VI test strips (ASCENSIA AUTODISC VI, ONE TOUCH ULTRA TEST VI) strip Check BS 2 x per day. E11.9 Type II DM with hyperglycemia.  cholecalciferol (VITAMIN D3) 1,000 unit tablet Take 1,000 Units by mouth daily.  levalbuterol (XOPENEX) 0.63 mg/3 mL nebu 3 mL by Nebulization route every four (4) hours as needed (shortness of breath).  Lancets misc To use to check blood sugar three times a day. Dx:E11.40    Nebulizer & Compressor machine 1 Each by Does Not Apply route as needed.  pramipexole (MIRAPEX) 0.5 mg tablet Take 0.5 mg by mouth two (2) times a day.  montelukast (SINGULAIR) 10 mg tablet Take 10 mg by mouth nightly. No current facility-administered medications for this visit. Diagnostic Data Review:       7/21/19: ECHO- LVEF 61-65% , NWMA, Mod concentric hypertrophy; Large circumferential but mostly posterior pericardial effusion measuring 22 mm. No chamner compression. No echo features of temponade. 4/10/19: NUC STRESS- Nml without ischemia or infarction on pharmacological stress test; LVEF 52%. No EKG changes of ischemia on pharmacological stress test.    11/14/18: ECHO- LVEF 55 % to 60 %, NWMA, G2DD; small pericardial effusion    9/16/18: CATH- One-vessel arteriosclerotic heart disease. Nml LV. No MR. Stent x1 circumflex. Stent x1 Obtuse marginal 1. Syntax = 6. Lab Review:     Lab Results   Component Value Date/Time    Cholesterol, total 235 (H) 07/21/2019 12:05 AM    HDL Cholesterol 58 07/21/2019 12:05 AM    LDL, calculated 137.8 (H) 07/21/2019 12:05 AM    Triglyceride 196 (H) 07/21/2019 12:05 AM    CHOL/HDL Ratio 4.1 07/21/2019 12:05 AM     Lab Results   Component Value Date/Time    Creatinine (POC) 0.7 10/31/2016 04:34 PM    Creatinine 1.01 (H) 08/01/2019 12:04 PM     Lab Results   Component Value Date/Time    BUN 23 08/01/2019 12:04 PM    BUN (POC) 21 (H) 10/31/2016 04:34 PM     Lab Results   Component Value Date/Time    Potassium 4.3 08/01/2019 12:04 PM     Lab Results   Component Value Date/Time    Hemoglobin A1c 6.8 (H) 07/21/2019 12:05 AM     Lab Results   Component Value Date/Time    Hemoglobin (POC) 12.9 10/31/2016 04:34 PM    HGB 11.9 08/01/2019 12:04 PM     Lab Results   Component Value Date/Time    PLATELET 256 09/49/0178 12:04 PM     No results for input(s): CPK, CKMB, TROIQ in the last 72 hours. No lab exists for component: CKQMB, CPKMB             ___________________________________________________    Hudson Post MD, [facility]

## 2019-08-31 VITALS
RESPIRATION RATE: 16 BRPM | DIASTOLIC BLOOD PRESSURE: 71 MMHG | OXYGEN SATURATION: 99 % | SYSTOLIC BLOOD PRESSURE: 134 MMHG | TEMPERATURE: 98 F | HEART RATE: 71 BPM

## 2019-09-03 ENCOUNTER — OFFICE VISIT (OUTPATIENT)
Dept: FAMILY MEDICINE CLINIC | Age: 59
End: 2019-09-03

## 2019-09-03 VITALS
OXYGEN SATURATION: 96 % | BODY MASS INDEX: 39.35 KG/M2 | RESPIRATION RATE: 20 BRPM | WEIGHT: 208.4 LBS | HEART RATE: 86 BPM | DIASTOLIC BLOOD PRESSURE: 60 MMHG | TEMPERATURE: 98 F | SYSTOLIC BLOOD PRESSURE: 136 MMHG | HEIGHT: 61 IN

## 2019-09-03 DIAGNOSIS — N30.00 ACUTE CYSTITIS WITHOUT HEMATURIA: Primary | ICD-10-CM

## 2019-09-03 LAB
BILIRUB UR QL STRIP: NEGATIVE
GLUCOSE UR-MCNC: ABNORMAL MG/DL
KETONES P FAST UR STRIP-MCNC: NEGATIVE MG/DL
PH UR STRIP: 5.5 [PH] (ref 4.6–8)
PROT UR QL STRIP: ABNORMAL
SP GR UR STRIP: 1.01 (ref 1–1.03)
UA UROBILINOGEN AMB POC: ABNORMAL (ref 0.2–1)
URINALYSIS CLARITY POC: CLEAR
URINALYSIS COLOR POC: ABNORMAL
URINE BLOOD POC: ABNORMAL
URINE LEUKOCYTES POC: ABNORMAL
URINE NITRITES POC: NEGATIVE

## 2019-09-03 RX ORDER — PRAMIPEXOLE DIHYDROCHLORIDE 0.5 MG/1
0.5 TABLET ORAL 2 TIMES DAILY
Qty: 90 TAB | Refills: 1 | Status: SHIPPED | OUTPATIENT
Start: 2019-09-03

## 2019-09-03 RX ORDER — MONTELUKAST SODIUM 10 MG/1
10 TABLET ORAL
Qty: 90 TAB | Refills: 1 | Status: SHIPPED | OUTPATIENT
Start: 2019-09-03

## 2019-09-03 NOTE — PATIENT INSTRUCTIONS
DASH Diet: Care Instructions  Your Care Instructions    The DASH diet is an eating plan that can help lower your blood pressure. DASH stands for Dietary Approaches to Stop Hypertension. Hypertension is high blood pressure. The DASH diet focuses on eating foods that are high in calcium, potassium, and magnesium. These nutrients can lower blood pressure. The foods that are highest in these nutrients are fruits, vegetables, low-fat dairy products, nuts, seeds, and legumes. But taking calcium, potassium, and magnesium supplements instead of eating foods that are high in those nutrients does not have the same effect. The DASH diet also includes whole grains, fish, and poultry. The DASH diet is one of several lifestyle changes your doctor may recommend to lower your high blood pressure. Your doctor may also want you to decrease the amount of sodium in your diet. Lowering sodium while following the DASH diet can lower blood pressure even further than just the DASH diet alone. Follow-up care is a key part of your treatment and safety. Be sure to make and go to all appointments, and call your doctor if you are having problems. It's also a good idea to know your test results and keep a list of the medicines you take. How can you care for yourself at home? Following the DASH diet  · Eat 4 to 5 servings of fruit each day. A serving is 1 medium-sized piece of fruit, ½ cup chopped or canned fruit, 1/4 cup dried fruit, or 4 ounces (½ cup) of fruit juice. Choose fruit more often than fruit juice. · Eat 4 to 5 servings of vegetables each day. A serving is 1 cup of lettuce or raw leafy vegetables, ½ cup of chopped or cooked vegetables, or 4 ounces (½ cup) of vegetable juice. Choose vegetables more often than vegetable juice. · Get 2 to 3 servings of low-fat and fat-free dairy each day. A serving is 8 ounces of milk, 1 cup of yogurt, or 1 ½ ounces of cheese. · Eat 6 to 8 servings of grains each day.  A serving is 1 slice of bread, 1 ounce of dry cereal, or ½ cup of cooked rice, pasta, or cooked cereal. Try to choose whole-grain products as much as possible. · Limit lean meat, poultry, and fish to 2 servings each day. A serving is 3 ounces, about the size of a deck of cards. · Eat 4 to 5 servings of nuts, seeds, and legumes (cooked dried beans, lentils, and split peas) each week. A serving is 1/3 cup of nuts, 2 tablespoons of seeds, or ½ cup of cooked beans or peas. · Limit fats and oils to 2 to 3 servings each day. A serving is 1 teaspoon of vegetable oil or 2 tablespoons of salad dressing. · Limit sweets and added sugars to 5 servings or less a week. A serving is 1 tablespoon jelly or jam, ½ cup sorbet, or 1 cup of lemonade. · Eat less than 2,300 milligrams (mg) of sodium a day. If you limit your sodium to 1,500 mg a day, you can lower your blood pressure even more. Tips for success  · Start small. Do not try to make dramatic changes to your diet all at once. You might feel that you are missing out on your favorite foods and then be more likely to not follow the plan. Make small changes, and stick with them. Once those changes become habit, add a few more changes. · Try some of the following:  ? Make it a goal to eat a fruit or vegetable at every meal and at snacks. This will make it easy to get the recommended amount of fruits and vegetables each day. ? Try yogurt topped with fruit and nuts for a snack or healthy dessert. ? Add lettuce, tomato, cucumber, and onion to sandwiches. ? Combine a ready-made pizza crust with low-fat mozzarella cheese and lots of vegetable toppings. Try using tomatoes, squash, spinach, broccoli, carrots, cauliflower, and onions. ? Have a variety of cut-up vegetables with a low-fat dip as an appetizer instead of chips and dip. ? Sprinkle sunflower seeds or chopped almonds over salads. Or try adding chopped walnuts or almonds to cooked vegetables.   ? Try some vegetarian meals using beans and peas. Add garbanzo or kidney beans to salads. Make burritos and tacos with mashed fitzgerald beans or black beans. Where can you learn more? Go to http://emily-janette.info/. Enter I041 in the search box to learn more about \"DASH Diet: Care Instructions. \"  Current as of: July 22, 2018  Content Version: 12.1  © 4564-4108 Healthwise, [employer]. Care instructions adapted under license by [employer] (which disclaims liability or warranty for this information). If you have questions about a medical condition or this instruction, always ask your healthcare professional. [address] any warranty or liability for your use of this information.

## 2019-09-03 NOTE — PROGRESS NOTES
Please let patient know that her UA dip in the office today showed possible continued infection. We are sending for culture. She may need an antibiotic and should let us know the best phone number to contact her in [place]. Very important.

## 2019-09-03 NOTE — PROGRESS NOTES
Chief Complaint:  Chief Complaint   Patient presents with    Follow Up Chronic Condition     1 month follow up  and medication refills       History of Present Illness:  Ms. Virginia Timmons is a 61yo female with a PMH of CHF, asthma, CAD, hypercholesterolemia, DM with chronic nephropathy, HTN, sleep apnea, neuropathy, restless leg syndrome, and obesity was admitted into the Family Medicine Service from 7/20/2019 to 7/23/2019 for shortness of breath. During the course of the admission she was treated for HF and presumed PNA. She also was found to have UTI and treated with appropriate antibiotics. Will check to make sure that her urine has cleared. She denies any burning with urination. It is unclear if she was treated for her most recent culture on 8/1/2019, but in reviewing the record, she was still on antibiotics then and so, she may not have completely have cleared the infection. She is leaving for [place]. She was seen by Gin Sandoval, NP for a sore between the buttocks folds. This is getting better. Visiting nurse has been using a mixture of A&D, Cornstarch and this has really helped. She continues to require Nystatin for yeast intertrigo. Bowels are stable. She also saw Dr. Sarbjit Lutz (cardiology). No changes were made to her regimen. Reviewed PmHx, RxHx, FmHx, SocHx, AllgHx and updated and dated in the chart.     Patient Active Problem List    Diagnosis    Community acquired pneumonia of right middle lobe of lung ([address]    CAP (community acquired pneumonia)    Type 2 diabetes with nephropathy ([address]    Chest pain    Coronary artery disease involving native coronary artery of native heart with angina pectoris ([address]    Status post coronary artery stent placement    Severe obesity ([address]    Neuropathy of right lower extremity    Pericardial effusion(moderate-large) with mild cardiac tamponade--via echo 8/1/16    Acute on chronic diastolic CHF (congestive heart failure) (HCC)    Obstructive sleep apnea    TIA (transient ischemic attack)    Essential hypertension with goal blood pressure less than 130/85    DM type 2, uncontrolled, with neuropathy (HCC)    Asthma    Hyperlipidemia    Restless leg syndrome       Review of Systems - negative except as listed above in the HPI    Objective:     Vitals:    09/03/19 1035   BP: 136/60   Pulse: 86   Resp: 20   Temp: 98 °F (36.7 °C)   TempSrc: Oral   SpO2: 96%   Weight: 208 lb 6.4 oz (94.5 kg)   Height: 5' 1\" (1.549 m)     Physical Examination:   General appearance - alert, well appearing, and in no distress and overweight  Mental status - alert, oriented to person, place, and time  Chest - clear to auscultation, no wheezes, rales or rhonchi, symmetric air entry  Heart - normal rate, regular rhythm, normal S1, S2, no murmurs, rubs, clicks or gallops  Back exam - antalgic gait, limited range of motion, pain with motion noted during exam  Skin - normal coloration and turgor, no rashes, no suspicious skin lesions noted  Lesion between buttocks cheeks has healed. Assessment/ Plan:     Diagnoses and all orders for this visit:    1. Acute cystitis without hematuria  -     AMB POC URINALYSIS DIP STICK AUTO W/O MICRO    Other orders  -     pramipexole (MIRAPEX) 0.5 mg tablet; Take 1 Tab by mouth two (2) times a day. -     montelukast (SINGULAIR) 10 mg tablet; Take 1 Tab by mouth nightly. I have discussed the diagnosis with the patient and the intended treatment plan as seen in the above orders. The patient has received an after-visit summary and questions were answered concerning future plans. Asked to return should symptoms worsen or not improve with treatment. Any pending labs and studies will be relayed to patient when they become available. Pt verbalizes understanding of plan of care and denies further questions or concerns at this time. Follow-up and Dispositions    · Return if symptoms worsen or fail to improve.        Phyllis Meade MD    Patient Instructions        DASH Diet: Care Instructions  Your Care Instructions    The DASH diet is an eating plan that can help lower your blood pressure. DASH stands for Dietary Approaches to Stop Hypertension. Hypertension is high blood pressure. The DASH diet focuses on eating foods that are high in calcium, potassium, and magnesium. These nutrients can lower blood pressure. The foods that are highest in these nutrients are fruits, vegetables, low-fat dairy products, nuts, seeds, and legumes. But taking calcium, potassium, and magnesium supplements instead of eating foods that are high in those nutrients does not have the same effect. The DASH diet also includes whole grains, fish, and poultry. The DASH diet is one of several lifestyle changes your doctor may recommend to lower your high blood pressure. Your doctor may also want you to decrease the amount of sodium in your diet. Lowering sodium while following the DASH diet can lower blood pressure even further than just the DASH diet alone. Follow-up care is a key part of your treatment and safety. Be sure to make and go to all appointments, and call your doctor if you are having problems. It's also a good idea to know your test results and keep a list of the medicines you take. How can you care for yourself at home? Following the DASH diet  · Eat 4 to 5 servings of fruit each day. A serving is 1 medium-sized piece of fruit, ½ cup chopped or canned fruit, 1/4 cup dried fruit, or 4 ounces (½ cup) of fruit juice. Choose fruit more often than fruit juice. · Eat 4 to 5 servings of vegetables each day. A serving is 1 cup of lettuce or raw leafy vegetables, ½ cup of chopped or cooked vegetables, or 4 ounces (½ cup) of vegetable juice. Choose vegetables more often than vegetable juice. · Get 2 to 3 servings of low-fat and fat-free dairy each day. A serving is 8 ounces of milk, 1 cup of yogurt, or 1 ½ ounces of cheese. · Eat 6 to 8 servings of grains each day.  A serving is 1 slice of bread, 1 ounce of dry cereal, or ½ cup of cooked rice, pasta, or cooked cereal. Try to choose whole-grain products as much as possible. · Limit lean meat, poultry, and fish to 2 servings each day. A serving is 3 ounces, about the size of a deck of cards. · Eat 4 to 5 servings of nuts, seeds, and legumes (cooked dried beans, lentils, and split peas) each week. A serving is 1/3 cup of nuts, 2 tablespoons of seeds, or ½ cup of cooked beans or peas. · Limit fats and oils to 2 to 3 servings each day. A serving is 1 teaspoon of vegetable oil or 2 tablespoons of salad dressing. · Limit sweets and added sugars to 5 servings or less a week. A serving is 1 tablespoon jelly or jam, ½ cup sorbet, or 1 cup of lemonade. · Eat less than 2,300 milligrams (mg) of sodium a day. If you limit your sodium to 1,500 mg a day, you can lower your blood pressure even more. Tips for success  · Start small. Do not try to make dramatic changes to your diet all at once. You might feel that you are missing out on your favorite foods and then be more likely to not follow the plan. Make small changes, and stick with them. Once those changes become habit, add a few more changes. · Try some of the following:  ? Make it a goal to eat a fruit or vegetable at every meal and at snacks. This will make it easy to get the recommended amount of fruits and vegetables each day. ? Try yogurt topped with fruit and nuts for a snack or healthy dessert. ? Add lettuce, tomato, cucumber, and onion to sandwiches. ? Combine a ready-made pizza crust with low-fat mozzarella cheese and lots of vegetable toppings. Try using tomatoes, squash, spinach, broccoli, carrots, cauliflower, and onions. ? Have a variety of cut-up vegetables with a low-fat dip as an appetizer instead of chips and dip. ? Sprinkle sunflower seeds or chopped almonds over salads. Or try adding chopped walnuts or almonds to cooked vegetables.   ? Try some vegetarian meals using beans and peas. Add garbanzo or kidney beans to salads. Make burritos and tacos with mashed fitzgerald beans or black beans. Where can you learn more? Go to http://emily-janette.info/. Enter M038 in the search box to learn more about \"DASH Diet: Care Instructions. \"  Current as of: July 22, 2018  Content Version: 12.1  © 9706-1403 Healthwise, [employer]. Care instructions adapted under license by [employer] (which disclaims liability or warranty for this information). If you have questions about a medical condition or this instruction, always ask your healthcare professional. [address] any warranty or liability for your use of this information.

## 2019-09-03 NOTE — PROGRESS NOTES
Spoke with patient and relayed result note message from MD.  Patient voiced that it will be the same mobile number listed; voices understanding and appreciation for the call.

## 2019-09-05 RX ORDER — BLOOD-GLUCOSE CONTROL, NORMAL
EACH MISCELLANEOUS
Qty: 100 LANCET | Refills: 3 | Status: SHIPPED | OUTPATIENT
Start: 2019-09-05

## 2019-09-05 RX ORDER — INSULIN PUMP SYRINGE, 3 ML
EACH MISCELLANEOUS
Qty: 1 KIT | Refills: 0 | Status: SHIPPED | OUTPATIENT
Start: 2019-09-05

## 2019-09-05 NOTE — TELEPHONE ENCOUNTER
Request received from [facility] to change Glucose Monitoring Equipment. Insurance will not cover True Metrix. Will cover One Touch Ultra Test Strips, Delica Lancets, One Touch Meter. Please send orders for One Touch with frequency of testing and ICD. 10 code.

## 2019-09-07 LAB
BACTERIA UR CULT: ABNORMAL
BACTERIA UR CULT: ABNORMAL
OTHER ANTIBIOTIC SUSC ISLT: ABNORMAL

## 2019-09-10 ENCOUNTER — PATIENT OUTREACH (OUTPATIENT)
Dept: FAMILY MEDICINE CLINIC | Age: 59
End: 2019-09-10

## 2019-09-10 DIAGNOSIS — F41.9 ANXIETY AND DEPRESSION: ICD-10-CM

## 2019-09-10 DIAGNOSIS — F32.A ANXIETY AND DEPRESSION: ICD-10-CM

## 2019-09-10 DIAGNOSIS — E11.21 TYPE 2 DIABETES WITH NEPHROPATHY (HCC): ICD-10-CM

## 2019-09-10 RX ORDER — SERTRALINE HYDROCHLORIDE 25 MG/1
TABLET, FILM COATED ORAL
Qty: 90 TAB | Refills: 1 | Status: SHIPPED | OUTPATIENT
Start: 2019-09-10 | End: 2019-12-17 | Stop reason: SDUPTHER

## 2019-09-10 RX ORDER — DAPAGLIFLOZIN 10 MG/1
TABLET, FILM COATED ORAL
Qty: 90 TAB | Refills: 1 | Status: SHIPPED | OUTPATIENT
Start: 2019-09-10 | End: 2020-01-14

## 2019-09-10 NOTE — PROGRESS NOTES
Patient has graduated from the Transitions of Care Coordination  program on 9/10/19. Patient's symptoms are stable at this time. Patient/family has the ability to self-manage. Care management goals have been completed at this time. No further nurse navigator follow up scheduled. Goals Addressed                 This Visit's Progress     Understands red flags post discharge. 9/10/19- symptoms have been resolved. CW    8/14/19-pt has continues to have cough,  States no other symptoms of PNA. Pt states that she missed her sleep study appointment     8/6/19- pt called today crying stating that she will miss her GI appointment as arranged medicaid transportation did not come to pick her up. This CTN contacted [facility] to advise and rebook pts appointment. Resources had been given to pt for [facility] prior and pt contacted them to make an appointment. Pt states that due to continued frequent stool incontinence she is in severe pain not alleviated by any over the counter treatments. Will continue to monitor. Pt has no additional symptoms of PNA. CW    7/29/19-spoke with pt today,  Still continues to have a lot of coughing, states \"I feel like I am using my inhaler more than usual, will continue to closely monitor pt. CW    7/26/19- [facility],  Per pt x-ray was taken and lungs are clear, pt states she is still not feeling well has cough,  Is taking Tesson DM,  Is resting. This CTN has given pt direct number for [email or web address] if she is not improving,  Has a PCP appointment for 8/1/19. Pt has my direct contact information. Will follow up with pt on 7/29/19 to see if she is improving. CW    7/24/19- pt aware to monitor for fever, SOB, cough with increased sputum or change of color, will contact pulmonologist or PCP with concerns.  Pt stating that she is also dealing with fecal incontinence that has been an ongoing issue, will be [facility] up with [facility]. CW            Pt has nurse navigator's contact information for any further questions, concerns, or needs.   Patients upcoming visits:    Future Appointments   Date Time Provider Carl Burton   9/12/2019  9:00 AM DIABETES CLASS #4 [id number]   12/2/2019  2:00 PM Bell John MD [address] [place] [address]

## 2019-09-14 RX ORDER — AMLODIPINE BESYLATE 5 MG/1
TABLET ORAL
Qty: 30 TAB | Refills: 0 | Status: SHIPPED | OUTPATIENT
Start: 2019-09-14

## 2019-09-14 RX ORDER — BUMETANIDE 1 MG/1
TABLET ORAL
Qty: 30 TAB | Refills: 0 | Status: SHIPPED | OUTPATIENT
Start: 2019-09-14 | End: 2019-09-24 | Stop reason: SDUPTHER

## 2019-09-14 RX ORDER — VALSARTAN 160 MG/1
TABLET ORAL
Qty: 30 TAB | Refills: 0 | Status: SHIPPED | OUTPATIENT
Start: 2019-09-14 | End: 2019-09-24 | Stop reason: SDUPTHER

## 2019-09-16 ENCOUNTER — TELEPHONE (OUTPATIENT)
Dept: FAMILY MEDICINE CLINIC | Age: 59
End: 2019-09-16

## 2019-09-16 NOTE — PROGRESS NOTES
Repeat UA was still positive for infection. Is she back home or having any symptoms? Will send antibiotics to [facility] or pharmacy near her.

## 2019-09-16 NOTE — TELEPHONE ENCOUNTER
Patient states she is still having some back pain but no other symptoms. Patient uses [facility] in [facility].

## 2019-09-16 NOTE — TELEPHONE ENCOUNTER
----- Message from Destin Zazueta MD sent at 9/16/2019  1:25 PM EDT -----  Repeat UA was still positive for infection. Is she back home or having any symptoms? Will send antibiotics to [facility] or pharmacy near her.

## 2019-09-17 RX ORDER — VANCOMYCIN HYDROCHLORIDE 250 MG/1
250 CAPSULE ORAL 4 TIMES DAILY
Qty: 40 CAP | Refills: 0 | Status: SHIPPED | OUTPATIENT
Start: 2019-09-17 | End: 2019-09-27

## 2019-09-24 RX ORDER — VALSARTAN 160 MG/1
TABLET ORAL
Qty: 90 TAB | Refills: 1 | Status: SHIPPED | OUTPATIENT
Start: 2019-09-24

## 2019-09-24 RX ORDER — BUMETANIDE 1 MG/1
TABLET ORAL
Qty: 90 TAB | Refills: 1 | Status: SHIPPED | OUTPATIENT
Start: 2019-09-24

## 2019-09-24 RX ORDER — AMLODIPINE BESYLATE 5 MG/1
TABLET ORAL
Qty: 90 TAB | Refills: 1 | Status: SHIPPED | OUTPATIENT
Start: 2019-09-24

## 2019-09-25 DIAGNOSIS — M79.2 NEUROPATHIC PAIN: ICD-10-CM

## 2019-09-30 RX ORDER — GABAPENTIN 600 MG/1
TABLET ORAL
Qty: 90 TAB | Refills: 1 | Status: SHIPPED | OUTPATIENT
Start: 2019-09-30

## 2019-12-05 ENCOUNTER — TELEPHONE (OUTPATIENT)
Dept: FAMILY MEDICINE CLINIC | Age: 59
End: 2019-12-05

## 2019-12-05 NOTE — TELEPHONE ENCOUNTER
Update - Pt has not picked up script for:  Medication - gabapentin   Dated - 9/30/19   has marked voided on script and put in shred bin

## 2019-12-17 DIAGNOSIS — F32.A ANXIETY AND DEPRESSION: ICD-10-CM

## 2019-12-17 DIAGNOSIS — F41.9 ANXIETY AND DEPRESSION: ICD-10-CM

## 2019-12-17 RX ORDER — SERTRALINE HYDROCHLORIDE 25 MG/1
TABLET, FILM COATED ORAL
Qty: 90 TAB | Refills: 1 | Status: SHIPPED | OUTPATIENT
Start: 2019-12-17

## 2020-01-08 DIAGNOSIS — I25.119 CORONARY ARTERY DISEASE INVOLVING NATIVE CORONARY ARTERY OF NATIVE HEART WITH ANGINA PECTORIS (HCC): ICD-10-CM

## 2020-01-08 RX ORDER — CLOPIDOGREL BISULFATE 75 MG/1
TABLET ORAL
Qty: 90 TAB | Refills: 0 | Status: SHIPPED | OUTPATIENT
Start: 2020-01-08

## 2020-01-14 DIAGNOSIS — E11.21 TYPE 2 DIABETES WITH NEPHROPATHY (HCC): ICD-10-CM

## 2020-01-14 RX ORDER — DAPAGLIFLOZIN 10 MG/1
TABLET, FILM COATED ORAL
Qty: 90 TAB | Refills: 1 | Status: SHIPPED | OUTPATIENT
Start: 2020-01-14

## 2020-01-25 ENCOUNTER — APPOINTMENT (OUTPATIENT)
Dept: GENERAL RADIOLOGY | Age: 60
End: 2020-01-25
Payer: MEDICARE

## 2020-01-25 ENCOUNTER — HOSPITAL ENCOUNTER (OUTPATIENT)
Age: 60
Setting detail: OBSERVATION
Discharge: HOME | End: 2020-01-28
Attending: EMERGENCY MEDICINE | Admitting: FAMILY MEDICINE
Payer: MEDICARE

## 2020-01-25 LAB
BASE EXCESS ARTERIAL: 5 MMOL/L (ref -2–2)
CARBOXYHEMOGLOBIN ARTERIAL: 1.3 % (ref 0–5)
HCO3 ARTERIAL: 29.1 MMOL/L (ref 22–26)
HEMOGLOBIN, ART, EXTENDED: 9.2 G/DL (ref 12–16)
METHEMOGLOBIN ARTERIAL: 0 %
O2 CONTENT ARTERIAL: 12.5 ML/DL
O2 SAT, ARTERIAL: 95.7 %
O2 THERAPY: ABNORMAL
PCO2 ARTERIAL: 40 MMHG (ref 35–45)
PH ARTERIAL: 7.47 (ref 7.35–7.45)
PO2 ARTERIAL: 81 MMHG (ref 80–100)
POTASSIUM, WHOLE BLOOD: 3.8

## 2020-01-25 PROCEDURE — 93005 ELECTROCARDIOGRAM TRACING: CPT | Performed by: EMERGENCY MEDICINE

## 2020-01-25 PROCEDURE — 36600 WITHDRAWAL OF ARTERIAL BLOOD: CPT

## 2020-01-25 PROCEDURE — 99285 EMERGENCY DEPT VISIT HI MDM: CPT

## 2020-01-25 PROCEDURE — 71046 X-RAY EXAM CHEST 2 VIEWS: CPT

## 2020-01-25 RX ORDER — LEVALBUTEROL INHALATION SOLUTION 1.25 MG/3ML
1.25 SOLUTION RESPIRATORY (INHALATION) ONCE
Status: COMPLETED | OUTPATIENT
Start: 2020-01-25 | End: 2020-01-26

## 2020-01-25 RX ORDER — METHYLPREDNISOLONE SODIUM SUCCINATE 125 MG/2ML
125 INJECTION, POWDER, LYOPHILIZED, FOR SOLUTION INTRAMUSCULAR; INTRAVENOUS ONCE
Status: COMPLETED | OUTPATIENT
Start: 2020-01-25 | End: 2020-01-26

## 2020-01-25 SDOH — HEALTH STABILITY: MENTAL HEALTH: HOW OFTEN DO YOU HAVE A DRINK CONTAINING ALCOHOL?: NEVER

## 2020-01-25 ASSESSMENT — PAIN SCALES - GENERAL: PAINLEVEL_OUTOF10: 5

## 2020-01-26 ENCOUNTER — APPOINTMENT (OUTPATIENT)
Dept: CT IMAGING | Age: 60
End: 2020-01-26
Payer: MEDICARE

## 2020-01-26 PROBLEM — R73.9 HYPERGLYCEMIA: Status: ACTIVE | Noted: 2020-01-26

## 2020-01-26 PROBLEM — R79.89 ELEVATED D-DIMER: Status: ACTIVE | Noted: 2020-01-26

## 2020-01-26 PROBLEM — R06.02 SOB (SHORTNESS OF BREATH): Status: ACTIVE | Noted: 2020-01-26

## 2020-01-26 PROBLEM — J44.1 COPD WITH ACUTE EXACERBATION (HCC): Status: ACTIVE | Noted: 2020-01-26

## 2020-01-26 PROBLEM — D64.9 ANEMIA: Status: ACTIVE | Noted: 2020-01-26

## 2020-01-26 PROBLEM — I50.9 CHF (CONGESTIVE HEART FAILURE) (HCC): Status: ACTIVE | Noted: 2020-01-26

## 2020-01-26 PROBLEM — E11.9 TYPE 2 DIABETES MELLITUS, WITH LONG-TERM CURRENT USE OF INSULIN (HCC): Status: ACTIVE | Noted: 2020-01-26

## 2020-01-26 PROBLEM — N17.9 ACUTE KIDNEY INJURY (HCC): Status: ACTIVE | Noted: 2020-01-26

## 2020-01-26 PROBLEM — Z79.4 TYPE 2 DIABETES MELLITUS, WITH LONG-TERM CURRENT USE OF INSULIN (HCC): Status: ACTIVE | Noted: 2020-01-26

## 2020-01-26 PROBLEM — I31.39 PERICARDIAL EFFUSION: Status: ACTIVE | Noted: 2020-01-26

## 2020-01-26 LAB
ALBUMIN SERPL-MCNC: 3.6 G/DL (ref 3.5–5.2)
ALP BLD-CCNC: 87 U/L (ref 35–104)
ALT SERPL-CCNC: 9 U/L (ref 5–33)
ANION GAP SERPL CALCULATED.3IONS-SCNC: 11 MMOL/L (ref 7–19)
ANION GAP SERPL CALCULATED.3IONS-SCNC: 15 MMOL/L (ref 7–19)
APTT: 33.5 SEC (ref 26–36.2)
AST SERPL-CCNC: 10 U/L (ref 5–32)
BASOPHILS ABSOLUTE: 0 K/UL (ref 0–0.2)
BASOPHILS RELATIVE PERCENT: 0.5 % (ref 0–1)
BILIRUB SERPL-MCNC: 0.4 MG/DL (ref 0.2–1.2)
BILIRUBIN URINE: NEGATIVE
BLOOD, URINE: ABNORMAL
BUN BLDV-MCNC: 45 MG/DL (ref 8–23)
BUN BLDV-MCNC: 48 MG/DL (ref 8–23)
CALCIUM SERPL-MCNC: 9 MG/DL (ref 8.8–10.2)
CALCIUM SERPL-MCNC: 9.3 MG/DL (ref 8.8–10.2)
CHLORIDE BLD-SCNC: 100 MMOL/L (ref 98–111)
CHLORIDE BLD-SCNC: 99 MMOL/L (ref 98–111)
CLARITY: CLEAR
CO2: 26 MMOL/L (ref 22–29)
CO2: 27 MMOL/L (ref 22–29)
COLOR: YELLOW
CREAT SERPL-MCNC: 1.2 MG/DL (ref 0.5–0.9)
CREAT SERPL-MCNC: 1.4 MG/DL (ref 0.5–0.9)
D DIMER: 0.92 UG/ML FEU (ref 0–0.48)
EOSINOPHILS ABSOLUTE: 0.1 K/UL (ref 0–0.6)
EOSINOPHILS RELATIVE PERCENT: 0.9 % (ref 0–5)
EPITHELIAL CELLS, UA: 4 /HPF (ref 0–5)
GFR NON-AFRICAN AMERICAN: 38
GFR NON-AFRICAN AMERICAN: 46
GLUCOSE BLD-MCNC: 177 MG/DL (ref 74–109)
GLUCOSE BLD-MCNC: 243 MG/DL (ref 74–109)
GLUCOSE BLD-MCNC: 244 MG/DL (ref 70–99)
GLUCOSE BLD-MCNC: 271 MG/DL (ref 70–99)
GLUCOSE BLD-MCNC: 310 MG/DL (ref 70–99)
GLUCOSE BLD-MCNC: 332 MG/DL (ref 70–99)
GLUCOSE BLD-MCNC: 430 MG/DL (ref 70–99)
GLUCOSE URINE: 250 MG/DL
HCT VFR BLD CALC: 28.4 % (ref 37–47)
HEMOGLOBIN: 8.7 G/DL (ref 12–16)
HYALINE CASTS: 1 /HPF (ref 0–8)
IMMATURE GRANULOCYTES #: 0 K/UL
INR BLD: 1.11 (ref 0.88–1.18)
IRON SATURATION: 20 % (ref 14–50)
IRON: 64 UG/DL (ref 37–145)
KETONES, URINE: NEGATIVE MG/DL
LACTIC ACID: 0.9 MMOL/L (ref 0.5–1.9)
LEUKOCYTE ESTERASE, URINE: NEGATIVE
LV EF: 60 %
LVEF MODALITY: NORMAL
LYMPHOCYTES ABSOLUTE: 1.8 K/UL (ref 1.1–4.5)
LYMPHOCYTES RELATIVE PERCENT: 28.1 % (ref 20–40)
MAGNESIUM: 2.3 MG/DL (ref 1.6–2.4)
MCH RBC QN AUTO: 29.5 PG (ref 27–31)
MCHC RBC AUTO-ENTMCNC: 30.6 G/DL (ref 33–37)
MCV RBC AUTO: 96.3 FL (ref 81–99)
MONOCYTES ABSOLUTE: 0.5 K/UL (ref 0–0.9)
MONOCYTES RELATIVE PERCENT: 7.3 % (ref 0–10)
NEUTROPHILS ABSOLUTE: 4 K/UL (ref 1.5–7.5)
NEUTROPHILS RELATIVE PERCENT: 62.9 % (ref 50–65)
NITRITE, URINE: NEGATIVE
PDW BLD-RTO: 14.6 % (ref 11.5–14.5)
PERFORMED ON: ABNORMAL
PH UA: 5.5 (ref 5–8)
PLATELET # BLD: 212 K/UL (ref 130–400)
PMV BLD AUTO: 8.8 FL (ref 9.4–12.3)
POTASSIUM SERPL-SCNC: 4 MMOL/L (ref 3.5–5)
POTASSIUM SERPL-SCNC: 4.4 MMOL/L (ref 3.5–5)
PRO-BNP: 1995 PG/ML (ref 0–900)
PRO-BNP: 2279 PG/ML (ref 0–900)
PROTEIN UA: >=300 MG/DL
PROTHROMBIN TIME: 13.7 SEC (ref 12–14.6)
RAPID INFLUENZA  B AGN: NEGATIVE
RAPID INFLUENZA A AGN: NEGATIVE
RBC # BLD: 2.95 M/UL (ref 4.2–5.4)
RBC UA: 28 /HPF (ref 0–4)
SODIUM BLD-SCNC: 138 MMOL/L (ref 136–145)
SODIUM BLD-SCNC: 140 MMOL/L (ref 136–145)
SPECIFIC GRAVITY UA: 1.01 (ref 1–1.03)
TOTAL IRON BINDING CAPACITY: 315 UG/DL (ref 250–400)
TOTAL PROTEIN: 6.5 G/DL (ref 6.6–8.7)
TROPONIN: 0.01 NG/ML (ref 0–0.03)
TROPONIN: 0.01 NG/ML (ref 0–0.03)
TROPONIN: 0.02 NG/ML (ref 0–0.03)
TROPONIN: <0.01 NG/ML (ref 0–0.03)
TSH SERPL DL<=0.05 MIU/L-ACNC: 1.35 UIU/ML (ref 0.27–4.2)
URINE REFLEX TO CULTURE: ABNORMAL
UROBILINOGEN, URINE: 0.2 E.U./DL
WBC # BLD: 6.3 K/UL (ref 4.8–10.8)
WBC UA: 3 /HPF (ref 0–5)

## 2020-01-26 PROCEDURE — 81001 URINALYSIS AUTO W/SCOPE: CPT

## 2020-01-26 PROCEDURE — 80053 COMPREHEN METABOLIC PANEL: CPT

## 2020-01-26 PROCEDURE — 96374 THER/PROPH/DIAG INJ IV PUSH: CPT

## 2020-01-26 PROCEDURE — 84443 ASSAY THYROID STIM HORMONE: CPT

## 2020-01-26 PROCEDURE — 96375 TX/PRO/DX INJ NEW DRUG ADDON: CPT

## 2020-01-26 PROCEDURE — 71275 CT ANGIOGRAPHY CHEST: CPT

## 2020-01-26 PROCEDURE — 2700000000 HC OXYGEN THERAPY PER DAY

## 2020-01-26 PROCEDURE — 85730 THROMBOPLASTIN TIME PARTIAL: CPT

## 2020-01-26 PROCEDURE — 2580000003 HC RX 258: Performed by: HOSPITALIST

## 2020-01-26 PROCEDURE — 83550 IRON BINDING TEST: CPT

## 2020-01-26 PROCEDURE — 84484 ASSAY OF TROPONIN QUANT: CPT

## 2020-01-26 PROCEDURE — 85610 PROTHROMBIN TIME: CPT

## 2020-01-26 PROCEDURE — 6370000000 HC RX 637 (ALT 250 FOR IP): Performed by: FAMILY MEDICINE

## 2020-01-26 PROCEDURE — 6370000000 HC RX 637 (ALT 250 FOR IP): Performed by: HOSPITALIST

## 2020-01-26 PROCEDURE — 2500000003 HC RX 250 WO HCPCS: Performed by: FAMILY MEDICINE

## 2020-01-26 PROCEDURE — 87804 INFLUENZA ASSAY W/OPTIC: CPT

## 2020-01-26 PROCEDURE — 6360000004 HC RX CONTRAST MEDICATION: Performed by: INTERNAL MEDICINE

## 2020-01-26 PROCEDURE — 82948 REAGENT STRIP/BLOOD GLUCOSE: CPT

## 2020-01-26 PROCEDURE — 83605 ASSAY OF LACTIC ACID: CPT

## 2020-01-26 PROCEDURE — 84132 ASSAY OF SERUM POTASSIUM: CPT

## 2020-01-26 PROCEDURE — 85025 COMPLETE CBC W/AUTO DIFF WBC: CPT

## 2020-01-26 PROCEDURE — 93005 ELECTROCARDIOGRAM TRACING: CPT | Performed by: EMERGENCY MEDICINE

## 2020-01-26 PROCEDURE — 94640 AIRWAY INHALATION TREATMENT: CPT

## 2020-01-26 PROCEDURE — 2140000000 HC CCU INTERMEDIATE R&B

## 2020-01-26 PROCEDURE — 83735 ASSAY OF MAGNESIUM: CPT

## 2020-01-26 PROCEDURE — 6360000004 HC RX CONTRAST MEDICATION: Performed by: EMERGENCY MEDICINE

## 2020-01-26 PROCEDURE — 83540 ASSAY OF IRON: CPT

## 2020-01-26 PROCEDURE — C8929 TTE W OR WO FOL WCON,DOPPLER: HCPCS

## 2020-01-26 PROCEDURE — 82803 BLOOD GASES ANY COMBINATION: CPT

## 2020-01-26 PROCEDURE — 96372 THER/PROPH/DIAG INJ SC/IM: CPT

## 2020-01-26 PROCEDURE — 6360000002 HC RX W HCPCS: Performed by: EMERGENCY MEDICINE

## 2020-01-26 PROCEDURE — 96376 TX/PRO/DX INJ SAME DRUG ADON: CPT

## 2020-01-26 PROCEDURE — 85379 FIBRIN DEGRADATION QUANT: CPT

## 2020-01-26 PROCEDURE — 6360000002 HC RX W HCPCS: Performed by: HOSPITALIST

## 2020-01-26 PROCEDURE — 99223 1ST HOSP IP/OBS HIGH 75: CPT | Performed by: INTERNAL MEDICINE

## 2020-01-26 PROCEDURE — 87040 BLOOD CULTURE FOR BACTERIA: CPT

## 2020-01-26 PROCEDURE — 83880 ASSAY OF NATRIURETIC PEPTIDE: CPT

## 2020-01-26 PROCEDURE — 36415 COLL VENOUS BLD VENIPUNCTURE: CPT

## 2020-01-26 RX ORDER — PRAMIPEXOLE DIHYDROCHLORIDE 0.25 MG/1
0.5 TABLET ORAL 2 TIMES DAILY
Status: DISCONTINUED | OUTPATIENT
Start: 2020-01-26 | End: 2020-01-28 | Stop reason: HOSPADM

## 2020-01-26 RX ORDER — MONTELUKAST SODIUM 10 MG/1
10 TABLET ORAL NIGHTLY
Status: DISCONTINUED | OUTPATIENT
Start: 2020-01-26 | End: 2020-01-28 | Stop reason: HOSPADM

## 2020-01-26 RX ORDER — FUROSEMIDE 10 MG/ML
60 INJECTION INTRAMUSCULAR; INTRAVENOUS ONCE
Status: COMPLETED | OUTPATIENT
Start: 2020-01-26 | End: 2020-01-26

## 2020-01-26 RX ORDER — VITAMIN B COMPLEX
1000 TABLET ORAL DAILY
Status: DISCONTINUED | OUTPATIENT
Start: 2020-01-26 | End: 2020-01-28 | Stop reason: HOSPADM

## 2020-01-26 RX ORDER — SODIUM CHLORIDE 0.9 % (FLUSH) 0.9 %
10 SYRINGE (ML) INJECTION EVERY 12 HOURS SCHEDULED
Status: DISCONTINUED | OUTPATIENT
Start: 2020-01-26 | End: 2020-01-28 | Stop reason: HOSPADM

## 2020-01-26 RX ORDER — LANOLIN ALCOHOL/MO/W.PET/CERES
10 CREAM (GRAM) TOPICAL NIGHTLY
COMMUNITY

## 2020-01-26 RX ORDER — DULOXETIN HYDROCHLORIDE 60 MG/1
60 CAPSULE, DELAYED RELEASE ORAL DAILY
COMMUNITY
End: 2020-06-03 | Stop reason: SDUPTHER

## 2020-01-26 RX ORDER — ONDANSETRON 2 MG/ML
4 INJECTION INTRAMUSCULAR; INTRAVENOUS EVERY 6 HOURS PRN
Status: DISCONTINUED | OUTPATIENT
Start: 2020-01-26 | End: 2020-01-28 | Stop reason: HOSPADM

## 2020-01-26 RX ORDER — AMLODIPINE BESYLATE 5 MG/1
5 TABLET ORAL DAILY
Status: DISCONTINUED | OUTPATIENT
Start: 2020-01-26 | End: 2020-01-28 | Stop reason: HOSPADM

## 2020-01-26 RX ORDER — CLOPIDOGREL BISULFATE 75 MG/1
75 TABLET ORAL DAILY
Status: DISCONTINUED | OUTPATIENT
Start: 2020-01-26 | End: 2020-01-28 | Stop reason: HOSPADM

## 2020-01-26 RX ORDER — METOPROLOL TARTRATE 50 MG/1
50 TABLET, FILM COATED ORAL 2 TIMES DAILY
COMMUNITY
End: 2020-02-03

## 2020-01-26 RX ORDER — DEXTROSE MONOHYDRATE 25 G/50ML
12.5 INJECTION, SOLUTION INTRAVENOUS PRN
Status: DISCONTINUED | OUTPATIENT
Start: 2020-01-26 | End: 2020-01-28 | Stop reason: HOSPADM

## 2020-01-26 RX ORDER — DULOXETIN HYDROCHLORIDE 30 MG/1
60 CAPSULE, DELAYED RELEASE ORAL DAILY
Status: DISCONTINUED | OUTPATIENT
Start: 2020-01-26 | End: 2020-01-28 | Stop reason: HOSPADM

## 2020-01-26 RX ORDER — SERTRALINE HYDROCHLORIDE 25 MG/1
25 TABLET, FILM COATED ORAL DAILY
Status: DISCONTINUED | OUTPATIENT
Start: 2020-01-26 | End: 2020-01-28 | Stop reason: HOSPADM

## 2020-01-26 RX ORDER — NICOTINE POLACRILEX 4 MG
15 LOZENGE BUCCAL PRN
Status: DISCONTINUED | OUTPATIENT
Start: 2020-01-26 | End: 2020-01-28 | Stop reason: HOSPADM

## 2020-01-26 RX ORDER — ACETAMINOPHEN 325 MG/1
650 TABLET ORAL EVERY 4 HOURS PRN
Status: DISCONTINUED | OUTPATIENT
Start: 2020-01-26 | End: 2020-01-28 | Stop reason: HOSPADM

## 2020-01-26 RX ORDER — SERTRALINE HYDROCHLORIDE 25 MG/1
25 TABLET, FILM COATED ORAL DAILY
COMMUNITY
End: 2020-05-01

## 2020-01-26 RX ORDER — FLUTICASONE FUROATE AND VILANTEROL 100; 25 UG/1; UG/1
1 POWDER RESPIRATORY (INHALATION) DAILY
COMMUNITY
End: 2020-04-01 | Stop reason: SDUPTHER

## 2020-01-26 RX ORDER — INSULIN GLARGINE 100 [IU]/ML
30 INJECTION, SOLUTION SUBCUTANEOUS NIGHTLY
Status: DISCONTINUED | OUTPATIENT
Start: 2020-01-26 | End: 2020-01-28 | Stop reason: HOSPADM

## 2020-01-26 RX ORDER — METOPROLOL TARTRATE 50 MG/1
50 TABLET, FILM COATED ORAL 2 TIMES DAILY
Status: DISCONTINUED | OUTPATIENT
Start: 2020-01-26 | End: 2020-01-28 | Stop reason: HOSPADM

## 2020-01-26 RX ORDER — INSULIN GLARGINE 100 [IU]/ML
30 INJECTION, SOLUTION SUBCUTANEOUS NIGHTLY
COMMUNITY
End: 2020-04-16

## 2020-01-26 RX ORDER — DEXTROSE MONOHYDRATE 50 MG/ML
100 INJECTION, SOLUTION INTRAVENOUS PRN
Status: DISCONTINUED | OUTPATIENT
Start: 2020-01-26 | End: 2020-01-28 | Stop reason: HOSPADM

## 2020-01-26 RX ORDER — GABAPENTIN 600 MG/1
600 TABLET ORAL 3 TIMES DAILY
Status: DISCONTINUED | OUTPATIENT
Start: 2020-01-26 | End: 2020-01-28 | Stop reason: HOSPADM

## 2020-01-26 RX ORDER — GABAPENTIN 600 MG/1
600 TABLET ORAL 3 TIMES DAILY
COMMUNITY
End: 2020-02-27 | Stop reason: SDUPTHER

## 2020-01-26 RX ORDER — SODIUM CHLORIDE 0.9 % (FLUSH) 0.9 %
10 SYRINGE (ML) INJECTION PRN
Status: DISCONTINUED | OUTPATIENT
Start: 2020-01-26 | End: 2020-01-28 | Stop reason: HOSPADM

## 2020-01-26 RX ORDER — BUMETANIDE 1 MG/1
1 TABLET ORAL 2 TIMES DAILY
Status: DISCONTINUED | OUTPATIENT
Start: 2020-01-26 | End: 2020-01-26

## 2020-01-26 RX ORDER — LEVALBUTEROL TARTRATE 45 UG/1
2 AEROSOL, METERED ORAL EVERY 4 HOURS PRN
COMMUNITY
End: 2020-04-09 | Stop reason: SDUPTHER

## 2020-01-26 RX ORDER — CLOPIDOGREL BISULFATE 75 MG/1
75 TABLET ORAL DAILY
COMMUNITY

## 2020-01-26 RX ORDER — BUMETANIDE 1 MG/1
1 TABLET ORAL 2 TIMES DAILY
Status: ON HOLD | COMMUNITY
End: 2020-01-28 | Stop reason: SDUPTHER

## 2020-01-26 RX ORDER — BUMETANIDE 0.25 MG/ML
1 INJECTION, SOLUTION INTRAMUSCULAR; INTRAVENOUS 2 TIMES DAILY
Status: DISCONTINUED | OUTPATIENT
Start: 2020-01-26 | End: 2020-01-28 | Stop reason: HOSPADM

## 2020-01-26 RX ORDER — MONTELUKAST SODIUM 10 MG/1
10 TABLET ORAL NIGHTLY
COMMUNITY

## 2020-01-26 RX ORDER — PRAMIPEXOLE DIHYDROCHLORIDE 0.5 MG/1
0.5 TABLET ORAL 2 TIMES DAILY
COMMUNITY
End: 2020-02-28 | Stop reason: SDUPTHER

## 2020-01-26 RX ORDER — AMLODIPINE BESYLATE 5 MG/1
5 TABLET ORAL DAILY
COMMUNITY
End: 2020-04-09 | Stop reason: SDUPTHER

## 2020-01-26 RX ADMIN — METOPROLOL TARTRATE 50 MG: 50 TABLET, FILM COATED ORAL at 21:30

## 2020-01-26 RX ADMIN — BUMETANIDE 1 MG: 0.25 INJECTION INTRAMUSCULAR; INTRAVENOUS at 21:29

## 2020-01-26 RX ADMIN — INSULIN LISPRO 2 UNITS: 100 INJECTION, SOLUTION INTRAVENOUS; SUBCUTANEOUS at 21:33

## 2020-01-26 RX ADMIN — IPRATROPIUM BROMIDE 0.5 MG: 0.5 SOLUTION RESPIRATORY (INHALATION) at 00:01

## 2020-01-26 RX ADMIN — BUMETANIDE 1 MG: 0.25 INJECTION INTRAMUSCULAR; INTRAVENOUS at 15:42

## 2020-01-26 RX ADMIN — SODIUM CHLORIDE, PRESERVATIVE FREE 10 ML: 5 INJECTION INTRAVENOUS at 08:56

## 2020-01-26 RX ADMIN — FUROSEMIDE 60 MG: 10 INJECTION, SOLUTION INTRAMUSCULAR; INTRAVENOUS at 02:09

## 2020-01-26 RX ADMIN — LEVALBUTEROL 1.25 MG: 1.25 SOLUTION RESPIRATORY (INHALATION) at 00:01

## 2020-01-26 RX ADMIN — VITAMIN D, TAB 1000IU (100/BT) 1000 UNITS: 25 TAB at 15:42

## 2020-01-26 RX ADMIN — PRAMIPEXOLE DIHYDROCHLORIDE 0.5 MG: 0.25 TABLET ORAL at 15:42

## 2020-01-26 RX ADMIN — ISOSORBIDE MONONITRATE 90 MG: 60 TABLET, EXTENDED RELEASE ORAL at 15:41

## 2020-01-26 RX ADMIN — INSULIN LISPRO 8 UNITS: 100 INJECTION, SOLUTION INTRAVENOUS; SUBCUTANEOUS at 13:01

## 2020-01-26 RX ADMIN — MONTELUKAST SODIUM 10 MG: 10 TABLET, FILM COATED ORAL at 21:30

## 2020-01-26 RX ADMIN — ENOXAPARIN SODIUM 40 MG: 40 INJECTION SUBCUTANEOUS at 15:42

## 2020-01-26 RX ADMIN — METOPROLOL TARTRATE 50 MG: 50 TABLET, FILM COATED ORAL at 15:41

## 2020-01-26 RX ADMIN — GABAPENTIN 600 MG: 600 TABLET, FILM COATED ORAL at 21:30

## 2020-01-26 RX ADMIN — CLOPIDOGREL BISULFATE 75 MG: 75 TABLET ORAL at 15:41

## 2020-01-26 RX ADMIN — INSULIN LISPRO 12 UNITS: 100 INJECTION, SOLUTION INTRAVENOUS; SUBCUTANEOUS at 08:55

## 2020-01-26 RX ADMIN — SERTRALINE HYDROCHLORIDE 25 MG: 25 TABLET ORAL at 15:42

## 2020-01-26 RX ADMIN — PERFLUTREN 1.65 MG: 6.52 INJECTION, SUSPENSION INTRAVENOUS at 10:00

## 2020-01-26 RX ADMIN — AMLODIPINE BESYLATE 5 MG: 5 TABLET ORAL at 15:41

## 2020-01-26 RX ADMIN — Medication 30 UNITS: at 21:30

## 2020-01-26 RX ADMIN — Medication 10 MG: at 21:29

## 2020-01-26 RX ADMIN — INSULIN LISPRO 8 UNITS: 100 INJECTION, SOLUTION INTRAVENOUS; SUBCUTANEOUS at 17:27

## 2020-01-26 RX ADMIN — SODIUM CHLORIDE, PRESERVATIVE FREE 10 ML: 5 INJECTION INTRAVENOUS at 21:30

## 2020-01-26 RX ADMIN — IOPAMIDOL 90 ML: 755 INJECTION, SOLUTION INTRAVENOUS at 01:01

## 2020-01-26 RX ADMIN — METHYLPREDNISOLONE SODIUM SUCCINATE 125 MG: 125 INJECTION, POWDER, FOR SOLUTION INTRAMUSCULAR; INTRAVENOUS at 00:10

## 2020-01-26 RX ADMIN — DULOXETINE HYDROCHLORIDE 60 MG: 30 CAPSULE, DELAYED RELEASE ORAL at 15:42

## 2020-01-26 RX ADMIN — GABAPENTIN 600 MG: 600 TABLET, FILM COATED ORAL at 15:41

## 2020-01-26 ASSESSMENT — ENCOUNTER SYMPTOMS
SORE THROAT: 0
NAUSEA: 0
BACK PAIN: 0
SHORTNESS OF BREATH: 1
DIARRHEA: 0
VOMITING: 0
RHINORRHEA: 0
ABDOMINAL PAIN: 0
COUGH: 1

## 2020-01-26 ASSESSMENT — PAIN SCALES - GENERAL: PAINLEVEL_OUTOF10: 0

## 2020-01-26 NOTE — SIGNIFICANT EVENT
Patient examined on the floor. Still dyspneic moving to the side of the bed to sit and eat. Home medications reconciled. Will diurese with IV bumetanide. Echo pending to evaluate LV function.

## 2020-01-26 NOTE — ED PROVIDER NOTES
[facility] EMERGENCY DEPT  eMERGENCY dEPARTMENT eNCOUnter      Pt Name: Quang Fernandez  MRN: [id number]  [place] 1960  Date of evaluation: 1/25/2020  Provider: Ricarda Campos MD    [address] [place] [address]       Chief Complaint   Patient presents with    Shortness of Breath     tightness in chest    Neck Pain         HISTORY OF PRESENT ILLNESS   (Location/Symptom, Timing/Onset,Context/Setting, Quality, Duration, Modifying Factors, Severity)  Note limiting factors. Quang Fernandez is a 62 y.o. female who presents to the emergency department with shortness of breath. Patient states that she has had a cough but no fever. Symptoms present over the last 2 days. She has a history of CHF. Remote history of DVT 26 years ago while pregnant. She takes Plavix. History of coronary artery disease with 3 stents. Had a cath last year in [place]. Had no fever. She reports some aching in her neck. She did not have similar symptoms with her prior MI, she states she had pain in her legs at that time. HPI    NursingNotes were reviewed. REVIEW OF SYSTEMS    (2-9 systems for level 4, 10 or more for level 5)     Review of Systems   Constitutional: Negative for chills and fever. HENT: Negative for rhinorrhea and sore throat. Respiratory: Positive for cough and shortness of breath. Cardiovascular: Positive for chest pain. Negative for leg swelling. Gastrointestinal: Negative for abdominal pain, diarrhea, nausea and vomiting. Genitourinary: Negative for difficulty urinating. Musculoskeletal: Negative for back pain and neck pain. Skin: Negative for rash. Neurological: Negative for weakness and headaches. Psychiatric/Behavioral: Negative for confusion. All other systems reviewed and are negative. A complete review of systems was performed and is negative except as noted above in the HPI.        PAST MEDICAL HISTORY     Past Medical History:   Diagnosis Date    [facility] 09/2018    Opening: Spontaneous  Best Verbal Response: Oriented  Best Motor Response: Obeys commands  Barbie Coma Scale Score: 15        PHYSICAL EXAM    (up to 7 for level 4, 8 or more for level 5)     ED Triage Vitals [01/25/20 2314]   BP Temp Temp Source Pulse Resp SpO2 Height Weight   (!) 150/55 98.8 °F (37.1 °C) Oral 64 18 91 % 5' 1\" (1.549 m) 203 lb (92.1 kg)       Physical Exam  Vitals signs and nursing note reviewed. Constitutional:       General: She is not in acute distress. Appearance: She is well-developed. She is not diaphoretic. HENT:      Head: Normocephalic and atraumatic. Eyes:      Pupils: Pupils are equal, round, and reactive to light. Neck:      Musculoskeletal: Normal range of motion and neck supple. Cardiovascular:      Rate and Rhythm: Normal rate and regular rhythm. Heart sounds: Normal heart sounds. Pulmonary:      Effort: Pulmonary effort is normal. No respiratory distress. Breath sounds: Rales present. Abdominal:      General: Bowel sounds are normal. There is no distension. Palpations: Abdomen is soft. Tenderness: There is no abdominal tenderness. Musculoskeletal: Normal range of motion. Skin:     General: Skin is warm and dry. Findings: No rash. Neurological:      Mental Status: She is alert and oriented to person, place, and time. Cranial Nerves: No cranial nerve deficit. Motor: No abnormal muscle tone.       Coordination: Coordination normal.   Psychiatric:         Behavior: Behavior normal.         DIAGNOSTIC RESULTS     EKG: All EKG's are interpreted by the Emergency Department Physician who either signs or Co-signs this chart in the absence of a cardiologist.    NSR rate 65 t wave inversion 1, II, III, avf, V5, V6    Repeat EKG unchanged from prior    RADIOLOGY:   Non-plain film images such as CT, Ultrasound and MRI are read by the radiologist. Plainradiographic images are visualized and preliminarily interpreted by the emergency physician with the below findings:    CXR: pulmonary vascular congestion    Interpretation per the Radiologist below, if available at the time of this note:    XR CHEST STANDARD (2 VW)    (Results Pending)   CTA PULMONARY W CONTRAST    (Results Pending)     CTA CHEST:  Cardiomegaly. At least a moderate amount of pericardial fluid. Bilateral small pleural effusions are seen to layer posteriorly. Interstitial pulmonary edema. No airway narrowing or adenopathy. Overall, findings may reflect congestive heart.  Exacerbation    ED BEDSIDE ULTRASOUND:   Performed by ED Physician - none    LABS:  Labs Reviewed   BLOOD GAS, ARTERIAL - Abnormal; Notable for the following components:       Result Value    pH, Arterial 7.470 (*)     HCO3, Arterial 29.1 (*)     Base Excess, Arterial 5.0 (*)     Hemoglobin, Art, Extended 9.2 (*)     All other components within normal limits   BRAIN NATRIURETIC PEPTIDE - Abnormal; Notable for the following components:    Pro-BNP 1,995 (*)     All other components within normal limits   CBC WITH AUTO DIFFERENTIAL - Abnormal; Notable for the following components:    RBC 2.95 (*)     Hemoglobin 8.7 (*)     Hematocrit 28.4 (*)     MCHC 30.6 (*)     RDW 14.6 (*)     MPV 8.8 (*)     All other components within normal limits   COMPREHENSIVE METABOLIC PANEL - Abnormal; Notable for the following components:    Glucose 177 (*)     BUN 48 (*)     CREATININE 1.4 (*)     GFR Non- 38 (*)     Total Protein 6.5 (*)     All other components within normal limits   D-DIMER, QUANTITATIVE - Abnormal; Notable for the following components:    D-Dimer, Quant 0.92 (*)     All other components within normal limits   URINE RT REFLEX TO CULTURE - Abnormal; Notable for the following components:    Glucose, Ur 250 (*)     Blood, Urine MODERATE (*)     Protein, UA >=300 (*)     All other components within normal limits   MICROSCOPIC URINALYSIS - Abnormal; Notable for the following components:    RBC, UA 28 (*)     All other components within normal limits   RAPID INFLUENZA A/B ANTIGENS   CULTURE BLOOD #1   CULTURE BLOOD #2   APTT   LACTIC ACID, PLASMA   PROTIME-INR   TROPONIN   POTASSIUM, WHOLE BLOOD   TROPONIN       All other labs were within normal range or not returned as of this dictation. EMERGENCY DEPARTMENT COURSE and DIFFERENTIALDIAGNOSIS/MDM:   Vitals:    Vitals:    01/26/20 0002 01/26/20 0014 01/26/20 0035 01/26/20 0230   BP: (!) 160/59 (!) 150/55  (!) 142/43   Pulse: 63 65  68   Resp: 16 23     Temp:       TempSrc:       SpO2: 92% 90% 98% 96%   Weight:       Height:           MDM   Pt anemic, does not know baseline, does not take iron, occasionally has black tarry stool. Rectal exam with brown stool, hemoccult negative. 1.  Patent left main coronary artery.  Type 3 left anterior descending  with a mild to moderate disease identified in the mid segment.  Patent  stents to the circumflex.  The obtuse marginal branch is a large  branching vessel.  One of its branches is a 2 mm vessel with an 80%  stenosis.  Right coronary artery is patent. 2.  Normal left ventricular function. 3.  Moderate to severe elevation of left ventricular end-diastolic  pressure. 4.  Severe pulmonary hypertension. 5.  Normal left ventricular function. RECOMMENDATIONS:  Medical therapy and aggressive risk factor  modification.  Advance diuretics.  Push nitrates for the elevated  end-diastolic pressure.  The patient requires treatment of her sleep  apnea and will obtain pulmonary function testing. cc: Chery Hager MD         [id number]  DT:  11/22/2019 07:24  DD:  11/21/2019 22:34    D/w Dr Mack Baldwin for admission    CONSULTS:  None    PROCEDURES:  Unless otherwise notedbelow, none     Procedures    FINAL IMPRESSION     1. Acute on chronic congestive heart failure, unspecified heart failure type ([address]    2. Pericardial effusion    3. Pleural effusion    4.  Anemia, unspecified type          DISPOSITION/PLAN DISPOSITION        PATIENT REFERRED TO:  @FUP@    DISCHARGE MEDICATIONS:  New Prescriptions    No medications on file          (Please note that portions of this note were completed with a voice recognition program.  Efforts were made to edit the dictations butoccasionally words are mis-transcribed.)    Pamela Murry MD (electronically signed)  AttendingEmergency Physician         Pamela Murry MD  01/26/20 5633       Pamela Murry MD  01/26/20 1572

## 2020-01-26 NOTE — H&P
Historical Provider, MD   diclofenac sodium 1 % GEL Apply 4 g topically 4 times daily as needed for Pain   Yes Historical Provider, MD   insulin glargine (LANTUS) 100 UNIT/ML injection vial Inject 30 Units into the skin nightly   Yes Historical Provider, MD       Allergies:  Albuterol; Levaquin [levofloxacin]; Metformin and related; and Aspirin    Social History:   TOBACCO:   reports that she quit smoking about 20 years ago. She has never used smokeless tobacco.  ETOH:   reports no history of alcohol use. Social History     Substance and Sexual Activity   Drug Use Never       Family History:   History reviewed. No pertinent family history. Physical Exam:    Vitals: /66   Pulse 66   Temp 97.2 °F (36.2 °C) (Temporal)   Resp 18   Ht 5' 1\" (1.549 m)   Wt 219 lb 3.2 oz (99.4 kg)   SpO2 98%   BMI 41.42 kg/m²   24HR INTAKE/OUTPUT:      Intake/Output Summary (Last 24 hours) at 1/26/2020 0758  Last data filed at 1/26/2020 0700  Gross per 24 hour   Intake 240 ml   Output 2000 ml   Net -1760 ml       General: Alert and cooperative with exam. Normal body habitus. Appears older than stated age and unhealthy. HEENT: Atraumatic normocephalic. Teeth and gums normal.  Eyes with clear conjunctiva and normal lids, pupils and irises normal, external ears and nose are normal, lips normal, no cyanosis. Neck without masses, lympadenopathy, bruit, thyroid normal.  Normal JVP. No hepatojugular reflux. Respiratory: Inspection of chest normal.  No use of accessory muscles, normal diaphragmatic movements. Lungs clear to auscultation bilaterally without rales, rhonchi or wheezes. Cardiac: Regular rate and rhythm, S1, S2 normal, no murmur, click, no gallop or rubs. No reproducible chest pain on palpation of the chest wall. Vascular: 2+ peripheral pulses with no cyanosis. Abdomen: Soft, non-tender; no peritoneal signs, bowel sounds normal; no masses,  no organomegaly. Musculoskeletal: Normal joints on inspection. 01/26/20  0459   TROPONINI 0.01       LFTs:   Recent Labs     01/25/20  2353   AST 10   ALT 9   BILITOT 0.4   ALKPHOS 87       ABGs:   Recent Labs     01/25/20  2357   PHART 7.470*   CYP1XQR 40.0   PO2ART 81.0   KJL4TQE 29.1*   BEART 5.0*   HGBAE 9.2*   C5DNHMKZ 95.7   CARBOXHGBART 1.3   02THERAPY Unknown         -----------------------------------------------------------------    Imaging Studies:    XR CHEST STANDARD (2 VW)    (Results Pending)   CTA PULMONARY W CONTRAST    (Results Pending)             I have personally reviewed the following images:    Chest CT as shown above. EKG: NSR, negative electrical alternans,  inferolateral T wave inversions V3 through V6 and leads I and AV L, septal infarct age undetermined. Assessment     Principal Problem:    SOB (shortness of breath)  Active Problems:    Pericardial effusion    CHF (congestive heart failure) ([facility])    Anemia  Resolved Problems:    * No resolved hospital problems. *      Plan     --Shortness of breath is likely multifactorial including chronic pericardial effusion, CHF exacerbation, COPD. I have ordered an echocardiogram for the morning. Patient did receive some diuresis in the emergency department however I have held off on ordering further diuresis until pericardial effusion and tamponade can be ruled out definitively with echocardiogram.  Patient may benefit from a cardiac MRI to further investigate the nature of the pericardial effusion. Patient does not complain of chest pain. Supplemental oxygen ordered. --Cardiology consultation requested to evaluate for constriction physiology, systolic heart failure exacerbation. -- DVT prophylaxis: SQ Lovenox      -- CODE STATUS: Full Code    Total face-to-face time with this patient, time spent reviewing medical records and in coordination of care with the emergency department physician, nursing staff was 75 minutes.     Signed:  Scottie Maldonado MD 1/26/2020 7:58 AM  Admitting Hospitalist

## 2020-01-26 NOTE — ED TRIAGE NOTES
Patient began having shortness of breath yesterday, right side of neck and right leg hurting, patient states she feels tight in her chest but not in pain

## 2020-01-26 NOTE — PROGRESS NOTES
Patient's medications from home brought down to pharmacy to be kept until discharge. Patient is also interested in using meds to beds for any new medications upon discharge.

## 2020-01-27 PROBLEM — R06.02 SHORTNESS OF BREATH: Status: ACTIVE | Noted: 2020-01-27

## 2020-01-27 PROBLEM — E66.01 MORBID OBESITY WITH BMI OF 40.0-44.9, ADULT (HCC): Status: ACTIVE | Noted: 2020-01-27

## 2020-01-27 LAB
ANION GAP SERPL CALCULATED.3IONS-SCNC: 14 MMOL/L (ref 7–19)
BUN BLDV-MCNC: 56 MG/DL (ref 8–23)
CALCIUM SERPL-MCNC: 9.5 MG/DL (ref 8.8–10.2)
CHLORIDE BLD-SCNC: 99 MMOL/L (ref 98–111)
CHOLESTEROL, TOTAL: 220 MG/DL (ref 160–199)
CO2: 28 MMOL/L (ref 22–29)
CREAT SERPL-MCNC: 1.3 MG/DL (ref 0.5–0.9)
EKG P AXIS: 41 DEGREES
EKG P AXIS: 45 DEGREES
EKG P-R INTERVAL: 166 MS
EKG P-R INTERVAL: 180 MS
EKG Q-T INTERVAL: 434 MS
EKG Q-T INTERVAL: 456 MS
EKG QRS DURATION: 94 MS
EKG QRS DURATION: 98 MS
EKG QTC CALCULATION (BAZETT): 442 MS
EKG QTC CALCULATION (BAZETT): 463 MS
EKG T AXIS: -111 DEGREES
EKG T AXIS: -145 DEGREES
GFR NON-AFRICAN AMERICAN: 42
GLUCOSE BLD-MCNC: 131 MG/DL (ref 70–99)
GLUCOSE BLD-MCNC: 151 MG/DL (ref 74–109)
GLUCOSE BLD-MCNC: 192 MG/DL (ref 70–99)
GLUCOSE BLD-MCNC: 205 MG/DL (ref 70–99)
GLUCOSE BLD-MCNC: 221 MG/DL (ref 70–99)
HDLC SERPL-MCNC: 48 MG/DL (ref 65–121)
LDL CHOLESTEROL CALCULATED: 135 MG/DL
MAGNESIUM: 2.6 MG/DL (ref 1.6–2.4)
PERFORMED ON: ABNORMAL
POTASSIUM SERPL-SCNC: 4.2 MMOL/L (ref 3.5–5)
SODIUM BLD-SCNC: 141 MMOL/L (ref 136–145)
TRIGL SERPL-MCNC: 184 MG/DL (ref 0–149)

## 2020-01-27 PROCEDURE — 6370000000 HC RX 637 (ALT 250 FOR IP): Performed by: HOSPITALIST

## 2020-01-27 PROCEDURE — 2580000003 HC RX 258: Performed by: HOSPITALIST

## 2020-01-27 PROCEDURE — 2500000003 HC RX 250 WO HCPCS: Performed by: FAMILY MEDICINE

## 2020-01-27 PROCEDURE — 96376 TX/PRO/DX INJ SAME DRUG ADON: CPT

## 2020-01-27 PROCEDURE — 93010 ELECTROCARDIOGRAM REPORT: CPT | Performed by: INTERNAL MEDICINE

## 2020-01-27 PROCEDURE — 83735 ASSAY OF MAGNESIUM: CPT

## 2020-01-27 PROCEDURE — 80061 LIPID PANEL: CPT

## 2020-01-27 PROCEDURE — 36415 COLL VENOUS BLD VENIPUNCTURE: CPT

## 2020-01-27 PROCEDURE — 80048 BASIC METABOLIC PNL TOTAL CA: CPT

## 2020-01-27 PROCEDURE — G0378 HOSPITAL OBSERVATION PER HR: HCPCS

## 2020-01-27 PROCEDURE — 6360000002 HC RX W HCPCS: Performed by: HOSPITALIST

## 2020-01-27 PROCEDURE — 6370000000 HC RX 637 (ALT 250 FOR IP): Performed by: FAMILY MEDICINE

## 2020-01-27 PROCEDURE — 96372 THER/PROPH/DIAG INJ SC/IM: CPT

## 2020-01-27 PROCEDURE — 82948 REAGENT STRIP/BLOOD GLUCOSE: CPT

## 2020-01-27 RX ADMIN — INSULIN LISPRO 4 UNITS: 100 INJECTION, SOLUTION INTRAVENOUS; SUBCUTANEOUS at 17:30

## 2020-01-27 RX ADMIN — CLOPIDOGREL BISULFATE 75 MG: 75 TABLET ORAL at 08:39

## 2020-01-27 RX ADMIN — ISOSORBIDE MONONITRATE 90 MG: 60 TABLET, EXTENDED RELEASE ORAL at 08:39

## 2020-01-27 RX ADMIN — SODIUM CHLORIDE, PRESERVATIVE FREE 10 ML: 5 INJECTION INTRAVENOUS at 08:39

## 2020-01-27 RX ADMIN — ENOXAPARIN SODIUM 40 MG: 40 INJECTION SUBCUTANEOUS at 08:42

## 2020-01-27 RX ADMIN — INSULIN LISPRO 2 UNITS: 100 INJECTION, SOLUTION INTRAVENOUS; SUBCUTANEOUS at 20:00

## 2020-01-27 RX ADMIN — PRAMIPEXOLE DIHYDROCHLORIDE 0.5 MG: 0.25 TABLET ORAL at 08:38

## 2020-01-27 RX ADMIN — BUMETANIDE 1 MG: 0.25 INJECTION INTRAMUSCULAR; INTRAVENOUS at 08:39

## 2020-01-27 RX ADMIN — SERTRALINE HYDROCHLORIDE 25 MG: 25 TABLET ORAL at 08:39

## 2020-01-27 RX ADMIN — PRAMIPEXOLE DIHYDROCHLORIDE 0.5 MG: 0.25 TABLET ORAL at 15:13

## 2020-01-27 RX ADMIN — VITAMIN D, TAB 1000IU (100/BT) 1000 UNITS: 25 TAB at 08:38

## 2020-01-27 RX ADMIN — METOPROLOL TARTRATE 50 MG: 50 TABLET, FILM COATED ORAL at 19:53

## 2020-01-27 RX ADMIN — GABAPENTIN 600 MG: 600 TABLET, FILM COATED ORAL at 08:38

## 2020-01-27 RX ADMIN — BUMETANIDE 1 MG: 0.25 INJECTION INTRAMUSCULAR; INTRAVENOUS at 19:53

## 2020-01-27 RX ADMIN — GABAPENTIN 600 MG: 600 TABLET, FILM COATED ORAL at 15:13

## 2020-01-27 RX ADMIN — INSULIN LISPRO 2 UNITS: 100 INJECTION, SOLUTION INTRAVENOUS; SUBCUTANEOUS at 13:19

## 2020-01-27 RX ADMIN — METOPROLOL TARTRATE 50 MG: 50 TABLET, FILM COATED ORAL at 08:38

## 2020-01-27 RX ADMIN — SODIUM CHLORIDE, PRESERVATIVE FREE 10 ML: 5 INJECTION INTRAVENOUS at 19:53

## 2020-01-27 RX ADMIN — MONTELUKAST SODIUM 10 MG: 10 TABLET, FILM COATED ORAL at 19:53

## 2020-01-27 RX ADMIN — Medication 10 MG: at 19:54

## 2020-01-27 RX ADMIN — DULOXETINE HYDROCHLORIDE 60 MG: 30 CAPSULE, DELAYED RELEASE ORAL at 08:38

## 2020-01-27 RX ADMIN — GABAPENTIN 600 MG: 600 TABLET, FILM COATED ORAL at 19:53

## 2020-01-27 RX ADMIN — AMLODIPINE BESYLATE 5 MG: 5 TABLET ORAL at 08:39

## 2020-01-27 RX ADMIN — Medication 30 UNITS: at 20:00

## 2020-01-27 ASSESSMENT — PAIN SCALES - GENERAL: PAINLEVEL_OUTOF10: 0

## 2020-01-27 NOTE — CONSULTS
[facility]       Cardiology Consultation          I personally saw the patient and rounded with:  her RN, on  1/26/20      The observations documented in this note, including the assessment and plan are mine              Date of Admission:  1/25/2020 11:15 PM    Date of Initially Being Seen / Consultation:  1/26/20    Cardiologist:  Gavino Auguste MD     Cardiology Attending: [facility] Attending: Hospitalist Service      PCP:  No primary care provider on file. Reason for Admission / Chief Complaint or Consultation      1. Reason for Hospital Admission: dysnea        2. Reason for Cardiology Consultation: CAD       SUBJECTIVE AND HISTORY OF PRESENT ILLNESS:    Source of the history:  Patient, family, previous inpatient and outpatient records in [facility], and from records from:  Outside hospital    [facility] Jad is a 62 y.o. female who presents to [facility] ED # [phone] (admitted on 1/25/2020 11:15 PM) with symptoms / signs / problem or diagnosis of dysnea. She is normal mood and affect, alert and orientated x 3, judgement and insight appear appropriate. Family present:  No   At the beginning of the interview she was lying in bed      CONTEXT OF THIS HISTORY OF PRESENT ILLNESS INCLUDE: (IF APPLICABLE):    Presentation:  She  presented with shortness of air. Cardiology was asked to see her regarding these symptoms and findings and to consider those in relationship to possible optimal diagnosis and treatment options. FEATURES OF THIS HISTORY OF PRESENT ILLNESS (SYMPTOMS AND FINDINGS) INCLUDE:       1. Location: The dyspnea was located in the central to left chest without radiation to the back, throat and left shoulder. 2. Duration: The dyspnea began over the last several days and lasted since then   3.  Quality:   The shortness of air has not been associated with symptoms of chest discomfort, pain with breathing, dizzness, fainting, cough, wheezing, hemoptysis, neck pain, chest injury, fever or sputum production. 4. Severity: It was described as mild but progessive   5. Timing The symptoms began graudally. 6. Modifying Factors: Modifying features included:   none   7. Associated Signs and Symptoms: There was not  associated manifestations such as nausea, vomiting, diaphoresis, presyncope, syncope, dizzyness, weakness, cold sweats, or shortness of air. 8. Context: As noted above in the context of the presentation. It  was not  suggestive of myocardial ischemia. When being examined this evening, in PCU, # [id number] with her RN, there was not on going or continuous symptoms of exertional chest discomfort, unusual or change in shortness of air, presyncope or syncope. CARDIAC RISK PROFILE:      Risk Factor Yes / No / Unknown       Gender Female   Cigarette Use No, but did use in the past   Family History of Cardiovascular Disease N/A   Diabetes Mellitus yes   Hypercholesteremia yes   Hypertension yes          Cardiac Specific Problems:    Specialty Problems        Cardiology Problems    CHF (congestive heart failure) (HCC)        Pericardial effusion                PRIOR CARDIAC PROBLEM LIST  (IF APPLICABLE):    Stents in the proximal circ and OM1    11/21/2019  Cath  1.  Mild elevation in central aortic pressure. 2.  Moderate to severe elevation of left ventricular end-diastolic pressure. 3.  Normal left ventricular function. 4.  Normal cardiac index. 5.  Severe pulmonary hypertension secondary to both a pre and post-capillary component. The left main coronary artery is short and patent. 2.  The left anterior descending coronary artery is a type 3 vessel. The vessel has mild to moderate degree of tortuosity.  The mid vessel has a 40% stenosis.  The remainder of the vessel has luminal irregularities.  A series of small diagonal branches are present.  3.  The circumflex coronary artery is patent and has a patent stent in the proximal segment.  The first obtuse marginal branch has a stent in the proximal segment with about a 25% area of in-stent restenosis. The obtuse marginal branch gives off a branch in the mid segment that is about a 2 mm vessel with an 80% stenosis.  The distal vessel bifurcates and there are luminal irregularities.  The distal circumflex has a 30% stenosis and then luminal irregularities are identified in a posterolateral branch. VENTRICULOGRAPHY:  The left ventricle is normal in size with normal systolic function.  Estimated ejection fraction is 55%-60%. 1/26/2020  Echo mild to moderate pericardial eff, normal LVF      Past Medical History:    Past Medical History:   Diagnosis Date    AMI (acute myocardial infarction) ([facility] 09/2018    Asthma     CAD (coronary artery disease)     hx of stents    [facility] 2012    R sided numbness/weakness    CHF [facility]congestive heart failure) ([facility]     COPD [facility]chronic obstructive [facility] disease[facility]     Diabetes mellitus [facility]     DVT (deep vein thrombosis) in pregnancy     Hyperlipidemia     Hypertension          Past Surgical History:    Past Surgical History:   Procedure Laterality Date    CATARACT REMOVAL      CORONARY ANGIOPLASTY WITH STENT PLACEMENT      TONSILLECTOMY           Home Medications:   Prior to Admission medications    Medication Sig Start Date End Date Taking?  Authorizing Provider   pramipexole (MIRAPEX) 0.5 MG tablet Take 0.5 mg by mouth 2 times daily   Yes Historical Provider, MD   DULoxetine (CYMBALTA) 60 MG extended release capsule Take 60 mg by mouth daily   Yes Historical Provider, MD   clopidogrel (PLAVIX) 75 MG tablet Take 75 mg by mouth daily   Yes Historical Provider, MD   montelukast (SINGULAIR) 10 MG tablet Take 10 mg by mouth nightly   Yes Historical Provider, MD   vitamin D (CHOLECALCIFEROL) 25 MCG (1000 UT) TABS tablet Take 1,000 Units by mouth daily   Yes Historical Provider, MD   melatonin 3 MG TABS tablet Take montelukast  10 mg Oral Nightly    pramipexole  0.5 mg Oral BID    sertraline  25 mg Oral Daily    Vitamin D  1,000 Units Oral Daily    bumetanide  1 mg Intravenous BID       Allergies:  Albuterol; Levaquin [levofloxacin]; Metformin and related; and Aspirin     Social History:       Social History     Socioeconomic History    Marital status:      Spouse name: Not on file    Number of children: Not on file    Years of education: Not on file    Highest education level: Not on file   Occupational History    Not on file   Social Needs    Financial resource strain: Not on file    Food insecurity:     Worry: Not on file     Inability: Not on file    Transportation needs:     Medical: Not on file     Non-medical: Not on file   Tobacco Use    Smoking status: Former Smoker     Last attempt to quit: 2000     Years since quittin.0    Smokeless tobacco: Never Used   Substance and Sexual Activity    Alcohol use: Never     Frequency: Never    Drug use: Never    Sexual activity: Not on file   Lifestyle    Physical activity:     Days per week: Not on file     Minutes per session: Not on file    Stress: Not on file   Relationships    Social connections:     Talks on phone: Not on file     Gets together: Not on file     Attends [employer] service: Not on file     Active member of club or organization: Not on file     Attends meetings of clubs or organizations: Not on file     Relationship status: Not on file    Intimate partner violence:     Fear of current or ex partner: Not on file     Emotionally abused: Not on file     Physically abused: Not on file     Forced sexual activity: Not on file   Other Topics Concern    Not on file   Social History Narrative    Not on file       Family History:   History reviewed. No pertinent family history.       REVIEW OF SYSTEMS:     Except as noted in the HPI, all other systems are negative        PHYSICAL EXAMINATION:     BP (!) 142/63   Pulse 66   Temp 98.5 °F stent in the proximal segment.  The first obtuse marginal branch has a stent in the proximal segment with about a 25% area of in-stent restenosis. The obtuse marginal branch gives off a branch in the mid segment that is about a 2 mm vessel with an 80% stenosis.  The distal vessel bifurcates and there are luminal irregularities.  The distal circumflex has a 30% stenosis and then luminal irregularities are identified in a posterolateral branch. VENTRICULOGRAPHY:  The left ventricle is normal in size with normal systolic function.  Estimated ejection fraction is 55%-60%. 1/26/2020  Echo mild to moderate pericardial eff, normal LVF     No Continue current medications:    Yes:            3. Concern regarding systemic blood pressure Initial presentation during this evaluation Systolic (22FWP), SZK:684 , Min:132 , LSX:929    Diastolic (35DSM), RZD:54, Min:43, Max:72   No Continue current medications:       yes         DISCUSSION AND PLAN:      1. I had a detailed discussion with the patient and / or family regarding the historical points, physical examination findings, and any diagnostic results supporting the admission / consultation diagnosis. The patient was educated on care and need for admission. Questions were invited and answered. The patient and / or family shows understanding of admission / consultation information and agrees to follow. 2. Continue present medications except for changes as noted above    3. Continue to monitor rhythm    4. Further orders per clinical course. 5. diuresis. Discussed with patient and nursing.     I greatly appreciate the opportunity and your confidence in allowing me to participate in the care of Geneva Mars    Electronically signed by Alistair Madrid MD on 1/26/20     [facility] [place]

## 2020-01-27 NOTE — PROGRESS NOTES
Pt is refusing her bed alarm and states she will get up when she wants too. Pt states she will not call for help when she need to go to restroom.

## 2020-01-27 NOTE — PROGRESS NOTES
Hospitalist Progress Note  1/27/2020 12:58 PM  Subjective:   Admit Date: 1/25/2020  PCP: No primary care provider on file. Chief Complaint: shortness of breath, dyspnea    Subjective: Patient is feeling much better today. Sitting up on side of bed to eat without any further dyspnea. Walks with a walker outside of the hospital. Agreeable to walk with PT. History is otherwise unchanged. Cumulative Hospital History:   1-26: Presented to ED overnight with shortness of breath and dyspnea on exertion progressing x1 week. BNP elevated, CTA thorax showing only pericardial effusion. ECG with T wave inversions. Admitted to PCU on diuresis with cardiology consult. Cardiology recommends diuresis without intervention. 1-27: Patient is feeling much better. Requests walker. PT evaluation, possibly discharge to home tomorrow. ROS: 14 point review of systems is negative except as specifically addressed above. DIET LOW SODIUM 2 GM;     Intake/Output Summary (Last 24 hours) at 1/27/2020 1258  Last data filed at 1/27/2020 1036  Gross per 24 hour   Intake 950 ml   Output 1930 ml   Net -980 ml     Medications:   dextrose       Current Facility-Administered Medications   Medication Dose Route Frequency Provider Last Rate Last Dose    perflutren lipid microspheres (DEFINITY) injection 1.65 mg  1.5 mL Intravenous ONCE PRN Merry Sequeira MD   1.65 mg at 01/26/20 1000    sodium chloride flush 0.9 % injection 10 mL  10 mL Intravenous 2 times per day Renae Uribe MD   10 mL at 01/27/20 0839    sodium chloride flush 0.9 % injection 10 mL  10 mL Intravenous PRN Renae Uribe MD        magnesium hydroxide (MILK OF MAGNESIA) 400 MG/5ML suspension 30 mL  30 mL Oral Daily PRN Renae Uribe MD        ondansetron (ZOFRAN) injection 4 mg  4 mg Intravenous Q6H PRN Renae Uribe MD        enoxaparin (LOVENOX) injection 40 mg  40 mg Subcutaneous Daily Renae Uribe MD   40 mg at 01/27/20 0842    acetaminophen (TYLENOL) tablet 650 mg  650 mg Oral Q4H PRN Krishna Goodwin MD        insulin lispro (HUMALOG) injection vial 0-12 Units  0-12 Units Subcutaneous TID WC Krishna Goodwin MD   8 Units at 01/26/20 1727    insulin lispro (HUMALOG) injection vial 0-6 Units  0-6 Units Subcutaneous Nightly Krishna Goodwin MD   2 Units at 01/26/20 2133    amLODIPine (NORVASC) tablet 5 mg  5 mg Oral Daily Hamilton Delcid, DO   5 mg at 01/27/20 1986    clopidogrel (PLAVIX) tablet 75 mg  75 mg Oral Daily Hamilton Delcid, DO   75 mg at 01/27/20 5735    DULoxetine (CYMBALTA) extended release capsule 60 mg  60 mg Oral Daily Hamilton Delcid, DO   60 mg at 01/27/20 1665    gabapentin (NEURONTIN) tablet 600 mg  600 mg Oral TID Alex Close, DO   600 mg at 01/27/20 2280    insulin glargine (LANTUS) injection vial 30 Units  30 Units Subcutaneous Nightly Hamilton Delcid, DO   30 Units at 01/26/20 2130    isosorbide mononitrate (IMDUR) extended release tablet 90 mg  90 mg Oral Daily Hamilton Delcid, DO   90 mg at 01/27/20 0839    melatonin tablet 10 mg  10 mg Oral Nightly Hamilton Delcid, DO   10 mg at 01/26/20 2129    metoprolol tartrate (LOPRESSOR) tablet 50 mg  50 mg Oral BID Hamilton Delcid, DO   50 mg at 01/27/20 1746    montelukast (SINGULAIR) tablet 10 mg  10 mg Oral Nightly Hamilton Delcid, DO   10 mg at 01/26/20 2130    pramipexole (MIRAPEX) tablet 0.5 mg  0.5 mg Oral BID Alex Close, DO   0.5 mg at 01/27/20 7611    sertraline (ZOLOFT) tablet 25 mg  25 mg Oral Daily Hamilton Delcid, DO   25 mg at 01/27/20 0561    Vitamin D (CHOLECALCIFEROL) tablet 1,000 Units  1,000 Units Oral Daily Hamilton Delcid, DO   1,000 Units at 01/27/20 0838    glucose (GLUTOSE) 40 % oral gel 15 g  15 g Oral PRN Alex Close, DO        dextrose 50 % IV solution  12.5 g Intravenous PRN Alex Close, DO        glucagon (rDNA) injection 1 mg  1 mg Intramuscular PRN Alex Close, DO        dextrose 5 % solution  100 mL/hr Intravenous PRN Alex Close, DO        bumetanide (BUMEX) injection 1 mg  1 mg Intravenous BID Kelley Cedeno Delcid, DO   1 mg at 01/27/20 6027        Labs:     Recent Labs     01/25/20 2353   WBC 6.3   RBC 2.95*   HGB 8.7*   HCT 28.4*   MCV 96.3   MCH 29.5   MCHC 30.6*        Recent Labs     01/25/20  2353 01/25/20  2357 01/26/20 0459 01/27/20  0319     --  140 141   K 4.0 3.8 4.4 4.2   ANIONGAP 11  --  15 14     --  99 99   CO2 27  --  26 28   BUN 48*  --  45* 56*   CREATININE 1.4*  --  1.2* 1.3*   GLUCOSE 177*  --  243* 151*   CALCIUM 9.0  --  9.3 9.5     Recent Labs     01/26/20 0459 01/27/20 0319   MG 2.3 2.6*     Recent Labs     01/25/20 2353   AST 10   ALT 9   BILITOT 0.4   ALKPHOS 87     ABGs:No results for input(s): PH, PO2, PCO2, HCO3, BE, O2SAT in the last 72 hours.   Troponin T:   Recent Labs     01/26/20  0309 01/26/20  0459 01/26/20  1228   TROPONINI 0.01 0.01 <0.01     INR:   Recent Labs     01/25/20 2353   INR 1.11     Lactic Acid:   Recent Labs     01/26/20  0023   LACTA 0.9       Objective:   Vitals: BP (!) 121/57   Pulse 60   Temp 97.1 °F (36.2 °C) (Temporal)   Resp 18   Ht 5' 1\" (1.549 m)   Wt 219 lb 3.2 oz (99.4 kg)   SpO2 97%   BMI 41.42 kg/m²   24HR INTAKE/OUTPUT:      Intake/Output Summary (Last 24 hours) at 1/27/2020 1258  Last data filed at 1/27/2020 1036  Gross per 24 hour   Intake 950 ml   Output 1930 ml   Net -980 ml     General appearance: alert and cooperative with exam  HEENT: atraumatic, eyes with clear conjunctiva and normal lids, pupils and irises normal, external ears and nose are normal, lips normal  Neck without masses, lympadenopathy, bruit, thyroid normal  Lungs: no increased work of breathing, \"clear to auscultation bilaterally\" without rales, rhonchi or wheezes  Heart: regular rate and rhythm, S1, S2 normal, no murmur, click, rub or gallop  Abdomen: soft, non-tender; bowel sounds normal; no masses,  no organomegaly and obese  Extremities: edema trace to 1+ bilaterally, modified Wolfgang's negative  Neurologic: no focal neurologic deficits, normal

## 2020-01-28 VITALS
TEMPERATURE: 96.6 F | WEIGHT: 218.38 LBS | OXYGEN SATURATION: 98 % | DIASTOLIC BLOOD PRESSURE: 55 MMHG | BODY MASS INDEX: 41.23 KG/M2 | HEIGHT: 61 IN | RESPIRATION RATE: 16 BRPM | HEART RATE: 61 BPM | SYSTOLIC BLOOD PRESSURE: 112 MMHG

## 2020-01-28 PROBLEM — R06.02 SHORTNESS OF BREATH: Status: RESOLVED | Noted: 2020-01-27 | Resolved: 2020-01-28

## 2020-01-28 PROBLEM — I50.32 CHRONIC DIASTOLIC CONGESTIVE HEART FAILURE (HCC): Status: ACTIVE | Noted: 2020-01-26

## 2020-01-28 LAB
ANION GAP SERPL CALCULATED.3IONS-SCNC: 15 MMOL/L (ref 7–19)
BUN BLDV-MCNC: 67 MG/DL (ref 8–23)
CALCIUM SERPL-MCNC: 9.6 MG/DL (ref 8.8–10.2)
CHLORIDE BLD-SCNC: 95 MMOL/L (ref 98–111)
CO2: 28 MMOL/L (ref 22–29)
CREAT SERPL-MCNC: 1.5 MG/DL (ref 0.5–0.9)
GFR NON-AFRICAN AMERICAN: 35
GLUCOSE BLD-MCNC: 123 MG/DL (ref 74–109)
GLUCOSE BLD-MCNC: 131 MG/DL (ref 70–99)
MAGNESIUM: 2.8 MG/DL (ref 1.6–2.4)
PERFORMED ON: ABNORMAL
POTASSIUM SERPL-SCNC: 4.7 MMOL/L (ref 3.5–5)
SODIUM BLD-SCNC: 138 MMOL/L (ref 136–145)

## 2020-01-28 PROCEDURE — G0378 HOSPITAL OBSERVATION PER HR: HCPCS

## 2020-01-28 PROCEDURE — 82948 REAGENT STRIP/BLOOD GLUCOSE: CPT

## 2020-01-28 PROCEDURE — 36415 COLL VENOUS BLD VENIPUNCTURE: CPT

## 2020-01-28 PROCEDURE — 6360000002 HC RX W HCPCS: Performed by: HOSPITALIST

## 2020-01-28 PROCEDURE — 83735 ASSAY OF MAGNESIUM: CPT

## 2020-01-28 PROCEDURE — 80048 BASIC METABOLIC PNL TOTAL CA: CPT

## 2020-01-28 PROCEDURE — 2580000003 HC RX 258: Performed by: HOSPITALIST

## 2020-01-28 PROCEDURE — 2500000003 HC RX 250 WO HCPCS: Performed by: FAMILY MEDICINE

## 2020-01-28 PROCEDURE — 96376 TX/PRO/DX INJ SAME DRUG ADON: CPT

## 2020-01-28 PROCEDURE — 97161 PT EVAL LOW COMPLEX 20 MIN: CPT

## 2020-01-28 PROCEDURE — 97116 GAIT TRAINING THERAPY: CPT

## 2020-01-28 PROCEDURE — 96372 THER/PROPH/DIAG INJ SC/IM: CPT

## 2020-01-28 PROCEDURE — 6370000000 HC RX 637 (ALT 250 FOR IP): Performed by: FAMILY MEDICINE

## 2020-01-28 RX ORDER — BUMETANIDE 1 MG/1
1 TABLET ORAL 2 TIMES DAILY
Qty: 60 TABLET | Refills: 0 | Status: SHIPPED | OUTPATIENT
Start: 2020-01-28 | End: 2020-02-04 | Stop reason: SDUPTHER

## 2020-01-28 RX ORDER — BUMETANIDE 1 MG/1
1 TABLET ORAL 2 TIMES DAILY
Qty: 60 TABLET | Refills: 0 | Status: SHIPPED | OUTPATIENT
Start: 2020-01-28 | End: 2020-01-28 | Stop reason: SDUPTHER

## 2020-01-28 RX ADMIN — DULOXETINE HYDROCHLORIDE 60 MG: 30 CAPSULE, DELAYED RELEASE ORAL at 08:17

## 2020-01-28 RX ADMIN — SERTRALINE HYDROCHLORIDE 25 MG: 25 TABLET ORAL at 08:17

## 2020-01-28 RX ADMIN — PRAMIPEXOLE DIHYDROCHLORIDE 0.5 MG: 0.25 TABLET ORAL at 08:17

## 2020-01-28 RX ADMIN — ENOXAPARIN SODIUM 40 MG: 40 INJECTION SUBCUTANEOUS at 08:17

## 2020-01-28 RX ADMIN — CLOPIDOGREL BISULFATE 75 MG: 75 TABLET ORAL at 08:17

## 2020-01-28 RX ADMIN — VITAMIN D, TAB 1000IU (100/BT) 1000 UNITS: 25 TAB at 08:17

## 2020-01-28 RX ADMIN — SODIUM CHLORIDE, PRESERVATIVE FREE 10 ML: 5 INJECTION INTRAVENOUS at 08:16

## 2020-01-28 RX ADMIN — ISOSORBIDE MONONITRATE 90 MG: 60 TABLET, EXTENDED RELEASE ORAL at 08:16

## 2020-01-28 RX ADMIN — BUMETANIDE 1 MG: 0.25 INJECTION INTRAMUSCULAR; INTRAVENOUS at 08:17

## 2020-01-28 RX ADMIN — AMLODIPINE BESYLATE 5 MG: 5 TABLET ORAL at 08:17

## 2020-01-28 RX ADMIN — GABAPENTIN 600 MG: 600 TABLET, FILM COATED ORAL at 08:16

## 2020-01-28 RX ADMIN — METOPROLOL TARTRATE 50 MG: 50 TABLET, FILM COATED ORAL at 08:17

## 2020-01-28 ASSESSMENT — PAIN SCALES - GENERAL: PAINLEVEL_OUTOF10: 0

## 2020-01-28 NOTE — DISCHARGE SUMMARY
reconstruction images of the pulmonary arteries, aorta and great vessels were also generated. .Radiation dose equals  mGy cm. AUTOMATED EXPOSURE CONTROL dose reduction technique was implemented Findings: Examination was sent to [facility] for preliminary interpretation. There are small bilateral pleural effusions, greater on the right. There is pulmonary congestion with diffuse mild groundglass opacities. There is cardiomegaly with panchamber enlargement. There is pericardial effusion measuring nearly 2 cm in thickness, measured posteriorly along the inferior lateral left ventricle. Pulmonary venous hypertension and mild heart failure considered. Correlate with patient presentation. The pulmonary arteries opacify without iodinated contrast without intraluminal filling defects or CT angiographic evidence of pulmonary embolus. The aorta is normal in caliber without aneurysm or dissection. There is atherosclerotic aortic and coronary artery calcifications. There is no mediastinal or hilar lymphadenopathy. There is no axillary lymph node enlargement. Limited imaging of the upper abdomen demonstrate no acute abnormalities. Spondylosis changes observed diffusely in the thoracic spine with osteophyte formation. No acute osseous abdomen is identified. Impression: 1. No CT angiographic evidence of pulmonary embolus or acute aortic pathology. 2. Cardiomegaly, small bilateral pleural effusions greater on the right with diffuse groundglass opacities and pulmonary vasculature congestion. Moderate size Pericardial effusion. Differential considerations include congestive heart failure.  Signed by Dr Luisa Neri on 1/26/2020 8:16 AM      Pertinent Labs:   CBC:   Recent Labs     01/25/20  2353   WBC 6.3   HGB 8.7*        BMP:    Recent Labs     01/26/20  0459 01/27/20  0319 01/28/20  0202    141 138   K 4.4 4.2 4.7   CL 99 99 95*   CO2 26 28 28   BUN 45* 56* 67*   CREATININE 1.2* 1.3* 1.5*   GLUCOSE 243* 151* 123*     INR:   Recent Labs     01/25/20  2353   INR 1.11     ABGs:No results for input(s): PH, PO2, PCO2, HCO3, BE, O2SAT in the last 72 hours. Lactic Acid:  Recent Labs     01/26/20  0023   LACTA 0.9       Procedures: None performed. Hospital Course:   1-26: Presented to ED overnight with shortness of breath and dyspnea on exertion progressing x1 week. BNP elevated, CTA thorax showing only pericardial effusion. ECG with T wave inversions. Admitted to PCU on diuresis with cardiology consult. Cardiology recommends diuresis without intervention. 1-27: Patient is feeling much better. Requests walker. PT evaluation, possibly discharge to home tomorrow. 1-28: Patient ambulated hallways without dyspnea, feeling [facility]. Prescription written for bumetanide at home dose. Transportation arranged by discharge planning. Follow up with PCP in one week.     Physical Exam:  Vitals: /62   Pulse 56   Temp 97 °F (36.1 °C) (Temporal)   Resp 16   Ht 5' 1\" (1.549 m)   Wt 218 lb 6 oz (99.1 kg)   SpO2 94%   BMI 41.26 kg/m²   24HR INTAKE/OUTPUT:      Intake/Output Summary (Last 24 hours) at 1/28/2020 3762  Last data filed at 1/28/2020 9799  Gross per 24 hour   Intake 1520 ml   Output --   Net 1520 ml     General appearance: alert and cooperative with exam  HEENT: atraumatic, eyes with clear conjunctiva and normal lids, pupils and irises normal, external ears and nose are normal, lips normal. Neck without masses, lympadenopathy, bruit, thyroid normal  Lungs: no increased work of breathing, diminished breath sounds bibasilar  Heart: regular rate and rhythm, S1, S2 normal, no murmur, click, rub or gallop  Abdomen: soft, non-tender; bowel sounds normal; no masses,  no organomegaly and obese  Extremities: extremities normal without clubbing, atraumatic, no cyanosis or edema  Neurologic: no focal neurologic deficits, normal sensation, alert and oriented, affect and mood appropriate  Skin: no jaundice, rashes, or nodules    Discharge Medications:       De Bruce   Home Medication Instructions [id number]    Printed on:01/28/20 1171   Medication Information                      amLODIPine (NORVASC) 5 MG tablet  Take 5 mg by mouth daily             bumetanide (BUMEX) 1 MG tablet  Take 1 tablet by mouth 2 times daily             clopidogrel (PLAVIX) 75 MG tablet  Take 75 mg by mouth daily             diclofenac sodium 1 % GEL  Apply 4 g topically 4 times daily as needed for Pain             DULoxetine (CYMBALTA) 60 MG extended release capsule  Take 60 mg by mouth daily             fluticasone-vilanterol (BREO ELLIPTA) 100-25 MCG/INH AEPB inhaler  Inhale 1 puff into the lungs daily             gabapentin (NEURONTIN) 600 MG tablet  Take 600 mg by mouth 3 times daily. insulin glargine (LANTUS) 100 UNIT/ML injection vial  Inject 30 Units into the skin nightly             isosorbide mononitrate (IMDUR) 30 MG extended release tablet  Take 90 mg by mouth daily             levalbuterol (XOPENEX HFA) 45 MCG/ACT inhaler  Inhale 2 puffs into the lungs every 4 hours as needed for Wheezing or Shortness of Breath             melatonin 3 MG TABS tablet  Take 10 mg by mouth nightly             metoprolol tartrate (LOPRESSOR) 50 MG tablet  Take 50 mg by mouth 2 times daily             montelukast (SINGULAIR) 10 MG tablet  Take 10 mg by mouth nightly             pramipexole (MIRAPEX) 0.5 MG tablet  Take 0.5 mg by mouth 2 times daily             raNITIdine HCl (RANITIDINE 75 PO)  Take 75 mg by mouth daily             sertraline (ZOLOFT) 25 MG tablet  Take 25 mg by mouth daily             vitamin D (CHOLECALCIFEROL) 25 MCG (1000 UT) TABS tablet  Take 1,000 Units by mouth daily                 Discharge Instructions: Follow up with No primary care provider on file. in 7 days. Take medications as directed. Resume activity as tolerated.     Diet: DIET LOW SODIUM 2 GM;     Disposition: Patient is medically stable and will be discharged Home. Time spent on discharge less than 30 minutes.     Signed:  Lashell Lagos,

## 2020-01-28 NOTE — PROGRESS NOTES
Physical Therapy    Facility/Department: [facility] PROGRESSIVE CARE  Initial Assessment    NAME: Melina Steel  : 1960  MRN: [id number]    Date of Service: 2020    Discharge Recommendations:  Continue to assess pending progress        Assessment   Body structures, Functions, Activity limitations: Decreased functional mobility ; Decreased endurance;Decreased strength  Assessment: Pt is a 61 y/o female who presented to ED with SOB and dyspnea on exertion, requiring CGA for mobility, gait and transfers. Treatment Diagnosis: Impaired mobility  Prognosis: Good  Decision Making: Low Complexity  PT Education: Goals;Precautions; Functional Mobility Training;PT Role;Plan of Care;Gait Training;Transfer Training  Barriers to Learning: none  REQUIRES PT FOLLOW UP: Yes  Activity Tolerance  Activity Tolerance: Patient Tolerated treatment well;Patient limited by endurance       Patient Diagnosis(es): The primary encounter diagnosis was Acute on chronic congestive heart failure, unspecified heart failure type ([address] Diagnoses of Pericardial effusion, Pleural effusion, and Anemia, unspecified type were also pertinent to this visit. has a past medical history of AMI (acute myocardial infarction) ([address] Asthma, CAD (coronary artery disease), Cerebral artery occlusion with cerebral infarction ([address] CHF (congestive heart failure) ([address] COPD (chronic obstructive pulmonary disease) ([address] Diabetes mellitus ([address] DVT (deep vein thrombosis) in pregnancy, Hyperlipidemia, and Hypertension.    has a past surgical history that includes Tonsillectomy; Coronary angioplasty with stent; and Cataract removal.    Restrictions  Restrictions/Precautions  Restrictions/Precautions: Fall Risk  Vision/Hearing  Vision: Within Functional Limits  Hearing: Within functional limits     Subjective  General  Chart Reviewed: Yes  Patient assessed for rehabilitation services?: Yes  Family / Caregiver Present: No  Diagnosis: SOB and dyspnea on Patient/Caregiver Education & Training, ADL/Self-care Training, Balance Training, Gait Training, Functional Mobility Training, Stair training, Safety Education & Training  Safety Devices  Type of devices: All fall risk precautions in place, Gait belt, Patient at risk for falls, Call light within reach, Left in bed, Nurse notified(PCA in room to give pt shower at end of session)    G-Code       OutComes Score                                                  AM-PAC Score             Goals  Long term goals  Time Frame for Long term goals : 2 weeks  Long term goal 1: Pt will transition sit/stand modified independently. Long term goal 2: Pt will transfer bed/chair/commode modified independently with a [employer]. Long term goal 3: Pt will ambulate > 150 ft with a [employer] modified independently. Long term goal 4: Pt will negotiate 4 steps with MEHNAZ rails and SBA.   Patient Goals   Patient goals : to return home       Therapy Time   Individual Concurrent Group Co-treatment   Time In           Time Out           Minutes                   Wang Wallace PT    Electronically signed by Wang Wallace PT on 1/28/2020 at 10:58 AM

## 2020-01-28 NOTE — PROGRESS NOTES
Nurse speaks with [facility] regarding patient condition.  States that they will speak with patient once she is discharged

## 2020-01-28 NOTE — PLAN OF CARE
Problem: Falls - Risk of:  Goal: Will remain free from falls  Description  Will remain free from falls  Outcome: Ongoing  Goal: Absence of physical injury  Description  Absence of physical injury  Outcome: Ongoing     Problem: SAFETY  Goal: Free from accidental physical injury  Outcome: Ongoing  Goal: Free from intentional harm  Outcome: Ongoing     Problem: PAIN  Goal: Patient's pain/discomfort is manageable  Outcome: Ongoing     Problem: OXYGENATION/RESPIRATORY FUNCTION  Goal: Patient will maintain patent airway  Outcome: Ongoing  Goal: Patient will achieve/maintain normal respiratory rate/effort  Description  Respiratory rate and effort will be within normal limits for the patient  Outcome: Ongoing     Problem: HEMODYNAMIC STATUS  Goal: Patient has stable vital signs and fluid balance  Outcome: Ongoing     Problem: FLUID AND ELECTROLYTE IMBALANCE  Goal: Fluid and electrolyte balance are achieved/maintained  Outcome: Ongoing

## 2020-01-28 NOTE — CONSULTS
Upon chart review noted normal EF and no diastolic dysfunction noted by Dr Melony Rock. Doesn't meet criteria for CHF clinic.

## 2020-01-31 LAB
BLOOD CULTURE, ROUTINE: NORMAL
CULTURE, BLOOD 2: NORMAL

## 2020-02-03 ENCOUNTER — HOSPITAL ENCOUNTER (OUTPATIENT)
Dept: GENERAL RADIOLOGY | Age: 60
Discharge: HOME | End: 2020-02-03
Payer: MEDICARE

## 2020-02-03 ENCOUNTER — OFFICE VISIT (OUTPATIENT)
Dept: PRIMARY CARE CLINIC | Age: 60
End: 2020-02-03
Payer: MEDICARE

## 2020-02-03 VITALS
OXYGEN SATURATION: 95 % | TEMPERATURE: 98.2 F | WEIGHT: 223 LBS | SYSTOLIC BLOOD PRESSURE: 138 MMHG | HEART RATE: 67 BPM | DIASTOLIC BLOOD PRESSURE: 82 MMHG | HEIGHT: 61 IN | BODY MASS INDEX: 42.1 KG/M2

## 2020-02-03 DIAGNOSIS — Z79.4 TYPE 2 DIABETES MELLITUS WITH OTHER SPECIFIED COMPLICATION, WITH LONG-TERM CURRENT USE OF INSULIN (HCC): ICD-10-CM

## 2020-02-03 DIAGNOSIS — I50.32 CHRONIC DIASTOLIC CONGESTIVE HEART FAILURE (HCC): ICD-10-CM

## 2020-02-03 DIAGNOSIS — E11.69 TYPE 2 DIABETES MELLITUS WITH OTHER SPECIFIED COMPLICATION, WITH LONG-TERM CURRENT USE OF INSULIN (HCC): ICD-10-CM

## 2020-02-03 DIAGNOSIS — Z09 HOSPITAL DISCHARGE FOLLOW-UP: ICD-10-CM

## 2020-02-03 LAB
ALBUMIN SERPL-MCNC: 4 G/DL (ref 3.5–5.2)
ALP BLD-CCNC: 87 U/L (ref 35–104)
ALT SERPL-CCNC: 12 U/L (ref 5–33)
ANION GAP SERPL CALCULATED.3IONS-SCNC: 19 MMOL/L (ref 7–19)
AST SERPL-CCNC: 10 U/L (ref 5–32)
BACTERIA: ABNORMAL /HPF
BASOPHILS ABSOLUTE: 0 K/UL (ref 0–0.2)
BASOPHILS RELATIVE PERCENT: 0.4 % (ref 0–1)
BILIRUB SERPL-MCNC: <0.2 MG/DL (ref 0.2–1.2)
BILIRUBIN URINE: NEGATIVE
BLOOD, URINE: ABNORMAL
BUN BLDV-MCNC: 39 MG/DL (ref 8–23)
CALCIUM SERPL-MCNC: 9.5 MG/DL (ref 8.8–10.2)
CHLORIDE BLD-SCNC: 100 MMOL/L (ref 98–111)
CLARITY: ABNORMAL
CO2: 25 MMOL/L (ref 22–29)
COLOR: YELLOW
CREAT SERPL-MCNC: 1.3 MG/DL (ref 0.5–0.9)
EOSINOPHILS ABSOLUTE: 0.1 K/UL (ref 0–0.6)
EOSINOPHILS RELATIVE PERCENT: 1.3 % (ref 0–5)
EPITHELIAL CELLS, UA: 4 /HPF (ref 0–5)
GFR NON-AFRICAN AMERICAN: 42
GLUCOSE BLD-MCNC: 153 MG/DL (ref 74–109)
GLUCOSE URINE: 500 MG/DL
HBA1C MFR BLD: 7.8 % (ref 4–6)
HCT VFR BLD CALC: 32.7 % (ref 37–47)
HEMOGLOBIN: 10.2 G/DL (ref 12–16)
HYALINE CASTS: 1 /HPF (ref 0–8)
IMMATURE GRANULOCYTES #: 0 K/UL
KETONES, URINE: NEGATIVE MG/DL
LEUKOCYTE ESTERASE, URINE: ABNORMAL
LYMPHOCYTES ABSOLUTE: 1.8 K/UL (ref 1.1–4.5)
LYMPHOCYTES RELATIVE PERCENT: 25.2 % (ref 20–40)
MCH RBC QN AUTO: 30 PG (ref 27–31)
MCHC RBC AUTO-ENTMCNC: 31.2 G/DL (ref 33–37)
MCV RBC AUTO: 96.2 FL (ref 81–99)
MONOCYTES ABSOLUTE: 0.7 K/UL (ref 0–0.9)
MONOCYTES RELATIVE PERCENT: 10.5 % (ref 0–10)
NEUTROPHILS ABSOLUTE: 4.4 K/UL (ref 1.5–7.5)
NEUTROPHILS RELATIVE PERCENT: 62.2 % (ref 50–65)
NITRITE, URINE: NEGATIVE
PDW BLD-RTO: 14.1 % (ref 11.5–14.5)
PH UA: 6 (ref 5–8)
PLATELET # BLD: 252 K/UL (ref 130–400)
PMV BLD AUTO: 9 FL (ref 9.4–12.3)
POTASSIUM SERPL-SCNC: 4.5 MMOL/L (ref 3.5–5)
PRO-BNP: 1428 PG/ML (ref 0–900)
PROTEIN UA: 100 MG/DL
RBC # BLD: 3.4 M/UL (ref 4.2–5.4)
RBC UA: 9 /HPF (ref 0–4)
SODIUM BLD-SCNC: 144 MMOL/L (ref 136–145)
SPECIFIC GRAVITY UA: 1.02 (ref 1–1.03)
TOTAL PROTEIN: 7.4 G/DL (ref 6.6–8.7)
UROBILINOGEN, URINE: 0.2 E.U./DL
WBC # BLD: 7 K/UL (ref 4.8–10.8)
WBC UA: 39 /HPF (ref 0–5)

## 2020-02-03 PROCEDURE — 71046 X-RAY EXAM CHEST 2 VIEWS: CPT

## 2020-02-03 PROCEDURE — 96372 THER/PROPH/DIAG INJ SC/IM: CPT | Performed by: NURSE PRACTITIONER

## 2020-02-03 PROCEDURE — 1111F DSCHRG MED/CURRENT MED MERGE: CPT | Performed by: NURSE PRACTITIONER

## 2020-02-03 PROCEDURE — 99215 OFFICE O/P EST HI 40 MIN: CPT | Performed by: NURSE PRACTITIONER

## 2020-02-03 RX ORDER — FUROSEMIDE 10 MG/ML
40 INJECTION INTRAMUSCULAR; INTRAVENOUS ONCE
Status: COMPLETED | OUTPATIENT
Start: 2020-02-03 | End: 2020-02-03

## 2020-02-03 RX ORDER — ATORVASTATIN CALCIUM 20 MG/1
TABLET, FILM COATED ORAL
Qty: 30 TABLET | Refills: 5 | Status: ON HOLD | OUTPATIENT
Start: 2020-02-03 | End: 2020-09-30

## 2020-02-03 RX ORDER — CARVEDILOL 3.12 MG/1
3.12 TABLET ORAL 2 TIMES DAILY
Qty: 60 TABLET | Refills: 2 | Status: SHIPPED | OUTPATIENT
Start: 2020-02-03 | End: 2020-05-14 | Stop reason: SDUPTHER

## 2020-02-03 RX ADMIN — FUROSEMIDE 40 MG: 10 INJECTION INTRAMUSCULAR; INTRAVENOUS at 11:27

## 2020-02-03 ASSESSMENT — PATIENT HEALTH QUESTIONNAIRE - PHQ9
SUM OF ALL RESPONSES TO PHQ QUESTIONS 1-9: 2
SUM OF ALL RESPONSES TO PHQ9 QUESTIONS 1 & 2: 2
SUM OF ALL RESPONSES TO PHQ QUESTIONS 1-9: 2
1. LITTLE INTEREST OR PLEASURE IN DOING THINGS: 1
2. FEELING DOWN, DEPRESSED OR HOPELESS: 1

## 2020-02-03 ASSESSMENT — ENCOUNTER SYMPTOMS
SHORTNESS OF BREATH: 1
ABDOMINAL PAIN: 0

## 2020-02-03 NOTE — PROGRESS NOTES
Post-Discharge Transitional Care Management Services or Hospital Follow Up      Bubba Hector   YOB: 1960    Date of Office Visit:  2/3/2020  Date of Hospital Admission: 1/25/20  Date of Hospital Discharge: 1/28/20  Readmission Risk Score(high >=14%.  Medium >=10%):Readmission Risk Score: 17      Care management risk score Rising risk (score 2-5) and Complex Care (Scores >=6): 3     Non face to face  following discharge, date last encounter closed (first attempt may have been earlier): *No documented post hospital discharge outreach found in the last 14 days *No documented post hospital discharge outreach found in the last 14 days    Call initiated 2 business days of discharge: *No response recorded in the last 14 days     Patient Active Problem List   Diagnosis    SOB (shortness of breath)    Pericardial effusion    Chronic diastolic congestive heart failure [address]    Normocytic anemia    Type 2 diabetes mellitus, with long-term current use of insulin ([facility]    COPD with acute exacerbation ([facility]    Hyperglycemia    Acute kidney injury ([facility]    Elevated d-dimer    Morbid obesity with BMI of 40.0-44.9, adult (HCC)       Allergies   Allergen Reactions    Albuterol Shortness Of Breath    Levaquin [Levofloxacin] Shortness Of Breath and Swelling    Metformin And Related Diarrhea    Aspirin Rash       Medications listed as ordered at the time of discharge from [facility] Medication Instructions GRETEL:    Printed on:02/04/20 0000   Medication Information                      amLODIPine (NORVASC) 5 MG tablet  Take 5 mg by mouth daily             atorvastatin (LIPITOR) 20 MG tablet  Take 1/2 tablet for 1 week, then increase to whole tablet             bumetanide (BUMEX) 1 MG tablet  Take 1 tablet by mouth 2 times daily             carvedilol (COREG) 3.125 MG tablet  Take 1 tablet by mouth 2 times daily             clopidogrel (PLAVIX) 75 MG tablet  Take 75 mg by mouth daily dapagliflozin (FARXIGA) 10 MG tablet  Take 10 mg by mouth every morning             diclofenac sodium 1 % GEL  Apply 4 g topically 4 times daily as needed for Pain             DULoxetine (CYMBALTA) 60 MG extended release capsule  Take 60 mg by mouth daily             fluticasone-vilanterol (BREO ELLIPTA) 100-25 MCG/INH AEPB inhaler  Inhale 1 puff into the lungs daily             gabapentin (NEURONTIN) 600 MG tablet  Take 600 mg by mouth 3 times daily.              insulin glargine (LANTUS) 100 UNIT/ML injection vial  Inject 30 Units into the skin nightly             isosorbide mononitrate (IMDUR) 30 MG extended release tablet  Take 90 mg by mouth daily             levalbuterol (XOPENEX HFA) 45 MCG/ACT inhaler  Inhale 2 puffs into the lungs every 4 hours as needed for Wheezing or Shortness of Breath             melatonin 3 MG TABS tablet  Take 10 mg by mouth nightly             montelukast (SINGULAIR) 10 MG tablet  Take 10 mg by mouth nightly             pramipexole (MIRAPEX) 0.5 MG tablet  Take 0.5 mg by mouth 2 times daily             sertraline (ZOLOFT) 25 MG tablet  Take 25 mg by mouth daily             vitamin D (CHOLECALCIFEROL) 25 MCG (1000 UT) TABS tablet  Take 1,000 Units by mouth daily                   Medications marked \"taking\" at this time  Outpatient Medications Marked as Taking for the 2/3/20 encounter (Office Visit) with YAMIL Ring - NP   Medication Sig Dispense Refill    bumetanide (BUMEX) 1 MG tablet Take 1 tablet by mouth 2 times daily 60 tablet 1    dapagliflozin (FARXIGA) 10 MG tablet Take 10 mg by mouth every morning      carvedilol (COREG) 3.125 MG tablet Take 1 tablet by mouth 2 times daily 60 tablet 2    atorvastatin (LIPITOR) 20 MG tablet Take 1/2 tablet for 1 week, then increase to whole tablet 30 tablet 5    pramipexole (MIRAPEX) 0.5 MG tablet Take 0.5 mg by mouth 2 times daily      DULoxetine (CYMBALTA) 60 MG extended release capsule Take 60 mg by mouth daily  clopidogrel (PLAVIX) 75 MG tablet Take 75 mg by mouth daily      montelukast (SINGULAIR) 10 MG tablet Take 10 mg by mouth nightly      vitamin D (CHOLECALCIFEROL) 25 MCG (1000 UT) TABS tablet Take 1,000 Units by mouth daily      melatonin 3 MG TABS tablet Take 10 mg by mouth nightly      gabapentin (NEURONTIN) 600 MG tablet Take 600 mg by mouth 3 times daily.  amLODIPine (NORVASC) 5 MG tablet Take 5 mg by mouth daily      isosorbide mononitrate (IMDUR) 30 MG extended release tablet Take 90 mg by mouth daily      levalbuterol (XOPENEX HFA) 45 MCG/ACT inhaler Inhale 2 puffs into the lungs every 4 hours as needed for Wheezing or Shortness of Breath      fluticasone-vilanterol (BREO ELLIPTA) 100-25 MCG/INH AEPB inhaler Inhale 1 puff into the lungs daily      sertraline (ZOLOFT) 25 MG tablet Take 25 mg by mouth daily      diclofenac sodium 1 % GEL Apply 4 g topically 4 times daily as needed for Pain      insulin glargine (LANTUS) 100 UNIT/ML injection vial Inject 30 Units into the skin nightly          Medications patient taking as of now reconciled against medications ordered at time of hospital discharge: Yes    Chief Complaint   Patient presents with    Chest Pain     yesterday 02/02/2020    Shortness of Breath       Congestive Heart Failure   Presents for follow-up visit. Associated symptoms include chest pressure, fatigue, muscle weakness, shortness of breath and unexpected weight change. Pertinent negatives include no abdominal pain, chest pain, edema, near-syncope, nocturia, orthopnea or palpitations. The symptoms have been worsening. Her past medical history is significant for CAD and chronic lung disease. Compliance with total regimen is 51-75%. Compliance problems include adherence to diet and adherence to exercise. Compliance with diet is 26-50%. Compliance with exercise is 26-50%. Compliance with medications is %. Side effects of treatment include joint pain.    Shortness of Exam  Vitals signs and nursing note reviewed. Constitutional:       General: She is not in acute distress. Appearance: She is well-developed. She is obese. She is ill-appearing (SOA; ). She is not diaphoretic. HENT:      Head: Normocephalic and atraumatic. Right Ear: External ear normal.      Left Ear: External ear normal.      Nose: Nose normal. No congestion or rhinorrhea. Mouth/Throat:      Mouth: Mucous membranes are moist.      Pharynx: Oropharynx is clear. No oropharyngeal exudate. Eyes:      General:         Right eye: No discharge. Left eye: No discharge. Conjunctiva/sclera: Conjunctivae normal.      Pupils: Pupils are equal, round, and reactive to light. Neck:      Trachea: No tracheal deviation. Cardiovascular:      Rate and Rhythm: Normal rate and regular rhythm. Pulses: Normal pulses. Heart sounds: Normal heart sounds. No murmur. Pulmonary:      Effort: Pulmonary effort is normal. No respiratory distress. Breath sounds: No stridor. Examination of the right-middle field reveals decreased breath sounds. Examination of the left-middle field reveals decreased breath sounds. Examination of the right-lower field reveals decreased breath sounds. Examination of the left-lower field reveals decreased breath sounds. Decreased breath sounds present. Abdominal:      General: Bowel sounds are normal.      Palpations: Abdomen is soft. Tenderness: There is no abdominal tenderness. Musculoskeletal:         General: No tenderness or deformity. Lymphadenopathy:      Cervical: No cervical adenopathy. Skin:     General: Skin is warm and dry. Capillary Refill: Capillary refill takes less than 2 seconds. Findings: No rash. Neurological:      Mental Status: She is alert and oriented to person, place, and time. Motor: Weakness present. Gait: Gait abnormal (walks with walker).    Psychiatric:         Mood and Affect: Mood normal. Behavior: Behavior normal.         Thought Content: Thought content normal.         Judgment: Judgment normal.       Assessment/Plan:  1. Hospital discharge follow-up  - IN DISCHARGE MEDS RECONCILED W/ CURRENT OUTPATIENT MED LIST  - Comprehensive Metabolic Panel; Future  - CBC Auto Differential; Future  - Urinalysis; Future  - Brain Natriuretic Peptide; Future    2. Chronic diastolic congestive heart failure (HCC)  - Brain Natriuretic Peptide; Future  - carvedilol (COREG) 3.125 MG tablet; Take 1 tablet by mouth 2 times daily  Dispense: 60 tablet; Refill: 2  - furosemide (LASIX) injection 40 mg  - bumetanide (BUMEX) 1 MG tablet; Take 1 tablet by mouth 2 times daily  Dispense: 60 tablet; Refill: 1    3. Type 2 diabetes mellitus with other specified complication, with long-term current use of insulin (HCC)  - dapagliflozin (FARXIGA) 10 MG tablet; Take 10 mg by mouth every morning  - Comprehensive Metabolic Panel; Future  - CBC Auto Differential; Future  - Urinalysis; Future  - Hemoglobin A1C; Future    4. Shortness of breath  - XR CHEST STANDARD (2 VW); Future  - furosemide (LASIX) injection 40 mg    5. Weight gain  - furosemide (LASIX) injection 40 mg    6. Mixed hyperlipidemia  - atorvastatin (LIPITOR) 20 MG tablet; Take 1/2 tablet for 1 week, then increase to whole tablet  Dispense: 30 tablet;  Refill: 5      At next visit: GI referral; Discuss right knee and treatment options     Medical Decision Making: high complexity

## 2020-02-03 NOTE — PATIENT INSTRUCTIONS
Patient Education     Bumex sliding scale:    Check weight every morning in the same outfit or unclothed for weight; If you gain more than 2 lbs in 1-5 days, need to start Bumex sliding scale     Bumex sliding scale:  2mg in AM and 2 mg in Pm for 5 days    Should have relief in 2 days, if no relief you need office visit ASAP     Low Sodium Diet (2,000 Milligram): Care Instructions  Your Care Instructions    Too much sodium causes your body to hold on to extra water. This can raise your blood pressure and force your heart and kidneys to work harder. In very serious cases, this could cause you to be put in the hospital. It might even be life-threatening. By limiting sodium, you will feel better and lower your risk of serious problems. The most common source of sodium is salt. People get most of the salt in their diet from canned, prepared, and packaged foods. Fast food and restaurant meals also are very high in sodium. Your doctor will probably limit your sodium to less than 2,000 milligrams (mg) a day. This limit counts all the sodium in prepared and packaged foods and any salt you add to your food. Follow-up care is a key part of your treatment and safety. Be sure to make and go to all appointments, and call your doctor if you are having problems. It's also a good idea to know your test results and keep a list of the medicines you take. How can you care for yourself at home? Read food labels  · Read labels on cans and food packages. The labels tell you how much sodium is in each serving. Make sure that you look at the serving size. If you eat more than the serving size, you have eaten more sodium. · Food labels also tell you the Percent Daily Value for sodium. Choose products with low Percent Daily Values for sodium. · Be aware that sodium can come in forms other than salt, including monosodium glutamate (MSG), sodium citrate, and sodium bicarbonate (baking soda). MSG is often added to Asian food.  When you eat out, you can sometimes ask for food without MSG or added salt. Buy low-sodium foods  · Buy foods that are labeled \"unsalted\" (no salt added), \"sodium-free\" (less than 5 mg of sodium per serving), or \"low-sodium\" (less than 140 mg of sodium per serving). Foods labeled \"reduced-sodium\" and \"light sodium\" may still have too much sodium. Be sure to read the label to see how much sodium you are getting. · Buy fresh vegetables, or frozen vegetables without added sauces. Buy low-sodium versions of canned vegetables, soups, and other canned goods. Prepare low-sodium meals  · Cut back on the amount of salt you use in cooking. This will help you adjust to the taste. Do not add salt after cooking. One teaspoon of salt has about 2,300 mg of sodium. · Take the salt shaker off the table. · Flavor your food with garlic, lemon juice, onion, vinegar, herbs, and spices. Do not use soy sauce, lite soy sauce, steak sauce, onion salt, garlic salt, celery salt, mustard, or ketchup on your food. · Use low-sodium salad dressings, sauces, and ketchup. Or make your own salad dressings and sauces without adding salt. · Use less salt (or none) when recipes call for it. You can often use half the salt a recipe calls for without losing flavor. Other foods such as rice, pasta, and grains do not need added salt. · Rinse canned vegetables, and cook them in fresh water. This removes some--but not all--of the salt. · Avoid water that is naturally high in sodium or that has been treated with water softeners, which add sodium. Call your local water company to find out the sodium content of your water supply. If you buy bottled water, read the label and choose a sodium-free brand. Avoid high-sodium foods  · Avoid eating:  ? Smoked, cured, salted, and canned meat, fish, and poultry. ? Ham, gottlieb, hot dogs, and luncheon meats. ? Regular, hard, and processed cheese and regular peanut butter.   ? Crackers with salted tops, and other salted snack foods such as pretzels, chips, and salted popcorn. ? Frozen prepared meals, unless labeled low-sodium. ? Canned and dried soups, broths, and bouillon, unless labeled sodium-free or low-sodium. ? Canned vegetables, unless labeled sodium-free or low-sodium. ? Western Christie fries, pizza, tacos, and other fast foods. ? Pickles, olives, ketchup, and other condiments, especially soy sauce, unless labeled sodium-free or low-sodium. Where can you learn more? Go to https://Vusionpepiceweb.[employer] org and sign in to your [employer] account. Enter P973 in the [employer] box to learn more about \"Low Sodium Diet (2,000 Milligram): Care Instructions. \"     If you do not have an account, please click on the \"Sign Up Now\" link. Current as of: August 21, 2019  Content Version: 12.3  © 1541-6857 [employer]wise, Incorporated. Care instructions adapted under license by [facility] ([facility]). If you have questions about a medical condition or this instruction, always ask your healthcare professional. [place] [address] any warranty or liability for your use of this information.

## 2020-02-04 RX ORDER — BUMETANIDE 1 MG/1
1 TABLET ORAL 2 TIMES DAILY
Qty: 60 TABLET | Refills: 1 | Status: SHIPPED | OUTPATIENT
Start: 2020-02-03 | End: 2020-06-03 | Stop reason: SDUPTHER

## 2020-02-04 ASSESSMENT — ENCOUNTER SYMPTOMS
HEMOPTYSIS: 0
SPUTUM PRODUCTION: 0
ORTHOPNEA: 0

## 2020-02-05 ENCOUNTER — TELEPHONE (OUTPATIENT)
Dept: PRIMARY CARE CLINIC | Age: 60
End: 2020-02-05

## 2020-02-05 NOTE — TELEPHONE ENCOUNTER
----- Message from Dillon Das, [address] - NP sent at 2/5/2020 11:04 AM CST -----  Ua - trace amount of bacterial, small amount of leukocytes. Is patient having symptoms? It wasn't mentioned during visit. CMP - improved from 8 days ago. BUN and creatinine has decreased and GFR has increased from last visit.    HgbA1c - 7.8 - we need to continue treatment plan in an effort to decrease A1C  BNP - elevated; consistent with evaluation; continue treatment plan  CBC- improved from last check; RBC, hgb, and hct still decreased; needs reevaluation In two weeks     Repeat CBC and CMP in two weeks

## 2020-02-05 NOTE — TELEPHONE ENCOUNTER
----- Message from Mandie Grey, [address] - NP sent at 2/4/2020 12:51 AM CST -----  Mild/ early fluid overload. Consistent with assessment. Treatment plan in place.

## 2020-02-13 ENCOUNTER — OFFICE VISIT (OUTPATIENT)
Dept: PRIMARY CARE CLINIC | Age: 60
End: 2020-02-13
Payer: MEDICARE

## 2020-02-13 ENCOUNTER — HOSPITAL ENCOUNTER (OUTPATIENT)
Dept: GENERAL RADIOLOGY | Age: 60
Discharge: HOME | End: 2020-02-13
Payer: MEDICARE

## 2020-02-13 VITALS
HEART RATE: 82 BPM | OXYGEN SATURATION: 98 % | HEIGHT: 61 IN | RESPIRATION RATE: 16 BRPM | WEIGHT: 218.6 LBS | BODY MASS INDEX: 41.27 KG/M2 | DIASTOLIC BLOOD PRESSURE: 80 MMHG | SYSTOLIC BLOOD PRESSURE: 130 MMHG | TEMPERATURE: 97.2 F

## 2020-02-13 PROCEDURE — G8484 FLU IMMUNIZE NO ADMIN: HCPCS | Performed by: NURSE PRACTITIONER

## 2020-02-13 PROCEDURE — 99214 OFFICE O/P EST MOD 30 MIN: CPT | Performed by: NURSE PRACTITIONER

## 2020-02-13 PROCEDURE — 3017F COLORECTAL CA SCREEN DOC REV: CPT | Performed by: NURSE PRACTITIONER

## 2020-02-13 PROCEDURE — 1036F TOBACCO NON-USER: CPT | Performed by: NURSE PRACTITIONER

## 2020-02-13 PROCEDURE — G8417 CALC BMI ABV UP PARAM F/U: HCPCS | Performed by: NURSE PRACTITIONER

## 2020-02-13 PROCEDURE — 72040 X-RAY EXAM NECK SPINE 2-3 VW: CPT

## 2020-02-13 PROCEDURE — G8427 DOCREV CUR MEDS BY ELIG CLIN: HCPCS | Performed by: NURSE PRACTITIONER

## 2020-02-13 RX ORDER — TIZANIDINE 2 MG/1
2 TABLET ORAL 2 TIMES DAILY PRN
Qty: 60 TABLET | Refills: 0 | Status: SHIPPED | OUTPATIENT
Start: 2020-02-13 | End: 2020-03-14

## 2020-02-13 ASSESSMENT — ENCOUNTER SYMPTOMS
FLATUS: 1
EYES NEGATIVE: 1
SHORTNESS OF BREATH: 1
COUGH: 0
ABDOMINAL PAIN: 0
VOMITING: 0
ALLERGIC/IMMUNOLOGIC NEGATIVE: 1
DIARRHEA: 1
BLOATING: 1

## 2020-02-13 NOTE — PROGRESS NOTES
isosorbide mononitrate (IMDUR) 30 MG extended release tablet Take 90 mg by mouth daily      levalbuterol (XOPENEX HFA) 45 MCG/ACT inhaler Inhale 2 puffs into the lungs every 4 hours as needed for Wheezing or Shortness of Breath      fluticasone-vilanterol (BREO ELLIPTA) 100-25 MCG/INH AEPB inhaler Inhale 1 puff into the lungs daily      sertraline (ZOLOFT) 25 MG tablet Take 25 mg by mouth daily      diclofenac sodium 1 % GEL Apply 4 g topically 4 times daily as needed for Pain      insulin glargine (LANTUS) 100 UNIT/ML injection vial Inject 30 Units into the skin nightly       No current facility-administered medications for this visit. Allergies   Allergen Reactions    Albuterol Shortness Of Breath    Levaquin [Levofloxacin] Shortness Of Breath and Swelling    Metformin And Related Diarrhea    Aspirin Rash       Past Surgical History:   Procedure Laterality Date    CATARACT REMOVAL      CORONARY ANGIOPLASTY WITH STENT PLACEMENT      TONSILLECTOMY         Social History     Tobacco Use    Smoking status: Former Smoker     Packs/day: 1.00     Years: 30.00     Pack years: 30.00     Last attempt to quit: 2005     Years since quitting: 15.1    Smokeless tobacco: Never Used   Substance Use Topics    Alcohol use: Never     Frequency: Never    Drug use: Never       History reviewed. No pertinent family history. /80   Pulse 82   Temp 97.2 °F (36.2 °C) (Temporal)   Resp 16   Ht 5' 1\" (1.549 m)   Wt 218 lb 9.6 oz (99.2 kg)   SpO2 98%   BMI 41.30 kg/m²     Physical Exam  Vitals signs and nursing note reviewed. Constitutional:       General: She is not in acute distress. Appearance: She is well-developed. She is not diaphoretic. HENT:      Head: Normocephalic. Right Ear: Tympanic membrane, ear canal and external ear normal. There is no impacted cerumen. Left Ear: Tympanic membrane, ear canal and external ear normal. There is no impacted cerumen.       Nose: Nose normal. No 1. Neck pain  - cervical spine x-ray    2. Chronic diarrhea  - Culture Stool; Future  - [address], Gastro[facility], [place]    3. Cervical paraspinal muscle spasm  - tizanidine 2 mg as prescribed     4. Chronic diastolic congestive heart failure (HCC)  - continue daily weights  - continue bumex sliding scale as needed   - one month follow-up    Over 50% of the total visit time of 25 minutes was spent on counseling and or coordination of care of cervical spine pain, chronic diarrhea, cervical paraspinal muscle spasm, imaging, medications, referral, and follow-up. Orders Placed This Encounter   Procedures    Culture Stool     Standing Status:   Future     Standing Expiration Date:   2/12/2021    [address], Gastroenterology, [place]     Referral Priority:   Routine     Referral Type:   Eval and Treat     Referral Reason:   Specialty Services Required     Referred to Provider:   YAMIL Jeffers - NP     Requested Specialty:   [facility] Nurse Practitioner     Number of Visits Requested:   1     Orders Placed This Encounter   Medications    tiZANidine (ZANAFLEX) 2 MG tablet     Sig: Take 1 tablet by mouth 2 times daily as needed (muscle spasms)     Dispense:  60 tablet     Refill:  0     There are no discontinued medications. There are no Patient Instructions on file for this visit. Patient given educational handouts and has had all questions answered. Patient voices understanding and agrees to plans along with risks and benefits of plan. Patient isinstructed to continue prior meds, diet, and exercise plans unless instructed otherwise. Patient agrees to follow up as instructed and sooner if needed. Patient agrees to go to ER if condition becomes emergent. Notesmay be completed with dictation device and spelling errors may occur. Return in about 1 month (around 3/13/2020) for DM; foot check.

## 2020-02-14 DIAGNOSIS — D64.9 NORMOCYTIC ANEMIA: ICD-10-CM

## 2020-02-14 DIAGNOSIS — I50.32 CHRONIC DIASTOLIC CONGESTIVE HEART FAILURE (HCC): ICD-10-CM

## 2020-02-14 LAB
ALBUMIN SERPL-MCNC: 4.2 G/DL (ref 3.5–5.2)
ALP BLD-CCNC: 83 U/L (ref 35–104)
ALT SERPL-CCNC: 11 U/L (ref 5–33)
ANION GAP SERPL CALCULATED.3IONS-SCNC: 19 MMOL/L (ref 7–19)
AST SERPL-CCNC: 10 U/L (ref 5–32)
BILIRUB SERPL-MCNC: <0.2 MG/DL (ref 0.2–1.2)
BUN BLDV-MCNC: 58 MG/DL (ref 8–23)
CALCIUM SERPL-MCNC: 9.7 MG/DL (ref 8.8–10.2)
CHLORIDE BLD-SCNC: 96 MMOL/L (ref 98–111)
CO2: 26 MMOL/L (ref 22–29)
CREAT SERPL-MCNC: 1.6 MG/DL (ref 0.5–0.9)
GFR NON-AFRICAN AMERICAN: 33
GLUCOSE BLD-MCNC: 318 MG/DL (ref 74–109)
HCT VFR BLD CALC: 34.2 % (ref 37–47)
HEMOGLOBIN: 10.6 G/DL (ref 12–16)
MCH RBC QN AUTO: 30.5 PG (ref 27–31)
MCHC RBC AUTO-ENTMCNC: 31 G/DL (ref 33–37)
MCV RBC AUTO: 98.3 FL (ref 81–99)
PDW BLD-RTO: 13.7 % (ref 11.5–14.5)
PLATELET # BLD: 232 K/UL (ref 130–400)
PMV BLD AUTO: 9.3 FL (ref 9.4–12.3)
POTASSIUM SERPL-SCNC: 5.5 MMOL/L (ref 3.5–5)
RBC # BLD: 3.48 M/UL (ref 4.2–5.4)
SODIUM BLD-SCNC: 141 MMOL/L (ref 136–145)
TOTAL PROTEIN: 6.7 G/DL (ref 6.6–8.7)
WBC # BLD: 6.2 K/UL (ref 4.8–10.8)

## 2020-02-17 ENCOUNTER — TELEPHONE (OUTPATIENT)
Dept: PRIMARY CARE CLINIC | Age: 60
End: 2020-02-17

## 2020-02-17 NOTE — TELEPHONE ENCOUNTER
----- Message from Veena Miller, [address] - NP sent at 2/17/2020  9:12 AM CST -----  Patient needs to have a repeat CMP due to potassium level this week. Would like to see patient in office to discuss labs/ medication changes.

## 2020-02-21 ENCOUNTER — OFFICE VISIT (OUTPATIENT)
Dept: GASTROENTEROLOGY | Age: 60
End: 2020-02-21
Payer: MEDICARE

## 2020-02-21 VITALS
HEIGHT: 61 IN | OXYGEN SATURATION: 98 % | BODY MASS INDEX: 41.35 KG/M2 | HEART RATE: 69 BPM | SYSTOLIC BLOOD PRESSURE: 120 MMHG | WEIGHT: 219 LBS | DIASTOLIC BLOOD PRESSURE: 60 MMHG

## 2020-02-21 DIAGNOSIS — K52.9 CHRONIC DIARRHEA: ICD-10-CM

## 2020-02-21 PROCEDURE — G8484 FLU IMMUNIZE NO ADMIN: HCPCS | Performed by: NURSE PRACTITIONER

## 2020-02-21 PROCEDURE — G8417 CALC BMI ABV UP PARAM F/U: HCPCS | Performed by: NURSE PRACTITIONER

## 2020-02-21 PROCEDURE — G8427 DOCREV CUR MEDS BY ELIG CLIN: HCPCS | Performed by: NURSE PRACTITIONER

## 2020-02-21 PROCEDURE — 99215 OFFICE O/P EST HI 40 MIN: CPT | Performed by: NURSE PRACTITIONER

## 2020-02-21 PROCEDURE — 1036F TOBACCO NON-USER: CPT | Performed by: NURSE PRACTITIONER

## 2020-02-21 PROCEDURE — 3017F COLORECTAL CA SCREEN DOC REV: CPT | Performed by: NURSE PRACTITIONER

## 2020-02-21 RX ORDER — ACETAMINOPHEN 500 MG
500 TABLET ORAL EVERY 6 HOURS PRN
COMMUNITY
End: 2020-06-03

## 2020-02-21 RX ORDER — VALSARTAN 160 MG/1
160 TABLET ORAL DAILY
COMMUNITY
End: 2021-01-15 | Stop reason: SDUPTHER

## 2020-02-21 ASSESSMENT — ENCOUNTER SYMPTOMS
ANAL BLEEDING: 0
RECTAL PAIN: 0
TROUBLE SWALLOWING: 0
NAUSEA: 0
ABDOMINAL DISTENTION: 0
DIARRHEA: 1
ABDOMINAL PAIN: 0
COUGH: 0
VOMITING: 0
SHORTNESS OF BREATH: 0
CONSTIPATION: 0
CHOKING: 0
BLOOD IN STOOL: 0

## 2020-02-21 NOTE — PATIENT INSTRUCTIONS
Schedule colonoscopy. No aspirin, ibuprofen, naproxen, fish oil or vitamin E for 5 days before procedure. Do not eat or drink after midnight the day of the procedure. Allowed medications can be taken with a small sip of water. Please review your prep instructions for allowed medications. You will not be able to drive for 24 hours after the procedure due to sedation. You must have a responsible adult, 25 year or older, to accompany you and drive you home the day of your procedure. If you are on blood thinners, clearance from the prescribing physician will be obtained before your procedure is scheduled. If it is determined it is not safe to hold these medications for a short time an alternative procedure for evaluation may be recommended. Risks of colonoscopy include, but are not limited to, perforation, bleeding, and infection, Risk of perforation and bleeding are increased if there is a polyp removed. Anesthesia risks will be reviewed with you before the procedure by a member of the anesthesia department. Your physician may also schedule a follow up appointment with the nurse practitioner to discuss pathology, symptoms or to check if you have had any problems related to your procedure. If you prefer not to return to the office after your procedure please discuss this with your physician on the day of your colonoscopy. The physician will talk with you and/or your family after the procedure is completed. Final recommendations are based on the pathologist report if biopsies or specimens are taken. If polyps are removed during the procedure they will be sent to a pathologist for analysis. Unless you have a follow up appointment scheduled, you will be notified by mail of the pathology results within 4 weeks. If you have not received results after 4 weeks you may call the office to obtain this information. For Colonoscopy:   You will be given specific directions regarding colonoscopy, continue to drink plenty of clear fluids. It is important   to keep yourself hydrated before the exam.     Please follow all instructions as provided for cleansing the bowel. Failure to have an adequately prepped colon may cause you to have incomplete exam with further testing required. [email or web address]    Increasing Your Fiber Intake   What is fiber? Fiber is the portion of plant foods that cannot be digested. There are two kinds of fiber, both of which are keys to a healthy diet and a healthy digestive system:   - Soluble fiber aids in bulking and moving food through the gut. It forms a gel when mixed with liquid. - Insoluble fiber does not mix with liquids and passes through the GI tract mostly intact. It is sometimes called \"roughage. \"     Why do I need to eat it? Fiber has many important roles:   - Helps maintain regular bowel movements. More fiber can improve both diarrhea and constipation.   - Reduces the risk of developing hemorrhoids. - Lowers LDL or \"bad\" cholesterol levels, which lowers risk of heart disease.   - Regulates blood sugar levels in people with diabetes. - Provides a feeling of fullness and may help with weight loss. How much do I need? The Academy of Nutrition and Dietetics recommends:   - For women, 25 grams per day under age 48 and 21 grams per day over age 48. - For men, 38 grams per day under age 48 and 30 grams per day over age 48. What foods are the best sources? Plant foods contain fiber, but some more than others. Best choices are:   - Whole grains and high fiber cereals. - Dried beans and legumes. - Fruits and vegetables, especially raw. What about fiber supplements? If you need to add more fiber than you can get in your diet, consider:   - Type of Fiber: The major brands of fiber supplements (Metamucil®, Konsyl®, Citrucel®, Benefiber®, Fibercon®) all use soluble fiber and work in the same way.

## 2020-02-23 LAB
CAMPYLOBACTER ANTIGEN: NORMAL
CULTURE, STOOL: NORMAL
E COLI SHIGA TOXIN ASSAY: NORMAL

## 2020-02-24 RX ORDER — INSULIN GLARGINE 100 [IU]/ML
INJECTION, SOLUTION SUBCUTANEOUS
Qty: 15 ML | Refills: 0 | Status: SHIPPED | OUTPATIENT
Start: 2020-02-24

## 2020-02-24 RX ORDER — GABAPENTIN 600 MG/1
600 TABLET ORAL 3 TIMES DAILY
Qty: 90 TABLET | Refills: 0 | OUTPATIENT
Start: 2020-02-24 | End: 2020-03-25

## 2020-02-27 ENCOUNTER — OFFICE VISIT (OUTPATIENT)
Dept: PRIMARY CARE CLINIC | Age: 60
End: 2020-02-27
Payer: MEDICARE

## 2020-02-27 VITALS
DIASTOLIC BLOOD PRESSURE: 68 MMHG | WEIGHT: 225 LBS | HEIGHT: 61 IN | OXYGEN SATURATION: 98 % | SYSTOLIC BLOOD PRESSURE: 118 MMHG | BODY MASS INDEX: 42.48 KG/M2 | TEMPERATURE: 98.4 F | HEART RATE: 74 BPM

## 2020-02-27 DIAGNOSIS — R19.7 DIARRHEA, UNSPECIFIED TYPE: ICD-10-CM

## 2020-02-27 DIAGNOSIS — E87.5 SERUM POTASSIUM ELEVATED: ICD-10-CM

## 2020-02-27 LAB
ALBUMIN SERPL-MCNC: 4.3 G/DL (ref 3.5–5.2)
ALP BLD-CCNC: 84 U/L (ref 35–104)
ALT SERPL-CCNC: 9 U/L (ref 5–33)
ANION GAP SERPL CALCULATED.3IONS-SCNC: 16 MMOL/L (ref 7–19)
AST SERPL-CCNC: 9 U/L (ref 5–32)
BILIRUB SERPL-MCNC: <0.2 MG/DL (ref 0.2–1.2)
BUN BLDV-MCNC: 54 MG/DL (ref 8–23)
C DIFF TOXIN/ANTIGEN: NORMAL
CALCIUM SERPL-MCNC: 9.4 MG/DL (ref 8.8–10.2)
CHLORIDE BLD-SCNC: 100 MMOL/L (ref 98–111)
CO2: 24 MMOL/L (ref 22–29)
CREAT SERPL-MCNC: 1.3 MG/DL (ref 0.5–0.9)
CRYPTOSPORIDIUM ANTIGEN STOOL: NORMAL
GFR NON-AFRICAN AMERICAN: 42
GIARDIA ANTIGEN STOOL: NORMAL
GLUCOSE BLD-MCNC: 220 MG/DL (ref 74–109)
LACTOFERRIN, FECAL: NEGATIVE
POTASSIUM SERPL-SCNC: 4.4 MMOL/L (ref 3.5–5)
SODIUM BLD-SCNC: 140 MMOL/L (ref 136–145)
TOTAL PROTEIN: 7.3 G/DL (ref 6.6–8.7)

## 2020-02-27 PROCEDURE — 2022F DILAT RTA XM EVC RTNOPTHY: CPT | Performed by: NURSE PRACTITIONER

## 2020-02-27 PROCEDURE — G8417 CALC BMI ABV UP PARAM F/U: HCPCS | Performed by: NURSE PRACTITIONER

## 2020-02-27 PROCEDURE — 3017F COLORECTAL CA SCREEN DOC REV: CPT | Performed by: NURSE PRACTITIONER

## 2020-02-27 PROCEDURE — G8484 FLU IMMUNIZE NO ADMIN: HCPCS | Performed by: NURSE PRACTITIONER

## 2020-02-27 PROCEDURE — G8427 DOCREV CUR MEDS BY ELIG CLIN: HCPCS | Performed by: NURSE PRACTITIONER

## 2020-02-27 PROCEDURE — 99214 OFFICE O/P EST MOD 30 MIN: CPT | Performed by: NURSE PRACTITIONER

## 2020-02-27 PROCEDURE — 3051F HG A1C>EQUAL 7.0%<8.0%: CPT | Performed by: NURSE PRACTITIONER

## 2020-02-27 PROCEDURE — 1036F TOBACCO NON-USER: CPT | Performed by: NURSE PRACTITIONER

## 2020-02-27 RX ORDER — GABAPENTIN 600 MG/1
600 TABLET ORAL 3 TIMES DAILY
Qty: 90 TABLET | Refills: 0 | Status: SHIPPED | OUTPATIENT
Start: 2020-02-27 | End: 2020-04-01 | Stop reason: SDUPTHER

## 2020-02-27 RX ORDER — PRAMIPEXOLE DIHYDROCHLORIDE 0.5 MG/1
0.5 TABLET ORAL 2 TIMES DAILY
Status: CANCELLED | OUTPATIENT
Start: 2020-02-27

## 2020-02-27 NOTE — PROGRESS NOTES
AMI (acute myocardial infarction) ([facility] 09/2018    Asthma     CAD (coronary artery disease)     hx of stents    Cerebral artery occlusion with cerebral infarction ([facility] 2012    R sided numbness/weakness    [facility]congest[facility] failure) [facility]     COPD (chronic obstructive pulmonary disease) ([facility])     Diabetes mellitus ([facility]     DVT (deep vein thrombosis) in pregnancy     Hyperlipidemia     Hypertension        Current Outpatient Medications   Medication Sig Dispense Refill    Semaglutide,0.25 or 0.5MG/DOS, 2 MG/1.5ML SOPN Inject 0.25 mg into the skin once a week 2 pen 2    Insulin Degludec (TRESIBA FLEXTOUCH) 100 UNIT/ML SOPN Inject 30 Units into the skin nightly 4 pen 2    pramipexole (MIRAPEX) 0.5 MG tablet Take 1 tablet by mouth 2 times daily 60 tablet 2    gabapentin (NEURONTIN) 600 MG tablet Take 1 tablet by mouth 3 times daily for 30 days.  90 tablet 0    acetaminophen (TYLENOL) 500 MG tablet Take 500 mg by mouth every 6 hours as needed for Pain      valsartan (DIOVAN) 160 MG tablet Take 160 mg by mouth daily      tiZANidine (ZANAFLEX) 2 MG tablet Take 1 tablet by mouth 2 times daily as needed (muscle spasms) 60 tablet 0    bumetanide (BUMEX) 1 MG tablet Take 1 tablet by mouth 2 times daily 60 tablet 1    carvedilol (COREG) 3.125 MG tablet Take 1 tablet by mouth 2 times daily 60 tablet 2    atorvastatin (LIPITOR) 20 MG tablet Take 1/2 tablet for 1 week, then increase to whole tablet 30 tablet 5    DULoxetine (CYMBALTA) 60 MG extended release capsule Take 60 mg by mouth daily      clopidogrel (PLAVIX) 75 MG tablet Take 75 mg by mouth daily      montelukast (SINGULAIR) 10 MG tablet Take 10 mg by mouth nightly      vitamin D (CHOLECALCIFEROL) 25 MCG (1000 UT) TABS tablet Take 1,000 Units by mouth daily      melatonin 3 MG TABS tablet Take 10 mg by mouth nightly      amLODIPine (NORVASC) 5 MG tablet Take 5 mg by mouth daily      isosorbide mononitrate (IMDUR) 30 MG extended release tablet Take 90 mg by mouth daily      levalbuterol (XOPENEX HFA) 45 MCG/ACT inhaler Inhale 2 puffs into the lungs every 4 hours as needed for Wheezing or Shortness of Breath      fluticasone-vilanterol (BREO ELLIPTA) 100-25 MCG/INH AEPB inhaler Inhale 1 puff into the lungs daily      sertraline (ZOLOFT) 25 MG tablet Take 25 mg by mouth daily      diclofenac sodium 1 % GEL Apply 4 g topically 4 times daily as needed for Pain      insulin glargine (LANTUS) 100 UNIT/ML injection vial Inject 30 Units into the skin nightly       No current facility-administered medications for this visit. Allergies   Allergen Reactions    Albuterol Shortness Of Breath    Levaquin [Levofloxacin] Shortness Of Breath and Swelling    Metformin And Related Diarrhea    Aspirin Rash       Past Surgical History:   Procedure Laterality Date    CATARACT REMOVAL      COLONOSCOPY  approx 2013    CORONARY ANGIOPLASTY WITH STENT PLACEMENT      TONSILLECTOMY         Social History     Tobacco Use    Smoking status: Former Smoker     Packs/day: 1.00     Years: 30.00     Pack years: 30.00     Last attempt to quit: 2005     Years since quitting: 15.1    Smokeless tobacco: Never Used   Substance Use Topics    Alcohol use: Never     Frequency: Never    Drug use: Never       Family History   Problem Relation Age of Onset    Colon Cancer Neg Hx     Colon Polyps Neg Hx        /68   Pulse 74   Temp 98.4 °F (36.9 °C)   Ht 5' 1\" (1.549 m)   Wt 225 lb (102.1 kg)   SpO2 98%   BMI 42.51 kg/m²     Physical Exam  Vitals signs and nursing note reviewed. Constitutional:       General: She is not in acute distress. Appearance: She is well-developed. She is not diaphoretic. HENT:      Head: Normocephalic. Right Ear: Tympanic membrane, ear canal and external ear normal. There is no impacted cerumen. Left Ear: Tympanic membrane, ear canal and external ear normal. There is no impacted cerumen.       Nose: Nose normal. No congestion. Mouth/Throat:      Mouth: Mucous membranes are moist.      Pharynx: Oropharynx is clear. No oropharyngeal exudate or posterior oropharyngeal erythema. Eyes:      General:         Right eye: No discharge. Left eye: No discharge. Conjunctiva/sclera: Conjunctivae normal.      Pupils: Pupils are equal, round, and reactive to light. Neck:      Musculoskeletal: Normal range of motion. Muscular tenderness (right sided) present. Trachea: No tracheal deviation. Cardiovascular:      Rate and Rhythm: Normal rate and regular rhythm. Pulses: Normal pulses. Heart sounds: Normal heart sounds. No murmur. Pulmonary:      Effort: Pulmonary effort is normal. No respiratory distress. Breath sounds: Normal breath sounds. No stridor. Abdominal:      General: Bowel sounds are normal.      Palpations: Abdomen is soft. Tenderness: There is no abdominal tenderness. Musculoskeletal: Normal range of motion. General: Swelling (right knee) and tenderness (right knee) present. No deformity. Lymphadenopathy:      Cervical: No cervical adenopathy. Skin:     General: Skin is warm and dry. Capillary Refill: Capillary refill takes less than 2 seconds. Findings: No rash. Neurological:      Mental Status: She is alert and oriented to person, place, and time. Cranial Nerves: No cranial nerve deficit. Psychiatric:         Mood and Affect: Mood normal.         Behavior: Behavior normal.         Thought Content: Thought content normal.         Judgment: Judgment normal.         Assessment:    ICD-10-CM    1. Type 2 diabetes mellitus with other specified complication, with long-term current use of insulin ([facility]) E11.69 Microalbumin / Creatinine Urine Ratio    Z79.4 Semaglutide,0.25 or 0.5MG/DOS, 2 MG/1.5ML SOPN     Insulin Degludec (TRESIBA FLEXTOUCH) 100 UNIT/ML SOPN   2. Neuropathy G62.9 gabapentin (NEURONTIN) 600 MG tablet   3.  Medication management A21.417

## 2020-02-27 NOTE — PATIENT INSTRUCTIONS
Ana Jaquez - for every morning blood sugar greater than 130, increase nightly dose of tresiba by 1 unit; max 50

## 2020-02-28 DIAGNOSIS — Z79.4 TYPE 2 DIABETES MELLITUS WITH OTHER SPECIFIED COMPLICATION, WITH LONG-TERM CURRENT USE OF INSULIN (HCC): ICD-10-CM

## 2020-02-28 DIAGNOSIS — Z79.899 MEDICATION MANAGEMENT: ICD-10-CM

## 2020-02-28 DIAGNOSIS — E11.69 TYPE 2 DIABETES MELLITUS WITH OTHER SPECIFIED COMPLICATION, WITH LONG-TERM CURRENT USE OF INSULIN (HCC): ICD-10-CM

## 2020-02-28 DIAGNOSIS — R15.9 INCONTINENCE OF FECES WITH FECAL URGENCY: ICD-10-CM

## 2020-02-28 DIAGNOSIS — R19.7 DIARRHEA, UNSPECIFIED TYPE: ICD-10-CM

## 2020-02-28 DIAGNOSIS — R15.2 INCONTINENCE OF FECES WITH FECAL URGENCY: ICD-10-CM

## 2020-02-28 LAB
AMPHETAMINE SCREEN, URINE: NEGATIVE
BARBITURATE SCREEN URINE: NEGATIVE
BENZODIAZEPINE SCREEN, URINE: NEGATIVE
CANNABINOID SCREEN URINE: NEGATIVE
COCAINE METABOLITE SCREEN URINE: NEGATIVE
CREATININE URINE: 60 MG/DL (ref 4.2–622)
HEMOCCULT SP2 STL QL: NORMAL
HEMOCCULT SP3 STL QL: NORMAL
Lab: NORMAL
MICROALBUMIN UR-MCNC: 117.9 MG/DL (ref 0–19)
MICROALBUMIN/CREAT UR-RTO: 1965 MG/G
OCCULT BLOOD QC: NORMAL
OCCULT BLOOD SCREENING: NORMAL
OPIATE SCREEN URINE: NEGATIVE

## 2020-02-28 RX ORDER — PRAMIPEXOLE DIHYDROCHLORIDE 0.5 MG/1
0.5 TABLET ORAL 2 TIMES DAILY
Qty: 60 TABLET | Refills: 2 | Status: SHIPPED | OUTPATIENT
Start: 2020-02-28 | End: 2020-04-09 | Stop reason: SDUPTHER

## 2020-02-29 LAB
CAMPYLOBACTER ANTIGEN: NORMAL
CULTURE, STOOL: NORMAL
E COLI SHIGA TOXIN ASSAY: NORMAL

## 2020-02-29 ASSESSMENT — ENCOUNTER SYMPTOMS
ALLERGIC/IMMUNOLOGIC NEGATIVE: 1
ABDOMINAL PAIN: 0
VOMITING: 0
DIARRHEA: 1
COUGH: 0
EYES NEGATIVE: 1
SHORTNESS OF BREATH: 1

## 2020-03-02 ENCOUNTER — TELEPHONE (OUTPATIENT)
Dept: PRIMARY CARE CLINIC | Age: 60
End: 2020-03-02

## 2020-03-02 RX ORDER — LISINOPRIL 2.5 MG/1
2.5 TABLET ORAL DAILY
Qty: 30 TABLET | Refills: 1 | Status: SHIPPED | OUTPATIENT
Start: 2020-03-02 | End: 2020-05-14 | Stop reason: SDUPTHER

## 2020-03-02 NOTE — TELEPHONE ENCOUNTER
Maco Anderson APRN - NP  Beth Hernandez MA             BUN, creatinine, potassium, and GFR improved.

## 2020-03-02 NOTE — TELEPHONE ENCOUNTER
YAMIL Churchill - NP  You 1 hour ago (1:00 PM)      Yes, I am okay with the letter. Thank you!     Routing comment

## 2020-03-02 NOTE — TELEPHONE ENCOUNTER
Patient notified of results and medication sent into the pharmacy. Spoke with patient about letter needed and this is so that this apartment complex can mail her the application instead of her having to go and pick it up. Are you ok with this?

## 2020-03-02 NOTE — TELEPHONE ENCOUNTER
----- Message from Susi Arias, [address] - NP sent at 2/29/2020  2:47 PM CST -----  Microalbumin elevated. Patient needs to start lisinopril 2.5 mg nightly.

## 2020-03-03 ENCOUNTER — OFFICE VISIT (OUTPATIENT)
Dept: CARDIOLOGY | Age: 60
End: 2020-03-03
Payer: MEDICARE

## 2020-03-03 VITALS
BODY MASS INDEX: 42.86 KG/M2 | SYSTOLIC BLOOD PRESSURE: 124 MMHG | DIASTOLIC BLOOD PRESSURE: 64 MMHG | WEIGHT: 227 LBS | HEIGHT: 61 IN | HEART RATE: 66 BPM

## 2020-03-03 PROCEDURE — 99214 OFFICE O/P EST MOD 30 MIN: CPT | Performed by: CLINICAL NURSE SPECIALIST

## 2020-03-03 PROCEDURE — 1036F TOBACCO NON-USER: CPT | Performed by: CLINICAL NURSE SPECIALIST

## 2020-03-03 PROCEDURE — G8484 FLU IMMUNIZE NO ADMIN: HCPCS | Performed by: CLINICAL NURSE SPECIALIST

## 2020-03-03 PROCEDURE — G8427 DOCREV CUR MEDS BY ELIG CLIN: HCPCS | Performed by: CLINICAL NURSE SPECIALIST

## 2020-03-03 PROCEDURE — 3017F COLORECTAL CA SCREEN DOC REV: CPT | Performed by: CLINICAL NURSE SPECIALIST

## 2020-03-03 PROCEDURE — G8417 CALC BMI ABV UP PARAM F/U: HCPCS | Performed by: CLINICAL NURSE SPECIALIST

## 2020-03-03 NOTE — PATIENT INSTRUCTIONS
Plan  Referral to pulmonary medicine   Use CPAP for MARTHA   Daily weights- if you gain 2 or lbs over night or 5 or more lbs in a week then take an extra dose of the Bumex  ECHO soon to recheck effusion  May hold Plavix 5-7 days prior to procedure  Follow up in 3 mos  Call with any questions or concerns  Follow up with YAMIL Diaz - NP for non cardiac problems and labs   Report any new problems  Cardiovascular Fitness-Exercise as tolerated. Strive for 30 minutes of exercise most days of the week. Cardiac / Healthy Diet- low salt   Continue current medications as directed  Continue plan of treatment  It is always recommended that you bring your medications bottles with you to each visit - this is for your safety! [place] [facility] the [facility] and [address] located on the first floor of [place] [address] through hospital main entrance and turn immediately to your left. Date/Time: March, 6 Friday @1:15    Patient's contact number:  [phone] (home)     Echocardiogram -  No prep. Takes approximately 30 min. An echocardiogram uses sound waves to produce images of your heart. This commonly used test allows your doctor to see how your heart is beating and pumping blood. Your doctor can use the images from an echocardiogram to identify various abnormalities in the heart muscle and valves. This test has 2 parts:   Ø You will be asked to disrobe from the waist up and given a gown to wear. The technologist will then hook up an EKG monitor to you for the entire exam.   Ø You will then have an ultrasound of your heart (echocardiogram) to assess the heart muscle, heart valves and heart function. You may eat and take any medicines before the exam.     If you need to change your appointment, please call outpatient scheduling at [phone].

## 2020-03-03 NOTE — PROGRESS NOTES
effusion  ECHO Complete 2D W Doppler W Color   3. Pulmonary hypertension [facility])  External Referral To Pulmonology   4. Morbid obesity with BMI of 40.0-44.9, adult ([address]     5. SOB (shortness of breath)     6. Coronary artery disease involving native coronary artery of native heart without angina pectoris       Data:  BP Readings from Last 3 Encounters:   03/03/20 124/64   02/27/20 118/68   02/21/20 120/60    Pulse Readings from Last 3 Encounters:   03/03/20 66   02/27/20 74   02/21/20 69        Wt Readings from Last 3 Encounters:   03/03/20 227 lb (103 kg)   02/27/20 225 lb (102.1 kg)   02/21/20 219 lb (99.3 kg)     Reviewed records via care everywhere. Patient underwent right and left heart catheterization November 2019 that showed  INTERPRETATION OF HEMODYNAMIC DATA:  1.  Mild elevation in central aortic pressure. 2.  Moderate to severe elevation of left ventricular end-diastolic  pressure. 3.  Normal left ventricular function. 4.  Normal cardiac index. 5.  Severe pulmonary hypertension secondary to both a pre and  post-capillary component  RIGHT CORONARY ARTERY:  Dominant, patent. LEFT CORONARY ARTERY SYSTEM:  1.  The left main coronary artery is short and patent. 2.  The left anterior descending coronary artery is a type 3 vessel. The vessel has mild to moderate degree of tortuosity.  The mid vessel  has a 40% stenosis.  The remainder of the vessel has luminal  irregularities.  A series of small diagonal branches are present.    3.  The circumflex coronary artery is patent and has a patent stent in  the proximal segment.  The first obtuse marginal branch has a stent in  the proximal segment with about a 25% area of in-stent restenosis.    The obtuse marginal branch gives off a branch in the mid segment that  is about a 2 mm vessel with an 80% stenosis.  The distal vessel  bifurcates and there are luminal irregularities.  The distal  circumflex has a 30% stenosis and then luminal irregularities are  identified in a posterolateral branch. VENTRICULOGRAPHY:  The left ventricle is normal in size with normal  systolic function.  Estimated ejection fraction is 55%-60%. Blood Pressure and heart rate controlled. Medical management includes low-dose beta-blocker and ARB, ACE, CCB  . On diuretic twice a day. Also on Plavix and statin. Is a diabetic on insulin  Reviewed recent records as well as cath info from [facility] - has significant PHTN and has  not seen pulmonology. Will get referral soon. Has nonocclusive coronary disease but is on guideline directed medical therapy. Appears stable. We discussed signs and symptoms of ischemia. Atypical chest pain recently. Probably associated with fluid retention. No recent stenting. May hold Plavix 5 to 7 days prior to any procedure-last stents were placed in 2018    We discussed  daily weights and when and how to take extra diuretic  Reviewed recent labs. Evidently ACE inhibitor was added in addition to her ARB due to some protein in her urine from what I can tell. We will need to watch her renal function and blood pressure    And has MARTHA and is not compliant with CPAP. We discussed the correlation of heart failure, arrhythmia and other symptoms of fatigue associated with untreated sleep apnea. Have small pericardial effusion when hospitalized in January. We will repeat echo to see if that is changed due to her symptoms.   If that is stable she can proceed with GI procedures   States taking medications as prescribed      Plan  Daily weights- if you gain 2 or lbs over night or 5 or more lbs in a week then take an extra dose of the Bumex  ECHO soon to recheck effusion -scheduled for 3/6/2020  May hold Plavix 5-7 days prior to procedure  Referral to pulmonary medicine   Use CPAP for MARTHA   Follow up in 3 mos  Call with any questions or concerns  Follow up with YAMIL Diaz - NP for non cardiac problems and labs   Report any new problems  Cardiovascular Fitness-Exercise as tolerated. Strive for 30 minutes of exercise most days of the week. Cardiac / Healthy Diet- low salt   Continue current medications as directed  Continue plan of treatment  It is always recommended that you bring your medications bottles with you to each visit - this is for your safety! YMAIL Bueno dragon/transcription disclaimer: Much of this encounter note is electronic transcription/translation of spoken language to printed tach. Electronic translation of spoken language may be erroneous, or at times, nonsensical words or phrases may be inadvertently transcribed.  Although, I have reviewed the note for such errors, some may still exist.

## 2020-03-05 ENCOUNTER — HOSPITAL ENCOUNTER (EMERGENCY)
Age: 60
Discharge: HOME | End: 2020-03-05
Attending: EMERGENCY MEDICINE
Payer: MEDICARE

## 2020-03-05 ENCOUNTER — APPOINTMENT (OUTPATIENT)
Dept: GENERAL RADIOLOGY | Age: 60
End: 2020-03-05
Payer: MEDICARE

## 2020-03-05 VITALS
DIASTOLIC BLOOD PRESSURE: 60 MMHG | HEIGHT: 61 IN | HEART RATE: 84 BPM | OXYGEN SATURATION: 96 % | WEIGHT: 220 LBS | RESPIRATION RATE: 18 BRPM | BODY MASS INDEX: 41.54 KG/M2 | SYSTOLIC BLOOD PRESSURE: 158 MMHG | TEMPERATURE: 100.4 F

## 2020-03-05 LAB
ALBUMIN SERPL-MCNC: 3.9 G/DL (ref 3.5–5.2)
ALP BLD-CCNC: 64 U/L (ref 35–104)
ALT SERPL-CCNC: 9 U/L (ref 5–33)
ANION GAP SERPL CALCULATED.3IONS-SCNC: 12 MMOL/L (ref 7–19)
AST SERPL-CCNC: 11 U/L (ref 5–32)
BASOPHILS ABSOLUTE: 0 K/UL (ref 0–0.2)
BASOPHILS RELATIVE PERCENT: 0.3 % (ref 0–1)
BILIRUB SERPL-MCNC: 0.3 MG/DL (ref 0.2–1.2)
BUN BLDV-MCNC: 44 MG/DL (ref 8–23)
CALCIUM SERPL-MCNC: 9.5 MG/DL (ref 8.8–10.2)
CHLORIDE BLD-SCNC: 98 MMOL/L (ref 98–111)
CO2: 26 MMOL/L (ref 22–29)
CREAT SERPL-MCNC: 1.4 MG/DL (ref 0.5–0.9)
EOSINOPHILS ABSOLUTE: 0 K/UL (ref 0–0.6)
EOSINOPHILS RELATIVE PERCENT: 0.1 % (ref 0–5)
GFR NON-AFRICAN AMERICAN: 38
GLUCOSE BLD-MCNC: 242 MG/DL (ref 74–109)
HCT VFR BLD CALC: 32.2 % (ref 37–47)
HEMOGLOBIN: 10.4 G/DL (ref 12–16)
IMMATURE GRANULOCYTES #: 0 K/UL
LIPASE: 39 U/L (ref 13–60)
LYMPHOCYTES ABSOLUTE: 0.5 K/UL (ref 1.1–4.5)
LYMPHOCYTES RELATIVE PERCENT: 6.6 % (ref 20–40)
MCH RBC QN AUTO: 29.8 PG (ref 27–31)
MCHC RBC AUTO-ENTMCNC: 32.3 G/DL (ref 33–37)
MCV RBC AUTO: 92.3 FL (ref 81–99)
MONOCYTES ABSOLUTE: 0.6 K/UL (ref 0–0.9)
MONOCYTES RELATIVE PERCENT: 9 % (ref 0–10)
NEUTROPHILS ABSOLUTE: 5.9 K/UL (ref 1.5–7.5)
NEUTROPHILS RELATIVE PERCENT: 83.7 % (ref 50–65)
PDW BLD-RTO: 12.2 % (ref 11.5–14.5)
PLATELET # BLD: 141 K/UL (ref 130–400)
PMV BLD AUTO: 9 FL (ref 9.4–12.3)
POTASSIUM SERPL-SCNC: 5 MMOL/L (ref 3.5–5)
RAPID INFLUENZA  B AGN: NEGATIVE
RAPID INFLUENZA A AGN: NEGATIVE
RBC # BLD: 3.49 M/UL (ref 4.2–5.4)
SODIUM BLD-SCNC: 136 MMOL/L (ref 136–145)
TOTAL PROTEIN: 6.8 G/DL (ref 6.6–8.7)
WBC # BLD: 7 K/UL (ref 4.8–10.8)

## 2020-03-05 PROCEDURE — 71046 X-RAY EXAM CHEST 2 VIEWS: CPT

## 2020-03-05 PROCEDURE — 87804 INFLUENZA ASSAY W/OPTIC: CPT

## 2020-03-05 PROCEDURE — 2580000003 HC RX 258: Performed by: EMERGENCY MEDICINE

## 2020-03-05 PROCEDURE — 6370000000 HC RX 637 (ALT 250 FOR IP): Performed by: EMERGENCY MEDICINE

## 2020-03-05 PROCEDURE — 36415 COLL VENOUS BLD VENIPUNCTURE: CPT

## 2020-03-05 PROCEDURE — 83690 ASSAY OF LIPASE: CPT

## 2020-03-05 PROCEDURE — 85025 COMPLETE CBC W/AUTO DIFF WBC: CPT

## 2020-03-05 PROCEDURE — 96374 THER/PROPH/DIAG INJ IV PUSH: CPT

## 2020-03-05 PROCEDURE — 6360000002 HC RX W HCPCS: Performed by: EMERGENCY MEDICINE

## 2020-03-05 PROCEDURE — 99283 EMERGENCY DEPT VISIT LOW MDM: CPT

## 2020-03-05 PROCEDURE — 80053 COMPREHEN METABOLIC PANEL: CPT

## 2020-03-05 RX ORDER — 0.9 % SODIUM CHLORIDE 0.9 %
1000 INTRAVENOUS SOLUTION INTRAVENOUS ONCE
Status: COMPLETED | OUTPATIENT
Start: 2020-03-05 | End: 2020-03-05

## 2020-03-05 RX ORDER — ACETAMINOPHEN 325 MG/1
650 TABLET ORAL ONCE
Status: COMPLETED | OUTPATIENT
Start: 2020-03-05 | End: 2020-03-05

## 2020-03-05 RX ORDER — ONDANSETRON 4 MG/1
4 TABLET, ORALLY DISINTEGRATING ORAL EVERY 8 HOURS PRN
Qty: 15 TABLET | Refills: 0 | Status: SHIPPED | OUTPATIENT
Start: 2020-03-05 | End: 2020-06-03

## 2020-03-05 RX ORDER — ONDANSETRON 2 MG/ML
4 INJECTION INTRAMUSCULAR; INTRAVENOUS ONCE
Status: COMPLETED | OUTPATIENT
Start: 2020-03-05 | End: 2020-03-05

## 2020-03-05 RX ADMIN — ONDANSETRON 4 MG: 2 INJECTION INTRAMUSCULAR; INTRAVENOUS at 08:17

## 2020-03-05 RX ADMIN — SODIUM CHLORIDE 1000 ML: 9 INJECTION, SOLUTION INTRAVENOUS at 08:17

## 2020-03-05 RX ADMIN — ACETAMINOPHEN 650 MG: 325 TABLET ORAL at 08:17

## 2020-03-05 ASSESSMENT — ENCOUNTER SYMPTOMS
SHORTNESS OF BREATH: 0
COUGH: 1
BLOOD IN STOOL: 0
DIARRHEA: 1
NAUSEA: 1
ABDOMINAL PAIN: 0
SORE THROAT: 0
VOMITING: 1
BACK PAIN: 0
RHINORRHEA: 0

## 2020-03-05 ASSESSMENT — PAIN SCALES - GENERAL
PAINLEVEL_OUTOF10: 2
PAINLEVEL_OUTOF10: 4

## 2020-03-05 NOTE — ED NOTES
Bed: 08  Expected date:   Expected time:   Means of arrival:   Comments:  Delonte Turner 89, RN  03/05/20 8488

## 2020-03-05 NOTE — ED PROVIDER NOTES
disease)     hx of stents    Cerebral artery occlusion with cerebral infarction ([facility] 2012    R sided numbness/weakness    CHF [facility]congestive heart failure) ([facility]     COPD (chronic obstructive pulmonary disease) ([facility])     [facility]     DVT (deep vein thrombosis) in pregnancy     Hyperlipidemia     Hypertension          SURGICAL HISTORY       Past Surgical History:   Procedure Laterality Date    CATARACT REMOVAL      COLONOSCOPY  approx 2013    CORONARY ANGIOPLASTY WITH STENT PLACEMENT  2018    [facility]    TONSILLECTOMY           CURRENT MEDICATIONS     Previous Medications    ACETAMINOPHEN (TYLENOL) 500 MG TABLET    Take 500 mg by mouth every 6 hours as needed for Pain    AMLODIPINE (NORVASC) 5 MG TABLET    Take 5 mg by mouth daily    ATORVASTATIN (LIPITOR) 20 MG TABLET    Take 1/2 tablet for 1 week, then increase to whole tablet    BUMETANIDE (BUMEX) 1 MG TABLET    Take 1 tablet by mouth 2 times daily    CARVEDILOL (COREG) 3.125 MG TABLET    Take 1 tablet by mouth 2 times daily    CLOPIDOGREL (PLAVIX) 75 MG TABLET    Take 75 mg by mouth daily    DICLOFENAC SODIUM 1 % GEL    Apply 4 g topically 4 times daily as needed for Pain    DULOXETINE (CYMBALTA) 60 MG EXTENDED RELEASE CAPSULE    Take 60 mg by mouth daily    FLUTICASONE-VILANTEROL (BREO ELLIPTA) 100-25 MCG/INH AEPB INHALER    Inhale 1 puff into the lungs daily    GABAPENTIN (NEURONTIN) 600 MG TABLET    Take 1 tablet by mouth 3 times daily for 30 days.     INSULIN DEGLUDEC (TRESIBA FLEXTOUCH) 100 UNIT/ML SOPN    Inject 30 Units into the skin nightly    INSULIN GLARGINE (LANTUS) 100 UNIT/ML INJECTION VIAL    Inject 30 Units into the skin nightly    ISOSORBIDE MONONITRATE (IMDUR) 30 MG EXTENDED RELEASE TABLET    Take 90 mg by mouth daily    LEVALBUTEROL (XOPENEX HFA) 45 MCG/ACT INHALER    Inhale 2 puffs into the lungs every 4 hours as needed for Wheezing or Shortness of Breath    LISINOPRIL (PRINIVIL;ZESTRIL) 2.5 MG TABLET None     Relationship status: None    Intimate partner violence:     Fear of current or ex partner: None     Emotionally abused: None     Physically abused: None     Forced sexual activity: None   Other Topics Concern    None   Social History Narrative    None       SCREENINGS             PHYSICAL EXAM    (up to 7 for level 4, 8 or more for level 5)     ED Triage Vitals [03/05/20 0735]   BP Temp Temp src Pulse Resp SpO2 Height Weight   (!) 151/57 100.4 °F (38 °C) -- 87 20 98 % 5' 1\" (1.549 m) 220 lb (99.8 kg)       Physical Exam  Vitals signs and nursing note reviewed. Constitutional:       General: She is not in acute distress. Appearance: She is well-developed. She is not ill-appearing, toxic-appearing or diaphoretic. HENT:      Head: Normocephalic and atraumatic. Right Ear: External ear normal.      Left Ear: External ear normal.      Nose: Nose normal. No congestion or rhinorrhea. Mouth/Throat:      Mouth: Mucous membranes are moist.   Eyes:      Conjunctiva/sclera: Conjunctivae normal.   Neck:      Musculoskeletal: Normal range of motion. Trachea: No tracheal deviation. Cardiovascular:      Rate and Rhythm: Normal rate and regular rhythm. Heart sounds: Normal heart sounds. No murmur. Pulmonary:      Effort: No tachypnea or respiratory distress. Breath sounds: Examination of the right-lower field reveals decreased breath sounds. Examination of the left-lower field reveals decreased breath sounds. Decreased breath sounds present. No wheezing or rales. Abdominal:      Palpations: Abdomen is soft. There is no mass. Tenderness: There is no abdominal tenderness. There is no right CVA tenderness or left CVA tenderness. Musculoskeletal: Normal range of motion. Right lower leg: No edema. Left lower leg: No edema. Skin:     General: Skin is warm and dry. Neurological:      Mental Status: She is alert and oriented to person, place, and time.       GCS: GCS eye

## 2020-03-06 ENCOUNTER — HOSPITAL ENCOUNTER (OUTPATIENT)
Dept: NON INVASIVE DIAGNOSTICS | Age: 60
Discharge: HOME | End: 2020-03-06
Payer: MEDICARE

## 2020-03-06 PROCEDURE — 93308 TTE F-UP OR LMTD: CPT

## 2020-03-09 ENCOUNTER — TELEPHONE (OUTPATIENT)
Dept: PRIMARY CARE CLINIC | Age: 60
End: 2020-03-09

## 2020-03-09 ENCOUNTER — APPOINTMENT (OUTPATIENT)
Dept: MRI IMAGING | Facility: HOSPITAL | Age: 60
End: 2020-03-09

## 2020-03-09 ENCOUNTER — HOSPITAL ENCOUNTER (OUTPATIENT)
Facility: HOSPITAL | Age: 60
Setting detail: OBSERVATION
Discharge: HOME | End: 2020-03-11
Attending: EMERGENCY MEDICINE | Admitting: INTERNAL MEDICINE

## 2020-03-09 ENCOUNTER — APPOINTMENT (OUTPATIENT)
Dept: GENERAL RADIOLOGY | Facility: HOSPITAL | Age: 60
End: 2020-03-09

## 2020-03-09 ENCOUNTER — APPOINTMENT (OUTPATIENT)
Dept: CT IMAGING | Facility: HOSPITAL | Age: 60
End: 2020-03-09

## 2020-03-09 DIAGNOSIS — R41.3 MEMORY CHANGES: ICD-10-CM

## 2020-03-09 DIAGNOSIS — I63.9 CEREBROVASCULAR ACCIDENT (CVA), UNSPECIFIED MECHANISM (HCC): Primary | ICD-10-CM

## 2020-03-09 DIAGNOSIS — Z74.09 IMPAIRED MOBILITY: ICD-10-CM

## 2020-03-09 DIAGNOSIS — I21.4 NON-STEMI (NON-ST ELEVATED MYOCARDIAL INFARCTION) (HCC): ICD-10-CM

## 2020-03-09 DIAGNOSIS — Z78.9 DECREASED ACTIVITIES OF DAILY LIVING (ADL): ICD-10-CM

## 2020-03-09 DIAGNOSIS — J11.1 INFLUENZA: ICD-10-CM

## 2020-03-09 PROBLEM — I25.10 NONOBSTRUCTIVE ATHEROSCLEROSIS OF CORONARY ARTERY: Status: ACTIVE | Noted: 2020-03-09

## 2020-03-09 PROBLEM — E11.65 TYPE 2 DIABETES MELLITUS WITH HYPERGLYCEMIA (HCC): Status: ACTIVE | Noted: 2020-03-09

## 2020-03-09 PROBLEM — I27.20 PULMONARY HYPERTENSION (HCC): Status: ACTIVE | Noted: 2020-03-09

## 2020-03-09 PROBLEM — N18.30 CKD (CHRONIC KIDNEY DISEASE) STAGE 3, GFR 30-59 ML/MIN (HCC): Status: ACTIVE | Noted: 2020-03-09

## 2020-03-09 PROBLEM — R77.8 ELEVATED TROPONIN: Status: ACTIVE | Noted: 2020-03-09

## 2020-03-09 PROBLEM — J10.1 INFLUENZA A: Status: ACTIVE | Noted: 2020-03-09

## 2020-03-09 PROBLEM — I31.39 PERICARDIAL EFFUSION: Status: ACTIVE | Noted: 2020-03-09

## 2020-03-09 PROBLEM — R09.89 SUSPECTED CEREBROVASCULAR ACCIDENT (CVA): Status: ACTIVE | Noted: 2020-03-09

## 2020-03-09 LAB
ALBUMIN SERPL-MCNC: 3.9 G/DL (ref 3.5–5.2)
ALBUMIN/GLOB SERPL: 1.3 G/DL
ALP SERPL-CCNC: 56 U/L (ref 39–117)
ALT SERPL W P-5'-P-CCNC: 11 U/L (ref 1–33)
ANION GAP SERPL CALCULATED.3IONS-SCNC: 11 MMOL/L (ref 5–15)
APTT PPP: 30.1 SECONDS (ref 24.1–35)
AST SERPL-CCNC: 17 U/L (ref 1–32)
B PARAPERT DNA SPEC QL NAA+PROBE: NOT DETECTED
B PERT DNA SPEC QL NAA+PROBE: NOT DETECTED
BASOPHILS # BLD AUTO: 0.02 10*3/MM3 (ref 0–0.2)
BASOPHILS NFR BLD AUTO: 0.4 % (ref 0–1.5)
BILIRUB SERPL-MCNC: 0.2 MG/DL (ref 0.2–1.2)
BUN BLD-MCNC: 37 MG/DL (ref 8–23)
BUN/CREAT SERPL: 24.8 (ref 7–25)
C PNEUM DNA NPH QL NAA+NON-PROBE: NOT DETECTED
CALCIUM SPEC-SCNC: 9.2 MG/DL (ref 8.6–10.5)
CHLORIDE SERPL-SCNC: 99 MMOL/L (ref 98–107)
CO2 SERPL-SCNC: 29 MMOL/L (ref 22–29)
CREAT BLD-MCNC: 1.49 MG/DL (ref 0.57–1)
DEPRECATED RDW RBC AUTO: 39.3 FL (ref 37–54)
EOSINOPHIL # BLD AUTO: 0.05 10*3/MM3 (ref 0–0.4)
EOSINOPHIL NFR BLD AUTO: 1 % (ref 0.3–6.2)
ERYTHROCYTE [DISTWIDTH] IN BLOOD BY AUTOMATED COUNT: 12.1 % (ref 12.3–15.4)
FLUAV H1 2009 PAND RNA NPH QL NAA+PROBE: DETECTED
FLUAV H1 HA GENE NPH QL NAA+PROBE: NOT DETECTED
FLUAV H3 RNA NPH QL NAA+PROBE: NOT DETECTED
FLUBV RNA ISLT QL NAA+PROBE: NOT DETECTED
GFR SERPL CREATININE-BSD FRML MDRD: 43 ML/MIN/1.73
GLOBULIN UR ELPH-MCNC: 3.1 GM/DL
GLUCOSE BLD-MCNC: 107 MG/DL (ref 65–99)
GLUCOSE BLDC GLUCOMTR-MCNC: 126 MG/DL (ref 70–130)
HADV DNA SPEC NAA+PROBE: NOT DETECTED
HCOV 229E RNA SPEC QL NAA+PROBE: NOT DETECTED
HCOV HKU1 RNA SPEC QL NAA+PROBE: NOT DETECTED
HCOV NL63 RNA SPEC QL NAA+PROBE: NOT DETECTED
HCOV OC43 RNA SPEC QL NAA+PROBE: NOT DETECTED
HCT VFR BLD AUTO: 30.9 % (ref 34–46.6)
HGB BLD-MCNC: 10.4 G/DL (ref 12–15.9)
HMPV RNA NPH QL NAA+NON-PROBE: NOT DETECTED
HPIV1 RNA SPEC QL NAA+PROBE: NOT DETECTED
HPIV2 RNA SPEC QL NAA+PROBE: NOT DETECTED
HPIV3 RNA NPH QL NAA+PROBE: NOT DETECTED
HPIV4 P GENE NPH QL NAA+PROBE: NOT DETECTED
IMM GRANULOCYTES # BLD AUTO: 0.01 10*3/MM3 (ref 0–0.05)
IMM GRANULOCYTES NFR BLD AUTO: 0.2 % (ref 0–0.5)
INR PPP: 0.98 (ref 0.91–1.09)
LYMPHOCYTES # BLD AUTO: 1.54 10*3/MM3 (ref 0.7–3.1)
LYMPHOCYTES NFR BLD AUTO: 32 % (ref 19.6–45.3)
M PNEUMO IGG SER IA-ACNC: NOT DETECTED
MCH RBC QN AUTO: 29.8 PG (ref 26.6–33)
MCHC RBC AUTO-ENTMCNC: 33.7 G/DL (ref 31.5–35.7)
MCV RBC AUTO: 88.5 FL (ref 79–97)
MONOCYTES # BLD AUTO: 0.37 10*3/MM3 (ref 0.1–0.9)
MONOCYTES NFR BLD AUTO: 7.7 % (ref 5–12)
NEUTROPHILS # BLD AUTO: 2.83 10*3/MM3 (ref 1.7–7)
NEUTROPHILS NFR BLD AUTO: 58.7 % (ref 42.7–76)
NRBC BLD AUTO-RTO: 0 /100 WBC (ref 0–0.2)
NT-PROBNP SERPL-MCNC: 974.4 PG/ML (ref 5–900)
PLATELET # BLD AUTO: 188 10*3/MM3 (ref 140–450)
PMV BLD AUTO: 8.9 FL (ref 6–12)
POTASSIUM BLD-SCNC: 5.2 MMOL/L (ref 3.5–5.2)
PROT SERPL-MCNC: 7 G/DL (ref 6–8.5)
PROTHROMBIN TIME: 12.9 SECONDS (ref 11.9–14.6)
RBC # BLD AUTO: 3.49 10*6/MM3 (ref 3.77–5.28)
RHINOVIRUS RNA SPEC NAA+PROBE: NOT DETECTED
RSV RNA NPH QL NAA+NON-PROBE: NOT DETECTED
S PYO AG THROAT QL: NEGATIVE
SODIUM BLD-SCNC: 139 MMOL/L (ref 136–145)
TROPONIN T SERPL-MCNC: 0.04 NG/ML (ref 0–0.03)
TROPONIN T SERPL-MCNC: 0.05 NG/ML (ref 0–0.03)
WBC NRBC COR # BLD: 4.82 10*3/MM3 (ref 3.4–10.8)

## 2020-03-09 PROCEDURE — G0378 HOSPITAL OBSERVATION PER HR: HCPCS

## 2020-03-09 PROCEDURE — 96375 TX/PRO/DX INJ NEW DRUG ADDON: CPT

## 2020-03-09 PROCEDURE — 25010000002 ONDANSETRON PER 1 MG: Performed by: EMERGENCY MEDICINE

## 2020-03-09 PROCEDURE — 96374 THER/PROPH/DIAG INJ IV PUSH: CPT

## 2020-03-09 PROCEDURE — 85025 COMPLETE CBC W/AUTO DIFF WBC: CPT | Performed by: EMERGENCY MEDICINE

## 2020-03-09 PROCEDURE — 96372 THER/PROPH/DIAG INJ SC/IM: CPT

## 2020-03-09 PROCEDURE — 93005 ELECTROCARDIOGRAM TRACING: CPT | Performed by: EMERGENCY MEDICINE

## 2020-03-09 PROCEDURE — 82962 GLUCOSE BLOOD TEST: CPT

## 2020-03-09 PROCEDURE — 93010 ELECTROCARDIOGRAM REPORT: CPT | Performed by: INTERNAL MEDICINE

## 2020-03-09 PROCEDURE — 87081 CULTURE SCREEN ONLY: CPT | Performed by: EMERGENCY MEDICINE

## 2020-03-09 PROCEDURE — 99284 EMERGENCY DEPT VISIT MOD MDM: CPT

## 2020-03-09 PROCEDURE — 84484 ASSAY OF TROPONIN QUANT: CPT | Performed by: INTERNAL MEDICINE

## 2020-03-09 PROCEDURE — 84484 ASSAY OF TROPONIN QUANT: CPT | Performed by: EMERGENCY MEDICINE

## 2020-03-09 PROCEDURE — 85730 THROMBOPLASTIN TIME PARTIAL: CPT | Performed by: EMERGENCY MEDICINE

## 2020-03-09 PROCEDURE — 80053 COMPREHEN METABOLIC PANEL: CPT | Performed by: EMERGENCY MEDICINE

## 2020-03-09 PROCEDURE — 85610 PROTHROMBIN TIME: CPT | Performed by: EMERGENCY MEDICINE

## 2020-03-09 PROCEDURE — 71045 X-RAY EXAM CHEST 1 VIEW: CPT

## 2020-03-09 PROCEDURE — 83880 ASSAY OF NATRIURETIC PEPTIDE: CPT | Performed by: EMERGENCY MEDICINE

## 2020-03-09 PROCEDURE — 25010000002 LORAZEPAM PER 2 MG: Performed by: EMERGENCY MEDICINE

## 2020-03-09 PROCEDURE — 0100U HC BIOFIRE FILMARRAY RESP PANEL 2: CPT | Performed by: EMERGENCY MEDICINE

## 2020-03-09 PROCEDURE — 25010000002 ENOXAPARIN PER 10 MG: Performed by: EMERGENCY MEDICINE

## 2020-03-09 PROCEDURE — 70450 CT HEAD/BRAIN W/O DYE: CPT

## 2020-03-09 PROCEDURE — 87880 STREP A ASSAY W/OPTIC: CPT | Performed by: EMERGENCY MEDICINE

## 2020-03-09 PROCEDURE — 70551 MRI BRAIN STEM W/O DYE: CPT

## 2020-03-09 PROCEDURE — 36415 COLL VENOUS BLD VENIPUNCTURE: CPT

## 2020-03-09 RX ORDER — PRAMIPEXOLE DIHYDROCHLORIDE 0.25 MG/1
0.5 TABLET ORAL 2 TIMES DAILY
Status: DISCONTINUED | OUTPATIENT
Start: 2020-03-10 | End: 2020-03-11 | Stop reason: HOSPADM

## 2020-03-09 RX ORDER — SODIUM CHLORIDE 0.9 % (FLUSH) 0.9 %
10 SYRINGE (ML) INJECTION EVERY 12 HOURS SCHEDULED
Status: DISCONTINUED | OUTPATIENT
Start: 2020-03-09 | End: 2020-03-11 | Stop reason: HOSPADM

## 2020-03-09 RX ORDER — ATORVASTATIN CALCIUM 40 MG/1
80 TABLET, FILM COATED ORAL NIGHTLY
Status: DISCONTINUED | OUTPATIENT
Start: 2020-03-09 | End: 2020-03-11 | Stop reason: HOSPADM

## 2020-03-09 RX ORDER — CLOPIDOGREL BISULFATE 75 MG/1
75 TABLET ORAL DAILY
Status: DISCONTINUED | OUTPATIENT
Start: 2020-03-10 | End: 2020-03-11 | Stop reason: HOSPADM

## 2020-03-09 RX ORDER — OSELTAMIVIR PHOSPHATE 75 MG/1
75 CAPSULE ORAL
Status: DISCONTINUED | OUTPATIENT
Start: 2020-03-09 | End: 2020-03-09

## 2020-03-09 RX ORDER — ACETAMINOPHEN 325 MG/1
650 TABLET ORAL EVERY 4 HOURS PRN
Status: DISCONTINUED | OUTPATIENT
Start: 2020-03-09 | End: 2020-03-11 | Stop reason: HOSPADM

## 2020-03-09 RX ORDER — CLOPIDOGREL BISULFATE 75 MG/1
75 TABLET ORAL ONCE
Status: COMPLETED | OUTPATIENT
Start: 2020-03-09 | End: 2020-03-09

## 2020-03-09 RX ORDER — LORAZEPAM 2 MG/ML
1 INJECTION INTRAMUSCULAR ONCE
Status: COMPLETED | OUTPATIENT
Start: 2020-03-09 | End: 2020-03-09

## 2020-03-09 RX ORDER — GABAPENTIN 300 MG/1
600 CAPSULE ORAL EVERY 8 HOURS SCHEDULED
Status: DISCONTINUED | OUTPATIENT
Start: 2020-03-10 | End: 2020-03-11 | Stop reason: HOSPADM

## 2020-03-09 RX ORDER — LANOLIN ALCOHOL/MO/W.PET/CERES
6 CREAM (GRAM) TOPICAL NIGHTLY
Status: DISCONTINUED | OUTPATIENT
Start: 2020-03-10 | End: 2020-03-11 | Stop reason: HOSPADM

## 2020-03-09 RX ORDER — SODIUM CHLORIDE 0.9 % (FLUSH) 0.9 %
10 SYRINGE (ML) INJECTION AS NEEDED
Status: DISCONTINUED | OUTPATIENT
Start: 2020-03-09 | End: 2020-03-11 | Stop reason: HOSPADM

## 2020-03-09 RX ORDER — ONDANSETRON 2 MG/ML
4 INJECTION INTRAMUSCULAR; INTRAVENOUS EVERY 6 HOURS PRN
Status: DISCONTINUED | OUTPATIENT
Start: 2020-03-09 | End: 2020-03-11 | Stop reason: HOSPADM

## 2020-03-09 RX ORDER — ONDANSETRON 4 MG/1
4 TABLET, FILM COATED ORAL EVERY 6 HOURS PRN
Status: DISCONTINUED | OUTPATIENT
Start: 2020-03-09 | End: 2020-03-11 | Stop reason: HOSPADM

## 2020-03-09 RX ORDER — NITROGLYCERIN 0.4 MG/1
0.4 TABLET SUBLINGUAL
Status: DISCONTINUED | OUTPATIENT
Start: 2020-03-09 | End: 2020-03-11 | Stop reason: HOSPADM

## 2020-03-09 RX ORDER — CARVEDILOL 3.12 MG/1
3.12 TABLET ORAL 2 TIMES DAILY
Status: DISCONTINUED | OUTPATIENT
Start: 2020-03-10 | End: 2020-03-11 | Stop reason: HOSPADM

## 2020-03-09 RX ORDER — MONTELUKAST SODIUM 10 MG/1
10 TABLET ORAL NIGHTLY
Status: DISCONTINUED | OUTPATIENT
Start: 2020-03-10 | End: 2020-03-11 | Stop reason: HOSPADM

## 2020-03-09 RX ORDER — BUMETANIDE 1 MG/1
1 TABLET ORAL 2 TIMES DAILY
Status: DISCONTINUED | OUTPATIENT
Start: 2020-03-10 | End: 2020-03-10

## 2020-03-09 RX ORDER — OSELTAMIVIR PHOSPHATE 30 MG/1
30 CAPSULE ORAL EVERY 12 HOURS SCHEDULED
Status: DISCONTINUED | OUTPATIENT
Start: 2020-03-09 | End: 2020-03-11 | Stop reason: HOSPADM

## 2020-03-09 RX ORDER — OSELTAMIVIR PHOSPHATE 75 MG/1
75 CAPSULE ORAL
Status: COMPLETED | OUTPATIENT
Start: 2020-03-09 | End: 2020-03-09

## 2020-03-09 RX ORDER — DEXTROSE MONOHYDRATE 25 G/50ML
25 INJECTION, SOLUTION INTRAVENOUS
Status: DISCONTINUED | OUTPATIENT
Start: 2020-03-09 | End: 2020-03-11 | Stop reason: HOSPADM

## 2020-03-09 RX ORDER — NICOTINE POLACRILEX 4 MG
15 LOZENGE BUCCAL
Status: DISCONTINUED | OUTPATIENT
Start: 2020-03-09 | End: 2020-03-11 | Stop reason: HOSPADM

## 2020-03-09 RX ORDER — AMLODIPINE BESYLATE 5 MG/1
5 TABLET ORAL DAILY
Status: DISCONTINUED | OUTPATIENT
Start: 2020-03-10 | End: 2020-03-11 | Stop reason: HOSPADM

## 2020-03-09 RX ORDER — ONDANSETRON 2 MG/ML
4 INJECTION INTRAMUSCULAR; INTRAVENOUS ONCE
Status: COMPLETED | OUTPATIENT
Start: 2020-03-09 | End: 2020-03-09

## 2020-03-09 RX ADMIN — Medication 1 APPLICATION: at 17:46

## 2020-03-09 RX ADMIN — GABAPENTIN 600 MG: 300 CAPSULE ORAL at 23:29

## 2020-03-09 RX ADMIN — Medication: at 12:55

## 2020-03-09 RX ADMIN — CARVEDILOL 3.12 MG: 3.12 TABLET, FILM COATED ORAL at 23:29

## 2020-03-09 RX ADMIN — SODIUM CHLORIDE: 9 INJECTION, SOLUTION INTRAVENOUS at 10:04

## 2020-03-09 RX ADMIN — OSELTAMIVIR PHOSPHATE 30 MG: 30 CAPSULE ORAL at 22:36

## 2020-03-09 RX ADMIN — Medication 6 MG: at 23:29

## 2020-03-09 RX ADMIN — CLOPIDOGREL 75 MG: 75 TABLET, FILM COATED ORAL at 11:45

## 2020-03-09 RX ADMIN — SODIUM CHLORIDE, PRESERVATIVE FREE 10 ML: 5 INJECTION INTRAVENOUS at 21:56

## 2020-03-09 RX ADMIN — ATORVASTATIN CALCIUM 80 MG: 40 TABLET, FILM COATED ORAL at 21:55

## 2020-03-09 RX ADMIN — PRAMIPEXOLE DIHYDROCHLORIDE 0.5 MG: 0.25 TABLET ORAL at 23:29

## 2020-03-09 RX ADMIN — ENOXAPARIN SODIUM 100 MG: 100 INJECTION SUBCUTANEOUS at 10:44

## 2020-03-09 RX ADMIN — LORAZEPAM 1 MG: 2 INJECTION INTRAMUSCULAR; INTRAVENOUS at 13:48

## 2020-03-09 RX ADMIN — OSELTAMIVIR PHOSPHATE 75 MG: 75 CAPSULE ORAL at 17:46

## 2020-03-09 RX ADMIN — MONTELUKAST SODIUM 10 MG: 10 TABLET, FILM COATED ORAL at 23:29

## 2020-03-09 RX ADMIN — ONDANSETRON HYDROCHLORIDE 4 MG: 2 SOLUTION INTRAMUSCULAR; INTRAVENOUS at 10:43

## 2020-03-09 RX ADMIN — BUMETANIDE 1 MG: 1 TABLET ORAL at 23:29

## 2020-03-09 NOTE — TELEPHONE ENCOUNTER
Pt  Called and stated that she is currently in [facility] and they are admitting her. She has an appointment Thursday for a colonoscopy with [address] and she will need to cancel this appointment due to her admission. Mago Cordon      Spoke to GI and cancelled pt appointment

## 2020-03-10 ENCOUNTER — APPOINTMENT (OUTPATIENT)
Dept: CARDIOLOGY | Facility: HOSPITAL | Age: 60
End: 2020-03-10

## 2020-03-10 ENCOUNTER — APPOINTMENT (OUTPATIENT)
Dept: ULTRASOUND IMAGING | Facility: HOSPITAL | Age: 60
End: 2020-03-10

## 2020-03-10 ENCOUNTER — APPOINTMENT (OUTPATIENT)
Dept: GENERAL RADIOLOGY | Facility: HOSPITAL | Age: 60
End: 2020-03-10

## 2020-03-10 LAB
ANION GAP SERPL CALCULATED.3IONS-SCNC: 11 MMOL/L (ref 5–15)
BH CV ECHO MEAS - AO MAX PG (FULL): 4.1 MMHG
BH CV ECHO MEAS - AO MAX PG: 9.4 MMHG
BH CV ECHO MEAS - AO MEAN PG (FULL): 2 MMHG
BH CV ECHO MEAS - AO MEAN PG: 5 MMHG
BH CV ECHO MEAS - AO ROOT AREA (BSA CORRECTED): 1.2
BH CV ECHO MEAS - AO ROOT AREA: 4.5 CM^2
BH CV ECHO MEAS - AO ROOT DIAM: 2.4 CM
BH CV ECHO MEAS - AO V2 MAX: 153 CM/SEC
BH CV ECHO MEAS - AO V2 MEAN: 109 CM/SEC
BH CV ECHO MEAS - AO V2 VTI: 34.3 CM
BH CV ECHO MEAS - AVA(I,A): 2.5 CM^2
BH CV ECHO MEAS - AVA(I,D): 2.5 CM^2
BH CV ECHO MEAS - AVA(V,A): 2.4 CM^2
BH CV ECHO MEAS - AVA(V,D): 2.4 CM^2
BH CV ECHO MEAS - BSA(HAYCOCK): 2.1 M^2
BH CV ECHO MEAS - BSA: 2 M^2
BH CV ECHO MEAS - BZI_BMI: 41.4 KILOGRAMS/M^2
BH CV ECHO MEAS - BZI_METRIC_HEIGHT: 154.9 CM
BH CV ECHO MEAS - BZI_METRIC_WEIGHT: 99.3 KG
BH CV ECHO MEAS - EDV(CUBED): 93.6 ML
BH CV ECHO MEAS - EDV(MOD-SP4): 101 ML
BH CV ECHO MEAS - EDV(TEICH): 94.4 ML
BH CV ECHO MEAS - EF(CUBED): 84.7 %
BH CV ECHO MEAS - EF(MOD-SP4): 67.7 %
BH CV ECHO MEAS - EF(TEICH): 78 %
BH CV ECHO MEAS - ESV(CUBED): 14.3 ML
BH CV ECHO MEAS - ESV(MOD-SP4): 32.6 ML
BH CV ECHO MEAS - ESV(TEICH): 20.8 ML
BH CV ECHO MEAS - FS: 46.5 %
BH CV ECHO MEAS - IVS/LVPW: 0.95
BH CV ECHO MEAS - IVSD: 1.3 CM
BH CV ECHO MEAS - LA DIMENSION: 4.1 CM
BH CV ECHO MEAS - LA/AO: 1.7
BH CV ECHO MEAS - LAT PEAK E' VEL: 3.8 CM/SEC
BH CV ECHO MEAS - LV DIASTOLIC VOL/BSA (35-75): 51.4 ML/M^2
BH CV ECHO MEAS - LV MASS(C)D: 232 GRAMS
BH CV ECHO MEAS - LV MASS(C)DI: 118.2 GRAMS/M^2
BH CV ECHO MEAS - LV MAX PG: 5.3 MMHG
BH CV ECHO MEAS - LV MEAN PG: 3 MMHG
BH CV ECHO MEAS - LV SYSTOLIC VOL/BSA (12-30): 16.6 ML/M^2
BH CV ECHO MEAS - LV V1 MAX: 115 CM/SEC
BH CV ECHO MEAS - LV V1 MEAN: 84.1 CM/SEC
BH CV ECHO MEAS - LV V1 VTI: 27.3 CM
BH CV ECHO MEAS - LVIDD: 4.5 CM
BH CV ECHO MEAS - LVIDS: 2.4 CM
BH CV ECHO MEAS - LVLD AP4: 6.7 CM
BH CV ECHO MEAS - LVLS AP4: 5.9 CM
BH CV ECHO MEAS - LVOT AREA (M): 3.1 CM^2
BH CV ECHO MEAS - LVOT AREA: 3.1 CM^2
BH CV ECHO MEAS - LVOT DIAM: 2 CM
BH CV ECHO MEAS - LVPWD: 1.4 CM
BH CV ECHO MEAS - MED PEAK E' VEL: 3.37 CM/SEC
BH CV ECHO MEAS - MV A MAX VEL: 58.2 CM/SEC
BH CV ECHO MEAS - MV DEC TIME: 0.21 SEC
BH CV ECHO MEAS - MV E MAX VEL: 80.5 CM/SEC
BH CV ECHO MEAS - MV E/A: 1.4
BH CV ECHO MEAS - PA MAX PG: 2.3 MMHG
BH CV ECHO MEAS - PA V2 MAX: 75.2 CM/SEC
BH CV ECHO MEAS - SI(AO): 79 ML/M^2
BH CV ECHO MEAS - SI(CUBED): 40.4 ML/M^2
BH CV ECHO MEAS - SI(LVOT): 43.7 ML/M^2
BH CV ECHO MEAS - SI(MOD-SP4): 34.8 ML/M^2
BH CV ECHO MEAS - SI(TEICH): 37.5 ML/M^2
BH CV ECHO MEAS - SV(AO): 155.2 ML
BH CV ECHO MEAS - SV(CUBED): 79.2 ML
BH CV ECHO MEAS - SV(LVOT): 85.8 ML
BH CV ECHO MEAS - SV(MOD-SP4): 68.4 ML
BH CV ECHO MEAS - SV(TEICH): 73.6 ML
BH CV ECHO MEASUREMENTS AVERAGE E/E' RATIO: 22.45
BUN BLD-MCNC: 34 MG/DL (ref 8–23)
BUN/CREAT SERPL: 27.2 (ref 7–25)
CALCIUM SPEC-SCNC: 9.1 MG/DL (ref 8.6–10.5)
CHLORIDE SERPL-SCNC: 100 MMOL/L (ref 98–107)
CHOLEST SERPL-MCNC: 117 MG/DL (ref 0–200)
CO2 SERPL-SCNC: 29 MMOL/L (ref 22–29)
CREAT BLD-MCNC: 1.25 MG/DL (ref 0.57–1)
DEPRECATED RDW RBC AUTO: 39.3 FL (ref 37–54)
ERYTHROCYTE [DISTWIDTH] IN BLOOD BY AUTOMATED COUNT: 12.1 % (ref 12.3–15.4)
GFR SERPL CREATININE-BSD FRML MDRD: 53 ML/MIN/1.73
GLUCOSE BLD-MCNC: 174 MG/DL (ref 65–99)
GLUCOSE BLDC GLUCOMTR-MCNC: 211 MG/DL (ref 70–130)
GLUCOSE BLDC GLUCOMTR-MCNC: 299 MG/DL (ref 70–130)
GLUCOSE BLDC GLUCOMTR-MCNC: 355 MG/DL (ref 70–130)
HBA1C MFR BLD: 7.6 % (ref 4.8–5.6)
HCT VFR BLD AUTO: 31 % (ref 34–46.6)
HDLC SERPL-MCNC: 33 MG/DL (ref 40–60)
HGB BLD-MCNC: 10.5 G/DL (ref 12–15.9)
LDLC SERPL CALC-MCNC: 57 MG/DL (ref 0–100)
LDLC/HDLC SERPL: 1.72 {RATIO}
LEFT ATRIUM VOLUME INDEX: 29.9 ML/M2
LEFT ATRIUM VOLUME: 58.7 CM3
MAXIMAL PREDICTED HEART RATE: 158 BPM
MCH RBC QN AUTO: 30.3 PG (ref 26.6–33)
MCHC RBC AUTO-ENTMCNC: 33.9 G/DL (ref 31.5–35.7)
MCV RBC AUTO: 89.6 FL (ref 79–97)
PLATELET # BLD AUTO: 174 10*3/MM3 (ref 140–450)
PMV BLD AUTO: 8.9 FL (ref 6–12)
POTASSIUM BLD-SCNC: 5 MMOL/L (ref 3.5–5.2)
PROCALCITONIN SERPL-MCNC: 0.08 NG/ML (ref 0.1–0.25)
RBC # BLD AUTO: 3.46 10*6/MM3 (ref 3.77–5.28)
SODIUM BLD-SCNC: 140 MMOL/L (ref 136–145)
STRESS TARGET HR: 134 BPM
TRIGL SERPL-MCNC: 136 MG/DL (ref 0–150)
TSH SERPL DL<=0.05 MIU/L-ACNC: 1.73 UIU/ML (ref 0.27–4.2)
VLDLC SERPL-MCNC: 27.2 MG/DL
WBC NRBC COR # BLD: 4.32 10*3/MM3 (ref 3.4–10.8)

## 2020-03-10 PROCEDURE — 97162 PT EVAL MOD COMPLEX 30 MIN: CPT

## 2020-03-10 PROCEDURE — 84145 PROCALCITONIN (PCT): CPT | Performed by: INTERNAL MEDICINE

## 2020-03-10 PROCEDURE — G0378 HOSPITAL OBSERVATION PER HR: HCPCS

## 2020-03-10 PROCEDURE — 63710000001 INSULIN LISPRO (HUMAN) PER 5 UNITS: Performed by: INTERNAL MEDICINE

## 2020-03-10 PROCEDURE — 25010000002 METHYLPREDNISOLONE PER 125 MG: Performed by: INTERNAL MEDICINE

## 2020-03-10 PROCEDURE — 99205 OFFICE O/P NEW HI 60 MIN: CPT | Performed by: PSYCHIATRY & NEUROLOGY

## 2020-03-10 PROCEDURE — 80048 BASIC METABOLIC PNL TOTAL CA: CPT | Performed by: INTERNAL MEDICINE

## 2020-03-10 PROCEDURE — 84443 ASSAY THYROID STIM HORMONE: CPT | Performed by: INTERNAL MEDICINE

## 2020-03-10 PROCEDURE — 25010000002 ENOXAPARIN PER 10 MG: Performed by: INTERNAL MEDICINE

## 2020-03-10 PROCEDURE — 73562 X-RAY EXAM OF KNEE 3: CPT

## 2020-03-10 PROCEDURE — 63710000001 INSULIN DETEMIR PER 5 UNITS: Performed by: INTERNAL MEDICINE

## 2020-03-10 PROCEDURE — 92523 SPEECH SOUND LANG COMPREHEN: CPT | Performed by: SPEECH-LANGUAGE PATHOLOGIST

## 2020-03-10 PROCEDURE — 93880 EXTRACRANIAL BILAT STUDY: CPT

## 2020-03-10 PROCEDURE — 93306 TTE W/DOPPLER COMPLETE: CPT | Performed by: INTERNAL MEDICINE

## 2020-03-10 PROCEDURE — 93306 TTE W/DOPPLER COMPLETE: CPT

## 2020-03-10 PROCEDURE — 83036 HEMOGLOBIN GLYCOSYLATED A1C: CPT | Performed by: INTERNAL MEDICINE

## 2020-03-10 PROCEDURE — 93880 EXTRACRANIAL BILAT STUDY: CPT | Performed by: SURGERY

## 2020-03-10 PROCEDURE — 85027 COMPLETE CBC AUTOMATED: CPT | Performed by: INTERNAL MEDICINE

## 2020-03-10 PROCEDURE — 25010000002 FUROSEMIDE PER 20 MG: Performed by: INTERNAL MEDICINE

## 2020-03-10 PROCEDURE — 96375 TX/PRO/DX INJ NEW DRUG ADDON: CPT

## 2020-03-10 PROCEDURE — 96372 THER/PROPH/DIAG INJ SC/IM: CPT

## 2020-03-10 PROCEDURE — 97116 GAIT TRAINING THERAPY: CPT

## 2020-03-10 PROCEDURE — 25010000002 PERFLUTREN 6.52 MG/ML SUSPENSION: Performed by: INTERNAL MEDICINE

## 2020-03-10 PROCEDURE — 82962 GLUCOSE BLOOD TEST: CPT

## 2020-03-10 PROCEDURE — 97166 OT EVAL MOD COMPLEX 45 MIN: CPT | Performed by: OCCUPATIONAL THERAPIST

## 2020-03-10 PROCEDURE — 80061 LIPID PANEL: CPT | Performed by: INTERNAL MEDICINE

## 2020-03-10 RX ORDER — BUMETANIDE 1 MG/1
1 TABLET ORAL
Status: DISCONTINUED | OUTPATIENT
Start: 2020-03-11 | End: 2020-03-11 | Stop reason: HOSPADM

## 2020-03-10 RX ORDER — METHYLPREDNISOLONE SODIUM SUCCINATE 125 MG/2ML
125 INJECTION, POWDER, LYOPHILIZED, FOR SOLUTION INTRAMUSCULAR; INTRAVENOUS ONCE
Status: COMPLETED | OUTPATIENT
Start: 2020-03-10 | End: 2020-03-10

## 2020-03-10 RX ORDER — FUROSEMIDE 10 MG/ML
40 INJECTION INTRAMUSCULAR; INTRAVENOUS ONCE
Status: COMPLETED | OUTPATIENT
Start: 2020-03-10 | End: 2020-03-10

## 2020-03-10 RX ADMIN — GABAPENTIN 600 MG: 300 CAPSULE ORAL at 22:02

## 2020-03-10 RX ADMIN — AMLODIPINE BESYLATE 5 MG: 5 TABLET ORAL at 09:08

## 2020-03-10 RX ADMIN — INSULIN DETEMIR 20 UNITS: 100 INJECTION, SOLUTION SUBCUTANEOUS at 22:15

## 2020-03-10 RX ADMIN — CARVEDILOL 3.12 MG: 3.12 TABLET, FILM COATED ORAL at 09:07

## 2020-03-10 RX ADMIN — PERFLUTREN 9.78 MG: 6.52 INJECTION, SUSPENSION INTRAVENOUS at 10:50

## 2020-03-10 RX ADMIN — METHYLPREDNISOLONE SODIUM SUCCINATE 125 MG: 125 INJECTION, POWDER, FOR SOLUTION INTRAMUSCULAR; INTRAVENOUS at 12:17

## 2020-03-10 RX ADMIN — GABAPENTIN 600 MG: 300 CAPSULE ORAL at 09:08

## 2020-03-10 RX ADMIN — SERTRALINE 25 MG: 50 TABLET, FILM COATED ORAL at 09:08

## 2020-03-10 RX ADMIN — OSELTAMIVIR PHOSPHATE 30 MG: 30 CAPSULE ORAL at 22:01

## 2020-03-10 RX ADMIN — CARVEDILOL 3.12 MG: 3.12 TABLET, FILM COATED ORAL at 22:02

## 2020-03-10 RX ADMIN — PRAMIPEXOLE DIHYDROCHLORIDE 0.5 MG: 0.25 TABLET ORAL at 22:02

## 2020-03-10 RX ADMIN — PRAMIPEXOLE DIHYDROCHLORIDE 0.5 MG: 0.25 TABLET ORAL at 09:08

## 2020-03-10 RX ADMIN — MONTELUKAST SODIUM 10 MG: 10 TABLET, FILM COATED ORAL at 22:02

## 2020-03-10 RX ADMIN — ISOSORBIDE MONONITRATE 90 MG: 30 TABLET, EXTENDED RELEASE ORAL at 09:08

## 2020-03-10 RX ADMIN — FUROSEMIDE 40 MG: 10 INJECTION, SOLUTION INTRAMUSCULAR; INTRAVENOUS at 12:18

## 2020-03-10 RX ADMIN — INSULIN LISPRO 2 UNITS: 100 INJECTION, SOLUTION INTRAVENOUS; SUBCUTANEOUS at 09:07

## 2020-03-10 RX ADMIN — INSULIN LISPRO 6 UNITS: 100 INJECTION, SOLUTION INTRAVENOUS; SUBCUTANEOUS at 17:33

## 2020-03-10 RX ADMIN — ATORVASTATIN CALCIUM 80 MG: 40 TABLET, FILM COATED ORAL at 22:02

## 2020-03-10 RX ADMIN — OSELTAMIVIR PHOSPHATE 30 MG: 30 CAPSULE ORAL at 09:08

## 2020-03-10 RX ADMIN — CLOPIDOGREL 75 MG: 75 TABLET, FILM COATED ORAL at 09:08

## 2020-03-10 RX ADMIN — Medication 6 MG: at 22:03

## 2020-03-10 RX ADMIN — SODIUM CHLORIDE, PRESERVATIVE FREE 10 ML: 5 INJECTION INTRAVENOUS at 22:01

## 2020-03-10 RX ADMIN — INSULIN LISPRO 8 UNITS: 100 INJECTION, SOLUTION INTRAVENOUS; SUBCUTANEOUS at 22:15

## 2020-03-10 RX ADMIN — ENOXAPARIN SODIUM 40 MG: 40 INJECTION SUBCUTANEOUS at 12:18

## 2020-03-10 RX ADMIN — INSULIN LISPRO 2 UNITS: 100 INJECTION, SOLUTION INTRAVENOUS; SUBCUTANEOUS at 12:18

## 2020-03-10 RX ADMIN — BUMETANIDE 1 MG: 1 TABLET ORAL at 09:07

## 2020-03-11 VITALS
OXYGEN SATURATION: 98 % | RESPIRATION RATE: 18 BRPM | BODY MASS INDEX: 41.35 KG/M2 | TEMPERATURE: 97.3 F | HEART RATE: 78 BPM | DIASTOLIC BLOOD PRESSURE: 54 MMHG | HEIGHT: 61 IN | SYSTOLIC BLOOD PRESSURE: 157 MMHG | WEIGHT: 219 LBS

## 2020-03-11 LAB
ANION GAP SERPL CALCULATED.3IONS-SCNC: 10 MMOL/L (ref 5–15)
BACTERIA SPEC AEROBE CULT: NORMAL
BUN BLD-MCNC: 44 MG/DL (ref 8–23)
BUN/CREAT SERPL: 32.8 (ref 7–25)
CALCIUM SPEC-SCNC: 9 MG/DL (ref 8.6–10.5)
CHLORIDE SERPL-SCNC: 99 MMOL/L (ref 98–107)
CO2 SERPL-SCNC: 27 MMOL/L (ref 22–29)
CREAT BLD-MCNC: 1.34 MG/DL (ref 0.57–1)
DEPRECATED RDW RBC AUTO: 37.6 FL (ref 37–54)
ERYTHROCYTE [DISTWIDTH] IN BLOOD BY AUTOMATED COUNT: 11.8 % (ref 12.3–15.4)
FOLATE SERPL-MCNC: 12.7 NG/ML (ref 4.78–24.2)
GFR SERPL CREATININE-BSD FRML MDRD: 49 ML/MIN/1.73
GLUCOSE BLD-MCNC: 297 MG/DL (ref 65–99)
GLUCOSE BLDC GLUCOMTR-MCNC: 197 MG/DL (ref 70–130)
HCT VFR BLD AUTO: 29.5 % (ref 34–46.6)
HGB BLD-MCNC: 10.2 G/DL (ref 12–15.9)
IRON 24H UR-MRATE: 55 MCG/DL (ref 37–145)
IRON SATN MFR SERPL: 18 % (ref 20–50)
MCH RBC QN AUTO: 30.2 PG (ref 26.6–33)
MCHC RBC AUTO-ENTMCNC: 34.6 G/DL (ref 31.5–35.7)
MCV RBC AUTO: 87.3 FL (ref 79–97)
PLATELET # BLD AUTO: 202 10*3/MM3 (ref 140–450)
PMV BLD AUTO: 9 FL (ref 6–12)
POTASSIUM BLD-SCNC: 4.6 MMOL/L (ref 3.5–5.2)
RBC # BLD AUTO: 3.38 10*6/MM3 (ref 3.77–5.28)
SODIUM BLD-SCNC: 136 MMOL/L (ref 136–145)
TIBC SERPL-MCNC: 310 MCG/DL (ref 298–536)
TRANSFERRIN SERPL-MCNC: 208 MG/DL (ref 200–360)
VIT B12 BLD-MCNC: 869 PG/ML (ref 211–946)
WBC NRBC COR # BLD: 5.19 10*3/MM3 (ref 3.4–10.8)

## 2020-03-11 PROCEDURE — 25010000002 ENOXAPARIN PER 10 MG: Performed by: INTERNAL MEDICINE

## 2020-03-11 PROCEDURE — 82746 ASSAY OF FOLIC ACID SERUM: CPT | Performed by: CLINICAL NURSE SPECIALIST

## 2020-03-11 PROCEDURE — 97116 GAIT TRAINING THERAPY: CPT

## 2020-03-11 PROCEDURE — 85027 COMPLETE CBC AUTOMATED: CPT | Performed by: INTERNAL MEDICINE

## 2020-03-11 PROCEDURE — 97530 THERAPEUTIC ACTIVITIES: CPT

## 2020-03-11 PROCEDURE — 63710000001 INSULIN LISPRO (HUMAN) PER 5 UNITS: Performed by: INTERNAL MEDICINE

## 2020-03-11 PROCEDURE — G0378 HOSPITAL OBSERVATION PER HR: HCPCS

## 2020-03-11 PROCEDURE — 82607 VITAMIN B-12: CPT | Performed by: CLINICAL NURSE SPECIALIST

## 2020-03-11 PROCEDURE — 96372 THER/PROPH/DIAG INJ SC/IM: CPT

## 2020-03-11 PROCEDURE — 82962 GLUCOSE BLOOD TEST: CPT

## 2020-03-11 PROCEDURE — 80048 BASIC METABOLIC PNL TOTAL CA: CPT | Performed by: INTERNAL MEDICINE

## 2020-03-11 PROCEDURE — 97535 SELF CARE MNGMENT TRAINING: CPT

## 2020-03-11 PROCEDURE — 83540 ASSAY OF IRON: CPT | Performed by: CLINICAL NURSE SPECIALIST

## 2020-03-11 PROCEDURE — 84466 ASSAY OF TRANSFERRIN: CPT | Performed by: CLINICAL NURSE SPECIALIST

## 2020-03-11 PROCEDURE — 99214 OFFICE O/P EST MOD 30 MIN: CPT | Performed by: PSYCHIATRY & NEUROLOGY

## 2020-03-11 RX ORDER — CLOPIDOGREL BISULFATE 75 MG/1
75 TABLET ORAL DAILY
Qty: 30 TABLET | Refills: 0 | Status: SHIPPED | OUTPATIENT
Start: 2020-03-12

## 2020-03-11 RX ORDER — OSELTAMIVIR PHOSPHATE 30 MG/1
30 CAPSULE ORAL EVERY 12 HOURS SCHEDULED
Qty: 5 CAPSULE | Refills: 0 | Status: SHIPPED | OUTPATIENT
Start: 2020-03-11 | End: 2020-03-14

## 2020-03-11 RX ADMIN — ENOXAPARIN SODIUM 40 MG: 40 INJECTION SUBCUTANEOUS at 12:40

## 2020-03-11 RX ADMIN — OSELTAMIVIR PHOSPHATE 30 MG: 30 CAPSULE ORAL at 08:37

## 2020-03-11 RX ADMIN — INSULIN LISPRO 2 UNITS: 100 INJECTION, SOLUTION INTRAVENOUS; SUBCUTANEOUS at 12:40

## 2020-03-11 RX ADMIN — SERTRALINE 25 MG: 50 TABLET, FILM COATED ORAL at 08:37

## 2020-03-11 RX ADMIN — GABAPENTIN 600 MG: 300 CAPSULE ORAL at 05:03

## 2020-03-11 RX ADMIN — BUMETANIDE 1 MG: 1 TABLET ORAL at 08:37

## 2020-03-11 RX ADMIN — CARVEDILOL 3.12 MG: 3.12 TABLET, FILM COATED ORAL at 08:37

## 2020-03-11 RX ADMIN — AMLODIPINE BESYLATE 5 MG: 5 TABLET ORAL at 08:37

## 2020-03-11 RX ADMIN — CLOPIDOGREL 75 MG: 75 TABLET, FILM COATED ORAL at 08:37

## 2020-03-11 RX ADMIN — ISOSORBIDE MONONITRATE 90 MG: 30 TABLET, EXTENDED RELEASE ORAL at 08:37

## 2020-03-11 RX ADMIN — INSULIN LISPRO 6 UNITS: 100 INJECTION, SOLUTION INTRAVENOUS; SUBCUTANEOUS at 08:38

## 2020-03-11 RX ADMIN — PRAMIPEXOLE DIHYDROCHLORIDE 0.5 MG: 0.25 TABLET ORAL at 08:37

## 2020-03-12 ENCOUNTER — READMISSION MANAGEMENT (OUTPATIENT)
Dept: CALL CENTER | Facility: HOSPITAL | Age: 60
End: 2020-03-12

## 2020-03-12 NOTE — OUTREACH NOTE
Prep Survey      Responses   [facility] facility patient discharged from?  [place]   Is LACE score < 7 ?  No   Eligibility  Not Eligible   What are the reasons patient is not eligible?  Other [[facility]]   Does the patient have one of the following disease processes/diagnoses(primary or secondary)?  Other   Prep survey completed?  Yes          Yesica Miles RN

## 2020-03-15 ENCOUNTER — HOSPITAL ENCOUNTER (EMERGENCY)
Facility: HOSPITAL | Age: 60
Discharge: HOME | End: 2020-03-15
Attending: EMERGENCY MEDICINE | Admitting: EMERGENCY MEDICINE

## 2020-03-15 ENCOUNTER — APPOINTMENT (OUTPATIENT)
Dept: GENERAL RADIOLOGY | Facility: HOSPITAL | Age: 60
End: 2020-03-15

## 2020-03-15 VITALS
OXYGEN SATURATION: 100 % | HEIGHT: 61 IN | WEIGHT: 219 LBS | HEART RATE: 64 BPM | RESPIRATION RATE: 19 BRPM | TEMPERATURE: 98.1 F | DIASTOLIC BLOOD PRESSURE: 72 MMHG | SYSTOLIC BLOOD PRESSURE: 152 MMHG | BODY MASS INDEX: 41.35 KG/M2

## 2020-03-15 DIAGNOSIS — J11.1 INFLUENZA: Primary | ICD-10-CM

## 2020-03-15 LAB
ALBUMIN SERPL-MCNC: 3.9 G/DL (ref 3.5–5.2)
ALBUMIN/GLOB SERPL: 1.3 G/DL
ALP SERPL-CCNC: 62 U/L (ref 39–117)
ALT SERPL W P-5'-P-CCNC: 14 U/L (ref 1–33)
ANION GAP SERPL CALCULATED.3IONS-SCNC: 11 MMOL/L (ref 5–15)
AST SERPL-CCNC: 12 U/L (ref 1–32)
BASOPHILS # BLD AUTO: 0.02 10*3/MM3 (ref 0–0.2)
BASOPHILS NFR BLD AUTO: 0.3 % (ref 0–1.5)
BILIRUB SERPL-MCNC: 0.2 MG/DL (ref 0.2–1.2)
BUN BLD-MCNC: 33 MG/DL (ref 8–23)
BUN/CREAT SERPL: 25.8 (ref 7–25)
CALCIUM SPEC-SCNC: 9.2 MG/DL (ref 8.6–10.5)
CHLORIDE SERPL-SCNC: 99 MMOL/L (ref 98–107)
CO2 SERPL-SCNC: 28 MMOL/L (ref 22–29)
CREAT BLD-MCNC: 1.28 MG/DL (ref 0.57–1)
DEPRECATED RDW RBC AUTO: 39.6 FL (ref 37–54)
EOSINOPHIL # BLD AUTO: 0.07 10*3/MM3 (ref 0–0.4)
EOSINOPHIL NFR BLD AUTO: 1.1 % (ref 0.3–6.2)
ERYTHROCYTE [DISTWIDTH] IN BLOOD BY AUTOMATED COUNT: 12.2 % (ref 12.3–15.4)
GFR SERPL CREATININE-BSD FRML MDRD: 51 ML/MIN/1.73
GLOBULIN UR ELPH-MCNC: 2.9 GM/DL
GLUCOSE BLD-MCNC: 343 MG/DL (ref 65–99)
HCT VFR BLD AUTO: 32.1 % (ref 34–46.6)
HGB BLD-MCNC: 10.6 G/DL (ref 12–15.9)
IMM GRANULOCYTES # BLD AUTO: 0.02 10*3/MM3 (ref 0–0.05)
IMM GRANULOCYTES NFR BLD AUTO: 0.3 % (ref 0–0.5)
LYMPHOCYTES # BLD AUTO: 2.14 10*3/MM3 (ref 0.7–3.1)
LYMPHOCYTES NFR BLD AUTO: 35 % (ref 19.6–45.3)
MCH RBC QN AUTO: 29.5 PG (ref 26.6–33)
MCHC RBC AUTO-ENTMCNC: 33 G/DL (ref 31.5–35.7)
MCV RBC AUTO: 89.4 FL (ref 79–97)
MONOCYTES # BLD AUTO: 0.57 10*3/MM3 (ref 0.1–0.9)
MONOCYTES NFR BLD AUTO: 9.3 % (ref 5–12)
NEUTROPHILS # BLD AUTO: 3.3 10*3/MM3 (ref 1.7–7)
NEUTROPHILS NFR BLD AUTO: 54 % (ref 42.7–76)
NRBC BLD AUTO-RTO: 0 /100 WBC (ref 0–0.2)
NT-PROBNP SERPL-MCNC: 2298 PG/ML (ref 5–900)
PLATELET # BLD AUTO: 285 10*3/MM3 (ref 140–450)
PMV BLD AUTO: 8.5 FL (ref 6–12)
POTASSIUM BLD-SCNC: 4.4 MMOL/L (ref 3.5–5.2)
PROT SERPL-MCNC: 6.8 G/DL (ref 6–8.5)
RBC # BLD AUTO: 3.59 10*6/MM3 (ref 3.77–5.28)
SODIUM BLD-SCNC: 138 MMOL/L (ref 136–145)
TROPONIN T SERPL-MCNC: 0.04 NG/ML (ref 0–0.03)
WBC NRBC COR # BLD: 6.12 10*3/MM3 (ref 3.4–10.8)

## 2020-03-15 PROCEDURE — 93005 ELECTROCARDIOGRAM TRACING: CPT | Performed by: EMERGENCY MEDICINE

## 2020-03-15 PROCEDURE — 83880 ASSAY OF NATRIURETIC PEPTIDE: CPT | Performed by: EMERGENCY MEDICINE

## 2020-03-15 PROCEDURE — 80053 COMPREHEN METABOLIC PANEL: CPT | Performed by: EMERGENCY MEDICINE

## 2020-03-15 PROCEDURE — 71045 X-RAY EXAM CHEST 1 VIEW: CPT

## 2020-03-15 PROCEDURE — 93010 ELECTROCARDIOGRAM REPORT: CPT | Performed by: INTERNAL MEDICINE

## 2020-03-15 PROCEDURE — 99283 EMERGENCY DEPT VISIT LOW MDM: CPT

## 2020-03-15 PROCEDURE — 84484 ASSAY OF TROPONIN QUANT: CPT | Performed by: EMERGENCY MEDICINE

## 2020-03-15 PROCEDURE — 85025 COMPLETE CBC W/AUTO DIFF WBC: CPT | Performed by: EMERGENCY MEDICINE

## 2020-03-15 PROCEDURE — 93005 ELECTROCARDIOGRAM TRACING: CPT

## 2020-03-15 RX ORDER — SODIUM CHLORIDE 0.9 % (FLUSH) 0.9 %
10 SYRINGE (ML) INJECTION AS NEEDED
Status: DISCONTINUED | OUTPATIENT
Start: 2020-03-15 | End: 2020-03-15 | Stop reason: HOSPADM

## 2020-03-15 NOTE — ED PROVIDER NOTES
Subjective   Patient presents with complaint of shortness of breath and cough.  She says she just got to the hospital 5 days ago with similar symptoms where she had been diagnosed with the flu.  She has finished all the medication but she does not feel like she is getting any better.  She still has a severe cough that is mostly nonproductive but does have occasional phlegm.  She does not have any fever or chills.  She does have some cough and shortness of breath also and wanted to be rechecked.      History provided by:  Patient   used: No    Shortness of Breath   Severity:  Severe  Onset quality:  Gradual  Duration:  1 week  Timing:  Constant  Progression:  Unchanged  Chronicity:  New  Context: URI    Context: not activity, not animal exposure, not emotional upset, not fumes, not known allergens, not occupational exposure, not pollens, not smoke exposure, not strong odors and not weather changes    Relieved by:  Nothing  Worsened by:  Exertion  Ineffective treatments:  None tried  Associated symptoms: chest pain and cough    Associated symptoms: no abdominal pain, no claudication, no diaphoresis, no ear pain, no fever, no headaches, no hemoptysis, no neck pain and no PND        Review of Systems   Constitutional: Negative.  Negative for diaphoresis and fever.   HENT: Negative.  Negative for ear pain.    Respiratory: Positive for cough and shortness of breath. Negative for hemoptysis.    Cardiovascular: Positive for chest pain. Negative for claudication and PND.   Gastrointestinal: Negative.  Negative for abdominal pain.   Genitourinary: Negative.    Musculoskeletal: Negative.  Negative for neck pain.   Skin: Negative.    Neurological: Negative.  Negative for headaches.   Psychiatric/Behavioral: Negative.    All other systems reviewed and are negative.      Past Medical History:   Diagnosis Date   • Anxiety    • Arthritis    • Asthma    • CHF (congestive heart failure) (CMS/[facility])    • Diabetes  mellitus (CMS/[facility])    • Hyperlipidemia    • Hypertension    • Myocardial infarction (CMS/[facility])    • Stroke (CMS/[facility])        Allergies   Allergen Reactions   • Albuterol Shortness Of Breath   • Levofloxacin Anaphylaxis, Shortness Of Breath and Swelling   • Metformin And Related Diarrhea   • Aspirin Rash       Past Surgical History:   Procedure Laterality Date   • CORONARY STENT PLACEMENT     • TONSILLECTOMY         No family history on file.    Social History     Socioeconomic History   • Marital status:      Spouse name: Not on file   • Number of children: Not on file   • Years of education: Not on file   • Highest education level: Not on file   Tobacco Use   • Smoking status: Never Smoker   • Smokeless tobacco: Never Used       Prior to Admission medications    Medication Sig Start Date End Date Taking? Authorizing Provider   amLODIPine (NORVASC) 5 MG tablet Take 5 mg by mouth Daily. 3/2/20   Nurse Kenia Iniguez RN   atorvastatin (LIPITOR) 20 MG tablet Take 20 mg by mouth Daily. 3/2/20   Geetha, Nurse Kenia RN   bumetanide (BUMEX) 1 MG tablet Take 1 mg by mouth 2 (Two) Times a Day. 11/21/19   Geetha, Nurse Kenia RN   carvedilol (COREG) 3.125 MG tablet Take 3.125 mg by mouth 2 (Two) Times a Day. 3/2/20   Nurse Kenia Iniguez RN   Cholecalciferol 25 MCG (1000 UT) tablet Take 1,000 Units by mouth Daily.    Nurse Kenia Iniguez RN   clopidogrel (PLAVIX) 75 MG tablet Take 1 tablet by mouth Daily. 3/12/20   Kaushik Gonzales MD   diclofenac (VOLTAREN) 1 % gel gel Apply 4 g topically to the appropriate area as directed 2 (Two) Times a Day. Apply to bilateral knees.    Nurse Kenia Iniguez RN   gabapentin (NEURONTIN) 600 MG tablet Take 600 mg by mouth 3 (Three) Times a Day. 2/27/20 3/28/20  Nurse Kenia Iniguez RN   isosorbide mononitrate (IMDUR) 30 MG 24 hr tablet Take 90 mg by mouth Daily.    Nurse Kenia Iniguez RN   levalbuterol (XOPENEX HFA) 45 MCG/ACT inhaler Inhale 2 puffs Every 4 (Four)  Hours As Needed for Wheezing.    Nurse Kenia Iniguez RN   lisinopril (PRINIVIL,ZESTRIL) 2.5 MG tablet Take 2.5 mg by mouth Daily. 3/2/20   Nurse Kenia Iniguez RN   melatonin 5 MG tablet tablet Take 10 mg by mouth Every Night.    Nurse Kenia Iniguez RN   montelukast (SINGULAIR) 10 MG tablet Take 10 mg by mouth Every Night. 3/2/20   Nurse Kenia Iniguez RN   nitroglycerin (NITROSTAT) 0.4 MG SL tablet Place 0.4 mg under the tongue Every 5 (Five) Minutes As Needed. do not exceed a total of 3 doses in 15 minutes 12/31/19   Nurse Kenia Iniguez RN   ondansetron ODT (ZOFRAN-ODT) 4 MG disintegrating tablet Take 4 mg by mouth Every 8 (Eight) Hours As Needed for Nausea. 3/5/20   Nurse Kenia Iniguez RN   oseltamivir (TAMIFLU) 30 MG capsule Take 1 capsule by mouth Every 12 (Twelve) Hours for 5 doses. Indications: Influenza A 3/11/20 3/14/20  Kaushik Gonzales MD   OZEMPIC, 0.25 OR 0.5 MG/DOSE, 2 MG/1.5ML solution pen-injector Inject 0.25 mg under the skin into the appropriate area as directed 1 (One) Time Per Week. Thursday. 2/28/20   Nurse Kenia Iniguez RN   pramipexole (MIRAPEX) 0.5 MG tablet Take 0.5 mg by mouth 2 (Two) Times a Day. 2/28/20   Nurse Kenia Iniguez RN   sertraline (ZOLOFT) 25 MG tablet Take 25 mg by mouth Daily.    Nurse Kenia Iniguez RN   tiZANidine (ZANAFLEX) 2 MG tablet Take 2 mg by mouth 2 (Two) Times a Day As Needed for Muscle Spasms. 2/13/20   Nurse Kenia Iniguez RN   TRESIBA FLEXTOUCH 100 UNIT/ML solution pen-injector injection Inject 30 Units under the skin into the appropriate area as directed Every Night. 2/28/20   Nurse Kenia Iniguez RN   valsartan (DIOVAN) 160 MG tablet Take 160 mg by mouth Daily.    Nurse Kenia Iniguez RN       Medications   sodium chloride 0.9 % flush 10 mL (has no administration in time range)       Vitals:    03/15/20 1802   BP: 152/72   Pulse: 64   Resp: 19   Temp: 98.1 °F (36.7 °C)   SpO2: 100%         Objective   Physical Exam    Constitutional: She is oriented to person, place, and time. She appears well-developed and well-nourished.   HENT:   Head: Normocephalic and atraumatic.   Neck: Normal range of motion. Neck supple.   Cardiovascular: Normal rate and regular rhythm.   Pulmonary/Chest: Tachypnea noted. She has rales in the right middle field and the left middle field.   Patient is coughing significantly and singing to work pretty hard with her breathing.   Abdominal: Soft. Bowel sounds are normal.   Morbidly obese.   Musculoskeletal: Normal range of motion.        Right lower leg: Normal.        Left lower leg: Normal.   Neurological: She is alert and oriented to person, place, and time.   Psychiatric: She has a normal mood and affect. Her behavior is normal.   Nursing note and vitals reviewed.      Procedures         Lab Results (last 24 hours)     Procedure Component Value Units Date/Time    CBC & Differential [[id number]] Collected:  03/15/20 1655    Specimen:  Blood Updated:  03/15/20 1703    Narrative:       The following orders were created for panel order CBC & Differential.  Procedure                               Abnormality         Status                     ---------                               -----------         ------                     CBC Auto Differential[[id number]]        Abnormal            Final result                 Please view results for these tests on the individual orders.    Comprehensive Metabolic Panel [[id number]]  (Abnormal) Collected:  03/15/20 1655    Specimen:  Blood Updated:  03/15/20 1721     Glucose 343 mg/dL      BUN 33 mg/dL      Creatinine 1.28 mg/dL      Sodium 138 mmol/L      Potassium 4.4 mmol/L      Chloride 99 mmol/L      CO2 28.0 mmol/L      Calcium 9.2 mg/dL      Total Protein 6.8 g/dL      Albumin 3.90 g/dL      ALT (SGPT) 14 U/L      AST (SGOT) 12 U/L      Alkaline Phosphatase 62 U/L      Total Bilirubin 0.2 mg/dL      eGFR  African Amer 51 mL/min/1.73      Globulin 2.9 gm/dL      A/G  Ratio 1.3 g/dL      BUN/Creatinine Ratio 25.8     Anion Gap 11.0 mmol/L     Narrative:       GFR Normal >60  Chronic Kidney Disease <60  Kidney Failure <15      BNP [[id number]]  (Abnormal) Collected:  03/15/20 1655    Specimen:  Blood Updated:  03/15/20 1721     proBNP 2,298.0 pg/mL     Narrative:       Among patients with dyspnea, NT-proBNP is highly sensitive for the detection of acute congestive heart failure. In addition NT-proBNP of <300 pg/ml effectively rules out acute congestive heart failure with 99% negative predictive value.    Results may be falsely decreased if patient taking Biotin.      Troponin [[id number]]  (Abnormal) Collected:  03/15/20 1655    Specimen:  Blood Updated:  03/15/20 1724     Troponin T 0.039 ng/mL     Narrative:       Troponin T Reference Range:  <= 0.03 ng/mL-   Negative for AMI  >0.03 ng/mL-     Abnormal for myocardial necrosis.  Clinicians would have to utilize clinical acumen, EKG, Troponin and serial changes to determine if it is an Acute Myocardial Infarction or myocardial injury due to an underlying chronic condition.       Results may be falsely decreased if patient taking Biotin.      CBC Auto Differential [[id number]]  (Abnormal) Collected:  03/15/20 1655    Specimen:  Blood Updated:  03/15/20 1703     WBC 6.12 10*3/mm3      RBC 3.59 10*6/mm3      Hemoglobin 10.6 g/dL      Hematocrit 32.1 %      MCV 89.4 fL      MCH 29.5 pg      MCHC 33.0 g/dL      RDW 12.2 %      RDW-SD 39.6 fl      MPV 8.5 fL      Platelets 285 10*3/mm3      Neutrophil % 54.0 %      Lymphocyte % 35.0 %      Monocyte % 9.3 %      Eosinophil % 1.1 %      Basophil % 0.3 %      Immature Grans % 0.3 %      Neutrophils, Absolute 3.30 10*3/mm3      Lymphocytes, Absolute 2.14 10*3/mm3      Monocytes, Absolute 0.57 10*3/mm3      Eosinophils, Absolute 0.07 10*3/mm3      Basophils, Absolute 0.02 10*3/mm3      Immature Grans, Absolute 0.02 10*3/mm3      nRBC 0.0 /100 WBC           XR Chest 1 View   Final Result   1.  Mild cardiomegaly no acute pulmonary vascular congestion.           This report was finalized on 03/15/2020 17:20 by Dr. Lencho Tinsley MD.          ED Course  ED Course as of Mar 15 1814   Sun Mar 15, 2020   1813 I told patient that her testing here is not normal perfectly but is essentially unchanged from her last admission.  This tells me that her biggest problem is just the influenza that she needs to nunez.  She is maintaining oxygenation without a problem.  She actually has 100% even though she has a mask on.  I explained to her that this fluid will just make her for miserable but the only thing I can really give her some coughs or medicine to help her rest and then time and fluids and this will get better.  She understands.  She is discharged in stable condition.    [TR]      ED Course User Index  [TR] Jose Tate Jr., MD          MDM  Number of Diagnoses or Management Options  Influenza: new and requires workup     Amount and/or Complexity of Data Reviewed  Clinical lab tests: ordered and reviewed  Tests in the radiology section of CPT®: ordered and reviewed  Tests in the medicine section of CPT®: ordered and reviewed  Decide to obtain previous medical records or to obtain history from someone other than the patient: yes    Risk of Complications, Morbidity, and/or Mortality  Presenting problems: moderate  Diagnostic procedures: moderate  Management options: moderate    Patient Progress  Patient progress: stable      Final diagnoses:   Influenza          Jose Tate Jr., MD  03/15/20 1814

## 2020-03-16 ENCOUNTER — TELEPHONE (OUTPATIENT)
Dept: PRIMARY CARE CLINIC | Age: 60
End: 2020-03-16

## 2020-03-16 RX ORDER — BENZONATATE 100 MG/1
100 CAPSULE ORAL 3 TIMES DAILY PRN
Qty: 30 CAPSULE | Refills: 0 | Status: SHIPPED | OUTPATIENT
Start: 2020-03-16 | End: 2020-03-26

## 2020-03-18 ENCOUNTER — TELEPHONE (OUTPATIENT)
Dept: GASTROENTEROLOGY | Age: 60
End: 2020-03-18

## 2020-03-19 NOTE — TELEPHONE ENCOUNTER
3/19/20  Pt has been notified of results. 3/27/20 appt cx. She will cb in May to get procedure re/sched. VB

## 2020-03-19 NOTE — TELEPHONE ENCOUNTER
If pt wants to come in to review stool studies that is fine with me. However, they were all normal and we will not know much more until the colonoscopy is done. I am ok with whatever the patient feels comfortable with.   Thank you Lee Up

## 2020-03-27 ENCOUNTER — TELEPHONE (OUTPATIENT)
Dept: PRIMARY CARE CLINIC | Age: 60
End: 2020-03-27

## 2020-04-01 ENCOUNTER — VIRTUAL VISIT (OUTPATIENT)
Dept: PRIMARY CARE CLINIC | Age: 60
End: 2020-04-01
Payer: MEDICARE

## 2020-04-01 VITALS
WEIGHT: 217 LBS | BODY MASS INDEX: 40.97 KG/M2 | HEART RATE: 72 BPM | SYSTOLIC BLOOD PRESSURE: 142 MMHG | DIASTOLIC BLOOD PRESSURE: 65 MMHG | HEIGHT: 61 IN

## 2020-04-01 PROCEDURE — G8417 CALC BMI ABV UP PARAM F/U: HCPCS | Performed by: NURSE PRACTITIONER

## 2020-04-01 PROCEDURE — G8926 SPIRO NO PERF OR DOC: HCPCS | Performed by: NURSE PRACTITIONER

## 2020-04-01 PROCEDURE — G8427 DOCREV CUR MEDS BY ELIG CLIN: HCPCS | Performed by: NURSE PRACTITIONER

## 2020-04-01 PROCEDURE — 3023F SPIROM DOC REV: CPT | Performed by: NURSE PRACTITIONER

## 2020-04-01 PROCEDURE — 2022F DILAT RTA XM EVC RTNOPTHY: CPT | Performed by: NURSE PRACTITIONER

## 2020-04-01 PROCEDURE — 3051F HG A1C>EQUAL 7.0%<8.0%: CPT | Performed by: NURSE PRACTITIONER

## 2020-04-01 PROCEDURE — 99214 OFFICE O/P EST MOD 30 MIN: CPT | Performed by: NURSE PRACTITIONER

## 2020-04-01 PROCEDURE — 1036F TOBACCO NON-USER: CPT | Performed by: NURSE PRACTITIONER

## 2020-04-01 PROCEDURE — 3017F COLORECTAL CA SCREEN DOC REV: CPT | Performed by: NURSE PRACTITIONER

## 2020-04-01 RX ORDER — FLUTICASONE FUROATE AND VILANTEROL 100; 25 UG/1; UG/1
1 POWDER RESPIRATORY (INHALATION) DAILY
Qty: 1 EACH | Refills: 3 | Status: SHIPPED | OUTPATIENT
Start: 2020-04-01 | End: 2020-10-18 | Stop reason: SDUPTHER

## 2020-04-01 RX ORDER — GABAPENTIN 600 MG/1
600 TABLET ORAL 3 TIMES DAILY
Qty: 90 TABLET | Refills: 1 | OUTPATIENT
Start: 2020-04-01 | End: 2020-06-03 | Stop reason: SDUPTHER

## 2020-04-01 RX ORDER — INSULIN DEGLUDEC INJECTION 100 U/ML
35 INJECTION, SOLUTION SUBCUTANEOUS NIGHTLY
Qty: 4 PEN | Refills: 2 | Status: SHIPPED | OUTPATIENT
Start: 2020-04-01 | End: 2020-06-03

## 2020-04-01 RX ORDER — GUAIFENESIN 600 MG/1
600 TABLET, EXTENDED RELEASE ORAL 2 TIMES DAILY
Qty: 30 TABLET | Refills: 0 | Status: SHIPPED | OUTPATIENT
Start: 2020-04-01 | End: 2020-04-15

## 2020-04-01 RX ORDER — FEXOFENADINE HCL 180 MG/1
180 TABLET ORAL DAILY
Qty: 30 TABLET | Refills: 5 | Status: SHIPPED | OUTPATIENT
Start: 2020-04-01 | End: 2020-04-15

## 2020-04-01 NOTE — PROGRESS NOTES
2020    TELEHEALTH EVALUATION -- Audio/Visual (During CGLUI-56 public health emergency)    HPI:    Vinny Rodriguez (:  1960) has requested an audio/video evaluation for the following concern(s):    GI issues   Patient reports she is having diarrhea constantly. Patient was scheduled for colonoscopy and EGD, but she was in [facility]. The procedures has been postponed until may due to COVID-19. Diabetes   Patient reports previously blood sugar was pretty high while in hospital, but its because she was receiving steroids regularly. Patient reports since being home, her blood sugar has normalized and she believes this regimen has improved her fasting glucose. Patient is still using ozempic, but hasn't increased to 0.5 mg weekly. /65     Hospital follow-up  Admitted with influenza x 4 days   Reports she also had a stroke during that episode   Right leg pain; impacted by the stroke; right UE and RLE felt heavy per patient; slowly getting her mobility back; reports more pain at night instead of during the day  Nebulizer; xopenex solution neb 3-4 times per day  xopenex inhaler 3-4 times per day   breo - reports to taking once per day   singulair at night     CHF  Patient reports her periperhal edema has improved. Patient does report RLE and RUE edema since stroke during hospital stay, but currently no edema of LLE. Patient reports the shortness of air has improved, but still has a cough that requires nebulizer and inhalers. Review of Systems   Constitutional: Positive for fatigue. HENT: Positive for postnasal drip. Eyes: Negative. Respiratory: Positive for cough and shortness of breath. Cardiovascular: Negative. Gastrointestinal: Positive for diarrhea. Endocrine: Negative. Genitourinary: Negative. Musculoskeletal: Positive for arthralgias and myalgias (RUE and RLE). Skin: Negative. Allergic/Immunologic: Positive for environmental allergies.    Hematological: Negative. Prior to Visit Medications    Medication Sig Taking?  Authorizing Provider   fluticasone-vilanterol (BREO ELLIPTA) 100-25 MCG/INH AEPB inhaler Inhale 1 puff into the lungs daily Yes YAMIL Mckeon NP   fexofenadine (ALLEGRA) 180 MG tablet Take 1 tablet by mouth daily Yes YAMIL Mckeon NP   guaiFENesin ([place] [address]) 600 MG extended release tablet Take 1 tablet by mouth 2 times daily for 15 days Yes YAMIL Mckeon NP   tiotropium (SPIRIVA RESPIMAT) 1.25 MCG/ACT AERS inhaler Inhale 2 puffs into the lungs daily Yes YAMIL Mckeon NP   Insulin Degludec (TRESIBA FLEXTOUCH) 100 UNIT/ML SOPN Inject 35 Units into the skin nightly Yes YAMIL Mckeon NP   Semaglutide,0.25 or 0.5MG/DOS, 2 MG/1.5ML SOPN Inject 0.5 mg into the skin once a week Yes YAMIL Mckeon NP   ondansetron (ZOFRAN ODT) 4 MG disintegrating tablet Take 1 tablet by mouth every 8 hours as needed for Nausea or Vomiting Yes Lionel Cummins MD   lisinopril (PRINIVIL;ZESTRIL) 2.5 MG tablet Take 1 tablet by mouth daily  Patient taking differently: Take 2.5 mg by mouth every morning  Yes YAMIL Mckeon NP   pramipexole (MIRAPEX) 0.5 MG tablet Take 1 tablet by mouth 2 times daily Yes YAMIL Mckeon NP   acetaminophen (TYLENOL) 500 MG tablet Take 500 mg by mouth every 6 hours as needed for Pain Yes Historical Provider, MD   valsartan (DIOVAN) 160 MG tablet Take 160 mg by mouth daily Yes Historical Provider, MD   bumetanide (BUMEX) 1 MG tablet Take 1 tablet by mouth 2 times daily Yes YAMIL Mckeon NP   carvedilol (COREG) 3.125 MG tablet Take 1 tablet by mouth 2 times daily Yes YAMIL Mckeon NP   atorvastatin (LIPITOR) 20 MG tablet Take 1/2 tablet for 1 week, then increase to whole tablet Yes YAMIL Mckeon NP   DULoxetine (CYMBALTA) 60 MG extended release capsule Take 60 mg by mouth daily Yes Historical Provider, MD   clopidogrel

## 2020-04-02 ENCOUNTER — TELEPHONE (OUTPATIENT)
Dept: PRIMARY CARE CLINIC | Age: 60
End: 2020-04-02

## 2020-04-06 RX ORDER — LEVOCETIRIZINE DIHYDROCHLORIDE 5 MG/1
5 TABLET, FILM COATED ORAL NIGHTLY
Qty: 30 TABLET | Refills: 0 | Status: SHIPPED | OUTPATIENT
Start: 2020-04-06 | End: 2020-06-03

## 2020-04-07 ASSESSMENT — ENCOUNTER SYMPTOMS
COUGH: 1
DIARRHEA: 1
EYES NEGATIVE: 1
SHORTNESS OF BREATH: 1

## 2020-04-09 RX ORDER — LEVALBUTEROL TARTRATE 45 UG/1
2 AEROSOL, METERED ORAL EVERY 4 HOURS PRN
Qty: 1 INHALER | Refills: 0 | Status: SHIPPED | OUTPATIENT
Start: 2020-04-09 | End: 2020-10-18 | Stop reason: SDUPTHER

## 2020-04-09 RX ORDER — SODIUM CHLORIDE FOR INHALATION 0.9 %
VIAL, NEBULIZER (ML) INHALATION
Qty: 120 ML | Refills: 1 | Status: SHIPPED | OUTPATIENT
Start: 2020-04-09 | End: 2020-06-03

## 2020-04-09 RX ORDER — PRAMIPEXOLE DIHYDROCHLORIDE 0.5 MG/1
0.5 TABLET ORAL 2 TIMES DAILY
Qty: 60 TABLET | Refills: 2 | Status: SHIPPED | OUTPATIENT
Start: 2020-04-09 | End: 2020-09-03 | Stop reason: SDUPTHER

## 2020-04-09 RX ORDER — AMLODIPINE BESYLATE 5 MG/1
5 TABLET ORAL DAILY
Qty: 30 TABLET | Refills: 1 | Status: SHIPPED | OUTPATIENT
Start: 2020-04-09 | End: 2020-10-18 | Stop reason: SDUPTHER

## 2020-04-09 NOTE — TELEPHONE ENCOUNTER
Koko Garner called to request a refill on her medication.       Last office visit : 2/27/2020   Next office visit : 4/15/2020     Requested Prescriptions     Pending Prescriptions Disp Refills    amLODIPine (NORVASC) 5 MG tablet 30 tablet 1     Sig: Take 1 tablet by mouth daily    pramipexole (MIRAPEX) 0.5 MG tablet 60 tablet 2     Sig: Take 1 tablet by mouth 2 times daily    levalbuterol (XOPENEX HFA) 45 MCG/ACT inhaler 1 Inhaler 0     Sig: Inhale 2 puffs into the lungs every 4 hours as needed for Wheezing or Shortness of Breath    sodium chloride nebulizer 0.9 % solution 120 mL 1     Sig: Please use premix            Trudi Shelton LPN

## 2020-04-15 ENCOUNTER — VIRTUAL VISIT (OUTPATIENT)
Dept: PRIMARY CARE CLINIC | Age: 60
End: 2020-04-15
Payer: MEDICARE

## 2020-04-15 VITALS
WEIGHT: 221 LBS | HEART RATE: 78 BPM | DIASTOLIC BLOOD PRESSURE: 62 MMHG | SYSTOLIC BLOOD PRESSURE: 117 MMHG | BODY MASS INDEX: 41.76 KG/M2

## 2020-04-15 PROCEDURE — 99214 OFFICE O/P EST MOD 30 MIN: CPT | Performed by: NURSE PRACTITIONER

## 2020-04-15 PROCEDURE — 3051F HG A1C>EQUAL 7.0%<8.0%: CPT | Performed by: NURSE PRACTITIONER

## 2020-04-15 PROCEDURE — G8427 DOCREV CUR MEDS BY ELIG CLIN: HCPCS | Performed by: NURSE PRACTITIONER

## 2020-04-15 PROCEDURE — 2022F DILAT RTA XM EVC RTNOPTHY: CPT | Performed by: NURSE PRACTITIONER

## 2020-04-15 PROCEDURE — 3017F COLORECTAL CA SCREEN DOC REV: CPT | Performed by: NURSE PRACTITIONER

## 2020-04-15 RX ORDER — LEVALBUTEROL INHALATION SOLUTION 0.63 MG/3ML
0.63 SOLUTION RESPIRATORY (INHALATION) EVERY 6 HOURS PRN
Qty: 20 VIAL | Refills: 2 | Status: SHIPPED | OUTPATIENT
Start: 2020-04-15 | End: 2020-10-18 | Stop reason: SDUPTHER

## 2020-04-15 ASSESSMENT — ENCOUNTER SYMPTOMS
SHORTNESS OF BREATH: 0
GASTROINTESTINAL NEGATIVE: 1
COUGH: 0
EYES NEGATIVE: 1
FACIAL SWELLING: 0
RESPIRATORY NEGATIVE: 1

## 2020-04-15 NOTE — PROGRESS NOTES
4/15/2020    TELEHEALTH EVALUATION -- Audio/Visual (During EFMUP-24 public health emergency)    HPI:    Scott Greenberg (:  1960) has requested an audio/video evaluation for the following concern(s):    Diabetes   Patient reports she ate late last night because she wasn't hungry before that time. Patient reports she has been using the weekly injection of Ozempic 0.5 mg. Patient reports her glucose readings have been under 200. Respiratory issues   Patient reports respiratory issues have improved. Patient is currently using spiriva and reports it has helped tremendously. Patient reports she is not using the xopenex as often as she was before spiriva. Patient reports she has seasonal allergies, but is okay with the xyzal she is currently taking and Singulair. Review of Systems   Constitutional: Positive for fatigue. HENT: Negative for facial swelling. Eyes: Negative. Respiratory: Negative. Negative for cough and shortness of breath. Cardiovascular: Negative. Negative for chest pain. Gastrointestinal: Negative. Endocrine: Negative. Genitourinary: Negative. Musculoskeletal: Negative. Allergic/Immunologic: Positive for environmental allergies. Neurological: Negative. Hematological: Negative. Psychiatric/Behavioral: Negative. Prior to Visit Medications    Medication Sig Taking?  Authorizing Provider   levalbuterol (XOPENEX) 0.63 MG/3ML nebulization Take 3 mLs by nebulization every 6 hours as needed for Wheezing Yes YAMIL Ang NP   amLODIPine (NORVASC) 5 MG tablet Take 1 tablet by mouth daily Yes YAMIL Ang NP   pramipexole (MIRAPEX) 0.5 MG tablet Take 1 tablet by mouth 2 times daily Yes YAMIL Ang NP   levalbuterol [facility]) 45 MCG/ACT inhaler Inhale 2 puffs into the lungs every 4 hours as needed for Wheezing or Shortness of Breath Yes YAMIL Ang NP   sodium chloride nebulizer 0.9 % solution Please

## 2020-04-22 ENCOUNTER — TELEMEDICINE (OUTPATIENT)
Dept: NEUROLOGY | Facility: CLINIC | Age: 60
End: 2020-04-22

## 2020-04-22 VITALS — BODY MASS INDEX: 41.35 KG/M2 | WEIGHT: 219 LBS | HEIGHT: 61 IN

## 2020-04-22 DIAGNOSIS — G43.109 COMPLICATED MIGRAINE: Primary | ICD-10-CM

## 2020-04-22 DIAGNOSIS — I31.39 PERICARDIAL EFFUSION: ICD-10-CM

## 2020-04-22 DIAGNOSIS — N18.30 CKD (CHRONIC KIDNEY DISEASE) STAGE 3, GFR 30-59 ML/MIN (HCC): ICD-10-CM

## 2020-04-22 PROCEDURE — 99214 OFFICE O/P EST MOD 30 MIN: CPT | Performed by: PHYSICIAN ASSISTANT

## 2020-04-22 RX ORDER — GABAPENTIN 600 MG/1
1 TABLET ORAL 3 TIMES DAILY
COMMUNITY
Start: 2020-04-16

## 2020-04-22 RX ORDER — TIOTROPIUM BROMIDE INHALATION SPRAY 1.56 UG/1
SPRAY, METERED RESPIRATORY (INHALATION)
COMMUNITY
Start: 2020-04-01 | End: 2020-09-16 | Stop reason: SDUPTHER

## 2020-04-22 NOTE — PROGRESS NOTES
You have chosen to receive care through a telehealth visit.  Do you consent to use a video/audio connection for your medical care today? Yes      Neurology Progress Note    TELEMEDICINE ENCOUNTER      Chief Complaint:    Transient right-sided weakness  Large pericardial effusion  Old right lacunar stroke, right basal ganglia    Subjective     Subjective:  This is a very pleasant 60-year-old female who has a complicated past medical history including coronary artery disease, myocardial infarction, coronary artery stenting, systolic heart failure, hypertension, diabetes, restless leg syndrome, dyslipidemia, obstructive sleep apnea (noncompliant with CPAP), possible narcolepsy and reported history of stroke.    On 3/10/2020, the patient was admitted to [facility] for transient right-sided arm and leg weakness.  This lasted for possibly several days and then resolved without sequela.  The patient had previously presented to [facility] and underwent several diagnostic studies and was found to have influenza A and underwent echocardiogram demonstrating a large pericardial effusion.    The patient initially presented to [facility] with persistent right-sided weakness thinking she was having a stroke.  MRI of the brain did not demonstrate any acute or subacute stroke.  Did demonstrate a previous, remote right basal ganglia lacunar infarct.  Carotid ultrasound demonstrated 50 to 69% stenosis of the left ICA and less than 50% of the right ICA.    The patient was eventually discharged without any specific findings from a neurologic standpoint.  Patient is seen today in follow-up via telemedicine due to the COVID-19 outbreak.  The patient is compliant with Plavix 75 mg daily and Lipitor 80 mg daily.  Although her previous LDL was 57.  She is diabetic and working with her primary care provider to improve glycemic control.    Patient states she has had no further right-sided weakness, however, she does reveal that  she has frequent headaches.  On further discussion it sounds as though these may be complicated migraines and that may be part of what she had been experiencing previously during this admission.    The patient is in a low income housing situation at present and does not have an existing CPAP machine.  At this time, she is not interested in pursuing treatment for sleep apnea.  She has not kept any follow-up with cardiology because again, being in a somewhat unstable housing situation, missed several phone calls.  She has not had any chest pain, fever, shortness of breath, however, has not had any follow-up from the echocardiogram demonstrating no large pericardial effusion on March 6.      Past Medical History:   Diagnosis Date   • Anxiety    • Arthritis    • Asthma    • CHF (congestive heart failure) (CMS/[facility])    • Diabetes mellitus (CMS/[facility])    • Hyperlipidemia    • Hypertension    • Myocardial infarction (CMS/[facility])    • Stroke (CMS/[facility])      Past Surgical History:   Procedure Laterality Date   • CORONARY STENT PLACEMENT     • TONSILLECTOMY       No family history on file.  Social History     Tobacco Use   • Smoking status: Never Smoker   • Smokeless tobacco: Never Used   Substance Use Topics   • Alcohol use: Not on file   • Drug use: Not on file       Medications:  Current Outpatient Medications   Medication Sig Dispense Refill   • amLODIPine (NORVASC) 5 MG tablet Take 5 mg by mouth Daily.     • atorvastatin (LIPITOR) 20 MG tablet Take 20 mg by mouth Daily.     • bumetanide (BUMEX) 1 MG tablet Take 1 mg by mouth 2 (Two) Times a Day.     • carvedilol (COREG) 3.125 MG tablet Take 3.125 mg by mouth 2 (Two) Times a Day.     • Cholecalciferol 25 MCG (1000 UT) tablet Take 1,000 Units by mouth Daily.     • clopidogrel (PLAVIX) 75 MG tablet Take 1 tablet by mouth Daily. 30 tablet 0   • diclofenac (VOLTAREN) 1 % gel gel Apply 4 g topically to the appropriate area as directed 2 (Two) Times a Day. Apply to bilateral knees.      • HYDROcodone-homatropine (HYCODAN) 5-1.5 MG/5ML syrup Take 5 mL by mouth Every 4 (Four) Hours As Needed for Cough. 120 mL 0   • isosorbide mononitrate (IMDUR) 30 MG 24 hr tablet Take 90 mg by mouth Daily.     • levalbuterol (XOPENEX HFA) 45 MCG/ACT inhaler Inhale 2 puffs Every 4 (Four) Hours As Needed for Wheezing.     • lisinopril (PRINIVIL,ZESTRIL) 2.5 MG tablet Take 2.5 mg by mouth Daily.     • melatonin 5 MG tablet tablet Take 10 mg by mouth Every Night.     • montelukast (SINGULAIR) 10 MG tablet Take 10 mg by mouth Every Night.     • nitroglycerin (NITROSTAT) 0.4 MG SL tablet Place 0.4 mg under the tongue Every 5 (Five) Minutes As Needed. do not exceed a total of 3 doses in 15 minutes     • ondansetron ODT (ZOFRAN-ODT) 4 MG disintegrating tablet Take 4 mg by mouth Every 8 (Eight) Hours As Needed for Nausea.     • OZEMPIC, 0.25 OR 0.5 MG/DOSE, 2 MG/1.5ML solution pen-injector Inject 0.25 mg under the skin into the appropriate area as directed 1 (One) Time Per Week. Thursday.     • pramipexole (MIRAPEX) 0.5 MG tablet Take 0.5 mg by mouth 2 (Two) Times a Day.     • sertraline (ZOLOFT) 25 MG tablet Take 25 mg by mouth Daily.     • tiZANidine (ZANAFLEX) 2 MG tablet Take 2 mg by mouth 2 (Two) Times a Day As Needed for Muscle Spasms.     • TRESIBA FLEXTOUCH 100 UNIT/ML solution pen-injector injection Inject 30 Units under the skin into the appropriate area as directed Every Night.     • valsartan (DIOVAN) 160 MG tablet Take 160 mg by mouth Daily.       No current facility-administered medications for this visit.        Allergies:    Albuterol; Levofloxacin; Metformin and related; and Aspirin    Review of Systems:   -A 14 point review of systems is completed and is negative.      Objective      General Exam:  Head:  Normocephalic, atraumatic.  HEENT: PERRLA.  Full EOM.  Hearing intact.  Neck: Full range of motion.  No mass.  No visible goiter.  Lungs:  No audible wheeze, rales or rhonchi.  Normal respiratory effort.   No distress.  Musculoskeletal: No crepitus.  No joint swelling.  No erythema.  Full extremities.  Extremities: No edema.  No cyanosis.  Skin: No rash.  Psych: Normal affect, mood, interaction and eye contact.  Normal judgement.      Neurologic Exam:  Mental Status:    -Awake. Alert. Oriented to person, place & time.  -No word finding difficulties.  -No aphasia.  -No dysarthria.  -Follows simple commands.    CN II:  Full visual fields with confrontation.  Pupils equally reactive to light.  CN III, IV, VI:  Extraocular muscles function intact with no nystagmus.  CN V:  Facial sensory is symmetric.  CN VII:  Facial motor symmetric.  CN VIII:  Gross hearing intact bilaterally.  CN IX/X:  Palate elevates symmetrically.  CN XI:  Shoulder shrug symmetric.  CN XII:  Tongue is midline on protrusion.      Assessment/Plan     Impression:  1.  Remote right basal ganglia CVA  2.  Left internal carotid artery stenosis  3.  Dyslipidemia, mixed  4.  Recent influenza A, 3/5/2020  5.  Large pericardial effusion, 3/6/2020 (echo at [facility])  6.  Transient right-sided weakness, resolved without treatment  7.  Diabetes mellitus  8.  Hypertension, essential  9.  Obstructive sleep apnea, untreated    Plan:  1.  Continue Plavix 75 mg daily for secondary stroke prevention.  2.  Continue atorvastatin 80 mg daily with target LDL less than 70.  Last LDL was 57 on hospitalization, however, would continue high-dose statin for plaque stabilization.  3.  Continue with primary care with target hemoglobin A1c less than 6.5%.  4.  Recommend annual carotid ultrasound surveillance with primary care.  5.  I will make a referral to cardiology because she needs to have a follow-up regarding pericardial effusion.  It is uncertain as to why this is present.  Again, I am looking at this vis-à-vis records from [facility], however, neither [facility] nor [facility] it appears did any thyroid studies.  I would like to check this at a minimum as well as sed rate and CRP.  " She did have borderline elevated troponins and it could simply be because of a pericarditis.  It does not appear the patient has had an adequate follow-up from this.  6.  In terms of patient's transient right-sided weakness and having been told in the past that she has TIAs, it is possible that she has complex migraine.  Therefore, I would like to follow-up with her in the next couple months hopefully in person at that time to further assess her headache burden and see if she has any more events where she has transient right-sided weakness.  She denies any other aura with her headaches such as blurred vision, double vision, light or sound sensitivity.  7.  Patient is counseled on stroke signs and symptoms using FAST and \"Time Saved is Brain Saved.\"    Patient will follow-up with me in 3 months to evaluate her clinical course and discuss further available treatment options.  I will follow-up with her after the laboratory evaluation and I would like to [place] back with her after she has met with cardiology.  At follow-up, we will also discuss as to whether or not she does have complicated migraine.  It will be important to steer away from any vasoactive agents such as triptans as she has a history of coronary artery stenting.  It may be worth considering a CGRP monoclonal antibody and consideration of something such as Ubrelvy if we can get insurance authorization.  I am not sure what insurance coverage she has.    Greater than 25 minutes of a 30-minute encounter spent discussing the aforementioned issues, medications, plan of care, referrals, etc.            DELL Marquez, KAYLEE, MT([facility])    04/22/20  13:43    "

## 2020-05-01 ENCOUNTER — VIRTUAL VISIT (OUTPATIENT)
Dept: PRIMARY CARE CLINIC | Age: 60
End: 2020-05-01
Payer: MEDICARE

## 2020-05-01 VITALS — BODY MASS INDEX: 40.97 KG/M2 | WEIGHT: 217 LBS | HEIGHT: 61 IN

## 2020-05-01 PROCEDURE — 3017F COLORECTAL CA SCREEN DOC REV: CPT | Performed by: NURSE PRACTITIONER

## 2020-05-01 PROCEDURE — 2022F DILAT RTA XM EVC RTNOPTHY: CPT | Performed by: NURSE PRACTITIONER

## 2020-05-01 PROCEDURE — 3051F HG A1C>EQUAL 7.0%<8.0%: CPT | Performed by: NURSE PRACTITIONER

## 2020-05-01 PROCEDURE — 99214 OFFICE O/P EST MOD 30 MIN: CPT | Performed by: NURSE PRACTITIONER

## 2020-05-01 PROCEDURE — G8428 CUR MEDS NOT DOCUMENT: HCPCS | Performed by: NURSE PRACTITIONER

## 2020-05-01 RX ORDER — DULOXETIN HYDROCHLORIDE 30 MG/1
30 CAPSULE, DELAYED RELEASE ORAL DAILY
Qty: 30 CAPSULE | Refills: 3 | Status: SHIPPED | OUTPATIENT
Start: 2020-05-01 | End: 2020-08-14

## 2020-05-01 ASSESSMENT — ENCOUNTER SYMPTOMS
GASTROINTESTINAL NEGATIVE: 1
EYES NEGATIVE: 1
SHORTNESS OF BREATH: 0
COUGH: 0
RESPIRATORY NEGATIVE: 1
FACIAL SWELLING: 0

## 2020-05-01 NOTE — PROGRESS NOTES
2020    TELEHEALTH EVALUATION -- Audio/Visual (During ZQRCS-97 public health emergency)    HPI:    Ivan Patton (:  1960) has requested an audio/video evaluation for the following concern(s):    GI issues    2020   Patient reports she is having diarrhea constantly. Patient was scheduled for colonoscopy and EGD, but she was in [facility]. The procedures has been postponed until may due to COVID-19.   20   Patient reports the same. Patient is waiting for appointment with GI. Patient needs to call provider and have reassessment. Diabetes    2020   Patient reports previously blood sugar was pretty high while in hospital, but its because she was receiving steroids regularly. Patient reports since being home,  her blood sugar has normalized and she believes this regimen has improved her fasting glucose. Patient is still using ozempic, but hasn't increased to 0.5 mg Weekly. 4/15/2020   Patient reports she ate late last night because she wasn't hungry before that time. Patient reports she has been using the weekly injection of Ozempic 0.5 mg. Patient reports her glucose readings have been under 200.    20   Patient reports her last fasting blood sugar was 167 this morning. Patient reports she checked it after she ate. Patient reports eating 3.5 [place] toasts for breakfast.     CHF   2020   Patient reports her periperhal edema has improved. Patient does report RLE and RUE edema since stroke during hospital stay, but currently no edema of LLE. Patient reports the shortness of air has improved, but still has a cough that requires nebulizer and inhalers. 20   Patient reports no issues at this time. Respiratory issues    4/15/2020   Patient reports respiratory issues have improved. Patient is currently using spiriva and reports it has helped tremendously. Patient reports she is not using the xopenex as often as she was before spiriva.  Patient reports she has inhaler Inhale 2 puffs into the lungs daily Yes Lexus Whitfield, APRN - NP   Insulin Degludec (TRESIBA FLEXTOUCH) 100 UNIT/ML SOPN Inject 35 Units into the skin nightly Yes Lexus Whitfield, APRN - NP   Semaglutide,0.25 or 0.5MG/DOS, 2 MG/1.5ML SOPN Inject 0.5 mg into the skin once a week Yes Lexus Whitfield, APRN - NP   gabapentin (NEURONTIN) 600 MG tablet Take 1 tablet by mouth 3 times daily for 30 days.  Yes Jose Chauhan MD   ondansetron (ZOFRAN ODT) 4 MG disintegrating tablet Take 1 tablet by mouth every 8 hours as needed for Nausea or Vomiting Yes Brunilda White MD   lisinopril (PRINIVIL;ZESTRIL) 2.5 MG tablet Take 1 tablet by mouth daily  Patient taking differently: Take 2.5 mg by mouth every morning  Yes Lexus Whtifield, APRN - NP   acetaminophen (TYLENOL) 500 MG tablet Take 500 mg by mouth every 6 hours as needed for Pain Yes Historical Provider, MD   valsartan (DIOVAN) 160 MG tablet Take 160 mg by mouth daily Yes Historical Provider, MD   bumetanide (BUMEX) 1 MG tablet Take 1 tablet by mouth 2 times daily Yes Lexus Whitfield, APRN - NP   carvedilol (COREG) 3.125 MG tablet Take 1 tablet by mouth 2 times daily Yes Lexus Whitfield, APRN - NP   atorvastatin (LIPITOR) 20 MG tablet Take 1/2 tablet for 1 week, then increase to whole tablet Yes Lexus Whitfield, APRN - NP   DULoxetine (CYMBALTA) 60 MG extended release capsule Take 60 mg by mouth daily Yes Historical Provider, MD   clopidogrel (PLAVIX) 75 MG tablet Take 75 mg by mouth daily Yes Historical Provider, MD   montelukast (SINGULAIR) 10 MG tablet Take 10 mg by mouth nightly Yes Historical Provider, MD   vitamin D (CHOLECALCIFEROL) 25 MCG (1000 UT) TABS tablet Take 1,000 Units by mouth daily Yes Historical Provider, MD   melatonin 3 MG TABS tablet Take 10 mg by mouth nightly Yes Historical Provider, MD   isosorbide mononitrate (IMDUR) 30 MG extended release tablet Take 90 mg by mouth daily Yes Historical Provider, MD   diclofenac sodium 1 % also been advised to contact this office for worsening conditions or problems, and seek emergency medical treatment and/or call 911 if deemed necessary. Patient identification was verified at the start of the visit: Yes    Total time spent on this encounter: 25 minutes    Services were provided through a video synchronous discussion virtually to substitute for in-person clinic visit. Patient and provider were located at their individual homes. --YAMIL Lewis NP on 5/1/2020 at 12:30 PM    An electronic signature was used to authenticate this note.

## 2020-05-14 RX ORDER — CARVEDILOL 3.12 MG/1
3.12 TABLET ORAL 2 TIMES DAILY
Qty: 60 TABLET | Refills: 2 | Status: SHIPPED | OUTPATIENT
Start: 2020-05-14 | End: 2020-10-18 | Stop reason: SDUPTHER

## 2020-05-14 RX ORDER — LISINOPRIL 2.5 MG/1
2.5 TABLET ORAL DAILY
Qty: 30 TABLET | Refills: 1 | Status: SHIPPED | OUTPATIENT
Start: 2020-05-14 | End: 2020-06-03

## 2020-05-25 ENCOUNTER — APPOINTMENT (OUTPATIENT)
Dept: GENERAL RADIOLOGY | Age: 60
End: 2020-05-25
Payer: MEDICARE

## 2020-05-25 ENCOUNTER — HOSPITAL ENCOUNTER (EMERGENCY)
Age: 60
Discharge: HOME | End: 2020-05-25
Attending: EMERGENCY MEDICINE
Payer: MEDICARE

## 2020-05-25 ENCOUNTER — APPOINTMENT (OUTPATIENT)
Dept: CT IMAGING | Age: 60
End: 2020-05-25
Payer: MEDICARE

## 2020-05-25 VITALS
HEART RATE: 71 BPM | BODY MASS INDEX: 41.16 KG/M2 | WEIGHT: 218 LBS | OXYGEN SATURATION: 94 % | RESPIRATION RATE: 19 BRPM | SYSTOLIC BLOOD PRESSURE: 144 MMHG | HEIGHT: 61 IN | DIASTOLIC BLOOD PRESSURE: 61 MMHG | TEMPERATURE: 98.2 F

## 2020-05-25 LAB
ALBUMIN SERPL-MCNC: 3.9 G/DL (ref 3.5–5.2)
ALP BLD-CCNC: 72 U/L (ref 35–104)
ALT SERPL-CCNC: 10 U/L (ref 5–33)
ANION GAP SERPL CALCULATED.3IONS-SCNC: 14 MMOL/L (ref 7–19)
AST SERPL-CCNC: 11 U/L (ref 5–32)
BASOPHILS ABSOLUTE: 0 K/UL (ref 0–0.2)
BASOPHILS RELATIVE PERCENT: 0.5 % (ref 0–1)
BILIRUB SERPL-MCNC: 0.3 MG/DL (ref 0.2–1.2)
BUN BLDV-MCNC: 33 MG/DL (ref 8–23)
CALCIUM SERPL-MCNC: 9.4 MG/DL (ref 8.8–10.2)
CHLORIDE BLD-SCNC: 100 MMOL/L (ref 98–111)
CO2: 27 MMOL/L (ref 22–29)
CREAT SERPL-MCNC: 1.2 MG/DL (ref 0.5–0.9)
D DIMER: <0.27 UG/ML FEU (ref 0–0.48)
EOSINOPHILS ABSOLUTE: 0.2 K/UL (ref 0–0.6)
EOSINOPHILS RELATIVE PERCENT: 2.6 % (ref 0–5)
GFR NON-AFRICAN AMERICAN: 45
GLUCOSE BLD-MCNC: 148 MG/DL (ref 74–109)
HCT VFR BLD CALC: 30.9 % (ref 37–47)
HEMOGLOBIN: 10.3 G/DL (ref 12–16)
IMMATURE GRANULOCYTES #: 0 K/UL
INR BLD: 0.99 (ref 0.88–1.18)
LACTIC ACID: 1 MMOL/L (ref 0.5–1.9)
LIPASE: 43 U/L (ref 13–60)
LYMPHOCYTES ABSOLUTE: 2.3 K/UL (ref 1.1–4.5)
LYMPHOCYTES RELATIVE PERCENT: 34.6 % (ref 20–40)
MCH RBC QN AUTO: 30.1 PG (ref 27–31)
MCHC RBC AUTO-ENTMCNC: 33.3 G/DL (ref 33–37)
MCV RBC AUTO: 90.4 FL (ref 81–99)
MONOCYTES ABSOLUTE: 0.6 K/UL (ref 0–0.9)
MONOCYTES RELATIVE PERCENT: 8.6 % (ref 0–10)
NEUTROPHILS ABSOLUTE: 3.6 K/UL (ref 1.5–7.5)
NEUTROPHILS RELATIVE PERCENT: 53.4 % (ref 50–65)
PDW BLD-RTO: 13.1 % (ref 11.5–14.5)
PLATELET # BLD: 221 K/UL (ref 130–400)
PMV BLD AUTO: 8.8 FL (ref 9.4–12.3)
POTASSIUM SERPL-SCNC: 4.3 MMOL/L (ref 3.5–5)
PRO-BNP: 754 PG/ML (ref 0–900)
PROTHROMBIN TIME: 13 SEC (ref 12–14.6)
RBC # BLD: 3.42 M/UL (ref 4.2–5.4)
SODIUM BLD-SCNC: 141 MMOL/L (ref 136–145)
TOTAL PROTEIN: 6.6 G/DL (ref 6.6–8.7)
TROPONIN: 0.02 NG/ML (ref 0–0.03)
WBC # BLD: 6.6 K/UL (ref 4.8–10.8)

## 2020-05-25 PROCEDURE — 85379 FIBRIN DEGRADATION QUANT: CPT

## 2020-05-25 PROCEDURE — 87040 BLOOD CULTURE FOR BACTERIA: CPT

## 2020-05-25 PROCEDURE — 99285 EMERGENCY DEPT VISIT HI MDM: CPT

## 2020-05-25 PROCEDURE — 71045 X-RAY EXAM CHEST 1 VIEW: CPT

## 2020-05-25 PROCEDURE — 80053 COMPREHEN METABOLIC PANEL: CPT

## 2020-05-25 PROCEDURE — 36415 COLL VENOUS BLD VENIPUNCTURE: CPT

## 2020-05-25 PROCEDURE — 83880 ASSAY OF NATRIURETIC PEPTIDE: CPT

## 2020-05-25 PROCEDURE — 84484 ASSAY OF TROPONIN QUANT: CPT

## 2020-05-25 PROCEDURE — 85610 PROTHROMBIN TIME: CPT

## 2020-05-25 PROCEDURE — 93005 ELECTROCARDIOGRAM TRACING: CPT

## 2020-05-25 PROCEDURE — 83690 ASSAY OF LIPASE: CPT

## 2020-05-25 PROCEDURE — 85025 COMPLETE CBC W/AUTO DIFF WBC: CPT

## 2020-05-25 PROCEDURE — 71275 CT ANGIOGRAPHY CHEST: CPT

## 2020-05-25 PROCEDURE — 83605 ASSAY OF LACTIC ACID: CPT

## 2020-05-25 PROCEDURE — 6360000004 HC RX CONTRAST MEDICATION: Performed by: EMERGENCY MEDICINE

## 2020-05-25 RX ORDER — AZITHROMYCIN 250 MG/1
TABLET, FILM COATED ORAL
Qty: 1 PACKET | Refills: 0 | Status: SHIPPED | OUTPATIENT
Start: 2020-05-25 | End: 2020-05-29

## 2020-05-25 RX ADMIN — IOPAMIDOL 95 ML: 755 INJECTION, SOLUTION INTRAVENOUS at 15:13

## 2020-05-25 NOTE — ED NOTES
Bed: 08  Expected date:   Expected time:   Means of arrival:   Comments:  Delonte Turner 89, RN  05/25/20 [id number]

## 2020-05-25 NOTE — ED PROVIDER NOTES
0.5MG/DOS, 2 MG/1.5ML SOPN Inject 0.5 mg into the skin once a week, Disp-2 pen, R-2Normal      ondansetron (ZOFRAN ODT) 4 MG disintegrating tablet Take 1 tablet by mouth every 8 hours as needed for Nausea or Vomiting, Disp-15 tablet, R-0Print      acetaminophen (TYLENOL) 500 MG tablet Take 500 mg by mouth every 6 hours as needed for PainHistorical Med      valsartan (DIOVAN) 160 MG tablet Take 160 mg by mouth dailyHistorical Med      bumetanide (BUMEX) 1 MG tablet Take 1 tablet by mouth 2 times daily, Disp-60 tablet, R-1Normal      atorvastatin (LIPITOR) 20 MG tablet Take 1/2 tablet for 1 week, then increase to whole tablet, Disp-30 tablet, R-5Normal      !! DULoxetine (CYMBALTA) 60 MG extended release capsule Take 60 mg by mouth dailyHistorical Med      clopidogrel (PLAVIX) 75 MG tablet Take 75 mg by mouth dailyHistorical Med      montelukast (SINGULAIR) 10 MG tablet Take 10 mg by mouth nightlyHistorical Med      vitamin D (CHOLECALCIFEROL) 25 MCG (1000 UT) TABS tablet Take 1,000 Units by mouth dailyHistorical Med      melatonin 3 MG TABS tablet Take 10 mg by mouth nightlyHistorical Med      isosorbide mononitrate (IMDUR) 30 MG extended release tablet Take 90 mg by mouth dailyHistorical Med      levalbuterol (XOPENEX) 0.63 MG/3ML nebulization Take 3 mLs by nebulization every 6 hours as needed for Wheezing, Disp-20 vial, R-2Normal      gabapentin (NEURONTIN) 600 MG tablet Take 1 tablet by mouth 3 times daily for 30 days. , Disp-90 tablet, R-1Phone In      diclofenac sodium 1 % GEL Apply 4 g topically 4 times daily as needed for Pain, Topical, 4 TIMES DAILY PRN, Historical Med       !! - Potential duplicate medications found. Please discuss with provider. ALLERGIES     Albuterol; Levaquin [levofloxacin];  Metformin and related; and Aspirin    FAMILY HISTORY       Family History   Problem Relation Age of Onset    Colon Cancer Neg Hx     Colon Polyps Neg Hx           SOCIAL HISTORY       Social History is obese. She is not ill-appearing, toxic-appearing or diaphoretic. HENT:      Head: Normocephalic and atraumatic. Nose: Nose normal. No congestion. Mouth/Throat:      Mouth: Mucous membranes are moist.      Pharynx: Oropharynx is clear. No oropharyngeal exudate. Comments: No blood  Eyes:      Extraocular Movements: Extraocular movements intact. Pupils: Pupils are equal, round, and reactive to light. Cardiovascular:      Rate and Rhythm: Normal rate and regular rhythm. Pulses: Normal pulses. Pulmonary:      Effort: Pulmonary effort is normal. No respiratory distress. Breath sounds: Normal breath sounds. No wheezing. Abdominal:      General: Abdomen is flat. Tenderness: There is no abdominal tenderness. Musculoskeletal: Normal range of motion. General: No tenderness. Skin:     General: Skin is warm and dry. Capillary Refill: Capillary refill takes less than 2 seconds. Neurological:      General: No focal deficit present. Mental Status: She is alert and oriented to person, place, and time. Psychiatric:         Mood and Affect: Mood normal.         Behavior: Behavior normal.         DIAGNOSTIC RESULTS     EKG: All EKG's are interpreted by the Emergency Department Physician who either signs or Co-signs this chart in the absence of a cardiologist.    Sinus t wave inv inf lat same as in Jan, no change on ekg morphology    RADIOLOGY:   Non-plain film images such as CT, Ultrasound and MRI are read by the radiologist. Plainradiographic images are visualized and preliminarily interpreted by the emergency physician with the below findings:        Interpretation per the Radiologist below, if available at the time of this note:    CTA PULMONARY W CONTRAST   Final Result   1. No pulmonary emboli. 2. Mild vascular calcification with no thoracic aortic aneurysm or   dissection. Coronary artery calcification and possible left circumflex   stent.    3.

## 2020-05-26 ENCOUNTER — CARE COORDINATION (OUTPATIENT)
Dept: CARE COORDINATION | Age: 60
End: 2020-05-26

## 2020-05-26 ASSESSMENT — ENCOUNTER SYMPTOMS
ABDOMINAL PAIN: 0
TROUBLE SWALLOWING: 0
SORE THROAT: 0
BACK PAIN: 0
BLOOD IN STOOL: 0
VOICE CHANGE: 0
COUGH: 1
WHEEZING: 0
SHORTNESS OF BREATH: 0
VOMITING: 0

## 2020-05-26 NOTE — CARE COORDINATION
Care Transition Nurse/ Ambulatory Care Manager contacted the patient by telephone to perform post discharge assessment. Verified name and  with patient as identifiers. Provided introduction to self, and explanation of the CTN/ACM role, and reason for call due to risk factors for infection and/or exposure to COVID-19. Symptoms reviewed with patient who verbalized the following symptoms: cough and shortness of breath. Due to no new or worsening symptoms encounter was not routed to provider for escalation. Patient has following risk factors of: heart failure, COPD, asthma and diabetes. CTN/ACM reviewed discharge instructions, medical action plan and red flags such as increased shortness of breath, increasing fever and signs of decompensation with patient who verbalized understanding. Discussed exposure protocols and quarantine with CDC Guidelines What to do if you are sick with coronavirus disease .  Patient was given an opportunity for questions and concerns. The patient agrees to contact PCP office for questions related to their healthcare. CTN/ACM provided contact information for future needs. Reviewed and educated patient on any new and changed medications related to discharge diagnosis       Plan for follow-up call in 5-7 days based on severity of symptoms and risk factors. Patient states that she has not had any episodes of hemoptysis today. She states that her SOB has improved. She does have a dry cough today. Patient denies fever. Patient has a follow up appointment on 6/3/2020, ACM offered to try to get her a sooner appointment, however patient declined at this time. Patient agrees to follow COVID-19 precautions.     Kvng Shultz RN  Ambulatory Care Manager

## 2020-05-29 ENCOUNTER — HOSPITAL ENCOUNTER (OUTPATIENT)
Dept: NON INVASIVE DIAGNOSTICS | Age: 60
Discharge: HOME | End: 2020-05-29
Payer: MEDICARE

## 2020-05-29 LAB
LV EF: 58 %
LVEF MODALITY: NORMAL

## 2020-05-29 PROCEDURE — 6360000004 HC RX CONTRAST MEDICATION: Performed by: INTERNAL MEDICINE

## 2020-05-29 PROCEDURE — 93306 TTE W/DOPPLER COMPLETE: CPT

## 2020-05-29 RX ADMIN — PERFLUTREN 1.65 MG: 6.52 INJECTION, SUSPENSION INTRAVENOUS at 12:59

## 2020-05-30 LAB
BLOOD CULTURE, ROUTINE: NORMAL
CULTURE, BLOOD 2: NORMAL

## 2020-06-01 LAB
EKG P AXIS: 40 DEGREES
EKG P-R INTERVAL: 166 MS
EKG Q-T INTERVAL: 408 MS
EKG QRS DURATION: 98 MS
EKG QTC CALCULATION (BAZETT): 429 MS
EKG T AXIS: -124 DEGREES

## 2020-06-02 ASSESSMENT — PATIENT HEALTH QUESTIONNAIRE - PHQ9
SUM OF ALL RESPONSES TO PHQ QUESTIONS 1-9: 0
SUM OF ALL RESPONSES TO PHQ QUESTIONS 1-9: 0

## 2020-06-03 ENCOUNTER — VIRTUAL VISIT (OUTPATIENT)
Dept: PRIMARY CARE CLINIC | Age: 60
End: 2020-06-03
Payer: MEDICARE

## 2020-06-03 PROCEDURE — 3051F HG A1C>EQUAL 7.0%<8.0%: CPT | Performed by: NURSE PRACTITIONER

## 2020-06-03 PROCEDURE — 3017F COLORECTAL CA SCREEN DOC REV: CPT | Performed by: NURSE PRACTITIONER

## 2020-06-03 PROCEDURE — 2022F DILAT RTA XM EVC RTNOPTHY: CPT | Performed by: NURSE PRACTITIONER

## 2020-06-03 PROCEDURE — 99214 OFFICE O/P EST MOD 30 MIN: CPT | Performed by: NURSE PRACTITIONER

## 2020-06-03 PROCEDURE — G8427 DOCREV CUR MEDS BY ELIG CLIN: HCPCS | Performed by: NURSE PRACTITIONER

## 2020-06-03 RX ORDER — AMLODIPINE BESYLATE 5 MG/1
5 TABLET ORAL DAILY
Qty: 30 TABLET | Refills: 3 | Status: CANCELLED | OUTPATIENT
Start: 2020-06-03

## 2020-06-03 RX ORDER — LEVOCETIRIZINE DIHYDROCHLORIDE 5 MG/1
5 TABLET, FILM COATED ORAL NIGHTLY
Qty: 30 TABLET | Refills: 3 | Status: SHIPPED | OUTPATIENT
Start: 2020-06-03 | End: 2020-10-18 | Stop reason: SDUPTHER

## 2020-06-03 RX ORDER — GABAPENTIN 600 MG/1
600 TABLET ORAL 3 TIMES DAILY
Qty: 90 TABLET | Refills: 0 | OUTPATIENT
Start: 2020-06-03 | End: 2020-07-26

## 2020-06-03 RX ORDER — INSULIN DEGLUDEC INJECTION 100 U/ML
40 INJECTION, SOLUTION SUBCUTANEOUS NIGHTLY
Qty: 4 PEN | Refills: 2 | Status: SHIPPED | OUTPATIENT
Start: 2020-06-03 | End: 2020-10-18 | Stop reason: SDUPTHER

## 2020-06-03 RX ORDER — DULOXETIN HYDROCHLORIDE 60 MG/1
60 CAPSULE, DELAYED RELEASE ORAL DAILY
Qty: 30 CAPSULE | Refills: 3 | Status: SHIPPED | OUTPATIENT
Start: 2020-06-03 | End: 2020-08-14

## 2020-06-03 RX ORDER — BUMETANIDE 1 MG/1
1 TABLET ORAL 2 TIMES DAILY
Qty: 60 TABLET | Refills: 1 | Status: ON HOLD | OUTPATIENT
Start: 2020-06-03 | End: 2020-07-05 | Stop reason: SDUPTHER

## 2020-06-03 ASSESSMENT — ENCOUNTER SYMPTOMS
EYES NEGATIVE: 1
RESPIRATORY NEGATIVE: 1
GASTROINTESTINAL NEGATIVE: 1
SHORTNESS OF BREATH: 0
COUGH: 0
FACIAL SWELLING: 0

## 2020-06-03 NOTE — PROGRESS NOTES
6/3/2020    TELEHEALTH EVALUATION -- Audio/Visual (During KBRRG-76 public health emergency)    HPI:    Shevlin Patella. John Nelson (:  1960) has requested an audio/video evaluation for the following concern(s):    Recent ER visit 2020  Patient was recently seen for hemoptysis and pericardial effusion. Negative CXR and ddimer. Bedside US indicated pericardial effusion. CTA indicated pericardial effusion. Recommended a new ECHO and to follow-up for effusion. Patient has a cardiology appt today. Patient was given azithromycin for possible bronchitis. No issues noted laying supine. Please see ER documentation for further information. GI issues               2020              Patient reports she is having diarrhea constantly. Patient was scheduled for colonoscopy and EGD, but she was in [facility]. The procedures has been postponed until may due to COVID-19.              20              Patient reports the same. Patient is waiting for appointment with GI. Patient needs to call provider and have reassessment. 6/3/2020   Patient reports no accident in a week. Patient reports she hasn't eaten pork in a week and could be a trigger. Patient is still needing an appointment with GI. She reports she had to cancel her appt due to COVID-19.     Diabetes               2020              Patient reports previously blood sugar was pretty high while in hospital, but its because she was receiving steroids regularly. Patient reports since being home,  her blood sugar has normalized and she believes this regimen has improved her fasting glucose. Patient is still using ozempic, but hasn't increased to 0.5 mg Weekly. 4/15/2020              Patient reports she ate late last night because she wasn't hungry before that time. Patient reports she has been using the weekly injection of Ozempic 0.5 mg.  Patient reports her glucose readings have been under 200.               20              Patient reports her last fasting blood sugar was 167 this morning. Patient reports she checked it after she ate. Patient reports eating 3.5 Urdu toasts for breakfast.    6/3/2020   Patient reports her blood glucose is running high. Patient reports her glucose in the morning is 150-160s. Patient reports she isn't following a strict diet.      CHF/ pericardial effusion               4/1/2020              Patient reports her periperhal edema has improved. Patient does report RLE and RUE edema since stroke during hospital stay, but currently no edema of LLE. Patient reports the shortness of air has improved, but still has a cough that requires nebulizer and inhalers.               05/01/20              Patient reports no issues at this time. 6/3/2020   Patient reports dyspnea on exertion is there, but not as severe. Patient reports she is not experiencing edema of lower extremities today. Patient is currently taking Imdur 90 mg daily, valsartan 160 mg daily, carvedilol 3.125 mg, and amlodipine 5 mg. Multiple medications were previously prescribed by provider in [place]. Patient has an appointment with cardiology today at 3:00 PM. New ECHO: EF 55-60%; left ventricular hypertrophy; difficulty visualizing valves noted; small pericardial effusion noted; previously a large circumferential pericardial effusion was noted. Will discuss further at visit on Wednesday. Patient also has appt scheduled with YAMIL Brown cardiology.      Respiratory issues               4/15/2020              Patient reports respiratory issues have improved. Patient is currently using spiriva and reports it has helped tremendously. Patient reports she is not using the xopenex as often as she was before spiriva. Patient reports she has seasonal allergies, but is okay with the xyzal she is currently taking and Singulair.                 05/01/20              No issues at this time. 6/3/2020   Patient reports she is still experiencing a cough.

## 2020-06-04 ENCOUNTER — CARE COORDINATION (OUTPATIENT)
Dept: CARE COORDINATION | Age: 60
End: 2020-06-04

## 2020-06-05 ENCOUNTER — OFFICE VISIT (OUTPATIENT)
Dept: CARDIOLOGY | Age: 60
End: 2020-06-05
Payer: MEDICARE

## 2020-06-05 VITALS
HEART RATE: 76 BPM | SYSTOLIC BLOOD PRESSURE: 138 MMHG | BODY MASS INDEX: 42.1 KG/M2 | DIASTOLIC BLOOD PRESSURE: 54 MMHG | HEIGHT: 61 IN | WEIGHT: 223 LBS

## 2020-06-05 PROBLEM — G47.33 OBSTRUCTIVE SLEEP APNEA SYNDROME: Status: ACTIVE | Noted: 2020-06-05

## 2020-06-05 PROBLEM — I10 ESSENTIAL HYPERTENSION: Status: ACTIVE | Noted: 2020-06-05

## 2020-06-05 PROBLEM — I50.33 ACUTE ON CHRONIC DIASTOLIC CONGESTIVE HEART FAILURE (HCC): Status: ACTIVE | Noted: 2020-01-26

## 2020-06-05 PROBLEM — J45.40 MODERATE PERSISTENT ASTHMA WITHOUT COMPLICATION: Status: ACTIVE | Noted: 2020-06-05

## 2020-06-05 PROCEDURE — G8427 DOCREV CUR MEDS BY ELIG CLIN: HCPCS | Performed by: CLINICAL NURSE SPECIALIST

## 2020-06-05 PROCEDURE — 1036F TOBACCO NON-USER: CPT | Performed by: CLINICAL NURSE SPECIALIST

## 2020-06-05 PROCEDURE — 99214 OFFICE O/P EST MOD 30 MIN: CPT | Performed by: CLINICAL NURSE SPECIALIST

## 2020-06-05 PROCEDURE — 3017F COLORECTAL CA SCREEN DOC REV: CPT | Performed by: CLINICAL NURSE SPECIALIST

## 2020-06-05 PROCEDURE — 3051F HG A1C>EQUAL 7.0%<8.0%: CPT | Performed by: CLINICAL NURSE SPECIALIST

## 2020-06-05 PROCEDURE — G8417 CALC BMI ABV UP PARAM F/U: HCPCS | Performed by: CLINICAL NURSE SPECIALIST

## 2020-06-05 PROCEDURE — 2022F DILAT RTA XM EVC RTNOPTHY: CPT | Performed by: CLINICAL NURSE SPECIALIST

## 2020-06-05 RX ORDER — NITROGLYCERIN 0.4 MG/1
0.4 TABLET SUBLINGUAL EVERY 5 MIN PRN
Status: ON HOLD | COMMUNITY
End: 2020-07-16 | Stop reason: HOSPADM

## 2020-06-05 ASSESSMENT — ENCOUNTER SYMPTOMS
FACIAL SWELLING: 0
VOMITING: 0
WHEEZING: 0
CHEST TIGHTNESS: 0
ABDOMINAL PAIN: 0
COUGH: 1
NAUSEA: 0
SHORTNESS OF BREATH: 1
EYE REDNESS: 0

## 2020-06-05 NOTE — PROGRESS NOTES
[facility], [address] [address]  Phone: [phone]  Fax: [phone]    OFFICE VISIT:  6/5/2020    [address]: 1960    Reason For Visit:  Faviola Brewster is a 56 y.o. female who is here for Congestive Heart Failure (No cardiac sx today. )     Diagnosis Orders   1. Acute on chronic diastolic congestive heart failure ([address]     2. Pericardial effusion     3. Type 2 diabetes mellitus with other specified complication, with long-term current use of insulin ([address]     4. Morbid obesity with BMI of 40.0-44.9, adult ([address]     5. Essential hypertension     6. Obstructive sleep apnea syndrome     7. Shortness of breath  External Referral To Pulmonology   8. Moderate persistent asthma without complication  External Referral To Pulmonology       HPI  Patient follows with our office with a history of diastolic heart failure, pericardial effusion, pulmonary hypertension, morbid obesity, sleep apnea, hypertension. She reports she has not been feeling well over the past 2 weeks. She has noticed increasing shortness of breath, orthopnea, and cough. She denies any weight gain or lower extremity edema. There is no complaint of chest pain or palpitations. She went to the ER on 5/2520 for hemoptysis and was ruled out for PE. A repeat echocardiogram was recommended which was done this past week. Patient is here to discuss     Patient is requesting an appointment to pulmonology for asthma    YAMIL Cordon NP is PCP.   Preston Benavides has the following history as recorded in [facility]:    Patient Active Problem List    Diagnosis Date Noted    Obstructive sleep apnea syndrome 06/05/2020    Essential hypertension 06/05/2020    Moderate persistent asthma without complication 90/17/3828    Morbid obesity with BMI of 40.0-44.9, adult ([address] 01/27/2020    SOB (shortness of breath) 01/26/2020    Pericardial effusion 01/26/2020    Acute on chronic 60 MG extended release capsule Take 1 capsule by mouth daily 30 capsule 3    levocetirizine (XYZAL) 5 MG tablet Take 1 tablet by mouth nightly 30 tablet 3    gabapentin (NEURONTIN) 600 MG tablet Take 1 tablet by mouth 3 times daily for 30 days. 90 tablet 0    carvedilol (COREG) 3.125 MG tablet Take 1 tablet by mouth 2 times daily 60 tablet 2    DULoxetine (CYMBALTA) 30 MG extended release capsule Take 1 capsule by mouth daily Take with 60 mg capsule for total of 90 mg. 30 capsule 3    levalbuterol (XOPENEX) 0.63 MG/3ML nebulization Take 3 mLs by nebulization every 6 hours as needed for Wheezing 20 vial 2    amLODIPine (NORVASC) 5 MG tablet Take 1 tablet by mouth daily 30 tablet 1    pramipexole (MIRAPEX) 0.5 MG tablet Take 1 tablet by mouth 2 times daily 60 tablet 2    levalbuterol (XOPENEX HFA) 45 MCG/ACT inhaler Inhale 2 puffs into the lungs every 4 hours as needed for Wheezing or Shortness of Breath 1 Inhaler 0    fluticasone-vilanterol (BREO ELLIPTA) 100-25 MCG/INH AEPB inhaler Inhale 1 puff into the lungs daily 1 each 3    tiotropium (SPIRIVA RESPIMAT) 1.25 MCG/ACT AERS inhaler Inhale 2 puffs into the lungs daily 1 Inhaler 3    valsartan (DIOVAN) 160 MG tablet Take 160 mg by mouth daily      atorvastatin (LIPITOR) 20 MG tablet Take 1/2 tablet for 1 week, then increase to whole tablet 30 tablet 5    clopidogrel (PLAVIX) 75 MG tablet Take 75 mg by mouth daily      montelukast (SINGULAIR) 10 MG tablet Take 10 mg by mouth nightly      vitamin D (CHOLECALCIFEROL) 25 MCG (1000 UT) TABS tablet Take 1,000 Units by mouth daily      melatonin 3 MG TABS tablet Take 10 mg by mouth nightly      ISOSORBIDE MONONITRATE ER PO Take 90 mg by mouth daily        No current facility-administered medications for this visit. Allergies: Albuterol; Levaquin [levofloxacin]; Metformin and related; and Aspirin    Review of Systems  Review of Systems   Constitutional: Positive for fatigue (worsening).  Negative for area recently. I have given her the name of [employer] to contact to get equipment set up and find a mask that is tolerable. Asthma- previously saw a pulmonologist prior to moving here. Will refer to pulmonology. Last PFTs done in [place] in December    Stable cardiovascular status. No evidence of overt heart failure,angina or dysrhythmia. Plan    Orders Placed This Encounter   Procedures    External Referral To Pulmonology     Referral Priority:   Routine     Referral Type:   Eval and Treat     Referral Reason:   Specialty Services Required     Referred to Provider:   Carolann Lacy MD     Requested Specialty:   Pulmonology     Number of Visits Requested:   1     Return for APRN. 2 weeks  Call [facility] [employer] regarding sleep equipment- treat your sleep apnea  Refer to Pulmonolgist, PFTs at [facility] in Dec 2019  Increase Bumex to 1mg THREEE times a day- 5 days only then back to normal dosing    - Weigh daily and report weight gain of 3lbs or more in 24hrs or 5lbs in one week. - Call for increasing shortness of breath or increasing swelling in feet and legs. (This could mean you are retaining too much fluid)  - 2000mg low sodium diet  - Fluid restriction of 1500ml per day (about 6 cups of fluid per day)      Call with any questionsor concerns  Follow up with YAMIL Hendrickson - NP for non cardiac problems  Report any new problems  Cardiovascular Fitness-Exercise as tolerated. Strive for 15 minutes of exercise most days of the week. Cardiac / HealthyDiet  Continue current medications as directed  Continue plan of treatment  It is always recommended that you bring your medicationsbottles with you to each visit - this is for your safety!        YAMIL Reynolds

## 2020-06-12 ENCOUNTER — CARE COORDINATION (OUTPATIENT)
Dept: CARE COORDINATION | Age: 60
End: 2020-06-12

## 2020-06-12 NOTE — CARE COORDINATION
You Patient resolved from the Care Transitions episode on 6/12/2020    Patient/family has been provided the following resources and education related to COVID-19:                         Signs, symptoms and red flags related to COVID-19            CDC exposure and quarantine guidelines            [facility]it exposure contact - [phone]            Contact for their local Department of Health                 Patient currently reports that the following symptoms have improved:  no new/worsening symptoms     No further outreach scheduled with this CTN/ACM. Episode of Care resolved. Patient has this CTN/ACM contact information if future needs arise.

## 2020-06-14 PROBLEM — G47.33 OBSTRUCTIVE SLEEP APNEA: Status: ACTIVE | Noted: 2020-06-14

## 2020-06-15 PROBLEM — J10.1 INFLUENZA A: Status: RESOLVED | Noted: 2020-03-09 | Resolved: 2020-06-15

## 2020-06-15 PROBLEM — R77.8 ELEVATED TROPONIN: Status: RESOLVED | Noted: 2020-03-09 | Resolved: 2020-06-15

## 2020-06-15 PROBLEM — R09.89 SUSPECTED CEREBROVASCULAR ACCIDENT (CVA): Status: RESOLVED | Noted: 2020-03-09 | Resolved: 2020-06-15

## 2020-06-15 PROBLEM — E11.65 TYPE 2 DIABETES MELLITUS WITH HYPERGLYCEMIA (HCC): Status: RESOLVED | Noted: 2020-03-09 | Resolved: 2020-06-15

## 2020-06-15 PROBLEM — I63.9 CEREBROVASCULAR ACCIDENT (CVA) (HCC): Status: RESOLVED | Noted: 2020-03-09 | Resolved: 2020-06-15

## 2020-06-15 PROBLEM — I31.39 PERICARDIAL EFFUSION: Status: RESOLVED | Noted: 2020-03-09 | Resolved: 2020-06-15

## 2020-06-16 ENCOUNTER — OFFICE VISIT (OUTPATIENT)
Dept: PULMONOLOGY | Facility: CLINIC | Age: 60
End: 2020-06-16

## 2020-06-16 VITALS
BODY MASS INDEX: 41.4 KG/M2 | HEART RATE: 83 BPM | SYSTOLIC BLOOD PRESSURE: 120 MMHG | OXYGEN SATURATION: 99 % | WEIGHT: 219.1 LBS | DIASTOLIC BLOOD PRESSURE: 58 MMHG | TEMPERATURE: 98.1 F

## 2020-06-16 DIAGNOSIS — N18.30 CKD (CHRONIC KIDNEY DISEASE) STAGE 3, GFR 30-59 ML/MIN (HCC): ICD-10-CM

## 2020-06-16 DIAGNOSIS — G47.33 OBSTRUCTIVE SLEEP APNEA: ICD-10-CM

## 2020-06-16 DIAGNOSIS — I27.20 PULMONARY HYPERTENSION (HCC): Primary | ICD-10-CM

## 2020-06-16 DIAGNOSIS — J45.50 SEVERE PERSISTENT ASTHMA WITHOUT COMPLICATION: ICD-10-CM

## 2020-06-16 PROCEDURE — 99204 OFFICE O/P NEW MOD 45 MIN: CPT | Performed by: INTERNAL MEDICINE

## 2020-06-16 RX ORDER — LEVOCETIRIZINE DIHYDROCHLORIDE 5 MG/1
5 TABLET, FILM COATED ORAL
COMMUNITY
Start: 2020-06-03

## 2020-06-16 RX ORDER — DULOXETIN HYDROCHLORIDE 30 MG/1
30 CAPSULE, DELAYED RELEASE ORAL
COMMUNITY
Start: 2020-05-01

## 2020-06-16 NOTE — PROGRESS NOTES
Subjective   Anna Peres is a 60 y.o. female.     Background: Pt with PH, pericardial effusion. LVH, CAD. ARMINDA. pre/post capillary ph on cath 11/2019, cardiomegaly on chest ct 1/2020    Chief Complaint   Patient presents with   • Asthma   • Shortness of Breath   • Sleep Apnea        History of Present Illness   I am asked to see her for asthma by Leti Landa, and there is also been some concern about pulmonary hypertension.  She has additional pulmonary issues which are currently happening more pressing right now.  She has a fairly complicated pulmonary history with prior treatment while living in [place] for severe asthma with Xolair.  She was treated with this for a while and then moved to [place].  She was reassessed in [place] and recalls being told that she had too high of a level and was not eligible for treatment so she went off Xolair and since that time has had increased dyspnea in the chest associated with wheeze requiring her to use her inhalers are more frequently than in the past associated with wheezing cough and shortness of breath for a couple of years.  She then moved back to [place] again and never did get started back on Xolair.  She has now lived in this area since September.  She reports having had pulmonary function tests done in [facility] and reported try to get hold of those.  She has been using Breo and Spiriva with some benefit and that has relieved her need for Xopenex although she continues to use Xopenex every day.  Dr. zechariah Barrientos was Teodoro Anaya with [facility].  Since she has been here she has seen cardiology Associates for acute and chronic diastolic CHF with associated pericardial effusion type 2 diabetes morbid obesity essential hypertension sleep apnea shortness of breath and asthma.  There was concern about pulmonary hypertension.  Review of outside records show history of coronary angioplasty and coronary stents around 2013  history of DVT history of obstructive lung disease listed as COPD in the [facility] record although it sounds like this is more asthma history of cerebral infarction.  Echocardiogram was done 529 showed he had a reversal with diastolic dysfunction normal LV size with preserved LV function estimated EF 55-60 small pericardial effusion.  There was no tricuspid regurgitation so pulmonary artery pressure could not be estimated.    Medical/Family/Social History   has a past medical history of Anxiety, Arthritis, Asthma, Cerebrovascular accident (CVA) (CMS/[facility]) (3/9/2020), CHF (congestive heart failure) (CMS/[facility]), Diabetes mellitus (CMS/[facility]), Elevated troponin, likely Type 2 from influenza A  (3/9/2020), Hyperlipidemia, Hypertension, Influenza A (3/9/2020), Myocardial infarction (CMS/[facility]), Pericardial effusion, large, circumferential ([facility] 3/7/2020 Echo) (3/9/2020), Stroke (CMS/[facility]), Suspected cerebrovascular accident (CVA) (3/9/2020), and Type 2 diabetes mellitus with hyperglycemia (CMS/[facility]) (3/9/2020).   has a past surgical history that includes Coronary stent placement and Tonsillectomy.  family history is not on file.   reports that she has never smoked. She has never used smokeless tobacco.  Allergies   Allergen Reactions   • Albuterol Shortness Of Breath   • Levofloxacin Anaphylaxis, Shortness Of Breath and Swelling   • Metformin And Related Diarrhea   • Aspirin Rash     Medications    Current Outpatient Medications:   •  amLODIPine (NORVASC) 5 MG tablet, Take 5 mg by mouth Daily., Disp: , Rfl:   •  atorvastatin (LIPITOR) 20 MG tablet, Take 20 mg by mouth Daily., Disp: , Rfl:   •  BREO ELLIPTA 100-25 MCG/INH inhaler, , Disp: , Rfl:   •  bumetanide (BUMEX) 1 MG tablet, Take 1 mg by mouth 2 (Two) Times a Day., Disp: , Rfl:   •  carvedilol (COREG) 3.125 MG tablet, Take 3.125 mg by mouth 2 (Two) Times a Day., Disp: , Rfl:   •  Cholecalciferol 25 MCG (1000 UT) tablet, Take 1,000 Units by mouth Daily., Disp: , Rfl:   •   clopidogrel (PLAVIX) 75 MG tablet, Take 1 tablet by mouth Daily., Disp: 30 tablet, Rfl: 0  •  diclofenac (VOLTAREN) 1 % gel gel, Apply 4 g topically to the appropriate area as directed 2 (Two) Times a Day. Apply to bilateral knees., Disp: , Rfl:   •  DULoxetine (CYMBALTA) 30 MG capsule, Take 30 mg by mouth., Disp: , Rfl:   •  gabapentin (NEURONTIN) 600 MG tablet, Take 1 tablet by mouth 3 (Three) Times a Day., Disp: , Rfl:   •  isosorbide mononitrate (IMDUR) 30 MG 24 hr tablet, Take 90 mg by mouth Daily., Disp: , Rfl:   •  levalbuterol (XOPENEX HFA) 45 MCG/ACT inhaler, Inhale 2 puffs Every 4 (Four) Hours As Needed for Wheezing., Disp: , Rfl:   •  levocetirizine (XYZAL) 5 MG tablet, Take 5 mg by mouth., Disp: , Rfl:   •  lisinopril (PRINIVIL,ZESTRIL) 2.5 MG tablet, Take 2.5 mg by mouth Daily., Disp: , Rfl:   •  melatonin 5 MG tablet tablet, Take 10 mg by mouth Every Night., Disp: , Rfl:   •  montelukast (SINGULAIR) 10 MG tablet, Take 10 mg by mouth Every Night., Disp: , Rfl:   •  nitroglycerin (NITROSTAT) 0.4 MG SL tablet, Place 0.4 mg under the tongue Every 5 (Five) Minutes As Needed. do not exceed a total of 3 doses in 15 minutes, Disp: , Rfl:   •  OZEMPIC, 0.25 OR 0.5 MG/DOSE, 2 MG/1.5ML solution pen-injector, Inject 0.25 mg under the skin into the appropriate area as directed 1 (One) Time Per Week. Thursday., Disp: , Rfl:   •  pramipexole (MIRAPEX) 0.5 MG tablet, Take 0.5 mg by mouth 2 (Two) Times a Day., Disp: , Rfl:   •  SPIRIVA RESPIMAT 1.25 MCG/ACT aerosol solution inhaler, , Disp: , Rfl:   •  tiZANidine (ZANAFLEX) 2 MG tablet, Take 2 mg by mouth 2 (Two) Times a Day As Needed for Muscle Spasms., Disp: , Rfl:   •  TRESIBA FLEXTOUCH 100 UNIT/ML solution pen-injector injection, Inject 30 Units under the skin into the appropriate area as directed Every Night., Disp: , Rfl:   •  valsartan (DIOVAN) 160 MG tablet, Take 160 mg by mouth Daily., Disp: , Rfl:   •  HYDROcodone-homatropine (HYCODAN) 5-1.5 MG/5ML syrup,  Take 5 mL by mouth Every 4 (Four) Hours As Needed for Cough., Disp: 120 mL, Rfl: 0  •  ondansetron ODT (ZOFRAN-ODT) 4 MG disintegrating tablet, Take 4 mg by mouth Every 8 (Eight) Hours As Needed for Nausea., Disp: , Rfl:   •  sertraline (ZOLOFT) 25 MG tablet, Take 25 mg by mouth Daily., Disp: , Rfl:     Review of Systems   Constitutional: Negative for chills and fever.   HENT: Negative for trouble swallowing and voice change.    Eyes: Negative for photophobia and visual disturbance.   Respiratory: Positive for cough and wheezing. Negative for shortness of breath.    Gastrointestinal: Negative for abdominal pain, nausea, vomiting and GERD.   Genitourinary: Negative for difficulty urinating and hematuria.   Musculoskeletal: Negative for gait problem and joint swelling.   Skin: Negative for pallor and skin lesions.   Neurological: Negative for tremors, weakness and confusion.   Hematological: Negative for adenopathy. Does not bruise/bleed easily.   Psychiatric/Behavioral: The patient is not nervous/anxious.        Objective   /58   Pulse 83   Temp 98.1 °F (36.7 °C)   Wt 99.4 kg (219 lb 1.6 oz)   SpO2 99% Comment: ra  BMI 41.40 kg/m²   Physical Exam   Constitutional: She appears well-developed. She is active. She does not appear ill. No distress. She is obese.  HENT:   Head: Atraumatic.   Eyes: Conjunctivae and EOM are normal. No scleral icterus.   Neck: Neck supple.   Cardiovascular: Normal rate, regular rhythm, S1 normal and S2 normal.   Pulmonary/Chest: Effort normal. No accessory muscle usage or stridor. No respiratory distress. She has decreased breath sounds. She has no wheezes.   Abdominal: Soft. She exhibits no distension. There is no tenderness.   Musculoskeletal: She exhibits no deformity.   Lymphadenopathy:     She has no cervical adenopathy.   Neurological: She is alert.   Skin: Skin is warm. No rash noted.   Psychiatric: She has a normal mood and affect.      -----------------------------------------------------------------------------------------------  CTA chest 5/25/2020 2:00 PM  HISTORY: Hemoptysis  TECHNIQUE: Axial images of the chest were obtained following IV  contrast. . Coronal and sagittal maximum intensity projectional  reformatted images are reconstructed and reviewed.   COMPARISON: 1/26/2020.   DLP: 981 mGy cm Automated exposure control was utilized to minimize  patient radiation dose.  FINDINGS:   There are no pulmonary emboli identified. There is mild thoracic  aortic arch calcification. No aneurysm or dissection identified.  Coronary artery calcification with circumflex stent suspected.  Cardiomegaly. Small-to-moderate pericardial effusion, mildly increased  from the previous study measuring 2.5 cm in width on the left,  increased from the prior 1.9 cm measurement.  Fluid within the esophagus may relate to poor primary esophageal  peristalsis or gastroesophageal reflux. No pathologic intrathoracic or  axillary lymphadenopathy.  Limited images the upper abdomen demonstrate diffuse vascular  calcification.  Linear left basilar atelectasis no pulmonary consolidation or  suspicious nodularity. Calcified granuloma right lower lobe on image  37. Mild scarring suspected of the right posterior medial lower lobe.  No discrete endobronchial lesions. No pneumothorax.  No focal destructive osseous lesions.  Sclerotic focus of the left 10th and may be a bony island.  IMPRESSION:  1. No pulmonary emboli.  2. Mild vascular calcification with no thoracic aortic aneurysm or  dissection. Coronary artery calcification and possible left circumflex  stent.  3. Cardiomegaly. Mild increase in size of the moderate size  pericardial effusion compared to 1/26/2020.  4. Left basilar atelectasis. No parenchymal consolidation or  suspicious nodularity.  Signed by Dr Sadie Martinez on 5/25/2020 3:36 PM      -----------------------------------------------------------------------------------------------         -----------------------------------------------------------------------------------------------  Assessment/Plan   Problem List Items Addressed This Visit        Pulmonary Problems    Obstructive sleep apnea       Other    Pulmonary hypertension (CMS/[facility]), severe  - Primary    CKD (chronic kidney disease) stage 3, GFR 30-59 ml/min (CMS/[facility])      Other Visit Diagnoses     Severe persistent asthma without complication        Relevant Orders    IgE + Allergens (22)    CBC & Differential        Patient's Body mass index is 41.4 kg/m². BMI is above normal parameters. Recommendations include: referral to primary care.      She has several things going on.  She has cardiomegaly and pericardial effusion and that is being evaluated per cardiology.  She has diastolic dysfunction which certainly can contribute to dyspnea and any pulmonary hypertension and edema.  She also has significant pulmonary issues with asthma.  I am not sure how much remodeling she might have.  She has had pulmonary function tests and will need to get those.  She also has been treated with Xolair in the past and it would be helpful to have records from that therapy, particularly what her IgE levels were.  For now we will have her continue on her current inhaler regimen.  Continue Breo and albuterol as that is a fairly aggressive regimen for controller goals.  We will try to get records from pulmonary associates at [place].  We will need to get the pulmonary function test results from [facility].  We will send her for an IgE with antigens and also CBC with differential to look for eosinophilia.  She may be a candidate for resumption of Xolair or may be a candidate for 1 of the other biologics if her IgE is too high for Xolair and she has eosinophils.  Using inhaler every day for rescue indicates she is not ideally controlled.   Regarding pulmonary hypertension is could be related to several things including diastolic dysfunction diastolic heart failure pulmonary failure and fluid overload related to chronic kidney disease and also sleep apnea.  These would constitute groups 2 and 3 pulmonary hypertension for which treatment would be optimization of the underlying disease processes.  Concern of pre-and post capillary pulmonary hypertension on the heart cath in 2019 suggesting there could be some degree of Group 1, however typically medications for this group of PAH can be detrimental in the setting of her other coexisting conditions, and can make things worse rather than better.  We will investigate this further after we get a better handle on her asthma situation.       Electronically signed by Archie Purdy MD, 6/16/2020, 16:08

## 2020-06-18 ENCOUNTER — TELEPHONE (OUTPATIENT)
Dept: PRIMARY CARE CLINIC | Age: 60
End: 2020-06-18

## 2020-06-18 ENCOUNTER — HOSPITAL ENCOUNTER (EMERGENCY)
Age: 60
Discharge: HOME | End: 2020-06-18
Attending: EMERGENCY MEDICINE
Payer: MEDICARE

## 2020-06-18 ENCOUNTER — APPOINTMENT (OUTPATIENT)
Dept: GENERAL RADIOLOGY | Age: 60
End: 2020-06-18
Payer: MEDICARE

## 2020-06-18 VITALS
WEIGHT: 217 LBS | RESPIRATION RATE: 17 BRPM | HEART RATE: 78 BPM | DIASTOLIC BLOOD PRESSURE: 49 MMHG | OXYGEN SATURATION: 96 % | SYSTOLIC BLOOD PRESSURE: 135 MMHG | BODY MASS INDEX: 40.97 KG/M2 | HEIGHT: 61 IN

## 2020-06-18 LAB
ALBUMIN SERPL-MCNC: 3.3 G/DL (ref 3.5–5.2)
ALP BLD-CCNC: 85 U/L (ref 35–104)
ALT SERPL-CCNC: 9 U/L (ref 5–33)
ANION GAP SERPL CALCULATED.3IONS-SCNC: 8 MMOL/L (ref 7–19)
AST SERPL-CCNC: 11 U/L (ref 5–32)
BASOPHILS ABSOLUTE: 0 K/UL (ref 0–0.2)
BASOPHILS RELATIVE PERCENT: 0.5 % (ref 0–1)
BILIRUB SERPL-MCNC: <0.2 MG/DL (ref 0.2–1.2)
BUN BLDV-MCNC: 30 MG/DL (ref 8–23)
CALCIUM SERPL-MCNC: 8.9 MG/DL (ref 8.8–10.2)
CHLORIDE BLD-SCNC: 103 MMOL/L (ref 98–111)
CO2: 28 MMOL/L (ref 22–29)
CREAT SERPL-MCNC: 1.1 MG/DL (ref 0.5–0.9)
D DIMER: 0.3 UG/ML FEU (ref 0–0.48)
EOSINOPHILS ABSOLUTE: 0.2 K/UL (ref 0–0.6)
EOSINOPHILS RELATIVE PERCENT: 3.8 % (ref 0–5)
GFR NON-AFRICAN AMERICAN: 50
GLUCOSE BLD-MCNC: 189 MG/DL (ref 74–109)
HCT VFR BLD CALC: 28.5 % (ref 37–47)
HEMOGLOBIN: 9.7 G/DL (ref 12–16)
IMMATURE GRANULOCYTES #: 0 K/UL
LYMPHOCYTES ABSOLUTE: 1.7 K/UL (ref 1.1–4.5)
LYMPHOCYTES RELATIVE PERCENT: 29.6 % (ref 20–40)
MCH RBC QN AUTO: 31.1 PG (ref 27–31)
MCHC RBC AUTO-ENTMCNC: 34 G/DL (ref 33–37)
MCV RBC AUTO: 91.3 FL (ref 81–99)
MONOCYTES ABSOLUTE: 0.6 K/UL (ref 0–0.9)
MONOCYTES RELATIVE PERCENT: 9.8 % (ref 0–10)
NEUTROPHILS ABSOLUTE: 3.2 K/UL (ref 1.5–7.5)
NEUTROPHILS RELATIVE PERCENT: 56 % (ref 50–65)
PDW BLD-RTO: 13.7 % (ref 11.5–14.5)
PLATELET # BLD: 225 K/UL (ref 130–400)
PMV BLD AUTO: 8.9 FL (ref 9.4–12.3)
POTASSIUM SERPL-SCNC: 4 MMOL/L (ref 3.5–5)
PRO-BNP: 633 PG/ML (ref 0–900)
RBC # BLD: 3.12 M/UL (ref 4.2–5.4)
SODIUM BLD-SCNC: 139 MMOL/L (ref 136–145)
TOTAL PROTEIN: 6.1 G/DL (ref 6.6–8.7)
TROPONIN: 0.03 NG/ML (ref 0–0.03)
WBC # BLD: 5.7 K/UL (ref 4.8–10.8)

## 2020-06-18 PROCEDURE — 99285 EMERGENCY DEPT VISIT HI MDM: CPT

## 2020-06-18 PROCEDURE — 36415 COLL VENOUS BLD VENIPUNCTURE: CPT

## 2020-06-18 PROCEDURE — 84484 ASSAY OF TROPONIN QUANT: CPT

## 2020-06-18 PROCEDURE — 80053 COMPREHEN METABOLIC PANEL: CPT

## 2020-06-18 PROCEDURE — 83880 ASSAY OF NATRIURETIC PEPTIDE: CPT

## 2020-06-18 PROCEDURE — 93005 ELECTROCARDIOGRAM TRACING: CPT | Performed by: EMERGENCY MEDICINE

## 2020-06-18 PROCEDURE — 85379 FIBRIN DEGRADATION QUANT: CPT

## 2020-06-18 PROCEDURE — 85025 COMPLETE CBC W/AUTO DIFF WBC: CPT

## 2020-06-18 PROCEDURE — 71045 X-RAY EXAM CHEST 1 VIEW: CPT

## 2020-06-18 ASSESSMENT — ENCOUNTER SYMPTOMS
COUGH: 0
SHORTNESS OF BREATH: 1
WHEEZING: 0

## 2020-06-18 NOTE — ED PROVIDER NOTES
management comments: CXR read as wnl by me, official per radiologist probable interstitial pneumonia- call in doxycycline. CRITICAL CARE TIME   Total Critical Care time was 0 minutes, excluding separately reportable procedures. There was a high probability of clinically significant/lifethreatening deterioration in the patient's condition which required my urgent intervention. CONSULTS:  None    PROCEDURES:  Unless otherwise noted below, none     Procedures    FINAL IMPRESSION      1.  Dyspnea, unspecified type          DISPOSITION/PLAN   DISPOSITION        PATIENT REFERRED TO:  YAMIL Mckeon - NP  [address] [place] [address]  [phone]      As needed      DISCHARGE MEDICATIONS:  Discharge Medication List as of 6/18/2020  8:39 PM             (Please note that portions of this note were completed with a voice recognition program.  Efforts were made to edit the dictations but occasionally words are mis-transcribed.)    Shira Frias MD (electronically signed)  Attending Emergency Physician          Shira Frias MD  06/19/20 [id number]

## 2020-06-18 NOTE — PROGRESS NOTES
Patient called with complaints of shortness of breath, leg swelling and a weight gain of 7 pounds this week. She does have congestive heart failure. She is having a hard time catching her breath while speaking with me on the phone. I have advised patient to go to ED for further evaluation due to medical conditions and severe shortness of breath. She also states her legs are swollen more than usual. She states she is taking all of her medications as prescribed, including diuretics.

## 2020-06-19 ENCOUNTER — CARE COORDINATION (OUTPATIENT)
Dept: CARE COORDINATION | Age: 60
End: 2020-06-19

## 2020-06-20 LAB
EKG P AXIS: 51 DEGREES
EKG P-R INTERVAL: 170 MS
EKG Q-T INTERVAL: 406 MS
EKG QRS DURATION: 98 MS
EKG QTC CALCULATION (BAZETT): 441 MS
EKG T AXIS: -110 DEGREES

## 2020-06-20 PROCEDURE — 93010 ELECTROCARDIOGRAM REPORT: CPT | Performed by: INTERNAL MEDICINE

## 2020-06-22 ENCOUNTER — CARE COORDINATION (OUTPATIENT)
Dept: CARE COORDINATION | Age: 60
End: 2020-06-22

## 2020-06-23 ENCOUNTER — TELEPHONE (OUTPATIENT)
Dept: PULMONOLOGY | Facility: CLINIC | Age: 60
End: 2020-06-23

## 2020-06-24 ENCOUNTER — VIRTUAL VISIT (OUTPATIENT)
Dept: PRIMARY CARE CLINIC | Age: 60
End: 2020-06-24
Payer: MEDICARE

## 2020-06-24 PROBLEM — N18.30 CKD (CHRONIC KIDNEY DISEASE) STAGE 3, GFR 30-59 ML/MIN (HCC): Status: ACTIVE | Noted: 2020-03-09

## 2020-06-24 PROBLEM — I27.20 PULMONARY HYPERTENSION (HCC): Status: ACTIVE | Noted: 2020-03-09

## 2020-06-24 PROBLEM — I25.10 NONOBSTRUCTIVE ATHEROSCLEROSIS OF CORONARY ARTERY: Status: ACTIVE | Noted: 2020-03-09

## 2020-06-24 PROCEDURE — 99214 OFFICE O/P EST MOD 30 MIN: CPT | Performed by: NURSE PRACTITIONER

## 2020-06-24 PROCEDURE — 3017F COLORECTAL CA SCREEN DOC REV: CPT | Performed by: NURSE PRACTITIONER

## 2020-06-24 PROCEDURE — G8427 DOCREV CUR MEDS BY ELIG CLIN: HCPCS | Performed by: NURSE PRACTITIONER

## 2020-06-24 RX ORDER — DOXYCYCLINE HYCLATE 100 MG/1
100 CAPSULE ORAL 2 TIMES DAILY
Status: ON HOLD | COMMUNITY
Start: 2020-06-19 | End: 2020-07-02

## 2020-06-24 NOTE — PROGRESS NOTES
Tye St, APRN - NP   DULoxetine (CYMBALTA) 60 MG extended release capsule Take 1 capsule by mouth daily  Inlet Given, APRN - NP   levocetirizine (XYZAL) 5 MG tablet Take 1 tablet by mouth nightly  Inlet Given, APRN - NP   gabapentin (NEURONTIN) 600 MG tablet Take 1 tablet by mouth 3 times daily for 30 days. Amy Alcantara MD   carvedilol (COREG) 3.125 MG tablet Take 1 tablet by mouth 2 times daily  Inlet Given, APRN - NP   DULoxetine (CYMBALTA) 30 MG extended release capsule Take 1 capsule by mouth daily Take with 60 mg capsule for total of 90 mg.   Torie Given, APRN - NP   levalbuterol Orlena Corson) 0.63 MG/3ML nebulization Take 3 mLs by nebulization every 6 hours as needed for Wheezing  Inlet Given, APRN - NP   amLODIPine (NORVASC) 5 MG tablet Take 1 tablet by mouth daily  Inlet Given, APRN - NP   pramipexole (MIRAPEX) 0.5 MG tablet Take 1 tablet by mouth 2 times daily  Inlet Given, APRN - NP   levalbuterol Orlena Corson HFA) 45 MCG/ACT inhaler Inhale 2 puffs into the lungs every 4 hours as needed for Wheezing or Shortness of Breath  Inlet Given, APRN - NP   fluticasone-vilanterol (BREO ELLIPTA) 100-25 MCG/INH AEPB inhaler Inhale 1 puff into the lungs daily  Inlet Given, APRN - NP   tiotropium (SPIRIVA RESPIMAT) 1.25 MCG/ACT AERS inhaler Inhale 2 puffs into the lungs daily  Inlet Given, APRN - NP   valsartan (DIOVAN) 160 MG tablet Take 160 mg by mouth daily  Historical Provider, MD   atorvastatin (LIPITOR) 20 MG tablet Take 1/2 tablet for 1 week, then increase to whole tablet  Torie Given, APRN - NP   clopidogrel (PLAVIX) 75 MG tablet Take 75 mg by mouth daily  Historical Provider, MD   montelukast (SINGULAIR) 10 MG tablet Take 10 mg by mouth nightly  Historical Provider, MD   vitamin D (CHOLECALCIFEROL) 25 MCG (1000 UT) TABS tablet Take 1,000 Units by mouth daily  Historical Provider, MD   melatonin 3 MG TABS tablet Take 10 mg by mouth

## 2020-06-25 ASSESSMENT — ENCOUNTER SYMPTOMS
COUGH: 1
WHEEZING: 1
EYES NEGATIVE: 1
SHORTNESS OF BREATH: 1
GASTROINTESTINAL NEGATIVE: 1

## 2020-06-26 ENCOUNTER — CARE COORDINATION (OUTPATIENT)
Dept: CARE COORDINATION | Age: 60
End: 2020-06-26

## 2020-06-26 NOTE — CARE COORDINATION
COVID-19 Screening Follow-up Note    Patient contacted regarding COVID-19 risk and screening. Care Transition Nurse/ Ambulatory Care Manager contacted the patient by telephone to perform follow-up assessment. Verified name and  with patient as identifiers. Patient has following risk factors of: heart failure, COPD and pneumonia        Symptoms reviewed with patient who verbalized the following symptoms: shortness of breath       Due to no new or worsening symptoms encounter was not routed to provider for escalation.   - Pt had appt with her PCP on . She said she is now taking antibiotics for pneumonia. Pt has some exertional SOA. She has been taking her inhaler and also using her home nebulizer with some relief. Pt was instructed if she becomes severely SOA, weak or develops fever she is to return to ED for evaluation. Pt has f/u with cardiology on  and will see her PCP again on 7/10 for close follow up. She has all of her routine medications. Will follow up with patient as indicated. Education provided regarding infection prevention, and signs and symptoms of COVID-19 and when to seek medical attention with patient who verbalized understanding. Discussed exposure protocols and quarantine from [address] you at higher risk for severe illness  and given an opportunity for questions and concerns. The patient agrees to contact the COVID-19 hotline [phone] or PCP office for questions related to their healthcare. CTN/ACM provided contact information for future reference. From CDC: Are you at higher risk for severe illness?  Wash your hands often.  Avoid close contact (6 feet, which is about two arm lengths) with people who are sick.  Put distance between yourself and other people if COVID-19 is spreading in your community.  Clean and disinfect frequently touched surfaces.  Avoid all cruise travel and non-essential air travel.    Call your healthcare professional if you

## 2020-07-01 ENCOUNTER — HOSPITAL ENCOUNTER (INPATIENT)
Age: 60
LOS: 2 days | Discharge: HOME | DRG: 291 | End: 2020-07-05
Attending: EMERGENCY MEDICINE | Admitting: HOSPITALIST
Payer: MEDICARE

## 2020-07-01 PROCEDURE — 93005 ELECTROCARDIOGRAM TRACING: CPT | Performed by: EMERGENCY MEDICINE

## 2020-07-01 PROCEDURE — 99285 EMERGENCY DEPT VISIT HI MDM: CPT

## 2020-07-02 ENCOUNTER — APPOINTMENT (OUTPATIENT)
Dept: CT IMAGING | Age: 60
DRG: 291 | End: 2020-07-02
Payer: MEDICARE

## 2020-07-02 ENCOUNTER — APPOINTMENT (OUTPATIENT)
Dept: GENERAL RADIOLOGY | Age: 60
DRG: 291 | End: 2020-07-02
Payer: MEDICARE

## 2020-07-02 ENCOUNTER — CARE COORDINATION (OUTPATIENT)
Dept: CARE COORDINATION | Age: 60
End: 2020-07-02

## 2020-07-02 PROBLEM — I50.1 PULMONARY EDEMA WITH CONGESTIVE HEART FAILURE (HCC): Status: ACTIVE | Noted: 2020-07-02

## 2020-07-02 PROBLEM — J44.9 COPD (CHRONIC OBSTRUCTIVE PULMONARY DISEASE) (HCC): Status: ACTIVE | Noted: 2020-01-26

## 2020-07-02 PROBLEM — Z51.5 PALLIATIVE CARE PATIENT: Status: ACTIVE | Noted: 2020-07-02

## 2020-07-02 LAB
ALBUMIN SERPL-MCNC: 3.3 G/DL (ref 3.5–5.2)
ALP BLD-CCNC: 103 U/L (ref 35–104)
ALT SERPL-CCNC: 13 U/L (ref 5–33)
ANION GAP SERPL CALCULATED.3IONS-SCNC: 11 MMOL/L (ref 7–19)
AST SERPL-CCNC: 16 U/L (ref 5–32)
BILIRUB SERPL-MCNC: 0.3 MG/DL (ref 0.2–1.2)
BUN BLDV-MCNC: 27 MG/DL (ref 8–23)
CALCIUM SERPL-MCNC: 8.6 MG/DL (ref 8.8–10.2)
CHLORIDE BLD-SCNC: 103 MMOL/L (ref 98–111)
CO2: 23 MMOL/L (ref 22–29)
CREAT SERPL-MCNC: 1 MG/DL (ref 0.5–0.9)
D DIMER: 0.66 UG/ML FEU (ref 0–0.48)
EKG P AXIS: 62 DEGREES
EKG P-R INTERVAL: 172 MS
EKG Q-T INTERVAL: 408 MS
EKG QRS DURATION: 92 MS
EKG QTC CALCULATION (BAZETT): 451 MS
EKG T AXIS: -14 DEGREES
GFR NON-AFRICAN AMERICAN: 56
GLUCOSE BLD-MCNC: 287 MG/DL (ref 74–109)
GLUCOSE BLD-MCNC: 382 MG/DL (ref 70–99)
GLUCOSE BLD-MCNC: 393 MG/DL (ref 70–99)
GLUCOSE BLD-MCNC: 412 MG/DL (ref 70–99)
HCT VFR BLD CALC: 29.5 % (ref 37–47)
HEMOGLOBIN: 9.5 G/DL (ref 12–16)
INR BLD: 1.03 (ref 0.88–1.18)
MCH RBC QN AUTO: 31.6 PG (ref 27–31)
MCHC RBC AUTO-ENTMCNC: 32.2 G/DL (ref 33–37)
MCV RBC AUTO: 98 FL (ref 81–99)
PDW BLD-RTO: 14.1 % (ref 11.5–14.5)
PERFORMED ON: ABNORMAL
PLATELET # BLD: 207 K/UL (ref 130–400)
PMV BLD AUTO: 9.2 FL (ref 9.4–12.3)
POTASSIUM SERPL-SCNC: 4.2 MMOL/L (ref 3.5–5)
PRO-BNP: 1801 PG/ML (ref 0–900)
PROTHROMBIN TIME: 13.5 SEC (ref 12–14.6)
RBC # BLD: 3.01 M/UL (ref 4.2–5.4)
SODIUM BLD-SCNC: 137 MMOL/L (ref 136–145)
TOTAL PROTEIN: 6.4 G/DL (ref 6.6–8.7)
TROPONIN: 0.02 NG/ML (ref 0–0.03)
WBC # BLD: 7 K/UL (ref 4.8–10.8)

## 2020-07-02 PROCEDURE — G0378 HOSPITAL OBSERVATION PER HR: HCPCS

## 2020-07-02 PROCEDURE — 99223 1ST HOSP IP/OBS HIGH 75: CPT | Performed by: INTERNAL MEDICINE

## 2020-07-02 PROCEDURE — 85610 PROTHROMBIN TIME: CPT

## 2020-07-02 PROCEDURE — 96374 THER/PROPH/DIAG INJ IV PUSH: CPT

## 2020-07-02 PROCEDURE — 80053 COMPREHEN METABOLIC PANEL: CPT

## 2020-07-02 PROCEDURE — 85379 FIBRIN DEGRADATION QUANT: CPT

## 2020-07-02 PROCEDURE — 2580000003 HC RX 258: Performed by: HOSPITALIST

## 2020-07-02 PROCEDURE — 2500000003 HC RX 250 WO HCPCS: Performed by: HOSPITALIST

## 2020-07-02 PROCEDURE — 82947 ASSAY GLUCOSE BLOOD QUANT: CPT

## 2020-07-02 PROCEDURE — 6370000000 HC RX 637 (ALT 250 FOR IP): Performed by: INTERNAL MEDICINE

## 2020-07-02 PROCEDURE — 96376 TX/PRO/DX INJ SAME DRUG ADON: CPT

## 2020-07-02 PROCEDURE — 36415 COLL VENOUS BLD VENIPUNCTURE: CPT

## 2020-07-02 PROCEDURE — 84484 ASSAY OF TROPONIN QUANT: CPT

## 2020-07-02 PROCEDURE — 71045 X-RAY EXAM CHEST 1 VIEW: CPT

## 2020-07-02 PROCEDURE — 96372 THER/PROPH/DIAG INJ SC/IM: CPT

## 2020-07-02 PROCEDURE — 83880 ASSAY OF NATRIURETIC PEPTIDE: CPT

## 2020-07-02 PROCEDURE — 93308 TTE F-UP OR LMTD: CPT

## 2020-07-02 PROCEDURE — 6360000002 HC RX W HCPCS: Performed by: HOSPITALIST

## 2020-07-02 PROCEDURE — 6360000004 HC RX CONTRAST MEDICATION: Performed by: EMERGENCY MEDICINE

## 2020-07-02 PROCEDURE — 85027 COMPLETE CBC AUTOMATED: CPT

## 2020-07-02 PROCEDURE — 2500000003 HC RX 250 WO HCPCS: Performed by: EMERGENCY MEDICINE

## 2020-07-02 PROCEDURE — 71275 CT ANGIOGRAPHY CHEST: CPT

## 2020-07-02 PROCEDURE — 6370000000 HC RX 637 (ALT 250 FOR IP): Performed by: HOSPITALIST

## 2020-07-02 PROCEDURE — 96375 TX/PRO/DX INJ NEW DRUG ADDON: CPT

## 2020-07-02 PROCEDURE — 94640 AIRWAY INHALATION TREATMENT: CPT

## 2020-07-02 RX ORDER — DULOXETIN HYDROCHLORIDE 30 MG/1
30 CAPSULE, DELAYED RELEASE ORAL DAILY
Status: DISCONTINUED | OUTPATIENT
Start: 2020-07-02 | End: 2020-07-05 | Stop reason: HOSPADM

## 2020-07-02 RX ORDER — PRAMIPEXOLE DIHYDROCHLORIDE 0.25 MG/1
0.5 TABLET ORAL 2 TIMES DAILY
Status: DISCONTINUED | OUTPATIENT
Start: 2020-07-02 | End: 2020-07-05 | Stop reason: HOSPADM

## 2020-07-02 RX ORDER — BUMETANIDE 0.25 MG/ML
1 INJECTION, SOLUTION INTRAMUSCULAR; INTRAVENOUS 2 TIMES DAILY
Status: DISCONTINUED | OUTPATIENT
Start: 2020-07-02 | End: 2020-07-02

## 2020-07-02 RX ORDER — POTASSIUM CHLORIDE 7.45 MG/ML
10 INJECTION INTRAVENOUS PRN
Status: DISCONTINUED | OUTPATIENT
Start: 2020-07-02 | End: 2020-07-05 | Stop reason: HOSPADM

## 2020-07-02 RX ORDER — CETIRIZINE HYDROCHLORIDE 10 MG/1
10 TABLET ORAL NIGHTLY
Status: DISCONTINUED | OUTPATIENT
Start: 2020-07-02 | End: 2020-07-05 | Stop reason: HOSPADM

## 2020-07-02 RX ORDER — LEVOCETIRIZINE DIHYDROCHLORIDE 5 MG/1
5 TABLET, FILM COATED ORAL NIGHTLY
Status: DISCONTINUED | OUTPATIENT
Start: 2020-07-02 | End: 2020-07-02 | Stop reason: CLARIF

## 2020-07-02 RX ORDER — METHYLPREDNISOLONE SODIUM SUCCINATE 40 MG/ML
40 INJECTION, POWDER, LYOPHILIZED, FOR SOLUTION INTRAMUSCULAR; INTRAVENOUS EVERY 6 HOURS
Status: DISCONTINUED | OUTPATIENT
Start: 2020-07-02 | End: 2020-07-02

## 2020-07-02 RX ORDER — BUMETANIDE 0.25 MG/ML
2 INJECTION, SOLUTION INTRAMUSCULAR; INTRAVENOUS ONCE
Status: COMPLETED | OUTPATIENT
Start: 2020-07-02 | End: 2020-07-02

## 2020-07-02 RX ORDER — DULOXETIN HYDROCHLORIDE 30 MG/1
60 CAPSULE, DELAYED RELEASE ORAL DAILY
Status: DISCONTINUED | OUTPATIENT
Start: 2020-07-02 | End: 2020-07-05 | Stop reason: HOSPADM

## 2020-07-02 RX ORDER — PROMETHAZINE HYDROCHLORIDE 25 MG/1
12.5 TABLET ORAL EVERY 6 HOURS PRN
Status: DISCONTINUED | OUTPATIENT
Start: 2020-07-02 | End: 2020-07-05 | Stop reason: HOSPADM

## 2020-07-02 RX ORDER — ACETAMINOPHEN 650 MG/1
650 SUPPOSITORY RECTAL EVERY 6 HOURS PRN
Status: DISCONTINUED | OUTPATIENT
Start: 2020-07-02 | End: 2020-07-05 | Stop reason: HOSPADM

## 2020-07-02 RX ORDER — NICOTINE POLACRILEX 4 MG
15 LOZENGE BUCCAL PRN
Status: DISCONTINUED | OUTPATIENT
Start: 2020-07-02 | End: 2020-07-05 | Stop reason: HOSPADM

## 2020-07-02 RX ORDER — METHYLPREDNISOLONE SODIUM SUCCINATE 40 MG/ML
40 INJECTION, POWDER, LYOPHILIZED, FOR SOLUTION INTRAMUSCULAR; INTRAVENOUS EVERY 12 HOURS
Status: DISCONTINUED | OUTPATIENT
Start: 2020-07-02 | End: 2020-07-02

## 2020-07-02 RX ORDER — VALSARTAN 80 MG/1
160 TABLET ORAL DAILY
Status: DISCONTINUED | OUTPATIENT
Start: 2020-07-02 | End: 2020-07-05 | Stop reason: HOSPADM

## 2020-07-02 RX ORDER — BUMETANIDE 0.25 MG/ML
2 INJECTION, SOLUTION INTRAMUSCULAR; INTRAVENOUS 2 TIMES DAILY
Status: DISCONTINUED | OUTPATIENT
Start: 2020-07-03 | End: 2020-07-05

## 2020-07-02 RX ORDER — AMLODIPINE BESYLATE 5 MG/1
5 TABLET ORAL DAILY
Status: DISCONTINUED | OUTPATIENT
Start: 2020-07-02 | End: 2020-07-05 | Stop reason: HOSPADM

## 2020-07-02 RX ORDER — ARFORMOTEROL TARTRATE 15 UG/2ML
15 SOLUTION RESPIRATORY (INHALATION) 2 TIMES DAILY
Status: DISCONTINUED | OUTPATIENT
Start: 2020-07-02 | End: 2020-07-05 | Stop reason: HOSPADM

## 2020-07-02 RX ORDER — CARVEDILOL 3.12 MG/1
3.12 TABLET ORAL 2 TIMES DAILY
Status: DISCONTINUED | OUTPATIENT
Start: 2020-07-02 | End: 2020-07-05 | Stop reason: HOSPADM

## 2020-07-02 RX ORDER — POTASSIUM CHLORIDE 20 MEQ/1
40 TABLET, EXTENDED RELEASE ORAL PRN
Status: DISCONTINUED | OUTPATIENT
Start: 2020-07-02 | End: 2020-07-05 | Stop reason: HOSPADM

## 2020-07-02 RX ORDER — MONTELUKAST SODIUM 10 MG/1
10 TABLET ORAL NIGHTLY
Status: DISCONTINUED | OUTPATIENT
Start: 2020-07-02 | End: 2020-07-05 | Stop reason: HOSPADM

## 2020-07-02 RX ORDER — SODIUM CHLORIDE 0.9 % (FLUSH) 0.9 %
10 SYRINGE (ML) INJECTION EVERY 12 HOURS SCHEDULED
Status: DISCONTINUED | OUTPATIENT
Start: 2020-07-02 | End: 2020-07-05 | Stop reason: HOSPADM

## 2020-07-02 RX ORDER — GABAPENTIN 600 MG/1
600 TABLET ORAL 3 TIMES DAILY
Status: DISCONTINUED | OUTPATIENT
Start: 2020-07-02 | End: 2020-07-05 | Stop reason: HOSPADM

## 2020-07-02 RX ORDER — ONDANSETRON 2 MG/ML
4 INJECTION INTRAMUSCULAR; INTRAVENOUS EVERY 6 HOURS PRN
Status: DISCONTINUED | OUTPATIENT
Start: 2020-07-02 | End: 2020-07-05 | Stop reason: HOSPADM

## 2020-07-02 RX ORDER — NITROGLYCERIN 0.4 MG/1
0.4 TABLET SUBLINGUAL EVERY 5 MIN PRN
Status: DISCONTINUED | OUTPATIENT
Start: 2020-07-02 | End: 2020-07-05 | Stop reason: HOSPADM

## 2020-07-02 RX ORDER — BUDESONIDE 0.25 MG/2ML
0.25 INHALANT ORAL 2 TIMES DAILY
Status: DISCONTINUED | OUTPATIENT
Start: 2020-07-02 | End: 2020-07-05 | Stop reason: HOSPADM

## 2020-07-02 RX ORDER — CLOPIDOGREL BISULFATE 75 MG/1
75 TABLET ORAL DAILY
Status: DISCONTINUED | OUTPATIENT
Start: 2020-07-02 | End: 2020-07-05 | Stop reason: HOSPADM

## 2020-07-02 RX ORDER — DEXTROSE MONOHYDRATE 50 MG/ML
100 INJECTION, SOLUTION INTRAVENOUS PRN
Status: DISCONTINUED | OUTPATIENT
Start: 2020-07-02 | End: 2020-07-05 | Stop reason: HOSPADM

## 2020-07-02 RX ORDER — FLUTICASONE FUROATE AND VILANTEROL 100; 25 UG/1; UG/1
2 POWDER RESPIRATORY (INHALATION) DAILY
Status: DISCONTINUED | OUTPATIENT
Start: 2020-07-02 | End: 2020-07-02 | Stop reason: CLARIF

## 2020-07-02 RX ORDER — MAGNESIUM SULFATE IN WATER 40 MG/ML
2 INJECTION, SOLUTION INTRAVENOUS PRN
Status: DISCONTINUED | OUTPATIENT
Start: 2020-07-02 | End: 2020-07-05 | Stop reason: HOSPADM

## 2020-07-02 RX ORDER — DEXTROSE MONOHYDRATE 25 G/50ML
12.5 INJECTION, SOLUTION INTRAVENOUS PRN
Status: DISCONTINUED | OUTPATIENT
Start: 2020-07-02 | End: 2020-07-05 | Stop reason: HOSPADM

## 2020-07-02 RX ORDER — VITAMIN B COMPLEX
1000 TABLET ORAL DAILY
Status: DISCONTINUED | OUTPATIENT
Start: 2020-07-02 | End: 2020-07-05 | Stop reason: HOSPADM

## 2020-07-02 RX ORDER — ACETAMINOPHEN 325 MG/1
650 TABLET ORAL EVERY 6 HOURS PRN
Status: DISCONTINUED | OUTPATIENT
Start: 2020-07-02 | End: 2020-07-05 | Stop reason: HOSPADM

## 2020-07-02 RX ORDER — SODIUM CHLORIDE 0.9 % (FLUSH) 0.9 %
10 SYRINGE (ML) INJECTION PRN
Status: DISCONTINUED | OUTPATIENT
Start: 2020-07-02 | End: 2020-07-05 | Stop reason: HOSPADM

## 2020-07-02 RX ORDER — INSULIN GLARGINE 100 [IU]/ML
40 INJECTION, SOLUTION SUBCUTANEOUS NIGHTLY
Status: DISCONTINUED | OUTPATIENT
Start: 2020-07-02 | End: 2020-07-05 | Stop reason: HOSPADM

## 2020-07-02 RX ADMIN — AMLODIPINE BESYLATE 5 MG: 5 TABLET ORAL at 09:33

## 2020-07-02 RX ADMIN — ACETAMINOPHEN 650 MG: 325 TABLET, FILM COATED ORAL at 04:23

## 2020-07-02 RX ADMIN — ENOXAPARIN SODIUM 40 MG: 40 INJECTION SUBCUTANEOUS at 09:31

## 2020-07-02 RX ADMIN — DULOXETINE HYDROCHLORIDE 60 MG: 30 CAPSULE, DELAYED RELEASE ORAL at 09:32

## 2020-07-02 RX ADMIN — CARVEDILOL 3.12 MG: 3.12 TABLET, FILM COATED ORAL at 20:54

## 2020-07-02 RX ADMIN — VALSARTAN 160 MG: 80 TABLET, FILM COATED ORAL at 09:31

## 2020-07-02 RX ADMIN — GABAPENTIN 600 MG: 600 TABLET, FILM COATED ORAL at 20:54

## 2020-07-02 RX ADMIN — Medication 40 UNITS: at 20:55

## 2020-07-02 RX ADMIN — PRAMIPEXOLE DIHYDROCHLORIDE 0.5 MG: 0.25 TABLET ORAL at 17:36

## 2020-07-02 RX ADMIN — BUMETANIDE 1 MG: 0.25 INJECTION, SOLUTION INTRAMUSCULAR; INTRAVENOUS at 09:37

## 2020-07-02 RX ADMIN — DULOXETINE HYDROCHLORIDE 30 MG: 30 CAPSULE, DELAYED RELEASE ORAL at 09:33

## 2020-07-02 RX ADMIN — ARFORMOTEROL TARTRATE 15 MCG: 15 SOLUTION RESPIRATORY (INHALATION) at 06:21

## 2020-07-02 RX ADMIN — GABAPENTIN 600 MG: 600 TABLET, FILM COATED ORAL at 14:09

## 2020-07-02 RX ADMIN — SODIUM CHLORIDE, PRESERVATIVE FREE 10 ML: 5 INJECTION INTRAVENOUS at 20:55

## 2020-07-02 RX ADMIN — CETIRIZINE HYDROCHLORIDE 10 MG: 10 TABLET, FILM COATED ORAL at 20:54

## 2020-07-02 RX ADMIN — CARVEDILOL 3.12 MG: 3.12 TABLET, FILM COATED ORAL at 09:37

## 2020-07-02 RX ADMIN — SODIUM CHLORIDE, PRESERVATIVE FREE 10 ML: 5 INJECTION INTRAVENOUS at 09:34

## 2020-07-02 RX ADMIN — INSULIN LISPRO 12 UNITS: 100 INJECTION, SOLUTION INTRAVENOUS; SUBCUTANEOUS at 17:37

## 2020-07-02 RX ADMIN — IPRATROPIUM BROMIDE 0.5 MG: 0.5 SOLUTION RESPIRATORY (INHALATION) at 06:21

## 2020-07-02 RX ADMIN — MONTELUKAST SODIUM 10 MG: 10 TABLET, COATED ORAL at 20:54

## 2020-07-02 RX ADMIN — Medication 10 MG: at 20:54

## 2020-07-02 RX ADMIN — VITAMIN D, TAB 1000IU (100/BT) 1000 UNITS: 25 TAB at 09:33

## 2020-07-02 RX ADMIN — BUDESONIDE 250 MCG: 0.25 INHALANT RESPIRATORY (INHALATION) at 18:33

## 2020-07-02 RX ADMIN — METHYLPREDNISOLONE SODIUM SUCCINATE 40 MG: 40 INJECTION, POWDER, FOR SOLUTION INTRAMUSCULAR; INTRAVENOUS at 04:23

## 2020-07-02 RX ADMIN — INSULIN LISPRO 10 UNITS: 100 INJECTION, SOLUTION INTRAVENOUS; SUBCUTANEOUS at 11:23

## 2020-07-02 RX ADMIN — BUMETANIDE 1 MG: 0.25 INJECTION, SOLUTION INTRAMUSCULAR; INTRAVENOUS at 20:55

## 2020-07-02 RX ADMIN — ISOSORBIDE MONONITRATE 90 MG: 30 TABLET ORAL at 09:32

## 2020-07-02 RX ADMIN — BUMETANIDE 2 MG: 0.25 INJECTION INTRAMUSCULAR; INTRAVENOUS at 01:04

## 2020-07-02 RX ADMIN — INSULIN LISPRO 5 UNITS: 100 INJECTION, SOLUTION INTRAVENOUS; SUBCUTANEOUS at 20:55

## 2020-07-02 RX ADMIN — GABAPENTIN 600 MG: 600 TABLET, FILM COATED ORAL at 09:33

## 2020-07-02 RX ADMIN — IPRATROPIUM BROMIDE 0.5 MG: 0.5 SOLUTION RESPIRATORY (INHALATION) at 11:07

## 2020-07-02 RX ADMIN — IOPAMIDOL 90 ML: 755 INJECTION, SOLUTION INTRAVENOUS at 02:32

## 2020-07-02 RX ADMIN — ARFORMOTEROL TARTRATE 15 MCG: 15 SOLUTION RESPIRATORY (INHALATION) at 18:33

## 2020-07-02 RX ADMIN — METHYLPREDNISOLONE SODIUM SUCCINATE 40 MG: 40 INJECTION, POWDER, FOR SOLUTION INTRAMUSCULAR; INTRAVENOUS at 09:31

## 2020-07-02 RX ADMIN — IPRATROPIUM BROMIDE 0.5 MG: 0.5 SOLUTION RESPIRATORY (INHALATION) at 18:33

## 2020-07-02 RX ADMIN — BUDESONIDE 250 MCG: 0.25 INHALANT RESPIRATORY (INHALATION) at 11:07

## 2020-07-02 RX ADMIN — IPRATROPIUM BROMIDE 0.5 MG: 0.5 SOLUTION RESPIRATORY (INHALATION) at 14:25

## 2020-07-02 RX ADMIN — CLOPIDOGREL BISULFATE 75 MG: 75 TABLET ORAL at 09:33

## 2020-07-02 RX ADMIN — PRAMIPEXOLE DIHYDROCHLORIDE 0.5 MG: 0.25 TABLET ORAL at 09:33

## 2020-07-02 ASSESSMENT — ENCOUNTER SYMPTOMS
ABDOMINAL PAIN: 0
COUGH: 0
DIARRHEA: 0
SHORTNESS OF BREATH: 1
EYE PAIN: 0
VOMITING: 0

## 2020-07-02 ASSESSMENT — PAIN DESCRIPTION - PAIN TYPE
TYPE: CHRONIC PAIN

## 2020-07-02 ASSESSMENT — PAIN SCALES - GENERAL
PAINLEVEL_OUTOF10: 6
PAINLEVEL_OUTOF10: 0
PAINLEVEL_OUTOF10: 2
PAINLEVEL_OUTOF10: 8

## 2020-07-02 ASSESSMENT — PAIN DESCRIPTION - LOCATION
LOCATION: LEG

## 2020-07-02 ASSESSMENT — PAIN - FUNCTIONAL ASSESSMENT: PAIN_FUNCTIONAL_ASSESSMENT: PREVENTS OR INTERFERES SOME ACTIVE ACTIVITIES AND ADLS

## 2020-07-02 ASSESSMENT — PAIN DESCRIPTION - DESCRIPTORS: DESCRIPTORS: ACHING

## 2020-07-02 ASSESSMENT — PAIN DESCRIPTION - ORIENTATION
ORIENTATION: RIGHT;LEFT

## 2020-07-02 ASSESSMENT — PAIN DESCRIPTION - ONSET: ONSET: UNABLE TO TELL

## 2020-07-02 ASSESSMENT — PAIN DESCRIPTION - PROGRESSION: CLINICAL_PROGRESSION: NOT CHANGED

## 2020-07-02 ASSESSMENT — PAIN DESCRIPTION - FREQUENCY: FREQUENCY: INTERMITTENT

## 2020-07-02 NOTE — PROGRESS NOTES
Post midnight admission    73-year-old female presenting hospital for orthopnea found to be acutely fluid overloaded. Patient also found to have an elevated d-dimer in the emergency department with CT angios pulmonary embolism study showing no evidence of pulmonary embolism but the patient does have a moderate to large pericardial effusion. During my evaluation patient states she is feeling much better but still not back to normal.  Currently being treated for acute on chronic diastolic CHF exacerbation. Follow-up TTE for pericardial effusion. Cardiology consulted for pericardial effusion. Continue supportive management. Monitor I's and O's.

## 2020-07-02 NOTE — PLAN OF CARE
Problem: Falls - Risk of:  Goal: Will remain free from falls  Description: Will remain free from falls  7/2/2020 1117 by Raman Melgoza RN  Outcome: Ongoing  7/2/2020 0623 by Cinda Laura RN  Outcome: Ongoing  Goal: Absence of physical injury  Description: Absence of physical injury  7/2/2020 1117 by Raman Melgoza RN  Outcome: Ongoing  7/2/2020 0623 by Cinda Laura RN  Outcome: Ongoing

## 2020-07-02 NOTE — CONSULTS
Palliative Care: Met with pt to initiate palliative care services. Pt is new to [facility] team.  She is alert and oriented. Pleasant and talkative. Past Medical History:        Past Medical History:   Diagnosis Date    [facility] 09/2018    Asthma     CAD (coronary artery disease)     hx of stents    Cerebral artery occlusion with cerebral infarction ([facility] 2012    R sided numbness/weakness    [facility]congestive [facility] failure) ([facility]     COPD [facility]chronic obstructive pulmonary disease) ([facility]     Diabetes mellitus ([facility]     DVT (deep vein thrombosis) in pregnancy     Hyperlipidemia     Hypertension     Palliative care patient 07/02/2020    Pneumonia        Advance Directives:  Full Code. ACP note complete. Pain/Other Symptoms:    Pt states \"leg pain\" equal to \"8\". States she has not been able to sleep d/t pain and SOA. Encouraged pt to request meds from her nurse for pain. RT administering neb tx. Activity:   As shawn          Psychological/Spiritual:   Pt has limited friend/family support. Pt lives in the home with her spouse. Dtr lives in [place] as does her other family members. States she was attending [address] and is looking forward to returning. Plan:  On dc pt wants to return to her home with her spouse. However, pt also states if she were to need [facility] or SNF she is willing accept those services. Pt states she has tried to obtain motorized scooter and home assistance. Spoke with RNCM, she believes pt needs to speak with her MD for documentation of need and an order. This was explained to pt by [facility] RN. Patient/family discussion r/t goals:    Pt talks with me about her life and places she has lived. States she and her spouse moved to [place] 1 yr ago to distance from family. Lived in [facility] for a short time, then to [place]" for 3 months until they were able to find their own place. We discussed her need and what she has available in the home. Spouse works, pt is alone at times. Pt assures me she is compliant with appts, meds. Spouse assists with med planner, pt states d/t she has glaucoma. Review of any needed services:  Home assistance/CG, motorized chair as mentioned above.             Electronically signed by Steven Cottrell RN on 7/2/2020 at 11:07 AM

## 2020-07-02 NOTE — PROGRESS NOTES
Nutrition Assessment (Low Risk)    Type and Reason for Visit: Initial    Nutrition Recommendations: Modify diet to include Na+ restriction. Nutrition Assessment:  Following for CHF diet education. Pt states she follows a low sodium diet at home and is familar with all it entails. Pt agreeable to receiving handout and verbal instruction. Discussed foods recommended and foods not recommended for low sodium diet as well as what constitutes a \"low sodium\" food choice. Pt verbalized understanding and appreciation. Modifying diet to include Na+ restriction.      Malnutrition Assessment:  · Malnutrition Status: No malnutrition    Nutrition Risk Level   Risk Level: Low    Nutrition Diagnosis:   · Problem: Altered nutrition-related lab values  · Etiology: Cardiac dysfunction    Signs and symptoms: Lab values    Nutrition Intervention:  Food and/or Delivery: Modify current diet  Nutrition Education/Counseling/Coordination of Care:  Continued Inpatient Monitoring, Education Completed      Electronically signed by Suzanna Mancia RD on 7/2/20 at 10:41 AM CDT    Contact Number: [phone]

## 2020-07-02 NOTE — H&P
[facility] - History & Physical      PCP: YAMIL Boswell NP    Date of Admission: 7/1/2020    Date of Service: 7/2/2020    Chief Complaint:  Progressive shortness pf breath and orthopnea, was seen here in ED 2 weeks ago started on abx for ? Pneumonia no improvement     History Of Present Illness: The patient is a 56 y.o. female with PMH of morbid obesity ? MARTHA CAD s/p stenting, diastolic CHF, DM, HTN, COPD, DVT  who presents to the emergency department due to dyspnea. Patient said she was seen about 2 weeks ago and diagnosed with pneumonia. Has since finished her antibiotics. Since her prior visit has had gradually worsening shortness of breath. She said she is much more short of  breath if she exerts herself or if she lays down, both of her legs are swelling as well. denies any chest pain. No fever. Said that sometimes when she gets short of breath she coughs a little bit the cough is been very minimal nonproductive. No hemoptysis. No exposure to COVID that she is aware of. No abdominal pain vomiting diarrhea. Takes Bumex due to history of CHF and has not missed any doses.   In ER CXR was showing pulmonary edema, BNP 1801, dimer elevated, CTA no PE but shows CHF, mild pericardiac effusion unchanged from before     Past Medical History:        Diagnosis Date    AMI (acute myocardial infarction) ([address] 09/2018    Asthma     CAD (coronary artery disease)     hx of stents    Cerebral artery occlusion with cerebral infarction ([address] 2012    R sided numbness/weakness    CHF (congestive heart failure) (HCC)     COPD (chronic obstructive pulmonary disease) (HCC)     Diabetes mellitus ([address]     DVT (deep vein thrombosis) in pregnancy     Hyperlipidemia     Hypertension     Pneumonia        Past Surgical History:        Procedure Laterality Date    CATARACT REMOVAL      COLONOSCOPY  approx 2013    CORONARY ANGIOPLASTY WITH STENT PLACEMENT  2018    in [facility] YAMIL Arizmendi NP   valsartan (DIOVAN) 160 MG tablet Take 160 mg by mouth daily   Yes Historical Provider, MD   atorvastatin (LIPITOR) 20 MG tablet Take 1/2 tablet for 1 week, then increase to whole tablet 2/3/20  Yes YAMIL Arizmendi NP   clopidogrel (PLAVIX) 75 MG tablet Take 75 mg by mouth daily   Yes Historical Provider, MD   montelukast (SINGULAIR) 10 MG tablet Take 10 mg by mouth nightly   Yes Historical Provider, MD   vitamin D (CHOLECALCIFEROL) 25 MCG (1000 UT) TABS tablet Take 1,000 Units by mouth daily   Yes Historical Provider, MD   melatonin 3 MG TABS tablet Take 10 mg by mouth nightly   Yes Historical Provider, MD   ISOSORBIDE MONONITRATE ER PO Take 90 mg by mouth daily    Yes Historical Provider, MD   nitroGLYCERIN (NITROSTAT) 0.4 MG SL tablet Place 0.4 mg under the tongue every 5 minutes as needed for Chest pain up to max of 3 total doses. If no relief after 1 dose, call 911. Historical Provider, MD   levalbuterol Mitch Elena) 0.63 MG/3ML nebulization Take 3 mLs by nebulization every 6 hours as needed for Wheezing 4/15/20 6/26/20  YAMIL Arizmendi NP       Allergies:    Albuterol; Levaquin [levofloxacin]; Metformin and related; and Aspirin    Social History:      Tobacco:   reports that she quit smoking about 15 years ago. She has a 30.00 pack-year smoking history. She has never used smokeless tobacco.  Alcohol:   reports no history of alcohol use.   Illicit Drugs: no     Family History:      Problem Relation Age of Onset    Colon Cancer Neg Hx     Colon Polyps Neg Hx        Review of Systems:   Constitutional / general:  No fever / chills / sweats  HEENT: No sore throat / hoarseness / vision changes  GI: No nausea / vomiting / abdominal pain / diarrhea / constipation  :  No dysuria / hesitancy / urgency / hematuria   Neuro: No muscle weakness / dysphagia / headache / paresthesias  Musculoskeletal:  No edema / cyanosis / pain  Psychiatric:  No depression / anxiety / insomnia  Skin:  No new rashes / lesions    Physical Examination:       BP (!) 184/60   Pulse 85   Temp 98.3 °F (36.8 °C) (Oral)   Resp 22   Ht 5' 1\" (1.549 m)   Wt 220 lb (99.8 kg)   SpO2 95%   BMI 41.57 kg/m²   General appearance: some  apparent distress, appears stated age and cooperative. Well developed and well groomed. HEENT: Normal cephalic, atraumatic without obvious deformity. Pupils equal, round, and reactive to light. Extra ocular muscles intact. Conjunctivae/corneas clear. Normal ears and nose. s.  Neck: Supple, with full range of motion. No jugular venous distention. Trachea midline. Thyroid no masses noted. Respiratory:  Bilateral crackles . Cardiovascular: Regular rate and rhythm with normal S1/S2 without murmurs, rubs or gallops. Abdomen: Soft, non-tender, non-distended with normal bowel sounds. Musculoskeletal: No clubbing, +2 edema bilaterally. Full range of motion without deformity in 4 extremities. Neurologic:  Neurovascularly intact without any focal sensory/motor deficits. Cranial nerves: II-XII intact, grossly non-focal.  Psychiatric: Alert and oriented, thought content appropriate, normal insight.      Diagnostic Data:  Imaging:   XR CHEST PORTABLE    (Results Pending)   CTA PULMONARY W CONTRAST    (Results Pending)     CBC:  Recent Labs     07/02/20 0008   WBC 7.0   HGB 9.5*   HCT 29.5*        BMP:  Recent Labs     07/02/20 0008      K 4.2      CO2 23   BUN 27*   CREATININE 1.0*   CALCIUM 8.6*     Recent Labs     07/02/20 0008   AST 16   ALT 13   BILITOT 0.3   ALKPHOS 103     Coag Panel:   Recent Labs     07/02/20 0008   INR 1.03   PROTIME 13.5     Cardiac Enzymes:   Recent Labs     07/02/20 0008   TROPONINI 0.02     ABGs:  Lab Results   Component Value Date    PHART 7.470 01/25/2020    PO2ART 81.0 01/25/2020    HTR4IEN 40.0 01/25/2020     Urinalysis:  Lab Results   Component Value Date    NITRU Negative 02/03/2020    WBCUA 39 02/03/2020    BACTERIA TRACE 02/03/2020    RBCUA 9 02/03/2020    BLOODU SMALL 02/03/2020    SPECGRAV 1.017 02/03/2020    GLUCOSEU 500 02/03/2020       Active Hospital Problems    Diagnosis Date Noted    Pulmonary edema with congestive heart failure ([facility] [I50.1] 07/02/2020       Assessment and Plan: Active Problems:    Pulmonary edema with congestive heart failure ([facility]  Resolved Problems:    * No resolved hospital problems.  *  admit, Is Os daily weights, had recent echo in may with %55 EF , continue bumex  H/o DM; SSI  H/o HTN continue same   DVT prophylaxis with Lovenox           [facility]  Hospitalist service  7/2/2020  3:15 AM

## 2020-07-02 NOTE — PROGRESS NOTES
4 Eyes Skin Assessment    Ashley Saez is being assessed upon: Admission    I agree that Brett Wells, along with Homero rn (either 2 RN's or 1 LPN and 1 RN) have performed a thorough Head to Toe Skin Assessment on the patient. ALL assessment sites listed below have been assessed. Areas assessed by both nurses:     [x]   Head, Face, and Ears   [x]   Shoulders, Back, and Chest  [x]   Arms, Elbows, and Hands   [x]   Coccyx, Sacrum, and Ischium  [x]   Legs, Feet, and Heels    Does the Patient have Skin Breakdown?  No    Rupert Prevention initiated: No  Wound Care Orders initiated: No    WOC nurse consulted for Pressure Injury (Stage 3,4, Unstageable, DTI, NWPT, and Complex wounds) and New or Established Ostomies: No        Primary Nurse eSignature: Jose M Sanchez RN on 7/2/2020 at 4:20 AM      Co-Signer eSignature: Electronically signed by Emmanuel Lujan RN on 7/2/20 at 5:32 AM CDT

## 2020-07-02 NOTE — ED NOTES
Patient placed on cardiac monitor, continuous pulse oximeter, and NIBP monitor. Monitor alarms on.          Harleen Gonzalez RN  07/01/20 3546

## 2020-07-02 NOTE — ACP (ADVANCE CARE PLANNING)
Advance Care Planning     Advance Care Planning Activator (Inpatient)  Conversation Note      Date of ACP Conversation: 07/02/2020  [employer] with: Patient has decision making capability. Pt is alert and oriented. ACP Activator: [address] Decision Maker:    Daughter, Wan Oseguera    Current Designated Health Care Decision Maker: Wan Oseguera        \"Who would you like to name as your primary health care decision-maker? \"               Name: Wan Oseguera  Relationship: daughter Phone [phone]  Cole Abts this person be reached easily? \" YES    Care Preferences    Ventilation: \"If you were in your present state of health and suddenly became very ill and were unable to breathe on your own, what would your preference be about the use of a ventilator (breathing machine) if it were available to you? \"    \"I would not want to be prolonged on a ventilator\". Would the patient desire the use of ventilator (breathing machine)?: YES     \"If your health worsens and it becomes clear that your chance of recovery is unlikely, what would your preference be about the use of a ventilator (breathing machine) if it were available to you? \"     \"I do not want a tracheostomy\"      Resuscitation  \"CPR works best to restart the heart when there is a sudden event, like a heart attack, in someone who is otherwise healthy. Unfortunately, CPR does not typically restart the heart for people who have serious health conditions or who are very sick. \"    \"In the event your heart stopped as a result of an underlying serious health condition, would you want attempts to be made to restart your heart (answer \"yes\" for attempt to resuscitate) or would you prefer a natural death (answer \"no\" for do not attempt to resuscitate)? \" YES         [x] Yes   [] No   Educated Patient / Kera Hayden regarding differences between Advance Directives and portable DNR orders.     Length of ACP Conversation in minutes: Conversation Outcomes:  [x] ACP discussion completed  [] Existing advance directive reviewed with patient; no changes to patient's previously recorded wishes  [] New Advance Directive completed  [] Portable Do Not Rescitate prepared for Provider review and signature  [] POLST/POST/MOLST/MOST prepared for Provider review and signature      Follow-up plan:    [x] Schedule follow-up conversation to continue planning  [] Referred individual to Provider for additional questions/concerns   [] Advised patient/agent/surrogate to review completed ACP document and update if needed with changes in condition, patient preferences or care setting    [] This note routed to one or more involved healthcare providers

## 2020-07-02 NOTE — ED PROVIDER NOTES
[address] [facility] EMERGENCY DEPT  eMERGENCY dEPARTMENT eNCOUnter      Pt Name: Michelle Osorio  MRN: [id number]  [place] 1960  Date of evaluation: 7/1/2020  Provider: Rashad Aleman MD    CHIEF COMPLAINT       Chief Complaint   Patient presents with    Shortness of Breath     diagnosed with pneumonia 2 weeks ago. HISTORY OF PRESENT ILLNESS   (Location/Symptom, Timing/Onset,Context/Setting, Quality, Duration, Modifying Factors, Severity)  Note limiting factors. Michelle Osorio is a 56 y.o. female who presents to the emergency department due to dyspnea. Patient said she was seen about 2 weeks ago and diagnosed with pneumonia. Has since finished her antibiotics. Since her prior visit has had gradually worsening shortness of breath. She said she is much more for breath if she exerts herself or if she lays down. Feels like both of her legs are swelling as well. No unilateral leg swelling or pain. Does have a history of DVT 27 years ago but she was pregnant at the time. Has had no DVT or PE since then. No chest pain. No fever. Said that sometimes when she gets short of breath she coughs a little bit the cough is been very minimal nonproductive. No hemoptysis. No exposure to COVID that she is aware of. No abdominal pain vomiting diarrhea. Takes Bumex due to history of CHF and has not missed any doses. HPI    NursingNotes were reviewed. REVIEW OF SYSTEMS    (2-9 systems for level 4, 10 or more for level 5)     Review of Systems   Constitutional: Negative for fever. Eyes: Negative for pain. Respiratory: Positive for shortness of breath. Negative for cough. Cardiovascular: Positive for leg swelling. Negative for chest pain and palpitations. Gastrointestinal: Negative for abdominal pain, diarrhea and vomiting. Genitourinary: Negative for dysuria. Skin: Negative for rash. Neurological: Negative for weakness and headaches. All other systems reviewed and are negative.       A complete review of systems was performed and is negative except as noted above in the HPI. PAST MEDICAL HISTORY     Past Medical History:   Diagnosis Date    AMI (acute myocardial infarction) ([facility] 09/2018    Asthma     CAD (coronary artery disease)     hx of stents    [facility]    R sided numbness/weakness    CHF (congestive heart failure) ([facility] [facility]     [facility]     Diabetes mellitus ([facility]     DVT (deep vein thrombosis) in pregnancy     Hyperlipidemia     Hypertension     Pneumonia          SURGICAL HISTORY       Past Surgical History:   Procedure Laterality Date    CATARACT REMOVAL      COLONOSCOPY  approx 2013    CORONARY ANGIOPLASTY WITH STENT PLACEMENT  2018    [facility]    TONSILLECTOMY           CURRENT MEDICATIONS       Previous Medications    AMLODIPINE (NORVASC) 5 MG TABLET    Take 1 tablet by mouth daily    ATORVASTATIN (LIPITOR) 20 MG TABLET    Take 1/2 tablet for 1 week, then increase to whole tablet    BUMETANIDE (BUMEX) 1 MG TABLET    Take 1 tablet by mouth 2 times daily    CARVEDILOL (COREG) 3.125 MG TABLET    Take 1 tablet by mouth 2 times daily    CLOPIDOGREL (PLAVIX) 75 MG TABLET    Take 75 mg by mouth daily    DOXYCYCLINE HYCLATE (VIBRAMYCIN) 100 MG CAPSULE    Take 100 mg by mouth 2 times daily    DULOXETINE (CYMBALTA) 30 MG EXTENDED RELEASE CAPSULE    Take 1 capsule by mouth daily Take with 60 mg capsule for total of 90 mg. DULOXETINE (CYMBALTA) 60 MG EXTENDED RELEASE CAPSULE    Take 1 capsule by mouth daily    FLUTICASONE-VILANTEROL (BREO ELLIPTA) 100-25 MCG/INH AEPB INHALER    Inhale 1 puff into the lungs daily    GABAPENTIN (NEURONTIN) 600 MG TABLET    Take 1 tablet by mouth 3 times daily for 30 days.     INSULIN DEGLUDEC (TRESIBA FLEXTOUCH) 100 UNIT/ML SOPN    Inject 40 Units into the skin nightly    ISOSORBIDE MONONITRATE ER PO    Take 90 mg by mouth daily bases) present. No wheezing or rhonchi. Chest:      Chest wall: No tenderness. Abdominal:      General: There is no distension. Palpations: Abdomen is soft. Tenderness: There is no abdominal tenderness. Musculoskeletal:         General: No tenderness. Right lower leg: Edema (mild ) present. Left lower leg: Edema (mild) present. Skin:     General: Skin is warm and dry. Capillary Refill: Capillary refill takes less than 2 seconds. Neurological:      General: No focal deficit present. Mental Status: She is alert and oriented to person, place, and time. Psychiatric:         Mood and Affect: Mood normal.         Behavior: Behavior normal.         DIAGNOSTIC RESULTS     EKG: All EKG's are interpreted by the Emergency Department Physician who either signs or Co-signs this chart in the absence of a cardiologist.    Normal sinus rhythm. Normal QT. Borderline T wave changes inferior laterally similar to previous EKG without signs of acute ischemia. RADIOLOGY:   Non-plain film images such as CT, Ultrasound and MRI are read by the radiologist. [place] images are visualized and preliminarily interpreted by the emergency physician with the below findings:    Interpretation per the Radiologist below, if available at the time of this note:    XR CHEST PORTABLE    (Results Pending)   CTA PULMONARY W CONTRAST    (Results Pending)     FILM CXR 1 VIEW:    Compared to 6/18/20. Evaluation is limited secondary to the patient's body habitus. Blunting of the left costophrenic angle with increased density at the left lung base suggestive of left-sided pleural effusion versus pleural thickening with adjacent atelectasis versus infiltrate. This has a similar appearance compared to the prior exam.        No definite plain film evidence for pneumothorax. Prominence of the cardiac silhouette which may at least partly be related to the portable technique.     Radiologist: Mya Bridges Ayla Sosa M.D. CTA CHEST:    Compared to 5/25/20. No evidence for pulmonary embolism. Pericardial effusion measuring up to 1.9 cm posteriorly. This overall appears slightly decreased in size compared to the prior exam.    Cardiomegaly with coronary artery calcifications. The aorta appears to opacify normally. Atherosclerotic calcifications are noted. Areas of mild interstitial thickening in the lungs which may be related to some mild pulmonary edema. Areas of probable mild linear atelectasis versus scarring in the lungs. There are few small scattered nonspecific soft tissue density nodules in the lungs. One of the larger nodules is located in the right upper lobe and measures 4 mm. Follow-up per final report. Degenerative changes of the thoracic spine. Mild vertebral body compression fracture of the upper thoracic spine which has a similar appearance compared to the prior exam.     Radiologist: NITA Patel:  Labs Reviewed   CBC - Abnormal; Notable for the following components:       Result Value    RBC 3.01 (*)     Hemoglobin 9.5 (*)     Hematocrit 29.5 (*)     MCH 31.6 (*)     MCHC 32.2 (*)     MPV 9.2 (*)     All other components within normal limits   COMPREHENSIVE METABOLIC PANEL - Abnormal; Notable for the following components:    Glucose 287 (*)     BUN 27 (*)     CREATININE 1.0 (*)     GFR Non-African American 56 (*)     Calcium 8.6 (*)     Total Protein 6.4 (*)     Alb 3.3 (*)     All other components within normal limits   BRAIN NATRIURETIC PEPTIDE - Abnormal; Notable for the following components:    Pro-BNP 1,801 (*)     All other components within normal limits   D-DIMER, QUANTITATIVE - Abnormal; Notable for the following components:    D-Dimer, Quant 0.66 (*)     All other components within normal limits   TROPONIN   PROTIME-INR       All other labs were within normal range or not returned as of this dictation.     EMERGENCY DEPARTMENT COURSE and

## 2020-07-02 NOTE — ED NOTES
Bed: 05  Expected date:   Expected time:   Means of arrival: [facility]  Comments:  EMS: Brandie Ramon RN  07/01/20 [id number]

## 2020-07-03 LAB
ALBUMIN SERPL-MCNC: 3.6 G/DL (ref 3.5–5.2)
ALP BLD-CCNC: 88 U/L (ref 35–104)
ALT SERPL-CCNC: 11 U/L (ref 5–33)
ANION GAP SERPL CALCULATED.3IONS-SCNC: 13 MMOL/L (ref 7–19)
AST SERPL-CCNC: 11 U/L (ref 5–32)
BASOPHILS ABSOLUTE: 0 K/UL (ref 0–0.2)
BASOPHILS RELATIVE PERCENT: 0.2 % (ref 0–1)
BILIRUB SERPL-MCNC: 0.3 MG/DL (ref 0.2–1.2)
BUN BLDV-MCNC: 39 MG/DL (ref 8–23)
CALCIUM SERPL-MCNC: 9.3 MG/DL (ref 8.8–10.2)
CHLORIDE BLD-SCNC: 100 MMOL/L (ref 98–111)
CHOLESTEROL, TOTAL: 206 MG/DL (ref 160–199)
CO2: 28 MMOL/L (ref 22–29)
CREAT SERPL-MCNC: 1.5 MG/DL (ref 0.5–0.9)
EOSINOPHILS ABSOLUTE: 0 K/UL (ref 0–0.6)
EOSINOPHILS RELATIVE PERCENT: 0 % (ref 0–5)
GFR NON-AFRICAN AMERICAN: 35
GLUCOSE BLD-MCNC: 185 MG/DL (ref 70–99)
GLUCOSE BLD-MCNC: 196 MG/DL (ref 70–99)
GLUCOSE BLD-MCNC: 201 MG/DL (ref 70–99)
GLUCOSE BLD-MCNC: 231 MG/DL (ref 70–99)
GLUCOSE BLD-MCNC: 251 MG/DL (ref 74–109)
HBA1C MFR BLD: 6.5 % (ref 4–6)
HCT VFR BLD CALC: 30 % (ref 37–47)
HDLC SERPL-MCNC: 62 MG/DL (ref 65–121)
HEMOGLOBIN: 10 G/DL (ref 12–16)
IMMATURE GRANULOCYTES #: 0 K/UL
LDL CHOLESTEROL CALCULATED: 119 MG/DL
LYMPHOCYTES ABSOLUTE: 1.2 K/UL (ref 1.1–4.5)
LYMPHOCYTES RELATIVE PERCENT: 13.2 % (ref 20–40)
MAGNESIUM: 2.2 MG/DL (ref 1.6–2.4)
MCH RBC QN AUTO: 31.5 PG (ref 27–31)
MCHC RBC AUTO-ENTMCNC: 33.3 G/DL (ref 33–37)
MCV RBC AUTO: 94.6 FL (ref 81–99)
MONOCYTES ABSOLUTE: 0.9 K/UL (ref 0–0.9)
MONOCYTES RELATIVE PERCENT: 9.7 % (ref 0–10)
NEUTROPHILS ABSOLUTE: 7 K/UL (ref 1.5–7.5)
NEUTROPHILS RELATIVE PERCENT: 76.6 % (ref 50–65)
PDW BLD-RTO: 13.9 % (ref 11.5–14.5)
PERFORMED ON: ABNORMAL
PLATELET # BLD: 266 K/UL (ref 130–400)
PMV BLD AUTO: 9.2 FL (ref 9.4–12.3)
POTASSIUM SERPL-SCNC: 5 MMOL/L (ref 3.5–5)
RBC # BLD: 3.17 M/UL (ref 4.2–5.4)
SODIUM BLD-SCNC: 141 MMOL/L (ref 136–145)
TOTAL PROTEIN: 6.6 G/DL (ref 6.6–8.7)
TRIGL SERPL-MCNC: 125 MG/DL (ref 0–149)
WBC # BLD: 9.2 K/UL (ref 4.8–10.8)

## 2020-07-03 PROCEDURE — 80053 COMPREHEN METABOLIC PANEL: CPT

## 2020-07-03 PROCEDURE — 6360000002 HC RX W HCPCS: Performed by: HOSPITALIST

## 2020-07-03 PROCEDURE — 6370000000 HC RX 637 (ALT 250 FOR IP): Performed by: HOSPITALIST

## 2020-07-03 PROCEDURE — 80061 LIPID PANEL: CPT

## 2020-07-03 PROCEDURE — 2580000003 HC RX 258: Performed by: HOSPITALIST

## 2020-07-03 PROCEDURE — 2500000003 HC RX 250 WO HCPCS: Performed by: INTERNAL MEDICINE

## 2020-07-03 PROCEDURE — 83735 ASSAY OF MAGNESIUM: CPT

## 2020-07-03 PROCEDURE — 36415 COLL VENOUS BLD VENIPUNCTURE: CPT

## 2020-07-03 PROCEDURE — 99233 SBSQ HOSP IP/OBS HIGH 50: CPT | Performed by: INTERNAL MEDICINE

## 2020-07-03 PROCEDURE — 96376 TX/PRO/DX INJ SAME DRUG ADON: CPT

## 2020-07-03 PROCEDURE — 85025 COMPLETE CBC W/AUTO DIFF WBC: CPT

## 2020-07-03 PROCEDURE — 2140000000 HC CCU INTERMEDIATE R&B

## 2020-07-03 PROCEDURE — 82947 ASSAY GLUCOSE BLOOD QUANT: CPT

## 2020-07-03 PROCEDURE — 83036 HEMOGLOBIN GLYCOSYLATED A1C: CPT

## 2020-07-03 PROCEDURE — 94640 AIRWAY INHALATION TREATMENT: CPT

## 2020-07-03 PROCEDURE — 96372 THER/PROPH/DIAG INJ SC/IM: CPT

## 2020-07-03 PROCEDURE — 6370000000 HC RX 637 (ALT 250 FOR IP): Performed by: INTERNAL MEDICINE

## 2020-07-03 RX ADMIN — DULOXETINE HYDROCHLORIDE 30 MG: 30 CAPSULE, DELAYED RELEASE ORAL at 10:45

## 2020-07-03 RX ADMIN — ENOXAPARIN SODIUM 40 MG: 40 INJECTION SUBCUTANEOUS at 08:42

## 2020-07-03 RX ADMIN — BUDESONIDE 250 MCG: 0.25 INHALANT RESPIRATORY (INHALATION) at 06:14

## 2020-07-03 RX ADMIN — INSULIN LISPRO 4 UNITS: 100 INJECTION, SOLUTION INTRAVENOUS; SUBCUTANEOUS at 17:16

## 2020-07-03 RX ADMIN — Medication 10 MG: at 19:59

## 2020-07-03 RX ADMIN — IPRATROPIUM BROMIDE 0.5 MG: 0.5 SOLUTION RESPIRATORY (INHALATION) at 18:41

## 2020-07-03 RX ADMIN — GABAPENTIN 600 MG: 600 TABLET, FILM COATED ORAL at 08:43

## 2020-07-03 RX ADMIN — VITAMIN D, TAB 1000IU (100/BT) 1000 UNITS: 25 TAB at 08:42

## 2020-07-03 RX ADMIN — SODIUM CHLORIDE, PRESERVATIVE FREE 10 ML: 5 INJECTION INTRAVENOUS at 19:59

## 2020-07-03 RX ADMIN — VALSARTAN 160 MG: 80 TABLET, FILM COATED ORAL at 08:42

## 2020-07-03 RX ADMIN — BUMETANIDE 2 MG: 0.25 INJECTION, SOLUTION INTRAMUSCULAR; INTRAVENOUS at 09:48

## 2020-07-03 RX ADMIN — PRAMIPEXOLE DIHYDROCHLORIDE 0.5 MG: 0.25 TABLET ORAL at 17:15

## 2020-07-03 RX ADMIN — CARVEDILOL 3.12 MG: 3.12 TABLET, FILM COATED ORAL at 08:42

## 2020-07-03 RX ADMIN — IPRATROPIUM BROMIDE 0.5 MG: 0.5 SOLUTION RESPIRATORY (INHALATION) at 06:13

## 2020-07-03 RX ADMIN — SODIUM CHLORIDE, PRESERVATIVE FREE 10 ML: 5 INJECTION INTRAVENOUS at 08:43

## 2020-07-03 RX ADMIN — MONTELUKAST SODIUM 10 MG: 10 TABLET, COATED ORAL at 19:58

## 2020-07-03 RX ADMIN — BUDESONIDE 250 MCG: 0.25 INHALANT RESPIRATORY (INHALATION) at 18:41

## 2020-07-03 RX ADMIN — CETIRIZINE HYDROCHLORIDE 10 MG: 10 TABLET, FILM COATED ORAL at 19:58

## 2020-07-03 RX ADMIN — INSULIN LISPRO 2 UNITS: 100 INJECTION, SOLUTION INTRAVENOUS; SUBCUTANEOUS at 13:03

## 2020-07-03 RX ADMIN — IPRATROPIUM BROMIDE 0.5 MG: 0.5 SOLUTION RESPIRATORY (INHALATION) at 10:26

## 2020-07-03 RX ADMIN — GABAPENTIN 600 MG: 600 TABLET, FILM COATED ORAL at 13:02

## 2020-07-03 RX ADMIN — ISOSORBIDE MONONITRATE 90 MG: 30 TABLET ORAL at 08:42

## 2020-07-03 RX ADMIN — ARFORMOTEROL TARTRATE 15 MCG: 15 SOLUTION RESPIRATORY (INHALATION) at 18:41

## 2020-07-03 RX ADMIN — CLOPIDOGREL BISULFATE 75 MG: 75 TABLET ORAL at 08:42

## 2020-07-03 RX ADMIN — Medication 40 UNITS: at 20:52

## 2020-07-03 RX ADMIN — PRAMIPEXOLE DIHYDROCHLORIDE 0.5 MG: 0.25 TABLET ORAL at 08:42

## 2020-07-03 RX ADMIN — GABAPENTIN 600 MG: 600 TABLET, FILM COATED ORAL at 19:59

## 2020-07-03 RX ADMIN — INSULIN LISPRO 4 UNITS: 100 INJECTION, SOLUTION INTRAVENOUS; SUBCUTANEOUS at 08:45

## 2020-07-03 RX ADMIN — ARFORMOTEROL TARTRATE 15 MCG: 15 SOLUTION RESPIRATORY (INHALATION) at 06:14

## 2020-07-03 RX ADMIN — AMLODIPINE BESYLATE 5 MG: 5 TABLET ORAL at 08:42

## 2020-07-03 RX ADMIN — DULOXETINE HYDROCHLORIDE 60 MG: 30 CAPSULE, DELAYED RELEASE ORAL at 08:42

## 2020-07-03 RX ADMIN — IPRATROPIUM BROMIDE 0.5 MG: 0.5 SOLUTION RESPIRATORY (INHALATION) at 14:54

## 2020-07-03 RX ADMIN — CARVEDILOL 3.12 MG: 3.12 TABLET, FILM COATED ORAL at 19:58

## 2020-07-03 ASSESSMENT — PAIN SCALES - GENERAL
PAINLEVEL_OUTOF10: 0

## 2020-07-03 NOTE — PROGRESS NOTES
[facility]        Hospitalist Progress Note  7/3/2020 12:35 PM  Subjective:   Admit Date: 7/1/2020  PCP: YAMIL Ang NP    Chief Complaint: Dyspnea    Subjective: Patient seen and examined at bedside with family present. No acute distress. Breathing is greatly improved. Denies chest pain. Cumulative Hospital History: 55-year-old female presenting hospital for orthopnea found to be acutely fluid overloaded. Patient also found to have an elevated d-dimer in the emergency department with CT angios pulmonary embolism study showing no evidence of pulmonary embolism but the patient does have a moderate to large pericardial effusion. Admitted for acute on chronic diastolic CHF exacerbation/pericardial effusion. Cardiology consulted for pericardial effusion. ROS: 14 point review of systems is negative except as specifically addressed above.     DIET CARB CONTROL; Low Sodium (2 GM)    Intake/Output Summary (Last 24 hours) at 7/3/2020 1235  Last data filed at 7/3/2020 1051  Gross per 24 hour   Intake 246 ml   Output 350 ml   Net -104 ml     Medications:   dextrose       Current Facility-Administered Medications   Medication Dose Route Frequency Provider Last Rate Last Dose    vitamin D (CHOLECALCIFEROL) tablet 1,000 Units  1,000 Units Oral Daily Omaira Gracia MD   1,000 Units at 07/03/20 0842    valsartan (DIOVAN) tablet 160 mg  160 mg Oral Daily Omaira Gracia MD   160 mg at 07/03/20 0842    [START ON 7/7/2020] Semaglutide (1 MG/DOSE) SOPN 1 mg  1 mg Subcutaneous Q7 Days Omaira Gracia MD        pramipexole (MIRAPEX) tablet 0.5 mg  0.5 mg Oral BID Omaira Gracia MD   0.5 mg at 07/03/20 0842    nitroGLYCERIN (NITROSTAT) SL tablet 0.4 mg  0.4 mg Sublingual Q5 Min PRN Omaira Gracia MD        montelukast (SINGULAIR) tablet 10 mg  10 mg Oral Nightly Omaira Gracia MD   10 mg at 07/02/20 2054    melatonin tablet 10 mg  10 mg Oral Nightly Omaira Gracia MD   10 mg at 07/02/20 2054    isosorbide mononitrate (IMDUR) extended release tablet 90 mg  90 mg Oral Daily Amadou Topete MD   90 mg at 07/03/20 0842    gabapentin (NEURONTIN) tablet 600 mg  600 mg Oral TID Amadou Topete MD   600 mg at 07/03/20 0843    DULoxetine (CYMBALTA) extended release capsule 60 mg  60 mg Oral Daily Amadou Topete MD   60 mg at 07/03/20 0842    DULoxetine (CYMBALTA) extended release capsule 30 mg  30 mg Oral Daily Amadou Topete MD   30 mg at 07/03/20 1045    clopidogrel (PLAVIX) tablet 75 mg  75 mg Oral Daily Amadou Topete MD   75 mg at 07/03/20 0842    carvedilol (COREG) tablet 3.125 mg  3.125 mg Oral BID Amadou Topete MD   3.125 mg at 07/03/20 0842    amLODIPine (NORVASC) tablet 5 mg  5 mg Oral Daily Amadou Topete MD   5 mg at 07/03/20 0842    sodium chloride flush 0.9 % injection 10 mL  10 mL Intravenous 2 times per day Amadou Topete MD   10 mL at 07/03/20 0843    sodium chloride flush 0.9 % injection 10 mL  10 mL Intravenous PRN Amadou Topete MD        acetaminophen (TYLENOL) tablet 650 mg  650 mg Oral Q6H PRN Amadou Topete MD   650 mg at 07/02/20 0423    Or    acetaminophen (TYLENOL) suppository 650 mg  650 mg Rectal Q6H PRN Amadou Topete MD        magnesium hydroxide (MILK OF MAGNESIA) 400 MG/5ML suspension 30 mL  30 mL Oral Daily PRN Amadou Topete MD        promethazine (PHENERGAN) tablet 12.5 mg  12.5 mg Oral Q6H PRN Amadou Topete MD        Or    ondansetron (ZOFRAN) injection 4 mg  4 mg Intravenous Q6H PRN Amadou Topete MD        enoxaparin (LOVENOX) injection 40 mg  40 mg Subcutaneous Daily Amadou Topete MD   40 mg at 07/03/20 0842    potassium chloride (KLOR-CON M) extended release tablet 40 mEq  40 mEq Oral PRN Amadou Topete MD        Or    potassium bicarb-citric acid (EFFER-K) effervescent tablet 40 mEq  40 mEq Oral PRN Amadou Topete MD        Or    potassium chloride 10 mEq/100 mL IVPB (Peripheral Line)  10 mEq Intravenous PRN Amadou Topete MD        potassium chloride 10 mEq/100 mL IVPB (Peripheral Line)  10 mEq Intravenous PRN Chryl Shone, MD        magnesium sulfate 2 g in 50 mL IVPB premix  2 g Intravenous PRN Chryl Shone, MD        insulin lispro (HUMALOG) injection vial 0-12 Units  0-12 Units Subcutaneous 4x Daily AC & HS Chryl Shone, MD   4 Units at 07/03/20 0845    insulin lispro (HUMALOG) injection vial 0-6 Units  0-6 Units Subcutaneous Nightly Chryl Shone, MD   5 Units at 07/02/20 2055    budesonide (PULMICORT) nebulizer suspension 250 mcg  0.25 mg Nebulization BID Chryl Shone, MD   250 mcg at 07/03/20 3082    And    Arformoterol Tartrate (BROVANA) nebulizer solution 15 mcg  15 mcg Nebulization BID Chryl Shone, MD   15 mcg at 07/03/20 0444    cetirizine (ZYRTEC) tablet 10 mg  10 mg Oral Nightly Chryl Shone, MD   10 mg at 07/02/20 2054    ipratropium (ATROVENT) 0.02 % nebulizer solution 0.5 mg  0.5 mg Nebulization 4x daily Chryl Shone, MD   0.5 mg at 07/03/20 1026    insulin glargine (LANTUS) injection vial 40 Units  40 Units Subcutaneous Nightly Renetta Alvarado MD   40 Units at 07/02/20 2055    glucose (GLUTOSE) 40 % oral gel 15 g  15 g Oral PRN Renetta Alvarado MD        dextrose 50 % IV solution  12.5 g Intravenous PRN Renetta Alvarado MD        glucagon (rDNA) injection 1 mg  1 mg Intramuscular PRN Renetta Alvarado MD        dextrose 5 % solution  100 mL/hr Intravenous PRN Renetta Alvarado MD        [Held by provider] metanide [facility]) injection 2 mg  2 mg Intravenous BID Suman Laws MD   2 mg at 07/03/20 0948        Labs:     Recent Labs     07/02/20  0008 07/03/20  0239   WBC 7.0 9.2   RBC 3.01* 3.17*   HGB 9.5* 10.0*   HCT 29.5* 30.0*   MCV 98.0 94.6   MCH 31.6* 31.5*   MCHC 32.2* 33.3    266     Recent Labs     07/02/20  0008 07/03/20  0239    141   K 4.2 5.0   ANIONGAP 11 13    100   CO2 23 28   BUN 27* 39*   CREATININE 1.0* 1.5*   GLUCOSE 287* 251*   CALCIUM 8.6* 9.3     Recent Labs     07/03/20  0239   MG 2.2     Recent Labs 07/02/20  0008 07/03/20  0239   AST 16 11   ALT 13 11   BILITOT 0.3 0.3   ALKPHOS 103 88     ABGs:No results for input(s): PH, PO2, PCO2, HCO3, BE, O2SAT in the last 72 hours. Troponin T:   Recent Labs     07/02/20 0008   TROPONINI 0.02     INR:   Recent Labs     07/02/20 0008   INR 1.03     Lactic Acid: No results for input(s): LACTA in the last 72 hours. Objective:   Vitals: BP (!) 149/67   Pulse 78   Temp 97.4 °F (36.3 °C) (Temporal)   Resp 20   Ht 5' 1\" (1.549 m)   Wt 228 lb 3.2 oz (103.5 kg)   SpO2 96%   BMI 43.12 kg/m²   24HR INTAKE/OUTPUT:      Intake/Output Summary (Last 24 hours) at 7/3/2020 1235  Last data filed at 7/3/2020 1051  Gross per 24 hour   Intake 246 ml   Output 350 ml   Net -104 ml     General appearance: alert and cooperative with exam  HEENT: AT/NC  Lungs: no increased work of breathing  Heart: RRR  Abdomen: Soft  Extremities: pulses+  Neurologic: Alert  Skin: warm    Assessment and Plan: Active Problems:    SOB (shortness of breath)    Pericardial effusion    Acute on chronic diastolic congestive heart failure ([facility])    Type 2 diabetes mellitus, with long-term current use of insulin ([facility])    COPD (chronic obstructive pulmonary disease) ([facility])    Hyperglycemia    Acute kidney injury ([facility] [address]    Morbid obesity with BMI of 40.0-44.9, adult ([facility])    Obstructive sleep apnea syndrome    Essential hypertension    Pulmonary hypertension ([facility])    CKD (chronic kidney disease) stage 3, GFR 30-59 ml/min ([facility])    Pulmonary edema with congestive heart failure ([facility] [address]    Palliative care patient  Resolved Problems:    * No resolved hospital problems. *    Acute on chronic diastolic congestive heart failure/moderate to large pericardial effusion: Cardiology on board. Diuresing well. Will hold diuresis for today given acute on chronic kidney injury. Monitor I's and O's. ANTONIO on CKD stage III: Hold diuresis for today. Monitor BMP. COPD: Not in acute exacerbation. Bronchodilators. Diabetes: HbA1c 6.5. Monitor blood glucose and adjust medications as needed. Hypertension: Monitor blood pressure and adjust medications as needed. Supportive management.     Advance Directive: Full Code    DVT prophylaxis: Lovenox    Discharge planning: TBD      Signed:  Rhys Patton MD 7/3/2020 12:35 PM  Rounding Hospitalist

## 2020-07-03 NOTE — PROGRESS NOTES
[address]    Daily HOSPITAL Progress Note                            Date:  7/3/20  Patient: Chloé Cochran  Admission:  7/1/2020 11:45 PM  Admit DX: Pulmonary edema with congestive heart failure ([facility] [address] [I50.1]  Age:  56 y.o., 1960        Date of Admission 7/1/2020 [facility] [place]  LOS: 0 days        Date / Time Of Initially Being Seen For This Daily Visit:  7/3/20, at approximately noon. At that time, I personally saw the patient and rounded with:  Prabhu Grey RN    Date / Time That This Note Is Written:  7/3/2020  at  12:53 PM        The observations documented in this note, including the assessment   and plan are mine    Portions of this note have been copied forward, from prior notes, yet modified, to reflect today's clinical status of this patient. Reason for initial evaluation or the patient's initial complaint    1. Reason for Hospital Admission: dyspnea        2. Reason for Cardiology Consultation: Systemic edema         SUBJECTIVE:      Chief Complaint / Reason for the Visit   Follow up of:  dyspnea and systemic edema and systemic arterial hypertension    Family present and in room during examination:  no    At the time of the examination, the patient was:  Sitting on the side of the bed      Specialty Problems        Cardiology Problems    Acute on chronic diastolic congestive heart failure (HCC)        Pericardial effusion        Nonobstructive atherosclerosis of coronary artery        Pulmonary hypertension ([address]        Essential hypertension        Pulmonary edema with congestive heart failure (HCC)              Current Status Today According to the patient:  Today she is feeling \"a lot better\". Subjective:  Ms. Chloé Cochran is generally feeling are improving:  diuresis ing but creatinine is going up    Ms. Chloé Cochran has the following cardiac complaints / symptoms today:    1. dyspnea, improving    2.  Systemic edema, can now \"see my ankles\"    3.  Hypertension    The blood pressure for the lastr 36 hours has been:  Systolic (87LAA), JPO:453 , Min:140 , DMI:774    Diastolic (16FIW), ADINA:85, Min:60, Max:83        Tj Gould is a 56 y.o. female with the following history as recorded in [facility]:    Patient Active Problem List    Diagnosis Date Noted    Pulmonary edema with congestive heart failure ([facility] 07/02/2020     Priority: Low    Palliative care patient 07/02/2020     Priority: Low    Obstructive sleep apnea syndrome 06/05/2020     Priority: Low    Essential hypertension 06/05/2020     Priority: Low    Moderate persistent asthma without complication 86/44/6528     Priority: Low    Pulmonary hypertension ([facility] 03/09/2020     Priority: Low    Nonobstructive atherosclerosis of coronary artery 03/09/2020     Priority: Low    CKD (chronic kidney disease) stage 3, GFR 30-59 ml/min ([facility]) 03/09/2020     Priority: Low    Morbid obesity with BMI of 40.0-44.9, adult ([facility] 01/27/2020     Priority: Low    SOB (shortness of breath) 01/26/2020     Priority: Low    Pericardial effusion 01/26/2020     Priority: Low    Acute on chronic diastolic congestive heart failure ([facility] 01/26/2020     Priority: Low    Normocytic anemia 01/26/2020     Priority: Low    Type 2 diabetes mellitus, with long-term current use of insulin ([facility] 01/26/2020     Priority: Low    COPD (chronic obstructive pulmonary disease) ([facility] 01/26/2020     Priority: Low    Hyperglycemia 01/26/2020     Priority: Low    Acute kidney injury ([facility] 01/26/2020     Priority: Low    Elevated d-dimer 01/26/2020     Priority: Low     Current Facility-Administered Medications   Medication Dose Route Frequency Provider Last Rate Last Dose    vitamin D (CHOLECALCIFEROL) tablet 1,000 Units  1,000 Units Oral Daily Jnenyfer Gonzalez MD   1,000 Units at 07/03/20 0842    valsartan (DIOVAN) tablet 160 mg  160 mg Oral Daily Jennyfer Gonzalez MD   160 mg at 07/03/20 0842    [START ON 7/7/2020] Carl Wolff MD        enoxaparin (LOVENOX) injection 40 mg  40 mg Subcutaneous Daily Fito Jeffers MD   40 mg at 07/03/20 0842    potassium chloride (KLOR-CON M) extended release tablet 40 mEq  40 mEq Oral PRN Fito Jeffers MD        Or   Steven Shah potassium bicarb-citric acid (EFFER-K) effervescent tablet 40 mEq  40 mEq Oral PRN Fito Jeffers MD        Or   Steven Shah potassium chloride 10 mEq/100 mL IVPB (Peripheral Line)  10 mEq Intravenous PRN Fito Jeffers MD        potassium chloride 10 mEq/100 mL IVPB (Peripheral Line)  10 mEq Intravenous PRN Fito Jeffers MD        magnesium sulfate 2 g in 50 mL IVPB premix  2 g Intravenous PRN Fito Jeffers MD        insulin lispro (HUMALOG) injection vial 0-12 Units  0-12 Units Subcutaneous 4x Daily AC & HS Fito Jeffers MD   4 Units at 07/03/20 0845    insulin lispro (HUMALOG) injection vial 0-6 Units  0-6 Units Subcutaneous Nightly Fito Jeffers MD   5 Units at 07/02/20 2055    budesonide (PULMICORT) nebulizer suspension 250 mcg  0.25 mg Nebulization BID Fito Jeffers MD   250 mcg at 07/03/20 7047    And    Arformoterol Tartrate (BROVANA) nebulizer solution 15 mcg  15 mcg Nebulization BID Fito Jeffers MD   15 mcg at 07/03/20 7066    cetirizine (ZYRTEC) tablet 10 mg  10 mg Oral Nightly Fito Jeffers MD   10 mg at 07/02/20 2054    ipratropium (ATROVENT) 0.02 % nebulizer solution 0.5 mg  0.5 mg Nebulization 4x daily Fito Jeffers MD   0.5 mg at 07/03/20 1026    insulin glargine (LANTUS) injection vial 40 Units  40 Units Subcutaneous Nightly Salima Munoz MD   40 Units at 07/02/20 2055    glucose (GLUTOSE) 40 % oral gel 15 g  15 g Oral PRN Salima Munoz MD        dextrose 50 % IV solution  12.5 g Intravenous PRN Salima Munoz MD        glucagon (rDNA) injection 1 mg  1 mg Intramuscular PRN Salima Munoz MD        dextrose 5 % solution  100 mL/hr Intravenous PRN Salima Munoz MD        [Held by provider] bumetanide (BUMEX) injection 2 mg  2 mg Intravenous BID Letta Second Keisha Collins MD   2 mg at 07/03/20 8569     Allergies: Albuterol; Levaquin [levofloxacin];  Metformin and related; and Aspirin  Past Medical History:   Diagnosis Date    AMI (acute myocardial infarction) ([facility] 09/2018    Asthma     CAD (coronary artery disease)     hx of stents    Cerebral artery occlusion with cerebral infarction ([facility] 2012    R sided numbness/weakness    CHF (congestive heart failure) ([facility]     COPD (chronic obstructive pulmonary disease) ([facility]     Diabetes mellitus ([facility]     DVT (deep vein thrombosis) in pregnancy     Hyperlipidemia     Hypertension     Palliative care patient 07/02/2020    Pneumonia      Past Surgical History:   Procedure Laterality Date    CATARACT REMOVAL      COLONOSCOPY  approx 2013    CORONARY ANGIOPLASTY WITH STENT PLACEMENT  2018    in [address]- [facility], [facility]    TONSILLECTOMY       Family History   Problem Relation Age of Onset    Colon Cancer Neg Hx     Colon Polyps Neg Hx      Social History     Tobacco Use    Smoking status: Former Smoker     Packs/day: 1.00     Years: 30.00     Pack years: 30.00     Last attempt to quit: 2005     Years since quitting: 15.5    Smokeless tobacco: Never Used   Substance Use Topics    Alcohol use: Never     Frequency: Never          Review of Systems:    General:      Complaint / Symptom Yes / No / Description if Yes       Fatigue Yes:  chronic   Weight gain NA   Insomnia NA       Respiratory:        Complaint / Symptom Yes / No / Description if Yes       Cough No   Horseness NA       Cardiovascular:    Complaint / Symptom Yes / No / Description if Yes       Chest Pain No   Shortness of Air / Orthopnea Yes: chronic and are improving:   Presyncope / Syncope No   Palpitations No         Objective:    BP (!) 149/67   Pulse 78   Temp 97.4 °F (36.3 °C) (Temporal)   Resp 20   Ht 5' 1\" (1.549 m)   Wt 228 lb 3.2 oz (103.5 kg)   SpO2 96%   BMI 43.12 kg/m² ,     Intake/Output Summary (Last 24 hours) at 7/3/2020 1253  Last data filed at 7/3/2020 1051  Gross per 24 hour   Intake 246 ml   Output 350 ml   Net -104 ml       GENERAL - well developed and well nourished, is an active participant in this examination  HEENT -  PERRLA, Hearing appears normal, conjunctiva and lids are normal, ears and nose appear normal  NECK - no thyromegaly, no JVD, trachea is in the midline  CARDIOVASCULAR - PMI is in the left mid line clavicular position, Normal S1 and S2 with a grade 1/6 systolic murmur. No S3 or S4    PULMONARY - No respiratory distress. scattered wheezes and rales. Breath sounds in both  lung fields are Decreased  ABDOMEN  - soft, non tender, no rebound, no hepatomegaly or splenomegaly  MUSCULOSKELETAL  - Sitting, digitals and nails are without clubbing or cyanosis  EXTREMITIES - trace edema  NEUROLOGIC - cranial nerves, II-XII, are normal  SKIN - turgor is normal, no rash  PSYCHIATRIC - normal mood and affect, alert and orientated x 3, judgement and insight appear appropriate      ASSESSMENT:    ALL THE CARDIOLOGY PROBLEMS ARE LISTED ABOVE; HOWEVER, THE FOLLOWING SPECIFIC CARDIAC PROBLEMS / CONDITIONS WERE ADDRESSED AND TREATED DURING THE HOSPITAL VISIT TODAY:                                                                                            MEDICAL DECISION MAKING                   Cardiac Specific Problem / Diagnosis   Discussion and Data Reviewed Diagnostic or Therapeutic Procedures Ordered Management Options Selected                               1. Presenting problem / symptom     Large pericardial effusion  are worsening    Has been present in the past     No clear cut evidence of tamponade and she is lying flat No Continue current medications:     Yes: continue the diuresis                        2.  Concentric LVH due to systemic arterial hypertension Initial presentation during this evaluation    Review and summation of old records:     Stents in the proximal circ and OM1     11/21/2019  Cath  LVEDP 25, PA 60/30, normal LVFX, mild CAD   1/26/2020  Echo mild to moderate pericardial eff, normal LVF  3/7/2020  Echo large pericardial effusion  5/29/2020  Echo  Severe LVH, DD, normal LVFX, small pericardial effusion    No Continue current medications:     Yes:                  3. Concern regarding systemic blood pressure Initial presentation during this evaluation The blood pressure for the lastr 36 hours has been:  Systolic (03AHD), TAX:382 , Min:140 , LHL:135    Diastolic (71QQY), BLE:39, Min:60, Max:83        No Continue current medications:        yes                       DAILY Ervin [id number]:       Date Clinical Event Plan of Treatment           7/2/2020 Admitted for: dyspnea  Cardiology Concern:  Pericardial effusion diursising   07/03/20 Symptomatically improved; but creatinine went from 1 to 1.5 Concur with backing down on the diuresis today                                  CONSIDERATIONS, THOUGHTS, AND PLANS:     4. Continue present medications except for changes as noted above  5. Continue to monitor rhythm  6. Further orders per clinical course. Discussed with patient and nursing.      Electronically signed by Mckayla Evans MD on 07/03/20             [facility] [address] [place] [address]

## 2020-07-03 NOTE — CONSULTS
[facility]       Cardiology Consultation          I personally saw the patient and rounded with:  Boston Sharma RN Nurse [address] [place] [facility]   RN, on  7/2/20      The observations documented in this note, including the assessment and plan are mine              Date of Admission:  7/1/2020 11:45 PM    Date / Time Of Initially Being Seen For This Daily Visit:  7/2/20, at approximately 1400. At that time, I personally saw the patient and rounded with:  Boston Sharma RN Nurse Navigator & Annamaria Cruz RN    Date / Time That This Note Is Written:  7/2/2020  at  9:16 PM    Cardiologist:  Salima Small MD     Cardiology Attending: [facility] Attending: Hospitalist Service      PCP:  YAMIL Romero NP    Reason for Admission / Chief Complaint or Consultation      1. Reason for Hospital Admission: dyspnea        2. Reason for Cardiology Consultation: Pericardial effusion       SUBJECTIVE AND HISTORY OF PRESENT ILLNESS:    Source of the history:  Patient, family, previous inpatient and outpatient records in 3 Marya Bradley is a 56 y.o. female who presents to [facility] ED / PCU # [id number] (admitted on 7/1/2020 11:45 PM) with symptoms / signs / problem or diagnosis of progressive dyspnea. She is normal mood and affect, alert and orientated x 3, judgement and insight appear appropriate. Family present:  No  At the beginning of the interview she was lying in bed and resting. CONTEXT OF THIS HISTORY OF PRESENT ILLNESS INCLUDE: (IF APPLICABLE):    Presentation:  She  presented with dyspnea. The dyspnea has been progressive over the last several weeks. Cardiology was asked to see her regarding these symptoms and findings and to consider those in relationship to possible optimal diagnosis and treatment options.       FEATURES OF THIS HISTORY OF PRESENT ILLNESS (SYMPTOMS AND FINDINGS) INCLUDE:       1. Location: The dyspnea was located in the central to left chest without radiation to the back, throat and left shoulder. 2. Duration: The dyspnea began about two weeks ago and lasted since then   3. Quality: The shortness of air has not been associated with symptoms of chest discomfort, pain with breathing, dizzness, fainting, cough, wheezing, hemoptysis, neck pain, chest injury, fever or sputum production. 4. Severity: It was described as mild but progessive   5. Timing The symptoms began gradually. 6. Modifying Factors: Modifying features included:   Rest and sitting up helps   7. Associated Signs and Symptoms: There was not  associated manifestations such as nausea, vomiting, diaphoresis, presyncope, syncope, dizzyness, weakness, cold sweats. 8. Context: As noted above in the context of the presentation. It  was not suggestive of myocardial ischemia. When being examined this afternoon, in PCU, # [id number] with Amelia Costa RN Nurse 1370 [facility]   RN, there was not on going or continuous symptoms of exertional chest discomfort, unusual or change in shortness of air, presyncope or syncope.          CARDIAC RISK PROFILE:      Risk Factor Yes / No / Unknown       Gender Female   Cigarette Use No, but did use in the past    Family History of Cardiovascular Disease N/A   Diabetes Mellitus yes   Hypercholesteremia yes   Hypertension yes          Cardiac Specific Problems:    Specialty Problems        Cardiology Problems    Acute on chronic diastolic congestive heart failure (HCC)        Pericardial effusion        Nonobstructive atherosclerosis of coronary artery        Pulmonary hypertension (HCC)        Essential hypertension        Pulmonary edema with congestive heart failure (HCC)                PRIOR CARDIAC PROBLEM LIST  (IF APPLICABLE):    Stents in the proximal circ and OM1    11/21/2019  Cath  LVEDP 25, PA 60/30, normal LVFX, mild CAD   1/26/2020  Echo mild to moderate pericardial eff, normal LVF  3/7/2020  Echo large pericardial effusion  5/29/2020  Echo  Severe LVH, DD, normal LVFX, small pericardial effusion      Past Medical History:    Past Medical History:   Diagnosis Date    [facility] infarction) [facility] 09/2018    Asthma     CAD (coronary artery disease)     hx of stents    [facility]bral [facility] occlusion [facility] 2012    R sided numbness/weakness    CHF [facility]congestive heart failure) ([facility]     COPD [facility]chronic obstructive [facility] disease) [facility]     Diabetes mellitus ([facility]     DVT (deep vein thrombosis) in pregnancy     Hyperlipidemia     Hypertension     Palliative care patient 07/02/2020    Pneumonia          Past Surgical History:    Past Surgical History:   Procedure Laterality Date    CATARACT REMOVAL      COLONOSCOPY  approx 2013    CORONARY ANGIOPLASTY WITH STENT PLACEMENT  2018    [facility]    TONSILLECTOMY           Home Medications:   Prior to Admission medications    Medication Sig Start Date End Date Taking? Authorizing Provider   Insulin Degludec (TRESIBA FLEXTOUCH) 100 UNIT/ML SOPN Inject 40 Units into the skin nightly 6/3/20  Yes Telma Juan M, APRN - NP   Semaglutide, 1 MG/DOSE, 2 MG/1.5ML SOPN Inject 1 mg into the skin every 7 days  Patient taking differently: Inject 1 mg into the skin every 7 days Pt takes on Thursday's 6/3/20  Yes Telma Juan M, APRN - NP   bumetanide [facility]) 1 MG tablet Take 1 tablet by mouth 2 times daily 6/3/20  Yes Telma Juan M, APRN - NP   DULoxetine (CYMBALTA) 60 MG extended release capsule Take 1 capsule by mouth daily 6/3/20  Yes Telma Juan M, APRN - NP   levocetirizine (XYZAL) 5 MG tablet Take 1 tablet by mouth nightly 6/3/20  Yes Telma Juan M, APRN - NP   gabapentin (NEURONTIN) 600 MG tablet Take 1 tablet by mouth 3 times daily for 30 days.  6/3/20 7/3/20 Yes Jcarlos Jack MD   carvedilol (COREG) 3.125 MG tablet Take 1 tablet by mouth 2 times daily 5/14/20  Yes Telma Juan M, APRN - NP   DULoxetine (CYMBALTA) 30 MG extended release capsule Take 1 capsule Historical Provider, MD        Facility Administered Medications:    Vitamin D  1,000 Units Oral Daily    valsartan  160 mg Oral Daily    [START ON 7/7/2020] Semaglutide (1 MG/DOSE)  1 mg Subcutaneous Q7 Days    pramipexole  0.5 mg Oral BID    montelukast  10 mg Oral Nightly    melatonin  10 mg Oral Nightly    isosorbide mononitrate  90 mg Oral Daily    gabapentin  600 mg Oral TID    DULoxetine  60 mg Oral Daily    DULoxetine  30 mg Oral Daily    clopidogrel  75 mg Oral Daily    carvedilol  3.125 mg Oral BID    amLODIPine  5 mg Oral Daily    sodium chloride flush  10 mL Intravenous 2 times per day    enoxaparin  40 mg Subcutaneous Daily    bumetanide  1 mg Intravenous BID    insulin lispro  0-12 Units Subcutaneous 4x Daily AC & HS    insulin lispro  0-6 Units Subcutaneous Nightly    budesonide  0.25 mg Nebulization BID    And    Arformoterol Tartrate  15 mcg Nebulization BID    cetirizine  10 mg Oral Nightly    ipratropium  0.5 mg Nebulization 4x daily    insulin glargine  40 Units Subcutaneous Nightly       Allergies:  Albuterol; Levaquin [levofloxacin];  Metformin and related; and Aspirin     Social History:       Social History     Socioeconomic History    Marital status:      Spouse name: Not on file    Number of children: Not on file    Years of education: Not on file    Highest education level: Not on file   Occupational History    Not on file   Social Needs    Financial resource strain: Not on file    Food insecurity     Worry: Not on file     Inability: Not on file   [employer] needs     Medical: Not on file     Non-medical: Not on file   Tobacco Use    Smoking status: Former Smoker     Packs/day: 1.00     Years: 30.00     Pack years: 30.00     Last attempt to quit: 2005     Years since quitting: 15.5    Smokeless tobacco: Never Used   Substance and Sexual Activity    Alcohol use: Never     Frequency: Never    Drug use: Never    Sexual activity: Not on file Lifestyle    Physical activity     Days per week: Not on file     Minutes per session: Not on file    Stress: Not on file   Relationships    Social connections     Talks on phone: Not on file     Gets together: Not on file     Attends Denominational service: Not on file     Active member of club or organization: Not on file     Attends meetings of clubs or organizations: Not on file     Relationship status: Not on file    Intimate partner violence     Fear of current or ex partner: Not on file     Emotionally abused: Not on file     Physically abused: Not on file     Forced sexual activity: Not on file   Other Topics Concern    Not on file   Social History Narrative    Not on file       Family History:     Family History   Problem Relation Age of Onset    Colon Cancer Neg Hx     Colon Polyps Neg Hx          REVIEW OF SYSTEMS:     Except as noted in the HPI, all other systems are negative        PHYSICAL EXAMINATION:     BP (!) 140/62   Pulse 83   Temp 98.2 °F (36.8 °C) (Temporal)   Resp 20   Ht 5' 1\" (1.549 m)   Wt 207 lb 9.6 oz (94.2 kg)   SpO2 100%   BMI 39.23 kg/m²     GENERAL - well developed and well nourished, in no amount of generalized distress; is an active participant in this examination  HEENT -  PERRLA, Hearing appears normal, conjunctiva and lids are normal, ears and nose appear normal  NECK - no thyromegaly, no JVD, trachea is in the midline  CARDIOVASCULAR - PMI is in the left mid line clavicular position, Normal S1 and S2 with a grade 1/6 systolic murmur. No S3 or S4    PULMONARY - No respiratory distress. scattered wheezes and rales.   Breath sounds in both  lung fields are Normal  ABDOMEN  - soft, non tender, no rebound, no hepatomegaly or splenomegaly  MUSCULOSKELETAL  - Prone/Supine, digitals and nails are without clubbing or cyanosis  EXTREMITIES - trace edema  NEUROLOGIC - cranial nerves, II-XII, are normal  SKIN - turgor is normal, no rash  PSYCHIATRIC - was not normal mood and affect, alert and orientated x 3, judgement and insight appear appropriate      LABORATORY EVALUATION & TESTING:    I have personally reviewed and interpreted the results of the following diagnostic testing      EKG and or Telemetry:  which was personally reviewed me:  Sinus rhythm,   Pulse Readings from Last 1 Encounters:   07/02/20 83    bpm,  without Acute changes    Troponin:  negative myocardial necrosis (  0.02);  BNP:  abnormal 1,801;  creatinine:  normal    CBC:   Recent Labs     07/02/20 0008   WBC 7.0   HGB 9.5*   HCT 29.5*   MCV 98.0        BMP:   Recent Labs     07/02/20 0008      K 4.2      CO2 23   BUN 27*   CREATININE 1.0*     Cardiac Enzymes:   Recent Labs     07/02/20 0008   TROPONINI 0.02     PT/INR:   Recent Labs     07/02/20 0008   PROTIME 13.5   INR 1.03     APTT: No results for input(s): APTT in the last 72 hours.   Liver Profile:  Lab Results   Component Value Date    AST 16 07/02/2020    ALT 13 07/02/2020    BILITOT 0.3 07/02/2020    ALKPHOS 103 07/02/2020     Lab Results   Component Value Date    CHOL 220 01/27/2020    HDL 48 01/27/2020    TRIG 184 01/27/2020     TSH:  Lab Results   Component Value Date    TSH 1.350 01/26/2020     UA:   Lab Results   Component Value Date    COLORU YELLOW 02/03/2020    PHUR 6.0 02/03/2020    WBCUA 39 02/03/2020    RBCUA 9 02/03/2020    BACTERIA TRACE 02/03/2020    CLARITYU CLOUDY 02/03/2020    SPECGRAV 1.017 02/03/2020    LEUKOCYTESUR SMALL 02/03/2020    UROBILINOGEN 0.2 02/03/2020    BILIRUBINUR Negative 02/03/2020    BLOODU SMALL 02/03/2020    GLUCOSEU 500 02/03/2020             ALL THE CARDIOLOGY PROBLEMS ARE LISTED ABOVE; HOWEVER, THE FOLLOWING SPECIFIC CARDIAC PROBLEMS WERE ADDRESSED AND  TREATED DURING THE HOSPITAL     MEDICAL DECISION MAKING               Cardiac Specific Problem / Diagnosis  Discussion and Data Reviewed Diagnostic or Therapeutic Procedures Ordered Management Options Selected                   1. Presenting problem / symptom    Large pericardial effusion  are worsening   Has been present in the past    No clear cut evidence of tamponade and she is lying flat No Continue current medications:    Yes: continue the diuresis                2. Concentric LVH due to systemic arterial hypertension Initial presentation during this evaluation   Review and summation of old records:    Stents in the proximal circ and OM1    11/21/2019  Cath  LVEDP 25, PA 60/30, normal LVFX, mild CAD   1/26/2020  Echo mild to moderate pericardial eff, normal LVF  3/7/2020  Echo large pericardial effusion  5/29/2020  Echo  Severe LVH, DD, normal LVFX, small pericardial effusion   No Continue current medications:    Yes:            3. Concern regarding systemic blood pressure Initial presentation during this evaluation Systolic (47ECJ), JJN:730 , Min:140 , IZM:106    Diastolic (75BLF), SXP:89, Min:58, Max:83   No Continue current medications:       yes                   DISCUSSION AND PLAN:      1. I had a detailed discussion with the patient and / or family regarding the historical points, physical examination findings, and any diagnostic results supporting the admission / consultation diagnosis. The patient was educated on care and need for admission. Questions were invited and answered. The patient and / or family shows understanding of admission / consultation information and agrees to follow. 2. Continue present medications except for changes as noted above    3. Continue to monitor rhythm    4. Further orders per clinical course. 5. Increase the diuresis         Discussed with patient and nursing.     I greatly appreciate the opportunity and your confidence in allowing me to participate in the care of Saturnino Marti    Electronically signed by Arzella Bosworth, MD on 7/2/20     [facility] [place] [facility]

## 2020-07-03 NOTE — PLAN OF CARE
Problem: Falls - Risk of:  Goal: Will remain free from falls  Description: Will remain free from falls  7/3/2020 0004 by Theta Mcardle, RN  Outcome: Ongoing  7/2/2020 1117 by Aron Ramsey RN  Outcome: Ongoing  Goal: Absence of physical injury  Description: Absence of physical injury  7/3/2020 0004 by Theta Mcardle, RN  Outcome: Ongoing  7/2/2020 1117 by Aron Ramsey RN  Outcome: Ongoing     Problem: Fluid Volume:  Goal: Ability to achieve and maintain adequate urine output will improve  Description: Ability to achieve and maintain adequate urine output will improve  Outcome: Ongoing     Problem: Respiratory:  Goal: Respiratory status will improve  Description: Respiratory status will improve  Outcome: Ongoing

## 2020-07-04 LAB
ALBUMIN SERPL-MCNC: 3.4 G/DL (ref 3.5–5.2)
ALP BLD-CCNC: 77 U/L (ref 35–104)
ALT SERPL-CCNC: 11 U/L (ref 5–33)
ANION GAP SERPL CALCULATED.3IONS-SCNC: 11 MMOL/L (ref 7–19)
AST SERPL-CCNC: 11 U/L (ref 5–32)
BASOPHILS ABSOLUTE: 0 K/UL (ref 0–0.2)
BASOPHILS RELATIVE PERCENT: 0.3 % (ref 0–1)
BILIRUB SERPL-MCNC: <0.2 MG/DL (ref 0.2–1.2)
BUN BLDV-MCNC: 45 MG/DL (ref 8–23)
CALCIUM SERPL-MCNC: 8.8 MG/DL (ref 8.8–10.2)
CHLORIDE BLD-SCNC: 101 MMOL/L (ref 98–111)
CO2: 28 MMOL/L (ref 22–29)
CREAT SERPL-MCNC: 1.5 MG/DL (ref 0.5–0.9)
EOSINOPHILS ABSOLUTE: 0.2 K/UL (ref 0–0.6)
EOSINOPHILS RELATIVE PERCENT: 2.3 % (ref 0–5)
GFR NON-AFRICAN AMERICAN: 35
GLUCOSE BLD-MCNC: 128 MG/DL (ref 70–99)
GLUCOSE BLD-MCNC: 160 MG/DL (ref 70–99)
GLUCOSE BLD-MCNC: 175 MG/DL (ref 74–109)
GLUCOSE BLD-MCNC: 191 MG/DL (ref 70–99)
GLUCOSE BLD-MCNC: 207 MG/DL (ref 70–99)
HCT VFR BLD CALC: 29.9 % (ref 37–47)
HEMOGLOBIN: 9.5 G/DL (ref 12–16)
IMMATURE GRANULOCYTES #: 0 K/UL
LYMPHOCYTES ABSOLUTE: 2.2 K/UL (ref 1.1–4.5)
LYMPHOCYTES RELATIVE PERCENT: 31.4 % (ref 20–40)
MAGNESIUM: 2.4 MG/DL (ref 1.6–2.4)
MCH RBC QN AUTO: 30.9 PG (ref 27–31)
MCHC RBC AUTO-ENTMCNC: 31.8 G/DL (ref 33–37)
MCV RBC AUTO: 97.4 FL (ref 81–99)
MONOCYTES ABSOLUTE: 0.6 K/UL (ref 0–0.9)
MONOCYTES RELATIVE PERCENT: 9.4 % (ref 0–10)
NEUTROPHILS ABSOLUTE: 3.8 K/UL (ref 1.5–7.5)
NEUTROPHILS RELATIVE PERCENT: 56.2 % (ref 50–65)
PDW BLD-RTO: 14.1 % (ref 11.5–14.5)
PERFORMED ON: ABNORMAL
PLATELET # BLD: 235 K/UL (ref 130–400)
PMV BLD AUTO: 9 FL (ref 9.4–12.3)
POTASSIUM SERPL-SCNC: 4.7 MMOL/L (ref 3.5–5)
RBC # BLD: 3.07 M/UL (ref 4.2–5.4)
SODIUM BLD-SCNC: 140 MMOL/L (ref 136–145)
TOTAL PROTEIN: 5.5 G/DL (ref 6.6–8.7)
WBC # BLD: 6.8 K/UL (ref 4.8–10.8)

## 2020-07-04 PROCEDURE — 6360000002 HC RX W HCPCS: Performed by: HOSPITALIST

## 2020-07-04 PROCEDURE — 82947 ASSAY GLUCOSE BLOOD QUANT: CPT

## 2020-07-04 PROCEDURE — 94760 N-INVAS EAR/PLS OXIMETRY 1: CPT

## 2020-07-04 PROCEDURE — 80053 COMPREHEN METABOLIC PANEL: CPT

## 2020-07-04 PROCEDURE — 85025 COMPLETE CBC W/AUTO DIFF WBC: CPT

## 2020-07-04 PROCEDURE — 83735 ASSAY OF MAGNESIUM: CPT

## 2020-07-04 PROCEDURE — 94640 AIRWAY INHALATION TREATMENT: CPT

## 2020-07-04 PROCEDURE — 2580000003 HC RX 258: Performed by: HOSPITALIST

## 2020-07-04 PROCEDURE — 6370000000 HC RX 637 (ALT 250 FOR IP): Performed by: INTERNAL MEDICINE

## 2020-07-04 PROCEDURE — 2140000000 HC CCU INTERMEDIATE R&B

## 2020-07-04 PROCEDURE — 36415 COLL VENOUS BLD VENIPUNCTURE: CPT

## 2020-07-04 PROCEDURE — 99233 SBSQ HOSP IP/OBS HIGH 50: CPT | Performed by: INTERNAL MEDICINE

## 2020-07-04 PROCEDURE — 6370000000 HC RX 637 (ALT 250 FOR IP): Performed by: HOSPITALIST

## 2020-07-04 RX ADMIN — GABAPENTIN 600 MG: 600 TABLET, FILM COATED ORAL at 13:15

## 2020-07-04 RX ADMIN — Medication 40 UNITS: at 20:22

## 2020-07-04 RX ADMIN — AMLODIPINE BESYLATE 5 MG: 5 TABLET ORAL at 07:56

## 2020-07-04 RX ADMIN — CLOPIDOGREL BISULFATE 75 MG: 75 TABLET ORAL at 07:56

## 2020-07-04 RX ADMIN — GABAPENTIN 600 MG: 600 TABLET, FILM COATED ORAL at 20:21

## 2020-07-04 RX ADMIN — SODIUM CHLORIDE, PRESERVATIVE FREE 10 ML: 5 INJECTION INTRAVENOUS at 20:21

## 2020-07-04 RX ADMIN — IPRATROPIUM BROMIDE 0.5 MG: 0.5 SOLUTION RESPIRATORY (INHALATION) at 11:01

## 2020-07-04 RX ADMIN — ENOXAPARIN SODIUM 40 MG: 40 INJECTION SUBCUTANEOUS at 07:59

## 2020-07-04 RX ADMIN — ARFORMOTEROL TARTRATE 15 MCG: 15 SOLUTION RESPIRATORY (INHALATION) at 07:07

## 2020-07-04 RX ADMIN — PRAMIPEXOLE DIHYDROCHLORIDE 0.5 MG: 0.25 TABLET ORAL at 17:22

## 2020-07-04 RX ADMIN — BUDESONIDE 250 MCG: 0.25 INHALANT RESPIRATORY (INHALATION) at 18:57

## 2020-07-04 RX ADMIN — ARFORMOTEROL TARTRATE 15 MCG: 15 SOLUTION RESPIRATORY (INHALATION) at 18:57

## 2020-07-04 RX ADMIN — INSULIN LISPRO 1 UNITS: 100 INJECTION, SOLUTION INTRAVENOUS; SUBCUTANEOUS at 20:22

## 2020-07-04 RX ADMIN — INSULIN LISPRO 4 UNITS: 100 INJECTION, SOLUTION INTRAVENOUS; SUBCUTANEOUS at 12:21

## 2020-07-04 RX ADMIN — DULOXETINE HYDROCHLORIDE 60 MG: 30 CAPSULE, DELAYED RELEASE ORAL at 07:56

## 2020-07-04 RX ADMIN — IPRATROPIUM BROMIDE 0.5 MG: 0.5 SOLUTION RESPIRATORY (INHALATION) at 18:57

## 2020-07-04 RX ADMIN — MONTELUKAST SODIUM 10 MG: 10 TABLET, COATED ORAL at 20:21

## 2020-07-04 RX ADMIN — VITAMIN D, TAB 1000IU (100/BT) 1000 UNITS: 25 TAB at 07:55

## 2020-07-04 RX ADMIN — INSULIN LISPRO 2 UNITS: 100 INJECTION, SOLUTION INTRAVENOUS; SUBCUTANEOUS at 17:26

## 2020-07-04 RX ADMIN — CARVEDILOL 3.12 MG: 3.12 TABLET, FILM COATED ORAL at 07:56

## 2020-07-04 RX ADMIN — Medication 10 MG: at 20:21

## 2020-07-04 RX ADMIN — CETIRIZINE HYDROCHLORIDE 10 MG: 10 TABLET, FILM COATED ORAL at 20:21

## 2020-07-04 RX ADMIN — GABAPENTIN 600 MG: 600 TABLET, FILM COATED ORAL at 07:55

## 2020-07-04 RX ADMIN — BUDESONIDE 250 MCG: 0.25 INHALANT RESPIRATORY (INHALATION) at 07:07

## 2020-07-04 RX ADMIN — DULOXETINE HYDROCHLORIDE 30 MG: 30 CAPSULE, DELAYED RELEASE ORAL at 07:56

## 2020-07-04 RX ADMIN — PRAMIPEXOLE DIHYDROCHLORIDE 0.5 MG: 0.25 TABLET ORAL at 07:55

## 2020-07-04 RX ADMIN — IPRATROPIUM BROMIDE 0.5 MG: 0.5 SOLUTION RESPIRATORY (INHALATION) at 07:07

## 2020-07-04 RX ADMIN — SODIUM CHLORIDE, PRESERVATIVE FREE 10 ML: 5 INJECTION INTRAVENOUS at 08:02

## 2020-07-04 RX ADMIN — VALSARTAN 160 MG: 80 TABLET, FILM COATED ORAL at 07:55

## 2020-07-04 RX ADMIN — IPRATROPIUM BROMIDE 0.5 MG: 0.5 SOLUTION RESPIRATORY (INHALATION) at 14:33

## 2020-07-04 RX ADMIN — CARVEDILOL 3.12 MG: 3.12 TABLET, FILM COATED ORAL at 20:21

## 2020-07-04 RX ADMIN — ISOSORBIDE MONONITRATE 90 MG: 30 TABLET ORAL at 07:55

## 2020-07-04 ASSESSMENT — PAIN SCALES - GENERAL
PAINLEVEL_OUTOF10: 0
PAINLEVEL_OUTOF10: 0

## 2020-07-04 NOTE — PLAN OF CARE
Problem: Falls - Risk of:  Goal: Will remain free from falls  Description: Will remain free from falls  Outcome: Ongoing  Goal: Absence of physical injury  Description: Absence of physical injury  Outcome: Ongoing     Problem: Fluid Volume:  Goal: Ability to achieve and maintain adequate urine output will improve  Description: Ability to achieve and maintain adequate urine output will improve  Outcome: Ongoing     Problem: Respiratory:  Goal: Respiratory status will improve  Description: Respiratory status will improve  Outcome: Ongoing

## 2020-07-04 NOTE — PROGRESS NOTES
effusion, breathing about the same    3.  Hypertension    The blood pressure for the lastr 36 hours has been:  Systolic (60GPR), NNR:513 , Min:101 , PZU:775    Diastolic (16CDU), CUG:10, Min:53, Max:75        Devang Robertson is a 56 y.o. female with the following history as recorded in [facility]:    Patient Active Problem List    Diagnosis Date Noted    Pulmonary edema with congestive heart failure ([facility] 07/02/2020     Priority: Low    Palliative care patient 07/02/2020     Priority: Low    Obstructive sleep apnea syndrome 06/05/2020     Priority: Low    Essential hypertension 06/05/2020     Priority: Low    Moderate persistent asthma without complication 25/49/7293     Priority: Low    Pulmonary hypertension ([facility] 03/09/2020     Priority: Low    Nonobstructive atherosclerosis of coronary artery 03/09/2020     Priority: Low    CKD (chronic kidney disease) stage 3, GFR 30-59 ml/min ([facility]) 03/09/2020     Priority: Low    Morbid obesity with BMI of 40.0-44.9, adult ([facility] 01/27/2020     Priority: Low    SOB (shortness of breath) 01/26/2020     Priority: Low    Pericardial effusion 01/26/2020     Priority: Low    Acute on chronic diastolic congestive heart failure ([facility] 01/26/2020     Priority: Low    Normocytic anemia 01/26/2020     Priority: Low    Type 2 diabetes mellitus, with long-term current use of insulin ([facility] 01/26/2020     Priority: Low    COPD (chronic obstructive pulmonary disease) ([facility] 01/26/2020     Priority: Low    Hyperglycemia 01/26/2020     Priority: Low    Acute kidney injury ([facility] 01/26/2020     Priority: Low    Elevated d-dimer 01/26/2020     Priority: Low     Current Facility-Administered Medications   Medication Dose Route Frequency Provider Last Rate Last Dose    vitamin D (CHOLECALCIFEROL) tablet 1,000 Units  1,000 Units Oral Daily Amadou Topete MD   1,000 Units at 07/04/20 5237    valsartan (DIOVAN) tablet 160 mg  160 mg Oral Daily Amadou Topete MD   160 mg at 07/04/20 6522 Intravenous Q6H PRN Ana Naqvi MD        enoxaparin (LOVENOX) injection 40 mg  40 mg Subcutaneous Daily Ana Naqvi MD   40 mg at 07/04/20 0759    potassium chloride (KLOR-CON M) extended release tablet 40 mEq  40 mEq Oral PRN Ana Naqvi MD        Or   [facility] potassium bicarb-citric acid (EFFER-K) effervescent tablet 40 mEq  40 mEq Oral PRN Ana Naqvi MD        Or   [facility] potassium chloride 10 mEq/100 mL IVPB (Peripheral Line)  10 mEq Intravenous PRN Ana Naqvi MD        potassium chloride 10 mEq/100 mL IVPB (Peripheral Line)  10 mEq Intravenous PRN Ana Naqvi MD        magnesium sulfate 2 g in 50 mL IVPB premix  2 g Intravenous PRN Ana Naqvi MD        insulin lispro (HUMALOG) injection vial 0-12 Units  0-12 Units Subcutaneous 4x Daily AC & HS Ana Naqvi MD   4 Units at 07/04/20 1221    insulin lispro (HUMALOG) injection vial 0-6 Units  0-6 Units Subcutaneous Nightly Ana Naqvi MD   5 Units at 07/02/20 2055    budesonide (PULMICORT) nebulizer suspension 250 mcg  0.25 mg Nebulization BID Ana Naqvi MD   250 mcg at 07/04/20 9188    And    Arformoterol Tartrate (BROVANA) nebulizer solution 15 mcg  15 mcg Nebulization BID Ana Naqvi MD   15 mcg at 07/04/20 0707    cetirizine (ZYRTEC) tablet 10 mg  10 mg Oral Nightly Ana Naqvi MD   10 mg at 07/1958    ipratropium (ATROVENT) 0.02 % nebulizer solution 0.5 mg  0.5 mg Nebulization 4x daily Ana Naqvi MD   0.5 mg at 07/04/20 1101    insulin glargine (LANTUS) injection vial 40 Units  40 Units Subcutaneous Nightly Sherre Nageotte, MD   40 Units at 07/03/20 2052    glucose (GLUTOSE) 40 % oral gel 15 g  15 g Oral PRN Sherre Nageotte, MD        dextrose 50 % IV solution  12.5 g Intravenous PRN Sherre Nageotte, MD        glucagon (rDNA) injection 1 mg  1 mg Intramuscular PRN Sherre Nageotte, MD        dextrose 5 % solution  100 mL/hr Intravenous PRN Sherre Nageotte, MD        [Held by provider] bumetanide (BUMEX) injection 2 mg  2 hours) at 7/4/2020 1236  Last data filed at 7/4/2020 0930  Gross per 24 hour   Intake 820 ml   Output 4000 ml   Net -3180 ml       GENERAL - well developed and well nourished, is an active participant in this examination  HEENT -  PERRLA, Hearing appears normal, conjunctiva and lids are normal, ears and nose appear normal  NECK - no thyromegaly, no JVD, trachea is in the midline  CARDIOVASCULAR - PMI is in the left mid line clavicular position, Normal S1 and S2 with a grade 1/6 systolic murmur. No S3 or S4    PULMONARY - No respiratory distress. scattered wheezes and rales. Breath sounds in both  lung fields are Decreased  ABDOMEN  - soft, non tender, no rebound, no hepatomegaly or splenomegaly  MUSCULOSKELETAL  - Prone/Supine, digitals and nails are without clubbing or cyanosis  EXTREMITIES - trace at worse edema  NEUROLOGIC - cranial nerves, II-XII, are normal  SKIN - turgor is normal, no rash  PSYCHIATRIC - normal mood and affect, alert and orientated x 3, judgement and insight appear appropriate      ASSESSMENT:    ALL THE CARDIOLOGY PROBLEMS ARE LISTED ABOVE; HOWEVER, THE FOLLOWING SPECIFIC CARDIAC PROBLEMS / CONDITIONS WERE ADDRESSED AND TREATED DURING THE HOSPITAL VISIT TODAY:                                                                                            MEDICAL DECISION MAKING                   Cardiac Specific Problem / Diagnosis   Discussion and Data Reviewed Diagnostic or Therapeutic Procedures Ordered Management Options Selected                               1. Presenting problem / symptom     Large pericardial effusion  are worsening    Has been present in the past     No clear cut evidence of tamponade and she is lying flat No Continue current medications:     Yes: continue the diuresis                        2.  Concentric LVH due to systemic arterial hypertension Initial presentation during this evaluation    Review and summation of old records:     Stents in the proximal circ and OM1     11/21/2019  Cath  LVEDP 25, PA 60/30, normal LVFX, mild CAD   1/26/2020  Echo mild to moderate pericardial eff, normal LVF  3/7/2020  Echo large pericardial effusion  5/29/2020  Echo  Severe LVH, DD, normal LVFX, small pericardial effusion    No Continue current medications:     Yes:                  3. Concern regarding systemic blood pressure Initial presentation during this evaluation The blood pressure for the lastr 36 hours has been:  Systolic (54KSL), YNP:073 , Min:101 , PEY:057    Diastolic (09FRZ), RSR:96, Min:53, Max:75             No Continue current medications:        yes                        DAILY [facility] COURSE:        Date Clinical Event Plan of Treatment           7/2/2020 Admitted for: dyspnea  Cardiology Concern:  Pericardial effusion diursising   07/03/20 Symptomatically improved; but creatinine went from 1 to 1.5 Concur with backing down on the diuresis today    07/04/20   creatinine stable at 1.5 Ideally needs some diuretics due to LVF and pericardial effusion                                    CONSIDERATIONS, THOUGHTS, AND PLANS:     4. Continue present medications except for changes as noted above  5. Continue to monitor rhythm  6.  Further orders per clinical course.        Discussed with patient and nursing.     Electronically signed by Mateo Logan MD on 07/04/20             [facility] [place]

## 2020-07-04 NOTE — PROGRESS NOTES
Nightly Rylee Jeffers MD   10 mg at 07/03/20 1959    isosorbide mononitrate (IMDUR) extended release tablet 90 mg  90 mg Oral Daily Rylee Jeffers MD   90 mg at 07/04/20 0755    gabapentin (NEURONTIN) tablet 600 mg  600 mg Oral TID Rylee Jeffers MD   600 mg at 07/04/20 0755    DULoxetine (CYMBALTA) extended release capsule 60 mg  60 mg Oral Daily Rylee Jeffers MD   60 mg at 07/04/20 0756    DULoxetine (CYMBALTA) extended release capsule 30 mg  30 mg Oral Daily Rylee Jeffers MD   30 mg at 07/04/20 0756    clopidogrel (PLAVIX) tablet 75 mg  75 mg Oral Daily Rylee Jeffers MD   75 mg at 07/04/20 0756    carvedilol (COREG) tablet 3.125 mg  3.125 mg Oral BID Rylee Jeffers MD   3.125 mg at 07/04/20 0756    amLODIPine (NORVASC) tablet 5 mg  5 mg Oral Daily Rylee Jeffers MD   5 mg at 07/04/20 0756    sodium chloride flush 0.9 % injection 10 mL  10 mL Intravenous 2 times per day Rylee Jeffers MD   10 mL at 07/04/20 0802    sodium chloride flush 0.9 % injection 10 mL  10 mL Intravenous PRN Rylee Jeffers MD        acetaminophen (TYLENOL) tablet 650 mg  650 mg Oral Q6H PRN Rylee Jeffers MD   650 mg at 07/02/20 0423    Or    acetaminophen (TYLENOL) suppository 650 mg  650 mg Rectal Q6H PRN Rylee Jeffers MD        magnesium hydroxide (MILK OF MAGNESIA) 400 MG/5ML suspension 30 mL  30 mL Oral Daily PRN Rylee Jeffers MD        promethazine (PHENERGAN) tablet 12.5 mg  12.5 mg Oral Q6H PRN Rylee Jeffers MD        Or    ondansetron (ZOFRAN) injection 4 mg  4 mg Intravenous Q6H PRN Rylee Jeffers MD        enoxaparin (LOVENOX) injection 40 mg  40 mg Subcutaneous Daily Rylee Jeffers MD   40 mg at 07/04/20 0759    potassium chloride (KLOR-CON M) extended release tablet 40 mEq  40 mEq Oral PRN Rylee Jeffers MD        Or    potassium bicarb-citric acid (EFFER-K) effervescent tablet 40 mEq  40 mEq Oral PRN Rylee Jeffers MD        Or    potassium chloride 10 mEq/100 mL IVPB (Peripheral Line)  10 mEq Intravenous PRN Daniel Carmichael MD        potassium chloride 10 mEq/100 mL IVPB (Peripheral Line)  10 mEq Intravenous PRN Daniel Carmichael MD        magnesium sulfate 2 g in 50 mL IVPB premix  2 g Intravenous PRN Daniel Carmichael MD        insulin lispro (HUMALOG) injection vial 0-12 Units  0-12 Units Subcutaneous 4x Daily AC & HS Daniel Carmichael MD   Stopped at 07/04/20 0803    insulin lispro (HUMALOG) injection vial 0-6 Units  0-6 Units Subcutaneous Nightly Daniel Carmichael MD   5 Units at 07/02/20 2055    budesonide (PULMICORT) nebulizer suspension 250 mcg  0.25 mg Nebulization BID Daniel Carmichael MD   250 mcg at 07/04/20 0004    And    Arformoterol Tartrate (BROVANA) nebulizer solution 15 mcg  15 mcg Nebulization BID Daniel Carmichael MD   15 mcg at 07/04/20 0707    cetirizine (ZYRTEC) tablet 10 mg  10 mg Oral Nightly Daniel Carmichael MD   10 mg at 07/1958    ipratropium (ATROVENT) 0.02 % nebulizer solution 0.5 mg  0.5 mg Nebulization 4x daily Daniel Carmichael MD   0.5 mg at 07/04/20 0707    insulin glargine (LANTUS) injection vial 40 Units  40 Units Subcutaneous Nightly Dinesh Ruggiero MD   40 Units at 07/03/20 2052    glucose (GLUTOSE) 40 % oral gel 15 g  15 g Oral PRN Dinesh Ruggiero MD        dextrose 50 % IV solution  12.5 g Intravenous PRN Dinesh Ruggiero MD        glucagon (rDNA) injection 1 mg  1 mg Intramuscular PRSEDRICK Ruggiero MD        dextrose 5 % solution  100 mL/hr Intravenous PRSEDRICK Ruggiero MD        [Held by provider] bumetanide [facility]) injection 2 mg  2 mg Intravenous BID Daniel Salinas MD   2 mg at 07/03/20 0948        Labs:     Recent Labs     07/02/20  0008 07/03/20  0239 07/04/20  0328   WBC 7.0 9.2 6.8   RBC 3.01* 3.17* 3.07*   HGB 9.5* 10.0* 9.5*   HCT 29.5* 30.0* 29.9*   MCV 98.0 94.6 97.4   MCH 31.6* 31.5* 30.9   MCHC 32.2* 33.3 31.8*    266 235     Recent Labs     07/02/20  0008 07/03/20  0239 07/04/20  0328    141 140   K 4.2 5.0 4.7   ANIONGAP 11 13 11    100 101 CO2 23 28 28   BUN 27* 39* 45*   CREATININE 1.0* 1.5* 1.5*   GLUCOSE 287* 251* 175*   CALCIUM 8.6* 9.3 8.8     Recent Labs     07/03/20  0239 07/04/20  0328   MG 2.2 2.4     Recent Labs     07/02/20  0008 07/03/20  0239 07/04/20  0328   AST 16 11 11   ALT 13 11 11   BILITOT 0.3 0.3 <0.2   ALKPHOS 103 88 77     ABGs:No results for input(s): PH, PO2, PCO2, HCO3, BE, O2SAT in the last 72 hours. Troponin T:   Recent Labs     07/02/20 0008   TROPONINI 0.02     INR:   Recent Labs     07/02/20 0008   INR 1.03     Lactic Acid: No results for input(s): LACTA in the last 72 hours. Objective:   Vitals: BP (!) 101/53   Pulse 76   Temp 97.4 °F (36.3 °C) (Temporal)   Resp 16   Ht 5' 1\" (1.549 m)   Wt 205 lb 12.8 oz (93.4 kg)   SpO2 97%   BMI 38.89 kg/m²   24HR INTAKE/OUTPUT:      Intake/Output Summary (Last 24 hours) at 7/4/2020 1027  Last data filed at 7/4/2020 0930  Gross per 24 hour   Intake 1060 ml   Output 4000 ml   Net -2940 ml     General appearance: alert and cooperative with exam  HEENT: AT/NC  Lungs: no increased work of breathing  Heart: RRR  Abdomen: Soft  Extremities: pulses+  Neurologic: Alert  Skin: warm    Assessment and Plan: Active Problems:    SOB (shortness of breath)    Pericardial effusion    Acute on chronic diastolic congestive heart failure (HCC)    Type 2 diabetes mellitus, with long-term current use of insulin ([facility])    COPD (chronic obstructive pulmonary disease) ([facility])    Hyperglycemia    Acute kidney injury ([facility] [address]    Morbid obesity with BMI of 40.0-44.9, adult ([facility])    Obstructive sleep apnea syndrome    Essential hypertension    Pulmonary hypertension ([facility])    CKD (chronic kidney disease) stage 3, GFR 30-59 ml/min ([facility])    Pulmonary edema with congestive heart failure ([facility] [address]    Palliative care patient  Resolved Problems:    * No resolved hospital problems. *    Acute on chronic diastolic congestive heart failure/moderate to large pericardial effusion: Cardiology on board.   Continue to hold diuresis - renal function stable today. Monitor I's and O's. ANTONIO on CKD stage III: Cr. 1.5 again today. Hold diuresis. Monitor BMP. COPD: Not in acute exacerbation. Bronchodilators. Diabetes: HbA1c 6.5. Monitor blood glucose and adjust medications as needed. Hypertension: Monitor blood pressure and adjust medications as needed. Supportive management.     Advance Directive: Full Code    DVT prophylaxis: Lovenox    Discharge planning: TBD - possibly tomorrow if renal function stable      Signed:  Rafy López MD 7/4/2020 10:27 AM  Rounding Hospitalist

## 2020-07-05 VITALS
HEART RATE: 71 BPM | DIASTOLIC BLOOD PRESSURE: 74 MMHG | SYSTOLIC BLOOD PRESSURE: 132 MMHG | TEMPERATURE: 97.3 F | WEIGHT: 226 LBS | RESPIRATION RATE: 16 BRPM | OXYGEN SATURATION: 93 % | HEIGHT: 61 IN | BODY MASS INDEX: 42.67 KG/M2

## 2020-07-05 LAB
ALBUMIN SERPL-MCNC: 3.4 G/DL (ref 3.5–5.2)
ALP BLD-CCNC: 83 U/L (ref 35–104)
ALT SERPL-CCNC: 11 U/L (ref 5–33)
ANION GAP SERPL CALCULATED.3IONS-SCNC: 11 MMOL/L (ref 7–19)
AST SERPL-CCNC: 10 U/L (ref 5–32)
BASOPHILS ABSOLUTE: 0 K/UL (ref 0–0.2)
BASOPHILS RELATIVE PERCENT: 0.5 % (ref 0–1)
BILIRUB SERPL-MCNC: <0.2 MG/DL (ref 0.2–1.2)
BUN BLDV-MCNC: 44 MG/DL (ref 8–23)
CALCIUM SERPL-MCNC: 9.2 MG/DL (ref 8.8–10.2)
CHLORIDE BLD-SCNC: 100 MMOL/L (ref 98–111)
CO2: 28 MMOL/L (ref 22–29)
CREAT SERPL-MCNC: 1.2 MG/DL (ref 0.5–0.9)
EOSINOPHILS ABSOLUTE: 0.1 K/UL (ref 0–0.6)
EOSINOPHILS RELATIVE PERCENT: 2.4 % (ref 0–5)
GFR NON-AFRICAN AMERICAN: 45
GLUCOSE BLD-MCNC: 177 MG/DL (ref 70–99)
GLUCOSE BLD-MCNC: 185 MG/DL (ref 70–99)
GLUCOSE BLD-MCNC: 206 MG/DL (ref 74–109)
HCT VFR BLD CALC: 29.5 % (ref 37–47)
HEMOGLOBIN: 9.7 G/DL (ref 12–16)
IMMATURE GRANULOCYTES #: 0 K/UL
LYMPHOCYTES ABSOLUTE: 1.8 K/UL (ref 1.1–4.5)
LYMPHOCYTES RELATIVE PERCENT: 30.7 % (ref 20–40)
MAGNESIUM: 2.4 MG/DL (ref 1.6–2.4)
MCH RBC QN AUTO: 31.4 PG (ref 27–31)
MCHC RBC AUTO-ENTMCNC: 32.9 G/DL (ref 33–37)
MCV RBC AUTO: 95.5 FL (ref 81–99)
MONOCYTES ABSOLUTE: 0.6 K/UL (ref 0–0.9)
MONOCYTES RELATIVE PERCENT: 10.3 % (ref 0–10)
NEUTROPHILS ABSOLUTE: 3.2 K/UL (ref 1.5–7.5)
NEUTROPHILS RELATIVE PERCENT: 55.6 % (ref 50–65)
PDW BLD-RTO: 13.9 % (ref 11.5–14.5)
PERFORMED ON: ABNORMAL
PERFORMED ON: ABNORMAL
PLATELET # BLD: 231 K/UL (ref 130–400)
PMV BLD AUTO: 9.1 FL (ref 9.4–12.3)
POTASSIUM SERPL-SCNC: 5 MMOL/L (ref 3.5–5)
RBC # BLD: 3.09 M/UL (ref 4.2–5.4)
SODIUM BLD-SCNC: 139 MMOL/L (ref 136–145)
TOTAL PROTEIN: 5.5 G/DL (ref 6.6–8.7)
WBC # BLD: 5.8 K/UL (ref 4.8–10.8)

## 2020-07-05 PROCEDURE — 6370000000 HC RX 637 (ALT 250 FOR IP): Performed by: HOSPITALIST

## 2020-07-05 PROCEDURE — 6360000002 HC RX W HCPCS: Performed by: HOSPITALIST

## 2020-07-05 PROCEDURE — 36415 COLL VENOUS BLD VENIPUNCTURE: CPT

## 2020-07-05 PROCEDURE — 80053 COMPREHEN METABOLIC PANEL: CPT

## 2020-07-05 PROCEDURE — 83735 ASSAY OF MAGNESIUM: CPT

## 2020-07-05 PROCEDURE — 2580000003 HC RX 258: Performed by: HOSPITALIST

## 2020-07-05 PROCEDURE — 82947 ASSAY GLUCOSE BLOOD QUANT: CPT

## 2020-07-05 PROCEDURE — 85025 COMPLETE CBC W/AUTO DIFF WBC: CPT

## 2020-07-05 PROCEDURE — 99232 SBSQ HOSP IP/OBS MODERATE 35: CPT | Performed by: INTERNAL MEDICINE

## 2020-07-05 RX ORDER — BUMETANIDE 1 MG/1
1 TABLET ORAL 2 TIMES DAILY
Qty: 60 TABLET | Refills: 0 | Status: ON HOLD | OUTPATIENT
Start: 2020-07-05 | End: 2020-09-30 | Stop reason: SDUPTHER

## 2020-07-05 RX ADMIN — CLOPIDOGREL BISULFATE 75 MG: 75 TABLET ORAL at 08:27

## 2020-07-05 RX ADMIN — INSULIN LISPRO 2 UNITS: 100 INJECTION, SOLUTION INTRAVENOUS; SUBCUTANEOUS at 12:24

## 2020-07-05 RX ADMIN — SODIUM CHLORIDE, PRESERVATIVE FREE 10 ML: 5 INJECTION INTRAVENOUS at 08:30

## 2020-07-05 RX ADMIN — PRAMIPEXOLE DIHYDROCHLORIDE 0.5 MG: 0.25 TABLET ORAL at 08:27

## 2020-07-05 RX ADMIN — VITAMIN D, TAB 1000IU (100/BT) 1000 UNITS: 25 TAB at 08:27

## 2020-07-05 RX ADMIN — GABAPENTIN 600 MG: 600 TABLET, FILM COATED ORAL at 08:27

## 2020-07-05 RX ADMIN — AMLODIPINE BESYLATE 5 MG: 5 TABLET ORAL at 08:27

## 2020-07-05 RX ADMIN — CARVEDILOL 3.12 MG: 3.12 TABLET, FILM COATED ORAL at 08:27

## 2020-07-05 RX ADMIN — ENOXAPARIN SODIUM 40 MG: 40 INJECTION SUBCUTANEOUS at 08:29

## 2020-07-05 RX ADMIN — ISOSORBIDE MONONITRATE 90 MG: 30 TABLET ORAL at 08:27

## 2020-07-05 RX ADMIN — DULOXETINE HYDROCHLORIDE 30 MG: 30 CAPSULE, DELAYED RELEASE ORAL at 08:27

## 2020-07-05 RX ADMIN — DULOXETINE HYDROCHLORIDE 60 MG: 30 CAPSULE, DELAYED RELEASE ORAL at 08:28

## 2020-07-05 RX ADMIN — INSULIN LISPRO 2 UNITS: 100 INJECTION, SOLUTION INTRAVENOUS; SUBCUTANEOUS at 09:26

## 2020-07-05 RX ADMIN — VALSARTAN 160 MG: 80 TABLET, FILM COATED ORAL at 08:27

## 2020-07-05 ASSESSMENT — PAIN SCALES - GENERAL: PAINLEVEL_OUTOF10: 0

## 2020-07-05 NOTE — PLAN OF CARE
Problem: Falls - Risk of:  Goal: Will remain free from falls  Description: Will remain free from falls  Outcome: Ongoing  Goal: Absence of physical injury  Description: Absence of physical injury  Outcome: Ongoing     Problem: Fluid Volume:  Goal: Ability to achieve and maintain adequate urine output will improve  Description: Ability to achieve and maintain adequate urine output will improve  Outcome: Ongoing     Problem: Respiratory:  Goal: Respiratory status will improve  Description: Respiratory status will improve  Outcome: Ongoing   Electronically signed by Arsen Magana RN on 7/5/2020 at 1:22 AM

## 2020-07-05 NOTE — DISCHARGE SUMMARY
Discharge Summary      Sheela Morrell  :  1960  MRN:  [id number]    Admit date:  2020  Discharge date:      Admitting Physician:  Danette North MD    Advance Directive: Full Code    Consults: cardiology    Primary Care Physician:  Jania Daugherty, APRN - NP    Discharge Diagnoses:  Principal Problem:    SOB (shortness of breath)  Active Problems:    Pericardial effusion    Acute on chronic diastolic congestive heart failure ([facility] [address]    Type 2 diabetes mellitus, with long-term current use of insulin ([facility])    COPD (chronic obstructive pulmonary disease) ([facility] [address]    Hyperglycemia    Acute kidney injury ([facility] [address]    Morbid obesity with BMI of 40.0-44.9, adult ([facility] [address]    Obstructive sleep apnea syndrome    Essential hypertension    Pulmonary hypertension ([facility])    CKD (chronic kidney disease) stage 3, GFR 30-59 ml/min ([facility])    Pulmonary edema with congestive heart failure ([facility] [address]    Palliative care patient  Resolved Problems:    * No resolved hospital problems. *      Significant Diagnostic Studies:   Xr Chest Portable    Result Date: 2020  EXAMINATION: XR CHEST PORTABLE 2020 6:56 AM HISTORY: XR CHEST PORTABLE 2020 11:15 PM HISTORY: Difficulty breathing COMPARISON: 2020. FINDINGS: The lungs are clear. Cardiac silhouettes upper limits of normal. Cardiac monitoring leads are present. . The osseous structures and surrounding soft tissues demonstrate no acute abnormality. 1. No radiographic evidence of acute cardiopulmonary process. Signed by Dr Gerry Bhakta on 2020 6:57 AM    Cta Pulmonary W Contrast    Result Date: 2020  CTA PULMONARY W CONTRAST 2020 7:21 AM History: Chest pain and short of breath. In order to have a CT radiation dose as low as reasonably achievable Automated Exposure Control was utilized for adjustment of the mA and/or KV according to patient size. DLP in mGycm= 760. CT angiography protocol. CT imaging with bolus IV contrast injection.  Under concurrent supervision axial, sagittal, coronal, and three-dimensional data sets were constructed. Comparison is made with 5/25/2020. A preliminary [employer] report was transmitted to the appropriate department immediately after this study was interpreted. Moderate to large pericardial effusion is the same or slightly increased. Cardiomegaly. No thoracic aortic aneurysm or dissection. Coronary artery calcification is present. Symmetric well opacified pulmonary arteries. No pulmonary embolism. Adequately expanded lungs. No more than minimal scarring or atelectasis within the lingula. A 4-5 mm nodule within the base of the right upper lobe on axial image 21/68 was not seen 5 weeks ago and probably is inflammatory. No other significant parenchymal disease. The lungs are otherwise clear. No pleural effusion or pneumothorax. Moderate thoracic spurring noted. 1. No pulmonary embolism. 2. Moderate to large pericardial effusion. 3. No acute lung disease. Signed by Dr Pham Camarena on 7/2/2020 7:26 AM      Pertinent Labs:   CBC:   Recent Labs     07/03/20 0239 07/04/20  0328 07/05/20  0319   WBC 9.2 6.8 5.8   HGB 10.0* 9.5* 9.7*    235 231     BMP:    Recent Labs     07/03/20 0239 07/04/20  0328 07/05/20  0319    140 139   K 5.0 4.7 5.0    101 100   CO2 28 28 28   BUN 39* 45* 44*   CREATININE 1.5* 1.5* 1.2*   GLUCOSE 251* 175* 206*     INR: No results for input(s): INR in the last 72 hours. ABGs:No results for input(s): PH, PO2, PCO2, HCO3, BE, O2SAT in the last 72 hours. Lactic Acid:No results for input(s): LACTA in the last 72 hours.     Procedures: None  Hospital Course: 67-year-old female presenting hospital for orthopnea found to be acutely fluid overloaded.  Patient also found to have an elevated d-dimer in the emergency department with CT angios pulmonary embolism study showing no evidence of pulmonary embolism but the patient does have a moderate to large pericardial effusion.  Admitted for acute on chronic diastolic CHF exacerbation/pericardial effusion.  Cardiology consulted for pericardial effusion. Patient with ANTONIO secondary to IV diuretics. ANTONIO has improved and patient is back to her baseline. Patient will require outpatient p.o. diuretics and cardiology follow-up with repeat TTE to evaluate for pericardial effusion. This is been explained to the patient and she expresses understanding. Patient is currently hemodynamically stable for discharge home to follow-up with PCP and cardiology as an outpatient. Physical Exam:  Vitals: /74   Pulse 71   Temp 97.3 °F (36.3 °C) (Temporal)   Resp 16   Ht 5' 1\" (1.549 m)   Wt 226 lb (102.5 kg)   SpO2 93%   BMI 42.70 kg/m²   24HR INTAKE/OUTPUT:      Intake/Output Summary (Last 24 hours) at 7/5/2020 1145  Last data filed at 7/5/2020 0913  Gross per 24 hour   Intake 1040 ml   Output 1500 ml   Net -460 ml     General appearance: alert and cooperative with exam  HEENT: AT/NC  Lungs: no increased work of breathing  Heart: RRR  Abdomen: Soft  Extremities: pulses+  Neurologic: Alert  Skin: warm    Discharge Medications:       David Black \"Marcelina\"   Home Medication Instructions [facility]:[id number]    Printed on:07/05/20 1145   Medication Information                      amLODIPine (NORVASC) 5 MG tablet  Take 1 tablet by mouth daily             atorvastatin (LIPITOR) 20 MG tablet  Take 1/2 tablet for 1 week, then increase to whole tablet             bumetanide (BUMEX) 1 MG tablet  Take 1 tablet by mouth 2 times daily             carvedilol (COREG) 3.125 MG tablet  Take 1 tablet by mouth 2 times daily             clopidogrel (PLAVIX) 75 MG tablet  Take 75 mg by mouth daily             DULoxetine (CYMBALTA) 30 MG extended release capsule  Take 1 capsule by mouth daily Take with 60 mg capsule for total of 90 mg.              DULoxetine (CYMBALTA) 60 MG extended release capsule  Take 1 capsule by mouth daily             fluticasone-vilanterol (BREO ELLIPTA) 100-25 MCG/INH AEPB inhaler  Inhale 1 puff into the lungs daily             gabapentin (NEURONTIN) 600 MG tablet  Take 1 tablet by mouth 3 times daily for 30 days. Insulin Degludec (TRESIBA FLEXTOUCH) 100 UNIT/ML SOPN  Inject 40 Units into the skin nightly             ISOSORBIDE MONONITRATE ER PO  Take 90 mg by mouth daily              levalbuterol (XOPENEX HFA) 45 MCG/ACT inhaler  Inhale 2 puffs into the lungs every 4 hours as needed for Wheezing or Shortness of Breath             levalbuterol (XOPENEX) 0.63 MG/3ML nebulization  Take 3 mLs by nebulization every 6 hours as needed for Wheezing             levocetirizine (XYZAL) 5 MG tablet  Take 1 tablet by mouth nightly             melatonin 3 MG TABS tablet  Take 10 mg by mouth nightly             montelukast (SINGULAIR) 10 MG tablet  Take 10 mg by mouth nightly             nitroGLYCERIN (NITROSTAT) 0.4 MG SL tablet  Place 0.4 mg under the tongue every 5 minutes as needed for Chest pain up to max of 3 total doses. If no relief after 1 dose, call 911. pramipexole (MIRAPEX) 0.5 MG tablet  Take 1 tablet by mouth 2 times daily             Semaglutide, 1 MG/DOSE, 2 MG/1.5ML SOPN  Inject 1 mg into the skin every 7 days             tiotropium (SPIRIVA RESPIMAT) 1.25 MCG/ACT AERS inhaler  Inhale 2 puffs into the lungs daily             valsartan (DIOVAN) 160 MG tablet  Take 160 mg by mouth daily             vitamin D (CHOLECALCIFEROL) 25 MCG (1000 UT) TABS tablet  Take 1,000 Units by mouth daily                 Discharge Instructions: Follow up with YAMIL Coffey NP in 7 days. Take medications as directed. Resume activity as tolerated. Diet: DIET CARB CONTROL; Low Sodium (2 GM)     Disposition: Patient is medically stable and will be discharged Home. Time spent on discharge 35 minutes.     Signed:  Calixto Hernandez MD 7/5/2020 11:45 AM

## 2020-07-05 NOTE — PROGRESS NOTES
[address]    Daily HOSPITAL Progress Note                            Date:  7/5/20  Patient: Bhavana Salmon  Admission:  7/1/2020 11:45 PM  Admit DX: Pulmonary edema with congestive heart failure ([address] [I50.1]  Pulmonary edema with congestive heart failure ([address] [I50.1]  Age:  56 y.o., 1960        Date of Admission 7/1/2020 [address] [facility] Length of Stay  LOS: 2 days        Date / Time Of Initially Being Seen For This Daily Visit:  7/5/20, at approximately 1130. At that time, I personally saw the patient and rounded with:  Natalia Park RN    Date / Time That This Note Is Written:  7/5/2020  at  12:26 PM        The observations documented in this note, including the assessment   and plan are mine    Portions of this note have been copied forward, from prior notes, yet modified, to reflect today's clinical status of this patient. Reason for initial evaluation or the patient's initial complaint    1. Reason for Hospital Admission: dyspnea        2. Reason for Cardiology Consultation: Pericardial effusion         SUBJECTIVE:      Chief Complaint / Reason for the Visit   Follow up of:  dyspnea and pericardial effusion and systemic arterial hypertension    Family present and in room during examination:  yes -     At the time of the examination, the patient was:  Lying flat in bed      Specialty Problems        Cardiology Problems    Acute on chronic diastolic congestive heart failure (HCC)        Pericardial effusion        Nonobstructive atherosclerosis of coronary artery        Pulmonary hypertension ([address]        Essential hypertension        Pulmonary edema with congestive heart failure (HCC)              Current Status Today According to the patient:  Today she is feeling \"ok\". Subjective:  Ms. Bhavana Salmon is generally feeling are improving:  Ready to go home    Ms. Bhavana Salmon has the following cardiac complaints / symptoms today:    1.  Dyspnea, lying flat 160 mg Oral Daily Monique Garcia MD   160 mg at 07/05/20 0827    [START ON 7/7/2020] Semaglutide (1 MG/DOSE) SOPN 1 mg  1 mg Subcutaneous Q7 Days Monique Garcia MD        pramipexole (MIRAPEX) tablet 0.5 mg  0.5 mg Oral BID Monique Garcia MD   0.5 mg at 07/05/20 0827    nitroGLYCERIN (NITROSTAT) SL tablet 0.4 mg  0.4 mg Sublingual Q5 Min PRN Monique Garcia MD        montelukast (SINGULAIR) tablet 10 mg  10 mg Oral Nightly Monique Garcia MD   10 mg at 07/04/20 2021    melatonin tablet 10 mg  10 mg Oral Nightly Monique Garcia MD   10 mg at 07/04/20 2021    isosorbide mononitrate (IMDUR) extended release tablet 90 mg  90 mg Oral Daily Monique Garcia MD   90 mg at 07/05/20 0827    gabapentin (NEURONTIN) tablet 600 mg  600 mg Oral TID Monique Garcia MD   600 mg at 07/05/20 0827    DULoxetine (CYMBALTA) extended release capsule 60 mg  60 mg Oral Daily Monique Garcia MD   60 mg at 07/05/20 0828    DULoxetine (CYMBALTA) extended release capsule 30 mg  30 mg Oral Daily Monique Garcia MD   30 mg at 07/05/20 0827    clopidogrel (PLAVIX) tablet 75 mg  75 mg Oral Daily Monique Garcia MD   75 mg at 07/05/20 0827    carvedilol (COREG) tablet 3.125 mg  3.125 mg Oral BID Monique Garcia MD   3.125 mg at 07/05/20 0827    amLODIPine (NORVASC) tablet 5 mg  5 mg Oral Daily Monique Garcia MD   5 mg at 07/05/20 0827    sodium chloride flush 0.9 % injection 10 mL  10 mL Intravenous 2 times per day Monique Garcia MD   10 mL at 07/05/20 0830    sodium chloride flush 0.9 % injection 10 mL  10 mL Intravenous PRN Monique Garcia MD        acetaminophen (TYLENOL) tablet 650 mg  650 mg Oral Q6H PRN Monique Garcia MD   650 mg at 07/02/20 0423    Or    acetaminophen (TYLENOL) suppository 650 mg  650 mg Rectal Q6H PRN Monique Garcia MD        magnesium hydroxide (MILK OF MAGNESIA) 400 MG/5ML suspension 30 mL  30 mL Oral Daily PRN Monique Garcia MD        promethazine (PHENERGAN) tablet 12.5 mg  12.5 mg Oral Q6H PRN Monique Garcia MD Or    ondansetron (ZOFRAN) injection 4 mg  4 mg Intravenous Q6H PRN Niru Fisher MD        enoxaparin (LOVENOX) injection 40 mg  40 mg Subcutaneous Daily Niru Fisher MD   40 mg at 07/05/20 0829    potassium chloride (KLOR-CON M) extended release tablet 40 mEq  40 mEq Oral PRN Niru Fisher MD        Or   [facility] potassium bicarb-citric acid (EFFER-K) effervescent tablet 40 mEq  40 mEq Oral PRN Niru Fisher MD        Or   [facility] potassium chloride 10 mEq/100 mL IVPB (Peripheral Line)  10 mEq Intravenous PRN Niru Fisher MD        potassium chloride 10 mEq/100 mL IVPB (Peripheral Line)  10 mEq Intravenous PRN Niru Fisher MD        magnesium sulfate 2 g in 50 mL IVPB premix  2 g Intravenous PRN Niru Fisher MD        insulin lispro (HUMALOG) injection vial 0-12 Units  0-12 Units Subcutaneous 4x Daily AC & HS Niru Fisher MD   2 Units at 07/05/20 0926    insulin lispro (HUMALOG) injection vial 0-6 Units  0-6 Units Subcutaneous Nightly Niru Fisher MD   1 Units at 07/04/20 2022    budesonide (PULMICORT) nebulizer suspension 250 mcg  0.25 mg Nebulization BID Niru Fisher MD   250 mcg at 07/04/20 1857    And    Arformoterol Tartrate (BROVANA) nebulizer solution 15 mcg  15 mcg Nebulization BID Niru Fisher MD   15 mcg at 07/04/20 1857    cetirizine (ZYRTEC) tablet 10 mg  10 mg Oral Nightly Niru Fisher MD   10 mg at 07/04/20 2021    ipratropium (ATROVENT) 0.02 % nebulizer solution 0.5 mg  0.5 mg Nebulization 4x daily Niru Fisher MD   0.5 mg at 07/04/20 1857    insulin glargine (LANTUS) injection vial 40 Units  40 Units Subcutaneous Nightly Rahul Pappas MD   40 Units at 07/04/20 2022    glucose (GLUTOSE) 40 % oral gel 15 g  15 g Oral PRN Rahul Pappas MD        dextrose 50 % IV solution  12.5 g Intravenous PRN Rahul Pappas MD        glucagon (rDNA) injection 1 mg  1 mg Intramuscular PRN Rahul Stake, MD        dextrose 5 % solution  100 mL/hr Intravenous PRN Rahul Pappas MD       [facility] [Held by provider] bumetanide (BUMEX) injection 2 mg  2 mg Intravenous BID Mateo Logan MD   2 mg at 07/03/20 1067     Allergies: Albuterol; Levaquin [levofloxacin];  Metformin and related; and Aspirin  Past Medical History:   Diagnosis Date    AMI (acute myocardial infarction) ([facility] 09/2018    Asthma     CAD (coronary artery disease)     hx of stents    Cerebral artery occlusion with cerebral infarction ([facility] 2012    R sided numbness/weakness    [facility]     COPD (chronic obstructive pulmonary disease) ([facility]     Diabetes mellitus ([facility]     DVT (deep vein thrombosis) in pregnancy     Hyperlipidemia     Hypertension     Palliative care patient 07/02/2020    Pneumonia      Past Surgical History:   Procedure Laterality Date    CATARACT REMOVAL      COLONOSCOPY  approx 2013    CORONARY ANGIOPLASTY WITH STENT PLACEMENT  2018    in [address]- [place], [facility] [address] [facility]    TONSILLECTOMY       Family History   Problem Relation Age of Onset    Colon Cancer Neg Hx     Colon Polyps Neg Hx      Social History     Tobacco Use    Smoking status: Former Smoker     Packs/day: 1.00     Years: 30.00     Pack years: 30.00     Last attempt to quit: 2005     Years since quitting: 15.5    Smokeless tobacco: Never Used   Substance Use Topics    Alcohol use: Never     Frequency: Never          Review of Systems:    General:      Complaint / Symptom Yes / No / Description if Yes       Fatigue Yes:  chronic   Weight gain NA   Insomnia NA       Respiratory:        Complaint / Symptom Yes / No / Description if Yes       Cough No   Horseness NA       Cardiovascular:    Complaint / Symptom Yes / No / Description if Yes       Chest Pain No   Shortness of Air / Orthopnea Yes: chronic and show no change:   Presyncope / Syncope No   Palpitations No         Objective:    /74   Pulse 71   Temp 97.3 °F (36.3 °C) (Temporal)   Resp 16   Ht 5' 1\" (1.549 m)   Wt 226 lb (102.5 kg)   SpO2 93%   BMI 42.70 kg/m² ,     Intake/Output Summary (Last 24 hours) at 7/5/2020 1226  Last data filed at 7/5/2020 0913  Gross per 24 hour   Intake 1040 ml   Output 1500 ml   Net -460 ml       GENERAL - well developed and well nourished, is an active participant in this examination  HEENT -  PERRLA, Hearing appears normal, conjunctiva and lids are normal, ears and nose appear normal  NECK - no thyromegaly, no JVD, trachea is in the midline  CARDIOVASCULAR - PMI is in the left mid line clavicular position, Normal S1 and S2 with a grade 1/6 systolic murmur. No S3 or S4    PULMONARY - No respiratory distress. scattered wheezes and rales. Breath sounds in both  lung fields are Decreased  ABDOMEN  - soft, non tender, no rebound, no hepatomegaly or splenomegaly  MUSCULOSKELETAL  - Prone/Supine, digitals and nails are without clubbing or cyanosis  EXTREMITIES - trace edema  NEUROLOGIC - cranial nerves, II-XII, are normal  SKIN - turgor is normal, no rash  PSYCHIATRIC - normal mood and affect, alert and orientated x 3, judgement and insight appear appropriate      ASSESSMENT:    ALL THE CARDIOLOGY PROBLEMS ARE LISTED ABOVE; HOWEVER, THE FOLLOWING SPECIFIC CARDIAC PROBLEMS / CONDITIONS WERE ADDRESSED AND TREATED DURING THE HOSPITAL VISIT TODAY:                                                                                            MEDICAL DECISION MAKING                   Cardiac Specific Problem / Diagnosis   Discussion and Data Reviewed Diagnostic or Therapeutic Procedures Ordered Management Options Selected                               1. Presenting problem / symptom     Large pericardial effusion  are worsening    Has been present in the past     No clear cut evidence of tamponade and she is lying flat No Continue current medications:     Yes: continue the diuresis                        2.  Concentric LVH due to systemic arterial hypertension Initial presentation during this evaluation    Review and summation of old records:     Stents in the proximal circ and OM1     11/21/2019  Cath  LVEDP 25, PA 60/30, normal LVFX, mild CAD   1/26/2020  Echo mild to moderate pericardial eff, normal LVF  3/7/2020  Echo large pericardial effusion  5/29/2020  Echo  Severe LVH, DD, normal LVFX, small pericardial effusion    No Continue current medications:     Yes:                  3. Concern regarding systemic blood pressure Initial presentation during this evaluation The blood pressure for the lastr 36 hours has been:  Systolic (92LIN), EIV:253 , Min:101 , LOO:507    Diastolic (77YMK), TCE:30, Min:53, Max:76     No Continue current medications:        yes                        DAILY [facility] COURSE:        Date Clinical Event Plan of Treatment           7/2/2020 Admitted for: dyspnea  Cardiology Concern:  Pericardial effusion diursising   07/03/20 Symptomatically improved; but creatinine went from 1 to 1.5 Concur with backing down on the diuresis today    07/04/20   creatinine stable at 1.5 Ideally needs some diuretics due to LVF and pericardial effusion    07/05/20   creatinine down to 1.2 Ok to dc, will need to go home on a diuretic                            CONSIDERATIONS, THOUGHTS, AND PLANS:     4. Continue present medications except for changes as noted above  5. Continue to monitor rhythm  6. Further orders per clinical course.   7. Will see in the office        Discussed with patient and nursing.     Electronically signed by Augutsina Galaviz MD on 07/05/20          Aidee Ponce [facility]

## 2020-07-06 ENCOUNTER — CARE COORDINATION (OUTPATIENT)
Dept: CASE MANAGEMENT | Age: 60
End: 2020-07-06

## 2020-07-06 NOTE — CARE COORDINATION
[address] Transitions Initial Follow Up Call    Call within 2 business days of discharge: Yes    Patient: Wei Tao Patient : 1960   MRN: [id number]  Reason for Admission: Pericardial Effusion  Discharge Date: 20 RARS: Readmission Risk Score: 28      Last Discharge [facility]       Complaint Diagnosis Description Type Department Provider    20 Shortness of Breath Acute on chronic congestive heart failure, unspecified heart failure type ([address] . .. ED to Hosp-Admission (Discharged) (ADMITTED) AMILCAR Park MD; Yaya Sawyer. .. Spoke with: N/A    Facility: [address] [place] [address]     Non-face-to-face services provided:  Reviewed encounter information for continuity of care prior to follow up phone call - chart notes, consults, progress notes, test results, med list, appointments, AVS, other information. Care Transitions 24 Hour Call    Care Transitions Interventions         Follow Up: Attempted to make contact with patient for an initial follow up call post discharge without success. Unable to leave a message regarding intent of call and call back information as the call went directly to an unidentifiable voice mail. Will try again at a later time.      Future Appointments   Date Time Provider Agnes Card   7/10/2020  1:10 PM [facility]anes - [facility]   2020 10:30 AM YAMIL Arellano [facility]       Colt Skelton RN

## 2020-07-07 ENCOUNTER — CARE COORDINATION (OUTPATIENT)
Dept: CASE MANAGEMENT | Age: 60
End: 2020-07-07

## 2020-07-07 ENCOUNTER — TELEPHONE (OUTPATIENT)
Dept: INTERNAL MEDICINE | Age: 60
End: 2020-07-07

## 2020-07-07 NOTE — ADT AUTH CERT
Utilization Reviews         Chronic Obstructive Pulmonary Disease - Care Day 3 (7/4/2020) by Nivia Marrero RN         Review Status Review Entered   Completed 7/6/2020 14:08       Criteria Review      Care Day: 3 Care Date: 7/4/2020 Level of Care:    Guideline Day 3    Clinical Status    ( ) * Hemodynamic stability    ( ) * Mental status at baseline    ( ) * No evidence of infection, or outpatient treatment planned    ( ) * Uncompensated acidosis absent    ( ) * Oxygenation at baseline or acceptable for next level of care [G]    ( ) * Airflow rates at baseline or acceptable for next level of care    ( ) * Eating and sleeping without frequent dyspnea    ( ) * Breathing comfortably at rest    ( ) * Discharge plans and education understood    Activity    ( ) * Ambulatory    Routes    ( ) * Oral hydration, medications, and diet    Medications    ( ) * Inhaled bronchodilator regimen established and feasible at next level of care    * Milestone   Additional Notes   CARE DAY 3     7/4/2020      Hospitalist Progress Note   7/4/2020 10:27 AM   Subjective:   Admit Date: 7/1/2020              PCP: YAMIL Kam NP       Chief Complaint: Dyspnea       Subjective: Patient seen and examined at bedside.  No acute distress.  Denies chest pain or SOB.  Has been up around her room walking without difficulty.       Cumulative Hospital History: 58-year-old female presenting hospital for orthopnea found to be acutely fluid overloaded.  Patient also found to have an elevated d-dimer in the emergency department with CT angios pulmonary embolism study showing no evidence of pulmonary embolism but the patient does have a moderate to large pericardial effusion.  Admitted for acute on chronic diastolic CHF exacerbation/pericardial effusion.  Cardiology consulted for pericardial effusion.  Patient with ANTONIO secondary to diuretics.       ROS: 14 point review of systems is negative except as specifically addressed above.       DIET CARB CONTROL; Low Sodium (2 GM)      BUN 45   CREAT 1.5      Objective:   Vitals: BP (!) 101/53   Pulse 76   Temp 97.4 °F (36.3 °C) (Temporal)   Resp 16   Ht 5' 1\" (1.549 m)   Wt 205 lb 12.8 oz (93.4 kg)   SpO2 97%   BMI 38.89 kg/m²       General appearance: alert and cooperative with exam   HEENT: AT/NC   Lungs: no increased work of breathing   Heart: RRR   Abdomen: Soft   Extremities: pulses+   Neurologic: Alert   Skin: warm       Assessment and Plan: Active Problems:     SOB (shortness of breath)     Pericardial effusion     Acute on chronic diastolic congestive heart failure ([facility])     Type 2 diabetes mellitus, with long-term current use of insulin ([facility])     COPD (chronic obstructive pulmonary disease) ([facility])     Hyperglycemia     Acute kidney injury ([facility]     Morbid obesity with BMI of 40.0-44.9, adult ([facility])     Obstructive sleep apnea syndrome     Essential hypertension     Pulmonary hypertension ([facility])     CKD (chronic kidney disease) stage 3, GFR 30-59 ml/min ([facility])     Pulmonary edema with congestive heart failure ([facility]     Palliative care patient   Resolved Problems:     * No resolved hospital problems. *       Acute on chronic diastolic congestive heart failure/moderate to large pericardial effusion: Cardiology on board.  Continue to hold diuresis - renal function stable today.  Monitor I's and O's.       ANTONIO on CKD stage III: Cr. 1.5 again today.  Hold diuresis.  Monitor BMP.       COPD: Not in acute exacerbation.  Bronchodilators.       Diabetes: HbA1c 6.5.  Monitor blood glucose and adjust medications as needed.       Hypertension: Monitor blood pressure and adjust medications as needed.       Supportive management.       Advance Directive: Full Code       DVT prophylaxis: Lovenox       Discharge planning: TBD - possibly tomorrow if renal function stable           Signed:   Calixto Hernandez MD 7/4/2020 10:27 AM   [address] [facility]   Current Status Today According to the patient:  Today she is feeling \"ok\".       Subjective:  Ms. Sheela Morrell is generally feeling are improving:  Breathing improved       Ms. Sheela Morrell has the following cardiac complaints / symptoms today:       1. dyspnea, improved       2. Pericardial effusion, breathing about the same       3.  Hypertension    Date Clinical Event Plan of Treatment           7/2/2020 Admitted for: dyspnea   Cardiology Concern:  Pericardial effusion diursising   07/03/20 Symptomatically improved; but creatinine went from 1 to 1.5 Concur with backing down on the diuresis today    07/04/20  creatinine stable at 1.5 Ideally needs some diuretics due to LVF and pericardial effusion             Chronic Obstructive Pulmonary Disease - Care Day 2 (7/3/2020) by Yessica Medeiros RN         Review Status Review Entered   Completed 7/6/2020 14:03       Criteria Review      Care Day: 2 Care Date: 7/3/2020 Level of Care:    Guideline Day 2    Level Of Care    (X) Floor or intermediate care    7/6/2020 3:03 PM EDT by Geri Sandoval      INTERMEDIATE CARE WITH TELEMETRY    Clinical Status    (X) * Hemodynamic stability    7/6/2020 3:03 PM EDT by Geri Sandoval      Vitals: BP (!) 149/67   Pulse 78   Temp 97.4  °F (36.3  °C) (Temporal)   Resp 20   Ht 5' 1\" (1.549 m)   Wt 228 lb 3.2 oz (103.5 kg)   SpO2 96%    (X) * Mechanical and noninvasive ventilation absent    (X) * Uncompensated acidosis absent    * Milestone   Additional Notes   CARE DAY 2       7/3/2020      Hospitalist Progress Note   7/3/2020 12:35 PM   Subjective:   Admit Date: 7/1/2020              PCP: YAMIL Sinclair NP       Chief Complaint: Dyspnea       Subjective: Patient seen and examined at bedside with family present.  No acute distress.  Breathing is greatly improved.  Denies chest pain.       Cumulative Hospital History: 24-year-old female presenting hospital for orthopnea found to be acutely fluid overloaded.  Patient also found to have an elevated d-dimer in the emergency department with CT angios pulmonary embolism study showing no evidence of pulmonary embolism but the patient does have a moderate to large pericardial effusion.  Admitted for acute on chronic diastolic CHF exacerbation/pericardial effusion.  Cardiology consulted for pericardial effusion.       ROS: 14 point review of systems is negative except as specifically addressed above.       DIET CARB CONTROL; Low Sodium (2 GM)       WBC 9.2   HGB 10.0      BUN 39   CREAT 1.5      General appearance: alert and cooperative with exam   HEENT: AT/NC   Lungs: no increased work of breathing   Heart: RRR   Abdomen: Soft   Extremities: pulses+   Neurologic: Alert   Skin: warm       Assessment and Plan: Active Problems:     SOB (shortness of breath)     Pericardial effusion     Acute on chronic diastolic congestive heart failure ([facility])     Type 2 diabetes mellitus, with long-term current use of insulin ([facility])     COPD (chronic obstructive pulmonary disease) ([facility])     Hyperglycemia     Acute kidney injury ([facility]     Morbid obesity with BMI of 40.0-44.9, adult ([facility])     Obstructive sleep apnea syndrome     Essential hypertension     Pulmonary hypertension ([facility])     CKD (chronic kidney disease) stage 3, GFR 30-59 ml/min ([facility])     Pulmonary edema with congestive heart failure ([facility]     Palliative care patient   Resolved Problems:     * No resolved hospital problems.  *       Acute on chronic diastolic congestive heart failure/moderate to large pericardial effusion: Cardiology on board. Karen sexton.  Will hold diuresis for today given acute on chronic kidney injury.  Monitor I's and O's.       ANTONIO on CKD stage III: Hold diuresis for today.  Monitor BMP.       COPD: Not in acute exacerbation.  Bronchodilators.       Diabetes: HbA1c 6.5.  Monitor blood glucose and adjust medications as needed.       Hypertension: Monitor blood pressure and adjust medications as needed.       Supportive management.       Advance Directive: Full Code       DVT prophylaxis: Lovenox       Discharge planning: TBD           Signed:   Shonna Chanel MD 7/3/2020 12:35 PM   [address] [facility] Progress Note    Current Status Today According to the patient:  Today she is feeling \"a lot better\".       Subjective:  Ms. Tj Gould is generally feeling are improving:  diuresis ing but creatinine is going up       Ms. Tj Gould has the following cardiac complaints / symptoms today:       1. dyspnea, improving       2.  Systemic edema, can now \"see my ankles\"       3. Hypertension      7/2/2020 Admitted for: dyspnea   Cardiology Concern:  Pericardial effusion diursising   07/03/20 Symptomatically improved; but creatinine went from 1 to 1.5 Concur with backing down on the diuresis today

## 2020-07-07 NOTE — TELEPHONE ENCOUNTER
[address] Transitions Initial Follow Up Call    Outreach made within 2 business days of discharge: Yes    Patient: Heidy Oconnell Patient : 1960   MRN: [id number]  Reason for Admission: Admitted 20 for shortness of breath    Discharge Date: 20    Discharge Diagnoses:  Principal Problem:    SOB (shortness of breath)  Active Problems:    Pericardial effusion    Acute on chronic diastolic congestive heart failure (HCC)    Type 2 diabetes mellitus, with long-term current use of insulin (HCC)    COPD (chronic obstructive pulmonary disease) ([facility]    Hyperglycemia    Acute kidney injury ([facility]    Morbid obesity with BMI of 40.0-44.9, adult ([facility])    Obstructive sleep apnea syndrome    Essential hypertension    Pulmonary hypertension ([facility])    CKD (chronic kidney disease) stage 3, GFR 30-59 ml/min ([facility])    Pulmonary edema with congestive heart failure ([facility]    Palliative care patient     Spoke with: attempted to reach patient, no answer. Voicemail box was full, I was unable to leave a message. I will reach out again later today.      Discharge department/facility: [facility]     Scheduled appointment with PCP within 7-14 days    Follow Up  Future Appointments   Date Time Provider Agnes Card   7/10/2020  1:10 PM YAMIL Arizmendi - NP [facility]   2020 10:30 AM YAMIL Figueroa [facility] Cardio [address] [place] [address]

## 2020-07-07 NOTE — CARE COORDINATION
Eddie 45 Transitions Initial Follow Up Call    Call within 2 business days of discharge: Yes    Patient: Pérez Rosairo Patient : 1960   MRN: [id number]  Reason for Admission:   Discharge Date: 20 RARS: Readmission Risk Score: 28      Last Discharge [facility]       Complaint Diagnosis Description Type Department Provider    20 Shortness of Breath Acute on chronic congestive heart failure, unspecified heart failure type ([address] . .. ED to Hosp-Admission (Discharged) (ADMITTED) AMILCAR Mendez MD; Alicia Lim. .. Spoke with: [id number]: [place] [phone]    Non-face-to-face services provided:  Chart review for COVID-19 calls    Care Transitions 24 Hour Call    Do you have any ongoing symptoms?:  No  Do you have a copy of your discharge instructions?:  Yes  Do you have all of your prescriptions and are they filled?:  Yes  Have you been contacted by a [facility] Pharmacist?:  No  Have you scheduled your follow up appointment?:  Yes  How are you going to get to your appointment?:  Car - family or friend to transport  Were you discharged with any Home Care or Post Acute Services:  No  Do you feel like you have everything you need to keep you well at home?:  Yes  Care Transitions Interventions         Follow Up : Spoke with patient today for COVID-19 initial follow up call after discharge from [facility]. She says she is doing well. No new medications she says. She has her HFU on Friday with PCP. She says she is watching her weight and diet. No signs or symptoms of COVID-19, discussed CDC guidelines regarding that, good handwashing, social distancing, wearing a mask while in public, keeping high traffic areas cleaned and disinfected. Will follow up at a later time.    Future Appointments   Date Time Provider Agnes Card   7/10/2020  1:10 PM Y[facility]IL Washington - NP [facility]   2020 10:30 AM YAMIL Bautista Cardio [facility]     Patient contacted regarding Cindi Suresh. Discussed COVID-19 related testing which was not done at this time. Test results were not done. Patient informed of results, if available? No    Care Transition Nurse/ Ambulatory Care Manager contacted the patient by telephone to perform post discharge assessment. Verified name and  with patient as identifiers. Provided introduction to self, and explanation of the CTN/ACM role, and reason for call due to risk factors for infection and/or exposure to COVID-19. Symptoms reviewed with patient who verbalized the following symptoms: no new symptoms and no worsening symptoms. Due to no new or worsening symptoms encounter was not routed to provider for escalation. Discussed follow-up appointments. If no appointment was previously scheduled, appointment scheduling offered: already done  [facility] follow up appointment(s):   Future Appointments   Date Time Provider Agnes Card   7/10/2020  1:10 PM Dedrick Manriquez, [facility]   2020 10:30 AM YAMIL Rae Cardio [facility]     [id number] [address] Lin follow up appointment(s): no     Patient has following risk factors of: heart failure, COPD, asthma and diabetes. CTN/ACM reviewed discharge instructions, medical action plan and red flags such as increased shortness of breath, increasing fever and signs of decompensation with patient who verbalized understanding. Discussed exposure protocols and quarantine with CDC Guidelines What to do if you are sick with coronavirus disease .  Patient was given an opportunity for questions and concerns. The patient agrees to contact the [employer] exposure line [phone], local [facility] department [facility]: ([phone]) and PCP office for questions related to their healthcare. CTN/ACM provided contact information for future needs.     Reviewed and educated patient on any new and changed medications related to discharge diagnosis

## 2020-07-10 ENCOUNTER — OFFICE VISIT (OUTPATIENT)
Dept: PRIMARY CARE CLINIC | Age: 60
End: 2020-07-10
Payer: MEDICARE

## 2020-07-10 VITALS
SYSTOLIC BLOOD PRESSURE: 138 MMHG | HEART RATE: 78 BPM | OXYGEN SATURATION: 99 % | BODY MASS INDEX: 42.7 KG/M2 | WEIGHT: 226 LBS | TEMPERATURE: 98.4 F | DIASTOLIC BLOOD PRESSURE: 77 MMHG

## 2020-07-10 PROCEDURE — 1111F DSCHRG MED/CURRENT MED MERGE: CPT | Performed by: NURSE PRACTITIONER

## 2020-07-10 PROCEDURE — 99495 TRANSJ CARE MGMT MOD F2F 14D: CPT | Performed by: NURSE PRACTITIONER

## 2020-07-10 ASSESSMENT — PATIENT HEALTH QUESTIONNAIRE - PHQ9
SUM OF ALL RESPONSES TO PHQ9 QUESTIONS 1 & 2: 1
SUM OF ALL RESPONSES TO PHQ QUESTIONS 1-9: 1
2. FEELING DOWN, DEPRESSED OR HOPELESS: 1
1. LITTLE INTEREST OR PLEASURE IN DOING THINGS: 0
SUM OF ALL RESPONSES TO PHQ QUESTIONS 1-9: 1

## 2020-07-10 ASSESSMENT — ENCOUNTER SYMPTOMS
SHORTNESS OF BREATH: 1
GASTROINTESTINAL NEGATIVE: 1
BACK PAIN: 1
EYES NEGATIVE: 1

## 2020-07-10 NOTE — PROGRESS NOTES
Post-Discharge Transitional Care Management Services or Hospital Follow Up      Tj Gould   YOB: 1960    Date of Office Visit:  7/10/2020  Date of Hospital Admission: 7/1/20  Date of Hospital Discharge: 7/5/20  Readmission Risk Score(high >=14%.  Medium >=10%):Readmission Risk Score: 28      Care management risk score Rising risk (score 2-5) and Complex Care (Scores >=6): 5     Non face to face  following discharge, date last encounter closed (first attempt may have been earlier): 7/7/2020 11:34 AM 7/7/2020 11:34 AM    Call initiated 2 business days of discharge: Yes     Patient Active Problem List   Diagnosis    SOB (shortness of breath)    Pericardial effusion    Acute on chronic diastolic congestive heart failure (HCC)    Normocytic anemia    Type 2 diabetes mellitus, with long-term current use of insulin [facility]    COPD (chronic obstructive pulmonary disease) ([facility]    Hyperglycemia    Acute kidney injury ([facility]    Elevated d-dimer    Morbid obesity with BMI of 40.0-44.9, adult ([address]    Obstructive sleep apnea syndrome    Essential hypertension    Moderate persistent asthma without complication    Pulmonary hypertension ([address]    Nonobstructive atherosclerosis of coronary artery    CKD (chronic kidney disease) stage 3, GFR 30-59 ml/min ([facility]    Pulmonary edema with congestive heart failure ([facility])    Palliative care patient       Allergies   Allergen Reactions    Albuterol Shortness Of Breath    Levaquin [Levofloxacin] Shortness Of Breath and Swelling    Metformin And Related Diarrhea    Aspirin Rash       Medications listed as ordered at the time of discharge from hospital   Zita Primrose M \"Marcelina\"   Home Medication Instructions GRETEL:    Printed on:07/10/20 2011   Medication Information                      amLODIPine (NORVASC) 5 MG tablet  Take 1 tablet by mouth daily             atorvastatin (LIPITOR) 20 MG tablet  Take 1/2 tablet for 1 week, then increase to whole tablet             bumetanide (BUMEX) 1 MG tablet  Take 1 tablet by mouth 2 times daily             carvedilol (COREG) 3.125 MG tablet  Take 1 tablet by mouth 2 times daily             clopidogrel (PLAVIX) 75 MG tablet  Take 75 mg by mouth daily             DULoxetine (CYMBALTA) 30 MG extended release capsule  Take 1 capsule by mouth daily Take with 60 mg capsule for total of 90 mg. DULoxetine (CYMBALTA) 60 MG extended release capsule  Take 1 capsule by mouth daily             fluticasone-vilanterol (BREO ELLIPTA) 100-25 MCG/INH AEPB inhaler  Inhale 1 puff into the lungs daily             gabapentin (NEURONTIN) 600 MG tablet  Take 1 tablet by mouth 3 times daily for 30 days. Insulin Degludec (TRESIBA FLEXTOUCH) 100 UNIT/ML SOPN  Inject 40 Units into the skin nightly             ISOSORBIDE MONONITRATE ER PO  Take 90 mg by mouth daily              levalbuterol (XOPENEX HFA) 45 MCG/ACT inhaler  Inhale 2 puffs into the lungs every 4 hours as needed for Wheezing or Shortness of Breath             levalbuterol (XOPENEX) 0.63 MG/3ML nebulization  Take 3 mLs by nebulization every 6 hours as needed for Wheezing             levocetirizine (XYZAL) 5 MG tablet  Take 1 tablet by mouth nightly             melatonin 3 MG TABS tablet  Take 10 mg by mouth nightly             montelukast (SINGULAIR) 10 MG tablet  Take 10 mg by mouth nightly             nitroGLYCERIN (NITROSTAT) 0.4 MG SL tablet  Place 0.4 mg under the tongue every 5 minutes as needed for Chest pain up to max of 3 total doses. If no relief after 1 dose, call 911.              pramipexole (MIRAPEX) 0.5 MG tablet  Take 1 tablet by mouth 2 times daily             Semaglutide, 1 MG/DOSE, 2 MG/1.5ML SOPN  Inject 1 mg into the skin every 7 days             tiotropium (SPIRIVA RESPIMAT) 1.25 MCG/ACT AERS inhaler  Inhale 2 puffs into the lungs daily             valsartan (DIOVAN) 160 MG tablet  Take 160 mg by mouth daily             vitamin D RESPIMAT) 1.25 MCG/ACT AERS inhaler Inhale 2 puffs into the lungs daily 1 Inhaler 3    valsartan (DIOVAN) 160 MG tablet Take 160 mg by mouth daily      atorvastatin (LIPITOR) 20 MG tablet Take 1/2 tablet for 1 week, then increase to whole tablet (Patient taking differently: daily Take 1/2 tablet for 1 week, then increase to whole tablet) 30 tablet 5    clopidogrel (PLAVIX) 75 MG tablet Take 75 mg by mouth daily      montelukast (SINGULAIR) 10 MG tablet Take 10 mg by mouth nightly      vitamin D (CHOLECALCIFEROL) 25 MCG (1000 UT) TABS tablet Take 1,000 Units by mouth daily      melatonin 3 MG TABS tablet Take 10 mg by mouth nightly      ISOSORBIDE MONONITRATE ER PO Take 90 mg by mouth daily           Medications patient taking as of now reconciled against medications ordered at time of hospital discharge: Yes    Chief Complaint   Patient presents with    1 Month Follow-Up    Follow-Up from [address] [place] [address] for CHF. She was released 7/5/2020     Inpatient course: Discharge summary reviewed- see chart. Interval history/Current status:     Stable   Cardiology appt: 8/13/2020 at 10:30  Hospital discharge note: po diuretics and cardiology follow-up  Need for motorized scooter   pulmonology 9/16/2020   Patient reports she is still having dyspnea on exertion which is a chronic issue   Patient does have history of pericardial effusion  Patient needs repeat ECHO before cardiology appt; will plan for them to schedule ECHO today so patient has the appt; stressed the importance of ECHO and cardiology follow-up  Most recent echo indicates large circumferential pericardial effusion, mild right atrial collapse; patient received diuretics for treatment    Review of Systems   Constitutional: Positive for fatigue. HENT: Negative. Eyes: Negative. Respiratory: Positive for shortness of breath (on exertion). Cardiovascular: Negative. Gastrointestinal: Negative. Endocrine: Negative. Genitourinary: Negative. normal.         Thought Content: Thought content normal.         Judgment: Judgment normal.         Assessment/Plan:  1. Hospital discharge follow-up  - [place] DISCHARGE MEDS RECONCILED W/ CURRENT OUTPATIENT MED LIST    2. Encounter for screening mammogram for malignant neoplasm of breast  - OSVALDO DIGITAL SCREEN W OR WO CAD BILATERAL; Future    3. Chronic diastolic congestive heart failure ([facility]  - keep appt with cardiology; ECHO scheduled for 8/3/2020    4. Shortness of breath  - keep appt with cardiology; ECHO scheduled for 8/3/2020    5.  Pericardial effusion  - keep appt with cardiology; ECHO scheduled for 8/3/2020        Medical Decision Making: moderate complexity

## 2020-07-11 RX ORDER — GLUCOSAMINE HCL/CHONDROITIN SU 500-400 MG
CAPSULE ORAL
Qty: 100 STRIP | Refills: 11 | Status: SHIPPED | OUTPATIENT
Start: 2020-07-11 | End: 2020-10-18 | Stop reason: SDUPTHER

## 2020-07-11 RX ORDER — LANCETS 30 GAUGE
1 EACH MISCELLANEOUS 3 TIMES DAILY
Qty: 200 EACH | Refills: 11 | Status: ON HOLD | OUTPATIENT
Start: 2020-07-11 | End: 2021-01-06 | Stop reason: HOSPADM

## 2020-07-11 RX ORDER — DIPHENHYDRAMINE HYDROCHLORIDE 25 MG/1
1 CAPSULE, LIQUID FILLED ORAL 3 TIMES DAILY
Qty: 1 KIT | Refills: 0 | Status: ON HOLD | OUTPATIENT
Start: 2020-07-11 | End: 2021-01-06 | Stop reason: HOSPADM

## 2020-07-14 ENCOUNTER — HOSPITAL ENCOUNTER (OUTPATIENT)
Age: 60
Setting detail: OBSERVATION
Discharge: HOME | End: 2020-07-16
Attending: EMERGENCY MEDICINE | Admitting: INTERNAL MEDICINE
Payer: MEDICARE

## 2020-07-14 ENCOUNTER — CARE COORDINATION (OUTPATIENT)
Dept: CASE MANAGEMENT | Age: 60
End: 2020-07-14

## 2020-07-14 ENCOUNTER — APPOINTMENT (OUTPATIENT)
Dept: GENERAL RADIOLOGY | Age: 60
End: 2020-07-14
Payer: MEDICARE

## 2020-07-14 ENCOUNTER — TELEPHONE (OUTPATIENT)
Dept: PRIMARY CARE CLINIC | Age: 60
End: 2020-07-14

## 2020-07-14 PROBLEM — R07.89 CHEST DISCOMFORT: Status: ACTIVE | Noted: 2020-07-14

## 2020-07-14 LAB
ALBUMIN SERPL-MCNC: 3.8 G/DL (ref 3.5–5.2)
ALP BLD-CCNC: 101 U/L (ref 35–104)
ALT SERPL-CCNC: 15 U/L (ref 5–33)
ANION GAP SERPL CALCULATED.3IONS-SCNC: 11 MMOL/L (ref 7–19)
AST SERPL-CCNC: 16 U/L (ref 5–32)
BASOPHILS ABSOLUTE: 0 K/UL (ref 0–0.2)
BASOPHILS RELATIVE PERCENT: 0.4 % (ref 0–1)
BILIRUB SERPL-MCNC: 0.3 MG/DL (ref 0.2–1.2)
BUN BLDV-MCNC: 33 MG/DL (ref 8–23)
CALCIUM SERPL-MCNC: 9.7 MG/DL (ref 8.8–10.2)
CHLORIDE BLD-SCNC: 96 MMOL/L (ref 98–111)
CO2: 28 MMOL/L (ref 22–29)
CREAT SERPL-MCNC: 1.2 MG/DL (ref 0.5–0.9)
EOSINOPHILS ABSOLUTE: 0.2 K/UL (ref 0–0.6)
EOSINOPHILS RELATIVE PERCENT: 2.6 % (ref 0–5)
GFR AFRICAN AMERICAN: 55
GFR NON-AFRICAN AMERICAN: 45
GLUCOSE BLD-MCNC: 253 MG/DL (ref 70–99)
GLUCOSE BLD-MCNC: 362 MG/DL (ref 74–109)
HCT VFR BLD CALC: 30.8 % (ref 37–47)
HEMOGLOBIN: 10.8 G/DL (ref 12–16)
IMMATURE GRANULOCYTES #: 0 K/UL
LYMPHOCYTES ABSOLUTE: 2 K/UL (ref 1.1–4.5)
LYMPHOCYTES RELATIVE PERCENT: 28.7 % (ref 20–40)
MCH RBC QN AUTO: 31.1 PG (ref 27–31)
MCHC RBC AUTO-ENTMCNC: 35.1 G/DL (ref 33–37)
MCV RBC AUTO: 88.8 FL (ref 81–99)
MONOCYTES ABSOLUTE: 0.6 K/UL (ref 0–0.9)
MONOCYTES RELATIVE PERCENT: 7.8 % (ref 0–10)
NEUTROPHILS ABSOLUTE: 4.3 K/UL (ref 1.5–7.5)
NEUTROPHILS RELATIVE PERCENT: 60.2 % (ref 50–65)
PDW BLD-RTO: 12.9 % (ref 11.5–14.5)
PERFORMED ON: ABNORMAL
PLATELET # BLD: 245 K/UL (ref 130–400)
PMV BLD AUTO: 9.4 FL (ref 9.4–12.3)
POTASSIUM REFLEX MAGNESIUM: 5 MMOL/L (ref 3.5–5)
PRO-BNP: 1432 PG/ML (ref 0–900)
RBC # BLD: 3.47 M/UL (ref 4.2–5.4)
SODIUM BLD-SCNC: 135 MMOL/L (ref 136–145)
TOTAL PROTEIN: 6.8 G/DL (ref 6.6–8.7)
TROPONIN: 0.03 NG/ML (ref 0–0.03)
TROPONIN: 0.05 NG/ML (ref 0–0.03)
TROPONIN: 0.05 NG/ML (ref 0–0.03)
WBC # BLD: 7.1 K/UL (ref 4.8–10.8)

## 2020-07-14 PROCEDURE — 36415 COLL VENOUS BLD VENIPUNCTURE: CPT

## 2020-07-14 PROCEDURE — 96375 TX/PRO/DX INJ NEW DRUG ADDON: CPT

## 2020-07-14 PROCEDURE — 96372 THER/PROPH/DIAG INJ SC/IM: CPT

## 2020-07-14 PROCEDURE — 2500000003 HC RX 250 WO HCPCS: Performed by: EMERGENCY MEDICINE

## 2020-07-14 PROCEDURE — 96374 THER/PROPH/DIAG INJ IV PUSH: CPT

## 2020-07-14 PROCEDURE — 80053 COMPREHEN METABOLIC PANEL: CPT

## 2020-07-14 PROCEDURE — 71045 X-RAY EXAM CHEST 1 VIEW: CPT

## 2020-07-14 PROCEDURE — 93005 ELECTROCARDIOGRAM TRACING: CPT | Performed by: EMERGENCY MEDICINE

## 2020-07-14 PROCEDURE — 6360000002 HC RX W HCPCS: Performed by: INTERNAL MEDICINE

## 2020-07-14 PROCEDURE — G0378 HOSPITAL OBSERVATION PER HR: HCPCS

## 2020-07-14 PROCEDURE — 99220 PR INITIAL OBSERVATION CARE/DAY 70 MINUTES: CPT | Performed by: INTERNAL MEDICINE

## 2020-07-14 PROCEDURE — 94640 AIRWAY INHALATION TREATMENT: CPT

## 2020-07-14 PROCEDURE — 82947 ASSAY GLUCOSE BLOOD QUANT: CPT

## 2020-07-14 PROCEDURE — 84484 ASSAY OF TROPONIN QUANT: CPT

## 2020-07-14 PROCEDURE — 2500000003 HC RX 250 WO HCPCS: Performed by: INTERNAL MEDICINE

## 2020-07-14 PROCEDURE — 96376 TX/PRO/DX INJ SAME DRUG ADON: CPT

## 2020-07-14 PROCEDURE — 85025 COMPLETE CBC W/AUTO DIFF WBC: CPT

## 2020-07-14 PROCEDURE — 83880 ASSAY OF NATRIURETIC PEPTIDE: CPT

## 2020-07-14 PROCEDURE — 6360000002 HC RX W HCPCS: Performed by: EMERGENCY MEDICINE

## 2020-07-14 PROCEDURE — 99285 EMERGENCY DEPT VISIT HI MDM: CPT

## 2020-07-14 PROCEDURE — 2580000003 HC RX 258: Performed by: INTERNAL MEDICINE

## 2020-07-14 PROCEDURE — 6370000000 HC RX 637 (ALT 250 FOR IP): Performed by: INTERNAL MEDICINE

## 2020-07-14 RX ORDER — SODIUM CHLORIDE 0.9 % (FLUSH) 0.9 %
10 SYRINGE (ML) INJECTION EVERY 12 HOURS SCHEDULED
Status: DISCONTINUED | OUTPATIENT
Start: 2020-07-14 | End: 2020-07-16 | Stop reason: SDUPTHER

## 2020-07-14 RX ORDER — ASPIRIN 81 MG/1
81 TABLET, CHEWABLE ORAL DAILY
Status: DISCONTINUED | OUTPATIENT
Start: 2020-07-14 | End: 2020-07-14

## 2020-07-14 RX ORDER — SODIUM CHLORIDE 9 MG/ML
500 INJECTION, SOLUTION INTRAVENOUS CONTINUOUS PRN
Status: ACTIVE | OUTPATIENT
Start: 2020-07-14 | End: 2020-07-15

## 2020-07-14 RX ORDER — AMINOPHYLLINE DIHYDRATE 25 MG/ML
50 INJECTION, SOLUTION INTRAVENOUS PRN
Status: ACTIVE | OUTPATIENT
Start: 2020-07-14 | End: 2020-07-15

## 2020-07-14 RX ORDER — INSULIN GLARGINE 100 [IU]/ML
40 INJECTION, SOLUTION SUBCUTANEOUS NIGHTLY
Status: DISCONTINUED | OUTPATIENT
Start: 2020-07-14 | End: 2020-07-16 | Stop reason: HOSPADM

## 2020-07-14 RX ORDER — ACETAMINOPHEN 650 MG/1
650 SUPPOSITORY RECTAL EVERY 6 HOURS PRN
Status: DISCONTINUED | OUTPATIENT
Start: 2020-07-14 | End: 2020-07-16 | Stop reason: HOSPADM

## 2020-07-14 RX ORDER — NITROGLYCERIN 0.4 MG/1
0.4 TABLET SUBLINGUAL EVERY 5 MIN PRN
Status: ACTIVE | OUTPATIENT
Start: 2020-07-14 | End: 2020-07-15

## 2020-07-14 RX ORDER — LEVALBUTEROL TARTRATE 45 UG/1
2 AEROSOL, METERED ORAL EVERY 4 HOURS PRN
Status: DISCONTINUED | OUTPATIENT
Start: 2020-07-14 | End: 2020-07-14

## 2020-07-14 RX ORDER — CLOPIDOGREL BISULFATE 75 MG/1
75 TABLET ORAL DAILY
Status: DISCONTINUED | OUTPATIENT
Start: 2020-07-14 | End: 2020-07-16 | Stop reason: HOSPADM

## 2020-07-14 RX ORDER — BUMETANIDE 1 MG/1
1 TABLET ORAL 2 TIMES DAILY
Status: DISCONTINUED | OUTPATIENT
Start: 2020-07-14 | End: 2020-07-16 | Stop reason: HOSPADM

## 2020-07-14 RX ORDER — GABAPENTIN 600 MG/1
600 TABLET ORAL 3 TIMES DAILY
Status: DISCONTINUED | OUTPATIENT
Start: 2020-07-14 | End: 2020-07-16 | Stop reason: HOSPADM

## 2020-07-14 RX ORDER — CARVEDILOL 3.12 MG/1
3.12 TABLET ORAL 2 TIMES DAILY
Status: DISCONTINUED | OUTPATIENT
Start: 2020-07-14 | End: 2020-07-16 | Stop reason: HOSPADM

## 2020-07-14 RX ORDER — POLYETHYLENE GLYCOL 3350 17 G/17G
17 POWDER, FOR SOLUTION ORAL DAILY PRN
Status: DISCONTINUED | OUTPATIENT
Start: 2020-07-14 | End: 2020-07-16 | Stop reason: HOSPADM

## 2020-07-14 RX ORDER — ONDANSETRON 2 MG/ML
4 INJECTION INTRAMUSCULAR; INTRAVENOUS EVERY 6 HOURS PRN
Status: DISCONTINUED | OUTPATIENT
Start: 2020-07-14 | End: 2020-07-16 | Stop reason: HOSPADM

## 2020-07-14 RX ORDER — LANCETS 30 GAUGE
1 EACH MISCELLANEOUS 3 TIMES DAILY
Status: DISCONTINUED | OUTPATIENT
Start: 2020-07-14 | End: 2020-07-14

## 2020-07-14 RX ORDER — NITROGLYCERIN 20 MG/100ML
5 INJECTION INTRAVENOUS CONTINUOUS
Status: DISCONTINUED | OUTPATIENT
Start: 2020-07-14 | End: 2020-07-16 | Stop reason: HOSPADM

## 2020-07-14 RX ORDER — DIPHENHYDRAMINE HYDROCHLORIDE 25 MG/1
1 CAPSULE, LIQUID FILLED ORAL 3 TIMES DAILY
Status: DISCONTINUED | OUTPATIENT
Start: 2020-07-14 | End: 2020-07-14

## 2020-07-14 RX ORDER — CETIRIZINE HYDROCHLORIDE 10 MG/1
10 TABLET ORAL NIGHTLY
Status: DISCONTINUED | OUTPATIENT
Start: 2020-07-14 | End: 2020-07-16 | Stop reason: HOSPADM

## 2020-07-14 RX ORDER — ATROPINE SULFATE 0.1 MG/ML
0.5 INJECTION INTRAVENOUS EVERY 5 MIN PRN
Status: ACTIVE | OUTPATIENT
Start: 2020-07-14 | End: 2020-07-15

## 2020-07-14 RX ORDER — PRAMIPEXOLE DIHYDROCHLORIDE 0.25 MG/1
0.5 TABLET ORAL 2 TIMES DAILY
Status: DISCONTINUED | OUTPATIENT
Start: 2020-07-14 | End: 2020-07-16 | Stop reason: HOSPADM

## 2020-07-14 RX ORDER — DULOXETIN HYDROCHLORIDE 60 MG/1
60 CAPSULE, DELAYED RELEASE ORAL DAILY
Status: DISCONTINUED | OUTPATIENT
Start: 2020-07-14 | End: 2020-07-16 | Stop reason: HOSPADM

## 2020-07-14 RX ORDER — PROMETHAZINE HYDROCHLORIDE 25 MG/1
12.5 TABLET ORAL EVERY 6 HOURS PRN
Status: DISCONTINUED | OUTPATIENT
Start: 2020-07-14 | End: 2020-07-16 | Stop reason: HOSPADM

## 2020-07-14 RX ORDER — SODIUM CHLORIDE 0.9 % (FLUSH) 0.9 %
10 SYRINGE (ML) INJECTION PRN
Status: ACTIVE | OUTPATIENT
Start: 2020-07-14 | End: 2020-07-15

## 2020-07-14 RX ORDER — ONDANSETRON 2 MG/ML
4 INJECTION INTRAMUSCULAR; INTRAVENOUS ONCE
Status: COMPLETED | OUTPATIENT
Start: 2020-07-14 | End: 2020-07-14

## 2020-07-14 RX ORDER — NITROGLYCERIN 0.4 MG/1
0.4 TABLET SUBLINGUAL EVERY 5 MIN PRN
Status: DISCONTINUED | OUTPATIENT
Start: 2020-07-14 | End: 2020-07-16 | Stop reason: HOSPADM

## 2020-07-14 RX ORDER — FLUTICASONE FUROATE AND VILANTEROL 100; 25 UG/1; UG/1
1 POWDER RESPIRATORY (INHALATION) DAILY
Status: DISCONTINUED | OUTPATIENT
Start: 2020-07-14 | End: 2020-07-16 | Stop reason: HOSPADM

## 2020-07-14 RX ORDER — AMLODIPINE BESYLATE 5 MG/1
5 TABLET ORAL DAILY
Status: DISCONTINUED | OUTPATIENT
Start: 2020-07-14 | End: 2020-07-16 | Stop reason: HOSPADM

## 2020-07-14 RX ORDER — SODIUM CHLORIDE 0.9 % (FLUSH) 0.9 %
10 SYRINGE (ML) INJECTION PRN
Status: DISCONTINUED | OUTPATIENT
Start: 2020-07-14 | End: 2020-07-16 | Stop reason: SDUPTHER

## 2020-07-14 RX ORDER — DULOXETIN HYDROCHLORIDE 30 MG/1
30 CAPSULE, DELAYED RELEASE ORAL DAILY
Status: DISCONTINUED | OUTPATIENT
Start: 2020-07-14 | End: 2020-07-16 | Stop reason: HOSPADM

## 2020-07-14 RX ORDER — METOPROLOL TARTRATE 5 MG/5ML
5 INJECTION INTRAVENOUS EVERY 5 MIN PRN
Status: ACTIVE | OUTPATIENT
Start: 2020-07-14 | End: 2020-07-15

## 2020-07-14 RX ORDER — MONTELUKAST SODIUM 10 MG/1
10 TABLET ORAL NIGHTLY
Status: DISCONTINUED | OUTPATIENT
Start: 2020-07-14 | End: 2020-07-16 | Stop reason: HOSPADM

## 2020-07-14 RX ORDER — LEVALBUTEROL INHALATION SOLUTION 0.63 MG/3ML
0.63 SOLUTION RESPIRATORY (INHALATION) EVERY 6 HOURS PRN
Status: DISCONTINUED | OUTPATIENT
Start: 2020-07-14 | End: 2020-07-16 | Stop reason: HOSPADM

## 2020-07-14 RX ORDER — VITAMIN B COMPLEX
1000 TABLET ORAL DAILY
Status: DISCONTINUED | OUTPATIENT
Start: 2020-07-14 | End: 2020-07-16 | Stop reason: HOSPADM

## 2020-07-14 RX ORDER — VALSARTAN 160 MG/1
160 TABLET ORAL DAILY
Status: DISCONTINUED | OUTPATIENT
Start: 2020-07-14 | End: 2020-07-16 | Stop reason: HOSPADM

## 2020-07-14 RX ORDER — LEVOCETIRIZINE DIHYDROCHLORIDE 5 MG/1
5 TABLET, FILM COATED ORAL NIGHTLY
Status: DISCONTINUED | OUTPATIENT
Start: 2020-07-14 | End: 2020-07-14

## 2020-07-14 RX ORDER — ACETAMINOPHEN 325 MG/1
650 TABLET ORAL EVERY 6 HOURS PRN
Status: DISCONTINUED | OUTPATIENT
Start: 2020-07-14 | End: 2020-07-16 | Stop reason: HOSPADM

## 2020-07-14 RX ORDER — ALBUTEROL SULFATE 90 UG/1
2 AEROSOL, METERED RESPIRATORY (INHALATION) PRN
Status: DISCONTINUED | OUTPATIENT
Start: 2020-07-14 | End: 2020-07-14

## 2020-07-14 RX ADMIN — IPRATROPIUM BROMIDE 0.5 MG: 0.5 SOLUTION RESPIRATORY (INHALATION) at 22:08

## 2020-07-14 RX ADMIN — Medication 10 MG: at 20:56

## 2020-07-14 RX ADMIN — BUMETANIDE 1 MG: 1 TABLET ORAL at 20:56

## 2020-07-14 RX ADMIN — NITROGLYCERIN 5 MCG/MIN: 20 INJECTION INTRAVENOUS at 13:49

## 2020-07-14 RX ADMIN — GABAPENTIN 600 MG: 600 TABLET, FILM COATED ORAL at 20:56

## 2020-07-14 RX ADMIN — INSULIN GLARGINE 40 UNITS: 100 INJECTION, SOLUTION SUBCUTANEOUS at 21:14

## 2020-07-14 RX ADMIN — NITROGLYCERIN 10 MCG/MIN: 20 INJECTION INTRAVENOUS at 20:30

## 2020-07-14 RX ADMIN — MONTELUKAST SODIUM 10 MG: 10 TABLET, FILM COATED ORAL at 20:55

## 2020-07-14 RX ADMIN — PRAMIPEXOLE DIHYDROCHLORIDE 0.5 MG: 0.25 TABLET ORAL at 20:55

## 2020-07-14 RX ADMIN — SODIUM CHLORIDE, PRESERVATIVE FREE 10 ML: 5 INJECTION INTRAVENOUS at 20:56

## 2020-07-14 RX ADMIN — ONDANSETRON 4 MG: 2 INJECTION INTRAMUSCULAR; INTRAVENOUS at 14:16

## 2020-07-14 RX ADMIN — ENOXAPARIN SODIUM 40 MG: 40 INJECTION SUBCUTANEOUS at 21:14

## 2020-07-14 ASSESSMENT — PAIN DESCRIPTION - FREQUENCY: FREQUENCY: CONTINUOUS

## 2020-07-14 ASSESSMENT — ENCOUNTER SYMPTOMS
BACK PAIN: 0
NAUSEA: 1
VOMITING: 0
COUGH: 0
SHORTNESS OF BREATH: 0

## 2020-07-14 ASSESSMENT — PAIN SCALES - GENERAL: PAINLEVEL_OUTOF10: 0

## 2020-07-14 ASSESSMENT — PAIN DESCRIPTION - DESCRIPTORS: DESCRIPTORS: DULL

## 2020-07-14 ASSESSMENT — PAIN DESCRIPTION - ORIENTATION: ORIENTATION: RIGHT

## 2020-07-14 ASSESSMENT — PAIN DESCRIPTION - LOCATION: LOCATION: CHEST

## 2020-07-14 ASSESSMENT — PAIN DESCRIPTION - PAIN TYPE: TYPE: ACUTE PAIN

## 2020-07-14 NOTE — TELEPHONE ENCOUNTER
Spoke to pt she she is still having chest pain it has eased up with taking 2 nitro she said she was going to take another. I told her to go to the ER she said she didn't want to go. I told her she really needed to go because she was having chest pain.

## 2020-07-14 NOTE — ED NOTES
Bed: 09  Expected date:   Expected time:   Means of arrival:   Comments:  1 Florenciario Bjorn     Jack, [facility]  07/14/20 0802

## 2020-07-14 NOTE — CARE COORDINATION
Placed a call to the office and spoke with the phone nurse. Explained my conversation with patient just a little bit ago. She will speak with the nurse for the provider and someone will check on patient further.

## 2020-07-14 NOTE — ED PROVIDER NOTES
[facility] EMERGENCY DEPT  eMERGENCY dEPARTMENT eNCOUnter      Pt Name: Juli Hayes  MRN: [id number]  [place] 1960  Date of evaluation: 7/14/2020  Provider: Baljinder Pickett MD    [address] [place] [address]       Chief Complaint   Patient presents with    Chest Pain         HISTORY OF PRESENT ILLNESS   (Location/Symptom, Timing/Onset,Context/Setting, Quality, Duration, Modifying Factors, Severity)  Note limiting factors. Juli Hayes is a 56 y.o. female who presents to the emergency department      The history is provided by the patient. Chest Pain   Pain location:  R chest  Pain quality: sharp (dull afetr NTG)    Pain radiates to:  Neck and R arm  Pain severity:  Moderate  Onset quality:  Sudden  Duration: 1100 - took one NTG - went away. 1200 came back - partial relief NTG x 1. Progression:  Partially resolved  Chronicity:  Recurrent  Relieved by:  Nitroglycerin  Worsened by:  Nothing  Associated symptoms: fatigue and nausea    Associated symptoms: no back pain, no cough, no diaphoresis, no fever, no palpitations, no shortness of breath and no vomiting    Risk factors: coronary artery disease (stents x 3, reports last cath 7/4, bu8t I cannot find record of it, denies intervention then, sees Dr. Clarita Carvajal)        NursingNotes were reviewed. REVIEW OF SYSTEMS    (2-9 systems for level 4, 10 or more for level 5)     Review of Systems   Constitutional: Positive for fatigue. Negative for diaphoresis and fever. Respiratory: Negative for cough and shortness of breath. Cardiovascular: Positive for chest pain. Negative for palpitations. Gastrointestinal: Positive for nausea. Negative for vomiting. Musculoskeletal: Negative for back pain. All other systems reviewed and are negative. Except as noted above the remainder of the review of systems was reviewed and negative.        PAST MEDICAL HISTORY     Past Medical History:   Diagnosis Date    AMI (acute myocardial infarction) ([facility] 09/2018    Asthma     CAD (coronary artery disease)     hx of stents    Cerebral artery occlusion with cerebral infarction [facility]) 2012    R sided numbness/weakness    CHF (congestive heart failure) (HCC)     COPD (chronic obstructive pulmonary disease) (HCC)     Diabetes mellitus ([facility]     DVT (deep vein thrombosis) in pregnancy     Hyperlipidemia     Hypertension     Palliative care patient 07/02/2020    Pneumonia          SURGICALHISTORY       Past Surgical History:   Procedure Laterality Date    CATARACT REMOVAL      COLONOSCOPY  approx 2013    CORONARY ANGIOPLASTY WITH STENT PLACEMENT  2018    [facility]    TONSILLECTOMY           CURRENT MEDICATIONS       Previous Medications    AMLODIPINE (NORVASC) 5 MG TABLET    Take 1 tablet by mouth daily    ATORVASTATIN (LIPITOR) 20 MG TABLET    Take 1/2 tablet for 1 week, then increase to whole tablet    BLOOD GLUCOSE MONITOR STRIPS    Test 3 times a day & as needed for symptoms of irregular blood glucose. Dispense sufficient amount for indicated testing frequency plus additional to accommodate PRN testing needs. BLOOD GLUCOSE MONITORING SUPPL (BLOOD GLUCOSE MONITOR SYSTEM) W/DEVICE KIT    1 Device by Does not apply route three times daily    BUMETANIDE (BUMEX) 1 MG TABLET    Take 1 tablet by mouth 2 times daily    CARVEDILOL (COREG) 3.125 MG TABLET    Take 1 tablet by mouth 2 times daily    CLOPIDOGREL (PLAVIX) 75 MG TABLET    Take 75 mg by mouth daily    DULOXETINE (CYMBALTA) 30 MG EXTENDED RELEASE CAPSULE    Take 1 capsule by mouth daily Take with 60 mg capsule for total of 90 mg. DULOXETINE (CYMBALTA) 60 MG EXTENDED RELEASE CAPSULE    Take 1 capsule by mouth daily    FLUTICASONE-VILANTEROL (BREO ELLIPTA) 100-25 MCG/INH AEPB INHALER    Inhale 1 puff into the lungs daily    GABAPENTIN (NEURONTIN) 600 MG TABLET    Take 1 tablet by mouth 3 times daily for 30 days.     INSULIN DEGLUDEC (TRESIBA FLEXTOUCH) 100 UNIT/ML SOPN    Inject 40 Units into the skin nightly    ISOSORBIDE MONONITRATE ER PO    Take 90 mg by mouth daily     LANCETS MISC    1 each by Does not apply route 3 times daily    LEVALBUTEROL (XOPENEX HFA) 45 MCG/ACT INHALER    Inhale 2 puffs into the lungs every 4 hours as needed for Wheezing or Shortness of Breath    LEVALBUTEROL (XOPENEX) 0.63 MG/3ML NEBULIZATION    Take 3 mLs by nebulization every 6 hours as needed for Wheezing    LEVOCETIRIZINE (XYZAL) 5 MG TABLET    Take 1 tablet by mouth nightly    MELATONIN 3 MG TABS TABLET    Take 10 mg by mouth nightly    MONTELUKAST (SINGULAIR) 10 MG TABLET    Take 10 mg by mouth nightly    NITROGLYCERIN (NITROSTAT) 0.4 MG SL TABLET    Place 0.4 mg under the tongue every 5 minutes as needed for Chest pain up to max of 3 total doses. If no relief after 1 dose, call 911. PRAMIPEXOLE (MIRAPEX) 0.5 MG TABLET    Take 1 tablet by mouth 2 times daily    SEMAGLUTIDE, 1 MG/DOSE, 2 MG/1.5ML SOPN    Inject 1 mg into the skin every 7 days    TIOTROPIUM (SPIRIVA RESPIMAT) 1.25 MCG/ACT AERS INHALER    Inhale 2 puffs into the lungs daily    VALSARTAN (DIOVAN) 160 MG TABLET    Take 160 mg by mouth daily    VITAMIN D (CHOLECALCIFEROL) 25 MCG (1000 UT) TABS TABLET    Take 1,000 Units by mouth daily       ALLERGIES     Albuterol; Levaquin [levofloxacin];  Metformin and related; and Aspirin    FAMILY HISTORY       Family History   Problem Relation Age of Onset    Colon Cancer Neg Hx     Colon Polyps Neg Hx           SOCIAL HISTORY       Social History     Socioeconomic History    Marital status:      Spouse name: None    Number of children: None    Years of education: None    Highest education level: None   Occupational History    None   Social Needs    Financial resource strain: None    Food insecurity     Worry: None     Inability: None    Transportation needs     Medical: None     Non-medical: None   Tobacco Use    Smoking status: Former Smoker     Packs/day: 1.00     Years: 30.00     Pack years: 30.00 Last attempt to quit: 2005     Years since quitting: 15.5    Smokeless tobacco: Never Used   Substance and Sexual Activity    Alcohol use: Never     Frequency: Never    Drug use: Never    Sexual activity: None   Lifestyle    Physical activity     Days per week: None     Minutes per session: None    Stress: None   Relationships    Social connections     Talks on phone: None     Gets together: None     Attends [employer] service: None     Active member of club or organization: None     Attends meetings of clubs or organizations: None     Relationship status: None    Intimate partner violence     Fear of current or ex partner: None     Emotionally abused: None     Physically abused: None     Forced sexual activity: None   Other Topics Concern    None   Social History Narrative    None       SCREENINGS      @FLOW([id number])@      PHYSICAL EXAM    (up to 7 for level 4, 8 or more for level 5)     ED Triage Vitals [07/14/20 1318]   BP Temp Temp src Pulse Resp SpO2 Height Weight   (!) 162/66 -- -- 78 19 92 % -- 226 lb (102.5 kg)       Physical Exam  Vitals signs and nursing note reviewed. Constitutional:       General: She is not in acute distress. Appearance: She is obese. She is not ill-appearing or toxic-appearing. HENT:      Nose: Nose normal.      Mouth/Throat:      Pharynx: Oropharynx is clear. Eyes:      Conjunctiva/sclera: Conjunctivae normal.   Neck:      Musculoskeletal: Normal range of motion and neck supple. Cardiovascular:      Rate and Rhythm: Normal rate and regular rhythm. Heart sounds: Normal heart sounds. Pulmonary:      Effort: Pulmonary effort is normal.      Breath sounds: Normal breath sounds. Chest:      Chest wall: Tenderness (Tmild right chest) present. Musculoskeletal:      Right lower leg: No edema. Left lower leg: No edema. Skin:     General: Skin is warm and dry. Neurological:      General: No focal deficit present.       Mental Status: She is alert and oriented to person, place, and time. Psychiatric:         Mood and Affect: Mood normal.         DIAGNOSTIC RESULTS     EKG: All EKG's are interpreted by the Emergency Department Physician who either signs or Co-signsthis chart in the absence of a cardiologist.    EKG: sinus, VR 66, old anteroseptal, new T-inversions inferolaterally from 7/1    RADIOLOGY:   Non-plain filmimages such as CT, Ultrasound and MRI are read by the radiologist. Plain radiographic images are visualized and preliminarily interpreted by the emergency physician with the below findings:        Interpretation per the Radiologist below, if available at the time ofthis note:    XR CHEST PORTABLE   Final Result   Cardiomegaly. Left basilar atelectasis. Signed by Dr Gifty Henderson on 7/14/2020 2:10 PM            ED BEDSIDE ULTRASOUND:   Performed by ED Physician - none    LABS:  Labs Reviewed   CBC WITH AUTO DIFFERENTIAL - Abnormal; Notable for the following components:       Result Value    RBC 3.47 (*)     Hemoglobin 10.8 (*)     Hematocrit 30.8 (*)     MCH 31.1 (*)     All other components within normal limits   COMPREHENSIVE METABOLIC PANEL W/ REFLEX TO MG FOR LOW K - Abnormal; Notable for the following components:    Sodium 135 (*)     Chloride 96 (*)     Glucose 362 (*)     BUN 33 (*)     CREATININE 1.2 (*)     GFR Non- 45 (*)     GFR  55 (*)     All other components within normal limits   TROPONIN - Abnormal; Notable for the following components:    Troponin 0.05 (*)     All other components within normal limits   BRAIN NATRIURETIC PEPTIDE - Abnormal; Notable for the following components:    Pro-BNP 1,432 (*)     All other components within normal limits   TROPONIN       All other labs were within normal range or not returned as of this dictation.     EMERGENCY DEPARTMENT COURSE and DIFFERENTIAL DIAGNOSIS/MDM:   Vitals:    Vitals:    07/14/20 1622 07/14/20 1713 07/14/20 1735 07/14/20 1743   BP: (!) 154/58 132/74 (!) 147/34 138/61   Pulse: 78 79 75 78   Resp: 20 25 20 20   SpO2: 99% 96% 95% 98%   Weight:               MDM  Number of Diagnoses or Management Options  Diagnosis management comments: Dr. Weston Aguayo sees in ED - admit      CRITICAL CARE TIME   Total Critical Care time was 0 minutes, excluding separately reportable procedures. There was a high probability of clinically significant/lifethreatening deterioration in the patient's condition which required my urgent intervention. CONSULTS:  IP CONSULT TO CARDIOLOGY    PROCEDURES:  Unless otherwise noted below, none     Procedures    FINAL IMPRESSION      1. Chest pain, unspecified type    2. Acute electrocardiogram changes    3. Elevated troponin          DISPOSITION/PLAN   DISPOSITION        PATIENT REFERRED TO:  No follow-up provider specified.     DISCHARGE MEDICATIONS:  New Prescriptions    No medications on file          (Please note that portions of this note were completed with a voice recognition program.  Efforts were made to edit the dictations but occasionally words are mis-transcribed.)    Damari Sandhu MD (electronically signed)  Attending Emergency Physician          Damari Sandhu MD  07/14/20 Alberta Ramirez

## 2020-07-14 NOTE — CARE COORDINATION
[facility] Transitions Follow Up Call    2020    Patient: Kinga Jacobo  Patient : 1960   MRN: [id number]    Discharge Date: 20 RARS: Readmission Risk Score: 29         Spoke with: [address] Transitions Subsequent and Final Call    Subsequent and Final Calls  Do you have any ongoing symptoms?:  Yes  Patient-reported symptoms:  Chest Pain, Nausea, Weakness  Have your medications changed?:  No  Do you have any questions related to your medications?:  No  Do you currently have any active services?:  No  Identified Barriers:  None  Care Transitions Interventions  Other Interventions: Follow Up: Placed a call to the patient for a follow up call. She reported that she is not doing well today. She said she has been having chest pain off and on this morning. She said she had an episode about one hour ago, sharp stabbing pain in the med sternum, radiating to the right. She said she was sick to her stomach. She said that she is a little dizzy. She is not sweating. She feels very weak. She said she has had some swelling in her legs, better today, but a little in right leg. She said she told her  that she thought she was having a heart attack and he told her she was not. She took a nitroglycerin and it helped some. She said she had just taken another on about 5 minutes before I called as the pain and nausea had returned. She said it is still not really improving. She said she is getting tired and sleepy and will probably just lie down and see if it does get any better. All along, I am encouraging her to get to the ER to be checked out. She said if it does not getting better in a few minutes, she will do something. I told her that I would call her PCP, but she will want her to come to the ER. Stressed how symptoms can present differently in men vs women and are different with each person. Stressed to her that she needs to be evaluated. Will call PCP.   Did not discuss COVID 19 information at this time due to circumstances.        Future Appointments   Date Time Provider Agnes Card   8/3/2020 10:30 AM [facility] ROOM 1 [facility]   8/13/2020 10:30 AM [facility] [place]   8/14/2020  2:50 PM YAMIL Jamison - NP [facility]       Laureano Orellana RN

## 2020-07-15 ENCOUNTER — APPOINTMENT (OUTPATIENT)
Dept: NUCLEAR MEDICINE | Age: 60
End: 2020-07-15
Payer: MEDICARE

## 2020-07-15 LAB
CHOLESTEROL, TOTAL: 208 MG/DL (ref 160–199)
EKG P AXIS: 52 DEGREES
EKG P AXIS: 53 DEGREES
EKG P-R INTERVAL: 166 MS
EKG P-R INTERVAL: 168 MS
EKG Q-T INTERVAL: 400 MS
EKG Q-T INTERVAL: 404 MS
EKG QRS DURATION: 88 MS
EKG QRS DURATION: 98 MS
EKG QTC CALCULATION (BAZETT): 426 MS
EKG QTC CALCULATION (BAZETT): 431 MS
EKG T AXIS: -120 DEGREES
EKG T AXIS: -171 DEGREES
GLUCOSE BLD-MCNC: 197 MG/DL (ref 70–99)
GLUCOSE BLD-MCNC: 444 MG/DL (ref 70–99)
HCT VFR BLD CALC: 31.5 % (ref 37–47)
HDLC SERPL-MCNC: 39 MG/DL (ref 65–121)
HEMOGLOBIN: 10.4 G/DL (ref 12–16)
LDL CHOLESTEROL CALCULATED: 99 MG/DL
LV EF: 58 %
LVEF MODALITY: NORMAL
MCH RBC QN AUTO: 31 PG (ref 27–31)
MCHC RBC AUTO-ENTMCNC: 33 G/DL (ref 33–37)
MCV RBC AUTO: 94 FL (ref 81–99)
PDW BLD-RTO: 13 % (ref 11.5–14.5)
PERFORMED ON: ABNORMAL
PERFORMED ON: ABNORMAL
PLATELET # BLD: 217 K/UL (ref 130–400)
PMV BLD AUTO: 9.3 FL (ref 9.4–12.3)
RBC # BLD: 3.35 M/UL (ref 4.2–5.4)
TRIGL SERPL-MCNC: 352 MG/DL (ref 0–149)
TROPONIN: 0.05 NG/ML (ref 0–0.03)
TROPONIN: 0.06 NG/ML (ref 0–0.03)
WBC # BLD: 5.9 K/UL (ref 4.8–10.8)

## 2020-07-15 PROCEDURE — 6360000002 HC RX W HCPCS: Performed by: INTERNAL MEDICINE

## 2020-07-15 PROCEDURE — G0378 HOSPITAL OBSERVATION PER HR: HCPCS

## 2020-07-15 PROCEDURE — 84484 ASSAY OF TROPONIN QUANT: CPT

## 2020-07-15 PROCEDURE — 94640 AIRWAY INHALATION TREATMENT: CPT

## 2020-07-15 PROCEDURE — 99226 PR SBSQ OBSERVATION CARE/DAY 35 MINUTES: CPT | Performed by: INTERNAL MEDICINE

## 2020-07-15 PROCEDURE — 80061 LIPID PANEL: CPT

## 2020-07-15 PROCEDURE — 6370000000 HC RX 637 (ALT 250 FOR IP): Performed by: INTERNAL MEDICINE

## 2020-07-15 PROCEDURE — 2500000003 HC RX 250 WO HCPCS: Performed by: INTERNAL MEDICINE

## 2020-07-15 PROCEDURE — 85027 COMPLETE CBC AUTOMATED: CPT

## 2020-07-15 PROCEDURE — 2580000003 HC RX 258: Performed by: INTERNAL MEDICINE

## 2020-07-15 PROCEDURE — 3430000000 HC RX DIAGNOSTIC RADIOPHARMACEUTICAL: Performed by: INTERNAL MEDICINE

## 2020-07-15 PROCEDURE — 93308 TTE F-UP OR LMTD: CPT

## 2020-07-15 PROCEDURE — 93017 CV STRESS TEST TRACING ONLY: CPT

## 2020-07-15 PROCEDURE — 93005 ELECTROCARDIOGRAM TRACING: CPT | Performed by: INTERNAL MEDICINE

## 2020-07-15 PROCEDURE — 93010 ELECTROCARDIOGRAM REPORT: CPT | Performed by: INTERNAL MEDICINE

## 2020-07-15 PROCEDURE — 96372 THER/PROPH/DIAG INJ SC/IM: CPT

## 2020-07-15 PROCEDURE — A9500 TC99M SESTAMIBI: HCPCS | Performed by: INTERNAL MEDICINE

## 2020-07-15 PROCEDURE — 36415 COLL VENOUS BLD VENIPUNCTURE: CPT

## 2020-07-15 PROCEDURE — 96376 TX/PRO/DX INJ SAME DRUG ADON: CPT

## 2020-07-15 PROCEDURE — 82947 ASSAY GLUCOSE BLOOD QUANT: CPT

## 2020-07-15 RX ORDER — SODIUM CHLORIDE 0.9 % (FLUSH) 0.9 %
10 SYRINGE (ML) INJECTION PRN
Status: DISCONTINUED | OUTPATIENT
Start: 2020-07-15 | End: 2020-07-16 | Stop reason: SDUPTHER

## 2020-07-15 RX ORDER — DIPHENHYDRAMINE HCL 25 MG
25 TABLET ORAL EVERY 6 HOURS PRN
Status: DISCONTINUED | OUTPATIENT
Start: 2020-07-15 | End: 2020-07-16 | Stop reason: HOSPADM

## 2020-07-15 RX ORDER — SODIUM CHLORIDE 0.9 % (FLUSH) 0.9 %
10 SYRINGE (ML) INJECTION EVERY 12 HOURS SCHEDULED
Status: DISCONTINUED | OUTPATIENT
Start: 2020-07-15 | End: 2020-07-16 | Stop reason: SDUPTHER

## 2020-07-15 RX ADMIN — CARVEDILOL 3.12 MG: 3.12 TABLET, FILM COATED ORAL at 20:16

## 2020-07-15 RX ADMIN — GABAPENTIN 600 MG: 600 TABLET, FILM COATED ORAL at 17:30

## 2020-07-15 RX ADMIN — AMLODIPINE BESYLATE 5 MG: 5 TABLET ORAL at 11:10

## 2020-07-15 RX ADMIN — Medication 10 MG: at 20:15

## 2020-07-15 RX ADMIN — GABAPENTIN 600 MG: 600 TABLET, FILM COATED ORAL at 20:16

## 2020-07-15 RX ADMIN — ENOXAPARIN SODIUM 40 MG: 40 INJECTION SUBCUTANEOUS at 11:11

## 2020-07-15 RX ADMIN — CARVEDILOL 3.12 MG: 3.12 TABLET, FILM COATED ORAL at 11:10

## 2020-07-15 RX ADMIN — CLOPIDOGREL BISULFATE 75 MG: 75 TABLET ORAL at 11:10

## 2020-07-15 RX ADMIN — GABAPENTIN 600 MG: 600 TABLET, FILM COATED ORAL at 11:11

## 2020-07-15 RX ADMIN — BUMETANIDE 1 MG: 1 TABLET ORAL at 17:30

## 2020-07-15 RX ADMIN — VALSARTAN 160 MG: 160 TABLET, FILM COATED ORAL at 11:10

## 2020-07-15 RX ADMIN — DIPHENHYDRAMINE HCL 25 MG: 25 TABLET ORAL at 20:39

## 2020-07-15 RX ADMIN — DULOXETINE HYDROCHLORIDE 60 MG: 60 CAPSULE, DELAYED RELEASE ORAL at 11:12

## 2020-07-15 RX ADMIN — REGADENOSON 0.4 MG: 0.08 INJECTION, SOLUTION INTRAVENOUS at 10:15

## 2020-07-15 RX ADMIN — VITAMIN D, TAB 1000IU (100/BT) 1000 UNITS: 25 TAB at 11:08

## 2020-07-15 RX ADMIN — DULOXETINE HYDROCHLORIDE 30 MG: 30 CAPSULE, DELAYED RELEASE ORAL at 11:07

## 2020-07-15 RX ADMIN — PRAMIPEXOLE DIHYDROCHLORIDE 0.5 MG: 0.25 TABLET ORAL at 11:09

## 2020-07-15 RX ADMIN — TETRAKIS(2-METHOXYISOBUTYLISOCYANIDE)COPPER(I) TETRAFLUOROBORATE 10 MILLICURIE: 1 INJECTION, POWDER, LYOPHILIZED, FOR SOLUTION INTRAVENOUS at 10:47

## 2020-07-15 RX ADMIN — TETRAKIS(2-METHOXYISOBUTYLISOCYANIDE)COPPER(I) TETRAFLUOROBORATE 30 MILLICURIE: 1 INJECTION, POWDER, LYOPHILIZED, FOR SOLUTION INTRAVENOUS at 10:46

## 2020-07-15 RX ADMIN — IPRATROPIUM BROMIDE 0.5 MG: 0.5 SOLUTION RESPIRATORY (INHALATION) at 06:09

## 2020-07-15 RX ADMIN — SODIUM CHLORIDE, PRESERVATIVE FREE 10 ML: 5 INJECTION INTRAVENOUS at 11:12

## 2020-07-15 RX ADMIN — SODIUM CHLORIDE, PRESERVATIVE FREE 10 ML: 5 INJECTION INTRAVENOUS at 20:15

## 2020-07-15 RX ADMIN — SODIUM CHLORIDE, PRESERVATIVE FREE 10 ML: 5 INJECTION INTRAVENOUS at 23:34

## 2020-07-15 RX ADMIN — NITROGLYCERIN 5 MCG/MIN: 20 INJECTION INTRAVENOUS at 06:32

## 2020-07-15 RX ADMIN — BUMETANIDE 1 MG: 1 TABLET ORAL at 11:09

## 2020-07-15 RX ADMIN — INSULIN GLARGINE 40 UNITS: 100 INJECTION, SOLUTION SUBCUTANEOUS at 20:15

## 2020-07-15 RX ADMIN — CETIRIZINE HYDROCHLORIDE 10 MG: 10 TABLET, FILM COATED ORAL at 20:16

## 2020-07-15 RX ADMIN — MONTELUKAST SODIUM 10 MG: 10 TABLET, FILM COATED ORAL at 20:16

## 2020-07-15 RX ADMIN — PRAMIPEXOLE DIHYDROCHLORIDE 0.5 MG: 0.25 TABLET ORAL at 17:30

## 2020-07-15 ASSESSMENT — PAIN SCALES - GENERAL
PAINLEVEL_OUTOF10: 0

## 2020-07-15 NOTE — PROGRESS NOTES
Meliza Ricci arrived to room # [phone]. Presented with: Chest pain    Mental Status: Patient is oriented, alert, coherent, logical, thought processes intact and able to concentrate and follow conversation. Vitals:    07/15/20 0049   BP: (!) 119/50   Pulse: 76   Resp: 16   Temp: 97.5 °F (36.4 °C)   SpO2: 90%     Patient safety contract and falls prevention contract reviewed with patient Yes. Oriented Patient to room. Call light within reach. Yes.   Needs, issues or concerns expressed at this time: no.      Electronically signed by Alexandrea Jordan on 7/15/2020 at 6:07 AM

## 2020-07-15 NOTE — PROGRESS NOTES
[address]    Daily HOSPITAL Progress Note                            Date:  7/15/20  Patient: Sheela Morrell  Admission:  7/14/2020  1:13 PM  Admit DX: Chest discomfort [R07.89]  Age:  56 y.o., 1960        Date of Admission 7/14/2020  1:13 PM   Hospital Length of Stay  LOS: 0 days        Date / Time Of Initially Being Seen For This Daily Visit:  7/15/20, at approximately noon. At that time, I personally saw the patient and rounded with:  Willow Dumont RN Nurse [id number] [place] [address]   RN    Date / Time That This Note Is Written:  7/15/2020  at  6:43 PM        The observations documented in this note, including the assessment   and plan are mine    Portions of this note have been copied forward, from prior notes, yet modified, to reflect today's clinical status of this patient. Reason for initial evaluation or the patient's initial complaint    1. Reason for Hospital Admission: Chest pain        2. Reason for Cardiology Consultation: CAD         SUBJECTIVE:      Chief Complaint / Reason for the Visit   Follow up of:  Chest pain and CAD and systemic arterial hypertension    Family present and in room during examination:  no    At the time of the examination, the patient was:  Lying in bed      Specialty Problems        Cardiology Problems    Acute on chronic diastolic congestive heart failure (HCC)        Pericardial effusion        Nonobstructive atherosclerosis of coronary artery        Pulmonary hypertension ([address]        Essential hypertension        Pulmonary edema with congestive heart failure (HCC)              Current Status Today According to the patient:  Today she is feeling \"ok\". Subjective:  Ms. Sheela Morrell is generally feeling show no change:  lexiscan + for ischemia    Ms. Sheela Morrell has the following cardiac complaints / symptoms today:    1. Chest pain, none today    2. CAD, lexiscan + for ischemia    3.  Hypertension    The blood pressure for the lastr 36 hours has been:  Systolic (83MZN), TRICIA , Min:119 , BQX:054    Diastolic (45NEQ), FUY:64, Min:34, Max:79        Caleb Vidal is a 56 y.o. female with the following history as recorded in [facility]:    Patient Active Problem List    Diagnosis Date Noted    Chest discomfort 2020     Priority: High    Pulmonary edema with congestive heart failure ([facility] 2020     Priority: Low    Palliative care patient 2020     Priority: Low    Obstructive sleep apnea syndrome 2020     Priority: Low    Essential hypertension 2020     Priority: Low    Moderate persistent asthma without complication      Priority: Low    Pulmonary hypertension ([facility] 2020     Priority: Low    Nonobstructive atherosclerosis of coronary artery 2020     Priority: Low    CKD (chronic kidney disease) stage 3, GFR 30-59 ml/min ([facility]) 2020     Priority: Low    Morbid obesity with BMI of 40.0-44.9, adult ([facility] 2020     Priority: Low    SOB (shortness of breath) 2020     Priority: Low    Pericardial effusion 2020     Priority: Low    Acute on chronic diastolic congestive heart failure ([facility] 2020     Priority: Low    Normocytic anemia 2020     Priority: Low    Type 2 diabetes mellitus, with long-term current use of insulin ([facility] 2020     Priority: Low    COPD (chronic obstructive pulmonary disease) ([facility] 2020     Priority: Low    Hyperglycemia 2020     Priority: Low    Acute kidney injury ([facility] 2020     Priority: Low    Elevated d-dimer 2020     Priority: Low     Current Facility-Administered Medications   Medication Dose Route Frequency Provider Last Rate Last Dose    nitroGLYCERIN 50 mg in dextrose 5% 250 mL infusion  5 mcg/min Intravenous Continuous Ivan Fonseca MD 1.5 mL/hr at 07/15/20 0632 5 mcg/min at 07/15/20 7104    clopidogrel (PLAVIX) tablet 75 mg  75 mg Oral Daily Ivan Fonseca MD   75 mg at 07/15/20 1110    of Systems:    General:      Complaint / Symptom Yes / No / Description if Yes       Fatigue Yes:  chronic   Weight gain NA   Insomnia NA       Respiratory:        Complaint / Symptom Yes / No / Description if Yes       Cough No   Horseness NA       Cardiovascular:    Complaint / Symptom Yes / No / Description if Yes       Chest Pain No   Shortness of Air / Orthopnea Yes: chronic and show no change:   Presyncope / Syncope No   Palpitations No         Objective:    BP (!) 123/57   Pulse 77   Temp 99.5 °F (37.5 °C) (Temporal)   Resp 14   Ht 5' 1\" (1.549 m)   Wt 220 lb 4 oz (99.9 kg)   SpO2 93%   BMI 41.62 kg/m² , No intake or output data in the 24 hours ending 07/15/20 1843    GENERAL - well developed and well nourished, is an active participant in this examination  HEENT -  PERRLA, Hearing appears normal, conjunctiva and lids are normal, ears and nose appear normal  NECK - no thyromegaly, no JVD, trachea is in the midline  CARDIOVASCULAR - PMI is in the left mid line clavicular position, Normal S1 and S2 with a grade 1/6 systolic murmur. No S3 or S4    PULMONARY - No respiratory distress. scattered wheezes and rales.   Breath sounds in both  lung fields are Decreased  ABDOMEN  - soft, non tender, no rebound, no hepatomegaly or splenomegaly  MUSCULOSKELETAL  - Prone/Supine, digitals and nails are without clubbing or cyanosis  EXTREMITIES - trace edema  NEUROLOGIC - cranial nerves, II-XII, are normal  SKIN - turgor is normal, no rash  PSYCHIATRIC - normal mood and affect, alert and orientated x 3, judgement and insight appear appropriate      ASSESSMENT:    ALL THE CARDIOLOGY PROBLEMS ARE LISTED ABOVE; HOWEVER, THE FOLLOWING SPECIFIC CARDIAC PROBLEMS / CONDITIONS WERE ADDRESSED AND TREATED DURING THE HOSPITAL VISIT TODAY:                                                                                            MEDICAL DECISION MAKING                   Cardiac Specific Problem / Diagnosis   Discussion and Data Reviewed Diagnostic or Therapeutic Procedures Ordered Management Options Selected                               1. Presenting problem / symptom     Chest discomfort of ? etiology  are worsening    The chest discomfort is was not exertional and does not appear to represent myocardial ischemia.       Nonetheless, She has the following risk factors for the presence of coronary artery disease:     Risk Factor Yes / No / Unknown         Gender Female   Cigarette Use No, but did use in the past    Family History of Cardiovascular Disease N/A   Diabetes Mellitus no   Hypercholesteremia yes   Hypertension yes          She also has the following cardiac history:         Specialty Problems                Cardiology Problems     Acute on chronic diastolic congestive heart failure (HCC)           Pericardial effusion           Nonobstructive atherosclerosis of coronary artery           Pulmonary hypertension (HCC)           Essential hypertension           Pulmonary edema with congestive heart failure (HCC)                  Yes: lexiscan Continue current medications:     Yes:                         2. History of a pericardial effusion Initial presentation during this evaluation    Review and summation of old records:     Stents in the proximal circ and OM1  11/21/2019  Cath  LVEDP 25, PA 60/30, normal LVFX, mild CAD ([facility], [address])  1/26/2020  Echo mild to moderate pericardial eff, normal LVF  3/7/2020  Echo large pericardial effusion  5/29/2020  Echo  Severe LVH, DD, normal LVFX, small pericardial effusion    Yes: echo Continue current medications:     Yes:                  3.  Concern regarding systemic blood pressure Initial presentation during this evaluation The blood pressure for the lastr 36 hours has been:  Systolic (94XGM), CPU:495 , Min:119 , XZK:246    Diastolic (75CDM), XPI:74, Min:34, Max:79      No Continue current medications:        yes                      DAILY SUMMARY OF HOSPITAL COURSE:       Date Clinical Event Plan of Treatment           7/14/2020 Admitted for: chest pain  Cardiology Concern:  Known CAD  lexiscan tomorrow   07/15/20  7/15/20  lexiscan Positive for inferior lateral myocardial ischemia, EF 33%, -3% ischemic myocardium on stress, intermediate risk findings, AUC indication 16, AUC score 7, (MD Chano) Cath tomorrow                                  PRIOR CARDIAC PROBLEM LIST  (IF APPLICABLE):    Stents in the proximal circ and OM1  11/21/2019  Cath  LVEDP 25, PA 60/30, normal LVFX, mild CAD ([facility], [address])  1/26/2020  Echo mild to moderate pericardial eff, normal LVF  3/7/2020  Echo large pericardial effusion  5/29/2020  Echo  Severe LVH, DD, normal LVFX, small pericardial effusion  7/15/20  lexiscan Positive for inferior lateral myocardial ischemia, EF 33%, -3% ischemic myocardium on stress, intermediate risk findings, AUC indication 16, AUC score 7, Ira Olson MD)  7/15/20  Echo  Normal LVFX, moderate pericardial effusion        Date of the Proposed Procedure:  07/16/20      Proposed Procedure:  Selective left heart and coronary arteriography with possible percutaneous coronary interventon, (femoral approach), 07/16/20      :  HOMERO Pittman MD    Indications:  7/15/20  lexiscan Positive for inferior lateral myocardial ischemia, EF 33%, -3% ischemic myocardium on stress, intermediate risk findings, AUC indication 16, AUC score 7, (MD Chano)    American Society of Anesthesiologists (ASA) Classification:  III    Plan of Sedation:  Moderate Sedation    Mallampati Classification:  II    I have examined this patient on 07/15/20  in PCU # [id number] in the presence of [address] RN and find no interval changes since the original History and Physical / Consult as noted written by myself on 07/15/20     With the constellation of symptoms and these findings, I recommend cardiac catheterization and possibility of percutaneous coronary intervention. I discussed with her  in detail  the risks, benefits and alternatives to this procedure. The risks mentioned to her include but are not limited to:  vascular complications in ~ 3%, stroke <1%, renal dysfunction <5%, myocardial infarction <1%, coronary dissection <1%, need for emergency open heart surgery <1, and death <1% . She appeared to understand, had no questions, and agreed to proceed with this plan. Additionally, there are risks associated with moderate sedation which includes transient drop in blood pressure and need for assisted ventilation. This procedure is scheduled for 07/16/20 .     Connie Weiss MD

## 2020-07-15 NOTE — PROGRESS NOTES
Pt to [facility] by transport on stretcher.  Electronically signed by Sanaz Germain RN on 7/15/2020 at 10:26 AM

## 2020-07-15 NOTE — PROGRESS NOTES
Pt now returned from [facility]idget scan.  Electronically signed by Henrique Hui RN on 7/15/2020 at 10:26 AM

## 2020-07-15 NOTE — PROGRESS NOTES
[facility] is being assessed upon: Admission    I agree that I, Celso Oconnell, along with Cristinelo RN (either 2 RN's or 1 LPN and 1 RN) have performed a thorough Head to Toe Skin Assessment on the patient. ALL assessment sites listed below have been assessed. Areas assessed by both nurses:     [x]   Head, Face, and Ears   [x]   Shoulders, Back, and Chest  [x]   Arms, Elbows, and Hands   [x]   Coccyx, Sacrum, and Ischium  [x]   Legs, Feet, and Heels    Does the Patient have Skin Breakdown?  No    Rupert Prevention initiated: Yes  Wound Care Orders initiated: No    [facility] nurse consulted for Pressure Injury (Stage 3,4, Unstageable, DTI, NWPT, and Complex wounds) and New or Established Ostomies: No        Primary Nurse eSignature: Celso Oconnell on 7/15/2020 at 6:08 AM      Co-Signer eSignature: {Esignature:[id number]}

## 2020-07-16 VITALS
SYSTOLIC BLOOD PRESSURE: 146 MMHG | TEMPERATURE: 96.7 F | WEIGHT: 220.25 LBS | BODY MASS INDEX: 41.58 KG/M2 | DIASTOLIC BLOOD PRESSURE: 69 MMHG | RESPIRATION RATE: 17 BRPM | HEART RATE: 78 BPM | HEIGHT: 61 IN | OXYGEN SATURATION: 97 %

## 2020-07-16 LAB
GLUCOSE BLD-MCNC: 207 MG/DL (ref 70–99)
GLUCOSE BLD-MCNC: 286 MG/DL (ref 70–99)
GLUCOSE BLD-MCNC: 302 MG/DL (ref 70–99)
LV EF: 33 %
LV EF: 33 %
LVEF MODALITY: NORMAL
LVEF MODALITY: NORMAL
PERFORMED ON: ABNORMAL

## 2020-07-16 PROCEDURE — 82947 ASSAY GLUCOSE BLOOD QUANT: CPT

## 2020-07-16 PROCEDURE — C1894 INTRO/SHEATH, NON-LASER: HCPCS

## 2020-07-16 PROCEDURE — 6360000004 HC RX CONTRAST MEDICATION: Performed by: INTERNAL MEDICINE

## 2020-07-16 PROCEDURE — G0378 HOSPITAL OBSERVATION PER HR: HCPCS

## 2020-07-16 PROCEDURE — 99152 MOD SED SAME PHYS/QHP 5/>YRS: CPT | Performed by: INTERNAL MEDICINE

## 2020-07-16 PROCEDURE — 93458 L HRT ARTERY/VENTRICLE ANGIO: CPT | Performed by: INTERNAL MEDICINE

## 2020-07-16 PROCEDURE — 2709999900 HC NON-CHARGEABLE SUPPLY

## 2020-07-16 PROCEDURE — 99024 POSTOP FOLLOW-UP VISIT: CPT | Performed by: INTERNAL MEDICINE

## 2020-07-16 PROCEDURE — 6370000000 HC RX 637 (ALT 250 FOR IP): Performed by: INTERNAL MEDICINE

## 2020-07-16 PROCEDURE — C1760 CLOSURE DEV, VASC: HCPCS

## 2020-07-16 PROCEDURE — 6360000002 HC RX W HCPCS

## 2020-07-16 RX ORDER — SODIUM CHLORIDE 0.9 % (FLUSH) 0.9 %
10 SYRINGE (ML) INJECTION PRN
Status: DISCONTINUED | OUTPATIENT
Start: 2020-07-16 | End: 2020-07-16 | Stop reason: HOSPADM

## 2020-07-16 RX ORDER — SODIUM CHLORIDE 0.9 % (FLUSH) 0.9 %
10 SYRINGE (ML) INJECTION EVERY 12 HOURS SCHEDULED
Status: DISCONTINUED | OUTPATIENT
Start: 2020-07-16 | End: 2020-07-16 | Stop reason: HOSPADM

## 2020-07-16 RX ADMIN — CLOPIDOGREL BISULFATE 75 MG: 75 TABLET ORAL at 10:52

## 2020-07-16 RX ADMIN — VALSARTAN 160 MG: 160 TABLET, FILM COATED ORAL at 10:52

## 2020-07-16 RX ADMIN — BUMETANIDE 1 MG: 1 TABLET ORAL at 10:52

## 2020-07-16 RX ADMIN — AMLODIPINE BESYLATE 5 MG: 5 TABLET ORAL at 10:52

## 2020-07-16 RX ADMIN — DULOXETINE HYDROCHLORIDE 60 MG: 60 CAPSULE, DELAYED RELEASE ORAL at 10:52

## 2020-07-16 RX ADMIN — DULOXETINE HYDROCHLORIDE 30 MG: 30 CAPSULE, DELAYED RELEASE ORAL at 10:53

## 2020-07-16 RX ADMIN — PRAMIPEXOLE DIHYDROCHLORIDE 0.5 MG: 0.25 TABLET ORAL at 10:54

## 2020-07-16 RX ADMIN — GABAPENTIN 600 MG: 600 TABLET, FILM COATED ORAL at 10:53

## 2020-07-16 RX ADMIN — CARVEDILOL 3.12 MG: 3.12 TABLET, FILM COATED ORAL at 10:53

## 2020-07-16 RX ADMIN — VITAMIN D, TAB 1000IU (100/BT) 1000 UNITS: 25 TAB at 10:52

## 2020-07-16 RX ADMIN — IOPAMIDOL 126 ML: 612 INJECTION, SOLUTION INTRAVENOUS at 08:40

## 2020-07-16 ASSESSMENT — PAIN SCALES - GENERAL
PAINLEVEL_OUTOF10: 0

## 2020-07-16 NOTE — PROCEDURES
Cardiac Risk Profile -       Risk Factor Yes / No / Unknown         Gender Female   Cigarette Use No, but did use in the past    Family History of Cardiovascular Disease N/A   Diabetes Mellitus no   Hypercholesteremia yes   Hypertension yes        Prior Cardiac History -    Stents in the proximal circ and OM1  11/21/2019  Cath  LVEDP 25, PA 60/30, normal LVFX, mild CAD ([facility], [address])  1/26/2020  Echo mild to moderate pericardial eff, normal LVF  3/7/2020  Echo large pericardial effusion  5/29/2020  Echo  Severe LVH, DD, normal LVFX, small pericardial effusion  7/15/20  lexiscan Positive for inferior lateral myocardial ischemia, EF 33%, -3% ischemic myocardium on stress, intermediate risk findings, AUC indication 16, AUC score 7, (MD Chano)  7/15/20  Echo  Normal LVFX, moderate pericardial effusion  7/16/20  Cath  Mild CAD, normal LVFX    Indications for Cardiac Catheterization -  7/15/20  lexiscan Positive for inferior lateral myocardial ischemia, EF 33%, -3% ischemic myocardium on stress, intermediate risk findings, AUC indication 16, AUC score 7, Oswald Babb MD), Diagnostic Catheterization Appropriate Use Criteria Description, [facility] 2012;[id number]      After obtaining informed written consent, the patient was brought to the catheterization laboratory where the right groin was prepared in the usual sterile fashion. Moderate Conscious Sedation Protocol Used During this Procedure -    An independent trained observer, registered nurse, administered medications at my direction. We, myself and the independent trained observer / registered nurse, monitored the patients's level of consciousness, vital signs, and physiological status, (including ECG and pulse oximetry) throughout the procedure duration. (See start and stop times below).         Anesthesia: Moderate   Sedation: 1 mg Midazolam (Versed)  0 mcg Fentanyl   Start time: 08:21   Stop time: 09:31   ASA Class: III   Estimated blood loss < 5 ml           Additionally, please review the \"Hetal Hemodynamic Procedure Report\", which is generated electronically through the [employer]. This report includes additional details regarding this procedure including, but not limited to:     1. Patient Data,   2. Admission,   3. Procedure,   4. Hemodynamics,   5. Vital Signs,   6. Medications, including conscious sedation medications given during the procedure (as noted above),   7. Procedure Log,   8. Device Usage,   9. Signature Audit [place], and,   10. Signatures. It should be noted, that I sign the \"Signatures\" line electronically through the \"HPF Def Portals\" tab on my computer. A time-out was preformed by the nursing staff immediately prior to beginning the procedure and included the following items:      1) Patient Identification: Marcela Mack  2) YOB: 1960  3) Physician:  Alberto Woods. Leila Preciado MD  4) Procedure:  Selective left heart and coronary arteriography with possible percutaneous coronary interventon, (femoral approach)  5) Equipment:  Available and ready  6) Site:   Right groin  7) Consents:  Signed and witnessed  8) Allergies:   Albuterol; Levaquin [levofloxacin]; Metformin and related; and Aspirin    After the time-out was completed, the procedure team, including myself, agreed that this was the correct patient and procedural details. 2% lidocaine was injected above the common femoral artery. Utilizing ultrasound assistance and the Seldinger technique, the common femoral artery was cannulated and bright red pulsatile blood returned. Using fluoroscopy guidance, the J-tip wire was placed in the common femoral artery. A 6-Fr sheath was inserted. Utilizing 6-Uzbek Caroline catheters, selective coronary arteriography was performed followed by left ventriculography. At this stage, the equipment was removed.   A right femoral arteriogram was performed to ensure patency of the vessel so that a vascular access closure

## 2020-07-16 NOTE — DISCHARGE SUMMARY
[facility]    Discharge Summary          I personally saw the patient and rounded with:  May Ortiz RN, on  7/16/20      The observations documented in this note, including the assessment and plan are mine              Patient ID: Lubertha Bi      Patient's PCP: YAMIL James NP    Admit Date: 7/14/2020     Discharge Date:  07/16/20     Admitting Physician:  Elizabeth Gonzalez MD       Discharge Physician: Elizabeth Gonzalez MD     Discharge Diagnoses:    1. Chest discomfort of ? Etiology, doubt myocardial ischemia, cardiac catheterization, 7/16/20, mild CAD, normal left ventricular function    7/15/20  lexiscan Positive for inferior lateral myocardial ischemia, EF 33%, -3% ischemic myocardium on stress, intermediate risk findings, AUC indication 16, AUC score 7, (MD [place])    2. Systemic arterial hypertension  3. Hypercholesteremia  4. Coronary artery disease    Stents in the proximal circ and OM1  11/21/2019  Cath  LVEDP 25, PA 60/30, normal LVFX, mild CAD ([place], [address])  1/26/2020  Echo mild to moderate pericardial eff, normal LVF  3/7/2020  Echo large pericardial effusion  5/29/2020  Echo  Severe LVH, DD, normal LVFX, small pericardial effusion  7/15/20  lexiscan Positive for inferior lateral myocardial ischemia, EF 33%, -3% ischemic myocardium on stress, intermediate risk findings, AUC indication 16, AUC score 7, Rosalba Orozco MD)  7/15/20  Echo  Normal LVFX, moderate pericardial effusion  7/16/20  Cath  Mild CAD, normal LVFX    5.  Prior cigarette use        Cardiac Specific Diagnoses:    Specialty Problems        Cardiology Problems    Acute on chronic diastolic congestive heart failure (HCC)        Pericardial effusion        Nonobstructive atherosclerosis of coronary artery        Pulmonary hypertension (HCC)        Essential hypertension        Pulmonary edema with congestive heart failure ([address]                The patient was seen and examined on day of discharge and this discharge summary is in conjunction with any daily progress note from day of discharge. History of Present Illness:     Karishma Cast is a 56 y.o. female who presents to [facility] ED # 9 (admitted on 7/14/2020  1:13 PM) with symptoms / signs / problem or diagnosis of chest discomfort.      She is normal mood and affect, alert and orientated x 3, judgement and insight appear appropriate.         Family present:  Yes   At the beginning of the interview she was lying on a stretcher and talking to family.        CONTEXT OF THIS HISTORY OF PRESENT ILLNESS INCLUDE: (IF APPLICABLE):     Presentation:  She  presented with chest discomfort. The discomfort is sharp but relieved to nitro. Cardiology was asked to see her regarding these symptoms and findings and to consider those in relationship to possible optimal diagnosis and treatment options.        FEATURES OF THIS HISTORY OF PRESENT ILLNESS (SYMPTOMS AND FINDINGS) INCLUDE:         1. Location: The chest discomfort was located in the central to left chest with radiation to the back, throat and left shoulder. 2. Duration: The discomfort began this am and lasted minutes   3. Quality: The chest discomfort was no pressure, fullness and tightness. It was not crushing. It was sharp.      4. Severity: It was described as mild but progessive   5. Timing The symptoms began at rest.   6. Modifying Factors: Modifying features included:   Nitro seems to help   7. Associated Signs and Symptoms: There was not  associated manifestations such as nausea, vomiting, diaphoresis, presyncope, syncope, dizzyness, weakness, cold sweats, or shortness of air.      8. Context: As noted above in the context of the presentation.     It  was perhaps suggestive of myocardial ischemia.         When being examined this afternoon, in ED, # 9 with ED RN, there was not on going or continuous symptoms of exertional chest discomfort, unusual or change in shortness of air, presyncope or syncope. Hospital Course:     After admission, there was no enzymatic or electrocardiographic evidence of myocardial necrosis. The patient had a [address] / cardiolyte, 7/15/2020  which was grossly positive for myocardial ischemia by imaging criteria. The patient underwent cardiac catheterization according to the following criteria:  7/15/20  lexiscan Positive for inferior lateral myocardial ischemia, EF 33%, -3% ischemic myocardium on stress, intermediate risk findings, AUC indication 16, AUC score 7, Luz Tan MD), Diagnostic Catheterization Appropriate Use Criteria Description, [facility] 2012;59:8314-4166    This lead to cardiac catheterization on 07/16/20  which revealed:    1. Successful femoral artery ultrasound  2. Successful femoral artery arteriogram  3. Supervision of the administration of moderate conscious sedation  4. Mild coronary artery disease  5. Left ventricular function is normal  6. Patent stents in the circumflex marginal system    This was a most reassuring report and strongly suggested that the patient's chest discomfort was of low probability of representing myocardial ischemia. In view of the stable coronary anatomy, I felt that an attempt of intense risk factor modification and medical therapy would be the initial course of therapy. The rest of the patient's hospitalization was uneventful. She was discharged home on medical therapy with outpatient follow up. Consults:     IP CONSULT TO CARDIOLOGY      Significant Diagnostic Studies:    1. [address] / cardiolyte, 7/15/2020   2. Selective left heart and coronary arteriography (femoral approach), 07/16/20        Significant Therapeutic Endeavors:      1. none      Activity: activity as directed per discharge instructions. Diet:  Cardiac diet    Labs:  For convenience and continuity at follow-up the following most recent labs are provided:    CBC:    Lab Results   Component Value Date    WBC 5.9 07/15/2020    HGB 10.4 07/15/2020    HCT 31.5 07/15/2020     07/15/2020       Renal:    Lab Results   Component Value Date     07/14/2020    K 5.0 07/14/2020    CL 96 07/14/2020    CO2 28 07/14/2020    BUN 33 07/14/2020    CREATININE 1.2 07/14/2020    CALCIUM 9.7 07/14/2020         Discharge Medications:     Current Discharge Medication List           Details   bumetanide (BUMEX) 1 MG tablet Take 1 tablet by mouth 2 times daily  Qty: 60 tablet, Refills: 0    Associated Diagnoses: Chronic diastolic congestive heart failure ([facility]      ! ! DULoxetine (CYMBALTA) 60 MG extended release capsule Take 1 capsule by mouth daily  Qty: 30 capsule, Refills: 3    Associated Diagnoses: Anxiety and depression      gabapentin (NEURONTIN) 600 MG tablet Take 1 tablet by mouth 3 times daily for 30 days. Qty: 90 tablet, Refills: 0    Associated Diagnoses: Neuropathy      carvedilol (COREG) 3.125 MG tablet Take 1 tablet by mouth 2 times daily  Qty: 60 tablet, Refills: 2    Associated Diagnoses: Chronic diastolic congest[facility] failure ([facility]      ! !  DULoxetine (CYMBALTA) 30 MG extended release capsule Take 1 capsule by mouth daily Take with 60 mg capsule for total of 90 mg.  Qty: 30 capsule, Refills: 3    Associated Diagnoses: Anxiety and depression      amLODIPine (NORVASC) 5 MG tablet Take 1 tablet by mouth daily  Qty: 30 tablet, Refills: 1      pramipexole (MIRAPEX) 0.5 MG tablet Take 1 tablet by mouth 2 times daily  Qty: 60 tablet, Refills: 2    Associated Diagnoses: Mixed stress and urge urinary incontinence      valsartan (DIOVAN) 160 MG tablet Take 160 mg by mouth daily      atorvastatin (LIPITOR) 20 MG tablet Take 1/2 tablet for 1 week, then increase to whole tablet  Qty: 30 tablet, Refills: 5    Associated Diagnoses: Mixed hyperlipidemia      clopidogrel (PLAVIX) 75 MG tablet Take 75 mg by mouth daily      montelukast (SINGULAIR) 10 MG tablet Take 10 mg by mouth nightly      vitamin D (CHOLECALCIFEROL) 25 MCG (1000 UT) TABS tablet Take 1,000 Units by mouth daily      ISOSORBIDE MONONITRATE ER PO Take 90 mg by mouth daily       Blood Glucose Monitoring Suppl (BLOOD GLUCOSE MONITOR SYSTEM) w/Device KIT 1 Device by Does not apply route three times daily  Qty: 1 kit, Refills: 0    Comments: Brand per patient preference  Associated Diagnoses: Type 2 diabetes mellitus with other specified complication, with long-term current use of insulin ([facility])      blood glucose monitor strips Test 3 times a day & as needed for symptoms of irregular blood glucose. Dispense sufficient amount for indicated testing frequency plus additional to accommodate PRN testing needs. Qty: 100 strip, Refills: 11    Comments: Brand per patient preference. May round up to next available package size.   Associated Diagnoses: Type 2 diabetes mellitus with other specified complication, with long-term current use of insulin ([facility])      Lancets MISC 1 each by Does not apply route 3 times daily  Qty: 200 each, Refills: 11    Comments: Brand per patient preference  Associated Diagnoses: Type 2 diabetes mellitus with other specified complication, with long-term current use of insulin ([facility])      Insulin Degludec (TRESIBA FLEXTOUCH) 100 UNIT/ML SOPN Inject 40 Units into the skin nightly  Qty: 4 pen, Refills: 2    Associated Diagnoses: Type 2 diabetes mellitus with other specified complication, with long-term current use of insulin ([facility])      Semaglutide, 1 MG/DOSE, 2 MG/1.5ML SOPN Inject 1 mg into the skin every 7 days  Qty: 2 pen, Refills: 2    Associated Diagnoses: Type 2 diabetes mellitus with other specified complication, with long-term current use of insulin ([facility])      levocetirizine (XYZAL) 5 MG tablet Take 1 tablet by mouth nightly  Qty: 30 tablet, Refills: 3    Associated Diagnoses: Environmental and seasonal allergies      levalbuterol (XOPENEX) 0.63 MG/3ML nebulization Take 3 mLs by nebulization every 6 hours as needed for Wheezing  Qty: 20 vial, Refills: 2

## 2020-07-16 NOTE — PROGRESS NOTES
Pt states she does not want scheduled breathing treatments. States, \" I take this when I need it and not every 4 hrs. Please tell the doctor and the 'breathing' janis that please\" .    Electronically signed by Joanna Burrell RN on 7/15/2020 at 8:41 PM

## 2020-07-16 NOTE — PROGRESS NOTES
Received post cardiac cath. No interventions. Bed settled back into room 715. mynx to right groin no bleeding or hematoma.  palatable pedal pulses

## 2020-07-20 ENCOUNTER — TELEPHONE (OUTPATIENT)
Dept: INTERNAL MEDICINE | Age: 60
End: 2020-07-20

## 2020-07-20 NOTE — TELEPHONE ENCOUNTER
Eddie [facility] Transitions Initial Follow Up Call    Outreach made within 2 business days of discharge: Yes    Patient: Heidy Oconnell Patient : 1960   MRN: [id number]  Reason for Admission: Admitted 20 for chest pain   Discharge Date: 20    Discharge Diagnoses:  1. Chest discomfort of ? Etiology, doubt myocardial ischemia, cardiac catheterization, 20, mild CAD, normal left ventricular function  2. Systemic arterial hypertension  3. Hypercholesteremia  4. Coronary artery disease     Spoke with:  Attempted to reach patient, no answer. Message left with appointment details. I will reach out again later today.      Discharge department/facility: [facility]       Scheduled appointment with PCP within 7-14 days    Follow Up  Future Appointments   Date Time Provider Agnes Card   2020 11:00 AM YAMIL Arizmendi NP [facility]   8/3/2020 10:30 AM [facility] ECHO ROOM 1 [facility] ECHO [facility]   2020 10:30 AM YAMIL Figueroa [facility]   2020  2:50 PM YAMIL Fernández NP [facility] [address], [address] [place] [address]

## 2020-07-20 NOTE — TELEPHONE ENCOUNTER
Eddie [facility] Transitions Initial Follow Up Call     Outreach made within 2 business days of discharge: Yes     Patient: Devang Robertson     Patient : 1960   MRN: [id number]  Reason for Admission: Admitted 20 for chest pain   Discharge Date: 20          Discharge Diagnoses:  1.         Chest discomfort of ? Etiology, doubt myocardial ischemia, cardiac catheterization, 20, mild CAD, normal left ventricular function  2.         Systemic arterial hypertension  3.         Hypercholesteremia  4.         Coronary artery disease           Spoke with:  2nd attempt to reach patient, no answer.  Message left with appointment details and to call with any needs.      Discharge department/facility: [facility] [place]         Scheduled appointment with PCP within 7-14 days     Follow Up  Future Appointments   Date Time Provider Agnes Card   2020 11:00 AM YAMIL Garza NP [facility]   8/3/2020 10:30 AM [facility] ECHO ROOM 1 [facility] ECHO [facility]   2020 10:30 AM YAMIL Duran [facility]   2020  2:50 PM YAMIL Garza NP [facility] [address]

## 2020-07-23 ENCOUNTER — TELEPHONE (OUTPATIENT)
Dept: PULMONOLOGY | Facility: CLINIC | Age: 60
End: 2020-07-23

## 2020-07-23 NOTE — TELEPHONE ENCOUNTER
Please let patient know we received records and [place] [facility] [place] like [place]. Dr. Purdy ordered IgE + 22 allergens and CBC with diff. Has she had those drawn yet?

## 2020-07-24 NOTE — TELEPHONE ENCOUNTER
Patient will go get the labs done at  next week. She will call back for results in one week of having the tests if she has not heard from our office.

## 2020-07-26 RX ORDER — GABAPENTIN 600 MG/1
TABLET ORAL
Qty: 90 TABLET | Refills: 1 | OUTPATIENT
Start: 2020-07-26 | End: 2020-09-03 | Stop reason: SDUPTHER

## 2020-07-26 NOTE — TELEPHONE ENCOUNTER
Stephan Matamoros called to request a refill on her medication.       Last office visit : 7/10/2020   Next office visit : 8/14/2020     Last UDS:   Amphetamine Screen, Urine   Date Value Ref Range Status   02/28/2020 Negative Negative <1000 ng/mL Final     Benzodiazepine Screen, Urine   Date Value Ref Range Status   02/28/2020 Negative Negative <100 ng/mL Final     Cocaine Metabolite Screen, Urine   Date Value Ref Range Status   02/28/2020 Negative Negative <300 ng/mL Final     Opiate Scrn, Ur   Date Value Ref Range Status   02/28/2020 Negative Negative < 300 ng/mL Final       Last Mildrederasmo Catracho: 4-1-2020  Medication Contract: 2-2020   Last Fill: 6-3    Requested Prescriptions     Pending Prescriptions Disp Refills    gabapentin (NEURONTIN) 600 MG tablet [Pharmacy Med Name: GABAPENTIN 600 MG TABLET 600 TAB] 90 tablet 1     Sig: TAKE ONE TABLET BY MOUTH THREE TIMES A DAY             rx called to pharmacy  Jessica Puckett LPN

## 2020-08-03 ENCOUNTER — HOSPITAL ENCOUNTER (OUTPATIENT)
Dept: NON INVASIVE DIAGNOSTICS | Age: 60
Discharge: HOME | End: 2020-08-03
Payer: MEDICARE

## 2020-08-03 LAB
LV EF: 63 %
LVEF MODALITY: NORMAL

## 2020-08-03 PROCEDURE — 93306 TTE W/DOPPLER COMPLETE: CPT

## 2020-08-11 ENCOUNTER — APPOINTMENT (OUTPATIENT)
Dept: GENERAL RADIOLOGY | Age: 60
End: 2020-08-11
Payer: MEDICARE

## 2020-08-11 ENCOUNTER — HOSPITAL ENCOUNTER (EMERGENCY)
Age: 60
Discharge: HOME | End: 2020-08-11
Attending: EMERGENCY MEDICINE
Payer: MEDICARE

## 2020-08-11 VITALS
HEIGHT: 61 IN | OXYGEN SATURATION: 97 % | BODY MASS INDEX: 42.1 KG/M2 | TEMPERATURE: 98.4 F | SYSTOLIC BLOOD PRESSURE: 152 MMHG | RESPIRATION RATE: 16 BRPM | DIASTOLIC BLOOD PRESSURE: 65 MMHG | HEART RATE: 68 BPM | WEIGHT: 223 LBS

## 2020-08-11 PROCEDURE — 99281 EMR DPT VST MAYX REQ PHY/QHP: CPT

## 2020-08-11 PROCEDURE — 73630 X-RAY EXAM OF FOOT: CPT

## 2020-08-11 PROCEDURE — 73502 X-RAY EXAM HIP UNI 2-3 VIEWS: CPT

## 2020-08-11 PROCEDURE — 73080 X-RAY EXAM OF ELBOW: CPT

## 2020-08-11 PROCEDURE — 99283 EMERGENCY DEPT VISIT LOW MDM: CPT

## 2020-08-11 PROCEDURE — 73610 X-RAY EXAM OF ANKLE: CPT

## 2020-08-11 PROCEDURE — 99282 EMERGENCY DEPT VISIT SF MDM: CPT

## 2020-08-11 PROCEDURE — 73030 X-RAY EXAM OF SHOULDER: CPT

## 2020-08-11 PROCEDURE — 73560 X-RAY EXAM OF KNEE 1 OR 2: CPT

## 2020-08-11 ASSESSMENT — PAIN DESCRIPTION - LOCATION: LOCATION: ARM;SHOULDER;LEG

## 2020-08-11 ASSESSMENT — PAIN SCALES - GENERAL: PAINLEVEL_OUTOF10: 3

## 2020-08-11 ASSESSMENT — PAIN DESCRIPTION - ORIENTATION: ORIENTATION: RIGHT

## 2020-08-11 NOTE — ED NOTES
Bed: 18  Expected date:   Expected time:   Means of arrival:   Comments:  Chandler Reid RN  08/11/20 [id number]
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
## 2020-08-12 ASSESSMENT — ENCOUNTER SYMPTOMS
SHORTNESS OF BREATH: 0
ABDOMINAL PAIN: 0
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
## 2020-08-13 ENCOUNTER — OFFICE VISIT (OUTPATIENT)
Dept: CARDIOLOGY | Age: 60
End: 2020-08-13
Payer: MEDICARE

## 2020-08-13 VITALS
DIASTOLIC BLOOD PRESSURE: 72 MMHG | SYSTOLIC BLOOD PRESSURE: 134 MMHG | BODY MASS INDEX: 43.23 KG/M2 | OXYGEN SATURATION: 98 % | HEIGHT: 61 IN | HEART RATE: 71 BPM | WEIGHT: 229 LBS

## 2020-08-13 DIAGNOSIS — I10 ESSENTIAL HYPERTENSION: ICD-10-CM

## 2020-08-13 DIAGNOSIS — I50.32 CHRONIC DIASTOLIC CONGESTIVE HEART FAILURE (HCC): ICD-10-CM

## 2020-08-13 PROBLEM — E78.2 MIXED HYPERLIPIDEMIA: Status: ACTIVE | Noted: 2020-08-13

## 2020-08-13 PROBLEM — I25.10 CORONARY ARTERY DISEASE INVOLVING NATIVE CORONARY ARTERY OF NATIVE HEART WITHOUT ANGINA PECTORIS: Status: ACTIVE | Noted: 2020-08-13

## 2020-08-13 LAB
ANION GAP SERPL CALCULATED.3IONS-SCNC: 12 MMOL/L (ref 7–19)
BASOPHILS ABSOLUTE: 0 K/UL (ref 0–0.2)
BASOPHILS RELATIVE PERCENT: 0.7 % (ref 0–1)
BUN BLDV-MCNC: 32 MG/DL (ref 8–23)
CALCIUM SERPL-MCNC: 9.6 MG/DL (ref 8.8–10.2)
CHLORIDE BLD-SCNC: 104 MMOL/L (ref 98–111)
CO2: 25 MMOL/L (ref 22–29)
CREAT SERPL-MCNC: 1 MG/DL (ref 0.5–0.9)
EOSINOPHILS ABSOLUTE: 0.2 K/UL (ref 0–0.6)
EOSINOPHILS RELATIVE PERCENT: 3.7 % (ref 0–5)
GFR AFRICAN AMERICAN: >59
GFR NON-AFRICAN AMERICAN: 56
GLUCOSE BLD-MCNC: 326 MG/DL (ref 74–109)
HCT VFR BLD CALC: 34.3 % (ref 37–47)
HEMOGLOBIN: 11.5 G/DL (ref 12–16)
IMMATURE GRANULOCYTES #: 0 K/UL
LYMPHOCYTES ABSOLUTE: 1.7 K/UL (ref 1.1–4.5)
LYMPHOCYTES RELATIVE PERCENT: 30.1 % (ref 20–40)
MCH RBC QN AUTO: 30.3 PG (ref 27–31)
MCHC RBC AUTO-ENTMCNC: 33.5 G/DL (ref 33–37)
MCV RBC AUTO: 90.3 FL (ref 81–99)
MONOCYTES ABSOLUTE: 0.5 K/UL (ref 0–0.9)
MONOCYTES RELATIVE PERCENT: 8.2 % (ref 0–10)
NEUTROPHILS ABSOLUTE: 3.2 K/UL (ref 1.5–7.5)
NEUTROPHILS RELATIVE PERCENT: 57.1 % (ref 50–65)
PDW BLD-RTO: 12.5 % (ref 11.5–14.5)
PLATELET # BLD: 241 K/UL (ref 130–400)
PMV BLD AUTO: 9 FL (ref 9.4–12.3)
POTASSIUM SERPL-SCNC: 4.6 MMOL/L (ref 3.5–5)
PRO-BNP: 1235 PG/ML (ref 0–900)
RBC # BLD: 3.8 M/UL (ref 4.2–5.4)
SODIUM BLD-SCNC: 141 MMOL/L (ref 136–145)
WBC # BLD: 5.6 K/UL (ref 4.8–10.8)

## 2020-08-13 PROCEDURE — 3017F COLORECTAL CA SCREEN DOC REV: CPT | Performed by: NURSE PRACTITIONER

## 2020-08-13 PROCEDURE — 1036F TOBACCO NON-USER: CPT | Performed by: NURSE PRACTITIONER

## 2020-08-13 PROCEDURE — 99214 OFFICE O/P EST MOD 30 MIN: CPT | Performed by: NURSE PRACTITIONER

## 2020-08-13 PROCEDURE — G8417 CALC BMI ABV UP PARAM F/U: HCPCS | Performed by: NURSE PRACTITIONER

## 2020-08-13 PROCEDURE — G8427 DOCREV CUR MEDS BY ELIG CLIN: HCPCS | Performed by: NURSE PRACTITIONER

## 2020-08-13 NOTE — PATIENT INSTRUCTIONS
New instructions for today:  Go get labs today. Weigh yourself daily without clothing at the same time each day. Record a daily weight log. Call the office if you gain 3 pounds or more in 2 to 3 days or 5 pounds in one week. A sudden weight gain may mean that your heart failure is getting worse. Sodium causes your body to hold on to extra water. This may cause your heart failure symptoms to get worse. Limit sodium to 2,000 milligrams (mg) a day or less. That is less than 1 teaspoon of salt a day, including all the salt you eat in cooking or in packaged foods. Fluid restriction of 1500ml per day (about 6 cups of fluid per day). Patient Instructions:  Continue current medications as prescribed. Always keep a current medication list. Bring your medications to every office visit. Continue to follow up with primary care provider for non cardiac medical problems. Call the office with any problems, questions or concerns at [phone]. If you have been asked to keep a blood pressure log, do so for 2 weeks. Call the office to report readings to the triage nurse at [phone]. Follow up with cardiologist as scheduled. The following educational material has been included in this after visit summary for your review: Life simple 7. Heart health. Avoid heart failure triggers. Life simple 7  1) Manage blood pressure - high blood pressure is a major risk factor for heart disease and stroke. Keeping blood pressure in health range reduces strain on your heart, arteries and kidneys. Blood pressure goal is less than 130/80. 2) Control cholesterol - contributes to plaque, which can clog arteries and lead to heart disease and stroke. When you control your cholesterol you are giving your arteries their best chance to remain clear. It is recommended that you get cholesterol lab work done once a year.   3) Reduce blood sugar - most of the food we eat is turning into glucose or blood sugar that our body good idea to know your test results and keep a list of the medicines you take. How can you care for yourself at home? Diet  · Use less salt when you cook and eat. This helps lower your blood pressure. Taste food before salting. Add only a little salt when you think you need it. With time, your taste buds will adjust to less salt. · Eat fewer snack items, fast foods, canned soups, and other high-salt, high-fat, processed foods. · Read food labels and try to avoid saturated and trans fats. They increase your risk of heart disease by raising cholesterol levels. · Limit the amount of solid fat-butter, margarine, and shortening-you eat. Use olive, peanut, or canola oil when you cook. Bake, broil, and steam foods instead of frying them. · Eat a variety of fruit and vegetables every day. Dark green, deep orange, red, or yellow fruits and vegetables are especially good for you. Examples include spinach, carrots, peaches, and berries. · Foods high in fiber can reduce your cholesterol and provide important vitamins and minerals. High-fiber foods include whole-grain cereals and breads, oatmeal, beans, brown rice, citrus fruits, and apples. · Eat lean proteins. Heart-healthy proteins include seafood, lean meats and poultry, eggs, beans, peas, nuts, seeds, and soy products. · Limit drinks and foods with added sugar. These include candy, desserts, and soda pop. Lifestyle changes  · If your doctor recommends it, get more exercise. Walking is a good choice. Bit by bit, increase the amount you walk every day. Try for at least 30 minutes on most days of the week. You also may want to swim, bike, or do other activities. · Do not smoke. If you need help quitting, talk to your doctor about stop-smoking programs and medicines. These can increase your chances of quitting for good. Quitting smoking may be the most important step you can take to protect your heart. It is never too late to quit.   · Limit alcohol to 2 drinks a day for men and 1 drink a day for women. Too much alcohol can cause health problems. · Manage other health problems such as diabetes, high blood pressure, and high cholesterol. If you think you may have a problem with alcohol or drug use, talk to your doctor. Medicines  · Take your medicines exactly as prescribed. Call your doctor if you think you are having a problem with your medicine. · If your doctor recommends aspirin, take the amount directed each day. Make sure you take aspirin and not another kind of pain reliever, such as acetaminophen (Tylenol). When should you call for help? [id number] if you have symptoms of a heart attack. These may include:  · Chest pain or pressure, or a strange feeling in the chest.  · Sweating. · Shortness of breath. · Pain, pressure, or a strange feeling in the back, neck, jaw, or upper belly or in one or both shoulders or arms. · Lightheadedness or sudden weakness. · A fast or irregular heartbeat. After you call 911, the  may tell you to chew 1 adult-strength or 2 to 4 low-dose aspirin. Wait for an ambulance. Do not try to drive yourself. Watch closely for changes in your health, and be sure to contact your doctor if you have any problems. Where can you learn more? Go to https://[employer]. org and sign in to your [employer] account. Enter W901 in the [facility] box to learn more about \"A Healthy Heart: Care Instructions. \"     If you do not have an account, please click on the \"Sign Up Now\" link. Current as of: December 16, 2019               Content Version: 12.5  © 8805-8782 Healthwise, Incorporated. Care instructions adapted under license by [facility] ([facility]). If you have questions about a medical condition or this instruction, always ask your healthcare professional. [place] [address] any warranty or liability for your use of this information.         Patient Education        Avoiding Triggers With Heart Failure: Care Instructions  Your Care Instructions     Triggers are anything that make your heart failure flare up. A flare-up is also called \"sudden heart failure\" or \"acute heart failure. \" When you have a flare-up, fluid builds up in your lungs, and you have problems breathing. You might need to go to the hospital. By watching for changes in your condition and avoiding triggers, you can prevent heart failure flare-ups. Follow-up care is a key part of your treatment and safety. Be sure to make and go to all appointments, and call your doctor if you are having problems. It's also a good idea to know your test results and keep a list of the medicines you take. How can you care for yourself at home? Watch for changes in your weight and condition  · Weigh yourself without clothing at the same time each day. Record your weight. Call your doctor if you have sudden weight gain, such as more than 2 to 3 pounds in a day or 5 pounds in a week. (Your doctor may suggest a different range of weight gain.) A sudden weight gain may mean that your heart failure is getting worse. · Keep a daily record of your symptoms. Write down any changes in how you feel, such as new shortness of breath, cough, or problems eating. Also record if your ankles are more swollen than usual and if you feel more tired than usual. Note anything that you ate or did that could have triggered these changes. Limit sodium  Sodium causes your body to hold on to extra water. This may cause your heart failure symptoms to get worse. People get most of their sodium from processed foods. Fast food and restaurant meals also tend to be very high in sodium. · Your doctor may suggest that you limit sodium. Your doctor can tell you how much sodium is right for you. This includes limiting sodium in cooked and packaged foods. · Read food labels on cans and food packages. They tell you how much sodium you get in one serving. Check the serving size.  If you eat more than one serving, you are getting more sodium. · Be aware that sodium can come in forms other than salt, including monosodium glutamate (MSG), sodium citrate, and sodium bicarbonate (baking soda). MSG is often added to Asian food. You can sometimes ask for food without MSG or salt. · Slowly reducing salt will help you adjust to the taste. Take the salt shaker off the table. · Flavor your food with garlic, lemon juice, onion, vinegar, herbs, and spices instead of salt. Do not use soy sauce, steak sauce, onion salt, garlic salt, mustard, or ketchup on your food, unless it is labeled \"low-sodium\" or \"low-salt. \"  · Make your own salad dressings, sauces, and ketchup without adding salt. · Use fresh or frozen ingredients, instead of canned ones, whenever you can. Choose low-sodium canned goods. · Eat less processed food and food from restaurants, including fast food. Exercise as directed  Moderate, regular exercise is very good for your heart. It improves your blood flow and helps control your weight. But too much exercise can stress your heart and cause a heart failure flare-up. · Check with your doctor before you start an exercise program.  · Walking is an easy way to get exercise. Start out slowly. Gradually increase the length and pace of your walk. Swimming, riding a bike, and using a treadmill are also good forms of exercise. · When you exercise, watch for signs that your heart is working too hard. You are pushing yourself too hard if you cannot talk while you are exercising. If you become short of breath or dizzy or have chest pain, stop, sit down, and rest.  · Do not exercise when you do not feel well. Take medicines correctly  · Take your medicines exactly as prescribed. Call your doctor if you think you are having a problem with your medicine. · Make a list of all the medicines you take. Include those prescribed to you by other doctors and any over-the-counter medicines, vitamins, or supplements you take.  Take this list with you when you go to any doctor. · Take your medicines at the same time every day. It may help you to post a list of all the medicines you take every day and what time of day you take them. · Make taking your medicine as simple as you can. Plan times to take your medicines when you are doing other things, such as eating a meal or getting ready for bed. This will make it easier to remember to take your medicines. · Get organized. Use helpful tools, such as daily or weekly pill containers. When should you call for help? Call 911 if you have symptoms of sudden heart failure such as:  · You have severe trouble breathing. · You cough up pink, foamy mucus. · You have a new irregular or rapid heartbeat. Call your doctor now or seek immediate medical care if:  · You have new or increased shortness of breath. · You are dizzy or lightheaded, or you feel like you may faint. · You have sudden weight gain, such as more than 2 to 3 pounds in a day or 5 pounds in a week. (Your doctor may suggest a different range of weight gain.)  · You have increased swelling in your legs, ankles, or feet. · You are suddenly so tired or weak that you cannot do your usual activities. Watch closely for changes in your health, and be sure to contact your doctor if you develop new symptoms. Where can you learn more? Go to https://[employer]. org and sign in to your [employer] account. Enter Z330 in the [facility] box to learn more about \"Avoiding Triggers With Heart Failure: Care Instructions. \"     If you do not have an account, please click on the \"Sign Up Now\" link. Current as of: December 16, 2019               Content Version: 12.5  © 6812-5229 Healthwise, Incorporated. Care instructions adapted under license by [facility] ([facility]).  If you have questions about a medical condition or this instruction, always ask your healthcare professional. Madonna Serrano disclaims any warranty or liability for your use of this information. [place] at the [address] [place] [address] and [address] [facility] located on the first floor of [address] [address] through hospital main entrance and turn immediately to your left. Date/Time:     Patient's contact number:  [phone] (home)     Echocardiogram -  No prep. Takes approximately 30 min. An echocardiogram uses sound waves to produce images of your heart. This commonly used test allows your doctor to see how your heart is beating and pumping blood. Your doctor can use the images from an echocardiogram to identify various abnormalities in the heart muscle and valves. This test has 2 parts:   Ø You will be asked to disrobe from the waist up and given a gown to wear. The technologist will then hook up an EKG monitor to you for the entire exam.   Ø You will then have an ultrasound of your heart (echocardiogram) to assess the heart muscle, heart valves and heart function. You may eat and take any medicines before the exam.     If you need to change your appointment, please call outpatient scheduling at [phone].

## 2020-08-13 NOTE — PROGRESS NOTES
[facility], [place]. [address] Hospital[address] [facility]  [phone] office  [phone] fax      OFFICE VISIT:  2020    Mari Taylor - : 1960  Reason For Visit:  Jose John is a 56 y.o. female who is here for Follow-Up from Hospital    History:   Diagnosis Orders   1. Pericardial effusion  Echo limited   2. Chronic diastolic congestive heart failure (HCC)  Basic Metabolic Panel    proBNP, N-TERMINAL    CBC Auto Differential   3. Coronary artery disease involving native coronary artery of native heart without angina pectoris     4. Essential hypertension  Basic Metabolic Panel    proBNP, N-TERMINAL    CBC Auto Differential   5. Mixed hyperlipidemia       The patient presents today for cardiology follow up concerning history of moderate to large pericardial effusion on serial 2D echocardiograms. She has a history of chronic diastolic heart failure and CAD. The patient is not weighing consistently, but is limiting sodium intake. She reports no angina or significant fluid retention. BP is well controlled on current regimen. The patient's PCP monitors cholesterol. Kimberli Goes denies exertional chest pain,  orthopnea, paroxysmal nocturnal dyspnea, syncope, presyncope, sensed arrhythmia, edema and fatigue. The patient denies numbness or weakness to suggest cerebrovascular accident or transient ischemic attack.  + FORD stable.     Mari Taylor has the following history as recorded in [facility]:  Patient Active Problem List   Diagnosis Code    SOB (shortness of breath) R06.02    Pericardial effusion I31.3    Chronic diastolic congestive heart failure (HCC) I50.32    Normocytic anemia D64.9    Type 2 diabetes mellitus, with long-term current use of insulin ([facility]) E11.9, Z79.4    COPD (chronic obstructive pulmonary disease) ([facility]) J44.9    Hyperglycemia R73.9    Acute kidney injury ([facility] N17.9    Elevated d-dimer R79.89    Morbid obesity with BMI of 40.0-44.9, adult ([facility]) E66.01, Z68.41    Obstructive sleep apnea syndrome G47.33    Essential hypertension I10    Moderate persistent asthma without complication P72.91    Pulmonary hypertension ([facility]) I27.20    Nonobstructive atherosclerosis of coronary artery I25.10    CKD (chronic kidney disease) stage 3, GFR 30-59 ml/min ([facility]) N18.3    Pulmonary edema with congestive heart failure ([facility]) I50.1    Palliative care patient Z51.5    Chest discomfort R07.89    Mixed hyperlipidemia E78.2    Coronary artery disease involving native coronary artery of native heart without angina pectoris I25.10     Past Medical History:   Diagnosis Date    AMI (acute myocardial infarction) ([facility] 09/2018    Asthma     CAD (coronary artery disease)     hx of stents    [facility]    R sided numbness/weakness    CHF (congestive heart failure) ([facility])     COPD (chronic obstructive pulmonary disease) ([facility])     Diabetes mellitus ([facility]     DVT (deep vein thrombosis) in pregnancy     Hyperlipidemia     Hypertension     Palliative care patient 07/02/2020    Pneumonia      Past Surgical History:   Procedure Laterality Date    CATARACT REMOVAL      COLONOSCOPY  approx 2013    CORONARY ANGIOPLASTY WITH STENT PLACEMENT  2018    [facility]    TONSILLECTOMY       Family History   Problem Relation Age of Onset    Colon Cancer Neg Hx     Colon Polyps Neg Hx      Social History     Tobacco Use    Smoking status: Former Smoker     Packs/day: 1.00     Years: 30.00     Pack years: 30.00     Last attempt to quit: 2005     Years since quitting: 15.6    Smokeless tobacco: Never Used   Substance Use Topics    Alcohol use: Never     Frequency: Never      Current Outpatient Medications   Medication Sig Dispense Refill    gabapentin (NEURONTIN) 600 MG tablet TAKE ONE TABLET BY MOUTH THREE TIMES A DAY 90 tablet 1    Blood Glucose Monitoring Suppl (BLOOD GLUCOSE MONITOR SYSTEM) w/Device KIT 1 Device by Does not apply route three times daily 1 kit 0    blood glucose monitor strips Test 3 times a day & as needed for symptoms of irregular blood glucose. Dispense sufficient amount for indicated testing frequency plus additional to accommodate PRN testing needs.  100 strip 11    Lancets MISC 1 each by Does not apply route 3 times daily 200 each 11    Insulin Degludec (TRESIBA FLEXTOUCH) 100 UNIT/ML SOPN Inject 40 Units into the skin nightly 4 pen 2    Semaglutide, 1 MG/DOSE, 2 MG/1.5ML SOPN Inject 1 mg into the skin every 7 days (Patient taking differently: Inject 1 mg into the skin every 7 days Pt takes on Thursday's) 2 pen 2    DULoxetine (CYMBALTA) 60 MG extended release capsule Take 1 capsule by mouth daily 30 capsule 3    levocetirizine (XYZAL) 5 MG tablet Take 1 tablet by mouth nightly 30 tablet 3    carvedilol (COREG) 3.125 MG tablet Take 1 tablet by mouth 2 times daily 60 tablet 2    DULoxetine (CYMBALTA) 30 MG extended release capsule Take 1 capsule by mouth daily Take with 60 mg capsule for total of 90 mg. 30 capsule 3    levalbuterol (XOPENEX) 0.63 MG/3ML nebulization Take 3 mLs by nebulization every 6 hours as needed for Wheezing 20 vial 2    amLODIPine (NORVASC) 5 MG tablet Take 1 tablet by mouth daily 30 tablet 1    pramipexole (MIRAPEX) 0.5 MG tablet Take 1 tablet by mouth 2 times daily 60 tablet 2    levalbuterol (XOPENEX HFA) 45 MCG/ACT inhaler Inhale 2 puffs into the lungs every 4 hours as needed for Wheezing or Shortness of Breath 1 Inhaler 0    fluticasone-vilanterol (BREO ELLIPTA) 100-25 MCG/INH AEPB inhaler Inhale 1 puff into the lungs daily 1 each 3    tiotropium (SPIRIVA RESPIMAT) 1.25 MCG/ACT AERS inhaler Inhale 2 puffs into the lungs daily 1 Inhaler 3    valsartan (DIOVAN) 160 MG tablet Take 160 mg by mouth daily      atorvastatin (LIPITOR) 20 MG tablet Take 1/2 tablet for 1 week, then increase to whole tablet (Patient taking differently: daily Take 1/2 tablet for 1 week, then increase to whole tablet) 30 tablet 5    clopidogrel (PLAVIX) 75 MG tablet Take 75 mg by mouth daily      montelukast (SINGULAIR) 10 MG tablet Take 10 mg by mouth nightly      vitamin D (CHOLECALCIFEROL) 25 MCG (1000 UT) TABS tablet Take 1,000 Units by mouth daily      melatonin 3 MG TABS tablet Take 10 mg by mouth nightly      ISOSORBIDE MONONITRATE ER PO Take 90 mg by mouth daily       bumetanide (BUMEX) 1 MG tablet Take 1 tablet by mouth 2 times daily 60 tablet 0     No current facility-administered medications for this visit. Allergies: Albuterol; Levaquin [levofloxacin]; Metformin and related; and Aspirin    Review of Systems  Constitutional - no appetite change, or unexpected weight change. No fever, chills or diaphoresis. No significant change in activity level or new onset of fatigue. HEENT - no significant rhinorrhea or epistaxis. No tinnitus or significant hearing loss. Eyes - no sudden vision change or amaurosis. No corneal arcus, xantholasma, subconjunctival hemorrhage or discharge. Respiratory - no significant wheezing, stridor, apnea or cough.  + FORD - stable. Cardiovascular - no exertional chest pain to suggest myocardial ischemia. No orthopnea or PND. No sensation of sustained arrythmia. No occurrence of slow heart rate. No palpitations. No claudication. Gastrointestinal - no abdominal swelling or pain. No blood in stool. No severe constipation, diarrhea, nausea, or vomiting. Genitourinary - no dysuria, frequency, or urgency. No flank pain or hematuria. Musculoskeletal - no back pain or myalgia. Transported via wheelchair. Extremities - no clubbing, cyanosis or extremity edema. Skin - no color change or rash. No pallor. No new surgical incision. Neurologic - no speech difficulty, facial asymmetry or lateralizing weakness. No seizures, presyncope or syncope.   No significant dizziness. Hematologic - no easy bruising or excessive bleeding. Psychiatric - no severe anxiety or insomnia. No confusion. All other review of systems are negative. Objective  Vital Signs - /72   Pulse 71   Ht 5' 1\" (1.549 m)   Wt 229 lb (103.9 kg)   SpO2 98%   BMI 43.27 kg/m²   General - Salome Kaiser is alert, cooperative, and pleasant. Well groomed. No acute distress. Body habitus - Body mass index is 43.27 kg/m². HEENT - Head is normocephalic. No circumoral cyanosis. Dentition is normal.  EYES -   Lids normal without ptosis. No discharge, edema or subconjunctival hemorrhage. Neck - Symmetrical without apparent mass or lymphadenopathy. Respiratory - Normal respiratory effort without use of accessory muscles. Ausculatation reveals vesicular breath sounds without crackles, wheezes, rub or rhonchi. Cardiovascular - No jugular venous distention. Auscultation reveals regular rate and rhythm. No audible clicks, gallop or rub. No murmur. No lower extremity varicosities. No carotid bruits. Abdominal -  No visible distention, mass or pulsations. Extremities - No clubbing or cyanosis. No statis dermatitis or ulcers. No edema. Musculoskeletal -   No Osler's nodes. No kyphosis or scoliosis. Transported via wheelchair. Skin -  Warm and dry; no rash or pallor. No new surgical wound. Neurological - No focal neurological deficits. Thought processes coherent. No apparent tremor. Oriented to person, place and time. Psychiatric -  Appropriate affect and mood. Assessment:     Diagnosis Orders   1. Pericardial effusion  Echo limited   2. Chronic diastolic congestive heart failure (HCC)  Basic Metabolic Panel    proBNP, N-TERMINAL    CBC Auto Differential   3. Coronary artery disease involving native coronary artery of native heart without angina pectoris     4. Essential hypertension  Basic Metabolic Panel    proBNP, N-TERMINAL    CBC Auto Differential   5.  Mixed hyperlipidemia       Data reviewed:  Coronary artery disease   Stents in the proximal circ and OM1  11/21/2019  Cath  LVEDP 25, PA 60/30, normal LVFX, mild CAD ([place], [address])  1/26/2020  Echo mild to moderate pericardial eff, normal LVF  3/7/2020  Echo large pericardial effusion  5/29/2020  Echo  Severe LVH, DD, normal LVFX, small pericardial effusion  7/15/20  lexiscan Positive for inferior lateral myocardial ischemia, EF 33%, -3% ischemic myocardium on stress, intermediate risk findings, AUC indication 16, AUC score 7, (MD [place]ano)  7/15/20  Echo  Normal LVFX, moderate pericardial effusion  7/16/20  Cath  Mild CAD, normal LVFX    8/3/20 echo  Summary   LV is normal in size with normal systolic function. LV ejection fraction  estimated at 60-65%. Moderate concentric LVH. Grade 1 diastolic dysfunction. RV is normal in size with normal systolic function. Mild to moderate left atrial enlargement. Normal right atrial size. Aortic valve is trileaflet with normal leaflet opening. No significant  stenosis or regurgitation noted. Mitral valve is structurally normal with normal leaflet mobility. No  significant stenosis or regurgitation noted. No significant tricuspid regurgitation. Moderate to large circumferential pericardial effusion which is   predominantly located posteriorly and laterally with some organization of  fluid anteriorly. No echocardiographic features suggestive of tamponade. Serial comparison with echoes from 7/15/2020, 5/29/2020 and 3/6/2020  showed no significant change. Signature    ----------------------------------------------------------------   Electronically signed by Allison Cedeno MD(Interpreting physician)   on 08/03/2020 06:34 PM   ----------------------------------------------------------------    7/14/20  Summary:     1. Successful femoral artery ultrasound  2. Successful femoral artery arteriogram  3.   Supervision of the administration of moderate conscious sedation  4. Mild coronary artery disease  5. Left ventricular function is normal  6. Patent stents in the circumflex marginal system    7/14/20 echo  Findings    Left Ventricle   Moderate concentric left ventricular hypertrophy. Normal left ventricular size with preserved LV function and an estimated  ejection fraction of approximately 55-60%. Pericardial Effusion   There is a moderate localized near left ventricle pericardial effusion  noted. 7/2/20 echo   Summary   There is a large circumferential pericardial effusion noted. There is mild  right atrial collapse consistent with tamponade physiology    Signature    ----------------------------------------------------------------   Electronically signed by Eladio Cummings MD(Interpreting   physician) on 07/02/2020 09:15 PM    5/29/20 echo  Pericardial Effusion   Small pericardial effusion noted. 1/25/20 echo  Summary   Moderate concentric left ventricular hypertrophy. Left ventricular ejection fraction is visually estimated at 60%. No evidence of left ventricular mass or thrombus noted. Small-moderate pericardial effusion noted. Pt is short of breath. Some respiratory variation noted.     Signature    ----------------------------------------------------------------   Electronically signed by Eladio Cummings MD(Interpreting   physician) on 01/26/2020 02:16 PM   ----------------------------------------------------------------    Lab Results   Component Value Date    WBC 5.6 08/13/2020    HGB 11.5 (L) 08/13/2020    HCT 34.3 (L) 08/13/2020    MCV 90.3 08/13/2020     08/13/2020     Lab Results   Component Value Date     08/13/2020    K 4.6 08/13/2020     08/13/2020    CO2 25 08/13/2020    BUN 32 (H) 08/13/2020    CREATININE 1.0 (H) 08/13/2020    GLUCOSE 326 (H) 08/13/2020    CALCIUM 9.6 08/13/2020    PROT 6.8 07/14/2020    LABALBU 3.8 07/14/2020    BILITOT 0.3 07/14/2020    ALKPHOS 101 07/14/2020    AST 16 Diana Lopez

## 2020-08-14 ENCOUNTER — OFFICE VISIT (OUTPATIENT)
Dept: PRIMARY CARE CLINIC | Age: 60
End: 2020-08-14
Payer: MEDICARE

## 2020-08-14 VITALS
TEMPERATURE: 98 F | DIASTOLIC BLOOD PRESSURE: 78 MMHG | SYSTOLIC BLOOD PRESSURE: 134 MMHG | HEART RATE: 70 BPM | OXYGEN SATURATION: 100 % | BODY MASS INDEX: 43.01 KG/M2 | WEIGHT: 227.8 LBS | HEIGHT: 61 IN

## 2020-08-14 PROCEDURE — G0402 INITIAL PREVENTIVE EXAM: HCPCS | Performed by: NURSE PRACTITIONER

## 2020-08-14 PROCEDURE — 3017F COLORECTAL CA SCREEN DOC REV: CPT | Performed by: NURSE PRACTITIONER

## 2020-08-14 RX ORDER — DULOXETIN HYDROCHLORIDE 60 MG/1
120 CAPSULE, DELAYED RELEASE ORAL DAILY
Qty: 60 CAPSULE | Refills: 3 | Status: ON HOLD | OUTPATIENT
Start: 2020-08-14 | End: 2020-09-30 | Stop reason: CLARIF

## 2020-08-14 ASSESSMENT — PATIENT HEALTH QUESTIONNAIRE - PHQ9
SUM OF ALL RESPONSES TO PHQ QUESTIONS 1-9: 2
SUM OF ALL RESPONSES TO PHQ QUESTIONS 1-9: 2

## 2020-08-14 NOTE — PROGRESS NOTES
seMedicare Annual Wellness Visit - Initial    Name: Shilo Blevins Date: 2020   MRN: [id number] Sex: Female   Age: 56 y.o. Ethnicity: /   : 1960 Race:       Betsy Valdivia is here for   Chief Complaint   Patient presents with    Medicare AWV        Screenings for behavioral, psychosocial and functional/safety risks, and cognitive dysfunction are all negative except as indicated below. These results, as well as other patient data from the [email or web address] [facility] [address] form, are documented in Flowsheets linked to this Encounter. Allergies   Allergen Reactions    Albuterol Shortness Of Breath    Levaquin [Levofloxacin] Shortness Of Breath and Swelling    Metformin And Related Diarrhea    Aspirin Rash       Prior to Visit Medications    Medication Sig Taking? Authorizing Provider   DULoxetine (CYMBALTA) 60 MG extended release capsule Take 2 capsules by mouth daily Yes YAMIL Gardner NP   gabapentin (NEURONTIN) 600 MG tablet TAKE ONE TABLET BY MOUTH THREE TIMES A DAY Yes Zoya Lowe MD   Blood Glucose Monitoring Suppl (BLOOD GLUCOSE MONITOR SYSTEM) w/Device KIT 1 Device by Does not apply route three times daily Yes YAMIL Gardner NP   blood glucose monitor strips Test 3 times a day & as needed for symptoms of irregular blood glucose. Dispense sufficient amount for indicated testing frequency plus additional to accommodate PRN testing needs.  Yes YAMIL Gardner NP   Lancets MISC 1 each by Does not apply route 3 times daily Yes YAIML Gardner NP   Insulin Degludec (TRESIBA FLEXTOUCH) 100 UNIT/ML SOPN Inject 40 Units into the skin nightly Yes YAMIL Gardner NP   Semaglutide, 1 MG/DOSE, 2 MG/1.5ML SOPN Inject 1 mg into the skin every 7 days  Patient taking differently: Inject 1 mg into the skin every 7 days Pt takes on Thursday's Yes YAMIL Gardner NP   levocetirizine (XYZAL) 5 MG tablet Take 1 tablet by mouth nightly Yes YAMIL Godfrey NP   carvedilol (COREG) 3.125 MG tablet Take 1 tablet by mouth 2 times daily Yes YAMIL Godfrey NP   levalbuterol [facility]) 0.63 MG/3ML nebulization Take 3 mLs by nebulization every 6 hours as needed for Wheezing Yes YAMIL Godfrey NP   amLODIPine (NORVASC) 5 MG tablet Take 1 tablet by mouth daily Yes YAMIL Godfrey NP   pramipexole (MIRAPEX) 0.5 MG tablet Take 1 tablet by mouth 2 times daily Yes YAMIL Godfrey NP   levalbuterol [facility] HFA) 45 MCG/ACT inhaler Inhale 2 puffs into the lungs every 4 hours as needed for Wheezing or Shortness of Breath Yes YAMIL Godfrey NP   fluticasone-vilanterol (BREO ELLIPTA) 100-25 MCG/INH AEPB inhaler Inhale 1 puff into the lungs daily Yes YAMIL Godfrey NP   tiotropium (SPIRIVA RESPIMAT) 1.25 MCG/ACT AERS inhaler Inhale 2 puffs into the lungs daily Yes YAMIL Godfrey NP   valsartan (DIOVAN) 160 MG tablet Take 160 mg by mouth daily Yes Historical Provider, MD   atorvastatin (LIPITOR) 20 MG tablet Take 1/2 tablet for 1 week, then increase to whole tablet  Patient taking differently: daily Take 1/2 tablet for 1 week, then increase to whole tablet Yes YAMIL Godfrey NP   clopidogrel (PLAVIX) 75 MG tablet Take 75 mg by mouth daily Yes Historical Provider, MD   montelukast (SINGULAIR) 10 MG tablet Take 10 mg by mouth nightly Yes Historical Provider, MD   vitamin D (CHOLECALCIFEROL) 25 MCG (1000 UT) TABS tablet Take 1,000 Units by mouth daily Yes Historical Provider, MD   melatonin 3 MG TABS tablet Take 10 mg by mouth nightly Yes Historical Provider, MD   bumetanide (BUMEX) 1 MG tablet Take 1 tablet by mouth 2 times daily  Marianna Nino MD   ISOSORBIDE MONONITRATE ER PO Take 90 mg by mouth daily   Historical Provider, MD         Past Medical History:   Diagnosis Date    AMI (acute myocardial infarction) ([facility] [address] 09/2018    Asthma     CAD (coronary artery disease)     hx of stents    Cerebral artery occlusion with cerebral infarction ([facility] [address] 2012    R sided numbness/weakness    CHF (congestive heart failure) ([facility] [address]     COPD (chronic obstructive pulmonary disease) (HCC)     Diabetes mellitus ([facility] [address]     DVT (deep vein thrombosis) in pregnancy     Hyperlipidemia     Hypertension     Palliative care patient 07/02/2020    Pneumonia          Past Surgical History:   Procedure Laterality Date    CATARACT REMOVAL      COLONOSCOPY  approx 2013    CORONARY ANGIOPLASTY WITH STENT PLACEMENT  2018    [address]    TONSILLECTOMY         Family History   Problem Relation Age of Onset    Colon Cancer Neg Hx     Colon Polyps Neg Hx        CareTeam (Including outside providers/suppliers regularly involved in providing care):   Patient Care Team:  YAMIL Flowers NP as PCP - General (Nurse Practitioner)  YAMIL Flowers NP as [facility]CP - [facility] Provider  YAMIL Valencia as Nurse Practitioner (Family Nurse Practitioner)    Wt Readings from Last 3 Encounters:   08/14/20 227 lb 12.8 oz (103.3 kg)   08/13/20 229 lb (103.9 kg)   08/11/20 223 lb (101.2 kg)     Vitals:    08/14/20 1200   BP: 134/78   Pulse: 70   Temp: 98 °F (36.7 °C)   TempSrc: Temporal   SpO2: 100%   Weight: 227 lb 12.8 oz (103.3 kg)   Height: 5' 1\" (1.549 m)     Physical Exam  Constitutional:       Appearance: Normal appearance. She is ill-appearing (chronic). HENT:      Head: Normocephalic and atraumatic. Right Ear: External ear normal.      Left Ear: External ear normal.      Nose: Nose normal. No congestion. Mouth/Throat:      Mouth: Mucous membranes are moist.      Pharynx: Oropharynx is clear. No oropharyngeal exudate. Eyes:      General:         Right eye: No discharge. Left eye: No discharge. Conjunctiva/sclera: Conjunctivae normal.      Pupils: Pupils are equal, round, and reactive to light.    Neck:      Musculoskeletal: Normal range of motion. No muscular tenderness. Cardiovascular:      Rate and Rhythm: Normal rate and regular rhythm. Pulses: Normal pulses. Pulmonary:      Effort: Pulmonary effort is normal.      Breath sounds: Normal breath sounds. Abdominal:      Palpations: Abdomen is soft. Tenderness: There is no abdominal tenderness. Musculoskeletal:         General: No signs of injury. Skin:     General: Skin is warm and dry. Capillary Refill: Capillary refill takes less than 2 seconds. Neurological:      Mental Status: She is alert and oriented to person, place, and time. Mental status is at baseline. Gait: Gait abnormal (wheelchair). Psychiatric:         Mood and Affect: Mood normal.         Behavior: Behavior normal.         Thought Content: Thought content normal.         Judgment: Judgment normal.     The following problems were reviewed today and where indicated follow up appointments were made and/or referrals ordered.     Risk Factor Screenings with Interventions     Fall Risk:  2 or more falls in past year?: (!) yes  Fall with injury in past year?: (!) yes  Fall Risk Interventions:    · Physical therapy referral ordered for strength and balance training    Depression:  PHQ-2 Score: 2  Depression Interventions:  · Patient declines any further evaluation/treatment for this issue    Anxiety:     Anxiety Interventions:  · Patient declines any further evaluation/treatment for this issue    Cognitive:  Clock Drawing Test (CDT) Score: Normal  Cognitive Impairment Interventions:  · Patient declines any further evaluation/treatment for cognitive impairment    Substance Abuse:  Social History     Socioeconomic History    Marital status:      Spouse name: Not on file    Number of children: Not on file    Years of education: Not on file    Highest education level: Not on file   Occupational History    Not on file   Social Needs    Financial resource strain: Not on file   [employer] insecurity     Worry: Not on file     Inability: Not on file    Transportation needs     Medical: Not on file     Non-medical: Not on file   Tobacco Use    Smoking status: Former Smoker     Packs/day: 1.00     Years: 30.00     Pack years: 30.00     Last attempt to quit: 2005     Years since quitting: 15.6    Smokeless tobacco: Never Used   Substance and Sexual Activity    Alcohol use: Never     Frequency: Never    Drug use: Never    Sexual activity: Not on file   Lifestyle    Physical activity     Days per week: Not on file     Minutes per session: Not on file    Stress: Not on file   Relationships    Social connections     Talks on phone: Not on file     Gets together: Not on file     Attends Church service: Not on file     Active member of club or organization: Not on file     Attends meetings of clubs or organizations: Not on file     Relationship status: Not on file    Intimate partner violence     Fear of current or ex partner: Not on file     Emotionally abused: Not on file     Physically abused: Not on file     Forced sexual activity: Not on file   Other Topics Concern    Not on file   Social History Narrative    Not on file        Substance Abuse Interventions:  · Tobacco abuse:  former smoker    Health Risk Assessment:     General  In general, how would you say your health is?: (!) Poor  In the past 7 days, have you experienced any of the following?  New or Increased Pain, New or Increased Fatigue, Loneliness, Social Isolation, Stress or Anger?: (!) New or Increased Pain, Loneliness, Stress, Anger  Do you get the social and emotional support that you need?: Yes  Do you have a Living Will?: (!) No  General Health Risk Interventions:  · Poor self-assessment of health status: patient advised to follow-up in this office for further evaluation and treatment of multiple comorbities within 2 month(s)  · Pain issues: patient declines any further evaluation/treatment for this issue, physical therapy referral  · Loneliness: patient's comments regarding inadequate social support: can be in a room full of people and still feel alone  · Stress: relaxation techniques discussed, patient declines any further evaluation/treatment for this issue  · Anger: relaxation techniques discussed, patient declines any further evaluation/treatment for this issue    Health Habits/Nutrition  Do you exercise for at least 20 minutes 2-3 times per week?: (!) No  Have you lost any weight without trying in the past 3 months?: No  Do you eat fewer than 2 meals per day?: No  Have you seen a dentist within the past year?: (!) No  Body mass index is 43.04 kg/m². Health Habits/Nutrition Interventions:  · Inadequate physical activity:  patient is not ready to increase his/her physical activity level at this time  · Dental exam overdue:  patient encouraged to make appointment with his/her dentist    Hearing/Vision  Do you or your family notice any trouble with your hearing?: (!) Yes  Do you have difficulty driving, watching TV, or doing any of your daily activities because of your eyesight?: No  Have you had an eye exam within the past year?: (!) No  Hearing/Vision Interventions:  · Hearing concerns:  patient declines any further evaluation/treatment for hearing issues  · Vision concerns:  patient encouraged to make appointment with his/her eye specialist    Safety  Do you have working smoke detectors?: (!) No  Have all throw rugs been removed or fastened?: (!) No  Do you have non-slip mats or surfaces in all bathtubs/showers?: (!) No  Do all of your stairways have a railing or banister?: Yes  Are your doorways, halls and stairs free of clutter?: Yes  Do you always fasten your seatbelt when you are in a car?: (!) No  Safety Interventions:  · Home safety tips provided  · Patient declines any further evaluation/treatment for this issue    ADLs  In the past 7 days, did you need help from others to perform any of the following everyday activities? Eating, dressing, grooming, bathing, toileting, or walking/balance?: (!) Dressing, Grooming, Walking/Balance  In the past 7 days, did you need help from others to take care of any of the following? Laundry, housekeeping, banking/finances, shopping, telephone use, food preparation, transportation, or taking medications?: [employer], Housekeeping, Shopping, Food Preparation, Transportation, Taking Medications  ADL Interventions:  · Patient declines any further evaluation/treatment for this issue    Personalized Preventive Plan   Current Health Maintenance Status  Immunization History   Administered Date(s) Administered    Influenza Virus Vaccine 11/15/2019    Tdap (Boostrix, Adacel) 12/20/2018        Health Maintenance   Topic Date Due    Pneumococcal 0-64 years Vaccine (1 of 1 - PPSV23) 02/19/1966    Breast cancer screen  02/19/2008    Shingles Vaccine (1 of 2) 02/19/2008    Colon cancer screen colonoscopy  02/19/2008    Annual Wellness Visit (AWV)  01/26/2020    Flu vaccine (1) 09/01/2020    A1C test (Diabetic or Prediabetic)  07/03/2021    Potassium monitoring  08/13/2021    Creatinine monitoring  08/13/2021    DTaP/Tdap/Td vaccine (2 - Td) 12/20/2028    Hepatitis A vaccine  Aged Out    Hib vaccine  Aged Out    Meningococcal (ACWY) vaccine  Aged Out       Recommendations for [employer] Due: see orders. Recommended screening schedule for the next 5-10 years is provided to the patient in written form: see Patient Instructions/AVS.    1. Routine general medical examination at a health care facility    2. Frequent falls  - [facility]    3. Weakness of both lower extremities  - [facility]    4. Anxiety and depression  - DULoxetine (CYMBALTA) 60 MG extended release capsule; Take 2 capsules by mouth daily  Dispense: 60 capsule; Refill: 3    5.  CKD (chronic kidney disease) stage 3, GFR 30-59 ml/min [facility])  - External Referral To Nephrology

## 2020-08-18 ENCOUNTER — TELEPHONE (OUTPATIENT)
Dept: CARDIOLOGY | Age: 60
End: 2020-08-18

## 2020-08-18 NOTE — TELEPHONE ENCOUNTER
Reviewed patient today with Dr. Mariam Brar. Ongoing large pericardial effusion since 1/20. Patient has a follow up in office on 9/2/120. An echo has been ordered prior to that visit.   Will check labs - Lupus panel, sed rate, TSH and free T4.  tlm

## 2020-08-19 ENCOUNTER — OFFICE VISIT (OUTPATIENT)
Dept: GASTROENTEROLOGY | Age: 60
End: 2020-08-19
Payer: MEDICARE

## 2020-08-19 VITALS
OXYGEN SATURATION: 96 % | WEIGHT: 224 LBS | BODY MASS INDEX: 42.29 KG/M2 | HEIGHT: 61 IN | SYSTOLIC BLOOD PRESSURE: 125 MMHG | HEART RATE: 77 BPM | DIASTOLIC BLOOD PRESSURE: 80 MMHG

## 2020-08-19 PROCEDURE — 1036F TOBACCO NON-USER: CPT | Performed by: NURSE PRACTITIONER

## 2020-08-19 PROCEDURE — G8427 DOCREV CUR MEDS BY ELIG CLIN: HCPCS | Performed by: NURSE PRACTITIONER

## 2020-08-19 PROCEDURE — 3017F COLORECTAL CA SCREEN DOC REV: CPT | Performed by: NURSE PRACTITIONER

## 2020-08-19 PROCEDURE — 99215 OFFICE O/P EST HI 40 MIN: CPT | Performed by: NURSE PRACTITIONER

## 2020-08-19 PROCEDURE — 3023F SPIROM DOC REV: CPT | Performed by: NURSE PRACTITIONER

## 2020-08-19 PROCEDURE — G8417 CALC BMI ABV UP PARAM F/U: HCPCS | Performed by: NURSE PRACTITIONER

## 2020-08-19 PROCEDURE — G8926 SPIRO NO PERF OR DOC: HCPCS | Performed by: NURSE PRACTITIONER

## 2020-08-19 ASSESSMENT — ENCOUNTER SYMPTOMS
ABDOMINAL PAIN: 0
SHORTNESS OF BREATH: 1
BLOOD IN STOOL: 0
TROUBLE SWALLOWING: 0
NAUSEA: 0
VOMITING: 0
CHOKING: 0
ABDOMINAL DISTENTION: 0
COUGH: 0
ANAL BLEEDING: 0
DIARRHEA: 1
RECTAL PAIN: 0
CONSTIPATION: 0

## 2020-08-19 NOTE — PROGRESS NOTES
Subjective:     Patient ID: Sheela Morrell is a 56 y.o. female  PCP: YAMIL Sinclair - NP  Referring Provider: No ref. provider found    HPI  Patient presents to the office today with the following complaints: Diarrhea    Diarrhea  Patient complains of diarrhea. Onset of diarrhea was years ago and is worsening. Diarrhea is occurring approximately 3-7 times per day. Patient describes diarrhea as semisolid and watery. Diarrhea has been associated with fecal incontinence, fecal urgency and nocturnal diarrhea. Patient denies blood in stool, fever. Last colonoscopy 6-7 years ago - normal per patient  Normal stool studies 2/2020    Hx CAD with stents  Hx COPD - follows pulmonology  Current use of daily anticoagulant - Plavix  Pt has been in and out of hospital [facility] at [facility] several times in the last few months. Pt has increased risk factors for sedation due to cardiac and pulmonary conditions. Assessment:     1. Chronic diarrhea    2. Current use of anticoagulant therapy    3. Chronic obstructive pulmonary disease, unspecified COPD type ([facility] [address]    4. Coronary artery disease involving native coronary artery of native heart without angina pectoris            Plan:   - High Fiber Diet. Samples of Fiber Choice provided today  - May use OTC anti-diarrheal prn   - Schedule colonoscopy  - Obtain pulmonary, cardiac clearance, as well as clearance to hold Plavix  Instruct on bowel prep. Nothing to eat or drink after midnight the day of the exam.  Unable to drive for 24 hours after the procedure. No aspirin or nonsteroidal anti-inflammatories for 5 days before procedure. I have discussed the benefits, alternatives, and risks (including bleeding, perforation and death)  for pursuing Endoscopy (EGD/Colonscopy/EUS/ERCP) with the patient and they are willing to continue.  We also discussed the need for anesthesia, IV access, proper dietary changes, medication changes if necessary, and need for bowel prep  amLODIPine (NORVASC) 5 MG tablet Take 1 tablet by mouth daily 30 tablet 1    pramipexole (MIRAPEX) 0.5 MG tablet Take 1 tablet by mouth 2 times daily 60 tablet 2    levalbuterol (XOPENEX HFA) 45 MCG/ACT inhaler Inhale 2 puffs into the lungs every 4 hours as needed for Wheezing or Shortness of Breath 1 Inhaler 0    fluticasone-vilanterol (BREO ELLIPTA) 100-25 MCG/INH AEPB inhaler Inhale 1 puff into the lungs daily 1 each 3    tiotropium (SPIRIVA RESPIMAT) 1.25 MCG/ACT AERS inhaler Inhale 2 puffs into the lungs daily 1 Inhaler 3    valsartan (DIOVAN) 160 MG tablet Take 160 mg by mouth daily      atorvastatin (LIPITOR) 20 MG tablet Take 1/2 tablet for 1 week, then increase to whole tablet (Patient taking differently: daily Take 1/2 tablet for 1 week, then increase to whole tablet) 30 tablet 5    clopidogrel (PLAVIX) 75 MG tablet Take 75 mg by mouth daily      montelukast (SINGULAIR) 10 MG tablet Take 10 mg by mouth nightly      vitamin D (CHOLECALCIFEROL) 25 MCG (1000 UT) TABS tablet Take 1,000 Units by mouth daily      melatonin 3 MG TABS tablet Take 10 mg by mouth nightly       No current facility-administered medications for this visit. Allergies   Allergen Reactions    Albuterol Shortness Of Breath    Levaquin [Levofloxacin] Shortness Of Breath and Swelling    Metformin And Related Diarrhea    Aspirin Rash       Review of Systems   Constitutional: Negative for activity change, appetite change, fatigue, fever and unexpected weight change. HENT: Negative for trouble swallowing. Respiratory: Positive for shortness of breath. Negative for cough and choking. Cardiovascular: Positive for chest pain. Gastrointestinal: Positive for diarrhea. Negative for abdominal distention, abdominal pain, anal bleeding, blood in stool, constipation, nausea, rectal pain and vomiting. Musculoskeletal: Positive for gait problem. Allergic/Immunologic: Negative for food allergies.    All other systems reviewed and are negative. Objective:     /80   Pulse 77   Ht 5' 1\" (1.549 m)   Wt 224 lb (101.6 kg)   SpO2 96%   BMI 42.32 kg/m²     Physical Exam  Vitals signs reviewed. Constitutional:       General: She is not in acute distress. Appearance: She is well-developed. HENT:      Head: Normocephalic and atraumatic. Right Ear: External ear normal.      Left Ear: External ear normal.      Nose: Nose normal.      Comments: Mask on      Mouth/Throat:      Comments: Mask on  Eyes:      Conjunctiva/sclera: Conjunctivae normal.      Pupils: Pupils are equal, round, and reactive to light. Neck:      Musculoskeletal: Normal range of motion and neck supple. Cardiovascular:      Rate and Rhythm: Normal rate and regular rhythm. Heart sounds: Normal heart sounds. No murmur. No friction rub. No gallop. Pulmonary:      Effort: Pulmonary effort is normal. No respiratory distress. Breath sounds: Decreased breath sounds present. Abdominal:      General: Bowel sounds are normal. There is no distension. Palpations: Abdomen is soft. There is no mass. Tenderness: There is no abdominal tenderness. There is no guarding or rebound. Musculoskeletal: Normal range of motion. Skin:     General: Skin is warm and dry. Findings: No rash. Nails: There is no clubbing. Neurological:      Mental Status: She is alert and oriented to person, place, and time. Gait: Gait abnormal.   Psychiatric:         Behavior: Behavior normal.         Thought Content:  Thought content normal.

## 2020-08-19 NOTE — PATIENT INSTRUCTIONS
restrictions to diet and bowel prep instructions including laxatives. Please read these instructions one week prior to your scheduled procedure to ensure that you are prepared. If you have any questions regarding these instructions please call our office Mon through Fri from 8:00 am to 4:00 pm.     Follow prep instructions provided for bowel prep. Take all of the bowel prep as directed. If you are having problems with nausea, stop your prep for 30-45 min to allow the nausea to subside before resuming your prep. It is important to drink plenty of fluids throughout the day before taking your laxatives. This will help to protect your kidneys, prevent dehydration and maximize the effect of the bowel prep. Your diet before a colonoscopy bowel preparation is very important to ensure a successful colon exam. It is recommended to consider certain changes to your diet three to four days prior to the procedure. Remember that your bowels need to be completely empty for the exam.    What foods are good to eat? Cut down on heavy solid foods three to four days before the procedure and start introducing lighter meals to your diet. The following food suggestions are a good part of your diet before a colonoscopy bowel preparation.  Light meat that is easily digestible such as chicken (without the skin)    Potatoes without skin    Cheese    Eggs    A light meal of steamed white fish    Light clear soups    Foods and drinks to avoid  Avoid foods that contain too much fiber. Stay clear of dark colored beverages. They can stick to the walls of the digestive tract and make it difficult to differentiate from blood.  Some of these foods are:   Red meat, rice, nuts and vegetables    Milk, other milk based fluids and cream    Most fruit and puddings    Whole grain pasta    Cereals, bran and seeds    Colored beverages, especially those that are red or purple in color    Red colored Jell-O     On the day before the colonoscopy, continue to drink plenty of clear fluids. It is important   to keep yourself hydrated before the exam.     Please follow all instructions as provided for cleansing the bowel. Failure to have an adequately prepped colon may cause you to have incomplete exam with further testing required. [email or web address]    Increasing Your Fiber Intake   What is fiber? Fiber is the portion of plant foods that cannot be digested. There are two kinds of fiber, both of which are keys to a healthy diet and a healthy digestive system:   - Soluble fiber aids in bulking and moving food through the gut. It forms a gel when mixed with liquid. - Insoluble fiber does not mix with liquids and passes through the GI tract mostly intact. It is sometimes called \"roughage. \"     Why do I need to eat it? Fiber has many important roles:   - Helps maintain regular bowel movements. More fiber can improve both diarrhea and constipation.   - Reduces the risk of developing hemorrhoids. - Lowers LDL or \"bad\" cholesterol levels, which lowers risk of heart disease.   - Regulates blood sugar levels in people with diabetes. - Provides a feeling of fullness and may help with weight loss. How much do I need? The Academy of Nutrition and Dietetics recommends:   - For women, 25 grams per day under age 48 and 21 grams per day over age 48. - For men, 38 grams per day under age 48 and 30 grams per day over age 48. What foods are the best sources? Plant foods contain fiber, but some more than others. Best choices are:   - Whole grains and high fiber cereals. - Dried beans and legumes. - Fruits and vegetables, especially raw. What about fiber supplements? If you need to add more fiber than you can get in your diet, consider:   - Type of Fiber: The major brands of fiber supplements (Metamucil®, Konsyl®, Citrucel®, Benefiber®, Fibercon®) all use soluble fiber and work in the same way. - Flavorings and Mixing: Many of the powdered brands have added flavoring and are mixed with just water. Other varieties are \"clear\" and can be added to numerous beverages and food items. Some brands also offer a \"wafer\" form. It's up to you! Tip: Increasing the fiber in your diet gradually may help minimize bloating and discomfort. Be sure to drink plenty of fluids as you increase your fiber intake.         Fruits  Serving size  Total fiber (grams)    Pear  1 medium  5.1    Figs, dried  2 medium  3.7    Blueberries  1 cup  3.5    Apple, with skin  1 medium  4.4    Strawberries  1 cup  3.3    Peaches, dried  3 halves  3.2    Orange  1 medium  3.1    Apricots, dried  10 halves  2.6    Raisins  1.5-ounce box  1.6    Grains, cereal & pasta  Serving size  Total fiber (grams)    Spaghetti, whole-wheat  1 cup  6.3    Bran flakes  3/4 cup  5.1    Oatmeal  1 cup  4.0    Bread, rye  1 slice  1.9    Bread, whole-wheat  1 slice  1.9    Bread, mixed-grain  1 slice  1.7    Bread, cracked-wheat  1 slice  1.4

## 2020-08-20 ENCOUNTER — HOSPITAL ENCOUNTER (OUTPATIENT)
Dept: WOMENS IMAGING | Age: 60
Discharge: HOME | End: 2020-08-20
Payer: MEDICARE

## 2020-08-20 PROCEDURE — 77063 BREAST TOMOSYNTHESIS BI: CPT

## 2020-08-21 NOTE — TELEPHONE ENCOUNTER
Called patient and told her about the labs that was needed, and needed to be done before the appointment with Dr. Fox Ramos.  Reminded her of the low sodium diet and to weigh daily

## 2020-08-26 ENCOUNTER — TELEPHONE (OUTPATIENT)
Dept: PRIMARY CARE CLINIC | Age: 60
End: 2020-08-26

## 2020-08-26 NOTE — TELEPHONE ENCOUNTER
----- Message from Natalia Hood, [facility] [address] - NP sent at 8/26/2020  4:02 PM CDT -----  Negative mammogram. Follow-up in one year or PRN.

## 2020-08-28 ENCOUNTER — TELEPHONE (OUTPATIENT)
Dept: CARDIOLOGY | Age: 60
End: 2020-08-28

## 2020-08-28 NOTE — TELEPHONE ENCOUNTER
Date: 9/3/20    Cardiologist: YVONNE    Procedure: colonoscopy    Surgeon: Aurea Rosado    Last Office Visit: 8/13/20  Reason for office visit and medical concerns addressed at this office visit: cad, chf, ckd, dm, dvt, htn, hyperlipidemia    Testing Performed and Date of Service:  7/16/20  Cath  Mild CAD, normal LVFX    Does the patient have a stent? If so, what type?  no    Current Medications: cymbalta, gabapentin, bumex, insulin, xyzal, coreg, amlodipine, mirapex, valsartan, atorvastatin, plavix, singulair, melatonin    Is the patient currently taking an anticoagulant? If so, what is the diagnosis the patient has been given to warrant the need for the anticoagulant?  no    Additional Notes: requesting cardiac clearance prior to procedure

## 2020-08-30 ENCOUNTER — OFFICE VISIT (OUTPATIENT)
Age: 60
End: 2020-08-30

## 2020-08-30 VITALS — TEMPERATURE: 96.7 F | HEART RATE: 69 BPM | OXYGEN SATURATION: 99 %

## 2020-08-31 ENCOUNTER — TELEPHONE (OUTPATIENT)
Dept: GASTROENTEROLOGY | Age: 60
End: 2020-08-31

## 2020-08-31 NOTE — TELEPHONE ENCOUNTER
Patient sched for CLN for diarrhea on 9/3/20 -     clearances were sent to   Dr. Ciro Farah - pulmonolgy - received 8/31  Telly Marieding for Plavix -rcvd 8/31 - ok to stop 5 days  Aldo Sandy - cardiac clearance - they sent back that patient needed to wait until after apt w/ Dr. Saturnino Younger on 9/21/20 to talk to him about clearance. I called patient with above information and that we are cancelling her CLN until after she sees Dr. Saturnino Younger. She has unfortunately already gotten her covid test and will have to repeat when procedure is rescheduled.   Clearances were originally sent on 8/19/20, then sent again on 8/27/20 and received back on 8/31

## 2020-08-31 NOTE — TELEPHONE ENCOUNTER
Meera Saravia,  She has a large pericardial effusion on echo. She is seeing Dr. Emely Flynn this month regarding this.   I think he needs to clear her after that appointment and echo recheck  Thanks, Qi Tan

## 2020-09-02 LAB — SARS-COV-2, NAA: NOT DETECTED

## 2020-09-03 RX ORDER — PRAMIPEXOLE DIHYDROCHLORIDE 0.5 MG/1
0.5 TABLET ORAL 2 TIMES DAILY
Qty: 60 TABLET | Refills: 2 | Status: SHIPPED | OUTPATIENT
Start: 2020-09-03 | End: 2020-10-18 | Stop reason: SDUPTHER

## 2020-09-04 RX ORDER — GABAPENTIN 600 MG/1
600 TABLET ORAL 3 TIMES DAILY
Qty: 90 TABLET | Refills: 1 | OUTPATIENT
Start: 2020-09-04 | End: 2020-11-19 | Stop reason: SDUPTHER

## 2020-09-04 NOTE — TELEPHONE ENCOUNTER
Pérez Rosario called to request a refill on her medication. Last office visit : 8/14/2020   Next office visit : 10/2/2020     Pharmacist: Britt Coon    Last UDS:   Amphetamine Screen, Urine   Date Value Ref Range Status   02/28/2020 Negative Negative <1000 ng/mL Final     Benzodiazepine Screen, Urine   Date Value Ref Range Status   02/28/2020 Negative Negative <100 ng/mL Final     Cocaine Metabolite Screen, Urine   Date Value Ref Range Status   02/28/2020 Negative Negative <300 ng/mL Final     Opiate Scrn, Ur   Date Value Ref Range Status   02/28/2020 Negative Negative < 300 ng/mL Final                 Requested Prescriptions     Pending Prescriptions Disp Refills    gabapentin (NEURONTIN) 600 MG tablet 90 tablet 1     Sig: Take 1 tablet by mouth 3 times daily for 30 days. Please approve or refuse this medication.    Justin Sandoval MA

## 2020-09-11 NOTE — PROGRESS NOTES
TERRI Yepez  [facility]   Respiratory Disease Clinic  [address] [place] [address]  Phone: [phone]  Fax: [phone]     Anna Peres is a 60 y.o. female.   CC:   Chief Complaint   Patient presents with   • Pulmonary hypertension     hospitalized in July; no exposure; tested 2 weeks ago and negaitive        HPI:  Ms. Peres is a pleasant 60 year old female patient of Dr. Archie Purdy's referred to him in June for concern about pulmonary hypertension. She has known Asthma treated in past with Xolair while in [place]. Moved to [place] and was off of Xolair. She moved back to [place] and despite her IgE being too high, she was started back on the Xolair. She has lived in [place] for the last year and has been off of the Xolair since. She states she took the Xolair for 2-3 years and during that time frame she did not have to use her inhalers or nebulizer nor did she have any hospitalizations. [place] [facility] [place] with Asthma. She has been on Breo and Spiriva which she finds benefit and she has not had to use the nebulizer or rescue inhaler since July. She has known chronic diastolic heart failure with associated pericardial effusion. She has known CAD with stents. She has had a heart cath since last seen in our office. The cath was done at  in July and showed mild CAD. She has known ARMINDA and morbid obesity. Hx of DVT. She only has to walk 5 steps and she is short of breath. Very sleepy today. She states she has not slept well for last 5 days.  Does not want to wear the CPAP mask due to being vain and does not like the way it gets caught in her hair. Last use was over a year ago. She had an echo done Monday that now showed a moderate pericardial effusion without evidence of tamponade, normal LV systolic function and mod-severe LVH. Right Ventricular size and systolic function appear normal. She reports she has not had the IgE +22 allergens lab done as ordered by Dr. Purdy.            Patient denies fever, chills, cough, shortness of breath or difficulty breathing, fatigue, muscle or body aches, new onset headache, new loss of taste or smell, sore throat, congestion or runny nose, nausea or vomiting, diarrhea.  Patient denies any known exposure to a person under investigation or positive for COVID-19.  Patient is wearing mask today.       The following portions of the patient's history were reviewed and updated as appropriate: allergies, current medications, past family history, past medical history, past social history, past surgical history and problem list.    Past Medical History:   Diagnosis Date   • Anxiety    • Arthritis    • Asthma    • Cerebrovascular accident (CVA) (CMS/[facility]) 3/9/2020   • CHF (congestive heart failure) (CMS/[facility])    • Diabetes mellitus (CMS/[facility])    • Elevated troponin, likely Type 2 from influenza A  3/9/2020   • Hyperlipidemia    • Hypertension    • Influenza A 3/9/2020   • Myocardial infarction (CMS/[facility])    • Pericardial effusion, large, circumferential ([facility] 3/7/2020 Echo) 3/9/2020   • Stroke (CMS/[facility])    • Suspected cerebrovascular accident (CVA) 3/9/2020   • Type 2 diabetes mellitus with hyperglycemia (CMS/[facility]) 3/9/2020       History reviewed. No pertinent family history.    Social History     Socioeconomic History   • Marital status:      Spouse name: Not on file   • Number of children: Not on file   • Years of education: Not on file   • Highest education level: Not on file   Tobacco Use   • Smoking status: Never Smoker   • Smokeless tobacco: Never Used       Review of Systems   Constitutional: Positive for fatigue. Negative for chills, diaphoresis and fever.   HENT: Negative for congestion, rhinorrhea and trouble swallowing.    Eyes: Negative for blurred vision, double vision and visual disturbance.   Respiratory: Positive for cough, shortness of breath and wheezing (improved).    Cardiovascular: Negative for chest pain, palpitations and leg swelling.  "  Gastrointestinal: Negative for abdominal distention, abdominal pain and nausea.   Endocrine: Negative for cold intolerance, heat intolerance and polydipsia.   Musculoskeletal: Negative for back pain, gait problem and myalgias.   Skin: Negative for color change, pallor and rash.   Neurological: Negative for dizziness, syncope and confusion.        Daytime sleepiness    Hematological: Negative for adenopathy. Does not bruise/bleed easily.   Psychiatric/Behavioral: Negative for agitation, behavioral problems and sleep disturbance.       /90   Pulse 80   Temp 98.1 °F (36.7 °C)   Ht 154.9 cm (61\")   Wt 101 kg (222 lb)   SpO2 97% Comment: RA  Breastfeeding No   BMI 41.95 kg/m²     Physical Exam   Constitutional: She is oriented to person, place, and time. She appears well-developed and well-nourished. No distress. She is morbidly obese.  HENT:   Head: Normocephalic and atraumatic.   Eyes: Pupils are equal, round, and reactive to light. Conjunctivae are normal.   Neck: Normal range of motion. Neck supple.   Cardiovascular: Normal rate, regular rhythm and normal heart sounds. Exam reveals no gallop and no friction rub.   No murmur heard.  Pulmonary/Chest: Effort normal. No respiratory distress. She has decreased breath sounds. She has no wheezes.   Abdominal: Soft. Bowel sounds are normal.   Musculoskeletal: Normal range of motion. No deformity or edema.   Lymphadenopathy:     She has no cervical adenopathy.   Neurological: She is alert and oriented to person, place, and time.   Very sleepy and yawning a lot but alert and oriented    Skin: Skin is warm and dry. She is not diaphoretic. No erythema.   Psychiatric: She has a normal mood and affect. Her behavior is normal. Judgment and thought content normal.       Pulmonary Functions Testing Results:        No results found for this or any previous visit.    My interpretation: 12/12/19 mild restriction     CXR: no new         Problem List Items Addressed This " Visit        Cardiovascular and Mediastinum    Pulmonary hypertension (CMS/HCC), severe        Respiratory    Obstructive sleep apnea      Other Visit Diagnoses     Severe persistent asthma without complication    -  Primary    Relevant Medications    levalbuterol (XOPENEX) 0.63 MG/3ML nebulizer solution    levalbuterol (XOPENEX HFA) 45 MCG/ACT inhaler    Spiriva Respimat 1.25 MCG/ACT aerosol solution inhaler    Morbidly obese (CMS/HCC)        Grade III diastolic dysfunction        Severe left ventricular hypertrophy            Patient's Body mass index is 41.95 kg/m². BMI is above normal parameters. Recommendations include: referral to primary care.      Assessment/Plan     Asthma-She will continue on her Current medications as she is finding benefit. She will have her IgE+22 allergens and CBC with diff drawn today. Review of records from [facility] does not include labs for IgE. We discussed she may be a candidate for resumption of Xolair or other biologic but need the lab work. She will go today.   ARMINDA-She has not worn her CPAP in over a year. She is struggling with daytime sleepiness. She will get her device out of storage and let us know who the DME is was in [place]. I will have our RT reach out to her as well and see if we can get her possibly transferred to [facility] locally. She is advised they may require updated testing.   Pulmonary hypertension-We discussed her most recent Echo results and advised her to keep follow up with Cardiology next Monday. She has a moderate pericardial effusion and records from [place] show this has been ongoing and without evidence of tamponade. She has severe LVH and diastolic dysfunction. We discussed low sodium diet and she was provided a hand out. We discussed the treatment for PHTN related to Who Group 2 & 3 was to treat the underlying causative process and in this case would be controlling her blood pressure and diastolic dysfunction with low sodium diet and to treat her sleep  apnea.     We discussed that she has multiple factors contributing to her dyspnea. We discussed the importance of treating her sleep apnea and the outcomes of untreated sleep apnea. We discussed the need to loose weight and the role it is playing in her restrictive lung disease and shortness of breath.      Brooklyn Hdez, TERRI  9/16/2020  14:12 CDT    Return in about 4 weeks (around 10/14/2020).    This dictation was generated by voice recognition computer software. Although all attempts are made to edit dictation for accuracy, there may be errors in the transcription that are not intended.

## 2020-09-14 ENCOUNTER — HOSPITAL ENCOUNTER (OUTPATIENT)
Dept: NON INVASIVE DIAGNOSTICS | Age: 60
Discharge: HOME | End: 2020-09-14
Payer: MEDICARE

## 2020-09-14 ENCOUNTER — TELEPHONE (OUTPATIENT)
Dept: PULMONOLOGY | Facility: CLINIC | Age: 60
End: 2020-09-14

## 2020-09-14 DIAGNOSIS — J45.50 SEVERE PERSISTENT ASTHMA WITHOUT COMPLICATION: Primary | ICD-10-CM

## 2020-09-14 PROCEDURE — 93308 TTE F-UP OR LMTD: CPT

## 2020-09-14 NOTE — TELEPHONE ENCOUNTER
Patient has not went for the bloodwork.  She may go tomorrow to get it at .  I am faxing and order to them.   If she does not then we may be able to do it here on her appt with us.     Faxed to [facility] griselda [facility].

## 2020-09-14 NOTE — TELEPHONE ENCOUNTER
Patient is supposed to see me Wed 9/16/20. Dr. Purdy had ordered a  IgE +22 Allergens. Can we see if she has had this drawn yet and if so where so we can get the results.

## 2020-09-16 ENCOUNTER — OFFICE VISIT (OUTPATIENT)
Dept: PULMONOLOGY | Facility: CLINIC | Age: 60
End: 2020-09-16

## 2020-09-16 ENCOUNTER — LAB (OUTPATIENT)
Dept: LAB | Facility: HOSPITAL | Age: 60
End: 2020-09-16

## 2020-09-16 VITALS
HEART RATE: 80 BPM | BODY MASS INDEX: 41.91 KG/M2 | WEIGHT: 222 LBS | HEIGHT: 61 IN | SYSTOLIC BLOOD PRESSURE: 130 MMHG | TEMPERATURE: 98.1 F | DIASTOLIC BLOOD PRESSURE: 90 MMHG | OXYGEN SATURATION: 97 %

## 2020-09-16 DIAGNOSIS — I27.20 PULMONARY HYPERTENSION (HCC): ICD-10-CM

## 2020-09-16 DIAGNOSIS — G47.33 OBSTRUCTIVE SLEEP APNEA: ICD-10-CM

## 2020-09-16 DIAGNOSIS — J45.50 SEVERE PERSISTENT ASTHMA WITHOUT COMPLICATION: Primary | ICD-10-CM

## 2020-09-16 DIAGNOSIS — E66.01 MORBIDLY OBESE (HCC): ICD-10-CM

## 2020-09-16 DIAGNOSIS — I31.39 PERICARDIAL EFFUSION: ICD-10-CM

## 2020-09-16 DIAGNOSIS — I51.89 GRADE III DIASTOLIC DYSFUNCTION: ICD-10-CM

## 2020-09-16 DIAGNOSIS — I51.7 SEVERE LEFT VENTRICULAR HYPERTROPHY: ICD-10-CM

## 2020-09-16 LAB
BASOPHILS # BLD AUTO: 0.03 10*3/MM3 (ref 0–0.2)
BASOPHILS NFR BLD AUTO: 0.6 % (ref 0–1.5)
DEPRECATED RDW RBC AUTO: 42.8 FL (ref 37–54)
EOSINOPHIL # BLD AUTO: 0.2 10*3/MM3 (ref 0–0.4)
EOSINOPHIL NFR BLD AUTO: 3.7 % (ref 0.3–6.2)
ERYTHROCYTE [DISTWIDTH] IN BLOOD BY AUTOMATED COUNT: 13.1 % (ref 12.3–15.4)
HCT VFR BLD AUTO: 33.7 % (ref 34–46.6)
HGB BLD-MCNC: 11.1 G/DL (ref 12–15.9)
IMM GRANULOCYTES # BLD AUTO: 0.02 10*3/MM3 (ref 0–0.05)
IMM GRANULOCYTES NFR BLD AUTO: 0.4 % (ref 0–0.5)
LYMPHOCYTES # BLD AUTO: 1.63 10*3/MM3 (ref 0.7–3.1)
LYMPHOCYTES NFR BLD AUTO: 30.5 % (ref 19.6–45.3)
MCH RBC QN AUTO: 29.8 PG (ref 26.6–33)
MCHC RBC AUTO-ENTMCNC: 32.9 G/DL (ref 31.5–35.7)
MCV RBC AUTO: 90.6 FL (ref 79–97)
MONOCYTES # BLD AUTO: 0.57 10*3/MM3 (ref 0.1–0.9)
MONOCYTES NFR BLD AUTO: 10.7 % (ref 5–12)
NEUTROPHILS NFR BLD AUTO: 2.9 10*3/MM3 (ref 1.7–7)
NEUTROPHILS NFR BLD AUTO: 54.1 % (ref 42.7–76)
NRBC BLD AUTO-RTO: 0 /100 WBC (ref 0–0.2)
PLATELET # BLD AUTO: 268 10*3/MM3 (ref 140–450)
PMV BLD AUTO: 9.6 FL (ref 6–12)
RBC # BLD AUTO: 3.72 10*6/MM3 (ref 3.77–5.28)
WBC # BLD AUTO: 5.35 10*3/MM3 (ref 3.4–10.8)

## 2020-09-16 PROCEDURE — 86003 ALLG SPEC IGE CRUDE XTRC EA: CPT | Performed by: NURSE PRACTITIONER

## 2020-09-16 PROCEDURE — 86003 ALLG SPEC IGE CRUDE XTRC EA: CPT | Performed by: INTERNAL MEDICINE

## 2020-09-16 PROCEDURE — 82785 ASSAY OF IGE: CPT | Performed by: INTERNAL MEDICINE

## 2020-09-16 PROCEDURE — 36415 COLL VENOUS BLD VENIPUNCTURE: CPT | Performed by: INTERNAL MEDICINE

## 2020-09-16 PROCEDURE — 82785 ASSAY OF IGE: CPT | Performed by: NURSE PRACTITIONER

## 2020-09-16 PROCEDURE — 99214 OFFICE O/P EST MOD 30 MIN: CPT | Performed by: NURSE PRACTITIONER

## 2020-09-16 PROCEDURE — 85025 COMPLETE CBC W/AUTO DIFF WBC: CPT | Performed by: INTERNAL MEDICINE

## 2020-09-16 RX ORDER — LEVALBUTEROL INHALATION SOLUTION 0.63 MG/3ML
0.63 SOLUTION RESPIRATORY (INHALATION)
COMMUNITY
Start: 2020-04-15 | End: 2021-07-10

## 2020-09-16 RX ORDER — TIOTROPIUM BROMIDE INHALATION SPRAY 1.56 UG/1
2 SPRAY, METERED RESPIRATORY (INHALATION)
Qty: 1 INHALER | Refills: 3 | Status: SHIPPED | OUTPATIENT
Start: 2020-09-16 | End: 2020-12-19 | Stop reason: SDUPTHER

## 2020-09-16 RX ORDER — LEVALBUTEROL TARTRATE 45 UG/1
2 AEROSOL, METERED ORAL EVERY 4 HOURS PRN
Qty: 1 INHALER | Refills: 3 | Status: SHIPPED | OUTPATIENT
Start: 2020-09-16 | End: 2020-12-19 | Stop reason: SDUPTHER

## 2020-09-16 NOTE — PATIENT INSTRUCTIONS
"Low-Sodium Eating Plan  Sodium, which is an element that makes up salt, helps you maintain a healthy balance of fluids in your body. Too much sodium can increase your blood pressure and cause fluid and waste to be held in your body.  Your health care provider or dietitian may recommend following this plan if you have high blood pressure (hypertension), kidney disease, liver disease, or heart failure. Eating less sodium can help lower your blood pressure, reduce swelling, and protect your heart, liver, and kidneys.  What are tips for following this plan?  General guidelines  · Most people on this plan should limit their sodium intake to 1,500-2,000 mg (milligrams) of sodium each day.  Reading food labels    · The Nutrition Facts label lists the amount of sodium in one serving of the food. If you eat more than one serving, you must multiply the listed amount of sodium by the number of servings.  · Choose foods with less than 140 mg of sodium per serving.  · Avoid foods with 300 mg of sodium or more per serving.  Shopping  · Look for lower-sodium products, often labeled as \"low-sodium\" or \"no salt added.\"  · Always check the sodium content even if foods are labeled as \"unsalted\" or \"no salt added\".  · Buy fresh foods.  ? Avoid canned foods and premade or frozen meals.  ? Avoid canned, cured, or processed meats  · Buy breads that have less than 80 mg of sodium per slice.  Cooking  · Eat more home-cooked food and less restaurant, buffet, and fast food.  · Avoid adding salt when cooking. Use salt-free seasonings or herbs instead of table salt or sea salt. Check with your health care provider or pharmacist before using salt substitutes.  · Cook with plant-based oils, such as canola, sunflower, or olive oil.  Meal planning  · When eating at a restaurant, ask that your food be prepared with less salt or no salt, if possible.  · Avoid foods that contain MSG (monosodium glutamate). MSG is sometimes added to Chinese food, " "bouillon, and some canned foods.  What foods are recommended?  The items listed may not be a complete list. Talk with your dietitian about what dietary choices are best for you.  Grains  Low-sodium cereals, including oats, puffed wheat and rice, and shredded wheat. Low-sodium crackers. Unsalted rice. Unsalted pasta. Low-sodium bread. Whole-grain breads and whole-grain pasta.  Vegetables  Fresh or frozen vegetables. \"No salt added\" canned vegetables. \"No salt added\" tomato sauce and paste. Low-sodium or reduced-sodium tomato and vegetable juice.  Fruits  Fresh, frozen, or canned fruit. Fruit juice.  Meats and other protein foods  Fresh or frozen (no salt added) meat, poultry, seafood, and fish. Low-sodium canned tuna and salmon. Unsalted nuts. Dried peas, beans, and lentils without added salt. Unsalted canned beans. Eggs. Unsalted nut butters.  Dairy  Milk. Soy milk. Cheese that is naturally low in sodium, such as ricotta cheese, fresh mozzarella, or Swiss cheese Low-sodium or reduced-sodium cheese. Cream cheese. Yogurt.  Fats and oils  Unsalted butter. Unsalted margarine with no trans fat. Vegetable oils such as canola or olive oils.  Seasonings and other foods  Fresh and dried herbs and spices. Salt-free seasonings. Low-sodium mustard and ketchup. Sodium-free salad dressing. Sodium-free light mayonnaise. Fresh or refrigerated horseradish. Lemon juice. Vinegar. Homemade, reduced-sodium, or low-sodium soups. Unsalted popcorn and pretzels. Low-salt or salt-free chips.  What foods are not recommended?  The items listed may not be a complete list. Talk with your dietitian about what dietary choices are best for you.  Grains  Instant hot cereals. Bread stuffing, pancake, and biscuit mixes. Croutons. Seasoned rice or pasta mixes. Noodle soup cups. Boxed or frozen macaroni and cheese. Regular salted crackers. Self-rising flour.  Vegetables  Sauerkraut, pickled vegetables, and relishes. Olives. French fries. Onion rings. " Regular canned vegetables (not low-sodium or reduced-sodium). Regular canned tomato sauce and paste (not low-sodium or reduced-sodium). Regular tomato and vegetable juice (not low-sodium or reduced-sodium). Frozen vegetables in sauces.  Meats and other protein foods  Meat or fish that is salted, canned, smoked, spiced, or pickled. Staley, ham, sausage, hotdogs, corned beef, chipped beef, packaged lunch meats, salt pork, jerky, pickled herring, anchovies, regular canned tuna, sardines, salted nuts.  Dairy  Processed cheese and cheese spreads. Cheese curds. Blue cheese. Feta cheese. String cheese. Regular cottage cheese. Buttermilk. Canned milk.  Fats and oils  Salted butter. Regular margarine. Ghee. Staley fat.  Seasonings and other foods  Onion salt, garlic salt, seasoned salt, table salt, and sea salt. Canned and packaged gravies. Worcestershire sauce. Tartar sauce. Barbecue sauce. Teriyaki sauce. Soy sauce, including reduced-sodium. Steak sauce. Fish sauce. Oyster sauce. Cocktail sauce. Horseradish that you find on the shelf. Regular ketchup and mustard. Meat flavorings and tenderizers. Bouillon cubes. Hot sauce and Tabasco sauce. Premade or packaged marinades. Premade or packaged taco seasonings. Relishes. Regular salad dressings. Salsa. Potato and tortilla chips. Corn chips and puffs. Salted popcorn and pretzels. Canned or dried soups. Pizza. Frozen entrees and pot pies.  Summary  · Eating less sodium can help lower your blood pressure, reduce swelling, and protect your heart, liver, and kidneys.  · Most people on this plan should limit their sodium intake to 1,500-2,000 mg (milligrams) of sodium each day.  · Canned, boxed, and frozen foods are high in sodium. Restaurant foods, fast foods, and pizza are also very high in sodium. You also get sodium by adding salt to food.  · Try to cook at home, eat more fresh fruits and vegetables, and eat less fast food, canned, processed, or prepared foods.  This information is  not intended to replace advice given to you by your health care provider. Make sure you discuss any questions you have with your health care provider.  Document Released: 06/09/2003 Document Revised: 11/30/2018 Document Reviewed: 12/11/2017  Elsevier Patient Education © 2020 [employer]

## 2020-09-18 DIAGNOSIS — I31.39 PERICARDIAL EFFUSION: ICD-10-CM

## 2020-09-18 LAB
A ALTERNATA IGE QN: 0.11 KU/L
A ALTERNATA IGE QN: <0.1 KU/L
A FUMIGATUS IGE QN: <0.1 KU/L
A FUMIGATUS IGE QN: <0.1 KU/L
A PULLULANS IGE QN: <0.1 KU/L
AMER ROACH IGE QN: <0.1 KU/L
BERMUDA GRASS IGE QN: <0.1 KU/L
BERMUDA GRASS IGE QN: <0.1 KU/L
BOXELDER IGE QN: <0.1 KU/L
BOXELDER IGE QN: <0.1 KU/L
C HERBARUM IGE QN: <0.1 KU/L
C HERBARUM IGE QN: <0.1 KU/L
CAT DANDER IGE QN: <0.1 KU/L
CAT DANDER IGE QN: <0.1 KU/L
CHICKEN FEATHER IGE QN: <0.1 KU/L
CMN PIGWEED IGE QN: <0.1 KU/L
COMMON RAGWEED IGE QN: <0.1 KU/L
COMMON RAGWEED IGE QN: <0.1 KU/L
CONV CLASS DESCRIPTION: ABNORMAL
CONV CLASS DESCRIPTION: ABNORMAL
COTTONWOOD IGE QN: <0.1 KU/L
COTTONWOOD IGE QN: <0.1 KU/L
D FARINAE IGE QN: 13.8 KU/L
D FARINAE IGE QN: 14.3 KU/L
D PTERONYSS IGE QN: 0.28 KU/L
D PTERONYSS IGE QN: 0.3 KU/L
DOG DANDER IGE QN: 0.16 KU/L
DOG DANDER IGE QN: 0.16 KU/L
DUCK FEATHER IGE QN: <0.1 KU/L
ENGL PLANTAIN IGE QN: <0.1 KU/L
F MONILIFORME IGE QN: <0.1 KU/L
GOOSE FEATHER IGE QN: <0.1 KU/L
GOOSEFOOT IGE QN: <0.1 KU/L
IGE SERPL-ACNC: 1622 IU/ML (ref 6–495)
IGE SERPL-ACNC: 1655 IU/ML (ref 6–495)
JOHNSON GRASS IGE QN: 0.12 KU/L
KENT BLUE GRASS IGE QN: 0.44 KU/L
LONDON PLANE IGE QN: <0.1 KU/L
M RACEMOSUS IGE QN: <0.1 KU/L
MARSH ELDER IGE QN: <0.1 KU/L
MT JUNIPER IGE QN: 0.13 KU/L
MT JUNIPER IGE QN: 0.15 KU/L
NETTLE IGE QN: <0.1 KU/L
P NOTATUM IGE QN: <0.1 KU/L
P NOTATUM IGE QN: <0.1 KU/L
R NIGRICANS IGE QN: <0.1 KU/L
ROACH IGE QN: 0.33 KU/L
S ROSTRATA IGE QN: <0.1 KU/L
SALTWORT IGE QN: 0.12 KU/L
SEDIMENTATION RATE, ERYTHROCYTE: 58 MM/HR (ref 0–25)
SHEEP SORREL IGE QN: <0.1 KU/L
SHEEP SORREL IGE QN: <0.1 KU/L
T4 FREE: 1.1 NG/DL (ref 0.93–1.7)
TIMOTHY IGE QN: <0.1 KU/L
TSH SERPL DL<=0.05 MIU/L-ACNC: 2.59 UIU/ML (ref 0.27–4.2)
WHITE ASH IGE QN: 0.33 KU/L
WHITE ASH IGE QN: 0.35 KU/L
WHITE ELM IGE QN: <0.1 KU/L
WHITE ELM IGE QN: <0.1 KU/L
WHITE HICKORY IGE QN: <0.1 KU/L
WHITE MULBERRY IGE QN: <0.1 KU/L
WHITE OAK IGE QN: 0.11 KU/L
WHITE OAK IGE QN: 0.12 KU/L
WORMWOOD IGE QN: <0.1 KU/L

## 2020-09-21 ENCOUNTER — OFFICE VISIT (OUTPATIENT)
Dept: CARDIOLOGY | Age: 60
End: 2020-09-21
Payer: MEDICARE

## 2020-09-21 ENCOUNTER — TELEPHONE (OUTPATIENT)
Dept: CARDIOLOGY | Age: 60
End: 2020-09-21

## 2020-09-21 VITALS
SYSTOLIC BLOOD PRESSURE: 94 MMHG | WEIGHT: 222 LBS | HEART RATE: 84 BPM | HEIGHT: 61 IN | DIASTOLIC BLOOD PRESSURE: 48 MMHG | BODY MASS INDEX: 41.91 KG/M2

## 2020-09-21 PROCEDURE — 99213 OFFICE O/P EST LOW 20 MIN: CPT | Performed by: INTERNAL MEDICINE

## 2020-09-21 NOTE — PROGRESS NOTES
[facility], [place]. [address], [facility], [address] [facility]  [phone] office  [phone] fax      OFFICE VISIT:  2020    Clint Scott - : 1960  Reason For Visit:  Jonathan Akers is a 56 y.o. female who is here for Check-Up (Does have to take nitro at times for chest pain.)    History:  The patient presents today for cardiology follow up concerning history of moderate to large pericardial effusion on serial 2D echocardiograms. She has a history of chronic diastolic heart failure and CAD. The patient is not weighing consistently, but is limiting sodium intake. She reports no angina or significant fluid retention. BP is well controlled on current regimen. The patient's PCP monitors cholesterol. Benjy Astorga is currently very symptomatic with shortness of breath with mild exertion, she denies exertional chest pain,  orthopnea, paroxysmal nocturnal dyspnea, syncope, presyncope, sensed arrhythmia, edema and fatigue. The patient denies numbness or weakness to suggest cerebrovascular accident or transient ischemic attack.     Clint Sonia has the following history as recorded in [facility]:  Patient Active Problem List   Diagnosis Code    SOB (shortness of breath) R06.02    Pericardial effusion I31.3    Chronic diastolic congestive heart failure ([facility]) I50.32    Normocytic anemia D64.9    Type 2 diabetes mellitus, with long-term current use of insulin ([facility]) E11.9, Z79.4    COPD (chronic obstructive pulmonary disease) ([facility]) J44.9    Hyperglycemia R73.9    Acute kidney injury ([facility] N17.9    Elevated d-dimer R79.89    Morbid obesity with BMI of 40.0-44.9, adult ([facility]) E66.01, Z68.41    Obstructive sleep apnea syndrome G47.33    Essential hypertension I10    Moderate persistent asthma without complication G54.25    Pulmonary hypertension ([facility]) I27.20    Nonobstructive atherosclerosis of coronary artery I25.10    CKD (chronic kidney disease) stage 3, GFR 30-59 ml/min ([facility]) N18.3    Pulmonary edema with congestive heart failure ([facility]) I50.1    Palliative care patient Z51.5    Chest discomfort R07.89    Mixed hyperlipidemia E78.2    Coronary artery disease involving native coronary artery of native heart without angina pectoris I25.10     Past Medical History:   Diagnosis Date    AMI (acute myocardial infarction) ([facility] 09/2018    Asthma     CAD (coronary artery disease)     hx of stents    Cerebral artery occlusion with cerebral infarction ([facility] 2012    R sided numbness/weakness    CHF (congestive heart failure) ([facility]     [facility]     Diabetes mellitus [facility]     DVT (deep vein thrombosis) in pregnancy     Hyperlipidemia     Hypertension     Palliative care patient 07/02/2020    Pneumonia      Past Surgical History:   Procedure Laterality Date    CATARACT REMOVAL      COLONOSCOPY  approx 2013    CORONARY ANGIOPLASTY WITH STENT PLACEMENT  2018    in [address]- [facility], [facility] [address] [facility]    TONSILLECTOMY       Family History   Problem Relation Age of Onset    Colon Cancer Neg Hx     Colon Polyps Neg Hx      Social History     Tobacco Use    Smoking status: Former Smoker     Packs/day: 1.00     Years: 30.00     Pack years: 30.00     Last attempt to quit: 2005     Years since quitting: 15.7    Smokeless tobacco: Never Used   Substance Use Topics    Alcohol use: Never     Frequency: Never      Current Outpatient Medications   Medication Sig Dispense Refill    gabapentin (NEURONTIN) 600 MG tablet Take 1 tablet by mouth 3 times daily for 30 days.  90 tablet 1    pramipexole (MIRAPEX) 0.5 MG tablet Take 1 tablet by mouth 2 times daily 60 tablet 2    DULoxetine (CYMBALTA) 60 MG extended release capsule Take 2 capsules by mouth daily 60 capsule 3    Blood Glucose Monitoring Suppl (BLOOD GLUCOSE MONITOR SYSTEM) w/Device KIT 1 Device by Does not apply route three times daily 1 kit 0    blood glucose monitor strips Test 3 times a day & as needed for symptoms of irregular blood glucose. Dispense sufficient amount for indicated testing frequency plus additional to accommodate PRN testing needs.  100 strip 11    Lancets MISC 1 each by Does not apply route 3 times daily 200 each 11    Insulin Degludec (TRESIBA FLEXTOUCH) 100 UNIT/ML SOPN Inject 40 Units into the skin nightly 4 pen 2    Semaglutide, 1 MG/DOSE, 2 MG/1.5ML SOPN Inject 1 mg into the skin every 7 days (Patient taking differently: Inject 1 mg into the skin every 7 days Pt takes on Thursday's) 2 pen 2    levocetirizine (XYZAL) 5 MG tablet Take 1 tablet by mouth nightly 30 tablet 3    carvedilol (COREG) 3.125 MG tablet Take 1 tablet by mouth 2 times daily 60 tablet 2    levalbuterol (XOPENEX) 0.63 MG/3ML nebulization Take 3 mLs by nebulization every 6 hours as needed for Wheezing 20 vial 2    amLODIPine (NORVASC) 5 MG tablet Take 1 tablet by mouth daily 30 tablet 1    levalbuterol (XOPENEX HFA) 45 MCG/ACT inhaler Inhale 2 puffs into the lungs every 4 hours as needed for Wheezing or Shortness of Breath 1 Inhaler 0    fluticasone-vilanterol (BREO ELLIPTA) 100-25 MCG/INH AEPB inhaler Inhale 1 puff into the lungs daily 1 each 3    tiotropium (SPIRIVA RESPIMAT) 1.25 MCG/ACT AERS inhaler Inhale 2 puffs into the lungs daily 1 Inhaler 3    valsartan (DIOVAN) 160 MG tablet Take 160 mg by mouth daily      atorvastatin (LIPITOR) 20 MG tablet Take 1/2 tablet for 1 week, then increase to whole tablet (Patient taking differently: daily Take 1/2 tablet for 1 week, then increase to whole tablet) 30 tablet 5    clopidogrel (PLAVIX) 75 MG tablet Take 75 mg by mouth daily      montelukast (SINGULAIR) 10 MG tablet Take 10 mg by mouth nightly      vitamin D (CHOLECALCIFEROL) 25 MCG (1000 UT) TABS tablet Take 1,000 Units by mouth daily      melatonin 3 MG TABS tablet Take 10 mg by mouth nightly      bumetanide (BUMEX) 1 MG tablet Take 1 tablet by mouth 2 times daily 60 tablet 0     No current facility-administered medications for this visit. Allergies: Albuterol; Levaquin [levofloxacin]; Metformin and related; and Aspirin    Review of Systems  Constitutional - no appetite change, or unexpected weight change. No fever, chills or diaphoresis. No significant change in activity level or new onset of fatigue. HEENT - no significant rhinorrhea or epistaxis. No tinnitus or significant hearing loss. Eyes - no sudden vision change or amaurosis. No corneal arcus, xantholasma, subconjunctival hemorrhage or discharge. Respiratory - no significant wheezing, stridor, apnea or cough.  + FORD - stable. Cardiovascular - no exertional chest pain to suggest myocardial ischemia. No orthopnea or PND. No sensation of sustained arrythmia. No occurrence of slow heart rate. No palpitations. No claudication. Gastrointestinal - no abdominal swelling or pain. No blood in stool. No severe constipation, diarrhea, nausea, or vomiting. Genitourinary - no dysuria, frequency, or urgency. No flank pain or hematuria. Musculoskeletal - no back pain or myalgia. Transported via wheelchair. Extremities - no clubbing, cyanosis or extremity edema. Skin - no color change or rash. No pallor. No new surgical incision. Neurologic - no speech difficulty, facial asymmetry or lateralizing weakness. No seizures, presyncope or syncope. No significant dizziness. Hematologic - no easy bruising or excessive bleeding. Psychiatric - no severe anxiety or insomnia. No confusion. All other review of systems are negative. Objective  Vital Signs - BP (!) 94/48   Pulse 84   Ht 5' 1\" (1.549 m)   Wt 222 lb (100.7 kg)   BMI 41.95 kg/m²   General - Lele Canales is alert, cooperative, and pleasant. Well groomed. No acute distress. Body habitus - Body mass index is 41.95 kg/m². HEENT - Head is normocephalic. No circumoral cyanosis.   Dentition is normal.  EYES -   Lids normal without ptosis. No discharge, edema or subconjunctival hemorrhage. Neck - Symmetrical without apparent mass or lymphadenopathy. Respiratory - Normal respiratory effort without use of accessory muscles. Ausculatation reveals vesicular breath sounds without crackles, wheezes, rub or rhonchi. Cardiovascular - No jugular venous distention. Auscultation reveals regular rate and rhythm. No audible clicks, gallop or rub. No murmur. No lower extremity varicosities. No carotid bruits. Abdominal -  No visible distention, mass or pulsations. Extremities - No clubbing or cyanosis. No statis dermatitis or ulcers. No edema. Musculoskeletal -   No Osler's nodes. No kyphosis or scoliosis. Transported via wheelchair. Skin -  Warm and dry; no rash or pallor. No new surgical wound. Neurological - No focal neurological deficits. Thought processes coherent. No apparent tremor. Oriented to person, place and time. Psychiatric -  Appropriate affect and mood. Assessment:        Data reviewed:  Coronary artery disease   Stents in the proximal circ and OM1  11/21/2019  Cath  LVEDP 25, PA 60/30, normal LVFX, mild CAD ([place], [address])  1/26/2020  Echo mild to moderate pericardial eff, normal LVF  3/7/2020  Echo large pericardial effusion  5/29/2020  Echo  Severe LVH, DD, normal LVFX, small pericardial effusion  7/15/20  lexiscan Positive for inferior lateral myocardial ischemia, EF 33%, -3% ischemic myocardium on stress, intermediate risk findings, AUC indication 16, AUC score 7, (MD [place])  7/15/20  Echo  Normal LVFX, moderate pericardial effusion  7/16/20  Cath  Mild CAD, normal LVFX    8/3/20 echo  Summary   LV is normal in size with normal systolic function. LV ejection fraction  estimated at 60-65%. Moderate concentric LVH. Grade 1 diastolic dysfunction. RV is normal in size with normal systolic function. Mild to moderate left atrial enlargement.    Normal right atrial size. Aortic valve is trileaflet with normal leaflet opening. No significant  stenosis or regurgitation noted. Mitral valve is structurally normal with normal leaflet mobility. No  significant stenosis or regurgitation noted. No significant tricuspid regurgitation. Moderate to large circumferential pericardial effusion which is   predominantly located posteriorly and laterally with some organization of  fluid anteriorly. No echocardiographic features suggestive of tamponade. Serial comparison with echoes from 7/15/2020, 5/29/2020 and 3/6/2020  showed no significant change. Signature    ----------------------------------------------------------------   Electronically signed by Mehran Cedeno MD(Interpreting physician)   on 08/03/2020 06:34 PM   ----------------------------------------------------------------    7/14/20  Summary:     1. Successful femoral artery ultrasound  2. Successful femoral artery arteriogram  3. Supervision of the administration of moderate conscious sedation  4. Mild coronary artery disease  5. Left ventricular function is normal  6. Patent stents in the circumflex marginal system    Echo in August 2020  Patient Status: Outpatient     Indications:Pericardial effusion.      Conclusions      Summary   This is a limited study for the evaluation of pericardial effusion. There   is a moderate size circumferential pericardial effusion, no signs to   suggest tamponade. LV systolic function appears normal with what appears to be moderate to   severe concentric LVH.    The atria appear to be normal.   The right ventricular size and systolic function appear normal.      Signature      ----------------------------------------------------------------   Electronically signed by Rudy Alarcon DO(Interpreting   physician) on 09/14/2020 02:21 PM   ----------------------------------------------------------------      7/14/20 echo  Findings    Left Ventricle   Moderate concentric left ventricular hypertrophy. Normal left ventricular size with preserved LV function and an estimated  ejection fraction of approximately 55-60%. Pericardial Effusion   There is a moderate localized near left ventricle pericardial effusion  noted. 7/2/20 echo   Summary   There is a large circumferential pericardial effusion noted. There is mild  right atrial collapse consistent with tamponade physiology    Signature    ----------------------------------------------------------------   Electronically signed by Eladio Cummings MD(Interpreting   physician) on 07/02/2020 09:15 PM    5/29/20 echo  Pericardial Effusion   Small pericardial effusion noted. 1/25/20 echo  Summary   Moderate concentric left ventricular hypertrophy. Left ventricular ejection fraction is visually estimated at 60%. No evidence of left ventricular mass or thrombus noted. Small-moderate pericardial effusion noted. Pt is short of breath. Some respiratory variation noted.     Signature    ----------------------------------------------------------------   Electronically signed by Eladio Cummings MD(Interpreting   physician) on 01/26/2020 02:16 PM   ----------------------------------------------------------------    Lab Results   Component Value Date    WBC 5.6 08/13/2020    HGB 11.5 (L) 08/13/2020    HCT 34.3 (L) 08/13/2020    MCV 90.3 08/13/2020     08/13/2020     Lab Results   Component Value Date     08/13/2020    K 4.6 08/13/2020     08/13/2020    CO2 25 08/13/2020    BUN 32 (H) 08/13/2020    CREATININE 1.0 (H) 08/13/2020    GLUCOSE 326 (H) 08/13/2020    CALCIUM 9.6 08/13/2020    PROT 6.8 07/14/2020    LABALBU 3.8 07/14/2020    BILITOT 0.3 07/14/2020    ALKPHOS 101 07/14/2020    AST 16 07/14/2020    ALT 15 07/14/2020    LABGLOM 56 (A) 08/13/2020    GFRAA >59 08/13/2020       Lab Results   Component Value Date    CHOL 208 (H) 07/15/2020    CHOL 206 (H) 07/03/2020    CHOL 220 (H) 01/27/2020     Lab Results   Component Value Date    TRIG 352 (H) 07/15/2020    TRIG 125 07/03/2020    TRIG 184 (H) 01/27/2020     Lab Results   Component Value Date    HDL 39 (L) 07/15/2020    HDL 62 (L) 07/03/2020    HDL 48 (L) 01/27/2020     Lab Results   Component Value Date    LDLCALC 99 07/15/2020    LDLCALC 119 07/03/2020    LDLCALC 135 01/27/2020     Pericardial effusion -follow-up echo in August 2020 showed the effusion is at least moderate in size, patient is symptomatic with shortness of breath even with modest exertion    I had a long discussion with the patient has seems that the conservative management is not helping and she becoming more symptomatic over the past few weeks, I gave her the option of pericardiocentesis and she agreed to proceed with that option  We will plan on doing this in the Cath Lab under MAC sedation with anesthesia as patient wished    CAD - stable on current medical management. Chronic diastolic heart failure - NYHA class II stage C  Patient reports stable weight. Encouraged daily weight log and low sodium diet. HTN - normotensive on current regimen. Hyperlipidemia - on Lipitor currently. LDL 99. Patient is compliant with medication regimen.       Christopher Rivas MD, [facility]  Interventional Cardiologist, Endovascular Specialist   Medical Director, Structural Heart Program   [facility]

## 2020-09-21 NOTE — TELEPHONE ENCOUNTER
Patient was told 10/1 for this procedure but due to anesthesia being booked for this day, procedure needs to be moved to 9/30. She is to arrive at 1130 for 130PM procedure. Tried to call patient no answer left message. If she returns call please send to [place].

## 2020-09-22 LAB
ANA IGG, ELISA: ABNORMAL
C3 COMPLEMENT: 146 MG/DL (ref 88–201)
C4 COMPLEMENT: 73 MG/DL (ref 10–40)
RHEUMATOID FACTOR: 85 IU/ML (ref 0–14)

## 2020-09-24 NOTE — TELEPHONE ENCOUNTER
Pt notified and verbally understood. Pt will get Covid test done one day earlier due to procedure date being changed.

## 2020-09-26 ENCOUNTER — OFFICE VISIT (OUTPATIENT)
Age: 60
End: 2020-09-26

## 2020-09-26 VITALS — OXYGEN SATURATION: 98 % | TEMPERATURE: 97.9 F | HEART RATE: 80 BPM

## 2020-09-29 ENCOUNTER — PREP FOR PROCEDURE (OUTPATIENT)
Dept: CARDIOLOGY | Age: 60
End: 2020-09-29

## 2020-09-30 ENCOUNTER — ANESTHESIA EVENT (OUTPATIENT)
Dept: CARDIAC CATH/INVASIVE PROCEDURES | Age: 60
End: 2020-09-30
Payer: MEDICARE

## 2020-09-30 ENCOUNTER — HOSPITAL ENCOUNTER (OUTPATIENT)
Dept: CARDIAC CATH/INVASIVE PROCEDURES | Age: 60
Discharge: HOME | End: 2020-09-30
Attending: INTERNAL MEDICINE | Admitting: INTERNAL MEDICINE
Payer: MEDICARE

## 2020-09-30 ENCOUNTER — ANESTHESIA (OUTPATIENT)
Dept: CARDIAC CATH/INVASIVE PROCEDURES | Age: 60
End: 2020-09-30
Payer: MEDICARE

## 2020-09-30 VITALS — DIASTOLIC BLOOD PRESSURE: 51 MMHG | SYSTOLIC BLOOD PRESSURE: 99 MMHG | OXYGEN SATURATION: 99 %

## 2020-09-30 VITALS
BODY MASS INDEX: 41.91 KG/M2 | SYSTOLIC BLOOD PRESSURE: 124 MMHG | TEMPERATURE: 97.2 F | OXYGEN SATURATION: 93 % | HEIGHT: 61 IN | RESPIRATION RATE: 15 BRPM | HEART RATE: 58 BPM | DIASTOLIC BLOOD PRESSURE: 79 MMHG | WEIGHT: 222 LBS

## 2020-09-30 LAB
ANION GAP SERPL CALCULATED.3IONS-SCNC: 9 MMOL/L (ref 7–19)
BUN BLDV-MCNC: 46 MG/DL (ref 8–23)
CALCIUM SERPL-MCNC: 9.9 MG/DL (ref 8.8–10.2)
CHLORIDE BLD-SCNC: 97 MMOL/L (ref 98–111)
CO2: 31 MMOL/L (ref 22–29)
CREAT SERPL-MCNC: 1.6 MG/DL (ref 0.5–0.9)
EKG P AXIS: 57 DEGREES
EKG P-R INTERVAL: 166 MS
EKG Q-T INTERVAL: 436 MS
EKG QRS DURATION: 100 MS
EKG QTC CALCULATION (BAZETT): 450 MS
EKG T AXIS: -147 DEGREES
GFR AFRICAN AMERICAN: 39
GFR NON-AFRICAN AMERICAN: 33
GLUCOSE BLD-MCNC: 240 MG/DL (ref 74–109)
HCT VFR BLD CALC: 34.5 % (ref 37–47)
HEMOGLOBIN: 11.6 G/DL (ref 12–16)
INR BLD: 1.04 (ref 0.88–1.18)
MCH RBC QN AUTO: 29.7 PG (ref 27–31)
MCHC RBC AUTO-ENTMCNC: 33.6 G/DL (ref 33–37)
MCV RBC AUTO: 88.5 FL (ref 81–99)
PDW BLD-RTO: 12.4 % (ref 11.5–14.5)
PLATELET # BLD: 233 K/UL (ref 130–400)
PMV BLD AUTO: 9.6 FL (ref 9.4–12.3)
POTASSIUM SERPL-SCNC: 4.9 MMOL/L (ref 3.5–5)
PROTHROMBIN TIME: 13.6 SEC (ref 12–14.6)
RBC # BLD: 3.9 M/UL (ref 4.2–5.4)
SARS-COV-2, NAA: NOT DETECTED
SODIUM BLD-SCNC: 137 MMOL/L (ref 136–145)
WBC # BLD: 6.5 K/UL (ref 4.8–10.8)

## 2020-09-30 PROCEDURE — 7100000001 HC PACU RECOVERY - ADDTL 15 MIN

## 2020-09-30 PROCEDURE — 93308 TTE F-UP OR LMTD: CPT

## 2020-09-30 PROCEDURE — 85027 COMPLETE CBC AUTOMATED: CPT

## 2020-09-30 PROCEDURE — 6360000002 HC RX W HCPCS: Performed by: NURSE ANESTHETIST, CERTIFIED REGISTERED

## 2020-09-30 PROCEDURE — 7100000000 HC PACU RECOVERY - FIRST 15 MIN

## 2020-09-30 PROCEDURE — 2580000003 HC RX 258: Performed by: INTERNAL MEDICINE

## 2020-09-30 PROCEDURE — 2500000003 HC RX 250 WO HCPCS

## 2020-09-30 PROCEDURE — 93005 ELECTROCARDIOGRAM TRACING: CPT | Performed by: INTERNAL MEDICINE

## 2020-09-30 PROCEDURE — 2709999900 HC NON-CHARGEABLE SUPPLY

## 2020-09-30 PROCEDURE — C1729 CATH, DRAINAGE: HCPCS

## 2020-09-30 PROCEDURE — 80048 BASIC METABOLIC PNL TOTAL CA: CPT

## 2020-09-30 PROCEDURE — 3700000000 HC ANESTHESIA ATTENDED CARE

## 2020-09-30 PROCEDURE — 6360000002 HC RX W HCPCS

## 2020-09-30 PROCEDURE — 3700000001 HC ADD 15 MINUTES (ANESTHESIA)

## 2020-09-30 PROCEDURE — 36415 COLL VENOUS BLD VENIPUNCTURE: CPT

## 2020-09-30 PROCEDURE — 85610 PROTHROMBIN TIME: CPT

## 2020-09-30 RX ORDER — ONDANSETRON 2 MG/ML
4 INJECTION INTRAMUSCULAR; INTRAVENOUS EVERY 6 HOURS PRN
Status: DISCONTINUED | OUTPATIENT
Start: 2020-09-30 | End: 2020-09-30 | Stop reason: HOSPADM

## 2020-09-30 RX ORDER — SODIUM CHLORIDE 0.9 % (FLUSH) 0.9 %
10 SYRINGE (ML) INJECTION PRN
Status: DISCONTINUED | OUTPATIENT
Start: 2020-09-30 | End: 2020-09-30 | Stop reason: HOSPADM

## 2020-09-30 RX ORDER — ATORVASTATIN CALCIUM 20 MG/1
20 TABLET, FILM COATED ORAL DAILY
Status: ON HOLD | COMMUNITY
End: 2020-12-16 | Stop reason: SDUPTHER

## 2020-09-30 RX ORDER — DULOXETIN HYDROCHLORIDE 60 MG/1
60 CAPSULE, DELAYED RELEASE ORAL DAILY
Status: ON HOLD | COMMUNITY
End: 2021-01-06 | Stop reason: HOSPADM

## 2020-09-30 RX ORDER — MIDAZOLAM HYDROCHLORIDE 1 MG/ML
INJECTION INTRAMUSCULAR; INTRAVENOUS PRN
Status: DISCONTINUED | OUTPATIENT
Start: 2020-09-30 | End: 2020-09-30 | Stop reason: SDUPTHER

## 2020-09-30 RX ORDER — PROPOFOL 10 MG/ML
INJECTION, EMULSION INTRAVENOUS PRN
Status: DISCONTINUED | OUTPATIENT
Start: 2020-09-30 | End: 2020-09-30 | Stop reason: SDUPTHER

## 2020-09-30 RX ORDER — SODIUM CHLORIDE 0.9 % (FLUSH) 0.9 %
10 SYRINGE (ML) INJECTION EVERY 12 HOURS SCHEDULED
Status: DISCONTINUED | OUTPATIENT
Start: 2020-09-30 | End: 2020-09-30 | Stop reason: HOSPADM

## 2020-09-30 RX ORDER — SODIUM CHLORIDE 9 MG/ML
INJECTION, SOLUTION INTRAVENOUS CONTINUOUS
Status: DISCONTINUED | OUTPATIENT
Start: 2020-09-30 | End: 2020-09-30 | Stop reason: HOSPADM

## 2020-09-30 RX ORDER — DULOXETIN HYDROCHLORIDE 30 MG/1
30 CAPSULE, DELAYED RELEASE ORAL DAILY
COMMUNITY

## 2020-09-30 RX ORDER — BUMETANIDE 1 MG/1
1 TABLET ORAL DAILY
Qty: 30 TABLET | Refills: 0 | Status: SHIPPED | OUTPATIENT
Start: 2020-09-30 | End: 2020-12-12 | Stop reason: ALTCHOICE

## 2020-09-30 RX ADMIN — PROPOFOL 20 MG: 10 INJECTION, EMULSION INTRAVENOUS at 13:44

## 2020-09-30 RX ADMIN — PROPOFOL 20 MG: 10 INJECTION, EMULSION INTRAVENOUS at 13:56

## 2020-09-30 RX ADMIN — PROPOFOL 20 MG: 10 INJECTION, EMULSION INTRAVENOUS at 13:55

## 2020-09-30 RX ADMIN — SODIUM CHLORIDE: 9 INJECTION, SOLUTION INTRAVENOUS at 12:30

## 2020-09-30 RX ADMIN — MIDAZOLAM 1 MG: 1 INJECTION INTRAMUSCULAR; INTRAVENOUS at 13:30

## 2020-09-30 RX ADMIN — PROPOFOL 10 MG: 10 INJECTION, EMULSION INTRAVENOUS at 13:51

## 2020-09-30 RX ADMIN — PROPOFOL 10 MG: 10 INJECTION, EMULSION INTRAVENOUS at 13:47

## 2020-09-30 RX ADMIN — PROPOFOL 40 MG: 10 INJECTION, EMULSION INTRAVENOUS at 13:59

## 2020-09-30 RX ADMIN — SODIUM CHLORIDE: 9 INJECTION, SOLUTION INTRAVENOUS at 13:19

## 2020-09-30 RX ADMIN — PROPOFOL 40 MG: 10 INJECTION, EMULSION INTRAVENOUS at 14:02

## 2020-09-30 RX ADMIN — PROPOFOL 10 MG: 10 INJECTION, EMULSION INTRAVENOUS at 13:49

## 2020-09-30 RX ADMIN — PROPOFOL 10 MG: 10 INJECTION, EMULSION INTRAVENOUS at 13:57

## 2020-09-30 ASSESSMENT — ENCOUNTER SYMPTOMS: SHORTNESS OF BREATH: 1

## 2020-09-30 ASSESSMENT — PAIN SCALES - GENERAL: PAINLEVEL_OUTOF10: 0

## 2020-09-30 NOTE — H&P
[facility], [place]. [address], [place] [address] [address] [facility]  [phone] office  [phone] fax      OFFICE VISIT:  2020    Mauricio Staff - : 1960  Reason For Visit:  Omar Daniel is a 56 y.o. female who is here for No chief complaint on file. History:  The patient presents today for cardiology follow up concerning history of moderate to large pericardial effusion on serial 2D echocardiograms. She has a history of chronic diastolic heart failure and CAD. The patient is not weighing consistently, but is limiting sodium intake. She reports no angina or significant fluid retention. BP is well controlled on current regimen. The patient's PCP monitors cholesterol. Vladislav Barnett is currently very symptomatic with shortness of breath with mild exertion, she denies exertional chest pain,  orthopnea, paroxysmal nocturnal dyspnea, syncope, presyncope, sensed arrhythmia, edema and fatigue. The patient denies numbness or weakness to suggest cerebrovascular accident or transient ischemic attack.     Mauricio Nichols has the following history as recorded in [facility]:  Patient Active Problem List   Diagnosis Code    SOB (shortness of breath) R06.02    Pericardial effusion I31.3    Chronic diastolic congestive heart failure (HCC) I50.32    Normocytic anemia D64.9    Type 2 diabetes mellitus, with long-term current use of insulin ([facility]) E11.9, Z79.4    COPD (chronic obstructive pulmonary disease) ([facility]) J44.9    Hyperglycemia R73.9    Acute kidney injury ([facility] N17.9    Elevated d-dimer R79.89    Morbid obesity with BMI of 40.0-44.9, adult ([facility]) E66.01, Z68.41    Obstructive sleep apnea syndrome G47.33    Essential hypertension I10    Moderate persistent asthma without complication N46.81    Pulmonary hypertension ([facility]) I27.20    Nonobstructive atherosclerosis of coronary artery I25.10    CKD (chronic kidney disease) stage 3, GFR 30-59 ml/min ([facility]) N18.3    Pulmonary edema with congestive heart failure ([facility]) I50.1    Palliative care patient Z51.5    Chest discomfort R07.89    Mixed hyperlipidemia E78.2    Coronary artery disease involving native coronary artery of native heart without angina pectoris I25.10     Past Medical History:   Diagnosis Date    [facility]I [facility]acute [facility] infarction) ([facility] 09/2018    Asthma     CAD (coronary artery disease)     hx of stents    [facility] 2012    R sided numbness/weakness    CHF (congestive heart failure) ([facility]     COPD [facility] obstructive [facility]) [facility]     Diabetes mellitus ([facility]     DVT (deep vein thrombosis) in pregnancy     Hyperlipidemia     Hypertension     Palliative care patient 07/02/2020    Pneumonia      Past Surgical History:   Procedure Laterality Date    CATARACT REMOVAL      COLONOSCOPY  approx 2013    CORONARY ANGIOPLASTY WITH STENT PLACEMENT  2018    in [address]- OM, OM ans Circ    TONSILLECTOMY       Family History   Problem Relation Age of Onset    Colon Cancer Neg Hx     Colon Polyps Neg Hx      Social History     Tobacco Use    Smoking status: Former Smoker     Packs/day: 1.00     Years: 30.00     Pack years: 30.00     Last attempt to quit: 2005     Years since quitting: 15.7    Smokeless tobacco: Never Used   Substance Use Topics    Alcohol use: Never     Frequency: Never      Current Facility-Administered Medications   Medication Dose Route Frequency Provider Last Rate Last Dose    0.9 % sodium chloride infusion   Intravenous Continuous Umberto Cabezas MD        sodium chloride flush 0.9 % injection 10 mL  10 mL Intravenous 2 times per day Umberto Cabezas MD        sodium chloride flush 0.9 % injection 10 mL  10 mL Intravenous PRN Umberto Cabezas MD        ondansetron (ZOFRAN) injection 4 mg  4 mg Intravenous Q6H PRN Umberto Cabezas MD           Allergies: Albuterol; Levaquin [levofloxacin]; Metformin and related; and Aspirin    Review of Systems  Constitutional - no appetite change, or unexpected weight change. No fever, chills or diaphoresis. No significant change in activity level or new onset of fatigue. HEENT - no significant rhinorrhea or epistaxis. No tinnitus or significant hearing loss. Eyes - no sudden vision change or amaurosis. No corneal arcus, xantholasma, subconjunctival hemorrhage or discharge. Respiratory - no significant wheezing, stridor, apnea or cough.  + FORD - stable. Cardiovascular - no exertional chest pain to suggest myocardial ischemia. No orthopnea or PND. No sensation of sustained arrythmia. No occurrence of slow heart rate. No palpitations. No claudication. Gastrointestinal - no abdominal swelling or pain. No blood in stool. No severe constipation, diarrhea, nausea, or vomiting. Genitourinary - no dysuria, frequency, or urgency. No flank pain or hematuria. Musculoskeletal - no back pain or myalgia. Transported via wheelchair. Extremities - no clubbing, cyanosis or extremity edema. Skin - no color change or rash. No pallor. No new surgical incision. Neurologic - no speech difficulty, facial asymmetry or lateralizing weakness. No seizures, presyncope or syncope. No significant dizziness. Hematologic - no easy bruising or excessive bleeding. Psychiatric - no severe anxiety or insomnia. No confusion. All other review of systems are negative. Objective  Vital Signs - There were no vitals taken for this visit. Silverio Sawyer is alert, cooperative, and pleasant. Well groomed. No acute distress. Body habitus - There is no height or weight on file to calculate BMI. HEENT - Head is normocephalic. No circumoral cyanosis. Dentition is normal.  EYES -   Lids normal without ptosis. No discharge, edema or subconjunctival hemorrhage. Neck - Symmetrical without apparent mass or lymphadenopathy.    Respiratory - Normal respiratory effort without use of accessory muscles. Ausculatation reveals vesicular breath sounds without crackles, wheezes, rub or rhonchi. Cardiovascular - No jugular venous distention. Auscultation reveals regular rate and rhythm. No audible clicks, gallop or rub. No murmur. No lower extremity varicosities. No carotid bruits. Abdominal -  No visible distention, mass or pulsations. Extremities - No clubbing or cyanosis. No statis dermatitis or ulcers. No edema. Musculoskeletal -   No Osler's nodes. No kyphosis or scoliosis. Transported via wheelchair. Skin -  Warm and dry; no rash or pallor. No new surgical wound. Neurological - No focal neurological deficits. Thought processes coherent. No apparent tremor. Oriented to person, place and time. Psychiatric -  Appropriate affect and mood. Assessment:        Data reviewed:  Coronary artery disease   Stents in the proximal circ and OM1  11/21/2019  Cath  LVEDP 25, PA 60/30, normal LVFX, mild CAD ([place], [address])  1/26/2020  Echo mild to moderate pericardial eff, normal LVF  3/7/2020  Echo large pericardial effusion  5/29/2020  Echo  Severe LVH, DD, normal LVFX, small pericardial effusion  7/15/20  lexiscan Positive for inferior lateral myocardial ischemia, EF 33%, -3% ischemic myocardium on stress, intermediate risk findings, AUC indication 16, AUC score 7, (MD Chano)  7/15/20  Echo  Normal LVFX, moderate pericardial effusion  7/16/20  Cath  Mild CAD, normal LVFX    8/3/20 echo  Summary   LV is normal in size with normal systolic function. LV ejection fraction  estimated at 60-65%. Moderate concentric LVH. Grade 1 diastolic dysfunction. RV is normal in size with normal systolic function. Mild to moderate left atrial enlargement. Normal right atrial size. Aortic valve is trileaflet with normal leaflet opening. No significant  stenosis or regurgitation noted.    Mitral valve is structurally normal with normal leaflet mobility. No  significant stenosis or regurgitation noted. No significant tricuspid regurgitation. Moderate to large circumferential pericardial effusion which is   predominantly located posteriorly and laterally with some organization of  fluid anteriorly. No echocardiographic features suggestive of tamponade. Serial comparison with echoes from 7/15/2020, 5/29/2020 and 3/6/2020  showed no significant change. Signature    ----------------------------------------------------------------   Electronically signed by Parminder Cedeno MD(Interpreting physician)   on 08/03/2020 06:34 PM   ----------------------------------------------------------------    7/14/20  Summary:     1. Successful femoral artery ultrasound  2. Successful femoral artery arteriogram  3. Supervision of the administration of moderate conscious sedation  4. Mild coronary artery disease  5. Left ventricular function is normal  6. Patent stents in the circumflex marginal system    Echo in August 2020  Patient Status: Outpatient     Indications:Pericardial effusion.      Conclusions      Summary   This is a limited study for the evaluation of pericardial effusion. There   is a moderate size circumferential pericardial effusion, no signs to   suggest tamponade. LV systolic function appears normal with what appears to be moderate to   severe concentric LVH. The atria appear to be normal.   The right ventricular size and systolic function appear normal.      Signature      ----------------------------------------------------------------   Electronically signed by Zeke Savage DO(Interpreting   physician) on 09/14/2020 02:21 PM   ----------------------------------------------------------------      7/14/20 echo  Findings    Left Ventricle   Moderate concentric left ventricular hypertrophy. Normal left ventricular size with preserved LV function and an estimated  ejection fraction of approximately 55-60%. Pericardial Effusion   There is a moderate localized near left ventricle pericardial effusion  noted. 7/2/20 echo   Summary   There is a large circumferential pericardial effusion noted. There is mild  right atrial collapse consistent with tamponade physiology    Signature    ----------------------------------------------------------------   Electronically signed by Scarlet Healy MD(Interpreting   physician) on 07/02/2020 09:15 PM    5/29/20 echo  Pericardial Effusion   Small pericardial effusion noted. 1/25/20 echo  Summary   Moderate concentric left ventricular hypertrophy. Left ventricular ejection fraction is visually estimated at 60%. No evidence of left ventricular mass or thrombus noted. Small-moderate pericardial effusion noted. Pt is short of breath. Some respiratory variation noted.     Signature    ----------------------------------------------------------------   Electronically signed by Scarlet Healy MD(Interpreting   physician) on 01/26/2020 02:16 PM   ----------------------------------------------------------------    Lab Results   Component Value Date    WBC 5.6 08/13/2020    HGB 11.5 (L) 08/13/2020    HCT 34.3 (L) 08/13/2020    MCV 90.3 08/13/2020     08/13/2020     Lab Results   Component Value Date     08/13/2020    K 4.6 08/13/2020     08/13/2020    CO2 25 08/13/2020    BUN 32 (H) 08/13/2020    CREATININE 1.0 (H) 08/13/2020    GLUCOSE 326 (H) 08/13/2020    CALCIUM 9.6 08/13/2020    PROT 6.8 07/14/2020    LABALBU 3.8 07/14/2020    BILITOT 0.3 07/14/2020    ALKPHOS 101 07/14/2020    AST 16 07/14/2020    ALT 15 07/14/2020    LABGLOM 56 (A) 08/13/2020    GFRAA >59 08/13/2020       Lab Results   Component Value Date    CHOL 208 (H) 07/15/2020    CHOL 206 (H) 07/03/2020    CHOL 220 (H) 01/27/2020     Lab Results   Component Value Date    TRIG 352 (H) 07/15/2020    TRIG 125 07/03/2020    TRIG 184 (H) 01/27/2020     Lab Results   Component Value Date    HDL 39 (L) 07/15/2020    HDL 62 (L) 07/03/2020    HDL 48 (L) 01/27/2020     Lab Results   Component Value Date    LDLCALC 99 07/15/2020    LDLCALC 119 07/03/2020    LDLCALC 135 01/27/2020     Pericardial effusion -follow-up echo in August 2020 showed the effusion is at least moderate in size, patient is symptomatic with shortness of breath even with modest exertion    I had a long discussion with the patient has seems that the conservative management is not helping and she becoming more symptomatic over the past few weeks, I gave her the option of pericardiocentesis and she agreed to proceed with that option  We will plan on doing this in the Cath Lab under MAC sedation with anesthesia as patient wished    CAD - stable on current medical management. Chronic diastolic heart failure - NYHA class II stage C  Patient reports stable weight. Encouraged daily weight log and low sodium diet. HTN - normotensive on current regimen. Hyperlipidemia - on Lipitor currently. LDL 99. Patient is compliant with medication regimen. Sin Lee MD, [facility]  Interventional Cardiologist, Endovascular Specialist   Medical Director, [facility]   [facility]        Risks, benefits, alternatives of pericardiocentesis discussed with the patient   I explained the procedure to the patient in detail and fully informed consent obtained.   Acceptable Mallampati score  The procedure will be done under MAC sedation with anesthesiologist  ASA 3      Sin Lee MD, [facility]  Interventional Cardiologist, Endovascular Specialist   Medical Director, Sandra Camacho

## 2020-09-30 NOTE — ANESTHESIA POSTPROCEDURE EVALUATION
Department of Anesthesiology  Postprocedure Note    Patient: Caryl Velásquez  MRN: [id number]  YOB: 1960  Date of evaluation: 9/30/2020  Time:  2:23 PM     Procedure Summary     Date:  09/30/20 Room / Location:  [facility] CATH LAB    Anesthesia Start:  0569 Anesthesia Stop:  5204    Procedure:  CATH LAB WITH ANESTHESIA Diagnosis:  Pericardial effusion (noninflammatory)    Scheduled Providers:  [place] Coad, APRN - CRNA Responsible Provider:      Anesthesia Type:  Not recorded ASA Status:  Not recorded          Anesthesia Type: No value filed. Melvin Phase I:      Melvin Phase II:      Last vitals: Reviewed and per EMR flowsheets.        Anesthesia Post Evaluation    Patient location during evaluation: PACU  Patient participation: complete - patient participated  Level of consciousness: sleepy but conscious  Pain score: 0  Airway patency: patent  Nausea & Vomiting: no nausea and no vomiting  Complications: no  Cardiovascular status: hemodynamically stable  Respiratory status: acceptable  Hydration status: euvolemic

## 2020-09-30 NOTE — ANESTHESIA PRE PROCEDURE
Department of Anesthesiology  Preprocedure Note       Name:  Caleb Vidal   Age:  56 y.o.  :  1960                                          MRN:  [id number]         Date:  2020      Surgeon: * No surgeons listed *    Procedure: * No procedures listed *    Medications prior to admission:   Prior to Admission medications    Medication Sig Start Date End Date Taking? Authorizing Provider   atorvastatin (LIPITOR) 20 MG tablet Take 20 mg by mouth daily   Yes Historical Provider, MD   DULoxetine (CYMBALTA) 30 MG extended release capsule Take 30 mg by mouth daily ALONG WITH A 60 MG TABLET (BOTH TOGETHER + 90 MG)   Yes Historical Provider, MD   DULoxetine (CYMBALTA) 60 MG extended release capsule Take 60 mg by mouth daily WITH A 30 MG TABLET. TOTAL OF 90 MG)   Yes Historical Provider, MD   gabapentin (NEURONTIN) 600 MG tablet Take 1 tablet by mouth 3 times daily for 30 days. 9/4/20 10/4/20 Yes David Ch MD   pramipexole (MIRAPEX) 0.5 MG tablet Take 1 tablet by mouth 2 times daily 9/3/20  Yes YAMIL Balbuena NP   Blood Glucose Monitoring Suppl (BLOOD GLUCOSE MONITOR SYSTEM) w/Device KIT 1 Device by Does not apply route three times daily 20  Yes YAMIL Balbuena NP   blood glucose monitor strips Test 3 times a day & as needed for symptoms of irregular blood glucose. Dispense sufficient amount for indicated testing frequency plus additional to accommodate PRN testing needs.  20  Yes YAMIL Balbuena NP   Lancets MISC 1 each by Does not apply route 3 times daily 20  Yes YAMIL Balbuena NP   bumetanide [facility]) 1 MG tablet Take 1 tablet by mouth 2 times daily 20 Yes Rahul Pappas MD   Insulin Degludec [facility]) 100 UNIT/ML SOPN Inject 40 Units into the skin nightly 6/3/20  Yes YAMIL Balbuena NP   levocetirizine (XYZAL) 5 MG tablet Take 1 tablet by mouth nightly 6/3/20  Yes YAMIL Balbuena NP   carvedilol ([address]) Bhavin Travis MD        ondansetron [facility] injection 4 mg  4 mg Intravenous Q6H PRN Bhavin Travis MD           Allergies:     Allergies   Allergen Reactions    Albuterol Shortness Of Breath    Levaquin [Levofloxacin] Shortness Of Breath and Swelling    Metformin And Related Diarrhea    Aspirin Rash       Problem List:    Patient Active Problem List   Diagnosis Code    SOB (shortness of breath) R06.02    Pericardial effusion I31.3    Chronic diastolic congestive [facility] failure ([facility] I50.32    Normocytic anemia D64.9    Type 2 diabetes mellitus, with long-term current use of insulin ([facility]) E11.9, Z79.4    COPD (chronic obstructive pulmonary disease) ([facility] J44.9    Hyperglycemia R73.9    Acute kidney injury ([facility] N17.9    Elevated d-dimer R79.89    Morbid obesity with BMI of 40.0-44.9, adult ([facility]) E66.01, Z68.41    Obstructive sleep apnea syndrome G47.33    Essential hypertension I10    Moderate persistent asthma without complication G50.21    Pulmonary hypertension ([facility] I27.20    Nonobstructive atherosclerosis of coronary artery I25.10    CKD (chronic kidney disease) stage 3, GFR 30-59 ml/min ([facility]) N18.3    Pulmonary edema with congestive heart failure ([facility]) I50.1    Palliative care patient Z51.5    Chest discomfort R07.89    Mixed hyperlipidemia E78.2    Coronary artery disease involving native coronary artery of native heart without angina pectoris I25.10       Past Medical History:        Diagnosis Date    Allergies     AMI (acute myocardial infarction) ([facility] 09/2018    Asthma     CAD (coronary artery disease)     hx of stents    Cerebral artery occlusion with cerebral infarction ([facility] 2012    R sided numbness/weakness    CHF (congestive heart failure) ([facility]     COPD (chronic obstructive pulmonary disease) ([facility]     Depression     Diabetes mellitus ([facility]     DVT (deep vein thrombosis) in pregnancy     GERD (gastroesophageal reflux disease)     Hx of blood clots rt leg    Hyperlipidemia     Hypertension     Palliative care patient 07/02/2020    Pneumonia        Past Surgical History:        Procedure Laterality Date    CATARACT REMOVAL Bilateral     COLONOSCOPY  approx 2013    CORONARY ANGIOPLASTY WITH STENT PLACEMENT  2018    in [address] OM, OM ans Circ (3 stents)    TONSILLECTOMY         Social History:    Social History     Tobacco Use    Smoking status: Former Smoker     Packs/day: 1.00     Years: 30.00     Pack years: 30.00     Last attempt to quit: 2005     Years since quitting: 15.7    Smokeless tobacco: Never Used   Substance Use Topics    Alcohol use: Never     Frequency: Never                                Counseling given: Not Answered      Vital Signs (Current):   Vitals:    09/30/20 1234 09/30/20 1421   BP: 108/62 (!) 141/57   Pulse: 68 65   Resp: 17 14   Temp: 97.2 °F (36.2 °C) 97 °F (36.1 °C)   TempSrc: Temporal Temporal   SpO2: 98% 95%   Weight: 222 lb (100.7 kg)    Height: 5' 1\" (1.549 m)                                               BP Readings from Last 3 Encounters:   09/30/20 (!) 141/57   09/30/20 (!) 99/51   09/21/20 (!) 94/48       NPO Status: Time of last liquid consumption: 0900(WITH SIPS OF WATER)                        Time of last solid consumption: 1900                        Date of last liquid consumption: 09/30/20                        Date of last solid food consumption: 09/29/20    BMI:   Wt Readings from Last 3 Encounters:   09/30/20 222 lb (100.7 kg)   09/21/20 222 lb (100.7 kg)   08/19/20 224 lb (101.6 kg)     Body mass index is 41.95 kg/m².     CBC:   Lab Results   Component Value Date    WBC 6.5 09/30/2020    RBC 3.90 09/30/2020    HGB 11.6 09/30/2020    HCT 34.5 09/30/2020    MCV 88.5 09/30/2020    RDW 12.4 09/30/2020     09/30/2020       CMP:   Lab Results   Component Value Date     09/30/2020    K 4.9 09/30/2020    K 5.0 07/14/2020    CL 97 09/30/2020    CO2 31 09/30/2020    BUN 46 09/30/2020    CREATININE 1.6 09/30/2020    GFRAA 39 09/30/2020    LABGLOM 33 09/30/2020    GLUCOSE 240 09/30/2020    PROT 6.8 07/14/2020    CALCIUM 9.9 09/30/2020    BILITOT 0.3 07/14/2020    ALKPHOS 101 07/14/2020    AST 16 07/14/2020    ALT 15 07/14/2020       POC Tests: No results for input(s): POCGLU, POCNA, POCK, POCCL, POCBUN, POCHEMO, POCHCT in the last 72 hours. Coags:   Lab Results   Component Value Date    PROTIME 13.6 09/30/2020    INR 1.04 09/30/2020    APTT 33.5 01/25/2020       HCG (If Applicable): No results found for: PREGTESTUR, PREGSERUM, HCG, HCGQUANT     ABGs:   Lab Results   Component Value Date    PHART 7.470 01/25/2020    PO2ART 81.0 01/25/2020    QUD9EZY 40.0 01/25/2020    MTU0QJU 29.1 01/25/2020    BEART 5.0 01/25/2020    W5CATJSE 95.7 01/25/2020        Type & Screen (If Applicable):  No results found for: LABABO, LABRH    Drug/Infectious Status (If Applicable):  No results found for: HIV, HEPCAB    COVID-19 Screening (If Applicable):   Lab Results   Component Value Date    COVID19 NOT DETECTED 09/27/2020         Anesthesia Evaluation    Airway: Mallampati: II  TM distance: >3 FB   Neck ROM: full  Mouth opening: > = 3 FB Dental: normal exam         Pulmonary: breath sounds clear to auscultation  (+) COPD:  shortness of breath:  sleep apnea:  asthma:                            Cardiovascular:    (+) hypertension: moderate, past MI:, CAD: non-obstructive, CHF: no interval change, murmur,                   Neuro/Psych:   (+) CVA: no interval change,             GI/Hepatic/Renal:   (+) GERD: well controlled,           Endo/Other:    (+) DiabetesType II DM, using insulin, . Abdominal:   (+) obese,     Abdomen: soft. Vascular:                                      Anesthesia Plan      general and TIVA     ASA 3       Induction: intravenous. Anesthetic plan and risks discussed with patient. Plan discussed with CRNA.                   YAMIL Pelayo - ANTHONY   9/30/2020

## 2020-09-30 NOTE — PROGRESS NOTES
PT OOB WITH RN @ SIDE. PT AMBULATED TO BATHROOM WITHOUT SOB NOTED. (APPROX 10 FT AMBULATION). PT THEN BACK TO BEDSIDE CHAIR REDRESSING. SPOUSE IN ROOM NOW.

## 2020-09-30 NOTE — PROGRESS NOTES
[address] [place] [address] REGARDING TODAY'S EVENT AND FUTURE PLAN OF CARE.  BUMEX CHANGED TO 1 MG DAILY PER DR. Mely Frank

## 2020-09-30 NOTE — PROGRESS NOTES
AVS EXPLAINED & GIVEN. PT & SPOUSE VOICED UNDERSTANDINGS. TRANSPORTED PT TO CAR PER W/C WITH BELONGINGS FOR DISCHARGE HOME PER SPOUSE.

## 2020-10-02 ENCOUNTER — OFFICE VISIT (OUTPATIENT)
Dept: PRIMARY CARE CLINIC | Age: 60
End: 2020-10-02
Payer: MEDICARE

## 2020-10-02 VITALS
OXYGEN SATURATION: 99 % | SYSTOLIC BLOOD PRESSURE: 128 MMHG | HEART RATE: 76 BPM | BODY MASS INDEX: 42.41 KG/M2 | WEIGHT: 224.6 LBS | DIASTOLIC BLOOD PRESSURE: 72 MMHG | TEMPERATURE: 97.7 F | HEIGHT: 61 IN

## 2020-10-02 PROCEDURE — 3044F HG A1C LEVEL LT 7.0%: CPT | Performed by: NURSE PRACTITIONER

## 2020-10-02 PROCEDURE — 90686 IIV4 VACC NO PRSV 0.5 ML IM: CPT | Performed by: NURSE PRACTITIONER

## 2020-10-02 PROCEDURE — 3017F COLORECTAL CA SCREEN DOC REV: CPT | Performed by: NURSE PRACTITIONER

## 2020-10-02 PROCEDURE — G0402 INITIAL PREVENTIVE EXAM: HCPCS | Performed by: NURSE PRACTITIONER

## 2020-10-02 PROCEDURE — G0008 ADMIN INFLUENZA VIRUS VAC: HCPCS | Performed by: NURSE PRACTITIONER

## 2020-10-02 RX ORDER — TIZANIDINE 2 MG/1
2 TABLET ORAL 2 TIMES DAILY PRN
Qty: 30 TABLET | Refills: 0 | Status: SHIPPED | OUTPATIENT
Start: 2020-10-02 | End: 2020-10-18 | Stop reason: SDUPTHER

## 2020-10-02 ASSESSMENT — PATIENT HEALTH QUESTIONNAIRE - PHQ9
SUM OF ALL RESPONSES TO PHQ QUESTIONS 1-9: 0
SUM OF ALL RESPONSES TO PHQ9 QUESTIONS 1 & 2: 0
1. LITTLE INTEREST OR PLEASURE IN DOING THINGS: 0
2. FEELING DOWN, DEPRESSED OR HOPELESS: 0
SUM OF ALL RESPONSES TO PHQ QUESTIONS 1-9: 0

## 2020-10-02 NOTE — PATIENT INSTRUCTIONS
Personalized Preventive Plan for Mickiel Dec - 10/2/2020  Medicare offers a range of preventive health benefits. Some of the tests and screenings are paid in full while other may be subject to a deductible, co-insurance, and/or copay. Some of these benefits include a comprehensive review of your medical history including lifestyle, illnesses that may run in your family, and various assessments and screenings as appropriate. After reviewing your medical record and screening and assessments performed today your provider may have ordered immunizations, labs, imaging, and/or referrals for you. A list of these orders (if applicable) as well as your Preventive Care list are included within your After Visit Summary for your review. Other Preventive Recommendations:    · A preventive eye exam performed by an eye specialist is recommended every 1-2 years to screen for glaucoma; cataracts, macular degeneration, and other eye disorders. · A preventive dental visit is recommended every 6 months. · Try to get at least 150 minutes of exercise per week or 10,000 steps per day on a pedometer . · Order or download the FREE \"Exercise & Physical Activity: Your Everyday Guide\" from [employer]. Call 8-149.914.6448 or search [employer] online. · You need 8462-6622 mg of calcium and 6692-2072 IU of vitamin D per day. It is possible to meet your calcium requirement with diet alone, but a vitamin D supplement is usually necessary to meet this goal.  · When exposed to the sun, use a sunscreen that protects against both UVA and UVB radiation with an SPF of 30 or greater. Reapply every 2 to 3 hours or after sweating, drying off with a towel, or swimming. · Always wear a seat belt when traveling in a car. Always wear a helmet when riding a bicycle or motorcycle.

## 2020-10-02 NOTE — PROGRESS NOTES
After obtaining consent, and per orders of Kaylah Vee APRN  injection of Flu  given in Right arm by Damian Araya. Patient instructed to remain in clinic for 20 minutes afterwards, and to report any adverse reaction to me immediately.

## 2020-10-02 NOTE — PROGRESS NOTES
Medicare Annual Wellness Visit  Name: Son Flood Date: 10/2/2020   MRN: [id number] Sex: Female   Age: 56 y.o. Ethnicity: /   : 1960 Race: Joseline Parker is here for Medicare AWV (doing ok)    Screenings for behavioral, psychosocial and functional/safety risks, and cognitive dysfunction are all negative except as indicated below. These results, as well as other patient data from the [address] [place] form, are documented in Flowsheets linked to this Encounter. Allergies   Allergen Reactions    Albuterol Shortness Of Breath    Levaquin [Levofloxacin] Shortness Of Breath and Swelling    Metformin And Related Diarrhea    Aspirin Rash         Prior to Visit Medications    Medication Sig Taking? Authorizing Provider   tiZANidine (ZANAFLEX) 2 MG tablet Take 1 tablet by mouth 2 times daily as needed (spasms) Yes YAMIL Sabillon NP   atorvastatin (LIPITOR) 20 MG tablet Take 20 mg by mouth daily Yes Historical Provider, MD   DULoxetine (CYMBALTA) 30 MG extended release capsule Take 30 mg by mouth daily ALONG WITH A 60 MG TABLET (BOTH TOGETHER + 90 MG) Yes Historical Provider, MD   DULoxetine (CYMBALTA) 60 MG extended release capsule Take 60 mg by mouth daily WITH A 30 MG TABLET. TOTAL OF 90 MG) Yes Historical Provider, MD   bumetanide (BUMEX) 1 MG tablet Take 1 tablet by mouth daily Yes Celestino Samayoa MD   gabapentin (NEURONTIN) 600 MG tablet Take 1 tablet by mouth 3 times daily for 30 days. Yes Phylis Klinefelter, MD   pramipexole (MIRAPEX) 0.5 MG tablet Take 1 tablet by mouth 2 times daily Yes YAMIL Sabillon NP   Blood Glucose Monitoring Suppl (BLOOD GLUCOSE MONITOR SYSTEM) w/Device KIT 1 Device by Does not apply route three times daily Yes YAMIL Sabillon NP   blood glucose monitor strips Test 3 times a day & as needed for symptoms of irregular blood glucose.  Dispense sufficient amount for indicated testing frequency plus additional to accommodate PRN testing needs.  Yes YAMIL Vega NP   Lancets MISC 1 each by Does not apply route 3 times daily Yes YAMIL Vega NP   Insulin Degludec (TRESIBA FLEXTOUCH) 100 UNIT/ML SOPN Inject 40 Units into the skin nightly Yes YAMIL Vega NP   Semaglutide, 1 MG/DOSE, 2 MG/1.5ML SOPN Inject 1 mg into the skin every 7 days  Patient taking differently: Inject 1 mg into the skin every 7 days Pt takes on Thursday's Yes YAMIL Vega NP   levocetirizine (XYZAL) 5 MG tablet Take 1 tablet by mouth nightly Yes YAMIL Vega NP   carvedilol (COREG) 3.125 MG tablet Take 1 tablet by mouth 2 times daily Yes YAMIL Vega NP   levalbuterol Thom Bernheim) 0.63 MG/3ML nebulization Take 3 mLs by nebulization every 6 hours as needed for Wheezing Yes YAMIL Vega NP   amLODIPine (NORVASC) 5 MG tablet Take 1 tablet by mouth daily Yes YAMIL Vega NP   levalbuterol Thom Bernheim HFA) 45 MCG/ACT inhaler Inhale 2 puffs into the lungs every 4 hours as needed for Wheezing or Shortness of Breath Yes YAMIL Vega NP   fluticasone-vilanterol (BREO ELLIPTA) 100-25 MCG/INH AEPB inhaler Inhale 1 puff into the lungs daily Yes YAMIL Vega NP   tiotropium (SPIRIVA RESPIMAT) 1.25 MCG/ACT AERS inhaler Inhale 2 puffs into the lungs daily Yes YAMIL Vega NP   valsartan (DIOVAN) 160 MG tablet Take 160 mg by mouth daily Yes Historical Provider, MD   clopidogrel (PLAVIX) 75 MG tablet Take 75 mg by mouth daily Yes Historical Provider, MD   montelukast (SINGULAIR) 10 MG tablet Take 10 mg by mouth nightly Yes Historical Provider, MD   vitamin D (CHOLECALCIFEROL) 25 MCG (1000 UT) TABS tablet Take 1,000 Units by mouth daily Yes Historical Provider, MD   melatonin 3 MG TABS tablet Take 10 mg by mouth nightly Yes Historical Provider, MD         Past Medical History:   Diagnosis Date    Allergies     AMI (acute myocardial infarction) ([facility] [address] 09/2018    Asthma     CAD (coronary artery disease)     hx of stents    Cerebral artery occlusion with cerebral infarction ([facility] [address] 2012    R sided numbness/weakness    CHF (congestive heart failure) ([facility] [address]     COPD (chronic obstructive pulmonary disease) ([facility] [address]     Depression     Diabetes mellitus ([facility] [address]     DVT (deep vein thrombosis) in pregnancy     GERD (gastroesophageal reflux disease)     Hx of blood clots     rt leg    Hyperlipidemia     Hypertension     Palliative care patient 07/02/2020    Pneumonia        Past Surgical History:   Procedure Laterality Date    CATARACT REMOVAL Bilateral     COLONOSCOPY  approx 2013    CORONARY ANGIOPLASTY WITH STENT PLACEMENT  2018    in [address] [facility] (3 stents)    TONSILLECTOMY           Family History   Problem Relation Age of Onset    Colon Cancer Neg Hx     Colon Polyps Neg Hx        CareTeam (Including outside providers/suppliers regularly involved in providing care):   Patient Care Team:  YAMIL Salmon - NP as PCP - General (Nurse Practitioner)  YAMIL Salmon NP as PCP - [facility] Empaneled Provider  YAMIL Art as Nurse Practitioner (Family Nurse Practitioner)  Veena Rosales MD as Consulting Physician (Pulmonology)    Wt Readings from Last 3 Encounters:   10/02/20 224 lb 9.6 oz (101.9 kg)   09/30/20 222 lb (100.7 kg)   09/21/20 222 lb (100.7 kg)     Vitals:    10/02/20 1337   BP: 128/72   Pulse: 76   Temp: 97.7 °F (36.5 °C)   TempSrc: Temporal   SpO2: 99%   Weight: 224 lb 9.6 oz (101.9 kg)   Height: 5' 1\" (1.549 m)     Body mass index is 42.44 kg/m². Based upon direct observation of the patient, evaluation of cognition reveals recent and remote memory intact.     General Appearance: alert and oriented to person, place and time and in no acute distress  Skin: warm and dry, no rash or erythema  Head: normocephalic and atraumatic  Eyes: pupils equal, round, and reactive to light, extraocular eye movements intact, conjunctivae normal  ENT: tympanic membrane, external ear and ear canal normal bilaterally, oropharynx clear and moist with normal mucous membranes  Neck: neck supple and non tender without mass   Pulmonary/Chest: clear to auscultation bilaterally- no wheezes, rales or rhonchi, normal air movement, no respiratory distress  Cardiovascular: normal rate, regular rhythm and intact distal pulses  Abdomen: soft, non-tender, non-distended, normal bowel sounds, no masses or organomegaly  Extremities: no cyanosis and no clubbing  Musculoskeletal: patient in wheelchair due to chronic BLE weakness  Neurologic: speech normal    Patient's complete Health Risk Assessment and screening values have been reviewed and are found in Flowsheets. The following problems were reviewed today and where indicated follow up appointments were made and/or referrals ordered. Positive Risk Factor Screenings with Interventions:     Fall Risk:  2 or more falls in past year?: (!) yes  Fall with injury in past year?: (!) yes  Fall Risk Interventions:    · Patient declines any further evaluation/treatment for this issue    General Health:  General  In general, how would you say your health is?: (!) Poor  In the past 7 days, have you experienced any of the following?  New or Increased Pain, New or Increased Fatigue, Loneliness, Social Isolation, Stress or Anger?: (!) New or Increased Fatigue, Loneliness, Stress, Anger  Do you get the social and emotional support that you need?: Yes  Do you have a Living Will?: Yes  General Health Risk Interventions:  · Poor self-assessment of health status: patient declines any further evaluation/treatment for this issue  · Fatigue: patient declines any further evaluation/treatment for this issue  · Loneliness: patient declines any further intervention for this issue  · Stress: relaxation techniques discussed, patient declines any further evaluation/treatment for this issue  · Anger: relaxation techniques discussed, patient declines any further evaluation/treatment for this issue    Health Habits/Nutrition:  Health Habits/Nutrition  Do you exercise for at least 20 minutes 2-3 times per week?: (!) No  Have you lost any weight without trying in the past 3 months?: No  Do you eat fewer than 2 meals per day?: (!) Yes  Have you seen a dentist within the past year?: (!) No  Body mass index is 42.44 kg/m².   Health Habits/Nutrition Interventions:  · Inadequate physical activity:  patient is not ready to increase his/her physical activity level at this time  · Nutritional issues:  patient is not ready to address his/her nutritional/weight issues at this time  · Dental exam overdue:  patient encouraged to make appointment with his/her dentist, patient declines dental evaluation    Hearing/Vision:  No exam data present  Hearing/Vision  Do you or your family notice any trouble with your hearing?: (!) Yes  Do you have difficulty driving, watching TV, or doing any of your daily activities because of your eyesight?: (!) Yes  Have you had an eye exam within the past year?: (!) No  Hearing/Vision Interventions:  · Hearing concerns:  patient declines any further evaluation/treatment for hearing issues  · Vision concerns:  patient encouraged to make appointment with his/her eye specialist, patient declines any further evaluation/treatment for this issue    Safety:  Safety  Do you have working smoke detectors?: Yes  Have all throw rugs been removed or fastened?: (!) No  Do you have non-slip mats or surfaces in all bathtubs/showers?: Yes  Do all of your stairways have a railing or banister?: Yes  Are your doorways, halls and stairs free of clutter?: Yes  Do you always fasten your seatbelt when you are in a car?: (!) No  Safety Interventions:  · Patient declines any further evaluation/treatment for this issue    ADL:  ADLs  In the past 7 days, did you need help from others to perform any of the following everyday activities? Eating, dressing, grooming, bathing, toileting, or walking/balance?: (!) Grooming, Bathing, Walking/Balance, Dressing  In the past 7 days, did you need help from others to take care of any of the following? Laundry, housekeeping, banking/finances, shopping, telephone use, food preparation, transportation, or taking medications?: (!) Laundry, Housekeeping, Food Preparation, Transportation, Taking Medications  ADL Interventions:  · Patient declines any further evaluation/treatment for this issue    Personalized Preventive Plan   Current Health Maintenance Status  Immunization History   Administered Date(s) Administered    Influenza Virus Vaccine 11/15/2019    Tdap (Boostrix, Adacel) 12/20/2018        Health Maintenance   Topic Date Due    Pneumococcal 0-64 years Vaccine (1 of 1 - PPSV23) 02/19/1966    Shingles Vaccine (1 of 2) 02/19/2008    Colon cancer screen colonoscopy  02/19/2008    Flu vaccine (1) 09/01/2020    A1C test (Diabetic or Prediabetic)  07/03/2021    Potassium monitoring  08/13/2021    Creatinine monitoring  08/13/2021    Annual Wellness Visit (AWV)  08/15/2021    Statin Therapy  09/30/2021    Breast cancer screen  08/20/2022    DTaP/Tdap/Td vaccine (2 - Td) 12/20/2028    Hepatitis A vaccine  Aged Out    Hib vaccine  Aged Out    Meningococcal (ACWY) vaccine  Aged Out     Recommendations for [employer] Due: see orders and patient instructions/AVS.  . Recommended screening schedule for the next 5-10 years is provided to the patient in written form: see Patient Leatha Holman was seen today for medicare awv.     Diagnoses and all orders for this visit:    Routine general medical examination at a health care facility    Type 2 diabetes mellitus with other specified complication, with long-term current use of insulin (HCC)    Chronic obstructive pulmonary disease, unspecified COPD type ([facility] [address]    Stage 3b chronic kidney disease    Chronic diastolic congestive heart failure (HCC)    Mixed hyperlipidemia    Chronic tension-type headache, not intractable  -     tiZANidine (ZANAFLEX) 2 MG tablet; Take 1 tablet by mouth 2 times daily as needed (spasms)    Muscle spasm  -     tiZANidine (ZANAFLEX) 2 MG tablet;  Take 1 tablet by mouth 2 times daily as needed (spasms)

## 2020-10-13 NOTE — PROGRESS NOTES
TERRI Yepez  [facility]   Respiratory Disease Clinic  [address] [place] [address]  Phone: [phone]  Fax: [phone]     Anna Peres is a 60 y.o. female.   CC:   Chief Complaint   Patient presents with   • Severe persistent asthma without complication     No exposure; no hospitalizations; tested and negative        HPI:  Ms. Peres is a pleasant 60 year old female patient with known Asthma treated in past with Xolair while in [place]. At one point she was taken off of Xolair related to elevated IgE. When on the Xolair she did not have to use her inhalers or nebulizer nor did she have any hospitalizations. PFTs at [facility] consistent with Asthma. Her last use of nebulizer was in May last year. She last used the rescue inhaler last week. She has been using the Spiriva and Symbicort and feels they do well except for when she lays down at night. When she lays down, her cough worsens as well as her SOB. She also has known ARMINDA non compliant with CPAP for the last year. She has mild CAD, chronic diastolic heart failure, sever LVH,  and associated pericardial effusion. Hx of DVT. At last visit she was going to get her CPAP out of storage. She has done this but not wearing it. At last visit she had not had the IgE + 22 allergens drawn and has since done the lab which showed a very elevated IgE of 1622 and several equivocal to low responses to certain allergens. She also states she has had problems with GERD and was on Prilosec at one time.     Patient denies fever, chills, muscle or body aches, new onset headache, new loss of taste or smell, sore throat, congestion or runny nose, nausea or vomiting, diarrhea.  Patient denies any known exposure to a person under investigation or positive for COVID-19.  Patient is wearing mask today.       The following portions of the patient's history were reviewed and updated as appropriate: allergies, current medications, past family history, past  medical history, past social history, past surgical history and problem list.    Past Medical History:   Diagnosis Date   • Anxiety    • Arthritis    • Asthma    • Cerebrovascular accident (CVA) (CMS/[facility]) 3/9/2020   • CHF (congestive heart failure) (CMS/[facility])    • Diabetes mellitus (CMS/[facility])    • Elevated troponin, likely Type 2 from influenza A  3/9/2020   • Hyperlipidemia    • Hypertension    • Influenza A 3/9/2020   • Myocardial infarction (CMS/[facility])    • Pericardial effusion, large, circumferential ([facility] 3/7/2020 Echo) 3/9/2020   • Stroke (CMS/[facility])    • Suspected cerebrovascular accident (CVA) 3/9/2020   • Type 2 diabetes mellitus with hyperglycemia (CMS/[facility]) 3/9/2020       History reviewed. No pertinent family history.    Social History     Socioeconomic History   • Marital status:      Spouse name: Not on file   • Number of children: Not on file   • Years of education: Not on file   • Highest education level: Not on file   Tobacco Use   • Smoking status: Never Smoker   • Smokeless tobacco: Never Used   Substance and Sexual Activity   • Alcohol use: Never     Frequency: Never       Review of Systems   Constitutional: Positive for fatigue. Negative for chills, diaphoresis and fever.   HENT: Negative for congestion, rhinorrhea and trouble swallowing.    Eyes: Negative for blurred vision, double vision and visual disturbance.   Respiratory: Positive for cough and shortness of breath. Negative for wheezing.    Cardiovascular: Negative for chest pain, palpitations and leg swelling.   Gastrointestinal: Negative for abdominal distention, abdominal pain and nausea.   Endocrine: Negative for cold intolerance, heat intolerance and polydipsia.   Musculoskeletal: Negative for back pain, gait problem and myalgias.   Skin: Negative for color change, pallor and rash.   Neurological: Negative for dizziness, syncope and confusion.        +daytime sleepiness    Hematological: Negative for adenopathy. Does not bruise/bleed  "easily.   Psychiatric/Behavioral: Negative for agitation, behavioral problems and sleep disturbance.       /78   Pulse 77   Temp 98.1 °F (36.7 °C)   Ht 154.9 cm (61\")   Wt 102 kg (224 lb)   SpO2 98% Comment: RA  Breastfeeding No   BMI 42.32 kg/m²     Physical Exam  Constitutional:       General: She is not in acute distress.     Appearance: She is well-developed. She is morbidly obese. She is not diaphoretic.   HENT:      Head: Normocephalic and atraumatic.   Eyes:      Conjunctiva/sclera: Conjunctivae normal.      Pupils: Pupils are equal, round, and reactive to light.   Neck:      Musculoskeletal: Normal range of motion and neck supple.   Cardiovascular:      Rate and Rhythm: Normal rate and regular rhythm.      Heart sounds: Normal heart sounds. No murmur. No friction rub. No gallop.    Pulmonary:      Effort: Pulmonary effort is normal. No respiratory distress.      Breath sounds: Decreased breath sounds present. No wheezing.   Abdominal:      General: Bowel sounds are normal.      Palpations: Abdomen is soft.   Musculoskeletal: Normal range of motion.         General: No deformity.   Lymphadenopathy:      Cervical: No cervical adenopathy.   Skin:     General: Skin is warm and dry.      Findings: No erythema.   Neurological:      Mental Status: She is alert and oriented to person, place, and time.   Psychiatric:         Behavior: Behavior normal.         Thought Content: Thought content normal.         Judgment: Judgment normal.         Pulmonary Functions Testing Results:      No results found for this or any previous visit.    My interpretation:  Previously 12/12/19 mild restriction (see scanned in document)     CXR: no new       Problem List Items Addressed This Visit        Cardiovascular and Mediastinum    Pulmonary hypertension (CMS/HCC), severe     Nonobstructive atherosclerosis of coronary artery       Respiratory    Obstructive sleep apnea      Other Visit Diagnoses     Severe persistent " asthma without complication    -  Primary    Relevant Orders    Ambulatory Referral to Allergy (Completed)    Environmental allergies        Relevant Orders    Ambulatory Referral to Allergy (Completed)    Morbid obesity with BMI of 40.0-44.9, adult (CMS/[facility])        Elevated IgE level            Patient's Body mass index is 42.32 kg/m². BMI is above normal parameters. Recommendations include: referral to primary care.      Assessment/Plan     She will cotninue her Spiriva Symbicort. She will begin using her nebulizer prior to bedtime to see if this helps her nightly symptoms. I suspect this is multifactorial related to her Asthma, diastolic heart failure, and possibly GERD. I have restarted her on Prilosec and this is sent to the pharmacy. She will continue to follow up with cardiology. Again, recommend right heart cath to further evaluate pulmonary hypertension. Likely Who Group 2 & 3. Blood pressure is better controlled. Continue to follow low sodium diet. She has also agreed to resume using her CPAP. I will have our RT arrange a titration study. I am going to refer her to Dr. Lomax. Her IgE is quit elevated. She did have some equivical to low results on some of the allergens. She may not be a candidate to restart Xolair but maybe another biologic? She will follow up in 3 months with us.      Health maintenance:   Influenza vaccine: Given 10/2/20       TERRI Yepez  10/15/2020  12:06 CDT    Return in about 3 months (around 1/14/2021).    This dictation was generated by voice recognition computer software. Although all attempts are made to edit dictation for accuracy, there may be errors in the transcription that are not intended.

## 2020-10-14 ENCOUNTER — OFFICE VISIT (OUTPATIENT)
Dept: PULMONOLOGY | Facility: CLINIC | Age: 60
End: 2020-10-14

## 2020-10-14 VITALS
TEMPERATURE: 98.1 F | SYSTOLIC BLOOD PRESSURE: 132 MMHG | DIASTOLIC BLOOD PRESSURE: 78 MMHG | BODY MASS INDEX: 42.29 KG/M2 | HEART RATE: 77 BPM | OXYGEN SATURATION: 98 % | WEIGHT: 224 LBS | HEIGHT: 61 IN

## 2020-10-14 DIAGNOSIS — I25.10 NONOBSTRUCTIVE ATHEROSCLEROSIS OF CORONARY ARTERY: ICD-10-CM

## 2020-10-14 DIAGNOSIS — I27.20 PULMONARY HYPERTENSION (HCC): ICD-10-CM

## 2020-10-14 DIAGNOSIS — J45.50 SEVERE PERSISTENT ASTHMA WITHOUT COMPLICATION: Primary | ICD-10-CM

## 2020-10-14 DIAGNOSIS — Z91.09 ENVIRONMENTAL ALLERGIES: ICD-10-CM

## 2020-10-14 DIAGNOSIS — E66.01 MORBID OBESITY WITH BMI OF 40.0-44.9, ADULT (HCC): ICD-10-CM

## 2020-10-14 DIAGNOSIS — G47.33 OBSTRUCTIVE SLEEP APNEA: ICD-10-CM

## 2020-10-14 DIAGNOSIS — R76.8 ELEVATED IGE LEVEL: ICD-10-CM

## 2020-10-14 PROCEDURE — 99214 OFFICE O/P EST MOD 30 MIN: CPT | Performed by: NURSE PRACTITIONER

## 2020-10-14 RX ORDER — OMEPRAZOLE 40 MG/1
40 CAPSULE, DELAYED RELEASE ORAL DAILY
Qty: 30 CAPSULE | Refills: 2 | Status: SHIPPED | OUTPATIENT
Start: 2020-10-14 | End: 2020-12-19 | Stop reason: SDUPTHER

## 2020-10-14 RX ORDER — TIZANIDINE 2 MG/1
2 TABLET ORAL
COMMUNITY
Start: 2020-10-02

## 2020-10-14 NOTE — PATIENT INSTRUCTIONS
I am referring you to the allergist for your Asthma and allergies  Continue your current inhalers   Begin Nebulizer treatment prior to bedtime   Continue to watch a low sodium diet  Continue to avoid eating or drinking 2-3 hours prior to going to bed and elevate your head.   Begin Prilosec daily -script sent to pharmacy

## 2020-10-15 ENCOUNTER — TELEPHONE (OUTPATIENT)
Dept: PULMONOLOGY | Facility: CLINIC | Age: 60
End: 2020-10-15

## 2020-10-15 NOTE — TELEPHONE ENCOUNTER
Patient has pulled her CPAP out of storage, last use 1 year ago and her DME has been in [place]?? She has agreed to start wearing it again. Can we arrange a titration study?

## 2020-10-16 ENCOUNTER — OFFICE VISIT (OUTPATIENT)
Dept: CARDIOLOGY | Age: 60
End: 2020-10-16
Payer: MEDICARE

## 2020-10-16 VITALS
HEIGHT: 61 IN | HEART RATE: 68 BPM | WEIGHT: 222 LBS | DIASTOLIC BLOOD PRESSURE: 44 MMHG | BODY MASS INDEX: 41.91 KG/M2 | SYSTOLIC BLOOD PRESSURE: 118 MMHG

## 2020-10-16 PROCEDURE — 99214 OFFICE O/P EST MOD 30 MIN: CPT | Performed by: INTERNAL MEDICINE

## 2020-10-16 NOTE — PROGRESS NOTES
[facility], [place]. [address], [address], [address] [facility]  [phone] office  [phone] fax      OFFICE VISIT:  10/16/2020    Amanda Chung - : 1960  Reason For Visit:  Umesh Khalil is a 56 y.o. female who is here for 1 Month Follow-Up (patient complains of shortness of breath but other than that she feels ok)    History:  The patient presents today for cardiology follow up visit pericardial effusion, her last echo showed moderate effusion, she was scheduled to have a pericardial tap done Cath Lab bedside echo that day showed the effusion is mild to moderate and pericardial tap was canceled    She comes today for follow-up visit, she mentioned that she is feeling better with still shortness of breath at baseline also complained of recurrent morning joint pain and stiffness in both arms and she thinks she is going to see dermatologist for possible rheumatoid arthritis  Her blood serology was done and shows positive rheumatoid factor and high ESR, she is waiting to see Dr. Mag Serrano for consultation    She has a history of chronic diastolic heart failure and CAD. The patient is not weighing consistently, but is limiting sodium intake. She reports no angina or significant fluid retention. BP is well controlled on current regimen. The patient's PCP monitors cholesterol. Alison Fernandes is currently mildly symptomatic with shortness of breath with mild exertion, she denies exertional chest pain,  orthopnea, paroxysmal nocturnal dyspnea, syncope, presyncope, sensed arrhythmia, edema and fatigue. The patient denies numbness or weakness to suggest cerebrovascular accident or transient ischemic attack.     Amanda Chung has the following history as recorded in [facility]:  Patient Active Problem List   Diagnosis Code    SOB (shortness of breath) R06.02    Pericardial effusion I31.3    Chronic diastolic congestive heart failure ([facility] I50.32    Normocytic anemia D64.9    Type 2 diabetes mellitus, with long-term current use of insulin ([facility]) E11.9, Z79.4    COPD (chronic obstructive pulmonary disease) ([facility]) J44.9    Hyperglycemia R73.9    Acute kidney injury ([facility] N17.9    Elevated d-dimer R79.89    Morbid obesity with BMI of 40.0-44.9, adult ([facility]) E66.01, Z68.41    Obstructive sleep apnea syndrome G47.33    Essential hypertension I10    Moderate persistent asthma without complication V63.32    Pulmonary hypertension ([facility]) I27.20    Nonobstructive atherosclerosis of coronary artery I25.10    CKD (chronic kidney disease) stage 3, GFR 30-59 ml/min N18.30    Pulmonary edema with congestive heart failure ([facility]) I50.1    Palliative care patient Z51.5    Chest discomfort R07.89    Mixed hyperlipidemia E78.2    Coronary artery disease involving native coronary artery of native heart without angina pectoris I25.10     Past Medical History:   Diagnosis Date    Allergies     AMI (acute myocardial infarction) ([facility] 09/2018    Asthma     CAD (coronary artery disease)     hx of stents    Cerebral artery occlusion with cerebral infarction ([facility] 2012    R sided numbness/weakness    CHF (congestive heart failure) ([facility]     COPD (chronic obstructive pulmonary disease) ([facility])     Depression     Diabetes mellitus ([facility]     DVT (deep vein thrombosis) in pregnancy     GERD (gastroesophageal reflux disease)     Hx of blood clots     rt leg    Hyperlipidemia     Hypertension     Palliative care patient 07/02/2020    Pneumonia      Past Surgical History:   Procedure Laterality Date    CATARACT REMOVAL Bilateral     COLONOSCOPY  approx 2013    CORONARY ANGIOPLASTY WITH STENT PLACEMENT  2018    in [facility] (3 stents)    TONSILLECTOMY       Family History   Problem Relation Age of Onset    Colon Cancer Neg Hx     Colon Polyps Neg Hx      Social History     Tobacco Use    Smoking status: Former Smoker Packs/day: 1.00     Years: 30.00     Pack years: 30.00     Last attempt to quit: 2005     Years since quitting: 15.8    Smokeless tobacco: Never Used   Substance Use Topics    Alcohol use: Never     Frequency: Never      Current Outpatient Medications   Medication Sig Dispense Refill    tiZANidine (ZANAFLEX) 2 MG tablet Take 1 tablet by mouth 2 times daily as needed (spasms) 30 tablet 0    atorvastatin (LIPITOR) 20 MG tablet Take 20 mg by mouth daily      DULoxetine (CYMBALTA) 30 MG extended release capsule Take 30 mg by mouth daily ALONG WITH A 60 MG TABLET (BOTH TOGETHER + 90 MG)      DULoxetine (CYMBALTA) 60 MG extended release capsule Take 60 mg by mouth daily WITH A 30 MG TABLET. TOTAL OF 90 MG)      bumetanide (BUMEX) 1 MG tablet Take 1 tablet by mouth daily 30 tablet 0    pramipexole (MIRAPEX) 0.5 MG tablet Take 1 tablet by mouth 2 times daily 60 tablet 2    Blood Glucose Monitoring Suppl (BLOOD GLUCOSE MONITOR SYSTEM) w/Device KIT 1 Device by Does not apply route three times daily 1 kit 0    blood glucose monitor strips Test 3 times a day & as needed for symptoms of irregular blood glucose. Dispense sufficient amount for indicated testing frequency plus additional to accommodate PRN testing needs.  100 strip 11    Lancets MISC 1 each by Does not apply route 3 times daily 200 each 11    Insulin Degludec (TRESIBA FLEXTOUCH) 100 UNIT/ML SOPN Inject 40 Units into the skin nightly 4 pen 2    Semaglutide, 1 MG/DOSE, 2 MG/1.5ML SOPN Inject 1 mg into the skin every 7 days (Patient taking differently: Inject 1 mg into the skin every 7 days Pt takes on Thursday's) 2 pen 2    levocetirizine (XYZAL) 5 MG tablet Take 1 tablet by mouth nightly 30 tablet 3    carvedilol (COREG) 3.125 MG tablet Take 1 tablet by mouth 2 times daily 60 tablet 2    levalbuterol (XOPENEX) 0.63 MG/3ML nebulization Take 3 mLs by nebulization every 6 hours as needed for Wheezing 20 vial 2    amLODIPine (NORVASC) 5 MG tablet Take 1 tablet by mouth daily 30 tablet 1    levalbuterol (XOPENEX HFA) 45 MCG/ACT inhaler Inhale 2 puffs into the lungs every 4 hours as needed for Wheezing or Shortness of Breath 1 Inhaler 0    fluticasone-vilanterol (BREO ELLIPTA) 100-25 MCG/INH AEPB inhaler Inhale 1 puff into the lungs daily 1 each 3    tiotropium (SPIRIVA RESPIMAT) 1.25 MCG/ACT AERS inhaler Inhale 2 puffs into the lungs daily 1 Inhaler 3    valsartan (DIOVAN) 160 MG tablet Take 160 mg by mouth daily      clopidogrel (PLAVIX) 75 MG tablet Take 75 mg by mouth daily      montelukast (SINGULAIR) 10 MG tablet Take 10 mg by mouth nightly      vitamin D (CHOLECALCIFEROL) 25 MCG (1000 UT) TABS tablet Take 1,000 Units by mouth daily      melatonin 3 MG TABS tablet Take 10 mg by mouth nightly      gabapentin (NEURONTIN) 600 MG tablet Take 1 tablet by mouth 3 times daily for 30 days. 90 tablet 1     No current facility-administered medications for this visit. Allergies: Albuterol; Levaquin [levofloxacin]; Metformin and related; and Aspirin    Review of Systems  Constitutional - no appetite change, or unexpected weight change. No fever, chills or diaphoresis. No significant change in activity level or new onset of fatigue. HEENT - no significant rhinorrhea or epistaxis. No tinnitus or significant hearing loss. Eyes - no sudden vision change or amaurosis. No corneal arcus, xantholasma, subconjunctival hemorrhage or discharge. Respiratory - no significant wheezing, stridor, apnea or cough.  + FORD - stable. Cardiovascular - no exertional chest pain to suggest myocardial ischemia. No orthopnea or PND. No sensation of sustained arrythmia. No occurrence of slow heart rate. No palpitations. No claudication. Gastrointestinal - no abdominal swelling or pain. No blood in stool. No severe constipation, diarrhea, nausea, or vomiting. Genitourinary - no dysuria, frequency, or urgency. No flank pain or hematuria.    Musculoskeletal - no back pain or myalgia. Transported via wheelchair. Extremities - no clubbing, cyanosis or extremity edema. Skin - no color change or rash. No pallor. No new surgical incision. Neurologic - no speech difficulty, facial asymmetry or lateralizing weakness. No seizures, presyncope or syncope. No significant dizziness. Hematologic - no easy bruising or excessive bleeding. Psychiatric - no severe anxiety or insomnia. No confusion. All other review of systems are negative. Objective  Vital Signs - BP (!) 118/44   Pulse 68   Ht 5' 1\" (1.549 m)   Wt 222 lb (100.7 kg)   BMI 41.95 kg/m²   General - Patricia Potts is alert, cooperative, and pleasant. Well groomed. No acute distress. Body habitus - Body mass index is 41.95 kg/m². HEENT - Head is normocephalic. No circumoral cyanosis. Dentition is normal.  EYES -   Lids normal without ptosis. No discharge, edema or subconjunctival hemorrhage. Neck - Symmetrical without apparent mass or lymphadenopathy. Respiratory - Normal respiratory effort without use of accessory muscles. Ausculatation reveals vesicular breath sounds without crackles, wheezes, rub or rhonchi. Cardiovascular - No jugular venous distention. Auscultation reveals regular rate and rhythm. No audible clicks, gallop or rub. No murmur. No lower extremity varicosities. No carotid bruits. Abdominal -  No visible distention, mass or pulsations. Extremities - No clubbing or cyanosis. No statis dermatitis or ulcers. No edema. Musculoskeletal -   No Osler's nodes. No kyphosis or scoliosis. Transported via wheelchair. Skin -  Warm and dry; no rash or pallor. No new surgical wound. Neurological - No focal neurological deficits. Thought processes coherent. No apparent tremor. Oriented to person, place and time. Psychiatric -  Appropriate affect and mood.      Assessment:        Data reviewed:  Coronary artery disease   Stents in the proximal circ and OM1  11/21/2019  Cath  LVEDP 25, PA 60/30, normal LVFX, mild CAD ([place], [address])  1/26/2020  Echo mild to moderate pericardial eff, normal LVF  3/7/2020  Echo large pericardial effusion  5/29/2020  Echo  Severe LVH, DD, normal LVFX, small pericardial effusion  7/15/20  lexiscan Positive for inferior lateral myocardial ischemia, EF 33%, -3% ischemic myocardium on stress, intermediate risk findings, AUC indication 16, AUC score 7, (MD Chano)  7/15/20  Echo  Normal LVFX, moderate pericardial effusion  7/16/20  Cath  Mild CAD, normal LVFX    8/3/20 echo  Summary   LV is normal in size with normal systolic function. LV ejection fraction  estimated at 60-65%. Moderate concentric LVH. Grade 1 diastolic dysfunction. RV is normal in size with normal systolic function. Mild to moderate left atrial enlargement. Normal right atrial size. Aortic valve is trileaflet with normal leaflet opening. No significant  stenosis or regurgitation noted. Mitral valve is structurally normal with normal leaflet mobility. No  significant stenosis or regurgitation noted. No significant tricuspid regurgitation. Moderate to large circumferential pericardial effusion which is   predominantly located posteriorly and laterally with some organization of  fluid anteriorly. No echocardiographic features suggestive of tamponade. Serial comparison with echoes from 7/15/2020, 5/29/2020 and 3/6/2020  showed no significant change. Signature    ----------------------------------------------------------------   Electronically signed by Vee Cedeno MD(Interpreting physician)   on 08/03/2020 06:34 PM   ----------------------------------------------------------------    7/14/20  Summary:     1. Successful femoral artery ultrasound  2. Successful femoral artery arteriogram  3. Supervision of the administration of moderate conscious sedation  4. Mild coronary artery disease  5. Left ventricular function is normal  6.   Patent stents in the circumflex marginal system    Echo in August 2020  Patient Status: Outpatient     Indications:Pericardial effusion.      Conclusions      Summary   This is a limited study for the evaluation of pericardial effusion. There   is a moderate size circumferential pericardial effusion, no signs to   suggest tamponade. LV systolic function appears normal with what appears to be moderate to   severe concentric LVH. The atria appear to be normal.   The right ventricular size and systolic function appear normal.      Signature      ----------------------------------------------------------------   Electronically signed by Jimmie Rodríguez DO(Interpreting   physician) on 09/14/2020 02:21 PM   ----------------------------------------------------------------      7/14/20 echo  Findings    Left Ventricle   Moderate concentric left ventricular hypertrophy. Normal left ventricular size with preserved LV function and an estimated  ejection fraction of approximately 55-60%. Pericardial Effusion   There is a moderate localized near left ventricle pericardial effusion  noted. 7/2/20 echo   Summary   There is a large circumferential pericardial effusion noted. There is mild  right atrial collapse consistent with tamponade physiology    Signature    ----------------------------------------------------------------   Electronically signed by Key Dillon MD(Interpreting   physician) on 07/02/2020 09:15 PM    5/29/20 echo  Pericardial Effusion   Small pericardial effusion noted. 1/25/20 echo  Summary   Moderate concentric left ventricular hypertrophy. Left ventricular ejection fraction is visually estimated at 60%. No evidence of left ventricular mass or thrombus noted. Small-moderate pericardial effusion noted. Pt is short of breath. Some respiratory variation noted.     Signature    ----------------------------------------------------------------   Electronically signed by Key Dillon MD(Interpreting physician) on 01/26/2020 02:16 PM   ----------------------------------------------------------------    Lab Results   Component Value Date    WBC 6.5 09/30/2020    HGB 11.6 (L) 09/30/2020    HCT 34.5 (L) 09/30/2020    MCV 88.5 09/30/2020     09/30/2020     Lab Results   Component Value Date     09/30/2020    K 4.9 09/30/2020    CL 97 (L) 09/30/2020    CO2 31 (H) 09/30/2020    BUN 46 (H) 09/30/2020    CREATININE 1.6 (H) 09/30/2020    GLUCOSE 240 (H) 09/30/2020    CALCIUM 9.9 09/30/2020    PROT 6.8 07/14/2020    LABALBU 3.8 07/14/2020    BILITOT 0.3 07/14/2020    ALKPHOS 101 07/14/2020    AST 16 07/14/2020    ALT 15 07/14/2020    LABGLOM 33 (A) 09/30/2020    GFRAA 39 (L) 09/30/2020       Lab Results   Component Value Date    CHOL 208 (H) 07/15/2020    CHOL 206 (H) 07/03/2020    CHOL 220 (H) 01/27/2020     Lab Results   Component Value Date    TRIG 352 (H) 07/15/2020    TRIG 125 07/03/2020    TRIG 184 (H) 01/27/2020     Lab Results   Component Value Date    HDL 39 (L) 07/15/2020    HDL 62 (L) 07/03/2020    HDL 48 (L) 01/27/2020     Lab Results   Component Value Date    LDLCALC 99 07/15/2020    LDLCALC 119 07/03/2020    LDLCALC 135 01/27/2020     Pericardial effusion -follow-up echo in showed moderate effusion with no tamponade  Patient has weight gain of 4 pounds, will change her Bumex to 2 mg daily for 3 days and then back to 1 mg daily as routine  We will plan on doing repeat echo in 3 months office visit follow-up    CAD - stable on current medical management. Chronic diastolic heart failure - NYHA class II stage C  Encouraged daily weight log and low sodium diet. HTN - normotensive on current regimen. Hyperlipidemia - on Lipitor currently. LDL 99. Patient is compliant with medication regimen.   Will refer to rheumatologist       Ronald Perez MD, [address] [facility]  Interventional Cardiologist, Endovascular Specialist   Medical Director, [address] [facility]

## 2020-10-19 RX ORDER — GLUCOSAMINE HCL/CHONDROITIN SU 500-400 MG
CAPSULE ORAL
Qty: 100 STRIP | Refills: 11 | Status: SHIPPED | OUTPATIENT
Start: 2020-10-19 | End: 2020-11-19 | Stop reason: SDUPTHER

## 2020-10-19 RX ORDER — LEVOCETIRIZINE DIHYDROCHLORIDE 5 MG/1
5 TABLET, FILM COATED ORAL NIGHTLY
Qty: 30 TABLET | Refills: 3 | Status: SHIPPED | OUTPATIENT
Start: 2020-10-19 | End: 2020-11-19 | Stop reason: SDUPTHER

## 2020-10-19 RX ORDER — FLUTICASONE FUROATE AND VILANTEROL 100; 25 UG/1; UG/1
1 POWDER RESPIRATORY (INHALATION) DAILY
Qty: 1 EACH | Refills: 3 | Status: SHIPPED | OUTPATIENT
Start: 2020-10-19 | End: 2020-11-19 | Stop reason: SDUPTHER

## 2020-10-19 RX ORDER — TIZANIDINE 2 MG/1
2 TABLET ORAL 2 TIMES DAILY PRN
Qty: 30 TABLET | Refills: 0 | Status: SHIPPED | OUTPATIENT
Start: 2020-10-19 | End: 2021-01-02

## 2020-10-19 RX ORDER — LEVALBUTEROL TARTRATE 45 UG/1
2 AEROSOL, METERED ORAL EVERY 4 HOURS PRN
Qty: 1 INHALER | Refills: 0 | Status: SHIPPED | OUTPATIENT
Start: 2020-10-19

## 2020-10-19 RX ORDER — PRAMIPEXOLE DIHYDROCHLORIDE 0.5 MG/1
0.5 TABLET ORAL 2 TIMES DAILY
Qty: 60 TABLET | Refills: 2 | Status: SHIPPED | OUTPATIENT
Start: 2020-10-19 | End: 2020-11-19 | Stop reason: SDUPTHER

## 2020-10-19 RX ORDER — AMLODIPINE BESYLATE 5 MG/1
5 TABLET ORAL DAILY
Qty: 30 TABLET | Refills: 1 | Status: SHIPPED | OUTPATIENT
Start: 2020-10-19 | End: 2020-11-19 | Stop reason: SDUPTHER

## 2020-10-19 RX ORDER — TIZANIDINE 2 MG/1
2 TABLET ORAL 2 TIMES DAILY PRN
Qty: 30 TABLET | Refills: 0 | Status: SHIPPED | OUTPATIENT
Start: 2020-10-19 | End: 2020-11-19 | Stop reason: SDUPTHER

## 2020-10-19 RX ORDER — LEVALBUTEROL INHALATION SOLUTION 0.63 MG/3ML
0.63 SOLUTION RESPIRATORY (INHALATION) EVERY 6 HOURS PRN
Qty: 20 VIAL | Refills: 2 | Status: ON HOLD | OUTPATIENT
Start: 2020-10-19 | End: 2021-01-06 | Stop reason: HOSPADM

## 2020-10-19 RX ORDER — INSULIN DEGLUDEC INJECTION 100 U/ML
40 INJECTION, SOLUTION SUBCUTANEOUS NIGHTLY
Qty: 4 PEN | Refills: 2 | Status: SHIPPED | OUTPATIENT
Start: 2020-10-19 | End: 2020-11-19 | Stop reason: SDUPTHER

## 2020-10-19 RX ORDER — GABAPENTIN 600 MG/1
600 TABLET ORAL 3 TIMES DAILY
Qty: 90 TABLET | Refills: 1 | OUTPATIENT
Start: 2020-10-19 | End: 2020-11-18

## 2020-10-19 RX ORDER — CARVEDILOL 3.12 MG/1
3.12 TABLET ORAL 2 TIMES DAILY
Qty: 60 TABLET | Refills: 2 | Status: SHIPPED | OUTPATIENT
Start: 2020-10-19 | End: 2020-11-19 | Stop reason: SDUPTHER

## 2020-10-19 NOTE — TELEPHONE ENCOUNTER
Per Nidia at the sleep lab, the patient will have to start over on the sleep study.  Also, may have trouble getting it approved from insurance if unable to locate original sleep study or downloads from [employer] company in [place].

## 2020-10-19 NOTE — TELEPHONE ENCOUNTER
Arsalan Figueroa called to request a refill on her medication. Last office visit : 10/2/2020   Next office visit : 10/18/2020     Last UDS:   Amphetamine Screen, Urine   Date Value Ref Range Status   02/28/2020 Negative Negative <1000 ng/mL Final     Benzodiazepine Screen, Urine   Date Value Ref Range Status   02/28/2020 Negative Negative <100 ng/mL Final     Cocaine Metabolite Screen, Urine   Date Value Ref Range Status   02/28/2020 Negative Negative <300 ng/mL Final     Opiate Scrn, Ur   Date Value Ref Range Status   02/28/2020 Negative Negative < 300 ng/mL Final       Last [place]: 9-4  Medication Contract: 2-2020   Last Fill: 9-4 with 1    Requested Prescriptions     Pending Prescriptions Disp Refills    gabapentin (NEURONTIN) 600 MG tablet 90 tablet 1     Sig: Take 1 tablet by mouth 3 times daily for 30 days.              Rx refused,too early  Josue Buchanan LPN

## 2020-10-20 NOTE — TELEPHONE ENCOUNTER
I spoke to pt.  [facility]  [phone] ph. Fax records release to [phone]

## 2020-10-23 DIAGNOSIS — G47.33 OBSTRUCTIVE SLEEP APNEA: Primary | ICD-10-CM

## 2020-11-02 ENCOUNTER — TELEPHONE (OUTPATIENT)
Dept: PRIMARY CARE CLINIC | Age: 60
End: 2020-11-02

## 2020-11-02 ENCOUNTER — HOSPITAL ENCOUNTER (EMERGENCY)
Age: 60
Discharge: HOME | End: 2020-11-02
Attending: EMERGENCY MEDICINE
Payer: MEDICARE

## 2020-11-02 ENCOUNTER — APPOINTMENT (OUTPATIENT)
Dept: GENERAL RADIOLOGY | Age: 60
End: 2020-11-02
Payer: MEDICARE

## 2020-11-02 VITALS
TEMPERATURE: 97.3 F | WEIGHT: 218 LBS | RESPIRATION RATE: 18 BRPM | OXYGEN SATURATION: 97 % | DIASTOLIC BLOOD PRESSURE: 66 MMHG | BODY MASS INDEX: 40.12 KG/M2 | HEART RATE: 74 BPM | HEIGHT: 62 IN | SYSTOLIC BLOOD PRESSURE: 164 MMHG

## 2020-11-02 LAB
ADENOVIRUS BY PCR: NOT DETECTED
ALBUMIN SERPL-MCNC: 3.5 G/DL (ref 3.5–5.2)
ALP BLD-CCNC: 83 U/L (ref 35–104)
ALT SERPL-CCNC: 10 U/L (ref 5–33)
ANION GAP SERPL CALCULATED.3IONS-SCNC: 8 MMOL/L (ref 7–19)
APTT: 28.5 SEC (ref 26–36.2)
AST SERPL-CCNC: 10 U/L (ref 5–32)
BASOPHILS ABSOLUTE: 0 K/UL (ref 0–0.2)
BASOPHILS RELATIVE PERCENT: 0.2 % (ref 0–1)
BILIRUB SERPL-MCNC: 0.3 MG/DL (ref 0.2–1.2)
BORDETELLA PARAPERTUSSIS BY PCR: NOT DETECTED
BORDETELLA PERTUSSIS BY PCR: NOT DETECTED
BUN BLDV-MCNC: 22 MG/DL (ref 8–23)
CALCIUM SERPL-MCNC: 8.6 MG/DL (ref 8.8–10.2)
CHLAMYDOPHILIA PNEUMONIAE BY PCR: NOT DETECTED
CHLORIDE BLD-SCNC: 101 MMOL/L (ref 98–111)
CO2: 25 MMOL/L (ref 22–29)
CORONAVIRUS 229E BY PCR: NOT DETECTED
CORONAVIRUS HKU1 BY PCR: NOT DETECTED
CORONAVIRUS NL63 BY PCR: NOT DETECTED
CORONAVIRUS OC43 BY PCR: NOT DETECTED
CREAT SERPL-MCNC: 1 MG/DL (ref 0.5–0.9)
EOSINOPHILS ABSOLUTE: 0.1 K/UL (ref 0–0.6)
EOSINOPHILS RELATIVE PERCENT: 1.7 % (ref 0–5)
GFR AFRICAN AMERICAN: >59
GFR NON-AFRICAN AMERICAN: 56
GLUCOSE BLD-MCNC: 432 MG/DL (ref 74–109)
HCT VFR BLD CALC: 27.8 % (ref 37–47)
HEMOGLOBIN: 9.2 G/DL (ref 12–16)
HUMAN METAPNEUMOVIRUS BY PCR: NOT DETECTED
HUMAN RHINOVIRUS/ENTEROVIRUS BY PCR: NOT DETECTED
IMMATURE GRANULOCYTES #: 0 K/UL
INFLUENZA A BY PCR: NOT DETECTED
INFLUENZA B BY PCR: NOT DETECTED
INR BLD: 1.06 (ref 0.88–1.18)
LYMPHOCYTES ABSOLUTE: 1.3 K/UL (ref 1.1–4.5)
LYMPHOCYTES RELATIVE PERCENT: 27.9 % (ref 20–40)
MCH RBC QN AUTO: 29.6 PG (ref 27–31)
MCHC RBC AUTO-ENTMCNC: 33.1 G/DL (ref 33–37)
MCV RBC AUTO: 89.4 FL (ref 81–99)
MONOCYTES ABSOLUTE: 0.4 K/UL (ref 0–0.9)
MONOCYTES RELATIVE PERCENT: 9 % (ref 0–10)
MYCOPLASMA PNEUMONIAE BY PCR: NOT DETECTED
NEUTROPHILS ABSOLUTE: 2.8 K/UL (ref 1.5–7.5)
NEUTROPHILS RELATIVE PERCENT: 61 % (ref 50–65)
PARAINFLUENZA VIRUS 1 BY PCR: NOT DETECTED
PARAINFLUENZA VIRUS 2 BY PCR: NOT DETECTED
PARAINFLUENZA VIRUS 3 BY PCR: NOT DETECTED
PARAINFLUENZA VIRUS 4 BY PCR: NOT DETECTED
PDW BLD-RTO: 12.9 % (ref 11.5–14.5)
PLATELET # BLD: 189 K/UL (ref 130–400)
PMV BLD AUTO: 9.2 FL (ref 9.4–12.3)
POTASSIUM SERPL-SCNC: 3.8 MMOL/L (ref 3.5–5)
PRO-BNP: 1758 PG/ML (ref 0–900)
PROTHROMBIN TIME: 13.7 SEC (ref 12–14.6)
RBC # BLD: 3.11 M/UL (ref 4.2–5.4)
RESPIRATORY SYNCYTIAL VIRUS BY PCR: NOT DETECTED
SARS-COV-2, PCR: NOT DETECTED
SODIUM BLD-SCNC: 134 MMOL/L (ref 136–145)
TOTAL PROTEIN: 6 G/DL (ref 6.6–8.7)
TROPONIN: 0.02 NG/ML (ref 0–0.03)
TROPONIN: 0.02 NG/ML (ref 0–0.03)
WBC # BLD: 4.6 K/UL (ref 4.8–10.8)

## 2020-11-02 PROCEDURE — 99284 EMERGENCY DEPT VISIT MOD MDM: CPT

## 2020-11-02 PROCEDURE — 80053 COMPREHEN METABOLIC PANEL: CPT

## 2020-11-02 PROCEDURE — 93005 ELECTROCARDIOGRAM TRACING: CPT | Performed by: EMERGENCY MEDICINE

## 2020-11-02 PROCEDURE — 71045 X-RAY EXAM CHEST 1 VIEW: CPT

## 2020-11-02 PROCEDURE — 85610 PROTHROMBIN TIME: CPT

## 2020-11-02 PROCEDURE — 85730 THROMBOPLASTIN TIME PARTIAL: CPT

## 2020-11-02 PROCEDURE — 85025 COMPLETE CBC W/AUTO DIFF WBC: CPT

## 2020-11-02 PROCEDURE — 84484 ASSAY OF TROPONIN QUANT: CPT

## 2020-11-02 PROCEDURE — 0202U NFCT DS 22 TRGT SARS-COV-2: CPT

## 2020-11-02 PROCEDURE — 36415 COLL VENOUS BLD VENIPUNCTURE: CPT

## 2020-11-02 PROCEDURE — 83880 ASSAY OF NATRIURETIC PEPTIDE: CPT

## 2020-11-02 PROCEDURE — 99999 PR OFFICE/OUTPT VISIT,PROCEDURE ONLY: CPT | Performed by: EMERGENCY MEDICINE

## 2020-11-02 RX ORDER — CEFDINIR 300 MG/1
300 CAPSULE ORAL 2 TIMES DAILY
Qty: 14 CAPSULE | Refills: 0 | Status: SHIPPED | OUTPATIENT
Start: 2020-11-02 | End: 2020-11-09

## 2020-11-02 RX ORDER — AZITHROMYCIN 250 MG/1
TABLET, FILM COATED ORAL
Qty: 1 PACKET | Refills: 0 | Status: SHIPPED | OUTPATIENT
Start: 2020-11-02 | End: 2020-11-06

## 2020-11-02 ASSESSMENT — ENCOUNTER SYMPTOMS
ABDOMINAL PAIN: 0
SORE THROAT: 1
SHORTNESS OF BREATH: 1
RHINORRHEA: 1
COUGH: 1
DIARRHEA: 0
BACK PAIN: 0
NAUSEA: 0
VOMITING: 0

## 2020-11-02 ASSESSMENT — PAIN SCALES - GENERAL: PAINLEVEL_OUTOF10: 3

## 2020-11-02 NOTE — ED PROVIDER NOTES
[facility]  eMERGENCY dEPARTMENT eNCOUnter      Pt Name: Vanna Moreira  MRN: [id number]  [place] 1960  Date of evaluation: 11/2/2020  Provider: Yamilex Murillo MD    [address] [place] [address]       Chief Complaint   Patient presents with    Shortness of Breath    Chest Pain         HISTORY OF PRESENT ILLNESS   (Location/Symptom, Timing/Onset,Context/Setting, Quality, Duration, Modifying Factors, Severity)  Note limiting factors. Vanna Moreira is a 56 y.o. female who presents to the emergency department for multiple complaints. Patient reports that she woke up today having body aches. She went back to sleep and then woke up later noticed she had a headache having rhinorrhea as well as mild scratchiness in her throat. She does admit to a dry cough as well. She also reports that for the past 2 hours she has had constant central chest discomfort described as a \"garbage can sitting on my chest\". She also reports associated shortness of breath. Denies any leg swelling. She called her doctor and due to her symptoms and complaining of chest pain they recommended she come here to the ER. Denies any Covid exposure. She does have 3 prior cardiac stents and was recently scheduled to have a pericardiocentesis due to a quite large effusion however this improved and the procedure was canceled. July 16th cath had mild CAD and normal LVFX. Does have some diastolic dysfunction. Admits to PND as well. HPI    NursingNotes were reviewed. REVIEW OF SYSTEMS    (2-9 systems for level 4, 10 or more for level 5)     Review of Systems   Constitutional: Positive for fatigue. Negative for chills and fever. HENT: Positive for rhinorrhea and sore throat. Respiratory: Positive for cough and shortness of breath. Cardiovascular: Positive for chest pain. Negative for leg swelling. Gastrointestinal: Negative for abdominal pain, diarrhea, nausea and vomiting. Genitourinary: Negative for dysuria, frequency and urgency. Musculoskeletal: Positive for myalgias. Negative for back pain and neck pain. Neurological: Positive for headaches. Negative for dizziness. All other systems reviewed and are negative. PAST MEDICALHISTORY     Past Medical History:   Diagnosis Date    Allergies     AMI (acute myocardial infarction) ([facility] [address] 09/2018    Asthma     CAD (coronary artery disease)     hx of stents    Cerebral artery occlusion with cerebral infarction ([facility] [address] 2012    R sided numbness/weakness    CHF (congestive heart failure) ([facility]     COPD (chronic obstructive pulmonary disease) ([facility]     Depression     Diabetes mellitus ([facility]     DVT (deep vein thrombosis) in pregnancy     GERD (gastroesophageal reflux disease)     Hx of blood clots     rt leg    Hyperlipidemia     Hypertension     Palliative care patient 07/02/2020    Pneumonia          SURGICAL HISTORY       Past Surgical History:   Procedure Laterality Date    CATARACT REMOVAL Bilateral     COLONOSCOPY  approx 2013    CORONARY ANGIOPLASTY WITH STENT PLACEMENT  2018    in [address] [facility] (3 stents)    TONSILLECTOMY           CURRENT MEDICATIONS     Previous Medications    AMLODIPINE (NORVASC) 5 MG TABLET    Take 1 tablet by mouth daily    ATORVASTATIN (LIPITOR) 20 MG TABLET    Take 20 mg by mouth daily    BLOOD GLUCOSE MONITOR STRIPS    Test 3 times a day & as needed for symptoms of irregular blood glucose. Dispense sufficient amount for indicated testing frequency plus additional to accommodate PRN testing needs.     BLOOD GLUCOSE MONITORING SUPPL (BLOOD GLUCOSE MONITOR SYSTEM) W/DEVICE KIT    1 Device by Does not apply route three times daily    BUMETANIDE (BUMEX) 1 MG TABLET    Take 1 tablet by mouth daily    CARVEDILOL (COREG) 3.125 MG TABLET    Take 1 tablet by mouth 2 times daily    CLOPIDOGREL (PLAVIX) 75 MG TABLET    Take 75 mg by mouth daily    DULOXETINE (CYMBALTA) 30 MG EXTENDED RELEASE CAPSULE    Take 30 mg by mouth daily ALONG WITH A 60 MG TABLET (BOTH TOGETHER + 90 MG)    DULOXETINE (CYMBALTA) 60 MG EXTENDED RELEASE CAPSULE    Take 60 mg by mouth daily WITH A 30 MG TABLET. TOTAL OF 90 MG)    FLUTICASONE-VILANTEROL (BREO ELLIPTA) 100-25 MCG/INH AEPB INHALER    Inhale 1 puff into the lungs daily    GABAPENTIN (NEURONTIN) 600 MG TABLET    Take 1 tablet by mouth 3 times daily for 30 days. INSULIN DEGLUDEC (TRESIBA FLEXTOUCH) 100 UNIT/ML SOPN    Inject 40 Units into the skin nightly    LANCETS MISC    1 each by Does not apply route 3 times daily    LEVALBUTEROL (XOPENEX HFA) 45 MCG/ACT INHALER    Inhale 2 puffs into the lungs every 4 hours as needed for Wheezing or Shortness of Breath    LEVALBUTEROL (XOPENEX) 0.63 MG/3ML NEBULIZATION    Take 3 mLs by nebulization every 6 hours as needed for Wheezing    LEVOCETIRIZINE (XYZAL) 5 MG TABLET    Take 1 tablet by mouth nightly    MELATONIN 3 MG TABS TABLET    Take 10 mg by mouth nightly    MONTELUKAST (SINGULAIR) 10 MG TABLET    Take 10 mg by mouth nightly    PRAMIPEXOLE (MIRAPEX) 0.5 MG TABLET    Take 1 tablet by mouth 2 times daily    SEMAGLUTIDE, 1 MG/DOSE, 2 MG/1.5ML SOPN    Inject 1 mg into the skin every 7 days    TIOTROPIUM (SPIRIVA RESPIMAT) 1.25 MCG/ACT AERS INHALER    Inhale 2 puffs into the lungs daily    TIZANIDINE (ZANAFLEX) 2 MG TABLET    Take 1 tablet by mouth 2 times daily as needed (spasms)    TIZANIDINE (ZANAFLEX) 2 MG TABLET    Take 1 tablet by mouth 2 times daily as needed (spasms)    VALSARTAN (DIOVAN) 160 MG TABLET    Take 160 mg by mouth daily    VITAMIN D (CHOLECALCIFEROL) 25 MCG (1000 UT) TABS TABLET    Take 1,000 Units by mouth daily       ALLERGIES     Albuterol; Levaquin [levofloxacin];  Metformin and related; and Aspirin    FAMILY HISTORY       Family History   Problem Relation Age of Onset    Colon Cancer Neg Hx     Colon Polyps Neg Hx           SOCIAL HISTORY       Social History     Socioeconomic History    Marital status:      Spouse name: Not on file    Number of children: Not on file    Years of education: Not on file    Highest education level: Not on file   Occupational History    Not on file   Social Needs    Financial resource strain: Not on file    Food insecurity     Worry: Not on file     Inability: Not on file    Transportation needs     Medical: Not on file     Non-medical: Not on file   Tobacco Use    Smoking status: Former Smoker     Packs/day: 1.00     Years: 30.00     Pack years: 30.00     Last attempt to quit: 2005     Years since quitting: 15.8    Smokeless tobacco: Never Used   Substance and Sexual Activity    Alcohol use: Never     Frequency: Never    Drug use: Never    Sexual activity: Not on file   Lifestyle    Physical activity     Days per week: Not on file     Minutes per session: Not on file    Stress: Not on file   Relationships    Social connections     Talks on phone: Not on file     Gets together: Not on file     Attends Faith service: Not on file     Active member of club or organization: Not on file     Attends meetings of clubs or organizations: Not on file     Relationship status: Not on file    Intimate partner violence     Fear of current or ex partner: Not on file     Emotionally abused: Not on file     Physically abused: Not on file     Forced sexual activity: Not on file   Other Topics Concern    Not on file   Social History Narrative    Not on file       SCREENINGS             PHYSICAL EXAM    (up to 7 for level 4, 8 or more for level 5)     ED Triage Vitals [11/02/20 1227]   BP Temp Temp src Pulse Resp SpO2 Height Weight   (!) 171/68 97.3 °F (36.3 °C) -- 78 17 97 % 5' 2\" (1.575 m) 218 lb (98.9 kg)       Physical Exam  Vitals signs and nursing note reviewed. Constitutional:       General: She is not in acute distress. Appearance: She is well-developed. She is not ill-appearing, toxic-appearing or diaphoretic. HENT:      Head: Normocephalic and atraumatic.       Right Ear: External ear normal. Left Ear: External ear normal.      Mouth/Throat:      Mouth: Mucous membranes are moist.   Eyes:      Conjunctiva/sclera: Conjunctivae normal.   Neck:      Musculoskeletal: Normal range of motion. Trachea: No tracheal deviation. Cardiovascular:      Rate and Rhythm: Normal rate and regular rhythm. Heart sounds: Normal heart sounds. No murmur. Pulmonary:      Effort: No respiratory distress. Breath sounds: Examination of the right-lower field reveals decreased breath sounds. Examination of the left-lower field reveals decreased breath sounds. Decreased breath sounds present. No wheezing or rales. Abdominal:      Palpations: Abdomen is soft. There is no mass. Tenderness: There is no abdominal tenderness. Musculoskeletal: Normal range of motion. Right lower leg: No edema. Left lower leg: No edema. Skin:     General: Skin is warm and dry. Neurological:      Mental Status: She is alert and oriented to person, place, and time. GCS: GCS eye subscore is 4. GCS verbal subscore is 5. GCS motor subscore is 6. DIAGNOSTIC RESULTS     EKG: All EKG's areinterpreted by the Emergency Department Physician who either signs or Co-signs this chart in the absence of a cardiologist.    76 normal sinus rhythm, T wave inversions in the lateral leads as well as inferior leads, no obvious ST elevation or depression, nondiagnostic EKG unchanged from 9/30/2020    RADIOLOGY:  Non-plain film images such as CT, Ultrasound and MRI are read by the radiologist. Plain radiographic images are visualized and preliminarily interpreted bythe emergency physician with the below findings:      XR CHEST PORTABLE   Final Result   1. Cardiomegaly. 2. Left lung base difficult to evaluate due to body habitus and heart   size. Pneumonia would be difficult to exclude in the left lung base. If there is clinical concern, an upright PA and lateral chest x-ray   would BE helpful.    Signed by Dr Delia Saldaña on 11/2/2020 1:51 PM              LABS:  Labs Reviewed   BRAIN NATRIURETIC PEPTIDE - Abnormal; Notable for the following components:       Result Value    Pro-BNP 1,758 (*)     All other components within normal limits   CBC WITH AUTO DIFFERENTIAL - Abnormal; Notable for the following components:    WBC 4.6 (*)     RBC 3.11 (*)     Hemoglobin 9.2 (*)     Hematocrit 27.8 (*)     MPV 9.2 (*)     All other components within normal limits   COMPREHENSIVE METABOLIC PANEL - Abnormal; Notable for the following components:    Sodium 134 (*)     Glucose 432 (*)     CREATININE 1.0 (*)     GFR Non-African American 56 (*)     Calcium 8.6 (*)     Total Protein 6.0 (*)     All other components within normal limits   RESPIRATORY PANEL, MOLECULAR, WITH COVID-19   APTT   PROTIME-INR   TROPONIN   TROPONIN       All other labs were within normal range or not returned as of this dictation. EMERGENCY DEPARTMENT COURSE and DIFFERENTIAL DIAGNOSIS/MDM:   Vitals:    Vitals:    11/02/20 1227 11/02/20 1400   BP: (!) 171/68 (!) 164/66   Pulse: 78 74   Resp: 17 18   Temp: 97.3 °F (36.3 °C)    SpO2: 97% 97%   Weight: 218 lb (98.9 kg)    Height: 5' 2\" (1.575 m)        MDM  Number of Diagnoses or Management Options     Amount and/or Complexity of Data Reviewed  Clinical lab tests: ordered and reviewed  Tests in the radiology section of CPT®: ordered and reviewed  Independent visualization of images, tracings, or specimens: yes        VSS, given her constellation of symptoms concern for covid, also having cp and sob, hx of CAD but cath in July overall reassuring, bedside echo only appears to have small effusion, no obvious tamponade, cont to monitor 1323    Pt remains well appearing, trop neg x2, given her constellation of symptoms I think her symptoms today are likely related to possibly pneumonia, her symptoms and presentation seem consistent with infectious etiology and much less likely cardiac, no concern for PE or dissection either.

## 2020-11-02 NOTE — TELEPHONE ENCOUNTER
Patient called stating she was having body aches, chest discomfort, and difficulty breathing.  She said it felt like she had garbage cans on her chest. I told her to go to the ED she said she was going as soon as her  returned home

## 2020-11-04 LAB
EKG P AXIS: 61 DEGREES
EKG P-R INTERVAL: 178 MS
EKG Q-T INTERVAL: 430 MS
EKG QRS DURATION: 98 MS
EKG QTC CALCULATION (BAZETT): 456 MS
EKG T AXIS: -124 DEGREES

## 2020-11-04 PROCEDURE — 93010 ELECTROCARDIOGRAM REPORT: CPT | Performed by: INTERNAL MEDICINE

## 2020-11-20 RX ORDER — GABAPENTIN 600 MG/1
600 TABLET ORAL 3 TIMES DAILY
Qty: 90 TABLET | Refills: 2 | OUTPATIENT
Start: 2020-11-20 | End: 2021-02-12 | Stop reason: SDUPTHER

## 2020-11-20 NOTE — TELEPHONE ENCOUNTER
Vanna Moreira called to request a refill on her medication. Last office visit : 10/2/2020   Next office visit : 3/5/2021       Last Jaimee Hobbs: 09/04/2020  Medication Contract: 2020   Last Fill: 10/05/2020    Last UDS:   Amphetamine Screen, Urine   Date Value Ref Range Status   02/28/2020 Negative Negative <1000 ng/mL Final     Benzodiazepine Screen, Urine   Date Value Ref Range Status   02/28/2020 Negative Negative <100 ng/mL Final     Cocaine Metabolite Screen, Urine   Date Value Ref Range Status   02/28/2020 Negative Negative <300 ng/mL Final     Opiate Scrn, Ur   Date Value Ref Range Status   02/28/2020 Negative Negative < 300 ng/mL Final               Requested Prescriptions     Pending Prescriptions Disp Refills    gabapentin (NEURONTIN) 600 MG tablet 90 tablet 2     Sig: Take 1 tablet by mouth 3 times daily for 30 days. Please approve or refuse this medication.    Torsten Hurd MA

## 2020-11-22 RX ORDER — AMLODIPINE BESYLATE 5 MG/1
5 TABLET ORAL DAILY
Qty: 30 TABLET | Refills: 1 | Status: SHIPPED | OUTPATIENT
Start: 2020-11-22

## 2020-11-22 RX ORDER — TIZANIDINE 2 MG/1
2 TABLET ORAL 2 TIMES DAILY PRN
Qty: 30 TABLET | Refills: 0 | Status: SHIPPED | OUTPATIENT
Start: 2020-11-22 | End: 2021-01-02

## 2020-11-22 RX ORDER — CARVEDILOL 3.12 MG/1
3.12 TABLET ORAL 2 TIMES DAILY
Qty: 60 TABLET | Refills: 2 | Status: ON HOLD | OUTPATIENT
Start: 2020-11-22 | End: 2020-12-16 | Stop reason: SDUPTHER

## 2020-11-22 RX ORDER — LEVOCETIRIZINE DIHYDROCHLORIDE 5 MG/1
5 TABLET, FILM COATED ORAL NIGHTLY
Qty: 30 TABLET | Refills: 3 | Status: SHIPPED | OUTPATIENT
Start: 2020-11-22

## 2020-11-22 RX ORDER — FLUTICASONE FUROATE AND VILANTEROL 100; 25 UG/1; UG/1
1 POWDER RESPIRATORY (INHALATION) DAILY
Qty: 1 EACH | Refills: 3 | Status: SHIPPED | OUTPATIENT
Start: 2020-11-22 | End: 2021-01-15 | Stop reason: ALTCHOICE

## 2020-11-22 RX ORDER — INSULIN DEGLUDEC INJECTION 100 U/ML
40 INJECTION, SOLUTION SUBCUTANEOUS NIGHTLY
Qty: 4 PEN | Refills: 2 | Status: SHIPPED | OUTPATIENT
Start: 2020-11-22 | End: 2021-03-26

## 2020-11-22 RX ORDER — GLUCOSAMINE HCL/CHONDROITIN SU 500-400 MG
CAPSULE ORAL
Qty: 100 STRIP | Refills: 11 | Status: ON HOLD | OUTPATIENT
Start: 2020-11-22 | End: 2021-01-06 | Stop reason: HOSPADM

## 2020-11-22 RX ORDER — PRAMIPEXOLE DIHYDROCHLORIDE 0.5 MG/1
0.5 TABLET ORAL 2 TIMES DAILY
Qty: 60 TABLET | Refills: 2 | Status: SHIPPED | OUTPATIENT
Start: 2020-11-22

## 2020-12-02 ENCOUNTER — TRANSCRIBE ORDERS (OUTPATIENT)
Dept: ADMINISTRATIVE | Facility: HOSPITAL | Age: 60
End: 2020-12-02

## 2020-12-02 ENCOUNTER — LAB (OUTPATIENT)
Dept: LAB | Facility: HOSPITAL | Age: 60
End: 2020-12-02

## 2020-12-02 DIAGNOSIS — K21.9 GASTROESOPHAGEAL REFLUX DISEASE WITHOUT ESOPHAGITIS: ICD-10-CM

## 2020-12-02 DIAGNOSIS — J30.1 ALLERGIC RHINITIS DUE TO POLLEN, UNSPECIFIED SEASONALITY: ICD-10-CM

## 2020-12-02 DIAGNOSIS — J45.50 SEVERE PERSISTENT ASTHMA, UNCOMPLICATED: ICD-10-CM

## 2020-12-02 DIAGNOSIS — J30.89 OTHER ALLERGIC RHINITIS: ICD-10-CM

## 2020-12-02 DIAGNOSIS — G47.30 SLEEP APNEA, UNSPECIFIED TYPE: ICD-10-CM

## 2020-12-02 DIAGNOSIS — H10.13 ALLERGIC CONJUNCTIVITIS, BILATERAL: Primary | ICD-10-CM

## 2020-12-02 LAB
25(OH)D3 SERPL-MCNC: 14.5 NG/ML (ref 30–100)
ALPHA1 GLOB MFR UR ELPH: 128 MG/DL (ref 90–200)
BASOPHILS # BLD AUTO: 0.03 10*3/MM3 (ref 0–0.2)
BASOPHILS NFR BLD AUTO: 0.5 % (ref 0–1.5)
DEPRECATED RDW RBC AUTO: 42.2 FL (ref 37–54)
EOSINOPHIL # BLD AUTO: 0.16 10*3/MM3 (ref 0–0.4)
EOSINOPHIL NFR BLD AUTO: 2.8 % (ref 0.3–6.2)
ERYTHROCYTE [DISTWIDTH] IN BLOOD BY AUTOMATED COUNT: 12.9 % (ref 12.3–15.4)
ERYTHROCYTE [SEDIMENTATION RATE] IN BLOOD: 45 MM/HR (ref 0–30)
HCT VFR BLD AUTO: 31.9 % (ref 34–46.6)
HGB BLD-MCNC: 10.5 G/DL (ref 12–15.9)
IMM GRANULOCYTES # BLD AUTO: 0.02 10*3/MM3 (ref 0–0.05)
IMM GRANULOCYTES NFR BLD AUTO: 0.4 % (ref 0–0.5)
LYMPHOCYTES # BLD AUTO: 1.43 10*3/MM3 (ref 0.7–3.1)
LYMPHOCYTES NFR BLD AUTO: 25.4 % (ref 19.6–45.3)
MCH RBC QN AUTO: 29.7 PG (ref 26.6–33)
MCHC RBC AUTO-ENTMCNC: 32.9 G/DL (ref 31.5–35.7)
MCV RBC AUTO: 90.1 FL (ref 79–97)
MONOCYTES # BLD AUTO: 0.42 10*3/MM3 (ref 0.1–0.9)
MONOCYTES NFR BLD AUTO: 7.5 % (ref 5–12)
NEUTROPHILS NFR BLD AUTO: 3.57 10*3/MM3 (ref 1.7–7)
NEUTROPHILS NFR BLD AUTO: 63.4 % (ref 42.7–76)
NRBC BLD AUTO-RTO: 0 /100 WBC (ref 0–0.2)
PLATELET # BLD AUTO: 243 10*3/MM3 (ref 140–450)
PMV BLD AUTO: 9.9 FL (ref 6–12)
RBC # BLD AUTO: 3.54 10*6/MM3 (ref 3.77–5.28)
WBC # BLD AUTO: 5.63 10*3/MM3 (ref 3.4–10.8)

## 2020-12-02 PROCEDURE — 85652 RBC SED RATE AUTOMATED: CPT | Performed by: ALLERGY & IMMUNOLOGY

## 2020-12-02 PROCEDURE — 82785 ASSAY OF IGE: CPT | Performed by: ALLERGY & IMMUNOLOGY

## 2020-12-02 PROCEDURE — 36415 COLL VENOUS BLD VENIPUNCTURE: CPT | Performed by: ALLERGY & IMMUNOLOGY

## 2020-12-02 PROCEDURE — 85025 COMPLETE CBC W/AUTO DIFF WBC: CPT | Performed by: ALLERGY & IMMUNOLOGY

## 2020-12-02 PROCEDURE — 82103 ALPHA-1-ANTITRYPSIN TOTAL: CPT | Performed by: ALLERGY & IMMUNOLOGY

## 2020-12-02 PROCEDURE — 82306 VITAMIN D 25 HYDROXY: CPT | Performed by: ALLERGY & IMMUNOLOGY

## 2020-12-05 LAB — IGE SERPL-ACNC: 1320 IU/ML (ref 6–495)

## 2020-12-09 ENCOUNTER — APPOINTMENT (OUTPATIENT)
Dept: GENERAL RADIOLOGY | Age: 60
End: 2020-12-09
Payer: MEDICARE

## 2020-12-09 ENCOUNTER — HOSPITAL ENCOUNTER (EMERGENCY)
Age: 60
Discharge: HOME | End: 2020-12-09
Attending: EMERGENCY MEDICINE
Payer: MEDICARE

## 2020-12-09 VITALS
HEART RATE: 78 BPM | OXYGEN SATURATION: 97 % | HEIGHT: 61 IN | SYSTOLIC BLOOD PRESSURE: 148 MMHG | RESPIRATION RATE: 16 BRPM | TEMPERATURE: 98.1 F | DIASTOLIC BLOOD PRESSURE: 98 MMHG | BODY MASS INDEX: 41.54 KG/M2 | WEIGHT: 220 LBS

## 2020-12-09 LAB
ADENOVIRUS BY PCR: NOT DETECTED
ALBUMIN SERPL-MCNC: 3.3 G/DL (ref 3.5–5.2)
ALP BLD-CCNC: 140 U/L (ref 35–104)
ALT SERPL-CCNC: 11 U/L (ref 5–33)
ANION GAP SERPL CALCULATED.3IONS-SCNC: 11 MMOL/L (ref 7–19)
AST SERPL-CCNC: 14 U/L (ref 5–32)
BASE EXCESS ARTERIAL: -0.8 MMOL/L (ref -2–2)
BASOPHILS ABSOLUTE: 0 K/UL (ref 0–0.2)
BASOPHILS RELATIVE PERCENT: 0.3 % (ref 0–1)
BILIRUB SERPL-MCNC: <0.2 MG/DL (ref 0.2–1.2)
BORDETELLA PARAPERTUSSIS BY PCR: NOT DETECTED
BORDETELLA PERTUSSIS BY PCR: NOT DETECTED
BUN BLDV-MCNC: 30 MG/DL (ref 8–23)
CALCIUM SERPL-MCNC: 8.9 MG/DL (ref 8.8–10.2)
CARBOXYHEMOGLOBIN ARTERIAL: 1.3 % (ref 0–5)
CHLAMYDOPHILIA PNEUMONIAE BY PCR: NOT DETECTED
CHLORIDE BLD-SCNC: 97 MMOL/L (ref 98–111)
CO2: 23 MMOL/L (ref 22–29)
CORONAVIRUS 229E BY PCR: NOT DETECTED
CORONAVIRUS HKU1 BY PCR: NOT DETECTED
CORONAVIRUS NL63 BY PCR: NOT DETECTED
CORONAVIRUS OC43 BY PCR: NOT DETECTED
CREAT SERPL-MCNC: 1.1 MG/DL (ref 0.5–0.9)
EKG P AXIS: 51 DEGREES
EKG P-R INTERVAL: 176 MS
EKG Q-T INTERVAL: 448 MS
EKG QRS DURATION: 96 MS
EKG QTC CALCULATION (BAZETT): 467 MS
EKG T AXIS: -177 DEGREES
EOSINOPHILS ABSOLUTE: 0.1 K/UL (ref 0–0.6)
EOSINOPHILS RELATIVE PERCENT: 1.3 % (ref 0–5)
GFR AFRICAN AMERICAN: >59
GFR NON-AFRICAN AMERICAN: 50
GLUCOSE BLD-MCNC: 628 MG/DL (ref 74–109)
HCO3 ARTERIAL: 24.3 MMOL/L (ref 22–26)
HCT VFR BLD CALC: 31.4 % (ref 37–47)
HEMOGLOBIN, ART, EXTENDED: 10.8 G/DL (ref 12–16)
HEMOGLOBIN: 10.5 G/DL (ref 12–16)
HUMAN METAPNEUMOVIRUS BY PCR: NOT DETECTED
HUMAN RHINOVIRUS/ENTEROVIRUS BY PCR: NOT DETECTED
IMMATURE GRANULOCYTES #: 0 K/UL
INFLUENZA A BY PCR: NOT DETECTED
INFLUENZA B BY PCR: NOT DETECTED
LYMPHOCYTES ABSOLUTE: 1.7 K/UL (ref 1.1–4.5)
LYMPHOCYTES RELATIVE PERCENT: 24.6 % (ref 20–40)
MCH RBC QN AUTO: 30.5 PG (ref 27–31)
MCHC RBC AUTO-ENTMCNC: 33.4 G/DL (ref 33–37)
MCV RBC AUTO: 91.3 FL (ref 81–99)
METHEMOGLOBIN ARTERIAL: 0.6 %
MONOCYTES ABSOLUTE: 0.5 K/UL (ref 0–0.9)
MONOCYTES RELATIVE PERCENT: 7.5 % (ref 0–10)
MYCOPLASMA PNEUMONIAE BY PCR: NOT DETECTED
NEUTROPHILS ABSOLUTE: 4.5 K/UL (ref 1.5–7.5)
NEUTROPHILS RELATIVE PERCENT: 66 % (ref 50–65)
O2 CONTENT ARTERIAL: 14.6 ML/DL
O2 SAT, ARTERIAL: 95.2 %
O2 THERAPY: ABNORMAL
PARAINFLUENZA VIRUS 1 BY PCR: NOT DETECTED
PARAINFLUENZA VIRUS 2 BY PCR: NOT DETECTED
PARAINFLUENZA VIRUS 3 BY PCR: NOT DETECTED
PARAINFLUENZA VIRUS 4 BY PCR: NOT DETECTED
PCO2 ARTERIAL: 41 MMHG (ref 35–45)
PDW BLD-RTO: 13.2 % (ref 11.5–14.5)
PH ARTERIAL: 7.38 (ref 7.35–7.45)
PLATELET # BLD: 191 K/UL (ref 130–400)
PMV BLD AUTO: 9.4 FL (ref 9.4–12.3)
PO2 ARTERIAL: 85 MMHG (ref 80–100)
POTASSIUM REFLEX MAGNESIUM: 4.3 MMOL/L (ref 3.5–5)
POTASSIUM, WHOLE BLOOD: 4
PRO-BNP: 1900 PG/ML (ref 0–900)
RBC # BLD: 3.44 M/UL (ref 4.2–5.4)
RESPIRATORY SYNCYTIAL VIRUS BY PCR: NOT DETECTED
SARS-COV-2, PCR: NOT DETECTED
SODIUM BLD-SCNC: 131 MMOL/L (ref 136–145)
TOTAL PROTEIN: 6.6 G/DL (ref 6.6–8.7)
TROPONIN: 0.03 NG/ML (ref 0–0.03)
WBC # BLD: 6.8 K/UL (ref 4.8–10.8)

## 2020-12-09 PROCEDURE — 99999 PR OFFICE/OUTPT VISIT,PROCEDURE ONLY: CPT | Performed by: NURSE PRACTITIONER

## 2020-12-09 PROCEDURE — 85025 COMPLETE CBC W/AUTO DIFF WBC: CPT

## 2020-12-09 PROCEDURE — 83880 ASSAY OF NATRIURETIC PEPTIDE: CPT

## 2020-12-09 PROCEDURE — 84132 ASSAY OF SERUM POTASSIUM: CPT

## 2020-12-09 PROCEDURE — 96374 THER/PROPH/DIAG INJ IV PUSH: CPT

## 2020-12-09 PROCEDURE — 6360000002 HC RX W HCPCS: Performed by: NURSE PRACTITIONER

## 2020-12-09 PROCEDURE — 80053 COMPREHEN METABOLIC PANEL: CPT

## 2020-12-09 PROCEDURE — 84484 ASSAY OF TROPONIN QUANT: CPT

## 2020-12-09 PROCEDURE — 36600 WITHDRAWAL OF ARTERIAL BLOOD: CPT

## 2020-12-09 PROCEDURE — 0202U NFCT DS 22 TRGT SARS-COV-2: CPT

## 2020-12-09 PROCEDURE — 96372 THER/PROPH/DIAG INJ SC/IM: CPT

## 2020-12-09 PROCEDURE — 71045 X-RAY EXAM CHEST 1 VIEW: CPT

## 2020-12-09 PROCEDURE — 99284 EMERGENCY DEPT VISIT MOD MDM: CPT

## 2020-12-09 PROCEDURE — 82803 BLOOD GASES ANY COMBINATION: CPT

## 2020-12-09 PROCEDURE — 93005 ELECTROCARDIOGRAM TRACING: CPT | Performed by: NURSE PRACTITIONER

## 2020-12-09 PROCEDURE — 36415 COLL VENOUS BLD VENIPUNCTURE: CPT

## 2020-12-09 PROCEDURE — 6370000000 HC RX 637 (ALT 250 FOR IP): Performed by: NURSE PRACTITIONER

## 2020-12-09 RX ORDER — FUROSEMIDE 10 MG/ML
40 INJECTION INTRAMUSCULAR; INTRAVENOUS ONCE
Status: COMPLETED | OUTPATIENT
Start: 2020-12-09 | End: 2020-12-09

## 2020-12-09 RX ADMIN — FUROSEMIDE 40 MG: 10 INJECTION, SOLUTION INTRAMUSCULAR; INTRAVENOUS at 13:53

## 2020-12-09 RX ADMIN — INSULIN HUMAN 10 UNITS: 100 INJECTION, SOLUTION PARENTERAL at 13:11

## 2020-12-09 ASSESSMENT — PAIN DESCRIPTION - DESCRIPTORS: DESCRIPTORS: ACHING

## 2020-12-09 ASSESSMENT — ENCOUNTER SYMPTOMS
COUGH: 1
SHORTNESS OF BREATH: 1
ABDOMINAL PAIN: 0

## 2020-12-09 ASSESSMENT — PAIN SCALES - GENERAL
PAINLEVEL_OUTOF10: 4
PAINLEVEL_OUTOF10: 0

## 2020-12-09 ASSESSMENT — PAIN DESCRIPTION - LOCATION: LOCATION: GENERALIZED

## 2020-12-09 ASSESSMENT — PAIN DESCRIPTION - PAIN TYPE: TYPE: ACUTE PAIN

## 2020-12-09 NOTE — ED NOTES
Went in to pt's room to give ordered Insulin and pt was eating [place] candy. Informed pt to stop eating the candy as it is causing her blood sugar to be too high.      Gerardo Lorenzo RN  12/09/20 0107

## 2020-12-09 NOTE — ED PROVIDER NOTES
[facility] EMERGENCY DEPT  eMERGENCY dEPARTMENT eNCOUnter      Pt Name: Cathy Hernandez  MRN: [id number]  [place] 1960  Date of evaluation: 12/9/2020  Provider: Fan Camp, [address]       Chief Complaint   Patient presents with    Concern For COVID-19     cough, SOA, generalized weakness x 3 days         HISTORY OF PRESENT ILLNESS   (Location/Symptom, Timing/Onset,Context/Setting, Quality, Duration, Modifying Factors, Severity)  Note limiting factors. Cathy Hernandez is a 56 y.o. female who presents to the emergency department with shortness of breath and fatigue x 3 days. Pt has had a cough and is worried about covid. No fever or vomiting. No chest pain. HAs extensive cardiac history. Is diabetic    The history is provided by the patient. Shortness of Breath   Severity:  Moderate  Onset quality:  Sudden  Duration:  3 days  Timing:  Constant  Progression:  Worsening  Chronicity:  Chronic  Worsened by:  Coughing  Associated symptoms: cough    Associated symptoms: no abdominal pain, no chest pain and no fever    Risk factors: obesity        NursingNotes were reviewed. REVIEW OF SYSTEMS    (2-9 systems for level 4, 10 or more for level 5)     Review of Systems   Constitutional: Positive for fatigue. Negative for fever. HENT: Negative for congestion. Respiratory: Positive for cough and shortness of breath. Cardiovascular: Negative for chest pain. Gastrointestinal: Negative for abdominal pain. Genitourinary: Negative for difficulty urinating. Neurological: Positive for weakness. Except as noted above the remainder of the review of systems was reviewed and negative.        PAST MEDICAL HISTORY     Past Medical History:   Diagnosis Date    Allergies     AMI (acute myocardial infarction) ([facility] 09/2018    Asthma     CAD (coronary artery disease)     hx of stents    Cerebral artery occlusion with cerebral infarction ([facility] [address] 2012    R sided numbness/weakness    CHF (congestive heart failure) (HCC)     [facility] [address]     Depression     Diabetes mellitus ([facility] [address]     DVT (deep vein thrombosis) in pregnancy     GERD (gastroesophageal reflux disease)     Hx of blood clots     rt leg    Hyperlipidemia     Hypertension     Palliative care patient 07/02/2020    Pneumonia          SURGICALHISTORY       Past Surgical History:   Procedure Laterality Date    CATARACT REMOVAL Bilateral     COLONOSCOPY  approx 2013    CORONARY ANGIOPLASTY WITH STENT PLACEMENT  2018    in [address] [facility] (3 stents)    TONSILLECTOMY           CURRENT MEDICATIONS       Discharge Medication List as of 12/9/2020  2:32 PM      CONTINUE these medications which have NOT CHANGED    Details   fluticasone-vilanterol (BREO ELLIPTA) 100-25 MCG/INH AEPB inhaler Inhale 1 puff into the lungs daily, Disp-1 each,R-3Normal      amLODIPine (NORVASC) 5 MG tablet Take 1 tablet by mouth daily, Disp-30 tablet,R-1Normal      carvedilol (COREG) 3.125 MG tablet Take 1 tablet by mouth 2 times daily, Disp-60 tablet,R-2Normal      Insulin Degludec (TRESIBA FLEXTOUCH) 100 UNIT/ML SOPN Inject 40 Units into the skin nightly, Disp-4 pen,R-2Normal      levocetirizine (XYZAL) 5 MG tablet Take 1 tablet by mouth nightly, Disp-30 tablet,R-3Normal      blood glucose monitor strips Test 3 times a day & as needed for symptoms of irregular blood glucose. Dispense sufficient amount for indicated testing frequency plus additional to accommodate PRN testing needs. , Disp-100 strip,R-11, Normal      pramipexole (MIRAPEX) 0.5 MG tablet Take 1 tablet by mouth 2 times daily, Disp-60 tablet,R-2Normal      gabapentin (NEURONTIN) 600 MG tablet Take 1 tablet by mouth 3 times daily for 30 days. , Disp-90 tablet,R-2Phone In      tiotropium (SPIRIVA RESPIMAT) 1.25 MCG/ACT AERS inhaler Inhale 2 puffs into the lungs daily, Disp-1 Inhaler,R-3Normal      levalbuterol (XOPENEX HFA) 45 MCG/ACT inhaler Inhale 2 puffs into the lungs every 4 hours as needed for Wheezing or Shortness of Breath, Disp-1 Inhaler,R-0Normal      levalbuterol (XOPENEX) 0.63 MG/3ML nebulization Take 3 mLs by nebulization every 6 hours as needed for Wheezing, Disp-20 vial,R-2Normal      !! tiZANidine (ZANAFLEX) 2 MG tablet Take 1 tablet by mouth 2 times daily as needed (spasms), Disp-30 tablet,R-0Normal      atorvastatin (LIPITOR) 20 MG tablet Take 20 mg by mouth dailyHistorical Med      !! DULoxetine (CYMBALTA) 30 MG extended release capsule Take 30 mg by mouth daily ALONG WITH A 60 MG TABLET (BOTH TOGETHER + 90 MG)Historical Med      !! DULoxetine (CYMBALTA) 60 MG extended release capsule Take 60 mg by mouth daily WITH A 30 MG TABLET. TOTAL OF 90 MG)Historical Med      bumetanide (BUMEX) 1 MG tablet Take 1 tablet by mouth daily, Disp-30 tablet,R-0Normal      Blood Glucose Monitoring Suppl (BLOOD GLUCOSE MONITOR SYSTEM) w/Device KIT 3 TIMES DAILY Starting Sat 7/11/2020, Disp-1 kit,R-0, Normal      Lancets MISC 3 TIMES DAILY Starting Sat 7/11/2020, Disp-200 each,R-11, Normal      clopidogrel (PLAVIX) 75 MG tablet Take 75 mg by mouth dailyHistorical Med      montelukast (SINGULAIR) 10 MG tablet Take 10 mg by mouth nightlyHistorical Med      vitamin D (CHOLECALCIFEROL) 25 MCG (1000 UT) TABS tablet Take 1,000 Units by mouth dailyHistorical Med      melatonin 3 MG TABS tablet Take 10 mg by mouth nightlyHistorical Med      Semaglutide, 1 MG/DOSE, 2 MG/1.5ML SOPN Inject 1 mg into the skin every 7 days, Disp-2 pen,R-2Normal      !! tiZANidine (ZANAFLEX) 2 MG tablet Take 1 tablet by mouth 2 times daily as needed (spasms), Disp-30 tablet,R-0Normal      valsartan (DIOVAN) 160 MG tablet Take 160 mg by mouth dailyHistorical Med       !! - Potential duplicate medications found. Please discuss with provider. ALLERGIES     Albuterol; Levaquin [levofloxacin];  Metformin and related; and Aspirin    FAMILY HISTORY       Family History   Problem Relation Age of Onset    Colon Cancer Neg Hx     Colon Polyps Neg Hx           SOCIAL HISTORY       Social History     Socioeconomic History    Marital status:      Spouse name: Not on file    Number of children: Not on file    Years of education: Not on file    Highest education level: Not on file   Occupational History    Not on file   Social Needs    Financial resource strain: Not on file    Food insecurity     Worry: Not on file     Inability: Not on file    Transportation needs     Medical: Not on file     Non-medical: Not on file   Tobacco Use    Smoking status: Former Smoker     Packs/day: 1.00     Years: 30.00     Pack years: 30.00     Last attempt to quit: 2005     Years since quitting: 15.9    Smokeless tobacco: Never Used   Substance and Sexual Activity    Alcohol use: Never     Frequency: Never    Drug use: Never    Sexual activity: Not on file   Lifestyle    Physical activity     Days per week: Not on file     Minutes per session: Not on file    Stress: Not on file   Relationships    Social connections     Talks on phone: Not on file     Gets together: Not on file     Attends Voodoo service: Not on file     Active member of club or organization: Not on file     Attends meetings of clubs or organizations: Not on file     Relationship status: Not on file    Intimate partner violence     Fear of current or ex partner: Not on file     Emotionally abused: Not on file     Physically abused: Not on file     Forced sexual activity: Not on file   Other Topics Concern    Not on file   Social History Narrative    Not on file       SCREENINGS      @[facility]([phone])@      PHYSICAL EXAM    (up to 7 for level 4, 8 or more for level 5)     ED Triage Vitals [12/09/20 0959]   BP Temp Temp src Pulse Resp SpO2 Height Weight   (!) 175/65 98 °F (36.7 °C) -- 77 20 99 % 5' 1\" (1.549 m) 220 lb (99.8 kg)       Physical Exam  Vitals signs and nursing note reviewed.    Constitutional:       Appearance: She is well-developed. She is obese. HENT:      Head: Normocephalic and atraumatic. Mouth/Throat:      Mouth: Mucous membranes are moist.   Eyes:      General: No scleral icterus. Right eye: No discharge. Left eye: No discharge. Neck:      Musculoskeletal: Normal range of motion and neck supple. Cardiovascular:      Rate and Rhythm: Normal rate. Heart sounds: Normal heart sounds, S1 normal and S2 normal.   Pulmonary:      Effort: Pulmonary effort is normal. No respiratory distress. Breath sounds: Normal breath sounds. Abdominal:      General: Abdomen is protuberant. Palpations: Abdomen is soft. Tenderness: There is no abdominal tenderness. Neurological:      Mental Status: She is alert. Psychiatric:         Behavior: Behavior normal.         DIAGNOSTIC RESULTS     EKG: All EKG's are interpreted by the Emergency Department Physician who either signs or Co-signsthis chart in the absence of a cardiologist.      72 sinus rhythm abnormal T waves in diffuse leads this was compared to an old EKG and is the same read at 1212 by Dr. Devin Lubin:   Riya Orta such as CT, Ultrasound and MRI are read by the radiologist. Plain radiographic images are visualized and preliminarily interpreted by the emergency physician with the below findings:      Interpretation per the Radiologist below, if available at the time of this note:    XR CHEST PORTABLE   Final Result   1. Interstitial prominence with chronic left lower lobe opacity. No   significant change from 2 months ago.    Signed by Dr Dawson Laughlin on 12/9/2020 11:21 AM            ED BEDSIDEULTRASOUND:   Performed by ED Physician -none    LABS:  Labs Reviewed   CBC WITH AUTO DIFFERENTIAL - Abnormal; Notable for the following components:       Result Value    RBC 3.44 (*)     Hemoglobin 10.5 (*)     Hematocrit 31.4 (*)     Neutrophils % 66.0 (*)     All other components within normal limits   COMPREHENSIVE METABOLIC PANEL W/ REFLEX TO MG FOR LOW K - Abnormal; Notable for the following components:    Sodium 131 (*)     Chloride 97 (*)     Glucose 628 (*)     BUN 30 (*)     CREATININE 1.1 (*)     GFR Non- 50 (*)     Alb 3.3 (*)     Alkaline Phosphatase 140 (*)     All other components within normal limits    Narrative:     CALL  [place]  [facility] tel. ,  Chemistry results called to and read back by Susan Card in ED, 12/09/2020  12:22, by [address] - Abnormal; Notable for the following components:    Pro-BNP 1,900 (*)     All other components within normal limits   BLOOD GAS, ARTERIAL - Abnormal; Notable for the following components:    Hemoglobin, Art, Extended 10.8 (*)     All other components within normal limits   RESPIRATORY PANEL, MOLECULAR, WITH COVID-19   TROPONIN   POTASSIUM, WHOLE BLOOD       All other labs were within normal range or not returned as of this dictation. EMERGENCY DEPARTMENT COURSE and DIFFERENTIALDIAGNOSIS/MDM:   Vitals:    Vitals:    12/09/20 0959 12/09/20 1312 12/09/20 1313 12/09/20 1445   BP: (!) 175/65 (!) 180/72  (!) 148/98   Pulse: 77 75  78   Resp: 20 18  16   Temp: 98 °F (36.7 °C) 98.1 °F (36.7 °C)  98.1 °F (36.7 °C)   SpO2: 99% 98% 98% 97%   Weight: 220 lb (99.8 kg)      Height: 5' 1\" (1.549 m)              MDM  Pt neg for covid. Blood sugar >600  But no DKA. HAs not taken her insulin for several days. Eating a large box of lemon drops. Pt to go home and get back on her insulin which she says she has. Dr Concepción Edwards looked at her heart with the US and there is no evidence of recurrent pericardial effusion      CONSULTS:  None    PROCEDURES:  Unless otherwise noted below, none     Procedures    FINAL IMPRESSION      1. Hyperglycemia    2.  Acute on chronic congestive heart failure, unspecified heart failure type [facility])        DISPOSITION/PLAN   DISPOSITION Decision To Discharge 12/09/2020 01:42:35 PM      PATIENT REFERRED TO:  YAMIL Tellez - NP  Wei Alejandre            DISCHARGE MEDICATIONS:  Discharge Medication List as of 12/9/2020  2:32 PM             (Please note that portions of this note were completed with a voice recognitionprogram.  Efforts were made to edit the dictations but occasionally words are mis-transcribed.)    YAMIL De La Rosa (electronically signed)         YAMIL De La Rosa  12/09/20 1848

## 2020-12-11 ENCOUNTER — LAB (OUTPATIENT)
Dept: LAB | Facility: HOSPITAL | Age: 60
End: 2020-12-11

## 2020-12-11 ENCOUNTER — TRANSCRIBE ORDERS (OUTPATIENT)
Dept: LAB | Facility: HOSPITAL | Age: 60
End: 2020-12-11

## 2020-12-11 ENCOUNTER — TELEPHONE (OUTPATIENT)
Dept: CARDIOLOGY | Age: 60
End: 2020-12-11

## 2020-12-11 DIAGNOSIS — H10.13 ALLERGIC CONJUNCTIVITIS, BILATERAL: ICD-10-CM

## 2020-12-11 DIAGNOSIS — J30.1 ALLERGIC RHINITIS DUE TO POLLEN, UNSPECIFIED SEASONALITY: ICD-10-CM

## 2020-12-11 DIAGNOSIS — H10.13 ALLERGIC CONJUNCTIVITIS OF BOTH EYES: Primary | ICD-10-CM

## 2020-12-11 DIAGNOSIS — H10.13 ALLERGIC CONJUNCTIVITIS OF BOTH EYES: ICD-10-CM

## 2020-12-11 LAB
CRP SERPL-MCNC: 0.42 MG/DL (ref 0–0.5)
ERYTHROCYTE [SEDIMENTATION RATE] IN BLOOD: 102 MM/HR (ref 0–30)

## 2020-12-11 PROCEDURE — 83520 IMMUNOASSAY QUANT NOS NONAB: CPT

## 2020-12-11 PROCEDURE — 36415 COLL VENOUS BLD VENIPUNCTURE: CPT

## 2020-12-11 PROCEDURE — 86256 FLUORESCENT ANTIBODY TITER: CPT

## 2020-12-11 PROCEDURE — 85652 RBC SED RATE AUTOMATED: CPT

## 2020-12-11 PROCEDURE — 86606 ASPERGILLUS ANTIBODY: CPT

## 2020-12-11 PROCEDURE — 86140 C-REACTIVE PROTEIN: CPT

## 2020-12-11 PROCEDURE — 86038 ANTINUCLEAR ANTIBODIES: CPT

## 2020-12-11 PROCEDURE — 86003 ALLG SPEC IGE CRUDE XTRC EA: CPT

## 2020-12-12 ENCOUNTER — HOSPITAL ENCOUNTER (INPATIENT)
Age: 60
LOS: 3 days | Discharge: HOME | DRG: 546 | End: 2020-12-16
Attending: EMERGENCY MEDICINE | Admitting: HOSPITALIST
Payer: MEDICARE

## 2020-12-12 PROCEDURE — 96374 THER/PROPH/DIAG INJ IV PUSH: CPT

## 2020-12-12 PROCEDURE — 6360000002 HC RX W HCPCS: Performed by: EMERGENCY MEDICINE

## 2020-12-12 PROCEDURE — 99285 EMERGENCY DEPT VISIT HI MDM: CPT

## 2020-12-12 PROCEDURE — 94640 AIRWAY INHALATION TREATMENT: CPT

## 2020-12-12 PROCEDURE — 99999 PR OFFICE/OUTPT VISIT,PROCEDURE ONLY: CPT | Performed by: EMERGENCY MEDICINE

## 2020-12-12 RX ORDER — LEVALBUTEROL INHALATION SOLUTION 1.25 MG/3ML
1.25 SOLUTION RESPIRATORY (INHALATION) EVERY 8 HOURS PRN
Status: DISCONTINUED | OUTPATIENT
Start: 2020-12-12 | End: 2020-12-15

## 2020-12-12 RX ORDER — BUMETANIDE 1 MG/1
1 TABLET ORAL 2 TIMES DAILY
Status: ON HOLD | COMMUNITY
End: 2021-02-25 | Stop reason: SDUPTHER

## 2020-12-12 RX ADMIN — LEVALBUTEROL 1.25 MG: 1.25 SOLUTION RESPIRATORY (INHALATION) at 23:51

## 2020-12-13 ENCOUNTER — APPOINTMENT (OUTPATIENT)
Dept: GENERAL RADIOLOGY | Age: 60
DRG: 546 | End: 2020-12-13
Payer: MEDICARE

## 2020-12-13 PROBLEM — I50.43 CHF (CONGESTIVE HEART FAILURE), NYHA CLASS I, ACUTE ON CHRONIC, COMBINED (HCC): Status: ACTIVE | Noted: 2020-12-13

## 2020-12-13 LAB
A FUMIGATUS IGE QN: <0.1 KU/L
ALBUMIN SERPL-MCNC: 3.4 G/DL (ref 3.5–5.2)
ALP BLD-CCNC: 96 U/L (ref 35–104)
ALT SERPL-CCNC: 11 U/L (ref 5–33)
ANION GAP SERPL CALCULATED.3IONS-SCNC: 9 MMOL/L (ref 7–19)
AST SERPL-CCNC: 18 U/L (ref 5–32)
BASE EXCESS ARTERIAL: 6.5 MMOL/L (ref -2–2)
BASOPHILS ABSOLUTE: 0 K/UL (ref 0–0.2)
BASOPHILS RELATIVE PERCENT: 0.3 % (ref 0–1)
BILIRUB SERPL-MCNC: 0.3 MG/DL (ref 0.2–1.2)
BUN BLDV-MCNC: 24 MG/DL (ref 8–23)
CALCIUM SERPL-MCNC: 8.7 MG/DL (ref 8.8–10.2)
CARBOXYHEMOGLOBIN ARTERIAL: 1.6 % (ref 0–5)
CHLORIDE BLD-SCNC: 100 MMOL/L (ref 98–111)
CO2: 28 MMOL/L (ref 22–29)
CREAT SERPL-MCNC: 1.2 MG/DL (ref 0.5–0.9)
D DIMER: 0.41 UG/ML FEU (ref 0–0.48)
EOSINOPHILS ABSOLUTE: 0.1 K/UL (ref 0–0.6)
EOSINOPHILS RELATIVE PERCENT: 1.8 % (ref 0–5)
GFR AFRICAN AMERICAN: 55
GFR NON-AFRICAN AMERICAN: 45
GLUCOSE BLD-MCNC: 228 MG/DL (ref 70–99)
GLUCOSE BLD-MCNC: 235 MG/DL (ref 70–99)
GLUCOSE BLD-MCNC: 282 MG/DL (ref 70–99)
GLUCOSE BLD-MCNC: 282 MG/DL (ref 74–109)
GLUCOSE BLD-MCNC: 338 MG/DL (ref 70–99)
HCO3 ARTERIAL: 31.3 MMOL/L (ref 22–26)
HCT VFR BLD CALC: 30.6 % (ref 37–47)
HEMOGLOBIN, ART, EXTENDED: 9.8 G/DL (ref 12–16)
HEMOGLOBIN: 9.9 G/DL (ref 12–16)
IMMATURE GRANULOCYTES #: 0 K/UL
LYMPHOCYTES ABSOLUTE: 1.4 K/UL (ref 1.1–4.5)
LYMPHOCYTES RELATIVE PERCENT: 22.5 % (ref 20–40)
MCH RBC QN AUTO: 29.5 PG (ref 27–31)
MCHC RBC AUTO-ENTMCNC: 32.4 G/DL (ref 33–37)
MCV RBC AUTO: 91.1 FL (ref 81–99)
METHEMOGLOBIN ARTERIAL: 0.4 %
MONOCYTES ABSOLUTE: 0.4 K/UL (ref 0–0.9)
MONOCYTES RELATIVE PERCENT: 7 % (ref 0–10)
NEUTROPHILS ABSOLUTE: 4.3 K/UL (ref 1.5–7.5)
NEUTROPHILS RELATIVE PERCENT: 68.2 % (ref 50–65)
O2 CONTENT ARTERIAL: 13.2 ML/DL
O2 SAT, ARTERIAL: 95.2 %
O2 THERAPY: ABNORMAL
PCO2 ARTERIAL: 45 MMHG (ref 35–45)
PDW BLD-RTO: 13.5 % (ref 11.5–14.5)
PERFORMED ON: ABNORMAL
PH ARTERIAL: 7.45 (ref 7.35–7.45)
PLATELET # BLD: 217 K/UL (ref 130–400)
PMV BLD AUTO: 9.2 FL (ref 9.4–12.3)
PO2 ARTERIAL: 80 MMHG (ref 80–100)
POTASSIUM SERPL-SCNC: 4.7 MMOL/L (ref 3.5–5)
POTASSIUM, WHOLE BLOOD: 4
PRO-BNP: 1946 PG/ML (ref 0–900)
RBC # BLD: 3.36 M/UL (ref 4.2–5.4)
SODIUM BLD-SCNC: 137 MMOL/L (ref 136–145)
TOTAL PROTEIN: 5.8 G/DL (ref 6.6–8.7)
TROPONIN: 0.02 NG/ML (ref 0–0.03)
WBC # BLD: 6.3 K/UL (ref 4.8–10.8)

## 2020-12-13 PROCEDURE — 84132 ASSAY OF SERUM POTASSIUM: CPT

## 2020-12-13 PROCEDURE — C9113 INJ PANTOPRAZOLE SODIUM, VIA: HCPCS | Performed by: HOSPITALIST

## 2020-12-13 PROCEDURE — 6370000000 HC RX 637 (ALT 250 FOR IP): Performed by: HOSPITALIST

## 2020-12-13 PROCEDURE — 80053 COMPREHEN METABOLIC PANEL: CPT

## 2020-12-13 PROCEDURE — 85025 COMPLETE CBC W/AUTO DIFF WBC: CPT

## 2020-12-13 PROCEDURE — 2140000000 HC CCU INTERMEDIATE R&B

## 2020-12-13 PROCEDURE — 36415 COLL VENOUS BLD VENIPUNCTURE: CPT

## 2020-12-13 PROCEDURE — 36600 WITHDRAWAL OF ARTERIAL BLOOD: CPT

## 2020-12-13 PROCEDURE — 82947 ASSAY GLUCOSE BLOOD QUANT: CPT

## 2020-12-13 PROCEDURE — 93005 ELECTROCARDIOGRAM TRACING: CPT | Performed by: EMERGENCY MEDICINE

## 2020-12-13 PROCEDURE — 2580000003 HC RX 258: Performed by: HOSPITALIST

## 2020-12-13 PROCEDURE — 71045 X-RAY EXAM CHEST 1 VIEW: CPT

## 2020-12-13 PROCEDURE — 6360000002 HC RX W HCPCS: Performed by: HOSPITALIST

## 2020-12-13 PROCEDURE — 84484 ASSAY OF TROPONIN QUANT: CPT

## 2020-12-13 PROCEDURE — 82803 BLOOD GASES ANY COMBINATION: CPT

## 2020-12-13 PROCEDURE — 83880 ASSAY OF NATRIURETIC PEPTIDE: CPT

## 2020-12-13 PROCEDURE — 85379 FIBRIN DEGRADATION QUANT: CPT

## 2020-12-13 PROCEDURE — 2500000003 HC RX 250 WO HCPCS: Performed by: EMERGENCY MEDICINE

## 2020-12-13 RX ORDER — BUMETANIDE 0.25 MG/ML
1 INJECTION, SOLUTION INTRAMUSCULAR; INTRAVENOUS DAILY
Status: DISCONTINUED | OUTPATIENT
Start: 2020-12-14 | End: 2020-12-14

## 2020-12-13 RX ORDER — PROMETHAZINE HYDROCHLORIDE 25 MG/1
12.5 TABLET ORAL EVERY 6 HOURS PRN
Status: DISCONTINUED | OUTPATIENT
Start: 2020-12-13 | End: 2020-12-16 | Stop reason: HOSPADM

## 2020-12-13 RX ORDER — BUMETANIDE 0.25 MG/ML
1 INJECTION, SOLUTION INTRAMUSCULAR; INTRAVENOUS EVERY 12 HOURS
Status: DISCONTINUED | OUTPATIENT
Start: 2020-12-13 | End: 2020-12-13

## 2020-12-13 RX ORDER — METHYLPREDNISOLONE SODIUM SUCCINATE 40 MG/ML
40 INJECTION, POWDER, LYOPHILIZED, FOR SOLUTION INTRAMUSCULAR; INTRAVENOUS EVERY 8 HOURS
Status: DISCONTINUED | OUTPATIENT
Start: 2020-12-13 | End: 2020-12-14

## 2020-12-13 RX ORDER — PANTOPRAZOLE SODIUM 40 MG/10ML
40 INJECTION, POWDER, LYOPHILIZED, FOR SOLUTION INTRAVENOUS 2 TIMES DAILY
Status: DISCONTINUED | OUTPATIENT
Start: 2020-12-13 | End: 2020-12-15

## 2020-12-13 RX ORDER — HYDRALAZINE HYDROCHLORIDE 20 MG/ML
5 INJECTION INTRAMUSCULAR; INTRAVENOUS EVERY 4 HOURS PRN
Status: DISCONTINUED | OUTPATIENT
Start: 2020-12-13 | End: 2020-12-16 | Stop reason: HOSPADM

## 2020-12-13 RX ORDER — GABAPENTIN 600 MG/1
600 TABLET ORAL 3 TIMES DAILY
Status: DISCONTINUED | OUTPATIENT
Start: 2020-12-13 | End: 2020-12-16 | Stop reason: HOSPADM

## 2020-12-13 RX ORDER — DULOXETIN HYDROCHLORIDE 30 MG/1
30 CAPSULE, DELAYED RELEASE ORAL DAILY
Status: DISCONTINUED | OUTPATIENT
Start: 2020-12-13 | End: 2020-12-16 | Stop reason: HOSPADM

## 2020-12-13 RX ORDER — CARVEDILOL 3.12 MG/1
3.12 TABLET ORAL 2 TIMES DAILY
Status: DISCONTINUED | OUTPATIENT
Start: 2020-12-13 | End: 2020-12-14

## 2020-12-13 RX ORDER — INSULIN GLARGINE 100 [IU]/ML
20 INJECTION, SOLUTION SUBCUTANEOUS 2 TIMES DAILY
Status: DISCONTINUED | OUTPATIENT
Start: 2020-12-13 | End: 2020-12-14

## 2020-12-13 RX ORDER — DEXTROSE MONOHYDRATE 50 MG/ML
100 INJECTION, SOLUTION INTRAVENOUS PRN
Status: DISCONTINUED | OUTPATIENT
Start: 2020-12-13 | End: 2020-12-16 | Stop reason: HOSPADM

## 2020-12-13 RX ORDER — NICOTINE POLACRILEX 4 MG
15 LOZENGE BUCCAL PRN
Status: DISCONTINUED | OUTPATIENT
Start: 2020-12-13 | End: 2020-12-16 | Stop reason: HOSPADM

## 2020-12-13 RX ORDER — ONDANSETRON 2 MG/ML
4 INJECTION INTRAMUSCULAR; INTRAVENOUS EVERY 6 HOURS PRN
Status: DISCONTINUED | OUTPATIENT
Start: 2020-12-13 | End: 2020-12-16 | Stop reason: HOSPADM

## 2020-12-13 RX ORDER — MECOBALAMIN 5000 MCG
10 TABLET,DISINTEGRATING ORAL NIGHTLY
Status: DISCONTINUED | OUTPATIENT
Start: 2020-12-13 | End: 2020-12-16 | Stop reason: HOSPADM

## 2020-12-13 RX ORDER — SODIUM CHLORIDE 9 MG/ML
10 INJECTION INTRAVENOUS 2 TIMES DAILY
Status: DISCONTINUED | OUTPATIENT
Start: 2020-12-13 | End: 2020-12-15

## 2020-12-13 RX ORDER — VALSARTAN 160 MG/1
160 TABLET ORAL DAILY
Status: DISCONTINUED | OUTPATIENT
Start: 2020-12-13 | End: 2020-12-16 | Stop reason: HOSPADM

## 2020-12-13 RX ORDER — AMLODIPINE BESYLATE 5 MG/1
5 TABLET ORAL DAILY
Status: DISCONTINUED | OUTPATIENT
Start: 2020-12-13 | End: 2020-12-14

## 2020-12-13 RX ORDER — SODIUM CHLORIDE 0.9 % (FLUSH) 0.9 %
10 SYRINGE (ML) INJECTION PRN
Status: DISCONTINUED | OUTPATIENT
Start: 2020-12-13 | End: 2020-12-16 | Stop reason: HOSPADM

## 2020-12-13 RX ORDER — POLYETHYLENE GLYCOL 3350 17 G/17G
17 POWDER, FOR SOLUTION ORAL DAILY PRN
Status: DISCONTINUED | OUTPATIENT
Start: 2020-12-13 | End: 2020-12-16 | Stop reason: HOSPADM

## 2020-12-13 RX ORDER — INSULIN GLARGINE 100 [IU]/ML
10 INJECTION, SOLUTION SUBCUTANEOUS 2 TIMES DAILY
Status: DISCONTINUED | OUTPATIENT
Start: 2020-12-13 | End: 2020-12-13

## 2020-12-13 RX ORDER — PRAMIPEXOLE DIHYDROCHLORIDE 0.25 MG/1
0.5 TABLET ORAL 2 TIMES DAILY
Status: DISCONTINUED | OUTPATIENT
Start: 2020-12-13 | End: 2020-12-16 | Stop reason: HOSPADM

## 2020-12-13 RX ORDER — SODIUM CHLORIDE 0.9 % (FLUSH) 0.9 %
10 SYRINGE (ML) INJECTION EVERY 12 HOURS SCHEDULED
Status: DISCONTINUED | OUTPATIENT
Start: 2020-12-13 | End: 2020-12-16 | Stop reason: HOSPADM

## 2020-12-13 RX ORDER — TIZANIDINE 4 MG/1
2 TABLET ORAL 2 TIMES DAILY PRN
Status: DISCONTINUED | OUTPATIENT
Start: 2020-12-13 | End: 2020-12-16 | Stop reason: HOSPADM

## 2020-12-13 RX ORDER — DEXTROSE MONOHYDRATE 25 G/50ML
12.5 INJECTION, SOLUTION INTRAVENOUS PRN
Status: DISCONTINUED | OUTPATIENT
Start: 2020-12-13 | End: 2020-12-16 | Stop reason: HOSPADM

## 2020-12-13 RX ORDER — MONTELUKAST SODIUM 10 MG/1
10 TABLET ORAL NIGHTLY
Status: DISCONTINUED | OUTPATIENT
Start: 2020-12-13 | End: 2020-12-16 | Stop reason: HOSPADM

## 2020-12-13 RX ORDER — CLOPIDOGREL BISULFATE 75 MG/1
75 TABLET ORAL DAILY
Status: DISCONTINUED | OUTPATIENT
Start: 2020-12-13 | End: 2020-12-16 | Stop reason: HOSPADM

## 2020-12-13 RX ORDER — ACETAMINOPHEN 650 MG/1
650 SUPPOSITORY RECTAL EVERY 6 HOURS PRN
Status: DISCONTINUED | OUTPATIENT
Start: 2020-12-13 | End: 2020-12-16 | Stop reason: HOSPADM

## 2020-12-13 RX ORDER — ACETAMINOPHEN 325 MG/1
650 TABLET ORAL EVERY 6 HOURS PRN
Status: DISCONTINUED | OUTPATIENT
Start: 2020-12-13 | End: 2020-12-16 | Stop reason: HOSPADM

## 2020-12-13 RX ORDER — VITAMIN B COMPLEX
1000 TABLET ORAL DAILY
Status: DISCONTINUED | OUTPATIENT
Start: 2020-12-13 | End: 2020-12-16 | Stop reason: HOSPADM

## 2020-12-13 RX ORDER — BUMETANIDE 0.25 MG/ML
1 INJECTION, SOLUTION INTRAMUSCULAR; INTRAVENOUS ONCE
Status: COMPLETED | OUTPATIENT
Start: 2020-12-13 | End: 2020-12-13

## 2020-12-13 RX ORDER — ATORVASTATIN CALCIUM 20 MG/1
20 TABLET, FILM COATED ORAL DAILY
Status: DISCONTINUED | OUTPATIENT
Start: 2020-12-13 | End: 2020-12-14

## 2020-12-13 RX ADMIN — Medication 10 MG: at 20:03

## 2020-12-13 RX ADMIN — SODIUM CHLORIDE, PRESERVATIVE FREE 10 ML: 5 INJECTION INTRAVENOUS at 20:06

## 2020-12-13 RX ADMIN — INSULIN LISPRO 2 UNITS: 100 INJECTION, SOLUTION INTRAVENOUS; SUBCUTANEOUS at 17:13

## 2020-12-13 RX ADMIN — INSULIN GLARGINE 20 UNITS: 100 INJECTION, SOLUTION SUBCUTANEOUS at 21:34

## 2020-12-13 RX ADMIN — GABAPENTIN 600 MG: 600 TABLET, FILM COATED ORAL at 20:02

## 2020-12-13 RX ADMIN — CARVEDILOL 3.12 MG: 3.12 TABLET, FILM COATED ORAL at 20:03

## 2020-12-13 RX ADMIN — PRAMIPEXOLE DIHYDROCHLORIDE 0.5 MG: 0.25 TABLET ORAL at 17:13

## 2020-12-13 RX ADMIN — PANTOPRAZOLE SODIUM 40 MG: 40 INJECTION, POWDER, FOR SOLUTION INTRAVENOUS at 20:02

## 2020-12-13 RX ADMIN — INSULIN LISPRO 3 UNITS: 100 INJECTION, SOLUTION INTRAVENOUS; SUBCUTANEOUS at 11:45

## 2020-12-13 RX ADMIN — ACETAMINOPHEN 650 MG: 325 TABLET ORAL at 20:02

## 2020-12-13 RX ADMIN — METHYLPREDNISOLONE SODIUM SUCCINATE 40 MG: 40 INJECTION, POWDER, FOR SOLUTION INTRAMUSCULAR; INTRAVENOUS at 20:05

## 2020-12-13 RX ADMIN — BUMETANIDE 1 MG: 0.25 INJECTION, SOLUTION INTRAMUSCULAR; INTRAVENOUS at 05:55

## 2020-12-13 RX ADMIN — ATORVASTATIN CALCIUM 20 MG: 20 TABLET, FILM COATED ORAL at 10:21

## 2020-12-13 RX ADMIN — MONTELUKAST SODIUM 10 MG: 10 TABLET, FILM COATED ORAL at 20:03

## 2020-12-13 RX ADMIN — DULOXETINE 30 MG: 30 CAPSULE, DELAYED RELEASE ORAL at 10:21

## 2020-12-13 RX ADMIN — SODIUM CHLORIDE, PRESERVATIVE FREE 10 ML: 5 INJECTION INTRAVENOUS at 11:50

## 2020-12-13 RX ADMIN — PRAMIPEXOLE DIHYDROCHLORIDE 0.5 MG: 0.25 TABLET ORAL at 10:21

## 2020-12-13 RX ADMIN — CLOPIDOGREL BISULFATE 75 MG: 75 TABLET ORAL at 10:21

## 2020-12-13 RX ADMIN — VALSARTAN 160 MG: 160 TABLET, FILM COATED ORAL at 11:48

## 2020-12-13 RX ADMIN — CHOLECALCIFEROL (VITAMIN D3) 25 MCG (1,000 UNIT) TABLET 1000 UNITS: TABLET at 10:21

## 2020-12-13 RX ADMIN — INSULIN LISPRO 1 UNITS: 100 INJECTION, SOLUTION INTRAVENOUS; SUBCUTANEOUS at 21:34

## 2020-12-13 RX ADMIN — CARVEDILOL 3.12 MG: 3.12 TABLET, FILM COATED ORAL at 10:21

## 2020-12-13 RX ADMIN — GABAPENTIN 600 MG: 600 TABLET, FILM COATED ORAL at 10:22

## 2020-12-13 RX ADMIN — GABAPENTIN 600 MG: 600 TABLET, FILM COATED ORAL at 17:13

## 2020-12-13 RX ADMIN — INSULIN LISPRO 4 UNITS: 100 INJECTION, SOLUTION INTRAVENOUS; SUBCUTANEOUS at 10:20

## 2020-12-13 RX ADMIN — AMLODIPINE BESYLATE 5 MG: 5 TABLET ORAL at 20:05

## 2020-12-13 RX ADMIN — Medication 10 UNITS: at 11:48

## 2020-12-13 ASSESSMENT — ENCOUNTER SYMPTOMS
NAUSEA: 0
VOMITING: 1
CHEST TIGHTNESS: 0
TROUBLE SWALLOWING: 0
DIARRHEA: 0
COLOR CHANGE: 0
ABDOMINAL DISTENTION: 0
RHINORRHEA: 0
WHEEZING: 0
EYE PAIN: 0
COUGH: 1
CONSTIPATION: 0
PHOTOPHOBIA: 0
SHORTNESS OF BREATH: 1
BACK PAIN: 0
ABDOMINAL PAIN: 0
SORE THROAT: 0

## 2020-12-13 ASSESSMENT — PAIN SCALES - GENERAL: PAINLEVEL_OUTOF10: 4

## 2020-12-13 NOTE — ED NOTES
Patient placed in a gown Patient placed on cardiac monitor, continuous pulse oximeter, and NIBP monitor.  Monitor alarms on.       Kali Benjamin RN  12/12/20 9568

## 2020-12-13 NOTE — PROGRESS NOTES
4 Eyes Skin Assessment    Maureen Band is being assessed upon: Admission    I agree that Liliana Thakur, along with Vanna RN (either 2 RN's or 1 LPN and 1 RN) have performed a thorough Head to Toe Skin Assessment on the patient. ALL assessment sites listed below have been assessed. Areas assessed by both nurses:     [x]   Head, Face, and Ears   [x]   Shoulders, Back, and Chest  [x]   Arms, Elbows, and Hands   [x]   Coccyx, Sacrum, and Ischium  [x]   Legs, Feet, and Heels    Does the Patient have Skin Breakdown?  No    Rupert Prevention initiated: NA  Wound Care Orders initiated: NA    [facility] nurse consulted for Pressure Injury (Stage 3,4, Unstageable, DTI, NWPT, and Complex wounds) and New or Established Ostomies: NA        Primary Nurse eSignature: Omar Scott RN on 12/13/2020 at 10:30 AM      Co-Signer eSignature: Electronically signed by Jim Mcdermott RN on 12/13/20 at 11:03 AM CST

## 2020-12-13 NOTE — ED PROVIDER NOTES
[facility] EMERGENCY DEPT  eMERGENCY dEPARTMENT eNCOUnter      Pt Name: Beba Monroy  MRN: [id number]  [place] 1960  Date of evaluation: 12/12/2020  Provider: Milena Stern MD    [address] [place] [address]       Chief Complaint   Patient presents with    Shortness of Breath    Wheezing     audible wheezing noted         HISTORY OF PRESENT ILLNESS   (Location/Symptom, Timing/Onset,Context/Setting, Quality, Duration, Modifying Factors, Severity)  Note limiting factors. Beba Monroy is a 56 y.o. female who presents to the emergency department for SOB. Pt tells me that at the beginning of the week she began to have SOB. She was seen 3 days ago for the same. Pt has a hx of CHF. She has found that she is having decreased exercise tolerance. Pt not able to walk from her bed to the bathroom without stopping after 2-3 steps. Pt has a cough that is dry. Pt does not require oxygen at home. HPI    NursingNotes were reviewed. REVIEW OF SYSTEMS    (2-9 systems for level 4, 10 or more for level 5)     Review of Systems   Constitutional: Positive for fatigue. Negative for activity change, appetite change, chills and fever. HENT: Negative for congestion, ear pain, rhinorrhea, sore throat and trouble swallowing. Eyes: Negative for photophobia, pain and visual disturbance. Respiratory: Positive for cough and shortness of breath. Negative for chest tightness and wheezing. Cardiovascular: Positive for palpitations and leg swelling. Negative for chest pain. Gastrointestinal: Positive for vomiting. Negative for abdominal distention, abdominal pain, constipation, diarrhea and nausea. Genitourinary: Negative for difficulty urinating, dysuria, flank pain, urgency, vaginal bleeding and vaginal discharge. Musculoskeletal: Negative for back pain, myalgias and neck stiffness. Skin: Negative for color change, pallor and rash. Neurological: Negative for dizziness, weakness, light-headedness, numbness and headaches. Psychiatric/Behavioral: Negative for agitation, behavioral problems, confusion, hallucinations and suicidal ideas. PAST MEDICALHISTORY     Past Medical History:   Diagnosis Date    Allergies     AMI (acute myocardial infarction) ([facility] 09/2018    Asthma     CAD (coronary artery disease)     hx of stents    Cerebral artery occlusion with cerebral infarction ([facility] 2012    R sided numbness/weakness    CHF (congestive heart failure) ([facility]     COPD (chronic obstructive pulmonary disease) ([facility]     Depression     Diabetes mellitus ([facility]     DVT (deep vein thrombosis) in pregnancy     GERD (gastroesophageal reflux disease)     Hx of blood clots     rt leg    Hyperlipidemia     Hypertension     Palliative care patient 07/02/2020    Pneumonia          SURGICAL HISTORY       Past Surgical History:   Procedure Laterality Date    CATARACT REMOVAL Bilateral     COLONOSCOPY  approx 2013    CORONARY ANGIOPLASTY WITH STENT PLACEMENT  2018    in [address] [facility] [facility] [address] (3 stents)    TONSILLECTOMY           CURRENT MEDICATIONS     Previous Medications    AMLODIPINE (NORVASC) 5 MG TABLET    Take 1 tablet by mouth daily    ATORVASTATIN (LIPITOR) 20 MG TABLET    Take 20 mg by mouth daily    BLOOD GLUCOSE MONITOR STRIPS    Test 3 times a day & as needed for symptoms of irregular blood glucose. Dispense sufficient amount for indicated testing frequency plus additional to accommodate PRN testing needs.     BLOOD GLUCOSE MONITORING SUPPL (BLOOD GLUCOSE MONITOR SYSTEM) W/DEVICE KIT    1 Device by Does not apply route three times daily    BUMETANIDE (BUMEX) 1 MG TABLET    Take 1 mg by mouth 2 times daily    CARVEDILOL (COREG) 3.125 MG TABLET    Take 1 tablet by mouth 2 times daily    CLOPIDOGREL (PLAVIX) 75 MG TABLET    Take 75 mg by mouth daily DULOXETINE (CYMBALTA) 30 MG EXTENDED RELEASE CAPSULE    Take 30 mg by mouth daily ALONG WITH A 60 MG TABLET (BOTH TOGETHER + 90 MG)    DULOXETINE (CYMBALTA) 60 MG EXTENDED RELEASE CAPSULE    Take 60 mg by mouth daily WITH A 30 MG TABLET. TOTAL OF 90 MG)    FLUTICASONE-VILANTEROL (BREO ELLIPTA) 100-25 MCG/INH AEPB INHALER    Inhale 1 puff into the lungs daily    GABAPENTIN (NEURONTIN) 600 MG TABLET    Take 1 tablet by mouth 3 times daily for 30 days.     INSULIN DEGLUDEC (TRESIBA FLEXTOUCH) 100 UNIT/ML SOPN    Inject 40 Units into the skin nightly    LANCETS MISC    1 each by Does not apply route 3 times daily    LEVALBUTEROL (XOPENEX HFA) 45 MCG/ACT INHALER    Inhale 2 puffs into the lungs every 4 hours as needed for Wheezing or Shortness of Breath    LEVALBUTEROL (XOPENEX) 0.63 MG/3ML NEBULIZATION    Take 3 mLs by nebulization every 6 hours as needed for Wheezing    LEVOCETIRIZINE (XYZAL) 5 MG TABLET    Take 1 tablet by mouth nightly    MELATONIN 3 MG TABS TABLET    Take 10 mg by mouth nightly    MONTELUKAST (SINGULAIR) 10 MG TABLET    Take 10 mg by mouth nightly    PRAMIPEXOLE (MIRAPEX) 0.5 MG TABLET    Take 1 tablet by mouth 2 times daily    SEMAGLUTIDE, 1 MG/DOSE, 2 MG/1.5ML SOPN    Inject 1 mg into the skin every 7 days    TIOTROPIUM (SPIRIVA RESPIMAT) 1.25 MCG/ACT AERS INHALER    Inhale 2 puffs into the lungs daily    TIZANIDINE (ZANAFLEX) 2 MG TABLET    Take 1 tablet by mouth 2 times daily as needed (spasms)    TIZANIDINE (ZANAFLEX) 2 MG TABLET    Take 1 tablet by mouth 2 times daily as needed (spasms)    VALSARTAN (DIOVAN) 160 MG TABLET    Take 160 mg by mouth daily    VITAMIN D (CHOLECALCIFEROL) 25 MCG (1000 UT) TABS TABLET    Take 1,000 Units by mouth daily       ALLERGIES     Albuterol, Levaquin [levofloxacin], Metformin and related, and Aspirin    FAMILY HISTORY       Family History   Problem Relation Age of Onset    Colon Cancer Neg Hx     Colon Polyps Neg Hx           SOCIAL HISTORY Social History     Socioeconomic History    Marital status:      Spouse name: None    Number of children: None    Years of education: None    Highest education level: None   Occupational History    None   Social Needs    Financial resource strain: None    Food insecurity     Worry: None     Inability: None    Transportation needs     Medical: None     Non-medical: None   Tobacco Use    Smoking status: Former Smoker     Packs/day: 1.00     Years: 30.00     Pack years: 30.00     Quit date: 2005     Years since quitting: 15.9    Smokeless tobacco: Never Used   Substance and Sexual Activity    Alcohol use: Never     Frequency: Never    Drug use: Never    Sexual activity: None   Lifestyle    Physical activity     Days per week: None     Minutes per session: None    Stress: None   Relationships    Social connections     Talks on phone: None     Gets together: None     Attends [employer] service: None     Active member of club or organization: None     Attends meetings of clubs or organizations: None     Relationship status: None    Intimate partner violence     Fear of current or ex partner: None     Emotionally abused: None     Physically abused: None     Forced sexual activity: None   Other Topics Concern    None   Social History Narrative    None       SCREENINGS    Barbie Coma Scale  Eye Opening: Spontaneous  Best Verbal Response: Oriented  Best Motor Response: Obeys commands  [place] Coma Scale Score: 15        PHYSICAL EXAM    (up to 7 for level 4, 8 or more for level 5)     ED Triage Vitals [12/12/20 2346]   BP Temp Temp src Pulse Resp SpO2 Height Weight   (!) 162/63 98 °F (36.7 °C) -- 73 20 99 % 5' 1\" (1.549 m) 220 lb (99.8 kg)       Physical Exam  Vitals signs and nursing note reviewed. Constitutional:       Appearance: Normal appearance. She is obese. She is not ill-appearing. HENT:      Head: Normocephalic and atraumatic.       Nose: Nose normal.      Mouth/Throat: Mouth: Mucous membranes are moist.      Pharynx: Oropharynx is clear. Eyes:      Extraocular Movements: Extraocular movements intact. Pupils: Pupils are equal, round, and reactive to light. Neck:      Musculoskeletal: Normal range of motion and neck supple. No neck rigidity. Cardiovascular:      Rate and Rhythm: Normal rate and regular rhythm. Heart sounds: No murmur. Pulmonary:      Effort: Tachypnea present. Breath sounds: Examination of the right-lower field reveals decreased breath sounds. Examination of the left-lower field reveals decreased breath sounds. Decreased breath sounds present. No wheezing, rhonchi or rales. Abdominal:      General: Abdomen is flat. There is no distension. Palpations: Abdomen is soft. Musculoskeletal: Normal range of motion. General: No tenderness or signs of injury. Right lower leg: Edema (1+) present. Left lower leg: Edema (1+) present. Skin:     General: Skin is warm and dry. Capillary Refill: Capillary refill takes less than 2 seconds. Neurological:      General: No focal deficit present. Mental Status: She is alert and oriented to person, place, and time. Cranial Nerves: No cranial nerve deficit. Psychiatric:         Mood and Affect: Mood normal.         Behavior: Behavior normal.         Thought Content: Thought content normal.         DIAGNOSTIC RESULTS     EKG: All EKG's areinterpreted by the Emergency Department Physician who either signs or Co-signs this chart in the absence of a cardiologist.    Normal sinus rhythm with a rate of 79, normal axis, no acute ST elevations.   There are ST depressions in leads II,III, aVF, V4, V5 V6.    RADIOLOGY:  Non-plain film images such as CT, Ultrasound and MRI are read by the radiologist. Plain radiographic images are visualized and preliminarily interpreted bythe emergency physician with the below findings:    FILM CXR 1 VIEW: Cardiomegaly with mild perihilar reticular opacities which may be due to edema or small airways infection, unchanged from 12/9/2020. No pleural effusions or pneumothorax. XR CHEST PORTABLE    (Results Pending)           LABS:  Labs Reviewed   BRAIN NATRIURETIC PEPTIDE - Abnormal; Notable for the following components:       Result Value    Pro-BNP 1,946 (*)     All other components within normal limits   CBC WITH AUTO DIFFERENTIAL - Abnormal; Notable for the following components:    RBC 3.36 (*)     Hemoglobin 9.9 (*)     Hematocrit 30.6 (*)     MCHC 32.4 (*)     MPV 9.2 (*)     Neutrophils % 68.2 (*)     All other components within normal limits   COMPREHENSIVE METABOLIC PANEL - Abnormal; Notable for the following components:    Glucose 282 (*)     BUN 24 (*)     CREATININE 1.2 (*)     GFR Non- 45 (*)     GFR African American 55 (*)     Calcium 8.7 (*)     Total Protein 5.8 (*)     Alb 3.4 (*)     All other components within normal limits       All other labs were within normal range or not returned as of this dictation.     EMERGENCY DEPARTMENT COURSE and DIFFERENTIAL DIAGNOSIS/MDM:   Vitals:    Vitals:    12/12/20 2346 12/13/20 0146 12/13/20 0423 12/13/20 0640   BP: (!) 162/63 (!) 160/80 (!) 144/60 (!) 163/80   Pulse: 73  74 80   Resp: 20  20 18   Temp: 98 °F (36.7 °C)      SpO2: 99%  98% 97%   Weight: 220 lb (99.8 kg)      Height: 5' 1\" (1.549 m)          MDM  Number of Diagnoses or Management Options  Chronic anemia: new and requires workup  Decompensated heart failure ([address]): new and requires workup  Dyspnea on exertion: new and requires workup Diagnosis management comments: Patient presented for shortness of breath this evening. She had difficulty walking just from her bed to her toilet without having to stop. The chest x-ray this evening showed edema. Her BNP was up a little bit more from what it was just 4 days ago. Patient has been trying to use more of her diuretic without success. I got patient up to walk her to see how her exercise tolerance was. She maintained oxygen saturation of 100%, but barely take any steps before becoming very tachypneic with her exercise tolerance down and the pulmonary edema present I feel it best to bring patient in for further evaluated. Spoke to the hospitalist service and he is agreed to admit her for further treatment. Amount and/or Complexity of Data Reviewed  Decide to obtain previous medical records or to obtain history from someone other than the patient: yes    Patient Progress  Patient progress: stable      Reassessment      CONSULTS:  None    PROCEDURES:  Unless otherwise noted below, none     Procedures    FINAL IMPRESSION      1. Dyspnea on exertion    2. Decompensated heart failure ([address]    3. Chronic anemia          DISPOSITION/PLAN   DISPOSITION Decision To Admit 12/13/2020 06:54:13 AM      PATIENT REFERRED TO:  No follow-up provider specified.     DISCHARGE MEDICATIONS:  New Prescriptions    No medications on file          (Please note that portions of this note were completed with a voice recognition program.  Efforts were made to edit thedictations but occasionally words are mis-transcribed.)    Micheal Betancourt MD (electronically signed)  Attending Emergency Physician          Demi Messina MD  12/13/20 2357

## 2020-12-13 NOTE — ED NOTES
Bed: 11  Expected date:   Expected time:   Means of arrival:   Comments:  [id number], [address] [facility]  12/12/20 4812

## 2020-12-13 NOTE — ED NOTES
Patient ambulated with assistance. O2 sat staying at 98%. Patient has expiratory wheezing present during ambulation.   Dr. Chaparro Champagne aware       [facility], RN  12/13/20 [address], RN  12/13/20 1498

## 2020-12-13 NOTE — ED NOTES
Sent voalte message to Dr. Shannon Yeager, hospitalist.  Awaiting call back for ED provider.      [facility]  12/13/20 6022

## 2020-12-13 NOTE — H&P
History and Physical    Patient Name:  Sukhi Vidales    :  1960    Chief Complaint:   Dyspnea     History of Present Illness:   Sukhi Vidales presents to [facility] with severe dyspnea worsening for one week associated with fatigue and chest pain. Pt is not able to walk more than 2 steps without getting short of air and feeling dizzy and weak. Chest pain is central, intermittent, 5 out 10, lasting few seconds. Occasional palpitations. Complains of peripheral swelling, that is gone now after dose of iv lasix (pt is on norvasc)  Mild cough, non productive, no fever, no chills. Pt is wheezing. She has asthma. Echo from 2020 shows moderate size circumferential pericardial effusion, LV systolic function appears normal with what appears to be moderate to severe concentric LVH. Pt was taking extra dose of lasix at home but dyspnea was getting worse. CXR shows generous heart size with chronic lung changes most likely. Labs show mild ANTONIO, hyperglycemia, anemia. Pt admits to being dx with RA. Past Medical History:   has a past medical history of Allergies, AMI (acute myocardial infarction) ([address] Asthma, CAD (coronary artery disease), Cerebral artery occlusion with cerebral infarction ([address] CHF (congestive heart failure) ([address] COPD (chronic obstructive pulmonary disease) ([address] Depression, Diabetes mellitus ([address] DVT (deep vein thrombosis) in pregnancy, GERD (gastroesophageal reflux disease), Hx of blood clots, Hyperlipidemia, Hypertension, Palliative care patient, and Pneumonia. Surgical History:   has a past surgical history that includes Tonsillectomy; Coronary angioplasty with stent (2018); Cataract removal (Bilateral); and Colonoscopy (approx ). Social History:   reports that she quit smoking about 15 years ago. She has a 30.00 pack-year smoking history. She has never used smokeless tobacco. She reports that she does not drink alcohol or use drugs.      Family History:  HTN Medications:  Prior to Admission medications    Medication Sig Start Date End Date Taking? Authorizing Provider   bumetanide (BUMEX) 1 MG tablet Take 1 mg by mouth 2 times daily   Yes Historical Provider, MD   fluticasone-vilanterol (BREO ELLIPTA) 100-25 MCG/INH AEPB inhaler Inhale 1 puff into the lungs daily 11/22/20  Yes YAMIL Bell NP   amLODIPine (NORVASC) 5 MG tablet Take 1 tablet by mouth daily 11/22/20  Yes YAMIL Bell NP   carvedilol (COREG) 3.125 MG tablet Take 1 tablet by mouth 2 times daily 11/22/20  Yes YAMIL Bell NP   Semaglutide, 1 MG/DOSE, 2 MG/1.5ML SOPN Inject 1 mg into the skin every 7 days 11/22/20  Yes YAMIL Bell NP   pramipexole (MIRAPEX) 0.5 MG tablet Take 1 tablet by mouth 2 times daily 11/22/20  Yes YAMIL Bell NP   tiZANidine (ZANAFLEX) 2 MG tablet Take 1 tablet by mouth 2 times daily as needed (spasms) 11/22/20  Yes YAMIL Bell NP   gabapentin (NEURONTIN) 600 MG tablet Take 1 tablet by mouth 3 times daily for 30 days. 11/20/20 12/20/20 Yes Rosemarie Zimmerman MD   tiotropium (SPIRIVA RESPIMAT) 1.25 MCG/ACT AERS inhaler Inhale 2 puffs into the lungs daily 10/19/20  Yes YAMIL Bell NP   levalbuterol [facility] HFA) 45 MCG/ACT inhaler Inhale 2 puffs into the lungs every 4 hours as needed for Wheezing or Shortness of Breath 10/19/20  Yes YAMIL Bell NP   atorvastatin (LIPITOR) 20 MG tablet Take 20 mg by mouth daily   Yes Historical Provider, MD   DULoxetine (CYMBALTA) 30 MG extended release capsule Take 30 mg by mouth daily ALONG WITH A 60 MG TABLET (BOTH TOGETHER + 90 MG)   Yes Historical Provider, MD   DULoxetine (CYMBALTA) 60 MG extended release capsule Take 60 mg by mouth daily WITH A 30 MG TABLET.  TOTAL OF 90 MG)   Yes Historical Provider, MD   valsartan (DIOVAN) 160 MG tablet Take 160 mg by mouth daily   Yes Historical Provider, MD clopidogrel (PLAVIX) 75 MG tablet Take 75 mg by mouth daily   Yes Historical Provider, MD   montelukast (SINGULAIR) 10 MG tablet Take 10 mg by mouth nightly   Yes Historical Provider, MD   vitamin D (CHOLECALCIFEROL) 25 MCG (1000 UT) TABS tablet Take 1,000 Units by mouth daily   Yes Historical Provider, MD   melatonin 3 MG TABS tablet Take 10 mg by mouth nightly   Yes Historical Provider, MD   Insulin Degludec (TRESIBA FLEXTOUCH) 100 UNIT/ML SOPN Inject 40 Units into the skin nightly 11/22/20   YAMIL Figueroa NP   levocetirizine Beth Dart) 5 MG tablet Take 1 tablet by mouth nightly 11/22/20   YAMIL Figueroa NP   blood glucose monitor strips Test 3 times a day & as needed for symptoms of irregular blood glucose. Dispense sufficient amount for indicated testing frequency plus additional to accommodate PRN testing needs. 11/22/20   YAMIL Figueroa NP   levalbuterol Leroyshelia Villaseñor) 0.63 MG/3ML nebulization Take 3 mLs by nebulization every 6 hours as needed for Wheezing 10/19/20 12/9/20  YAMIL Figueroa NP   tiZANidine (ZANAFLEX) 2 MG tablet Take 1 tablet by mouth 2 times daily as needed (spasms) 10/19/20   YAMIL Figueroa NP   Blood Glucose Monitoring Suppl (BLOOD GLUCOSE MONITOR SYSTEM) w/Device KIT 1 Device by Does not apply route three times daily 7/11/20   YAMIL Figueroa NP   Lancets MISC 1 each by Does not apply route 3 times daily 7/11/20   YAMIL Figueroa NP       Allergies:  Albuterol, Levaquin [levofloxacin], Metformin and related, and Aspirin     Review of Systems:   · Constitutional: there has been no unanticipated weight loss. There's been change in energy level, sleep pattern, or activity level. · Eyes: No visual changes or diplopia. No scleral icterus. · ENT: No Headaches, hearing loss or vertigo. No mouth sores or sore throat. · Cardiovascular: chest pain, palpitations, no loss of consciousness. No pleuritic pain or phlebitis. No orthopnea, PND, complains of peripheral edema. · Respiratory: cough wheezing, no sputum production. No hemoptysis. dyspnea     · Gastrointestinal: No abdominal pain, has appetite loss,one episode of vomiting  · Genitourinary: dysuria, frequency  · Musculoskeletal:  No gait disturbance, weakness or joint complaints. · Integumentary: No rash or pruritis. · Neurological: No headache, diplopia, change in muscle strength, numbness or tingling. No change in gait, balance, coordination. · Psychiatric: No anxiety, or depression. · Endocrine: No temperature intolerance. No excessive thirst, fluid intake, or urination. No tremor. · Hematologic/Lymphatic: No abnormal bruising or bleeding, blood clots or swollen lymph nodes. · Allergic/Immunologic: No nasal congestion or hives. Physical Examination:    Vital Signs: BP (!) 180/72   Pulse 99   Temp 97.6 °F (36.4 °C) (Temporal)   Resp 18   Ht 5' 1\" (1.549 m)   Wt 226 lb 7 oz (102.7 kg)   SpO2 98%   BMI 42.78 kg/m²   General appearance: Well preserved, mesomorphic body habitus, alert, moderate distress. Skin: Skin color, texture, turgor normal. No rashes or lesions. No induration or tightening palpated. Head: Normocephalic. No masses, lesions, tenderness or abnormalities  Eyes: conjunctivae/corneas clear. EOM's intact. Sclera non icteric. Ears: External ears normal.  Hearing normal to finger rub. Nose/Sinuses: Nares normal. Septum midline. Mucosa normal. No drainage or sinus tenderness. Oropharynx: Lips, mucosa, and tongue normal. Oropharynx clear with no exudate seen. Neck: Neck supple, and symmetric. No adenopathy. Trachea is midline. Carotids brisk in upstroke without bruits, No abnormal JVP noted at 45°. Lungs: BL wheezing   Heart:  S1 > S2. Regular rate and rhythm. No gallop, murmur, rub, palpable thrill or heave noted. Abdomen: Abdomen soft, non-tender. BS normal. No masses, organomegaly. No hernia noted. Extremities: Extremities normal. No deformities, edema, or skin discoloration. No cyanosis or clubbing noted to the nails. Peripheral pulses 4/4. Musculoskeletal: Spine ROM normal. Muscular strength intact. Neuro: Cranial nerves intact. Motor: Strength 5/5 in all extremities. No focal weakness. Sensory: grossly normal to touch. Pertinent Labs:  CBC:   Recent Labs     12/13/20  0130   WBC 6.3   RBC 3.36*   HGB 9.9*   HCT 30.6*   MCV 91.1   MCH 29.5   MCHC 32.4*   RDW 13.5      MPV 9.2*     BMP:   Recent Labs     12/13/20  0130      K 4.7      CO2 28   BUN 24*   CREATININE 1.2*   GLUCOSE 282*   CALCIUM 8.7*     ABGs: No results for input(s): PO2, PCO2, PH, HCO3, BE, O2SAT in the last 72 hours. INR: No results for input(s): INR, PROTIME in the last 72 hours.   BNP:  No results found for: BNP  TSH:   Lab Results   Component Value Date    TSH 2.590 09/18/2020     Cardiac Injury Profile:   Lab Results   Component Value Date    TROPONINI 0.03 12/09/2020     Lipid Profile: No components found for: CHLPL  Lab Results   Component Value Date    TRIG 352 (H) 07/15/2020    TRIG 125 07/03/2020    TRIG 184 (H) 01/27/2020     Lab Results   Component Value Date    HDL 39 (L) 07/15/2020    HDL 62 (L) 07/03/2020    HDL 48 (L) 01/27/2020     Lab Results   Component Value Date    LDLCALC 99 07/15/2020    LDLCALC 119 07/03/2020    LDLCALC 135 01/27/2020     No results found for: LABVLDL  Hemoglobin A1C:   Lab Results   Component Value Date    LABA1C 6.5 (H) 07/03/2020     No results found for: EAG      Assessment/Plan:  · Dyspnea - echo am, dd now  · pericardial effusion - echo am  · Asthma AE- steroids, nebs  · RA- check inflammatory markers  · ANTONIO- mild   · Dysuria - UA now  · Anemia - work up  · DM2- ISS, lantus   · MARTHA  · HTN- home med   ·  chest pain in pt with CAD- troponin now, cardiology eval   : I have reviewed my findings and recommendations in detail with Margaret Henderson.     Alexa Miller MD     Admission level 3

## 2020-12-13 NOTE — PROGRESS NOTES
Bibi Barbosa arrived to room # 203. Presented with: shortness of breath  Mental Status: Patient is oriented, alert, coherent, logical, thought processes intact and able to concentrate and follow conversation. Vitals:    12/13/20 0908   BP: (!) 196/78   Pulse: 71   Resp: 20   Temp: 96.2 °F (35.7 °C)   SpO2: 96%     Patient safety contract and falls prevention contract reviewed with patient Yes. Oriented Patient to room. Call light within reach. Yes.   Needs, issues or concerns expressed at this time: no.      Electronically signed by Gilbert Gary RN on 12/13/2020 at 10:29 AM

## 2020-12-14 ENCOUNTER — TELEPHONE (OUTPATIENT)
Dept: CARDIOLOGY | Age: 60
End: 2020-12-14

## 2020-12-14 ENCOUNTER — LAB REQUISITION (OUTPATIENT)
Dept: LAB | Facility: HOSPITAL | Age: 60
End: 2020-12-14

## 2020-12-14 DIAGNOSIS — Z00.00 ROUTINE GENERAL MEDICAL EXAMINATION AT A HEALTH CARE FACILITY: ICD-10-CM

## 2020-12-14 LAB
ALBUMIN SERPL-MCNC: 3.4 G/DL (ref 3.5–5.2)
ALP BLD-CCNC: 91 U/L (ref 35–104)
ALT SERPL-CCNC: 10 U/L (ref 5–33)
ANA SER QL: NEGATIVE
ANION GAP SERPL CALCULATED.3IONS-SCNC: 10 MMOL/L (ref 7–19)
AST SERPL-CCNC: 11 U/L (ref 5–32)
BACTERIA: ABNORMAL /HPF
BILIRUB SERPL-MCNC: 0.3 MG/DL (ref 0.2–1.2)
BILIRUBIN URINE: NEGATIVE
BLOOD, URINE: ABNORMAL
BUN BLDV-MCNC: 26 MG/DL (ref 8–23)
C-ANCA TITR SER IF: NORMAL TITER
C-ANCA TITR SER IF: NORMAL TITER
C-REACTIVE PROTEIN: 0.17 MG/DL (ref 0–0.5)
CALCIUM SERPL-MCNC: 8.8 MG/DL (ref 8.8–10.2)
CHLORIDE BLD-SCNC: 98 MMOL/L (ref 98–111)
CHOLESTEROL, TOTAL: 261 MG/DL (ref 160–199)
CLARITY: CLEAR
CO2: 26 MMOL/L (ref 22–29)
COLOR: YELLOW
CREAT SERPL-MCNC: 1.1 MG/DL (ref 0.5–0.9)
CRYSTALS, UA: ABNORMAL /HPF
EPITHELIAL CELLS, UA: 4 /HPF (ref 0–5)
ERYTHROCYTE [SEDIMENTATION RATE] IN BLOOD: 82 MM/HR (ref 0–20)
FERRITIN: 113.5 NG/ML (ref 13–150)
FOLATE: 13.2 NG/ML (ref 4.8–37.3)
GFR AFRICAN AMERICAN: >59
GFR NON-AFRICAN AMERICAN: 50
GLUCOSE BLD-MCNC: 340 MG/DL (ref 74–109)
GLUCOSE BLD-MCNC: 366 MG/DL (ref 70–99)
GLUCOSE BLD-MCNC: 368 MG/DL (ref 70–99)
GLUCOSE BLD-MCNC: 374 MG/DL (ref 70–99)
GLUCOSE BLD-MCNC: 497 MG/DL (ref 70–99)
GLUCOSE URINE: 500 MG/DL
HCT VFR BLD CALC: 32.9 % (ref 37–47)
HDLC SERPL-MCNC: 43 MG/DL (ref 65–121)
HEMOGLOBIN: 10.7 G/DL (ref 12–16)
HYALINE CASTS: 0 /HPF (ref 0–8)
IRON SATURATION: 24 % (ref 14–50)
IRON: 63 UG/DL (ref 37–145)
KETONES, URINE: NEGATIVE MG/DL
LDL CHOLESTEROL CALCULATED: 191 MG/DL
LEUKOCYTE ESTERASE, URINE: NEGATIVE
LV EF: 60 %
LVEF MODALITY: NORMAL
MAGNESIUM: 2.2 MG/DL (ref 1.6–2.4)
MCH RBC QN AUTO: 29.4 PG (ref 27–31)
MCHC RBC AUTO-ENTMCNC: 32.5 G/DL (ref 33–37)
MCV RBC AUTO: 90.4 FL (ref 81–99)
MYELOPEROXIDASE AB SER IA-ACNC: <9 U/ML (ref 0–9)
NITRITE, URINE: NEGATIVE
P-ANCA ATYPICAL TITR SER IF: NORMAL TITER
P-ANCA ATYPICAL TITR SER IF: NORMAL TITER
P-ANCA TITR SER IF: NORMAL TITER
P-ANCA TITR SER IF: NORMAL TITER
PDW BLD-RTO: 13.2 % (ref 11.5–14.5)
PERFORMED ON: ABNORMAL
PH UA: 5 (ref 5–8)
PLATELET # BLD: 217 K/UL (ref 130–400)
PMV BLD AUTO: 9.1 FL (ref 9.4–12.3)
POTASSIUM SERPL-SCNC: 4.7 MMOL/L (ref 3.5–5)
PROTEIN UA: 300 MG/DL
PROTEINASE3 AB SER IA-ACNC: <3.5 U/ML (ref 0–3.5)
RBC # BLD: 3.64 M/UL (ref 4.2–5.4)
RBC UA: 10 /HPF (ref 0–4)
RHEUMATOID FACTOR: 88 IU/ML
SEDIMENTATION RATE, ERYTHROCYTE: 82 MM/HR (ref 0–25)
SODIUM BLD-SCNC: 134 MMOL/L (ref 136–145)
SPECIFIC GRAVITY UA: 1.01 (ref 1–1.03)
TOTAL IRON BINDING CAPACITY: 263 UG/DL (ref 250–400)
TOTAL PROTEIN: 6.3 G/DL (ref 6.6–8.7)
TRIGL SERPL-MCNC: 135 MG/DL (ref 0–149)
TROPONIN: 0.01 NG/ML (ref 0–0.03)
TROPONIN: 0.01 NG/ML (ref 0–0.03)
TROPONIN: 0.02 NG/ML (ref 0–0.03)
TSH SERPL DL<=0.05 MIU/L-ACNC: 0.97 UIU/ML (ref 0.27–4.2)
URIC ACID, SERUM: 6.5 MG/DL (ref 2.4–5.7)
UROBILINOGEN, URINE: 0.2 E.U./DL
VITAMIN B-12: 419 PG/ML (ref 211–946)
WBC # BLD: 5.2 K/UL (ref 4.8–10.8)
WBC UA: 4 /HPF (ref 0–5)

## 2020-12-14 PROCEDURE — 2140000000 HC CCU INTERMEDIATE R&B

## 2020-12-14 PROCEDURE — 6370000000 HC RX 637 (ALT 250 FOR IP): Performed by: INTERNAL MEDICINE

## 2020-12-14 PROCEDURE — 83735 ASSAY OF MAGNESIUM: CPT

## 2020-12-14 PROCEDURE — 82607 VITAMIN B-12: CPT

## 2020-12-14 PROCEDURE — 84443 ASSAY THYROID STIM HORMONE: CPT

## 2020-12-14 PROCEDURE — 86738 MYCOPLASMA ANTIBODY: CPT

## 2020-12-14 PROCEDURE — 2500000003 HC RX 250 WO HCPCS: Performed by: INTERNAL MEDICINE

## 2020-12-14 PROCEDURE — 36415 COLL VENOUS BLD VENIPUNCTURE: CPT

## 2020-12-14 PROCEDURE — 81001 URINALYSIS AUTO W/SCOPE: CPT

## 2020-12-14 PROCEDURE — 80053 COMPREHEN METABOLIC PANEL: CPT

## 2020-12-14 PROCEDURE — 85651 RBC SED RATE NONAUTOMATED: CPT

## 2020-12-14 PROCEDURE — 85027 COMPLETE CBC AUTOMATED: CPT

## 2020-12-14 PROCEDURE — 84550 ASSAY OF BLOOD/URIC ACID: CPT

## 2020-12-14 PROCEDURE — 93308 TTE F-UP OR LMTD: CPT

## 2020-12-14 PROCEDURE — 2500000003 HC RX 250 WO HCPCS: Performed by: HOSPITALIST

## 2020-12-14 PROCEDURE — 82746 ASSAY OF FOLIC ACID SERUM: CPT

## 2020-12-14 PROCEDURE — 82947 ASSAY GLUCOSE BLOOD QUANT: CPT

## 2020-12-14 PROCEDURE — 85652 RBC SED RATE AUTOMATED: CPT

## 2020-12-14 PROCEDURE — 86140 C-REACTIVE PROTEIN: CPT

## 2020-12-14 PROCEDURE — 2580000003 HC RX 258: Performed by: HOSPITALIST

## 2020-12-14 PROCEDURE — C9113 INJ PANTOPRAZOLE SODIUM, VIA: HCPCS | Performed by: HOSPITALIST

## 2020-12-14 PROCEDURE — 83540 ASSAY OF IRON: CPT

## 2020-12-14 PROCEDURE — 82728 ASSAY OF FERRITIN: CPT

## 2020-12-14 PROCEDURE — 99222 1ST HOSP IP/OBS MODERATE 55: CPT | Performed by: INTERNAL MEDICINE

## 2020-12-14 PROCEDURE — 80061 LIPID PANEL: CPT

## 2020-12-14 PROCEDURE — 86431 RHEUMATOID FACTOR QUANT: CPT

## 2020-12-14 PROCEDURE — 6360000002 HC RX W HCPCS: Performed by: HOSPITALIST

## 2020-12-14 PROCEDURE — 83550 IRON BINDING TEST: CPT

## 2020-12-14 PROCEDURE — 84484 ASSAY OF TROPONIN QUANT: CPT

## 2020-12-14 PROCEDURE — 6370000000 HC RX 637 (ALT 250 FOR IP): Performed by: HOSPITALIST

## 2020-12-14 RX ORDER — ATORVASTATIN CALCIUM 40 MG/1
40 TABLET, FILM COATED ORAL DAILY
Status: DISCONTINUED | OUTPATIENT
Start: 2020-12-15 | End: 2020-12-16 | Stop reason: HOSPADM

## 2020-12-14 RX ORDER — INSULIN GLARGINE 100 [IU]/ML
30 INJECTION, SOLUTION SUBCUTANEOUS 2 TIMES DAILY
Status: DISCONTINUED | OUTPATIENT
Start: 2020-12-14 | End: 2020-12-15

## 2020-12-14 RX ORDER — METHYLPREDNISOLONE SODIUM SUCCINATE 40 MG/ML
40 INJECTION, POWDER, LYOPHILIZED, FOR SOLUTION INTRAMUSCULAR; INTRAVENOUS EVERY 12 HOURS
Status: DISCONTINUED | OUTPATIENT
Start: 2020-12-14 | End: 2020-12-15

## 2020-12-14 RX ORDER — BUMETANIDE 0.25 MG/ML
1 INJECTION, SOLUTION INTRAMUSCULAR; INTRAVENOUS 2 TIMES DAILY
Status: DISCONTINUED | OUTPATIENT
Start: 2020-12-14 | End: 2020-12-15

## 2020-12-14 RX ORDER — CARVEDILOL 6.25 MG/1
6.25 TABLET ORAL 2 TIMES DAILY
Status: DISCONTINUED | OUTPATIENT
Start: 2020-12-14 | End: 2020-12-16 | Stop reason: HOSPADM

## 2020-12-14 RX ADMIN — GABAPENTIN 600 MG: 600 TABLET, FILM COATED ORAL at 08:12

## 2020-12-14 RX ADMIN — VALSARTAN 160 MG: 160 TABLET, FILM COATED ORAL at 08:12

## 2020-12-14 RX ADMIN — PANTOPRAZOLE SODIUM 40 MG: 40 INJECTION, POWDER, FOR SOLUTION INTRAVENOUS at 21:06

## 2020-12-14 RX ADMIN — CLOPIDOGREL BISULFATE 75 MG: 75 TABLET ORAL at 08:12

## 2020-12-14 RX ADMIN — CHOLECALCIFEROL (VITAMIN D3) 25 MCG (1,000 UNIT) TABLET 1000 UNITS: TABLET at 08:12

## 2020-12-14 RX ADMIN — INSULIN LISPRO 5 UNITS: 100 INJECTION, SOLUTION INTRAVENOUS; SUBCUTANEOUS at 12:35

## 2020-12-14 RX ADMIN — GABAPENTIN 600 MG: 600 TABLET, FILM COATED ORAL at 21:05

## 2020-12-14 RX ADMIN — INSULIN GLARGINE 20 UNITS: 100 INJECTION, SOLUTION SUBCUTANEOUS at 08:13

## 2020-12-14 RX ADMIN — BUMETANIDE 1 MG: 0.25 INJECTION, SOLUTION INTRAMUSCULAR; INTRAVENOUS at 08:11

## 2020-12-14 RX ADMIN — DULOXETINE 30 MG: 30 CAPSULE, DELAYED RELEASE ORAL at 08:11

## 2020-12-14 RX ADMIN — SODIUM CHLORIDE, PRESERVATIVE FREE 10 ML: 5 INJECTION INTRAVENOUS at 21:06

## 2020-12-14 RX ADMIN — SODIUM CHLORIDE, PRESERVATIVE FREE 10 ML: 5 INJECTION INTRAVENOUS at 08:12

## 2020-12-14 RX ADMIN — INSULIN LISPRO 5 UNITS: 100 INJECTION, SOLUTION INTRAVENOUS; SUBCUTANEOUS at 08:13

## 2020-12-14 RX ADMIN — PRAMIPEXOLE DIHYDROCHLORIDE 0.5 MG: 0.25 TABLET ORAL at 08:12

## 2020-12-14 RX ADMIN — BUMETANIDE 1 MG: 0.25 INJECTION, SOLUTION INTRAMUSCULAR; INTRAVENOUS at 21:16

## 2020-12-14 RX ADMIN — AMLODIPINE BESYLATE 5 MG: 5 TABLET ORAL at 08:12

## 2020-12-14 RX ADMIN — PRAMIPEXOLE DIHYDROCHLORIDE 0.5 MG: 0.25 TABLET ORAL at 17:24

## 2020-12-14 RX ADMIN — SODIUM CHLORIDE, PRESERVATIVE FREE 10 ML: 5 INJECTION INTRAVENOUS at 21:13

## 2020-12-14 RX ADMIN — ENOXAPARIN SODIUM 40 MG: 40 INJECTION SUBCUTANEOUS at 08:12

## 2020-12-14 RX ADMIN — Medication 30 UNITS: at 21:03

## 2020-12-14 RX ADMIN — PANTOPRAZOLE SODIUM 40 MG: 40 INJECTION, POWDER, FOR SOLUTION INTRAVENOUS at 08:11

## 2020-12-14 RX ADMIN — SODIUM CHLORIDE, PRESERVATIVE FREE 10 ML: 5 INJECTION INTRAVENOUS at 08:13

## 2020-12-14 RX ADMIN — ATORVASTATIN CALCIUM 20 MG: 20 TABLET, FILM COATED ORAL at 08:12

## 2020-12-14 RX ADMIN — CARVEDILOL 3.12 MG: 3.12 TABLET, FILM COATED ORAL at 08:12

## 2020-12-14 RX ADMIN — CARVEDILOL 6.25 MG: 6.25 TABLET, FILM COATED ORAL at 21:05

## 2020-12-14 RX ADMIN — INSULIN LISPRO 5 UNITS: 100 INJECTION, SOLUTION INTRAVENOUS; SUBCUTANEOUS at 17:24

## 2020-12-14 RX ADMIN — Medication 10 MG: at 21:05

## 2020-12-14 RX ADMIN — METHYLPREDNISOLONE SODIUM SUCCINATE 40 MG: 40 INJECTION, POWDER, FOR SOLUTION INTRAMUSCULAR; INTRAVENOUS at 02:50

## 2020-12-14 RX ADMIN — METHYLPREDNISOLONE SODIUM SUCCINATE 40 MG: 40 INJECTION, POWDER, FOR SOLUTION INTRAMUSCULAR; INTRAVENOUS at 17:24

## 2020-12-14 RX ADMIN — MONTELUKAST SODIUM 10 MG: 10 TABLET, FILM COATED ORAL at 21:05

## 2020-12-14 RX ADMIN — INSULIN LISPRO 3 UNITS: 100 INJECTION, SOLUTION INTRAVENOUS; SUBCUTANEOUS at 21:03

## 2020-12-14 ASSESSMENT — ENCOUNTER SYMPTOMS
EYE DISCHARGE: 0
BACK PAIN: 0
WHEEZING: 0
DIARRHEA: 0
SHORTNESS OF BREATH: 1
COUGH: 0
VOMITING: 0
BLOOD IN STOOL: 0
ABDOMINAL DISTENTION: 0
CONSTIPATION: 0

## 2020-12-14 ASSESSMENT — PAIN SCALES - GENERAL: PAINLEVEL_OUTOF10: 0

## 2020-12-14 NOTE — PROGRESS NOTES
 valsartan (DIOVAN) tablet 160 mg  160 mg Oral Daily Gael Powers MD   160 mg at 12/14/20 5726    vitamin D (CHOLECALCIFEROL) tablet 1,000 Units  1,000 Units Oral Daily Gael Powers MD   1,000 Units at 12/14/20 7871    sodium chloride flush 0.9 % injection 10 mL  10 mL Intravenous 2 times per day Gael Powers MD   10 mL at 12/14/20 4596    sodium chloride flush 0.9 % injection 10 mL  10 mL Intravenous PRN Gael Powers MD        promethazine (PHENERGAN) tablet 12.5 mg  12.5 mg Oral Q6H PRN Gael Powers MD        Or    ondansetron [facility]) injection 4 mg  4 mg Intravenous Q6H PRN Gael Powers MD        polyethylene glycol [facility]) packet 17 g  17 g Oral Daily PRN Gael Powers MD        acetaminophen (TYLENOL) tablet 650 mg  650 mg Oral Q6H PRN Gael Powers MD   650 mg at 12/13/20 2002    Or    acetaminophen (TYLENOL) suppository 650 mg  650 mg Rectal Q6H PRN Gael Powers MD        enoxaparin (LOVENOX) injection 40 mg  40 mg Subcutaneous Daily Gael Powers MD   40 mg at 12/14/20 4863    insulin lispro (HUMALOG) injection vial 0-6 Units  0-6 Units Subcutaneous TID WC Gael Powers MD   5 Units at 12/14/20 0813    insulin lispro (HUMALOG) injection vial 0-3 Units  0-3 Units Subcutaneous Nightly Gael Powers MD   1 Units at 12/13/20 2134    hydrALAZINE (APRESOLINE) injection 5 mg  5 mg Intravenous Q4H PRN Gael Powers MD        glucose (GLUTOSE) 40 % oral gel 15 g  15 g Oral PRN Gael Powers MD        dextrose 50 % IV solution  12.5 g Intravenous PRN Gael Powers MD        glucagon (rDNA) injection 1 mg  1 mg Intramuscular PRN Gael Powers MD        dextrose 5 % solution  100 mL/hr Intravenous PRN Gael Powers MD  pantoprazole (PROTONIX) injection 40 mg  40 mg Intravenous BID Jatin Rojas MD   40 mg at 12/14/20 0388    And    sodium chloride (PF) 0.9 % injection 10 mL  10 mL Intravenous BID Jatin Rojas MD   10 mL at 12/14/20 0813    amLODIPine (NORVASC) tablet 5 mg  5 mg Oral Daily Jatin Rojas MD   5 mg at 12/14/20 9846    bumetanide (BUMEX) injection 1 mg  1 mg Intravenous Daily Jatin Rojas MD   1 mg at 12/14/20 7041    levalbuterol (Layo Riff) nebulizer solution 1.25 mg  1.25 mg Nebulization Q8H PRN Ale Rivers MD   1.25 mg at 12/12/20 2351     DVT Prophylaxis: Lovenox 40 mg sq daily    Continuous Infusions:   dextrose         Intake/Output Summary (Last 24 hours) at 12/14/2020 1047  Last data filed at 12/14/2020 0827  Gross per 24 hour   Intake    Output 1900 ml   Net -1900 ml     CBC:   Recent Labs     12/13/20  0130 12/14/20  0113   WBC 6.3 5.2   HGB 9.9* 10.7*    217     BMP:  Recent Labs     12/13/20  0130 12/13/20  1910 12/14/20  0113     --  134*   K 4.7 4.0 4.7     --  98   CO2 28  --  26   BUN 24*  --  26*   CREATININE 1.2*  --  1.1*   GLUCOSE 282*  --  340*     ABGs:   Lab Results   Component Value Date    PHART 7.450 12/13/2020    PO2ART 80.0 12/13/2020    LOQ2APP 45.0 12/13/2020     INR: No results for input(s): INR in the last 72 hours. Objective:   Vitals: BP (!) 142/76   Pulse 81   Temp 96.8 °F (36 °C) (Temporal)   Resp 18   Ht 5' 1\" (1.549 m)   Wt 226 lb 7 oz (102.7 kg)   SpO2 97%   BMI 42.78 kg/m²   General appearance: alert, appears stated age and cooperative  Skin: Skin color, texture, turgor normal.   HEENT: Head: Normocephalic, no lesions, without obvious abnormality.   Neck: no adenopathy, no carotid bruit, no JVD and supple, symmetrical, trachea midline  Lungs: clear to auscultation bilaterally  Heart: regular rate and rhythm, S1, S2 normal, no murmur, click, rub or gallop Abdomen: soft, non-tender; bowel sounds normal; no masses,  no organomegaly  Extremities: extremities normal, atraumatic, no cyanosis or edema  Lymphatic: No significant lymph node enlargement papable  Neurologic: Mental status: Alert, oriented, thought content appropriate        Assessment & Plan:    · Dyspnea on exertion - echo now  · pericardial effusion - echo now   · Asthma AE- steroids, nebs  · RA- steroids for now  · ANTONIO on ckd- mild   · Dysuria - UA now- not done last night ???  · Anemia  · DM2- ISS, lantus   · MARTHA  · HTN- home med   ·  chest pain in pt with CAD- troponin, cardiology eval   · HL- increase statin dose   :    Disposition: cardiology work up pending, home when cleared by cardiology     Bobby Mendez     Rounding level 2

## 2020-12-14 NOTE — TELEPHONE ENCOUNTER
Called to [facility] 01/15 apt with dr Lynnette Pagan to diff cardiologist, but pt told me that she was going to see Dr Aiden Thompson she is currently pt in hospital) & will wait & see what he wants her to do, & to cancel apt with Dr Lynnette Pagan, which I did at her request.

## 2020-12-14 NOTE — PROGRESS NOTES
pH, Arterial 7.450  7.350 - 7.450 Final 12/13/2020  7:10 PM [facility]   pCO2, Arterial 45.0  35.0 - 45.0 mmHg Final 12/13/2020  7:10 PM [facility]   pO2, Arterial 80.0  80.0 - 100.0 mmHg Final 12/13/2020  7:10 PM [facility]   HCO3, Arterial 31.3High   22.0 - 26.0 mmol/L Final 12/13/2020  7:10 PM [facility] Excess, Arterial 6. 5High   -2.0 - 2.0 mmol/L Final 12/13/2020  7:10 PM [facility]   Hemoglobin, Art, Extended 9.8Low   12.0 - 16.0 g/dL Final 12/13/2020  7:10 PM [address] [place] [address] Lab   O2 Sat, Arterial 95.2  >92 % Final 12/13/2020  7:10 PM [facility]   Carboxyhgb, Arterial 1.6  0.0 - 5.0 % Final 12/13/2020  7:10 PM [facility]        0.0-1.5   (Smokers 1.5-5.0)    Methemoglobin, Arterial 0.4  <1.5 % Final 12/13/2020  7:10 PM [address] [place] [address] Lab   O2 Content, Arterial 13.2  Not Established mL/dL Final 12/13/2020  7:10 PM [address] [place] [address] Lab   O2 Therapy Unknown          ra @ rest  r-16bpm  Left radial  +at

## 2020-12-15 ENCOUNTER — OUTSIDE FACILITY SERVICE (OUTPATIENT)
Dept: PULMONOLOGY | Facility: CLINIC | Age: 60
End: 2020-12-15

## 2020-12-15 ENCOUNTER — APPOINTMENT (OUTPATIENT)
Dept: CT IMAGING | Age: 60
DRG: 546 | End: 2020-12-15
Payer: MEDICARE

## 2020-12-15 LAB
EKG P AXIS: 37 DEGREES
EKG P-R INTERVAL: 180 MS
EKG Q-T INTERVAL: 402 MS
EKG QRS DURATION: 96 MS
EKG QTC CALCULATION (BAZETT): 433 MS
EKG T AXIS: -107 DEGREES
GLUCOSE BLD-MCNC: 291 MG/DL (ref 70–99)
GLUCOSE BLD-MCNC: 319 MG/DL (ref 70–99)
GLUCOSE BLD-MCNC: 427 MG/DL (ref 70–99)
GLUCOSE BLD-MCNC: 446 MG/DL (ref 70–99)
GLUCOSE BLD-MCNC: 483 MG/DL (ref 70–99)
PERFORMED ON: ABNORMAL

## 2020-12-15 PROCEDURE — 6360000002 HC RX W HCPCS: Performed by: NURSE PRACTITIONER

## 2020-12-15 PROCEDURE — 82947 ASSAY GLUCOSE BLOOD QUANT: CPT

## 2020-12-15 PROCEDURE — 71250 CT THORAX DX C-: CPT

## 2020-12-15 PROCEDURE — 6370000000 HC RX 637 (ALT 250 FOR IP): Performed by: HOSPITALIST

## 2020-12-15 PROCEDURE — 94761 N-INVAS EAR/PLS OXIMETRY MLT: CPT

## 2020-12-15 PROCEDURE — 6370000000 HC RX 637 (ALT 250 FOR IP): Performed by: INTERNAL MEDICINE

## 2020-12-15 PROCEDURE — 99232 SBSQ HOSP IP/OBS MODERATE 35: CPT | Performed by: INTERNAL MEDICINE

## 2020-12-15 PROCEDURE — 99222 1ST HOSP IP/OBS MODERATE 55: CPT | Performed by: INTERNAL MEDICINE

## 2020-12-15 PROCEDURE — 2580000003 HC RX 258: Performed by: HOSPITALIST

## 2020-12-15 PROCEDURE — 94640 AIRWAY INHALATION TREATMENT: CPT

## 2020-12-15 PROCEDURE — 2140000000 HC CCU INTERMEDIATE R&B

## 2020-12-15 PROCEDURE — 93010 ELECTROCARDIOGRAM REPORT: CPT | Performed by: INTERNAL MEDICINE

## 2020-12-15 PROCEDURE — 6360000002 HC RX W HCPCS: Performed by: HOSPITALIST

## 2020-12-15 PROCEDURE — 2500000003 HC RX 250 WO HCPCS: Performed by: INTERNAL MEDICINE

## 2020-12-15 RX ORDER — LEVALBUTEROL INHALATION SOLUTION 1.25 MG/3ML
1.25 SOLUTION RESPIRATORY (INHALATION) 3 TIMES DAILY
Status: DISCONTINUED | OUTPATIENT
Start: 2020-12-15 | End: 2020-12-15

## 2020-12-15 RX ORDER — PREDNISONE 20 MG/1
20 TABLET ORAL 2 TIMES DAILY
Status: DISCONTINUED | OUTPATIENT
Start: 2020-12-16 | End: 2020-12-16 | Stop reason: HOSPADM

## 2020-12-15 RX ORDER — ARFORMOTEROL TARTRATE 15 UG/2ML
15 SOLUTION RESPIRATORY (INHALATION) 2 TIMES DAILY
Status: DISCONTINUED | OUTPATIENT
Start: 2020-12-15 | End: 2020-12-15

## 2020-12-15 RX ORDER — BUDESONIDE AND FORMOTEROL FUMARATE DIHYDRATE 160; 4.5 UG/1; UG/1
2 AEROSOL RESPIRATORY (INHALATION) 2 TIMES DAILY
Status: DISCONTINUED | OUTPATIENT
Start: 2020-12-15 | End: 2020-12-15 | Stop reason: CLARIF

## 2020-12-15 RX ORDER — INSULIN GLARGINE 100 [IU]/ML
40 INJECTION, SOLUTION SUBCUTANEOUS 2 TIMES DAILY
Status: DISCONTINUED | OUTPATIENT
Start: 2020-12-15 | End: 2020-12-16 | Stop reason: HOSPADM

## 2020-12-15 RX ORDER — BUDESONIDE 0.5 MG/2ML
0.5 INHALANT ORAL 2 TIMES DAILY
Status: DISCONTINUED | OUTPATIENT
Start: 2020-12-15 | End: 2020-12-15

## 2020-12-15 RX ORDER — PANTOPRAZOLE SODIUM 40 MG/1
40 TABLET, DELAYED RELEASE ORAL
Status: DISCONTINUED | OUTPATIENT
Start: 2020-12-15 | End: 2020-12-16

## 2020-12-15 RX ORDER — BUMETANIDE 1 MG/1
2 TABLET ORAL 2 TIMES DAILY
Status: DISCONTINUED | OUTPATIENT
Start: 2020-12-15 | End: 2020-12-16

## 2020-12-15 RX ORDER — COLCHICINE 0.6 MG/1
0.6 TABLET ORAL 2 TIMES DAILY
Status: DISCONTINUED | OUTPATIENT
Start: 2020-12-15 | End: 2020-12-16 | Stop reason: HOSPADM

## 2020-12-15 RX ADMIN — VALSARTAN 160 MG: 160 TABLET, FILM COATED ORAL at 07:54

## 2020-12-15 RX ADMIN — ENOXAPARIN SODIUM 40 MG: 40 INJECTION SUBCUTANEOUS at 07:53

## 2020-12-15 RX ADMIN — ARFORMOTEROL TARTRATE 15 MCG: 15 SOLUTION RESPIRATORY (INHALATION) at 14:30

## 2020-12-15 RX ADMIN — INSULIN LISPRO 4 UNITS: 100 INJECTION, SOLUTION INTRAVENOUS; SUBCUTANEOUS at 20:48

## 2020-12-15 RX ADMIN — CARVEDILOL 6.25 MG: 6.25 TABLET, FILM COATED ORAL at 07:53

## 2020-12-15 RX ADMIN — ATORVASTATIN CALCIUM 40 MG: 40 TABLET, FILM COATED ORAL at 07:54

## 2020-12-15 RX ADMIN — INSULIN LISPRO 12 UNITS: 100 INJECTION, SOLUTION INTRAVENOUS; SUBCUTANEOUS at 17:28

## 2020-12-15 RX ADMIN — COLCHICINE 0.6 MG: 0.6 TABLET, FILM COATED ORAL at 20:47

## 2020-12-15 RX ADMIN — CLOPIDOGREL BISULFATE 75 MG: 75 TABLET ORAL at 07:54

## 2020-12-15 RX ADMIN — Medication 10 MG: at 20:47

## 2020-12-15 RX ADMIN — GABAPENTIN 600 MG: 600 TABLET, FILM COATED ORAL at 13:24

## 2020-12-15 RX ADMIN — CHOLECALCIFEROL (VITAMIN D3) 25 MCG (1,000 UNIT) TABLET 1000 UNITS: TABLET at 07:53

## 2020-12-15 RX ADMIN — CARVEDILOL 6.25 MG: 6.25 TABLET, FILM COATED ORAL at 20:48

## 2020-12-15 RX ADMIN — BUMETANIDE 1 MG: 0.25 INJECTION, SOLUTION INTRAMUSCULAR; INTRAVENOUS at 07:54

## 2020-12-15 RX ADMIN — METHYLPREDNISOLONE SODIUM SUCCINATE 40 MG: 40 INJECTION, POWDER, FOR SOLUTION INTRAMUSCULAR; INTRAVENOUS at 03:31

## 2020-12-15 RX ADMIN — DULOXETINE 30 MG: 30 CAPSULE, DELAYED RELEASE ORAL at 07:58

## 2020-12-15 RX ADMIN — MONTELUKAST SODIUM 10 MG: 10 TABLET, FILM COATED ORAL at 20:47

## 2020-12-15 RX ADMIN — IPRATROPIUM BROMIDE 0.5 MG: 0.5 SOLUTION RESPIRATORY (INHALATION) at 14:40

## 2020-12-15 RX ADMIN — BUDESONIDE 500 MCG: 0.5 SUSPENSION RESPIRATORY (INHALATION) at 14:30

## 2020-12-15 RX ADMIN — GABAPENTIN 600 MG: 600 TABLET, FILM COATED ORAL at 07:53

## 2020-12-15 RX ADMIN — PRAMIPEXOLE DIHYDROCHLORIDE 0.5 MG: 0.25 TABLET ORAL at 17:28

## 2020-12-15 RX ADMIN — IPRATROPIUM BROMIDE 0.5 MG: 0.5 SOLUTION RESPIRATORY (INHALATION) at 11:53

## 2020-12-15 RX ADMIN — Medication 30 UNITS: at 07:55

## 2020-12-15 RX ADMIN — INSULIN LISPRO 6 UNITS: 100 INJECTION, SOLUTION INTRAVENOUS; SUBCUTANEOUS at 13:19

## 2020-12-15 RX ADMIN — PANTOPRAZOLE SODIUM 40 MG: 40 TABLET, DELAYED RELEASE ORAL at 17:28

## 2020-12-15 RX ADMIN — PANTOPRAZOLE SODIUM 40 MG: 40 TABLET, DELAYED RELEASE ORAL at 07:07

## 2020-12-15 RX ADMIN — BUMETANIDE 2 MG: 1 TABLET ORAL at 20:47

## 2020-12-15 RX ADMIN — SODIUM CHLORIDE, PRESERVATIVE FREE 10 ML: 5 INJECTION INTRAVENOUS at 07:54

## 2020-12-15 RX ADMIN — LEVALBUTEROL 1.25 MG: 1.25 SOLUTION RESPIRATORY (INHALATION) at 14:40

## 2020-12-15 RX ADMIN — SODIUM CHLORIDE, PRESERVATIVE FREE 10 ML: 5 INJECTION INTRAVENOUS at 20:57

## 2020-12-15 RX ADMIN — GABAPENTIN 600 MG: 600 TABLET, FILM COATED ORAL at 20:47

## 2020-12-15 RX ADMIN — PRAMIPEXOLE DIHYDROCHLORIDE 0.5 MG: 0.25 TABLET ORAL at 07:53

## 2020-12-15 RX ADMIN — Medication 40 UNITS: at 20:48

## 2020-12-15 RX ADMIN — INSULIN LISPRO 6 UNITS: 100 INJECTION, SOLUTION INTRAVENOUS; SUBCUTANEOUS at 07:54

## 2020-12-15 ASSESSMENT — ENCOUNTER SYMPTOMS
BACK PAIN: 1
VOMITING: 0
SINUS PAIN: 0
EYE ITCHING: 0
RHINORRHEA: 0
APNEA: 0
CONSTIPATION: 0
DIARRHEA: 0
ABDOMINAL DISTENTION: 0
TROUBLE SWALLOWING: 0
SINUS PRESSURE: 0
NAUSEA: 0
COUGH: 1
EYE PAIN: 0
COLOR CHANGE: 0
CHEST TIGHTNESS: 1
SHORTNESS OF BREATH: 1
EYE DISCHARGE: 0
WHEEZING: 0

## 2020-12-15 ASSESSMENT — PAIN SCALES - GENERAL: PAINLEVEL_OUTOF10: 0

## 2020-12-15 NOTE — PROGRESS NOTES
HOSPITAL MEDICINE  - PROGRESS NOTE    Admit Date: 12/12/2020         CC: dyspnea     Subjective: feels much better     Objective: mild distress if any, pulmonary ordered ct chest    Echo from 09/2020 shows moderate size circumferential pericardial effusion, LV systolic function appears normal with what appears to be moderate to severe concentric LVH. CXR shows generous heart size with chronic lung changes most likely. Per cardiology that's stable and not contributing to dyspnea.       Diet: DIET CARDIAC; Carb Control: 4 carb choices (60 gms)/meal  Pain is:None  Nausea:None  Bowel Movement/Flatus no    Data:   Scheduled Meds: Reviewed  Current Facility-Administered Medications   Medication Dose Route Frequency Provider Last Rate Last Admin    pantoprazole (PROTONIX) tablet 40 mg  40 mg Oral BID AC Umesh Fu MD   40 mg at 12/15/20 6236    insulin glargine (LANTUS) injection vial 40 Units  40 Units Subcutaneous BID Umesh Fu MD        [facility] nebulizer solution 1.25 mg  1.25 mg Nebulization TID Kasia Sniff Shwetha, APRN        ipratropium (ATROVENT) 0.02 % nebulizer solution 0.5 mg  0.5 mg Nebulization 4x daily Kasia Sniff Shwetha, APRN   0.5 mg at 12/15/20 1153    budesonide (PULMICORT) nebulizer suspension 500 mcg  0.5 mg Nebulization BID Kasia Sniff Shwetha, APRN        And    Arformoterol Tartrate (BROVANA) nebulizer solution 15 mcg  15 mcg Nebulization BID Kasia Sniff Shwetha, APRN        insulin lispro (HUMALOG) injection vial 0-12 Units  0-12 Units Subcutaneous TID WC Umesh Fu MD        insulin lispro (HUMALOG) injection vial 0-6 Units  0-6 Units Subcutaneous Nightly Umesh Fu MD        methylPREDNISolone sodium (SOLU-MEDROL) injection 40 mg  40 mg Intravenous Q12H Umehs Fu MD   40 mg at 12/15/20 5281  atorvastatin (LIPITOR) tablet 40 mg  40 mg Oral Daily Love Farfan MD   40 mg at 12/15/20 0754    carvedilol (COREG) tablet 6.25 mg  6.25 mg Oral BID Halle Niño MD   6.25 mg at 12/15/20 0753    bumetanide (BUMEX) injection 1 mg  1 mg Intravenous BID Halle Niño MD   1 mg at 12/15/20 0754    clopidogrel (PLAVIX) tablet 75 mg  75 mg Oral Daily Love Farfan MD   75 mg at 12/15/20 0754    DULoxetine (CYMBALTA) extended release capsule 30 mg  30 mg Oral Daily Love Farfan MD   30 mg at 12/15/20 0758    gabapentin (NEURONTIN) tablet 600 mg  600 mg Oral TID Love Farfan MD   600 mg at 12/15/20 1324    melatonin disintegrating tablet 10 mg  10 mg Oral Nightly Love Farfan MD   10 mg at 12/14/20 2105    montelukast (SINGULAIR) tablet 10 mg  10 mg Oral Nightly Love Farfan MD   10 mg at 12/14/20 2105    pramipexole (MIRAPEX) tablet 0.5 mg  0.5 mg Oral BID Love Farfan MD   0.5 mg at 12/15/20 0753    tiZANidine (ZANAFLEX) tablet 2 mg  2 mg Oral BID PRN Love Farfan MD        valsartan (DIOVAN) tablet 160 mg  160 mg Oral Daily Love Farfan MD   160 mg at 12/15/20 0754    vitamin D (CHOLECALCIFEROL) tablet 1,000 Units  1,000 Units Oral Daily Love Farfan MD   1,000 Units at 12/15/20 0753    sodium chloride flush 0.9 % injection 10 mL  10 mL Intravenous 2 times per day Love Farfan MD   10 mL at 12/15/20 0754    sodium chloride flush 0.9 % injection 10 mL  10 mL Intravenous PRN Love Farfan MD        promethazine (PHENERGAN) tablet 12.5 mg  12.5 mg Oral Q6H PRN Love Farfan MD        Or    ondansetron [facility]) injection 4 mg  4 mg Intravenous Q6H PRN Love Farfan MD        polyethylene glycol [facility]) packet 17 g  17 g Oral Daily PRN Love Farfan MD  acetaminophen (TYLENOL) tablet 650 mg  650 mg Oral Q6H PRN Guille Del Rio MD   650 mg at 12/13/20 2002    Or    acetaminophen (TYLENOL) suppository 650 mg  650 mg Rectal Q6H PRN Guille Del Rio MD        enoxaparin (LOVENOX) injection 40 mg  40 mg Subcutaneous Daily Guille Del Rio MD   40 mg at 12/15/20 0753    hydrALAZINE (APRESOLINE) injection 5 mg  5 mg Intravenous Q4H PRN Guille Del Rio MD        glucose (GLUTOSE) 40 % oral gel 15 g  15 g Oral PRN Guille Del Rio MD        dextrose 50 % IV solution  12.5 g Intravenous PRN Guille Del Rio MD        glucagon (rDNA) injection 1 mg  1 mg Intramuscular PRN Guille Del Rio MD        dextrose 5 % solution  100 mL/hr Intravenous PRN Guille Del Rio MD         DVT Prophylaxis: Lovenox 40 mg sq daily    Continuous Infusions:   dextrose         Intake/Output Summary (Last 24 hours) at 12/15/2020 1330  Last data filed at 12/15/2020 1322  Gross per 24 hour   Intake 730 ml   Output 1250 ml   Net -520 ml     CBC:   Recent Labs     12/13/20  0130 12/14/20  0113   WBC 6.3 5.2   HGB 9.9* 10.7*    217     BMP:  Recent Labs     12/13/20  0130 12/13/20  1910 12/14/20  0113     --  134*   K 4.7 4.0 4.7     --  98   CO2 28  --  26   BUN 24*  --  26*   CREATININE 1.2*  --  1.1*   GLUCOSE 282*  --  340*     ABGs:   Lab Results   Component Value Date    PHART 7.450 12/13/2020    PO2ART 80.0 12/13/2020    PWM9SUN 45.0 12/13/2020     INR: No results for input(s): INR in the last 72 hours. Objective:   Vitals: /67   Pulse 73   Temp 97.1 °F (36.2 °C) (Temporal)   Resp 20   Ht 5' 1\" (1.549 m)   Wt 226 lb 7 oz (102.7 kg)   SpO2 93%   BMI 42.78 kg/m²   General appearance: alert, appears stated age and cooperative  Skin: Skin color, texture, turgor normal.   HEENT: Head: Normocephalic, no lesions, without obvious abnormality. Neck: no adenopathy, no carotid bruit, no JVD and supple, symmetrical, trachea midline  Lungs: clear to auscultation bilaterally  Heart: regular rate and rhythm, S1, S2 normal, no murmur, click, rub or gallop  Abdomen: soft, non-tender; bowel sounds normal; no masses,  no organomegaly  Extremities: extremities normal, atraumatic, no cyanosis or edema  Lymphatic: No significant lymph node enlargement papable  Neurologic: Mental status: Alert, oriented, thought content appropriate        Assessment & Plan:    · Dyspnea on exertion - improved after steroids- pulmonary on board - ct chest pending   · pericardial effusion - stable - per cardiology no intervention   · Asthma AE- steroids - better- pt complained about inhalors making her jittery (stopped), steroids changed to po   · RA- steroids   · ANTONIO on ckd- mild   · Anemia  · DM2-uncontrolled due to steroid, increase ISS, increase lantus   · MARTHA  · HTN- home med   · HL- statin  :    Disposition: home when cleared by pulmonary    Jelena Mendez     Rounding level 2

## 2020-12-15 NOTE — PROGRESS NOTES
Patient called out for help. Upon entering room patient c/o being short of breath, and shaking. Stated she just received a breathing treatment and didn't know what was in it. Stating she \" thought that was the reason\". Katy Downy, charge nurse to bedside and got a set of VS. VSS. Placed pt on 2L NC for comfort. Patient began to calm down. Explained to patient that the shakiness could have been a side effect of the breathing treatment. Patient was upset at the time. Notified Dr. Monica Unger of this incident. See MAR for new orders. Stayed with patient until she was calm and felt better.  Electronically signed by Florence Morton RN on 12/15/2020 at 5:38 PM

## 2020-12-15 NOTE — PROGRESS NOTES
Cardiology Progress Note Gary Sequeira MD      Patient:  Margi Gómez  [id number]    Patient Active Problem List    Diagnosis Date Noted    Chest discomfort 07/14/2020     Priority: High    CHF (congestive heart failure), NYHA class I, acute on chronic, combined ([facility] 12/13/2020     Priority: Low    Mixed hyperlipidemia 08/13/2020     Priority: Low    Coronary artery disease involving native coronary artery of native heart without angina pectoris 08/13/2020     Priority: Low    Pulmonary edema with congestive heart failure ([facility] 07/02/2020     Priority: Low    Palliative care patient 07/02/2020     Priority: Low    Obstructive sleep apnea syndrome 06/05/2020     Priority: Low    Essential hypertension 06/05/2020     Priority: Low    Moderate persistent asthma without complication 12/97/7704     Priority: Low    Pulmonary hypertension ([facility] 03/09/2020     Priority: Low    Nonobstructive atherosclerosis of coronary artery 03/09/2020     Priority: Low    CKD (chronic kidney disease) stage 3, GFR 30-59 ml/min 03/09/2020     Priority: Low    Morbid obesity with BMI of 40.0-44.9, adult ([facility] 01/27/2020     Priority: Low    SOB (shortness of breath) 01/26/2020     Priority: Low     Overview Note:     Stents in the proximal circ and OM1  11/21/2019  Cath  LVEDP 25, PA 60/30, normal LVFX, mild CAD ([place], [address])  1/26/2020  Echo mild to moderate pericardial eff, normal LVF  3/7/2020  Echo large pericardial effusion  5/29/2020  Echo  Severe LVH, DD, normal LVFX, small pericardial effusion  7/15/20  lexiscan Positive for inferior lateral myocardial ischemia, EF 33%, -3% ischemic myocardium on stress, intermediate risk findings, AUC indication 16, AUC score 7, Tiffany Peguero MD)  7/15/20  Echo  Normal LVFX, moderate pericardial effusion  7/16/20  Cath  Mild CAD, normal LVFX        Pericardial effusion 01/26/2020     Priority: Low  Chronic diastolic congestive heart failure ([facility] 01/26/2020     Priority: Low    Normocytic anemia 01/26/2020     Priority: Low    Type 2 diabetes mellitus, with long-term current use of insulin ([facility] 01/26/2020     Priority: Low    COPD (chronic obstructive pulmonary disease) ([facility] 01/26/2020     Priority: Low    Hyperglycemia 01/26/2020     Priority: Low    Acute kidney injury ([facility] 01/26/2020     Priority: Low    Elevated d-dimer 01/26/2020     Priority: Low       Admit Date:  12/12/2020    Admission Problem List: Present on Admission:   CHF (congestive heart failure), NYHA class I, acute on chronic, combined [facility])      Cardiac Specific Data:  Specialty Problems        Cardiology Problems    Chronic diastolic congestive heart failure (HCC)        Pericardial effusion        Nonobstructive atherosclerosis of coronary artery        Pulmonary hypertension (HCC)        Essential hypertension        Pulmonary edema with congestive heart failure (HCC)        Coronary artery disease involving native coronary artery of native heart without angina pectoris        Mixed hyperlipidemia        CHF (congestive heart failure), NYHA class I, acute on chronic, combined ([facility]            1. Coronary artery disease, prior PCI 7/2018 to proximal to mid circumflex/[address]) with patent stents by catheterization 7/16/2020.  2.  Hypertension with moderate to severe concentric LVH with apical component, moderate left atrial enlargement and probable significant diastolic dysfunction. 3.  Persistent predominantly localized posterior pericardial effusion without change on serial echocardiograms. 4.  Diabetes mellitus. 5.  Rheumatoid arthritis with strongly positive rheumatoid titers. 6.  Morbid obesity. Subjective:  Ms. Martin Fontanez reports feeling anxious and jittery after receiving steroids. Worried about her blood sugars. Shortness of breath is better. No leg swelling currently.     Objective: BP (!) 141/72   Pulse 74   Temp 97.3 °F (36.3 °C) (Temporal)   Resp 16   Ht 5' 1\" (1.549 m)   Wt 226 lb 7 oz (102.7 kg)   SpO2 99%   BMI 42.78 kg/m²       Intake/Output Summary (Last 24 hours) at 12/15/2020 1655  Last data filed at 12/15/2020 1333  Gross per 24 hour   Intake 970 ml   Output 1250 ml   Net -280 ml       Prior to Admission medications    Medication Sig Start Date End Date Taking? Authorizing Provider   bumetanide (BUMEX) 1 MG tablet Take 1 mg by mouth 2 times daily   Yes Historical Provider, MD   fluticasone-vilanterol (BREO ELLIPTA) 100-25 MCG/INH AEPB inhaler Inhale 1 puff into the lungs daily 11/22/20  Yes YAMIL Diego NP   amLODIPine (NORVASC) 5 MG tablet Take 1 tablet by mouth daily 11/22/20  Yes YAMIL Diego NP   carvedilol (COREG) 3.125 MG tablet Take 1 tablet by mouth 2 times daily 11/22/20  Yes YAMIL Diego NP   Semaglutide, 1 MG/DOSE, 2 MG/1.5ML SOPN Inject 1 mg into the skin every 7 days 11/22/20  Yes YAMIL Diego NP   pramipexole (MIRAPEX) 0.5 MG tablet Take 1 tablet by mouth 2 times daily 11/22/20  Yes YAMIL Diego - NP   tiZANidine (ZANAFLEX) 2 MG tablet Take 1 tablet by mouth 2 times daily as needed (spasms) 11/22/20  Yes YAMIL Diego NP   gabapentin (NEURONTIN) 600 MG tablet Take 1 tablet by mouth 3 times daily for 30 days.  11/20/20 12/20/20 Yes Edison Langley MD   tiotropium (SPIRIVA RESPIMAT) 1.25 MCG/ACT AERS inhaler Inhale 2 puffs into the lungs daily 10/19/20  Yes YAMIL Diego NP   levalbuterol [facility]) 45 MCG/ACT inhaler Inhale 2 puffs into the lungs every 4 hours as needed for Wheezing or Shortness of Breath 10/19/20  Yes Daisha Hector APRN - NP   atorvastatin (LIPITOR) 20 MG tablet Take 20 mg by mouth daily   Yes Historical Provider, MD DULoxetine (CYMBALTA) 30 MG extended release capsule Take 30 mg by mouth daily ALONG WITH A 60 MG TABLET (BOTH TOGETHER + 90 MG)   Yes Historical Provider, MD   DULoxetine (CYMBALTA) 60 MG extended release capsule Take 60 mg by mouth daily WITH A 30 MG TABLET. TOTAL OF 90 MG)   Yes Historical Provider, MD   valsartan (DIOVAN) 160 MG tablet Take 160 mg by mouth daily   Yes Historical Provider, MD   clopidogrel (PLAVIX) 75 MG tablet Take 75 mg by mouth daily   Yes Historical Provider, MD   montelukast (SINGULAIR) 10 MG tablet Take 10 mg by mouth nightly   Yes Historical Provider, MD   vitamin D (CHOLECALCIFEROL) 25 MCG (1000 UT) TABS tablet Take 1,000 Units by mouth daily   Yes Historical Provider, MD   melatonin 3 MG TABS tablet Take 10 mg by mouth nightly   Yes Historical Provider, MD   Insulin Degludec (TRESIBA FLEXTOUCH) 100 UNIT/ML SOPN Inject 40 Units into the skin nightly 11/22/20   YAMIL Gardner NP   levocetirizine [facility]) 5 MG tablet Take 1 tablet by mouth nightly 11/22/20   YAMIL Gardner NP   blood glucose monitor strips Test 3 times a day & as needed for symptoms of irregular blood glucose. Dispense sufficient amount for indicated testing frequency plus additional to accommodate PRN testing needs.  11/22/20   YAMIL Gardner NP   levalbuterol [employer]) 0.63 MG/3ML nebulization Take 3 mLs by nebulization every 6 hours as needed for Wheezing 10/19/20 12/9/20  YAMIL Gardner NP   tiZANidine (ZANAFLEX) 2 MG tablet Take 1 tablet by mouth 2 times daily as needed (spasms) 10/19/20   YAMIL Gardner NP   Blood Glucose Monitoring Suppl (BLOOD GLUCOSE MONITOR SYSTEM) w/Device KIT 1 Device by Does not apply route three times daily 7/11/20   YAMIL Gardner NP   Lancets MISC 1 each by Does not apply route 3 times daily 7/11/20   YAMIL Gardner NP        pantoprazole  40 mg Oral BID AC    insulin glargine  40 Units Subcutaneous BID  insulin lispro  0-12 Units Subcutaneous TID     insulin lispro  0-6 Units Subcutaneous Nightly    bumetanide  2 mg Oral BID    [START ON 12/16/2020] predniSONE  20 mg Oral BID    atorvastatin  40 mg Oral Daily    carvedilol  6.25 mg Oral BID    clopidogrel  75 mg Oral Daily    DULoxetine  30 mg Oral Daily    gabapentin  600 mg Oral TID    melatonin  10 mg Oral Nightly    montelukast  10 mg Oral Nightly    pramipexole  0.5 mg Oral BID    valsartan  160 mg Oral Daily    Vitamin D  1,000 Units Oral Daily    sodium chloride flush  10 mL Intravenous 2 times per day    enoxaparin  40 mg Subcutaneous Daily       TELEMETRY: Sinus     Physical Exam:      Physical Exam  Constitutional:       General: She is not in acute distress. Appearance: She is obese. She is not diaphoretic. Comments: Morbid obesity   HENT:      Mouth/Throat:      Pharynx: No oropharyngeal exudate. Eyes:      General: No scleral icterus. Right eye: No discharge. Left eye: No discharge. Neck:      Thyroid: No thyromegaly. Vascular: No JVD. Cardiovascular:      Rate and Rhythm: Normal rate and regular rhythm. No extrasystoles are present. Heart sounds: Normal heart sounds, S1 normal and S2 normal. No murmur. No systolic murmur. No diastolic murmur. No friction rub. No gallop. No S3 or S4 sounds. Comments: No JVD  Trace edema  Heart sounds are well heard  Pulmonary:      Effort: Pulmonary effort is normal. No respiratory distress. Breath sounds: Normal breath sounds. No wheezing or rales. Comments: No wheezing or crepitations are noted  Chest:      Chest wall: No tenderness. Abdominal:      General: Bowel sounds are normal. There is no distension. Palpations: Abdomen is soft. There is no mass. Tenderness: There is no abdominal tenderness. There is no guarding or rebound. Hernia: No hernia is present.       Comments: Soft, nontender  No palpable organomegaly Musculoskeletal: Normal range of motion. Skin:     General: Skin is warm. Coloration: Skin is not pale. Findings: No rash. Neurological:      Mental Status: She is alert and oriented to person, place, and time. Cranial Nerves: No cranial nerve deficit. Deep Tendon Reflexes: Reflexes normal.                 Lab Data:  CBC:   Recent Labs     12/13/20 0130 12/14/20  0113   WBC 6.3 5.2   HGB 9.9* 10.7*   HCT 30.6* 32.9*   MCV 91.1 90.4    217     BMP:   Recent Labs     12/13/20 0130 12/13/20  1910 12/14/20  0113     --  134*   K 4.7 4.0 4.7     --  98   CO2 28  --  26   BUN 24*  --  26*   CREATININE 1.2*  --  1.1*     LIVER PROFILE:   Recent Labs     12/13/20 0130 12/14/20 0113   AST 18 11   ALT 11 10   BILITOT 0.3 0.3   ALKPHOS 96 91     PT/INR: No results for input(s): PROTIME, INR in the last 72 hours. APTT: No results for input(s): APTT in the last 72 hours. BNP:  No results for input(s): BNP in the last 72 hours. CK, CKMB, Troponin: @LABRCNT (CKTOTAL:3, CKMB:3, TROPONINI:3)@    IMAGING:  Xr Chest Portable    Result Date: 12/13/2020  XR CHEST PORTABLE 12/13/2020 12:48 AM HISTORY:   Shortness of breath  Single view. Portable upright COMPARISONS:  12/9/2020, 11/2/2020 and 7/14/2020 FINDINGS: Examination was sent to [facility] for preliminary interpretation. There is cardiomegaly. The interstitial markings are diffusely prominent. There is airspace opacities noted in the left lower lobe with may be related to the technique and patient body habitus, observe similarly on prior examinations. The bony structures are intact. Radiographically, chest is unchanged. Generous heart size with chronic lung changes most likely.  Signed by Dr Martha Muñoz on 12/13/2020 9:08 AM    Xr Chest Portable    Result Date: 12/9/2020 XR CHEST PORTABLE 12/9/2020 11:20 AM History: Short of breath. Portable chest x-ray compared with 2 November 2020. Magnified heart size. Moderate thoracic spurring. Diffuse interstitial prominence is again seen. There is opacification of the retrocardiac left lower lobe though this is not significantly changed compared with 2 months ago. 1. Interstitial prominence with chronic left lower lobe opacity. No significant change from 2 months ago.  Signed by Dr Isabella Verdugo on 12/9/2020 11:21 AM    Echo 2d Wo Color Doppler Complete    Result Date: 12/14/2020 Transthoracic Echocardiography Report (TTE)  Demographics   Patient Name  Ashwin Irby Date of Study         12/14/2020   MRN           [id number]          Gender                Female   Date of Birth 1960      Room Number           L-0718   Age           56 year(s)   Height:       61 inches       Referring Physician   Preet Barrera   Weight:       226 pounds      Sonographer           SARA Mcrae   BSA:          1.99 m^2        Interpreting          Brooklyn Cedeno MD                                Physician   BMI:          42.7 kg/m^2  Procedure Type of Study   TTE procedure  Study Location: Echo Lab Technical Quality: Adequate visualization Patient Status: Inpatient BP: 133/64 mmHg Indications:Dyspnea/SOB. Conclusions   Summary  Limited echocardiographic study for pericardial effusion. LV is normal in size with normal systolic function. LV ejection fraction  estimated at 60%. Moderate concentric LVH. RV is normal in size with normal systolic function. Moderate left atrial enlargement. Right atrium is normal in size. There is a localized pericardial effusion there is predominantly posterior  behind the left ventricle (measuring 2.37 cm) and lateral with minimal  effusion anteriorly. No significant effusion overlying right ventricleor  in apical location. No obvious chamber evidence of collapse to suggest  significant tamponade features from this effusion. Mitral and tricuspid  inflow were not studied. Comparison with echocardiogram from 9/30/2020 shows no significant change  in pericardial effusion. Signature   ----------------------------------------------------------------  Electronically signed by Brooklyn Cedeno MD(Interpreting physician)  on 12/14/2020 06:29 PM  ----------------------------------------------------------------  Allergies   - Other allergy:Reaction - Other;Severity - Unknown;Sensitivity -     Allergy(albuterol,Levoquin,metformin,asprin). - Metformin.   - Levaquin.    - Other allergy.   - Aspirin. M-Mode Measurements (cm)   LVIDd: 4.6 cm                                   LVIDs: 3.14 cm  IVSd: 1.45 cm  LVPWd: 1.48 cm  % Ejection Fraction: 60 %          Assessment and Plan: This is a 56y.o. year old female with past medical history of coronary artery disease with prior PCI to proximal to mid circumflex/OM1 7/2018 (patent by catheterization 7/16/2020) moderate to severe LVH with preserved LV systolic function with probable diastolic heart failure with predominantly stable BNP, moderate predominantly localized posterior pericardial effusion that has been stable with no evidence of tamponade, severe obesity, diabetes mellitus with strongly positive rheumatoid titers. 1.  Can transition IV Bumex to oral Bumex 2 mg twice daily. Will add colchicine 0.6 mg twice daily. Steroid therapy as per hospitalist service. Blood pressures appear fairly well controlled. Continue valsartan and Coreg. Can follow-up with cardiology as an outpatient.       Praful Nails MD 12/15/2020 4:55 PM

## 2020-12-15 NOTE — CONSULTS
Pulmonary and Critical Care Consult Note  Ciara Hopper    MR# [id number]    Acct# [de-identified]  12/15/2020   11:28 AM CST    Referring Ellen Morel, *     Chief Complaint: Shortness of breath upon exertion    HPI: We have been consulted to see this 56y.o. year old female born on 1960. Patient complains of moderate shortness of breath in the mid chest for several months, aggravated by exertion and alleviated by rest and medication(s) (xopenex), and associated with Fatigue, joint pain and incontinence of urine and stool. We have been asked to see her for her shortness of breath. She is known to  [facility] and Wally Roper at our office for management of asthma. She was on full dose Spiriva and full dose Symbicort for management of the symptoms. She is intolerant to albuterol and takes Xopenex HFA and breathing treatments when needed. She does not use them excessively. She has a positive allergy history and elevated IgE previously requiring Xolair infusions. These were provided to [place] specialist [place]. She has not resumed those since residing in [place]. She had an office visit with [facility] in October which identified continued elevated IgE and allergy issues. She was referred to a local allergy specialist, Dr. Latonia Matthew. She has seen him one time she indicates. Labs confirmed previously known history. She reports she obtained additional labs and was considering starting a new biologic however she has not seen him yet. He did change her Symbicort to full dose of Breo. History is complicated by morbid obesity, sleep apnea, heart disease and rheumatoid arthritis. She has confirmed sleep apnea with noncompliance. She does not use any CPAP at home. Layla Burgos recommended she have an updated CPAP titration study. This was scheduled for Thursday however it was canceled due to her illness and rescheduled however openings were not available until February. She has been seen by her heart specialist here. They have made adjustments to her diuretic therapy and Coreg. She has a persistent but stable pericardial effusion which is not amendable to percutaneous drainage and not large enough for surgical drainage. There is some question whether this may be related to her underlying rheumatoid condition. She is not followed by a specialist.  She is on no treatment for her rheumatoid. Her sed rate is elevated here.   methylPREDNISolone, 40 mg, Intravenous, Q12H    atorvastatin, 40 mg, Oral, Daily    carvedilol, 6.25 mg, Oral, BID    bumetanide, 1 mg, Intravenous, BID    clopidogrel, 75 mg, Oral, Daily    DULoxetine, 30 mg, Oral, Daily    gabapentin, 600 mg, Oral, TID    melatonin, 10 mg, Oral, Nightly    montelukast, 10 mg, Oral, Nightly    pramipexole, 0.5 mg, Oral, BID    valsartan, 160 mg, Oral, Daily    Vitamin D, 1,000 Units, Oral, Daily    sodium chloride flush, 10 mL, Intravenous, 2 times per day    enoxaparin, 40 mg, Subcutaneous, Daily    insulin lispro, 0-6 Units, Subcutaneous, TID WC    insulin lispro, 0-3 Units, Subcutaneous, Nightly   Social History   reports that she quit smoking about 15 years ago. She has a 30.00 pack-year smoking history. She has never used smokeless tobacco. She reports that she does not drink alcohol or use drugs. Family History  family history is not on file. Review of Systems:  Review of Systems   Constitutional: Positive for activity change and fatigue. Negative for chills and fever. HENT: Positive for congestion. Negative for postnasal drip, rhinorrhea, sinus pressure, sinus pain and trouble swallowing. Eyes: Negative for pain, discharge and itching. Respiratory: Positive for cough, chest tightness and shortness of breath. Negative for apnea and wheezing. Cardiovascular: Positive for chest pain and leg swelling. Negative for palpitations. Gastrointestinal: Negative for abdominal distention, constipation, diarrhea, nausea and vomiting. Endocrine: Negative for polydipsia, polyphagia and polyuria. Genitourinary: Negative for dysuria, frequency, hematuria and urgency. Loss of bowel and bladder control intermittently   Musculoskeletal: Positive for arthralgias, back pain, joint swelling and myalgias. Skin: Negative for color change, pallor and rash. General: Skin is warm and dry. Coloration: Skin is not jaundiced or pale. Neurological:      General: No focal deficit present. Mental Status: She is alert and oriented to person, place, and time. Psychiatric:         Mood and Affect: Mood normal.         Behavior: Behavior normal.         Thought Content: Thought content normal.       Recent Labs     12/13/20 0130 12/14/20 0113   WBC 6.3 5.2   RBC 3.36* 3.64*   HGB 9.9* 10.7*   HCT 30.6* 32.9*    217   MCV 91.1 90.4   MCH 29.5 29.4   MCHC 32.4* 32.5*   RDW 13.5 13.2      Recent Labs     12/13/20 0130 12/13/20 1910 12/14/20 0113     --  134*   K 4.7 4.0 4.7     --  98   CO2 28  --  26   BUN 24*  --  26*   CREATININE 1.2*  --  1.1*   CALCIUM 8.7*  --  8.8   GLUCOSE 282*  --  340*      Recent Labs     12/13/20 1910   PHART 7.450   IKA8FXZ 45.0   PO2ART 80.0   OOI8IDU 31.3*   B8ZXCEJD 95.2   BEART 6.5*     Recent Labs     12/14/20 0113 12/14/20 0113 12/14/20  1238   AST 11  --   --    ALT 10  --   --    ALKPHOS 91  --   --    BILITOT 0.3  --   --    MG 2.2  --   --    CALCIUM 8.8  --   --    TROPONINI  --    < > 0.01   TSH 0.973  --   --     < > = values in this interval not displayed. No results for input(s): BC, LABGRAM, CULTRESP, BFCX in the last 72 hours. Radiograph: Xr Chest Portable    Result Date: 12/13/2020  XR CHEST PORTABLE 12/13/2020 12:48 AM HISTORY:   Shortness of breath  Single view. Portable upright COMPARISONS:  12/9/2020, 11/2/2020 and 7/14/2020 FINDINGS: Examination was sent to [facility] for preliminary interpretation. There is cardiomegaly. The interstitial markings are diffusely prominent. There is airspace opacities noted in the left lower lobe with may be related to the technique and patient body habitus, observe similarly on prior examinations. The bony structures are intact. Radiographically, chest is unchanged. Generous heart size with chronic lung changes most likely. Signed by Dr Armando Arciniega on 12/13/2020 9:08 AM    Xr Chest Portable    Result Date: 12/9/2020  XR CHEST PORTABLE 12/9/2020 11:20 AM History: Short of breath. Portable chest x-ray compared with 2 November 2020. Magnified heart size. Moderate thoracic spurring. Diffuse interstitial prominence is again seen. There is opacification of the retrocardiac left lower lobe though this is not significantly changed compared with 2 months ago. 1. Interstitial prominence with chronic left lower lobe opacity. No significant change from 2 months ago.  Signed by Dr Latoya Reid on 12/9/2020 11:21 AM    Echo 2d Wo Color Doppler Complete    Result Date: 12/14/2020 Transthoracic Echocardiography Report (TTE)  Demographics   Patient Name  Bimal Montano Date of Study         12/14/2020   MRN           [id number]          Gender                Female   Date of Birth 1960      Room Number           L-0718   Age           56 year(s)   Height:       61 inches       Referring Physician   Deshawn Blanton   Weight:       226 pounds      Sonographer           SARA Hammonds   BSA:          1.99 m^2        Interpreting          Juan Cedeno MD                                Physician   BMI:          42.7 kg/m^2  Procedure Type of Study   TTE procedure  Study Location: Echo Lab Technical Quality: Adequate visualization Patient Status: Inpatient BP: 133/64 mmHg Indications:Dyspnea/SOB. Conclusions   Summary  Limited echocardiographic study for pericardial effusion. LV is normal in size with normal systolic function. LV ejection fraction  estimated at 60%. Moderate concentric LVH. RV is normal in size with normal systolic function. Moderate left atrial enlargement. Right atrium is normal in size. There is a localized pericardial effusion there is predominantly posterior  behind the left ventricle (measuring 2.37 cm) and lateral with minimal  effusion anteriorly. No significant effusion overlying right ventricleor  in apical location. No obvious chamber evidence of collapse to suggest  significant tamponade features from this effusion. Mitral and tricuspid  inflow were not studied. Comparison with echocardiogram from 9/30/2020 shows no significant change  in pericardial effusion. Signature   ----------------------------------------------------------------  Electronically signed by Juan Cedeno MD(Interpreting physician)  on 12/14/2020 06:29 PM  ----------------------------------------------------------------  Allergies   - Other allergy:Reaction - Other;Severity - Unknown;Sensitivity -     Allergy(albuterol,Levoquin,metformin,asprin). - Metformin.   - Levaquin.    - Other allergy.   - Aspirin. M-Mode Measurements (cm)   LVIDd: 4.6 cm                                   LVIDs: 3.14 cm  IVSd: 1.45 cm  LVPWd: 1.48 cm  % Ejection Fraction: 60 %      My radiograph interpretation/independent review of imaging: Cardiomegaly, chronic interstitial disease    Other test results (not lab or imaging):  Intact ejection fraction at 60, left atrial enlargement, localized stable pericardial effusion posteriorly, no tamponade, no mention of pulmonary hypertension    Independent review of ekg: Sinus rhythm, rate 78, ST and T wave changes    Problem list generated by [employer]:  Patient Active Problem List   Diagnosis    SOB (shortness of breath)    Pericardial effusion    Chronic diastolic congestive heart failure ([facility])    Normocytic anemia    Type 2 diabetes mellitus, with long-term current use of insulin ([facility])    COPD (chronic obstructive pulmonary disease) ([facility])    Hyperglycemia    Acute kidney injury ([facility] [address]    Elevated d-dimer    Morbid obesity with BMI of 40.0-44.9, adult ([facility])    Obstructive sleep apnea syndrome    Essential hypertension    Moderate persistent asthma without complication    Pulmonary hypertension ([facility])    Nonobstructive atherosclerosis of coronary artery    CKD (chronic kidney disease) stage 3, GFR 30-59 ml/min    Pulmonary edema with congestive heart failure ([facility])    Palliative care patient    Chest discomfort    Mixed hyperlipidemia    Coronary artery disease involving native coronary artery of native heart without angina pectoris    CHF (congestive heart failure), NYHA class I, acute on chronic, combined ([facility])     Pulmonary Assessment:  New problem (to me), with additional workup planned: Severe persistent asthma, with exacerbation    New problem (to me), no additional workup planned: Pericardial effusion, stable, cardiology following    Other problems either stable, failing to improve or worsening: 1. Elevated IgE, previously on Xolair, currently being worked up by allergy specialist locally for new biologic  2. Rheumatoid arthritis, currently following no one, active joint pain, elevated sed rate, needs rheumatology  3. Obstructive sleep apnea, noncompliant, awaiting titration  4. Chronic back pain with loss of bowel and bladder control, defer to attending  5. Type 2 diabetes mellitus with insulin, defer to attending  6. GERD    Recommend/plan:     · We will perform formal walking oximetry testing. Will need oxygen prescribed if she qualifies  · Overnight oximetry on room air for determination of need for nocturnal oxygen  · Sleep study rescheduled, patient encouraged to attend  · High-resolution CT imaging to determine if she has any interstitial disease related to her rheumatoid  · Patient is on very aggressive bronchodilators outpatient. Not receiving them here. Will order  · Continue Singulair  · IV steroids for asthma and rheumatoid  · Continue PPI for known reflux  · Patient encouraged to keep outpatient appointments with Dr. Maryjo Holley to better manage her allergy/asthma and elevated IgE issue  · Would benefit from rheumatology referral outpatient as well as management of her chronic back issues outpatient since she is having issues with bowel and bladder control, defer to attending    Thank you for this consult. We will follow  Electronically signed by Payal Alberts on 12/15/20 at 11:28 AM     Physician Substantive portion:  Patient is a morbidly obese middle-aged -American female who was seen as inpatient pulmonary consult today with Surya Tadeo. I concur with her clinical assessment documentation and I also examined the patient.

## 2020-12-15 NOTE — CONSULTS
[facility]  Cardiology Consult      Requesting MD:  Zina Schilder, *   Admit Status:  Inpatient [101]       History obtained from:   ? Patient  ?  Other (specify):     PROBLEM LIST:    Patient Active Problem List    Diagnosis Date Noted    Chest discomfort 07/14/2020     Priority: High    CHF (congestive heart failure), NYHA class I, acute on chronic, combined (Merrib[place] Graft) 12/13/2020     Priority: Low    Mixed hyperlipidemia 08/13/2020     Priority: Low    Coronary artery disease involving native coronary artery of native heart without angina pectoris 08/13/2020     Priority: Low    Pulmonary edema with congestive heart failure (Merribeth Graft) 07/02/2020     Priority: Low    Palliative care patient 07/02/2020     Priority: Low    Obstructive sleep apnea syndrome 06/05/2020     Priority: Low    Essential hypertension 06/05/2020     Priority: Low    Moderate persistent asthma without complication 14/30/3218     Priority: Low    Pulmonary hypertension (Merribeth Graft) 03/09/2020     Priority: Low    Nonobstructive atherosclerosis of coronary artery 03/09/2020     Priority: Low    CKD (chronic kidney disease) stage 3, GFR 30-59 ml/min 03/09/2020     Priority: Low    Morbid obesity with BMI of 40.0-44.9, adult (Merribeth Graft) 01/27/2020     Priority: Low    SOB (shortness of breath) 01/26/2020     Priority: Low     Overview Note:     Stents in the proximal circ and OM1  11/21/2019  Cath  LVEDP 25, PA 60/30, normal LVFX, mild CAD ([place], [address])  1/26/2020  Echo mild to moderate pericardial eff, normal LVF  3/7/2020  Echo large pericardial effusion  5/29/2020  Echo  Severe LVH, DD, normal LVFX, small pericardial effusion  7/15/20  lexiscan Positive for inferior lateral myocardial ischemia, EF 33%, -3% ischemic myocardium on stress, intermediate risk findings, AUC indication 16, AUC score 7, Jaime Aguilar MD)  7/15/20  Echo  Normal LVFX, moderate pericardial effusion Constitutional: Negative for activity change, fatigue and fever. HENT: Negative for ear pain, hearing loss and tinnitus. Eyes: Negative for discharge and visual disturbance. Respiratory: Positive for shortness of breath. Negative for cough and wheezing. Cardiovascular: Positive for leg swelling. Negative for chest pain and palpitations. Gastrointestinal: Negative for abdominal distention, blood in stool, constipation, diarrhea and vomiting. Endocrine: Negative for cold intolerance, heat intolerance, polydipsia and polyuria. Genitourinary: Negative for dysuria and hematuria. Musculoskeletal: Negative for arthralgias, back pain and myalgias. Skin: Negative for pallor and rash. Neurological: Negative for seizures, syncope, weakness and headaches. Psychiatric/Behavioral: Negative for behavioral problems and dysphoric mood.        Past Medical History:      Diagnosis Date    Allergies     AMI (acute myocardial infarction) ([facility] 09/2018    Asthma     CAD (coronary artery disease)     hx of stents    Cerebral artery occlusion with cerebral infarction ([facility] 2012    R sided numbness/weakness    CHF (congestive heart failure) ([facility]     COPD (chronic obstructive pulmonary disease) ([facility]     Depression     Diabetes mellitus ([facility]     DVT (deep vein thrombosis) in pregnancy     GERD (gastroesophageal reflux disease)     Hx of blood clots     rt leg    Hyperlipidemia     Hypertension     Palliative care patient 07/02/2020    Pneumonia        Past Surgical History:      Procedure Laterality Date    CATARACT REMOVAL Bilateral     COLONOSCOPY  approx 2013    CORONARY ANGIOPLASTY WITH STENT PLACEMENT  2018    in [facility] (3 stents)    TONSILLECTOMY         Allergies:  Albuterol, Levaquin [levofloxacin], Metformin and related, and Aspirin    Past Social History:  Social History     Socioeconomic History    Marital status:      Spouse name: Not on file Insulin Degludec (TRESIBA FLEXTOUCH) 100 UNIT/ML SOPN Inject 40 Units into the skin nightly 11/22/20   YAMIL Bell NP   levocetirizine Lanice Dumont) 5 MG tablet Take 1 tablet by mouth nightly 11/22/20   YAMIL Bell NP   blood glucose monitor strips Test 3 times a day & as needed for symptoms of irregular blood glucose. Dispense sufficient amount for indicated testing frequency plus additional to accommodate PRN testing needs.  11/22/20   YAMIL Bell NP   levalbuterol Pratt Brothconcepc[place]) 0.63 MG/3ML nebulization Take 3 mLs by nebulization every 6 hours as needed for Wheezing 10/19/20 12/9/20  YAMIL Bell NP   tiZANidine (ZANAFLEX) 2 MG tablet Take 1 tablet by mouth 2 times daily as needed (spasms) 10/19/20   YAMIL Bell NP   Blood Glucose Monitoring Suppl (BLOOD GLUCOSE MONITOR SYSTEM) w/Device KIT 1 Device by Does not apply route three times daily 7/11/20   YAMIL Bell NP   Lancets MISC 1 each by Does not apply route 3 times daily 7/11/20   YAMIL Bell NP       Current Meds:   insulin glargine  30 Units Subcutaneous BID    methylPREDNISolone  40 mg Intravenous Q12H    [START ON 12/15/2020] atorvastatin  40 mg Oral Daily    carvedilol  6.25 mg Oral BID    bumetanide  1 mg Intravenous BID    clopidogrel  75 mg Oral Daily    DULoxetine  30 mg Oral Daily    gabapentin  600 mg Oral TID    melatonin  10 mg Oral Nightly    montelukast  10 mg Oral Nightly    pramipexole  0.5 mg Oral BID    valsartan  160 mg Oral Daily    Vitamin D  1,000 Units Oral Daily    sodium chloride flush  10 mL Intravenous 2 times per day    enoxaparin  40 mg Subcutaneous Daily    insulin lispro  0-6 Units Subcutaneous TID WC    insulin lispro  0-3 Units Subcutaneous Nightly    pantoprazole  40 mg Intravenous BID    And    sodium chloride (PF)  10 mL Intravenous BID       Current Infused Meds:   dextrose         Physical Exam:  Vitals: 12/14/20 1903   BP: (!) 171/71   Pulse: 83   Resp: 18   Temp: 97 °F (36.1 °C)   SpO2: 93%       Intake/Output Summary (Last 24 hours) at 12/14/2020 2025  Last data filed at 12/14/2020 2019  Gross per 24 hour   Intake 480 ml   Output 2250 ml   Net -1770 ml     Estimated body mass index is 42.78 kg/m² as calculated from the following:    Height as of this encounter: 5' 1\" (1.549 m). Weight as of this encounter: 226 lb 7 oz (102.7 kg). Physical Exam  Constitutional:       General: She is not in acute distress. Appearance: She is not diaphoretic. Comments: Morbidly obese   HENT:      Mouth/Throat:      Pharynx: No oropharyngeal exudate. Eyes:      General: No scleral icterus. Right eye: No discharge. Left eye: No discharge. Neck:      Thyroid: No thyromegaly. Vascular: No JVD. Cardiovascular:      Rate and Rhythm: Normal rate and regular rhythm. No extrasystoles are present. Heart sounds: Normal heart sounds, S1 normal and S2 normal. No murmur. No systolic murmur. No diastolic murmur. No friction rub. No gallop. No S3 or S4 sounds. Comments: Mild jugular venous distention noted  Trace to 1+ pitting edema in both lower extremities  No systolic murmur  Heart sounds are well appreciated  No rub noted  Pulmonary:      Effort: Pulmonary effort is normal. No respiratory distress. Breath sounds: Normal breath sounds. No wheezing or rales. Comments: Good air entry bilaterally with no crepitations or wheezes  Chest:      Chest wall: No tenderness. Abdominal:      General: Bowel sounds are normal. There is no distension. Palpations: Abdomen is soft. There is no mass. Tenderness: There is no abdominal tenderness. There is no guarding or rebound. Hernia: No hernia is present. Comments: Abdomen is soft and nontender  No palpable organomegaly   Musculoskeletal: Normal range of motion. Skin:     General: Skin is warm. Coloration: Skin is not pale. Findings: No rash. Neurological:      Mental Status: She is alert and oriented to person, place, and time. Cranial Nerves: No cranial nerve deficit. Deep Tendon Reflexes: Reflexes normal.           Labs:  Recent Labs     12/13/20  0130 12/14/20  0113   WBC 6.3 5.2   HGB 9.9* 10.7*    217       Recent Labs     12/13/20  0130 12/13/20  1910 12/14/20  0113     --  134*   K 4.7 4.0 4.7     --  98   CO2 28  --  26   BUN 24*  --  26*   CREATININE 1.2*  --  1.1*   LABGLOM 45*  --  50*   MG  --   --  2.2   CALCIUM 8.7*  --  8.8       CK, CKMB, Troponin: @LABRCNT (CKTOTAL:3, CKMB:3, TROPONINI:3)@    Last 3 BNP:          IMAGING:  Xr Chest Portable    Result Date: 12/13/2020  XR CHEST PORTABLE 12/13/2020 12:48 AM HISTORY:   Shortness of breath  Single view. Portable upright COMPARISONS:  12/9/2020, 11/2/2020 and 7/14/2020 FINDINGS: Examination was sent to [facility]rad for preliminary interpretation. There is cardiomegaly. The interstitial markings are diffusely prominent. There is airspace opacities noted in the left lower lobe with may be related to the technique and patient body habitus, observe similarly on prior examinations. The bony structures are intact. Radiographically, chest is unchanged. Generous heart size with chronic lung changes most likely. Signed by Dr Hussain Doan on 12/13/2020 9:08 AM    Xr Chest Portable    Result Date: 12/9/2020  XR CHEST PORTABLE 12/9/2020 11:20 AM History: Short of breath. Portable chest x-ray compared with 2 November 2020. Magnified heart size. Moderate thoracic spurring. Diffuse interstitial prominence is again seen. There is opacification of the retrocardiac left lower lobe though this is not significantly changed compared with 2 months ago. 1. Interstitial prominence with chronic left lower lobe opacity. No significant change from 2 months ago.  Signed by Dr Tricia Marmolejo on 12/9/2020 11:21 AM Transthoracic Echocardiography Report (TTE)  Demographics   Patient Name  Jessee Rjoas Date of Study         12/14/2020   MRN           [id number]          Gender                Female   Date of Birth 1960      Room Number           [facility]   Age           56 year(s)   Height:       61 inches       Referring Physician   Trang Segovia   Weight:       226 pounds      Sonographer           Linda Du RDCS Santa   BSA:          1.99 m^2        Interpreting          Brice Cedeno MD                                Physician   BMI:          42.7 kg/m^2  Procedure Type of Study   TTE procedure  Study Location: Echo Lab Technical Quality: Adequate visualization Patient Status: Inpatient BP: 133/64 mmHg Indications:Dyspnea/SOB. Conclusions   Summary  Limited echocardiographic study for pericardial effusion. LV is normal in size with normal systolic function. LV ejection fraction  estimated at 60%. Moderate concentric LVH. RV is normal in size with normal systolic function. Moderate left atrial enlargement. Right atrium is normal in size. There is a localized pericardial effusion there is predominantly posterior  behind the left ventricle (measuring 2.37 cm) and lateral with minimal  effusion anteriorly. No significant effusion overlying right ventricleor  in apical location. No obvious chamber evidence of collapse to suggest  significant tamponade features from this effusion. Mitral and tricuspid  inflow were not studied. Comparison with echocardiogram from 9/30/2020 shows no significant change  in pericardial effusion. Signature   ----------------------------------------------------------------  Electronically signed by Brice Cedeno MD(Interpreting physician)  on 12/14/2020 06:29 PM  ----------------------------------------------------------------  Allergies   - Other allergy:Reaction - Other;Severity - Unknown;Sensitivity -     Allergy(albuterol,Levoquin,metformin,asprin). - Metformin.   - Levaquin.    - Other allergy.   - Aspirin. M-Mode Measurements (cm)   LVIDd: 4.6 cm                                   LVIDs: 3.14 cm  IVSd: 1.45 cm  LVPWd: 1.48 cm  % Ejection Fraction: 60 %            Assessment and Plan: This is a 56y.o. year old female with past medical history of coronary artery disease with prior PCI to proximal to mid circumflex/OM1 7/2018 (patent by catheterization 7/16/2020) moderate to severe LVH with preserved LV systolic function with probable diastolic heart failure with predominantly stable BNP, moderate predominantly localized posterior pericardial effusion that has been stable with no evidence of tamponade, severe obesity, diabetes mellitus with strongly positive rheumatoid titers. 1.  Echocardiogram reviewed. Pericardial effusion is stable and localized posteriorly. This does not appear to be the etiology of her symptoms. This is also not easily amenable percutaneously due to location. No significant effusion anteriorly or overlying right ventricle. Possible etiology from rheumatoid arthritis needs to be considered. 2.  Significant LVH with probable diastolic dysfunction which is a strong component of her symptoms. No recent change in diet or medications however. BNP also not significantly different from prior values. Some leg swelling and improvement with diuresis. Would increase IV Bumex to 1 mg twice daily. Consideration for increasing her oral Bumex to 2 mg twice daily. Increase Coreg to 6.25 mg twice daily. If possible optimize beta-blocker therapy. Hypertensive management. 3.  Given strongly positive rheumatoid titers and some improvement with steroid therapy, would do full PFTs with diffusion capacities to rule out ILD. CT chest previously unremarkable. Rheumatology and pulmonary evaluation.     Electronically signed by Halle Niño MD on 12/14/2020 at 8:25 PM

## 2020-12-15 NOTE — PROGRESS NOTES
Pharmacy Intravenous to Oral Protocol    Medication changed per [facility] IV to PO protocol: Protonix    Patient meets the following inclusion criteria and none of the exclusion criteria:    Inclusion criteria:  - IV therapy > 24 hours (antibiotics only)  - Tolerating diet more advanced than clear liquids  - Tolerating PO medications  - No vasopressor blood pressure support (ie no signs of shock)  - Patient hasn't had a seizure for 72 hrs (antiepileptic medications only)    Exclusion criteria:  - Infections requiring IV therapy (ie meningitis, endocarditis, osteomyelitis, pancreatitis)   - Nausea and/or vomiting or severe diarrhea within past 24 hours   - Has gastrectomy, ileus, gastric outlet or bowel obstruction, or malabsorption syndromes   - Has significant painful oral ulceration   - TPN with an NPO order   - Active GI bleed   - Unable to swallow   - NPO   - Febrile in the last 24 hours (antibiotics only)   - Clinical deteriorating or unstable (antibiotics only)   - Pediatric patients and patients who are not euthyroid (not on oral levothyroxine/not stabilized on oral levothyroxine)- Levothyroxine only     Electronically signed by Augustina Brasher, [address] [place] [address] on 12/15/2020 at 3:38 AM

## 2020-12-15 NOTE — PROGRESS NOTES
11:35a - Home oxygen evaluation performed and results are as follows: SaO2 = 97% on R/A at rest, SaO2 = 98% on 2 lpm O2 (NC) at rest, SaO2 = 96% on R/A while ambulating, SaO2 = 97% on 2 lpm O2(NC) while ambulating. (Recovery SaO2).  TS RRT/RCP

## 2020-12-16 ENCOUNTER — OUTSIDE FACILITY SERVICE (OUTPATIENT)
Dept: PULMONOLOGY | Facility: CLINIC | Age: 60
End: 2020-12-16

## 2020-12-16 ENCOUNTER — TELEPHONE (OUTPATIENT)
Dept: ADMINISTRATIVE | Age: 60
End: 2020-12-16

## 2020-12-16 VITALS
DIASTOLIC BLOOD PRESSURE: 66 MMHG | RESPIRATION RATE: 17 BRPM | HEIGHT: 61 IN | TEMPERATURE: 96.4 F | OXYGEN SATURATION: 95 % | BODY MASS INDEX: 42.75 KG/M2 | SYSTOLIC BLOOD PRESSURE: 143 MMHG | HEART RATE: 68 BPM | WEIGHT: 226.44 LBS

## 2020-12-16 LAB
A FUMIGATUS1 AB SER QL ID: NEGATIVE
ALBUMIN SERPL-MCNC: 3.8 G/DL (ref 3.5–5.2)
ALP BLD-CCNC: 92 U/L (ref 35–104)
ALT SERPL-CCNC: 13 U/L (ref 5–33)
ANION GAP SERPL CALCULATED.3IONS-SCNC: 8 MMOL/L (ref 7–19)
AST SERPL-CCNC: 11 U/L (ref 5–32)
BILIRUB SERPL-MCNC: <0.2 MG/DL (ref 0.2–1.2)
BUN BLDV-MCNC: 55 MG/DL (ref 8–23)
CALCIUM SERPL-MCNC: 8.8 MG/DL (ref 8.8–10.2)
CHLORIDE BLD-SCNC: 97 MMOL/L (ref 98–111)
CO2: 32 MMOL/L (ref 22–29)
CREAT SERPL-MCNC: 1.6 MG/DL (ref 0.5–0.9)
GFR AFRICAN AMERICAN: 39
GFR NON-AFRICAN AMERICAN: 33
GLUCOSE BLD-MCNC: 191 MG/DL (ref 74–109)
GLUCOSE BLD-MCNC: 221 MG/DL (ref 70–99)
GLUCOSE BLD-MCNC: 251 MG/DL (ref 70–99)
PERFORMED ON: ABNORMAL
PERFORMED ON: ABNORMAL
POTASSIUM REFLEX MAGNESIUM: 4.5 MMOL/L (ref 3.5–5)
SODIUM BLD-SCNC: 137 MMOL/L (ref 136–145)
TOTAL PROTEIN: 6.9 G/DL (ref 6.6–8.7)

## 2020-12-16 PROCEDURE — 6360000002 HC RX W HCPCS: Performed by: HOSPITALIST

## 2020-12-16 PROCEDURE — 94762 N-INVAS EAR/PLS OXIMTRY CONT: CPT

## 2020-12-16 PROCEDURE — 99231 SBSQ HOSP IP/OBS SF/LOW 25: CPT | Performed by: NURSE PRACTITIONER

## 2020-12-16 PROCEDURE — 80053 COMPREHEN METABOLIC PANEL: CPT

## 2020-12-16 PROCEDURE — 36415 COLL VENOUS BLD VENIPUNCTURE: CPT

## 2020-12-16 PROCEDURE — 2580000003 HC RX 258: Performed by: HOSPITALIST

## 2020-12-16 PROCEDURE — 82947 ASSAY GLUCOSE BLOOD QUANT: CPT

## 2020-12-16 PROCEDURE — 2500000003 HC RX 250 WO HCPCS: Performed by: HOSPITALIST

## 2020-12-16 PROCEDURE — 6370000000 HC RX 637 (ALT 250 FOR IP): Performed by: HOSPITALIST

## 2020-12-16 PROCEDURE — 6370000000 HC RX 637 (ALT 250 FOR IP): Performed by: INTERNAL MEDICINE

## 2020-12-16 RX ORDER — ATORVASTATIN CALCIUM 20 MG/1
40 TABLET, FILM COATED ORAL DAILY
Qty: 30 TABLET | Refills: 3
Start: 2020-12-16

## 2020-12-16 RX ORDER — PANTOPRAZOLE SODIUM 20 MG/1
40 TABLET, DELAYED RELEASE ORAL 2 TIMES DAILY
Qty: 120 TABLET | Refills: 0 | Status: SHIPPED | OUTPATIENT
Start: 2020-12-16 | End: 2021-03-10

## 2020-12-16 RX ORDER — 0.9 % SODIUM CHLORIDE 0.9 %
250 INTRAVENOUS SOLUTION INTRAVENOUS ONCE
Status: COMPLETED | OUTPATIENT
Start: 2020-12-16 | End: 2020-12-16

## 2020-12-16 RX ORDER — COLCHICINE 0.6 MG/1
0.6 TABLET ORAL 2 TIMES DAILY
Qty: 10 TABLET | Refills: 0 | Status: SHIPPED | OUTPATIENT
Start: 2020-12-16 | End: 2021-01-02

## 2020-12-16 RX ORDER — METHYLPREDNISOLONE 4 MG/1
TABLET ORAL
Qty: 1 KIT | Refills: 0 | Status: SHIPPED | OUTPATIENT
Start: 2020-12-16 | End: 2020-12-22

## 2020-12-16 RX ORDER — PANTOPRAZOLE SODIUM 40 MG/10ML
40 INJECTION, POWDER, LYOPHILIZED, FOR SOLUTION INTRAVENOUS DAILY
Status: DISCONTINUED | OUTPATIENT
Start: 2020-12-16 | End: 2020-12-16

## 2020-12-16 RX ORDER — SODIUM CHLORIDE 9 MG/ML
10 INJECTION INTRAVENOUS DAILY
Status: DISCONTINUED | OUTPATIENT
Start: 2020-12-16 | End: 2020-12-16

## 2020-12-16 RX ORDER — CARVEDILOL 3.12 MG/1
6.25 TABLET ORAL 2 TIMES DAILY
Qty: 60 TABLET | Refills: 2
Start: 2020-12-16

## 2020-12-16 RX ADMIN — COLCHICINE 0.6 MG: 0.6 TABLET, FILM COATED ORAL at 10:10

## 2020-12-16 RX ADMIN — DULOXETINE 30 MG: 30 CAPSULE, DELAYED RELEASE ORAL at 10:11

## 2020-12-16 RX ADMIN — CLOPIDOGREL BISULFATE 75 MG: 75 TABLET ORAL at 10:11

## 2020-12-16 RX ADMIN — GABAPENTIN 600 MG: 600 TABLET, FILM COATED ORAL at 10:10

## 2020-12-16 RX ADMIN — ATORVASTATIN CALCIUM 40 MG: 40 TABLET, FILM COATED ORAL at 10:14

## 2020-12-16 RX ADMIN — VALSARTAN 160 MG: 160 TABLET, FILM COATED ORAL at 10:10

## 2020-12-16 RX ADMIN — CARVEDILOL 6.25 MG: 6.25 TABLET, FILM COATED ORAL at 10:11

## 2020-12-16 RX ADMIN — INSULIN LISPRO 4 UNITS: 100 INJECTION, SOLUTION INTRAVENOUS; SUBCUTANEOUS at 10:18

## 2020-12-16 RX ADMIN — SODIUM CHLORIDE, PRESERVATIVE FREE 10 ML: 5 INJECTION INTRAVENOUS at 10:11

## 2020-12-16 RX ADMIN — PRAMIPEXOLE DIHYDROCHLORIDE 0.5 MG: 0.25 TABLET ORAL at 10:10

## 2020-12-16 RX ADMIN — SODIUM CHLORIDE 250 ML: 9 INJECTION, SOLUTION INTRAVENOUS at 12:42

## 2020-12-16 RX ADMIN — INSULIN LISPRO 6 UNITS: 100 INJECTION, SOLUTION INTRAVENOUS; SUBCUTANEOUS at 12:42

## 2020-12-16 RX ADMIN — Medication 40 UNITS: at 10:14

## 2020-12-16 RX ADMIN — PREDNISONE 20 MG: 20 TABLET ORAL at 10:10

## 2020-12-16 RX ADMIN — CHOLECALCIFEROL (VITAMIN D3) 25 MCG (1,000 UNIT) TABLET 1000 UNITS: TABLET at 10:10

## 2020-12-16 RX ADMIN — ENOXAPARIN SODIUM 40 MG: 40 INJECTION SUBCUTANEOUS at 10:09

## 2020-12-16 RX ADMIN — FAMOTIDINE 20 MG: 10 INJECTION INTRAVENOUS at 10:09

## 2020-12-16 RX ADMIN — BUMETANIDE 2 MG: 1 TABLET ORAL at 10:10

## 2020-12-16 RX ADMIN — PANTOPRAZOLE SODIUM 40 MG: 40 TABLET, DELAYED RELEASE ORAL at 05:42

## 2020-12-16 ASSESSMENT — PAIN SCALES - GENERAL
PAINLEVEL_OUTOF10: 0
PAINLEVEL_OUTOF10: 2

## 2020-12-16 NOTE — PROGRESS NOTES
Pulmonary and Critical Care Progress Note [facility]    Patient: Jorge Real  1960   MR# [id number]   Acct# [de-identified]  12/16/20   8:16 AM  Referring Provider: Macie Szymanski, *    Chief Complaint: Shortness of breath    Interval history: Patient complains of mild shortness of breath in the mid chest for several months, aggravated by exertion and alleviated by rest, and associated with ear congestion and fatigue. She reports she is feeling much better this morning. She is breathing comfortably on room air with a saturation of 98. Rest and exercise oximetry did not show the need for portable oxygen. She underwent overnight oximetry testing however those results are not available. She reports it did wake her up several times and her saturation was below 85. She is very anxious to go home today. No new complaints. Medications:   famotidine (PEPCID) injection, 20 mg, Intravenous, BID    insulin glargine, 40 Units, Subcutaneous, BID    insulin lispro, 0-12 Units, Subcutaneous, TID WC    insulin lispro, 0-6 Units, Subcutaneous, Nightly    bumetanide, 2 mg, Oral, BID    predniSONE, 20 mg, Oral, BID    colchicine, 0.6 mg, Oral, BID    atorvastatin, 40 mg, Oral, Daily    carvedilol, 6.25 mg, Oral, BID    clopidogrel, 75 mg, Oral, Daily    DULoxetine, 30 mg, Oral, Daily    gabapentin, 600 mg, Oral, TID    melatonin, 10 mg, Oral, Nightly    montelukast, 10 mg, Oral, Nightly    pramipexole, 0.5 mg, Oral, BID    valsartan, 160 mg, Oral, Daily    Vitamin D, 1,000 Units, Oral, Daily    sodium chloride flush, 10 mL, Intravenous, 2 times per day    enoxaparin, 40 mg, Subcutaneous, Daily   Review of Systems: Review of Systems:    Constitutional: Positive for activity change and fatigue. Negative for chills and fever. HENT: Positive for congestion. Negative for postnasal drip, rhinorrhea, sinus pressure, sinus pain and trouble swallowing. Eyes: Negative for pain, discharge and itching. Respiratory: Positive for cough, chest tightness and shortness of breath. Negative for apnea and wheezing. Cardiovascular: Positive for chest pain and leg swelling. Negative for palpitations. Gastrointestinal: Negative for abdominal distention, constipation, diarrhea, nausea and vomiting. Endocrine: Negative for polydipsia, polyphagia and polyuria. Genitourinary: Negative for dysuria, frequency, hematuria and urgency. Loss of bowel and bladder control intermittently   Musculoskeletal: Positive for arthralgias, back pain, joint swelling and myalgias. Skin: Negative for color change, pallor and rash. Allergic/Immunologic: Negative for environmental allergies, food allergies and immunocompromised state. Neurological: Negative for dizziness, speech difficulty, weakness and light-headedness. Hematological: Negative for adenopathy. Does not bruise/bleed easily. Psychiatric/Behavioral: Positive for sleep disturbance. Negative for agitation and confusion. The patient is not hyperactive. Physical Exam:  Blood pressure (!) 143/66, pulse 68, temperature 96.4 °F (35.8 °C), temperature source Temporal, resp. rate 17, height 5' 1\" (1.549 m), weight 226 lb 7 oz (102.7 kg), SpO2 95 %. Intake/Output Summary (Last 24 hours) at 12/16/2020 0816  Last data filed at 12/16/2020 0300  Gross per 24 hour   Intake 1020 ml   Output 1900 ml   Net -880 ml     Physical Exam:    Vitals signs and nursing note reviewed. Exam conducted with a chaperone present. Constitutional:       Appearance: Normal appearance. She is obese. She is ill-appearing. HENT:      Head: Normocephalic and atraumatic. Right Ear: External ear normal.      Left Ear: External ear normal.      Nose: Nose normal.      Mouth/Throat:      Mouth: Mucous membranes are moist.      Pharynx: Oropharynx is clear. Eyes:      Extraocular Movements: Extraocular movements intact. Conjunctiva/sclera: Conjunctivae normal.      Pupils: Pupils are equal, round, and reactive to light. Neck:      Musculoskeletal: Normal range of motion and neck supple. No neck rigidity. Comments: thick  Cardiovascular:      Rate and Rhythm: Normal rate and regular rhythm. Heart sounds: No murmur. Pulmonary:      Effort: Pulmonary effort is normal.      Breath sounds: Normal breath sounds. Abdominal:      General: Abdomen is flat. Bowel sounds are normal.      Palpations: Abdomen is soft. Comments: obese   Musculoskeletal:      Right lower leg: Edema present. Left lower leg: Edema present. Skin:     General: Skin is warm and dry. Coloration: Skin is not jaundiced or pale. Neurological:      General: No focal deficit present. Mental Status: She is alert and oriented to person, place, and time. Psychiatric:         Mood and Affect: Mood normal.         Behavior: Behavior normal.         Thought Content:  Thought content normal.     Recent Labs     12/14/20 0113   WBC 5.2   RBC 3.64*   HGB 10.7*   HCT 32.9*      MCV 90.4   MCH 29.4   MCHC 32.5*   RDW 13.2      Recent Labs     12/13/20 1910 12/13/20 1910 12/14/20 0113 12/14/20 0113 12/14/20  1238   NA  --   --  134*  --   --    K 4.0  --  4.7  --   --    CL  --   --  98  --   --    CO2  --   --  26  --   --    BUN  --   --  26*  --   --    CREATININE  --   --  1.1*  --   --    CALCIUM  --   --  8.8  --   --    GLUCOSE  --   --  340*  --   --    PHART 7.450  --   --   --   --    DPQ7VWT 45.0  --   --   --   --    PO2ART 80.0  --   --   --   --    NTN8TAU 31.3*  --   --   --   --    H6TXMXEO 95.2  --   --   --   --    BEART 6.5*  --   --   --   --    AST  --   --  11  --   --    ALT  --   --  10  --   --    ALKPHOS  --   --  91  --   --    BILITOT  --   --  0.3  --   --    MG  --   --  2.2  --   --    TROPONINI  --    < >  --    < > 0.01   TSH  --   --  0.973  --   -- < > = values in this interval not displayed. No results for input(s): BC, LABGRAM, CULTRESP, BFCX in the last 72 hours. Radiograph: none today  My radiograph interpretation: none today, 12-16-20    Pulmonary Assessment:    1. Severe persistent asthma  2. Chronic pericardial effusion  3. Elevated IgE, previously on Xolair, currently being worked up by allergy specialist locally for new biologic  4. Rheumatoid arthritis, currently following no one, active joint pain, elevated sed rate, needs rheumatology  5. Obstructive sleep apnea, noncompliant, awaiting titration  6. Chronic back pain with loss of bowel and bladder control, defer to attending  7. Type 2 diabetes mellitus with insulin, defer to attending  8.  GERD, poorly controlled    Recommend:     · Optimize reflux therapy and refer to GI if it is not improving  · No portable O2 need, suspect she will need nocturnal oxygen however we are awaiting overnight oximetry results  · Patient's sleep study has already been rescheduled  · High-resolution imaging did not identify interstitial lung disease however it did identify reflux  · Continue aggressive outpatient therapy for asthma with our office as well as Dr. Luiz Brewster  · Transition to oral prednisone and taper  · Cardiology addressing underlying heart issues  · Okay to home from a pulmonary standpoint when okay with others    Electronically signed by Oneal Hood on 12/16/2020 at 8:16 AM

## 2020-12-16 NOTE — DISCHARGE SUMMARY
Physician Discharge Summary     Patient ID:  Sukhi Vidales  [id number]  6 y.o.  1960    Admit date: 12/12/2020    Discharge date and time: 12/16/2020    Admitting Physician: Sandip Toro MD     Discharge Physician: Sandip Toro MD    Discharge Diagnoses:     · Severe persistent asthma  · Chronic pericardial effusion  · RA- needs OP rheumatology referral  · MARTHA- non compliant  · Chronic back pain without neurological deficits    · ANTONIO on ckd- rcd dose of bolus prior to admission - follow with pcp tomorrow for bmp  · Anemia  · DM2   · HTN- home med   · HL  · GERD  :  Discharged Condition: good    Indication for Admission: dyspnea     Hospital Course:     Sukhi Vidales presented to [facility] with severe dyspnea worsening for one week associated with fatigue and chest pain. Pt was not able to walk more than 2 steps without getting short of air and feeling dizzy and weak. Pt was wheezing. She has asthma. Echo from 09/2020 shows moderate size circumferential pericardial effusion, LV systolic function appears normal with what appears to be moderate to severe concentric LVH. Pt was taking extra dose of lasix at home but dyspnea was getting worse. CXR shows generous heart size with chronic lung changes most likely. Labs show mild ANTONIO, hyperglycemia, anemia. Pt rcd bolus of fluid for ANTONIO and will follow up with pcp tomorrow for bmp check      Pt was evaluated by cardiology, she has PMH of CAD with prior PCI to proximal to mid circumflex/OM1 7/2018 (patent by catheterization 7/16/2020). New echogram was obtained and showed stable pericardial effusion localized posteriorly. This does not appear to be the etiology of her symptoms. This is also not easily amenable percutaneously due to location. No significant effusion anteriorly or overlying right ventricle. Possible etiology from rheumatoid arthritis needs to be considered. Patient was also evaluated by pulmonology. Today she is symptoms free and eager to go home. She is cleared by all for dc    Rheumatology was consulted, but not available           Consults: cardiology, pulmonary/intensive care and rheumatology     Significant Diagnostic Studies:     CT CHEST HIGH RESOLUTION [[id number]] Resulted: 12/15/20 1905      Order Status: Completed Updated: 12/15/20 1907     Narrative:       CT CHEST HIGH RESOLUTION 12/15/2020 4:22 PM   HISTORY: Shortness of breath, history of rheumatoid arthritis,   questionable interstitial lung disease   COMPARISON: CT scan dated 7/2/2020   DLP: 1043 mGy cm   TECHNIQUE: Serial helical tomographic images of the chest were   acquired. Bone and soft tissue algorithms were provided. Coronal   reformatted images were also provided for review. Automated exposure   control was also utilized to decrease patient radiation dose. FINDINGS:   Neck base: The imaged portion of the neck and thyroid gland is   unremarkable. Lungs: There are a few tiny benign-appearing nodules in the lung   apices, the largest measuring only 3 mm. These are stable. There are   no new or enlarging pulmonary nodules. No consolidation. No subpleural   reticulation, traction bronchiectasis, honeycombing or cyst formation   is evidence for underlying chronic interstitial lung disease. There is   no pleural effusion. Airways are clear. Heart: The heart is normal in size. Coronary artery atheromatous   calcification. There is a moderate to large pericardial effusion,   which appears to be slightly increased from the earlier comparison CT   scan. Vessels: Thoracic aorta is normal in caliber. Central pulmonary   arteries are normal in caliber. Mediastinum: No suspicious hilar or mediastinal adenopathy. Debris   identified diffusely throughout the esophagus, up to the level of the   upper third of the esophagus. The esophagus is nondilated. Skeletal and soft tissues: The osseous structures of the thorax and   surrounding soft tissues demonstrate no worrisome lesions or acute   process. Upper abdomen: The imaged portion of the upper abdomen demonstrates no   acute process. Splenic artery atheromatous calcification.     Impression:       1. I do not see evidence for underlying fibrotic interstitial lung   disease. 2. There is a moderate to large sized pericardial effusion. This   appears to be mildly increased from the earlier comparison CT from   July 2020, and I am suspicious for this being asymptomatic pericardial   effusion given its size. 3. Coronary artery atheromatous calcification. 4. Gastroesophageal reflux, with layering debris in the esophagus up   to the level of the proximal third of the esophagus. Signed by Dr Polo Harris on 12/15/2020 7:05 PM     XR CHEST PORTABLE [[id number]] Resulted: 12/13/20 0908     Order Status: Completed Updated: 12/13/20 0910     Narrative:       XR CHEST PORTABLE 12/13/2020 12:48 AM   HISTORY:   Shortness of breath     Single view. Portable upright   COMPARISONS:  12/9/2020, 11/2/2020 and 7/14/2020   FINDINGS:   Examination was sent to [facility] for preliminary interpretation. There is cardiomegaly. The interstitial markings are diffusely prominent. There is airspace   opacities noted in the left lower lobe with may be related to the   technique and patient body habitus, observe similarly on prior   examinations. The bony structures are intact. Radiographically, chest is unchanged.     Impression:       Generous heart size with chronic lung changes most likely.    Signed by Dr Valarie Moreno on 12/13/2020 9:08 AM             Discharge Exam:  BP (!) 143/66   Pulse 68   Temp 96.4 °F (35.8 °C) (Temporal)   Resp 17   Ht 5' 1\" (1.549 m)   Wt 226 lb 7 oz (102.7 kg)   SpO2 95%   BMI 42.78 kg/m²     General Appearance:    Alert, cooperative, no distress, appears stated age DULoxetine (CYMBALTA) 30 MG extended release capsule  Take 30 mg by mouth daily ALONG WITH A 60 MG TABLET (BOTH TOGETHER + 90 MG)             DULoxetine (CYMBALTA) 60 MG extended release capsule  Take 60 mg by mouth daily WITH A 30 MG TABLET. TOTAL OF 90 MG)             fluticasone-vilanterol (BREO ELLIPTA) 100-25 MCG/INH AEPB inhaler  Inhale 1 puff into the lungs daily             gabapentin (NEURONTIN) 600 MG tablet  Take 1 tablet by mouth 3 times daily for 30 days.              Insulin Degludec (TRESIBA FLEXTOUCH) 100 UNIT/ML SOPN  Inject 40 Units into the skin nightly             Lancets MISC  1 each by Does not apply route 3 times daily             levalbuterol (XOPENEX HFA) 45 MCG/ACT inhaler  Inhale 2 puffs into the lungs every 4 hours as needed for Wheezing or Shortness of Breath             levalbuterol (XOPENEX) 0.63 MG/3ML nebulization  Take 3 mLs by nebulization every 6 hours as needed for Wheezing             levocetirizine (XYZAL) 5 MG tablet  Take 1 tablet by mouth nightly             melatonin 3 MG TABS tablet  Take 10 mg by mouth nightly             methylPREDNISolone (MEDROL DOSEPACK) 4 MG tablet  Take by mouth.             montelukast (SINGULAIR) 10 MG tablet  Take 10 mg by mouth nightly             pantoprazole (PROTONIX) 20 MG tablet  Take 2 tablets by mouth 2 times daily             pramipexole (MIRAPEX) 0.5 MG tablet  Take 1 tablet by mouth 2 times daily             Semaglutide, 1 MG/DOSE, 2 MG/1.5ML SOPN  Inject 1 mg into the skin every 7 days             tiotropium (SPIRIVA RESPIMAT) 1.25 MCG/ACT AERS inhaler  Inhale 2 puffs into the lungs daily             tiZANidine (ZANAFLEX) 2 MG tablet  Take 1 tablet by mouth 2 times daily as needed (spasms)             tiZANidine (ZANAFLEX) 2 MG tablet  Take 1 tablet by mouth 2 times daily as needed (spasms)             valsartan (DIOVAN) 160 MG tablet  Take 160 mg by mouth daily             vitamin D (CHOLECALCIFEROL) 25 MCG (1000 UT) TABS tablet Take 1,000 Units by mouth daily                   Discharge Instructions: Follow up with YAMIL Hill NP in 1 day, with cardiology 1/15/2021, with pulmonologist in one week. Take medications as directed. Resume activity as tolerated. Follow up with your primary care physician tomorrow for lab recheck, and possible referral to rheumatologist and GI for EGD, and for referral to GYN regarding urinary incontinence       Diet: DIET CARDIAC; Carb Control: 4 carb choices (60 gms)/meal     Disposition: Patient is medically stable and will be discharged home .     Dc time above 30 min

## 2020-12-16 NOTE — PROGRESS NOTES
Pharmacy Renal Adjustment    Beba Monroy is a 56 y.o. female. Pharmacy has renally adjusted medications per protocol. Recent Labs     12/14/20  0113 12/16/20  0805   BUN 26* 55*       Recent Labs     12/14/20  0113 12/16/20  0805   CREATININE 1.1* 1.6*       Estimated Creatinine Clearance: 40 mL/min (A) (based on SCr of 1.6 mg/dL (H)). Height:   Ht Readings from Last 1 Encounters:   12/13/20 5' 1\" (1.549 m)     Weight:  Wt Readings from Last 1 Encounters:   12/13/20 226 lb 7 oz (102.7 kg)       CKD stage: Unspecified         Baseline SCr: 1.0 - 1.2    Plan: Adjust the following medications based on renal function:           Famotidine 20 mg IV twice daily adjusted to Famotidine 20 mg IV daily.       Electronically signed by Poonam Bull [facility] on 12/16/2020 at 10:46 AM

## 2020-12-17 ENCOUNTER — TELEPHONE (OUTPATIENT)
Dept: PRIMARY CARE CLINIC | Age: 60
End: 2020-12-17

## 2020-12-17 ENCOUNTER — HOSPITAL ENCOUNTER (OUTPATIENT)
Dept: SLEEP MEDICINE | Facility: HOSPITAL | Age: 60
End: 2020-12-17

## 2020-12-17 LAB
MYCOPLASMA PNEUMONIAE IGG: 0.03 U/L
MYCOPLASMA PNEUMONIAE IGM: 0.06 U/L

## 2020-12-17 NOTE — TELEPHONE ENCOUNTER
[facility] Trans[facility] Initial Follow Up Call    Outreach made within 2 business days of discharge: Yes    Patient: Arsalan Figueroa Patient : 1960   MRN: [id number]    · Reason for Admission: Severe persistent asthma  · Chronic pericardial effusion  · RA- needs OP rheumatology referral  · MARTHA- non compliant  · Chronic back pain without neurological deficits    ANTONIO on ckd- rcd dose of bolus prior to admission - follow with pcp tomorrow for bmp  Discharge Date: 20       Spoke with: second attempt, unable to reach patient    Discharge department/facility: L        Scheduled appointment with PCP within 7-14 days    Follow Up  Future Appointments   Date Time Provider Agnes Card   2021 10:30 AM [facility] ECHO ROOM 1 [facility] ECHO [facility]   3/5/2021  1:20 PM YAMIL Vega NP [address]

## 2020-12-17 NOTE — TELEPHONE ENCOUNTER
[facility] Transitions Initial Follow Up Call    Outreach made within 2 business days of discharge: Yes    Patient: Arsalan Figueroa Patient : 1960   MRN: [id number]    · Reason for Admission: Severe persistent asthma  · Chronic pericardial effusion  · RA- needs OP rheumatology referral  · MARTHA- non compliant  · Chronic back pain without neurological deficits    ANTONIO on ckd- rcd dose of bolus prior to admission - follow with pcp tomorrow for bmp  Discharge Date: 20       Spoke with: First attempt, unable to reach patient. Left a message on voicemail.               Scheduled appointment with PCP within 7-14 days    Follow Up  Future Appointments   Date Time Provider Agnes Card   2021 10:30 AM [facility] ECHO ROOM 1 [facility]   3/5/2021  1:20 PM YAMIL Vega NP [address]

## 2020-12-18 ENCOUNTER — TELEPHONE (OUTPATIENT)
Dept: INTERNAL MEDICINE | Age: 60
End: 2020-12-18

## 2020-12-18 NOTE — TELEPHONE ENCOUNTER
[facility] Transitions Initial Follow Up Call    Outreach made within 2 business days of discharge: Yes    Patient: Herbert Shaw   Patient : 1960     MRN: [id number]  Reason for Admission: Admitted 20 for dyspnea  Discharge Date: 20    Discharge Diagnoses:      · Severe persistent asthma  · Chronic pericardial effusion  · RA- needs OP rheumatology referral  · MARTHA- non compliant  · Chronic back pain without neurological deficits    · ANTONIO on ckd- rcd dose of bolus prior to admission - follow with pcp tomorrow for bmp  · Anemia  · DM2   · HTN- home med   · HL  GERD     Spoke with: 3rd attempt to reach patient, phone goes straight to voicemail. Message left with intent of my call and asking patient to call the office ASAP to schedule a hospital follow up visit.     Discharge department/facility: [facility]       Follow Up  Future Appointments   Date Time Provider Agnes Card   2021 10:30 AM [facility] ECHO ROOM 1 [facility] ECHO [facility]   3/5/2021  1:20 PM YAMIL Mesa NP [place] [address], [address] [place] [address]

## 2020-12-19 DIAGNOSIS — J45.50 SEVERE PERSISTENT ASTHMA WITHOUT COMPLICATION: Primary | ICD-10-CM

## 2020-12-21 RX ORDER — TIOTROPIUM BROMIDE INHALATION SPRAY 1.56 UG/1
2 SPRAY, METERED RESPIRATORY (INHALATION)
Qty: 1 INHALER | Refills: 5 | Status: SHIPPED | OUTPATIENT
Start: 2020-12-21

## 2020-12-21 RX ORDER — LEVALBUTEROL TARTRATE 45 UG/1
2 AEROSOL, METERED ORAL EVERY 4 HOURS PRN
Qty: 1 INHALER | Refills: 5 | Status: SHIPPED | OUTPATIENT
Start: 2020-12-21

## 2020-12-21 RX ORDER — OMEPRAZOLE 40 MG/1
40 CAPSULE, DELAYED RELEASE ORAL DAILY
Qty: 30 CAPSULE | Refills: 5 | Status: SHIPPED | OUTPATIENT
Start: 2020-12-21

## 2020-12-21 NOTE — TELEPHONE ENCOUNTER
Pharmacy sent a request for refills on Omeprazole, Spiriva and Levalbuterol. Patient was last seen on 10/14/20 and has a return appointment on 1/20/21.  Refill request sent to [facility].

## 2021-01-02 ENCOUNTER — APPOINTMENT (OUTPATIENT)
Dept: GENERAL RADIOLOGY | Age: 61
DRG: 291 | End: 2021-01-02
Payer: MEDICARE

## 2021-01-02 ENCOUNTER — HOSPITAL ENCOUNTER (EMERGENCY)
Age: 61
Discharge: HOME HEALTH | DRG: 291 | End: 2021-01-06
Attending: EMERGENCY MEDICINE | Admitting: INTERNAL MEDICINE
Payer: MEDICARE

## 2021-01-02 ENCOUNTER — APPOINTMENT (OUTPATIENT)
Dept: CT IMAGING | Age: 61
DRG: 291 | End: 2021-01-02
Payer: MEDICARE

## 2021-01-02 DIAGNOSIS — R77.8 ELEVATED TROPONIN: Primary | ICD-10-CM

## 2021-01-02 DIAGNOSIS — I50.9 ACUTE ON CHRONIC CONGESTIVE HEART FAILURE, UNSPECIFIED HEART FAILURE TYPE (HCC): ICD-10-CM

## 2021-01-02 DIAGNOSIS — R06.02 SHORTNESS OF BREATH: ICD-10-CM

## 2021-01-02 PROBLEM — R79.89 ELEVATED TROPONIN: Status: ACTIVE | Noted: 2021-01-02

## 2021-01-02 PROBLEM — R91.8 PULMONARY NODULES: Status: ACTIVE | Noted: 2021-01-02

## 2021-01-02 LAB
ALBUMIN SERPL-MCNC: 3.2 G/DL (ref 3.5–5.2)
ALP BLD-CCNC: 120 U/L (ref 35–104)
ALT SERPL-CCNC: 14 U/L (ref 5–33)
ANION GAP SERPL CALCULATED.3IONS-SCNC: 9 MMOL/L (ref 7–19)
APTT: 25.7 SEC (ref 26–36.2)
AST SERPL-CCNC: 15 U/L (ref 5–32)
BASE EXCESS ARTERIAL: 1.6 MMOL/L (ref -2–2)
BASOPHILS ABSOLUTE: 0 K/UL (ref 0–0.2)
BASOPHILS RELATIVE PERCENT: 0.3 % (ref 0–1)
BILIRUB SERPL-MCNC: <0.2 MG/DL (ref 0.2–1.2)
BUN BLDV-MCNC: 19 MG/DL (ref 8–23)
CALCIUM SERPL-MCNC: 8.9 MG/DL (ref 8.8–10.2)
CARBOXYHEMOGLOBIN ARTERIAL: 1.3 % (ref 0–5)
CHLORIDE BLD-SCNC: 101 MMOL/L (ref 98–111)
CO2: 24 MMOL/L (ref 22–29)
CREAT SERPL-MCNC: 1.1 MG/DL (ref 0.5–0.9)
EOSINOPHILS ABSOLUTE: 0.1 K/UL (ref 0–0.6)
EOSINOPHILS RELATIVE PERCENT: 0.8 % (ref 0–5)
GFR AFRICAN AMERICAN: >59
GFR NON-AFRICAN AMERICAN: 50
GLUCOSE BLD-MCNC: 124 MG/DL (ref 70–99)
GLUCOSE BLD-MCNC: 382 MG/DL (ref 70–99)
GLUCOSE BLD-MCNC: 494 MG/DL (ref 74–109)
HCO3 ARTERIAL: 26.6 MMOL/L (ref 22–26)
HCT VFR BLD CALC: 30.7 % (ref 37–47)
HEMOGLOBIN, ART, EXTENDED: 9.8 G/DL (ref 12–16)
HEMOGLOBIN: 9.9 G/DL (ref 12–16)
IMMATURE GRANULOCYTES #: 0 K/UL
LYMPHOCYTES ABSOLUTE: 1.5 K/UL (ref 1.1–4.5)
LYMPHOCYTES RELATIVE PERCENT: 19.3 % (ref 20–40)
MCH RBC QN AUTO: 29.4 PG (ref 27–31)
MCHC RBC AUTO-ENTMCNC: 32.2 G/DL (ref 33–37)
MCV RBC AUTO: 91.1 FL (ref 81–99)
METHEMOGLOBIN ARTERIAL: 0.6 %
MONOCYTES ABSOLUTE: 0.5 K/UL (ref 0–0.9)
MONOCYTES RELATIVE PERCENT: 6.9 % (ref 0–10)
NEUTROPHILS ABSOLUTE: 5.5 K/UL (ref 1.5–7.5)
NEUTROPHILS RELATIVE PERCENT: 72.4 % (ref 50–65)
O2 CONTENT ARTERIAL: 13.2 ML/DL
O2 SAT, ARTERIAL: 95.4 %
O2 THERAPY: ABNORMAL
PCO2 ARTERIAL: 43 MMHG (ref 35–45)
PDW BLD-RTO: 12.6 % (ref 11.5–14.5)
PERFORMED ON: ABNORMAL
PERFORMED ON: ABNORMAL
PH ARTERIAL: 7.4 (ref 7.35–7.45)
PLATELET # BLD: 175 K/UL (ref 130–400)
PMV BLD AUTO: 9.6 FL (ref 9.4–12.3)
PO2 ARTERIAL: 79 MMHG (ref 80–100)
POTASSIUM SERPL-SCNC: 4 MMOL/L (ref 3.5–5)
POTASSIUM, WHOLE BLOOD: 3.8
PRO-BNP: 4331 PG/ML (ref 0–900)
RBC # BLD: 3.37 M/UL (ref 4.2–5.4)
SARS-COV-2, NAAT: NOT DETECTED
SODIUM BLD-SCNC: 134 MMOL/L (ref 136–145)
TOTAL PROTEIN: 6 G/DL (ref 6.6–8.7)
TROPONIN: 0.05 NG/ML (ref 0–0.03)
TROPONIN: 0.05 NG/ML (ref 0–0.03)
TROPONIN: 0.1 NG/ML (ref 0–0.03)
WBC # BLD: 7.6 K/UL (ref 4.8–10.8)

## 2021-01-02 PROCEDURE — U0002 COVID-19 LAB TEST NON-CDC: HCPCS

## 2021-01-02 PROCEDURE — 36415 COLL VENOUS BLD VENIPUNCTURE: CPT

## 2021-01-02 PROCEDURE — 6370000000 HC RX 637 (ALT 250 FOR IP): Performed by: EMERGENCY MEDICINE

## 2021-01-02 PROCEDURE — 87040 BLOOD CULTURE FOR BACTERIA: CPT

## 2021-01-02 PROCEDURE — 84484 ASSAY OF TROPONIN QUANT: CPT

## 2021-01-02 PROCEDURE — 85730 THROMBOPLASTIN TIME PARTIAL: CPT

## 2021-01-02 PROCEDURE — 85025 COMPLETE CBC W/AUTO DIFF WBC: CPT

## 2021-01-02 PROCEDURE — 2140000000 HC CCU INTERMEDIATE R&B

## 2021-01-02 PROCEDURE — 6360000002 HC RX W HCPCS: Performed by: INTERNAL MEDICINE

## 2021-01-02 PROCEDURE — 71045 X-RAY EXAM CHEST 1 VIEW: CPT

## 2021-01-02 PROCEDURE — 6360000002 HC RX W HCPCS: Performed by: EMERGENCY MEDICINE

## 2021-01-02 PROCEDURE — 2500000003 HC RX 250 WO HCPCS: Performed by: INTERNAL MEDICINE

## 2021-01-02 PROCEDURE — 99285 EMERGENCY DEPT VISIT HI MDM: CPT

## 2021-01-02 PROCEDURE — 94640 AIRWAY INHALATION TREATMENT: CPT

## 2021-01-02 PROCEDURE — 2580000003 HC RX 258: Performed by: INTERNAL MEDICINE

## 2021-01-02 PROCEDURE — 6360000004 HC RX CONTRAST MEDICATION: Performed by: EMERGENCY MEDICINE

## 2021-01-02 PROCEDURE — 80053 COMPREHEN METABOLIC PANEL: CPT

## 2021-01-02 PROCEDURE — 93005 ELECTROCARDIOGRAM TRACING: CPT | Performed by: EMERGENCY MEDICINE

## 2021-01-02 PROCEDURE — 2700000000 HC OXYGEN THERAPY PER DAY

## 2021-01-02 PROCEDURE — 6370000000 HC RX 637 (ALT 250 FOR IP): Performed by: INTERNAL MEDICINE

## 2021-01-02 PROCEDURE — 82947 ASSAY GLUCOSE BLOOD QUANT: CPT

## 2021-01-02 PROCEDURE — 71275 CT ANGIOGRAPHY CHEST: CPT

## 2021-01-02 PROCEDURE — 36600 WITHDRAWAL OF ARTERIAL BLOOD: CPT

## 2021-01-02 PROCEDURE — 2500000003 HC RX 250 WO HCPCS: Performed by: EMERGENCY MEDICINE

## 2021-01-02 PROCEDURE — 84132 ASSAY OF SERUM POTASSIUM: CPT

## 2021-01-02 PROCEDURE — 96374 THER/PROPH/DIAG INJ IV PUSH: CPT

## 2021-01-02 PROCEDURE — 83880 ASSAY OF NATRIURETIC PEPTIDE: CPT

## 2021-01-02 PROCEDURE — 99223 1ST HOSP IP/OBS HIGH 75: CPT | Performed by: INTERNAL MEDICINE

## 2021-01-02 PROCEDURE — 82803 BLOOD GASES ANY COMBINATION: CPT

## 2021-01-02 RX ORDER — ARFORMOTEROL TARTRATE 15 UG/2ML
15 SOLUTION RESPIRATORY (INHALATION) 2 TIMES DAILY
Status: DISCONTINUED | OUTPATIENT
Start: 2021-01-02 | End: 2021-01-06 | Stop reason: HOSPADM

## 2021-01-02 RX ORDER — LEVALBUTEROL INHALATION SOLUTION 0.63 MG/3ML
0.63 SOLUTION RESPIRATORY (INHALATION) EVERY 8 HOURS PRN
Status: DISCONTINUED | OUTPATIENT
Start: 2021-01-02 | End: 2021-01-06 | Stop reason: HOSPADM

## 2021-01-02 RX ORDER — BUMETANIDE 0.25 MG/ML
1 INJECTION, SOLUTION INTRAMUSCULAR; INTRAVENOUS 2 TIMES DAILY
Status: DISCONTINUED | OUTPATIENT
Start: 2021-01-02 | End: 2021-01-04

## 2021-01-02 RX ORDER — MONTELUKAST SODIUM 10 MG/1
10 TABLET ORAL NIGHTLY PRN
Status: DISCONTINUED | OUTPATIENT
Start: 2021-01-02 | End: 2021-01-06 | Stop reason: HOSPADM

## 2021-01-02 RX ORDER — MECOBALAMIN 5000 MCG
10 TABLET,DISINTEGRATING ORAL NIGHTLY PRN
Status: DISCONTINUED | OUTPATIENT
Start: 2021-01-02 | End: 2021-01-06 | Stop reason: HOSPADM

## 2021-01-02 RX ORDER — ACETAMINOPHEN 325 MG/1
650 TABLET ORAL EVERY 6 HOURS PRN
Status: DISCONTINUED | OUTPATIENT
Start: 2021-01-02 | End: 2021-01-06 | Stop reason: HOSPADM

## 2021-01-02 RX ORDER — SODIUM CHLORIDE 0.9 % (FLUSH) 0.9 %
10 SYRINGE (ML) INJECTION PRN
Status: DISCONTINUED | OUTPATIENT
Start: 2021-01-02 | End: 2021-01-06 | Stop reason: HOSPADM

## 2021-01-02 RX ORDER — DEXTROSE MONOHYDRATE 25 G/50ML
12.5 INJECTION, SOLUTION INTRAVENOUS PRN
Status: DISCONTINUED | OUTPATIENT
Start: 2021-01-02 | End: 2021-01-06 | Stop reason: HOSPADM

## 2021-01-02 RX ORDER — PANTOPRAZOLE SODIUM 40 MG/1
40 TABLET, DELAYED RELEASE ORAL 2 TIMES DAILY
Status: DISCONTINUED | OUTPATIENT
Start: 2021-01-02 | End: 2021-01-06 | Stop reason: HOSPADM

## 2021-01-02 RX ORDER — BUMETANIDE 0.25 MG/ML
1 INJECTION, SOLUTION INTRAMUSCULAR; INTRAVENOUS ONCE
Status: COMPLETED | OUTPATIENT
Start: 2021-01-02 | End: 2021-01-02

## 2021-01-02 RX ORDER — DEXTROSE MONOHYDRATE 50 MG/ML
100 INJECTION, SOLUTION INTRAVENOUS PRN
Status: DISCONTINUED | OUTPATIENT
Start: 2021-01-02 | End: 2021-01-06 | Stop reason: HOSPADM

## 2021-01-02 RX ORDER — GUAIFENESIN/DEXTROMETHORPHAN 100-10MG/5
5 SYRUP ORAL EVERY 4 HOURS PRN
Status: DISCONTINUED | OUTPATIENT
Start: 2021-01-02 | End: 2021-01-02

## 2021-01-02 RX ORDER — INSULIN GLARGINE 100 [IU]/ML
30 INJECTION, SOLUTION SUBCUTANEOUS DAILY
Status: DISCONTINUED | OUTPATIENT
Start: 2021-01-02 | End: 2021-01-06 | Stop reason: HOSPADM

## 2021-01-02 RX ORDER — INSULIN GLARGINE 100 [IU]/ML
30 INJECTION, SOLUTION SUBCUTANEOUS NIGHTLY
Status: DISCONTINUED | OUTPATIENT
Start: 2021-01-02 | End: 2021-01-06 | Stop reason: HOSPADM

## 2021-01-02 RX ORDER — POLYETHYLENE GLYCOL 3350 17 G/17G
17 POWDER, FOR SOLUTION ORAL DAILY PRN
Status: DISCONTINUED | OUTPATIENT
Start: 2021-01-02 | End: 2021-01-06 | Stop reason: HOSPADM

## 2021-01-02 RX ORDER — BUDESONIDE AND FORMOTEROL FUMARATE DIHYDRATE 80; 4.5 UG/1; UG/1
2 AEROSOL RESPIRATORY (INHALATION) 2 TIMES DAILY
Refills: 3 | Status: DISCONTINUED | OUTPATIENT
Start: 2021-01-02 | End: 2021-01-02 | Stop reason: CLARIF

## 2021-01-02 RX ORDER — CLOPIDOGREL BISULFATE 75 MG/1
75 TABLET ORAL DAILY
Status: DISCONTINUED | OUTPATIENT
Start: 2021-01-02 | End: 2021-01-06 | Stop reason: HOSPADM

## 2021-01-02 RX ORDER — GABAPENTIN 600 MG/1
600 TABLET ORAL 3 TIMES DAILY
Status: DISCONTINUED | OUTPATIENT
Start: 2021-01-02 | End: 2021-01-06 | Stop reason: HOSPADM

## 2021-01-02 RX ORDER — ONDANSETRON 2 MG/ML
4 INJECTION INTRAMUSCULAR; INTRAVENOUS EVERY 6 HOURS PRN
Status: DISCONTINUED | OUTPATIENT
Start: 2021-01-02 | End: 2021-01-06 | Stop reason: HOSPADM

## 2021-01-02 RX ORDER — CETIRIZINE HYDROCHLORIDE 10 MG/1
10 TABLET ORAL NIGHTLY PRN
Status: DISCONTINUED | OUTPATIENT
Start: 2021-01-02 | End: 2021-01-06 | Stop reason: HOSPADM

## 2021-01-02 RX ORDER — CARVEDILOL 6.25 MG/1
6.25 TABLET ORAL 2 TIMES DAILY
Status: DISCONTINUED | OUTPATIENT
Start: 2021-01-02 | End: 2021-01-06 | Stop reason: HOSPADM

## 2021-01-02 RX ORDER — VALSARTAN 160 MG/1
160 TABLET ORAL DAILY
Status: DISCONTINUED | OUTPATIENT
Start: 2021-01-02 | End: 2021-01-02

## 2021-01-02 RX ORDER — VITAMIN B COMPLEX
1000 TABLET ORAL DAILY
Status: DISCONTINUED | OUTPATIENT
Start: 2021-01-02 | End: 2021-01-06 | Stop reason: HOSPADM

## 2021-01-02 RX ORDER — BUDESONIDE 0.25 MG/2ML
0.25 INHALANT ORAL 2 TIMES DAILY
Status: DISCONTINUED | OUTPATIENT
Start: 2021-01-02 | End: 2021-01-06 | Stop reason: HOSPADM

## 2021-01-02 RX ORDER — PRAMIPEXOLE DIHYDROCHLORIDE 0.25 MG/1
0.5 TABLET ORAL 2 TIMES DAILY
Status: DISCONTINUED | OUTPATIENT
Start: 2021-01-02 | End: 2021-01-06 | Stop reason: HOSPADM

## 2021-01-02 RX ORDER — VALSARTAN 160 MG/1
160 TABLET ORAL DAILY
Status: DISCONTINUED | OUTPATIENT
Start: 2021-01-02 | End: 2021-01-06 | Stop reason: HOSPADM

## 2021-01-02 RX ORDER — AMLODIPINE BESYLATE 5 MG/1
5 TABLET ORAL DAILY
Status: DISCONTINUED | OUTPATIENT
Start: 2021-01-02 | End: 2021-01-06 | Stop reason: HOSPADM

## 2021-01-02 RX ORDER — LEVALBUTEROL INHALATION SOLUTION 1.25 MG/3ML
SOLUTION RESPIRATORY (INHALATION)
Status: DISPENSED
Start: 2021-01-02 | End: 2021-01-02

## 2021-01-02 RX ORDER — GUAIFENESIN 600 MG/1
600 TABLET, EXTENDED RELEASE ORAL 2 TIMES DAILY PRN
Status: DISCONTINUED | OUTPATIENT
Start: 2021-01-02 | End: 2021-01-06 | Stop reason: HOSPADM

## 2021-01-02 RX ORDER — PROMETHAZINE HYDROCHLORIDE 25 MG/1
12.5 TABLET ORAL EVERY 6 HOURS PRN
Status: DISCONTINUED | OUTPATIENT
Start: 2021-01-02 | End: 2021-01-06 | Stop reason: HOSPADM

## 2021-01-02 RX ORDER — CARVEDILOL 3.12 MG/1
3.12 TABLET ORAL ONCE
Status: COMPLETED | OUTPATIENT
Start: 2021-01-02 | End: 2021-01-02

## 2021-01-02 RX ORDER — AMLODIPINE BESYLATE 5 MG/1
5 TABLET ORAL DAILY
Status: DISCONTINUED | OUTPATIENT
Start: 2021-01-03 | End: 2021-01-02

## 2021-01-02 RX ORDER — LEVALBUTEROL INHALATION SOLUTION 0.63 MG/3ML
0.63 SOLUTION RESPIRATORY (INHALATION) EVERY 4 HOURS PRN
Status: DISCONTINUED | OUTPATIENT
Start: 2021-01-02 | End: 2021-01-06 | Stop reason: HOSPADM

## 2021-01-02 RX ORDER — NICOTINE POLACRILEX 4 MG
15 LOZENGE BUCCAL PRN
Status: DISCONTINUED | OUTPATIENT
Start: 2021-01-02 | End: 2021-01-06 | Stop reason: HOSPADM

## 2021-01-02 RX ORDER — SODIUM CHLORIDE 0.9 % (FLUSH) 0.9 %
10 SYRINGE (ML) INJECTION EVERY 12 HOURS SCHEDULED
Status: DISCONTINUED | OUTPATIENT
Start: 2021-01-02 | End: 2021-01-06 | Stop reason: HOSPADM

## 2021-01-02 RX ORDER — CARVEDILOL 6.25 MG/1
6.25 TABLET ORAL 2 TIMES DAILY
Status: DISCONTINUED | OUTPATIENT
Start: 2021-01-02 | End: 2021-01-02

## 2021-01-02 RX ORDER — ACETAMINOPHEN 650 MG/1
650 SUPPOSITORY RECTAL EVERY 6 HOURS PRN
Status: DISCONTINUED | OUTPATIENT
Start: 2021-01-02 | End: 2021-01-06 | Stop reason: HOSPADM

## 2021-01-02 RX ORDER — ATORVASTATIN CALCIUM 40 MG/1
40 TABLET, FILM COATED ORAL DAILY
Status: DISCONTINUED | OUTPATIENT
Start: 2021-01-02 | End: 2021-01-06 | Stop reason: HOSPADM

## 2021-01-02 RX ADMIN — ATORVASTATIN CALCIUM 40 MG: 40 TABLET, FILM COATED ORAL at 12:50

## 2021-01-02 RX ADMIN — IOPAMIDOL 90 ML: 755 INJECTION, SOLUTION INTRAVENOUS at 06:04

## 2021-01-02 RX ADMIN — LEVALBUTEROL HYDROCHLORIDE 0.63 MG: 0.63 SOLUTION RESPIRATORY (INHALATION) at 05:22

## 2021-01-02 RX ADMIN — BUDESONIDE 250 MCG: 0.25 SUSPENSION RESPIRATORY (INHALATION) at 19:27

## 2021-01-02 RX ADMIN — IPRATROPIUM BROMIDE 0.5 MG: 0.5 SOLUTION RESPIRATORY (INHALATION) at 19:27

## 2021-01-02 RX ADMIN — INSULIN LISPRO 10 UNITS: 100 INJECTION, SOLUTION INTRAVENOUS; SUBCUTANEOUS at 18:16

## 2021-01-02 RX ADMIN — SODIUM CHLORIDE, PRESERVATIVE FREE 10 ML: 5 INJECTION INTRAVENOUS at 12:52

## 2021-01-02 RX ADMIN — PRAMIPEXOLE DIHYDROCHLORIDE 0.5 MG: 0.25 TABLET ORAL at 12:50

## 2021-01-02 RX ADMIN — PANTOPRAZOLE SODIUM 40 MG: 40 TABLET, DELAYED RELEASE ORAL at 12:49

## 2021-01-02 RX ADMIN — VALSARTAN 160 MG: 160 TABLET, FILM COATED ORAL at 06:58

## 2021-01-02 RX ADMIN — Medication 30 UNITS: at 20:54

## 2021-01-02 RX ADMIN — Medication 30 UNITS: at 12:57

## 2021-01-02 RX ADMIN — GABAPENTIN 600 MG: 600 TABLET, FILM COATED ORAL at 12:52

## 2021-01-02 RX ADMIN — Medication 1000 UNITS: at 12:50

## 2021-01-02 RX ADMIN — AMLODIPINE BESYLATE 5 MG: 5 TABLET ORAL at 12:50

## 2021-01-02 RX ADMIN — CARVEDILOL 6.25 MG: 6.25 TABLET, FILM COATED ORAL at 20:54

## 2021-01-02 RX ADMIN — ENOXAPARIN SODIUM 40 MG: 40 INJECTION SUBCUTANEOUS at 12:51

## 2021-01-02 RX ADMIN — BUMETANIDE 1 MG: 0.25 INJECTION, SOLUTION INTRAMUSCULAR; INTRAVENOUS at 06:07

## 2021-01-02 RX ADMIN — DULOXETINE 90 MG: 60 CAPSULE, DELAYED RELEASE ORAL at 12:50

## 2021-01-02 RX ADMIN — GABAPENTIN 600 MG: 600 TABLET, FILM COATED ORAL at 20:54

## 2021-01-02 RX ADMIN — ARFORMOTEROL TARTRATE 15 MCG: 15 SOLUTION RESPIRATORY (INHALATION) at 19:27

## 2021-01-02 RX ADMIN — CARVEDILOL 3.12 MG: 3.12 TABLET, FILM COATED ORAL at 06:58

## 2021-01-02 RX ADMIN — PANTOPRAZOLE SODIUM 40 MG: 40 TABLET, DELAYED RELEASE ORAL at 20:53

## 2021-01-02 RX ADMIN — PRAMIPEXOLE DIHYDROCHLORIDE 0.5 MG: 0.25 TABLET ORAL at 18:16

## 2021-01-02 RX ADMIN — BUMETANIDE 1 MG: 0.25 INJECTION, SOLUTION INTRAMUSCULAR; INTRAVENOUS at 20:53

## 2021-01-02 RX ADMIN — CLOPIDOGREL BISULFATE 75 MG: 75 TABLET ORAL at 12:50

## 2021-01-02 RX ADMIN — IPRATROPIUM BROMIDE 0.5 MG: 0.5 SOLUTION RESPIRATORY (INHALATION) at 14:11

## 2021-01-02 ASSESSMENT — ENCOUNTER SYMPTOMS
RHINORRHEA: 0
VOICE CHANGE: 1
SORE THROAT: 0
SORE THROAT: 1
RESPIRATORY NEGATIVE: 1
GASTROINTESTINAL NEGATIVE: 1
CHEST TIGHTNESS: 0
COUGH: 1
BACK PAIN: 0
EYE PAIN: 0
PHOTOPHOBIA: 0
VOMITING: 0
COLOR CHANGE: 0
TROUBLE SWALLOWING: 0
SHORTNESS OF BREATH: 1
NAUSEA: 0
CHEST TIGHTNESS: 1
ABDOMINAL DISTENTION: 0
ABDOMINAL PAIN: 0
WHEEZING: 1
DIARRHEA: 0
WHEEZING: 0
EYES NEGATIVE: 1
SHORTNESS OF BREATH: 0
CONSTIPATION: 0

## 2021-01-02 ASSESSMENT — PAIN DESCRIPTION - LOCATION: LOCATION: CHEST

## 2021-01-02 NOTE — H&P
Semaglutide, 1 MG/DOSE, 2 MG/1.5ML SOPN Inject 1 mg into the skin every 7 days 11/22/20  Yes Vernadine Mantle, APRN - NP   levocetirizine (XYZAL) 5 MG tablet Take 1 tablet by mouth nightly 11/22/20  Yes Vernadine Mantle, APRN - NP   blood glucose monitor strips Test 3 times a day & as needed for symptoms of irregular blood glucose. Dispense sufficient amount for indicated testing frequency plus additional to accommodate PRN testing needs. 11/22/20  Yes Vernadine Mantle, APRN - NP   pramipexole (MIRAPEX) 0.5 MG tablet Take 1 tablet by mouth 2 times daily 11/22/20  Yes Vernadine Mantle, APRN - NP   gabapentin (NEURONTIN) 600 MG tablet Take 1 tablet by mouth 3 times daily for 30 days. 11/20/20 1/2/21 Yes David Patel MD   tiotropium (SPIRIVA RESPIMAT) 1.25 MCG/ACT AERS inhaler Inhale 2 puffs into the lungs daily 10/19/20  Yes Vernadine Mantle, APRN - NP   levalbuterol [facility] HFA) 45 MCG/ACT inhaler Inhale 2 puffs into the lungs every 4 hours as needed for Wheezing or Shortness of Breath 10/19/20  Yes Vernadine Mantle, APRN - NP   levalbuterol [facility]) 0.63 MG/3ML nebulization Take 3 mLs by nebulization every 6 hours as needed for Wheezing 10/19/20 1/2/21 Yes Vernadine Mantle, APRN - NP   DULoxetine (CYMBALTA) 30 MG extended release capsule Take 30 mg by mouth daily ALONG WITH A 60 MG TABLET (BOTH TOGETHER + 90 MG)   Yes Historical Provider, MD   DULoxetine (CYMBALTA) 60 MG extended release capsule Take 60 mg by mouth daily WITH A 30 MG TABLET.  TOTAL OF 90 MG)   Yes Historical Provider, MD   Blood Glucose Monitoring Suppl (BLOOD GLUCOSE MONITOR SYSTEM) w/Device KIT 1 Device by Does not apply route three times daily 7/11/20  Yes Vernadine Mantle, APRN - NP   Lancets MISC 1 each by Does not apply route 3 times daily 7/11/20  Yes Vernadine Mantle, APRN - NP   valsartan (DIOVAN) 160 MG tablet Take 160 mg by mouth daily   Yes Historical Provider, MD CREATININE 1.1*  --    GLUCOSE 494*  --      CMP:   Recent Labs     21   *  --    K 4.0 3.8     --    CO2 24  --    BUN 19  --    CREATININE 1.1*  --    GLUCOSE 494*  --    CALCIUM 8.9  --    BILITOT <0.2  --    ALKPHOS 120*  --    AST 15  --    ALT 14  --      Hepatic:   Recent Labs     21   AST 15   ALT 14   BILITOT <0.2   ALKPHOS 120*       Lactid Acid:  No results for input(s): LACTA in the last 72 hours. Procalcitonin:     Troponin T:   Recent Labs     21   TROPONINI 0.05* 0.05*     Pro-BNP: No results for input(s): BNP in the last 72 hours. Lipids: No results for input(s): CHOL, HDL in the last 72 hours. Invalid input(s): LDLCALCU  INR: No results for input(s): INR in the last 72 hours. A1c:Invalid input(s): HEMOGLOBIN A1C    SEPSIS Criteria:   Temp: Temp  Av.4 °F (36.9 °C)  Min: 98.4 °F (36.9 °C)  Max: 98.4 °F (36.9 °C)    HR Range: Pulse  Av  Min: 91  Max: 91   RR Range: Resp  Av.5  Min: 21  Max: 26   WBC:   Recent Labs     21   WBC 7.6      Lactic acid: No results for input(s): LACTA in the last 72 hours. Creatinine:   Recent Labs     21   CREATININE 1.1*      Troponin:   Recent Labs     21   TROPONINI 0.05*       LFTs:   Recent Labs     21   AST 15   ALT 14   BILITOT <0.2   ALKPHOS 120*       ABGs:   Recent Labs     21   PHART 7.400   LBN5UES 43.0   PO2ART 79.0*   FAI4UEU 26.6*   BEART 1.6   HGBAE 9.8*   O1IOECPM 95.4   CARBOXHGBART 1.3   02THERAPY Unknown       -----------------------------------------------------------------    Imaging Studies:    CTA PULMONARY W CONTRAST   Final Result   1. No evidence of pulmonary embolus. 2.  Fluid overload with findings of pericardial effusion. 3.  Subcentimeter pulmonary nodules. Follow-up be obtained in 6 months   to assess stability.    Signed by Dr Thony Wise on 2021 7:26 AM XR CHEST PORTABLE   Final Result   Impression:   Fluid overload. Signed by Dr Ayesha Barragan on 1/2/2021 7:48 AM            Assessment     Principal Problem:    Acute on chronic diastolic heart failure (HCC)  Active Problems:    Pericardial effusion    Normocytic anemia    Type 2 diabetes mellitus, with long-term current use of insulin (HCC)    Hyperglycemia    Morbid obesity with BMI of 40.0-44.9, adult ([address]    Obstructive sleep apnea syndrome    Essential hypertension    Moderate persistent asthma without complication    Pulmonary hypertension (HCC)    Nonobstructive atherosclerosis of coronary artery    CKD (chronic kidney disease) stage 3, GFR 30-59 ml/min    Mixed hyperlipidemia    Elevated troponin    Pulmonary nodules  Resolved Problems:    Shortness of breath      Plan     Acute on Chronic diastolic heart failure  Pulmonary HTN  Midly elevated troponin  Pericardial effusion, chronic- TTE on 12/14 with stable effusion and no evidence of tamponade. Currently HDS. CAD - non-obstructive per cath on 7/16/2020  - diuresis  - cardiology consult  - tele  - TTE  - continue home cardiac meds where appropriate     Type 2 diabetes with hyperglycemia  -Hyperglycemic and did not receive her p.m. Lantus last night in ER. Giving her 30 units this morning.  - correction lispro, adjust as needed     Chronic normocytic anemia  Iron studies and most recent hospitalization not consistent with iron deficiency. Serum folate/B12 okay. Suspect chronic disease.  - stable. Monitor with CBC    Subcentimeter pulmonary nodules - needs f/u CT chest in six months by PCP.      Chronic problems, continue home meds where appropriate:  asthma  Morbid obesity with BMI 41.57  Hypertension  CAD  CKD 3  Hyperlipidemia  MARTHAnoncompliant with CPAP    DVT prophylaxis:  SQ Lovenox      CODE STATUS: Full Code

## 2021-01-02 NOTE — PROGRESS NOTES
Contains abnormal data Blood Gas, Arterial  Status:  Edited Result - FINAL   Ref Range & Units 01/02/21 0513   pH, Arterial 7.350 - 7.450 7.400    pCO2, Arterial 35.0 - 45.0 mmHg 43.0    pO2, Arterial 80.0 - 100.0 mmHg 79. 0Low     HCO3, Arterial 22.0 - 26.0 mmol/L 26.6High     Base Excess, Arterial -2.0 - 2.0 mmol/L 1.6    Hemoglobin, Art, Extended 12.0 - 16.0 g/dL 9.8Low     O2 Sat, Arterial >92 % 95.4    Carboxyhgb, Arterial 0.0 - 5.0 % 1.3    Comment:      0.0-1.5   (Smokers 1.5-5.0)    Methemoglobin, Arterial <1.5 % 0.6    O2 Content, Arterial Not Established mL/dL 13.2    O2 Therapy  Unknown    Resulting Agency  [facility]        Specimen Collected: 01/02/21 0513 Last Resulted: 01/02/21 0513 View Other Order Details        Room Air  RR site choice

## 2021-01-02 NOTE — ED NOTES
Patient ambulated, patients respiratory rate went to 30, but O2 sat stayed above 94%.        Marco Sims RN  01/02/21 0452

## 2021-01-02 NOTE — ED PROVIDER NOTES
[address] [facility] (formerly [facility])[3] EMERGENCY DEPT  eMERGENCY dEPARTMENT eNCOUnter      Pt Name: Aileen Pierce  MRN: [id number]  [place] 1960  Date of evaluation: 1/2/2021  Provider: MD Keegan Urban       Chief Complaint   Patient presents with    Shortness of Breath     increasing SOA x3 weeks. SOA worse x3 hours. HISTORY OF PRESENT ILLNESS   (Location/Symptom, Timing/Onset,Context/Setting, Quality, Duration, Modifying Factors, Severity)  Note limiting factors. Aileen Pierce is a 56 y.o. female who presents to the emergency department for shortness of breath. Patient has had progressively worsening shortness of breath for the past 3 weeks. Here in the past 3 hours though, she began to have more difficult time breathing. Patient has a history of asthma and CHF. She tried using her Xopenex x1 with little to no relief. She is complained of some mid central chest pain associated with the shortness of breath. She has a mild cough that tends to be nonproductive. Patient denies any fever or history of SARS-CoV-2 contact. HPI    NursingNotes were reviewed. REVIEW OF SYSTEMS    (2-9 systems for level 4, 10 or more for level 5)     Review of Systems   Constitutional: Positive for fatigue. Negative for activity change, appetite change, chills and fever. HENT: Negative for congestion, ear pain, rhinorrhea, sore throat and trouble swallowing. Eyes: Negative for photophobia, pain and visual disturbance. Respiratory: Positive for cough and shortness of breath. Negative for chest tightness and wheezing. Cardiovascular: Positive for chest pain. Negative for palpitations and leg swelling. Gastrointestinal: Negative for abdominal distention, abdominal pain, constipation, diarrhea, nausea and vomiting. Genitourinary: Negative for difficulty urinating, dysuria, flank pain, urgency, vaginal bleeding and vaginal discharge. Musculoskeletal: Negative for back pain, myalgias and neck stiffness. Skin: Negative for color change, pallor and rash. Neurological: Negative for dizziness, weakness, light-headedness, numbness and headaches. Psychiatric/Behavioral: Negative for agitation, behavioral problems, confusion, hallucinations and suicidal ideas. PAST MEDICALHISTORY     Past Medical History:   Diagnosis Date    Allergies     AMI (acute myocardial infarction) ([facility] [address] 09/2018    Asthma     CAD (coronary artery disease)     hx of stents    Cerebral artery occlusion with cerebral infarction ([facility] [address] 2012    R sided numbness/weakness    CHF (congestive heart failure) ([facility] [address] [facility]     COPD (chronic obstructive pulmonary disease) ([facility] [address] [facility]     Depression     Diabetes mellitus ([facility] [address]     DVT (deep vein thrombosis) in pregnancy     GERD (gastroesophageal reflux disease)     Hx of blood clots     rt leg    Hyperlipidemia     Hypertension     Palliative care patient 07/02/2020    Pneumonia          SURGICAL HISTORY       Past Surgical History:   Procedure Laterality Date    CATARACT REMOVAL Bilateral     COLONOSCOPY  approx 2013    CORONARY ANGIOPLASTY WITH STENT PLACEMENT  2018    in [address] [facility] (3 stents)    TONSILLECTOMY           CURRENT MEDICATIONS     Previous Medications    AMLODIPINE (NORVASC) 5 MG TABLET    Take 1 tablet by mouth daily    ATORVASTATIN (LIPITOR) 20 MG TABLET    Take 2 tablets by mouth daily    BLOOD GLUCOSE MONITOR STRIPS    Test 3 times a day & as needed for symptoms of irregular blood glucose. Dispense sufficient amount for indicated testing frequency plus additional to accommodate PRN testing needs.     BLOOD GLUCOSE MONITORING SUPPL (BLOOD GLUCOSE MONITOR SYSTEM) W/DEVICE KIT    1 Device by Does not apply route three times daily    BUMETANIDE (BUMEX) 1 MG TABLET    Take 1 mg by mouth 2 times daily    CARVEDILOL (COREG) 3.125 MG TABLET    Take 2 tablets by mouth 2 times daily    CLOPIDOGREL (PLAVIX) 75 MG TABLET    Take 75 mg by mouth daily DULOXETINE (CYMBALTA) 30 MG EXTENDED RELEASE CAPSULE    Take 30 mg by mouth daily ALONG WITH A 60 MG TABLET (BOTH TOGETHER + 90 MG)    DULOXETINE (CYMBALTA) 60 MG EXTENDED RELEASE CAPSULE    Take 60 mg by mouth daily WITH A 30 MG TABLET. TOTAL OF 90 MG)    FLUTICASONE-VILANTEROL (BREO ELLIPTA) 100-25 MCG/INH AEPB INHALER    Inhale 1 puff into the lungs daily    GABAPENTIN (NEURONTIN) 600 MG TABLET    Take 1 tablet by mouth 3 times daily for 30 days.     INSULIN DEGLUDEC (TRESIBA FLEXTOUCH) 100 UNIT/ML SOPN    Inject 40 Units into the skin nightly    LANCETS MISC    1 each by Does not apply route 3 times daily    LEVALBUTEROL (XOPENEX HFA) 45 MCG/ACT INHALER    Inhale 2 puffs into the lungs every 4 hours as needed for Wheezing or Shortness of Breath    LEVALBUTEROL (XOPENEX) 0.63 MG/3ML NEBULIZATION    Take 3 mLs by nebulization every 6 hours as needed for Wheezing    LEVOCETIRIZINE (XYZAL) 5 MG TABLET    Take 1 tablet by mouth nightly    MELATONIN 3 MG TABS TABLET    Take 10 mg by mouth nightly    MONTELUKAST (SINGULAIR) 10 MG TABLET    Take 10 mg by mouth nightly    PANTOPRAZOLE (PROTONIX) 20 MG TABLET    Take 2 tablets by mouth 2 times daily    PRAMIPEXOLE (MIRAPEX) 0.5 MG TABLET    Take 1 tablet by mouth 2 times daily    SEMAGLUTIDE, 1 MG/DOSE, 2 MG/1.5ML SOPN    Inject 1 mg into the skin every 7 days    TIOTROPIUM (SPIRIVA RESPIMAT) 1.25 MCG/ACT AERS INHALER    Inhale 2 puffs into the lungs daily    VALSARTAN (DIOVAN) 160 MG TABLET    Take 160 mg by mouth daily    VITAMIN D (CHOLECALCIFEROL) 25 MCG (1000 UT) TABS TABLET    Take 1,000 Units by mouth daily       ALLERGIES     Albuterol, Levaquin [levofloxacin], Metformin and related, and Aspirin    FAMILY HISTORY       Family History   Problem Relation Age of Onset    Colon Cancer Neg Hx     Colon Polyps Neg Hx           SOCIAL HISTORY       Social History     Socioeconomic History    Marital status:      Spouse name: None    Number of children: None  Years of education: None    Highest education level: None   Occupational History    None   Social Needs    Financial resource strain: None    Food insecurity     Worry: None     Inability: None    Transportation needs     Medical: None     Non-medical: None   Tobacco Use    Smoking status: Former Smoker     Packs/day: 1.00     Years: 30.00     Pack years: 30.00     Quit date:      Years since quittin.0    Smokeless tobacco: Never Used   Substance and Sexual Activity    Alcohol use: Never     Frequency: Never    Drug use: Never    Sexual activity: None   Lifestyle    Physical activity     Days per week: None     Minutes per session: None    Stress: None   Relationships    Social connections     Talks on phone: None     Gets together: None     Attends [employer] service: None     Active member of club or organization: None     Attends meetings of clubs or organizations: None     Relationship status: None    Intimate partner violence     Fear of current or ex partner: None     Emotionally abused: None     Physically abused: None     Forced sexual activity: None   Other Topics Concern    None   Social History Narrative    None       SCREENINGS    Barbie Coma Scale  Eye Opening: Spontaneous  Best Verbal Response: Oriented  Best Motor Response: Obeys commands  [place] Coma Scale Score: 15        PHYSICAL EXAM    (up to 7 for level 4, 8 or more for level 5)     ED Triage Vitals   BP Temp Temp Source Pulse Resp SpO2 Height Weight   21 0445 21 0450 21 0450 21 0443 21 0443 21 0443 21 0443 21 0443   (!) 169/103 98.4 °F (36.9 °C) Oral 91 26 98 % 5' 1\" (1.549 m) 220 lb (99.8 kg)       Physical Exam  Vitals signs and nursing note reviewed. Constitutional:       Appearance: Normal appearance. She is not ill-appearing. HENT:      Head: Normocephalic and atraumatic.       Nose: Nose normal.      Mouth/Throat:      Mouth: Mucous membranes are moist. Pharynx: Oropharynx is clear. Eyes:      Extraocular Movements: Extraocular movements intact. Pupils: Pupils are equal, round, and reactive to light. Cardiovascular:      Rate and Rhythm: Normal rate and regular rhythm. Heart sounds: No murmur. Pulmonary:      Effort: Tachypnea present. Breath sounds: Decreased air movement present. Examination of the right-upper field reveals wheezing. Examination of the left-upper field reveals wheezing. Examination of the right-middle field reveals wheezing. Examination of the right-lower field reveals wheezing. Examination of the left-lower field reveals wheezing. Wheezing present. No decreased breath sounds, rhonchi or rales. Abdominal:      General: Abdomen is flat. There is no distension. Palpations: Abdomen is soft. Tenderness: There is no abdominal tenderness. Musculoskeletal: Normal range of motion. Right lower leg: Edema (1+) present. Left lower leg: Edema (1+) present. Skin:     General: Skin is warm and dry. Capillary Refill: Capillary refill takes less than 2 seconds. Neurological:      General: No focal deficit present. Mental Status: She is alert and oriented to person, place, and time. Cranial Nerves: No cranial nerve deficit. Psychiatric:         Attention and Perception: Attention normal.         Mood and Affect: Mood is anxious. Speech: Speech normal.         Behavior: Behavior normal.         Thought Content: Thought content normal.         Cognition and Memory: Cognition normal.         DIAGNOSTIC RESULTS     EKG: All EKG's areinterpreted by the Emergency Department Physician who either signs or Co-signs this chart in the absence of a cardiologist.    Sinus rhythm with a rate of 87, normal axis, T wave inversions in leads V4 through 6. T wave inversions in lead II. No acute process. No acute change when compared to 12/13/2020.     RADIOLOGY: CBC WITH AUTO DIFFERENTIAL - Abnormal; Notable for the following components:    RBC 3.37 (*)     Hemoglobin 9.9 (*)     Hematocrit 30.7 (*)     MCHC 32.2 (*)     Neutrophils % 72.4 (*)     Lymphocytes % 19.3 (*)     All other components within normal limits   BRAIN NATRIURETIC PEPTIDE - Abnormal; Notable for the following components:    Pro-BNP 4,331 (*)     All other components within normal limits   TROPONIN - Abnormal; Notable for the following components:    Troponin 0.05 (*)     All other components within normal limits   POTASSIUM, WHOLE BLOOD   POCT GLUCOSE   POCT GLUCOSE       All other labs were within normal range or not returned as of this dictation. EMERGENCY DEPARTMENT COURSE and DIFFERENTIAL DIAGNOSIS/MDM:   Vitals:    Vitals:    01/02/21 0443 01/02/21 0445 01/02/21 0450 01/02/21 0538   BP:  (!) 169/103  (!) 159/57   Pulse: 91   91   Resp: 26   21   Temp:   98.4 °F (36.9 °C)    TempSrc:   Oral    SpO2: 98%   96%   Weight: 220 lb (99.8 kg)      Height: 5' 1\" (1.549 m)          MDM  Number of Diagnoses or Management Options  Acute on chronic congestive heart failure, unspecified heart failure type [facility]): new and requires workup  Elevated troponin: new and requires workup  Shortness of breath: new and requires workup  Diagnosis management comments: Patient has increased pulmonary edema. BNP is elevated. She is dyspneic, but is maintaining her oxygen saturation with no supplemental O2. She is not complaining of any chest pain. She was found to have an elevation of troponin which is concerning since last time I saw her about 3 weeks ago there was no change in her cardiac enzyme. When patient was evaluated she had a TTE at that time. Feel that patient probably needs a stress test at this time to evaluate the dyspnea for a CAD cause. Spoke to the hospitalist service and they have agreed to admit this patient for further evaluation and treatment.        Amount and/or Complexity of Data Reviewed Decide to obtain previous medical records or to obtain history from someone other than the patient: yes    Patient Progress  Patient progress: stable      Reassessment      CONSULTS:  IP CONSULT TO HOSPITALIST  IP CONSULT TO CARDIOLOGY    PROCEDURES:  Unless otherwise noted below, none     Procedures    FINAL IMPRESSION      1. Elevated troponin    2. Shortness of breath    3. Acute on chronic congestive heart failure, unspecified heart failure type [facility])          DISPOSITION/PLAN   DISPOSITION Admitted 01/02/2021 08:29:01 AM      PATIENT REFERRED TO:  No follow-up provider specified.     DISCHARGE MEDICATIONS:  New Prescriptions    No medications on file          (Please note that portions of this note were completed with a voice recognition program.  Efforts were made to edit thedictations but occasionally words are mis-transcribed.)    Sergei Lundberg MD (electronically signed)  Attending Emergency Physician          Kathleen Gomez MD  01/02/21 2419

## 2021-01-02 NOTE — CONSULTS
[facility]  Cardiology Consult      Requesting MD:  Pat Savage MD   Admit Status:  Inpatient [101]       History obtained from:   [] Patient  [] Other (specify):     Patient:  Lafe Curling  [id number]     Chief Complaint:   Chief Complaint   Patient presents with    Shortness of Breath     increasing SOA x3 weeks. SOA worse x3 hours. HPI: Ms. Juancarlos Caballero is a 56 y.o. female with a history of congestive heart failure recent heart cath July 2020 noncritical disease comes in now with worsening dyspnea also has had a sore throat difficulty speaking over the last few days. Denies any chest pressure recently troponin minimally elevated 0.05. She does report exertional chest pressure which has been present for several years perhaps gradually worsening but not in the last few days. proBNP 4331 ABGs pH 7.400 CO2 43 PO2 79.0 saturation 95.4. BUN 19 creatinine 1.1 potassium 4 sodium 134. Chest x-ray consistent with fluid overload. Review of Systems:  Review of Systems   Constitutional: Negative. Negative for chills, fever and unexpected weight change. HENT: Negative. Eyes: Negative. Respiratory: Negative. Negative for shortness of breath. Cardiovascular: Negative. Negative for chest pain. Gastrointestinal: Negative. Negative for diarrhea, nausea and vomiting. Endocrine: Negative. Genitourinary: Negative. Musculoskeletal: Negative. Skin: Negative. Neurological: Negative. All other systems reviewed and are negative.       Cardiac Specific Data:  Specialty Problems        Cardiology Problems    * (Principal) Acute on chronic diastolic heart failure (HCC)        Pericardial effusion        Nonobstructive atherosclerosis of coronary artery        Pulmonary hypertension (HCC)        Essential hypertension        Pulmonary edema with congestive heart failure ([facility] Coronary artery disease involving native coronary artery of native heart without angina pectoris        Mixed hyperlipidemia        CHF (congestive heart failure), NYHA class I, acute on chronic, combined ([facility]              Past Medical History:  Past Medical History:   Diagnosis Date    Allergies     AMI (acute myocardial infarction) ([facility] 2018    Asthma     CAD (coronary artery disease)     hx of stents    Cerebral artery occlusion with [facility] infarction ([facility]     R sided numbness/weakness    CHF (congestive heart failure) (HCC)     COPD (chronic obstructive pulmonary disease) ([facility]     Depression     Diabetes mellitus ([facility]     DVT (deep vein thrombosis) in pregnancy     GERD (gastroesophageal reflux disease)     Hx of blood clots     rt leg    Hyperlipidemia     Hypertension     Palliative care patient 2020    Pneumonia         Past Surgical History:  Past Surgical History:   Procedure Laterality Date    CATARACT REMOVAL Bilateral     COLONOSCOPY  approx 2013    CORONARY ANGIOPLASTY WITH STENT PLACEMENT  2018    [facility] (3 stents)    TONSILLECTOMY         Past Family History:  Family History   Problem Relation Age of Onset    Colon Cancer Neg Hx     Colon Polyps Neg Hx        Past Social History:  Social History     Socioeconomic History    Marital status:      Spouse name: Not on file    Number of children: Not on file    Years of education: Not on file    Highest education level: Not on file   Occupational History    Not on file   Social Needs    Financial resource strain: Not on file    Food insecurity     Worry: Not on file     Inability: Not on file    Transportation needs     Medical: Not on file     Non-medical: Not on file   Tobacco Use    Smoking status: Former Smoker     Packs/day: 1.00     Years: 30.00     Pack years: 30.00     Quit date:      Years since quittin.0    Smokeless tobacco: Never Used Substance and Sexual Activity    Alcohol use: Never     Frequency: Never    Drug use: Never    Sexual activity: Not on file   Lifestyle    Physical activity     Days per week: Not on file     Minutes per session: Not on file    Stress: Not on file   Relationships    Social connections     Talks on phone: Not on file     Gets together: Not on file     Attends Christian service: Not on file     Active member of club or organization: Not on file     Attends meetings of clubs or organizations: Not on file     Relationship status: Not on file    Intimate partner violence     Fear of current or ex partner: Not on file     Emotionally abused: Not on file     Physically abused: Not on file     Forced sexual activity: Not on file   Other Topics Concern    Not on file   Social History Narrative     17 years second Vero Charles has 3 daughtersPresently in collegeShe is worked as a sales associateQuit smoking 2004 denies alcohol consumption or substance usagePhysically sedentary       Allergies: Allergies   Allergen Reactions    Albuterol Shortness Of Breath    Levaquin [Levofloxacin] Shortness Of Breath and Swelling    Metformin And Related Diarrhea    Aspirin Rash       Home Meds:  Prior to Admission medications    Medication Sig Start Date End Date Taking?  Authorizing Provider   atorvastatin (LIPITOR) 20 MG tablet Take 2 tablets by mouth daily 12/16/20  Yes Nadine Mendez MD   carvedilol (COREG) 3.125 MG tablet Take 2 tablets by mouth 2 times daily 12/16/20  Yes Nadine Mendez MD   pantoprazole (PROTONIX) 20 MG tablet Take 2 tablets by mouth 2 times daily 12/16/20 1/15/21 Yes Nadine Mendez MD   bumetanide (BUMEX) 1 MG tablet Take 1 mg by mouth 2 times daily   Yes Historical Provider, MD   fluticasone-vilanterol (BREO ELLIPTA) 100-25 MCG/INH AEPB inhaler Inhale 1 puff into the lungs daily 11/22/20  Yes Ted Souza, YAMIL - NP amLODIPine (NORVASC) 5 MG tablet Take 1 tablet by mouth daily 11/22/20  Yes YAMIL Richardson NP   Insulin Degludec [facility] FLEXTOUCH) 100 UNIT/ML SOPN Inject 40 Units into the skin nightly 11/22/20  Yes YAMIL Richardson NP   Semaglutide, 1 MG/DOSE, 2 MG/1.5ML SOPN Inject 1 mg into the skin every 7 days 11/22/20  Yes YAMIL Richardson NP   levocetirizine Viola Woody) 5 MG tablet Take 1 tablet by mouth nightly 11/22/20  Yes YAMIL Richardson NP   blood glucose monitor strips Test 3 times a day & as needed for symptoms of irregular blood glucose. Dispense sufficient amount for indicated testing frequency plus additional to accommodate PRN testing needs. 11/22/20  Yes YAMIL Richardson NP   pramipexole (MIRAPEX) 0.5 MG tablet Take 1 tablet by mouth 2 times daily 11/22/20  Yes YAMIL Richardson NP   gabapentin (NEURONTIN) 600 MG tablet Take 1 tablet by mouth 3 times daily for 30 days. 11/20/20 1/2/21 Yes Carolina Treviño MD   tiotropium (SPIRIVA RESPIMAT) 1.25 MCG/ACT AERS inhaler Inhale 2 puffs into the lungs daily 10/19/20  Yes YAMIL Richardson NP   levalbuterol Marchia Pantera HFA) 45 MCG/ACT inhaler Inhale 2 puffs into the lungs every 4 hours as needed for Wheezing or Shortness of Breath 10/19/20  Yes YAMIL Richardson NP   levalbuterol Marchia Pantera) 0.63 MG/3ML nebulization Take 3 mLs by nebulization every 6 hours as needed for Wheezing 10/19/20 1/2/21 Yes YAMIL Richardson NP   DULoxetine (CYMBALTA) 30 MG extended release capsule Take 30 mg by mouth daily ALONG WITH A 60 MG TABLET (BOTH TOGETHER + 90 MG)   Yes Historical Provider, MD   DULoxetine (CYMBALTA) 60 MG extended release capsule Take 60 mg by mouth daily WITH A 30 MG TABLET.  TOTAL OF 90 MG)   Yes Historical Provider, MD   Blood Glucose Monitoring Suppl (BLOOD GLUCOSE MONITOR SYSTEM) w/Device KIT 1 Device by Does not apply route three times daily 7/11/20  Yes YAMIL Richardson - NP Lancets MISC 1 each by Does not apply route 3 times daily 7/11/20  Yes YAMIL Ramírez NP   valsartan (DIOVAN) 160 MG tablet Take 160 mg by mouth daily   Yes Historical Provider, MD   clopidogrel (PLAVIX) 75 MG tablet Take 75 mg by mouth daily   Yes Historical Provider, MD   montelukast (SINGULAIR) 10 MG tablet Take 10 mg by mouth nightly   Yes Historical Provider, MD   vitamin D (CHOLECALCIFEROL) 25 MCG (1000 UT) TABS tablet Take 1,000 Units by mouth daily   Yes Historical Provider, MD   melatonin 3 MG TABS tablet Take 10 mg by mouth nightly   Yes Historical Provider, MD       Current Meds:   levalbuterol        amLODIPine  5 mg Oral Daily    valsartan  160 mg Oral Daily    insulin glargine  30 Units Subcutaneous Daily    insulin lispro  0-12 Units Subcutaneous TID WC    insulin lispro  0-6 Units Subcutaneous Nightly    sodium chloride flush  10 mL Intravenous 2 times per day    enoxaparin  40 mg Subcutaneous Daily    atorvastatin  40 mg Oral Daily    clopidogrel  75 mg Oral Daily    DULoxetine  90 mg Oral Daily    gabapentin  600 mg Oral TID    pantoprazole  40 mg Oral BID    pramipexole  0.5 mg Oral BID    Vitamin D  1,000 Units Oral Daily    carvedilol  6.25 mg Oral BID    budesonide  0.25 mg Nebulization BID    And    Arformoterol Tartrate  15 mcg Nebulization BID    ipratropium  0.5 mg Nebulization 4x daily       Current Infused Meds:   dextrose         Physical Exam:  Vitals:    01/02/21 0912   BP: (!) 158/73   Pulse: 81   Resp: 20   Temp: 98.2 °F (36.8 °C)   SpO2: 95%     No intake or output data in the 24 hours ending 01/02/21 1037  Estimated body mass index is 41.57 kg/m² as calculated from the following:    Height as of this encounter: 5' 1\" (1.549 m). Weight as of this encounter: 220 lb (99.8 kg). Physical Exam  Vitals signs reviewed. Constitutional:       General: She is not in acute distress. Recent Labs     01/02/21  0450   WBC 7.6   HGB 9.9*          Recent Labs     01/02/21  0450 01/02/21  0513   *  --    K 4.0 3.8     --    CO2 24  --    BUN 19  --    CREATININE 1.1*  --    LABGLOM 50*  --    CALCIUM 8.9  --        CK, CKMB, Troponin: @LABRCNT (CKTOTAL:3, CKMB:3, TROPONINI:3)@    Last 3 BNP:  No results for input(s): BNP in the last 72 hours. IMAGING:  Xr Chest Portable    Result Date: 1/2/2021  Exam:   XR CHEST PORTABLE  Date:  1/2/2021 History:  Female, age  56 years; chest pain COMPARISON:  Chest x-ray dated December 13, 2020. Findings : Mild-to-moderate prominence of the cardiomediastinal silhouette. Interstitial edema. Trace bilateral pleural effusions. No measurable pneumothorax. The bones show no acute pathology. Impression: Fluid overload. Signed by Dr Bri Sales on 1/2/2021 7:48 AM    Xr Chest Portable    Result Date: 12/13/2020  XR CHEST PORTABLE 12/13/2020 12:48 AM HISTORY:   Shortness of breath  Single view. Portable upright COMPARISONS:  12/9/2020, 11/2/2020 and 7/14/2020 FINDINGS: Examination was sent to [facility]rad for preliminary interpretation. There is cardiomegaly. The interstitial markings are diffusely prominent. There is airspace opacities noted in the left lower lobe with may be related to the technique and patient body habitus, observe similarly on prior examinations. The bony structures are intact. Radiographically, chest is unchanged. Generous heart size with chronic lung changes most likely. Signed by Dr Bashir Bae on 12/13/2020 9:08 AM    Xr Chest Portable    Result Date: 12/9/2020  XR CHEST PORTABLE 12/9/2020 11:20 AM History: Short of breath. Portable chest x-ray compared with 2 November 2020. Magnified heart size. Moderate thoracic spurring. Diffuse interstitial prominence is again seen. There is opacification of the retrocardiac left lower lobe though this is not significantly changed compared with 2 months ago. 1. Interstitial prominence with chronic left lower lobe opacity. No significant change from 2 months ago. Signed by Dr Korina Matt on 12/9/2020 11:21 AM    Ct Chest High Resolution    Result Date: 12/15/2020  CT CHEST HIGH RESOLUTION 12/15/2020 4:22 PM HISTORY: Shortness of breath, history of rheumatoid arthritis, questionable interstitial lung disease COMPARISON: CT scan dated 7/2/2020 DLP: 1043 mGy cm TECHNIQUE: Serial helical tomographic images of the chest were acquired. Bone and soft tissue algorithms were provided. Coronal reformatted images were also provided for review. Automated exposure control was also utilized to decrease patient radiation dose. FINDINGS: Neck base: The imaged portion of the neck and thyroid gland is unremarkable. Lungs: There are a few tiny benign-appearing nodules in the lung apices, the largest measuring only 3 mm. These are stable. There are no new or enlarging pulmonary nodules. No consolidation. No subpleural reticulation, traction bronchiectasis, honeycombing or cyst formation is evidence for underlying chronic interstitial lung disease. There is no pleural effusion. Airways are clear. Heart: The heart is normal in size. Coronary artery atheromatous calcification. There is a moderate to large pericardial effusion, which appears to be slightly increased from the earlier comparison CT scan. Vessels: Thoracic aorta is normal in caliber. Central pulmonary arteries are normal in caliber. Mediastinum: No suspicious hilar or mediastinal adenopathy. Debris identified diffusely throughout the esophagus, up to the level of the upper third of the esophagus. The esophagus is nondilated. Skeletal and soft tissues: The osseous structures of the thorax and surrounding soft tissues demonstrate no worrisome lesions or acute process. Upper abdomen: The imaged portion of the upper abdomen demonstrates no acute process. Splenic artery atheromatous calcification. 1. I do not see evidence for underlying fibrotic interstitial lung disease. 2. There is a moderate to large sized pericardial effusion. This appears to be mildly increased from the earlier comparison CT from July 2020, and I am suspicious for this being asymptomatic pericardial effusion given its size. 3. Coronary artery atheromatous calcification. 4. Gastroesophageal reflux, with layering debris in the esophagus up to the level of the proximal third of the esophagus.  Signed by Dr Thine Clancy on 12/15/2020 7:05 PM    Cta Pulmonary W Contrast    Result Date: 1/2/2021 CTA PULMONARY W CONTRAST 1/2/2021 5:04 AM HISTORY: Chest pain COMPARISON: CT chest dated December 15, 2020. DLP: 739 mGy cm TECHNIQUE: Helical tomographic images of the chest were obtained after the administration of intravenous contrast following angiogram protocol. Additionally, 3D reformatted images were provided. FINDINGS:  Pulmonary arteries: There is adequate enhancement of the pulmonary arteries to evaluate for central and segmental pulmonary emboli. There are no filling defects within the main, lobar, segmental or visualized subsegmental pulmonary arteries. The pulmonary arteries are within normal limits. Aorta and great vessels: There is atherosclerosis in the aorta without aneurysm or dissection. There is atherosclerosis in the great vessels. Visualized neck base: The imaged portion of the base of the neck appears unremarkable. Lungs: 2 adjacent pulmonary nodules in the left lower lobe measuring 7 mm (image [id number], series 6). Additional smaller nodules are scattered throughout the lungs, measuring up to 3 mm, best seen in the right upper lobe. Interlobular septal thickening, most concerning for interstitial edema. Trace bilateral pleural effusions. . The trachea and bronchial tree are patent. Heart: The heart is normal in size. Small-to-moderate pericardial effusion. Coronary artery disease. . Mediastinum and lymph nodes: No enlarged mediastinal, hilar, or axillary lymph nodes are present. Skeletal and soft tissues: The osseous structures of the thorax and surrounding soft tissues demonstrate no acute process. Upper abdomen: The imaged portion of the upper abdomen demonstrates no acute process. 1.   No evidence of pulmonary embolus. 2.  Fluid overload with findings of pericardial effusion. 3.  Subcentimeter pulmonary nodules. Follow-up be obtained in 6 months to assess stability.  Signed by Dr Aida Stanley on 1/2/2021 7:26 AM    Echo 2d Wo Color Doppler Complete    Result Date: 12/14/2020 Transthoracic Echocardiography Report (TTE)  Demographics   Patient Name  Lisseth Haddad Date of Study         2020   MRN           [id number]          Gender                Female   Date of Birth 1960      Room Number           MHL-0718   Age           56 year(s)   Height:       61 inches       Referring Physician   Naomy Wakefield   Weight:       226 pounds      Sonographer           SARA Bailon   BSA:          1.99 m^2        Interpreting          Jaky Cedeno MD                                Physician   BMI:          42.7 kg/m^2  Procedure Type of Study   TTE procedure  Study Location: Echo Lab Technical Quality: Adequate visualization Patient Status: Inpatient BP: 133/64 mmHg Indications:Dyspnea/SOB. Conclusions   Summary  Limited echocardiographic study for pericardial effusion. LV is normal in size with normal systolic function. LV ejection fraction  estimated at 60%. Moderate concentric LVH. RV is normal in size with normal systolic function. Moderate left atrial enlargement. Right atrium is normal in size. There is a localized pericardial effusion there is predominantly posterior  behind the left ventricle (measuring 2.37 cm) and lateral with minimal  effusion anteriorly. No significant effusion overlying right ventricleor  in apical location. No obvious chamber evidence of collapse to suggest  significant tamponade features from this effusion. Mitral and tricuspid  inflow were not studied. Comparison with echocardiogram from 2020 shows no significant change  in pericardial effusion. Signature   ----------------------------------------------------------------  Electronically signed by Jaky Cedeno MD(Interpreting physician)  on 2020 06:29 PM  ----------------------------------------------------------------  Allergies   - Other allergy:Reaction - Other;Severity - Unknown;Sensitivity -     Allergy(albuterol,Levoquin,metformin,asprin). - Metformin.   - Levaquin.    - Other allergy.   - Aspirin. M-Mode Measurements (cm)   LVIDd: 4.6 cm                                   LVIDs: 3.14 cm  IVSd: 1.45 cm  LVPWd: 1.48 cm  % Ejection Fraction: 60 %        Assessment:  1. Complaints of worsening dyspnea with exertion  2. CTA pulmonary 1-21 no evidence of pulmonary embolism small to moderate pericardial effusion subcentimeter pulmonary nodules follow-up study recommended in 6 months  3. Echo 12/14/2020 ejection fraction 60% moderate concentric LVH left atrial enlargement posterior pericardial effusion and lateral no evidence of tamponade   4. Minimally elevated troponin level  5. Acute and chronic diastolic congestive heart failure  6. Anemia  7. Diabetes mellitus type 2  8. Morbid obesity  9. Obstructive sleep apnea  10. Hypertension  11. Moderate persistent asthma  12. Pulmonary hypertension  13. Nonobstructive coronary artery disease  14. Chronic kidney disease stage III  15. Hyperlipidemia  16. Complaints of exertional chest pressure greater than 1 month possibly gradually worsening  17. Cardiac catheterization 7/16/2020 normal left ventricular systolic function mild nonobstructive coronary disease medical therapy risk factor modification recommended  18. Lexiscan 7/15/2020 ejection fraction 33% inferolateral ischemia        Recommendations:  1. Diuresis  2. Serial EKGs and cardiac enzymes  3. Repeat limited echo  4.  Further comments to follow

## 2021-01-02 NOTE — ED NOTES
Bed: 02-A  Expected date:   Expected time:   Means of arrival: [facility]  Comments:  EMS rebecca Christensen RN  01/02/21 0234

## 2021-01-03 LAB
ANION GAP SERPL CALCULATED.3IONS-SCNC: 8 MMOL/L (ref 7–19)
BASOPHILS ABSOLUTE: 0 K/UL (ref 0–0.2)
BASOPHILS RELATIVE PERCENT: 0.3 % (ref 0–1)
BUN BLDV-MCNC: 21 MG/DL (ref 8–23)
CALCIUM SERPL-MCNC: 8.9 MG/DL (ref 8.8–10.2)
CHLORIDE BLD-SCNC: 101 MMOL/L (ref 98–111)
CO2: 27 MMOL/L (ref 22–29)
CREAT SERPL-MCNC: 1.3 MG/DL (ref 0.5–0.9)
EOSINOPHILS ABSOLUTE: 0.1 K/UL (ref 0–0.6)
EOSINOPHILS RELATIVE PERCENT: 1.4 % (ref 0–5)
GFR AFRICAN AMERICAN: 50
GFR NON-AFRICAN AMERICAN: 41
GLUCOSE BLD-MCNC: 102 MG/DL (ref 70–99)
GLUCOSE BLD-MCNC: 148 MG/DL (ref 70–99)
GLUCOSE BLD-MCNC: 206 MG/DL (ref 70–99)
GLUCOSE BLD-MCNC: 211 MG/DL (ref 74–109)
HCT VFR BLD CALC: 30.8 % (ref 37–47)
HEMOGLOBIN: 9.8 G/DL (ref 12–16)
IMMATURE GRANULOCYTES #: 0 K/UL
LV EF: 58 %
LVEF MODALITY: NORMAL
LYMPHOCYTES ABSOLUTE: 1.3 K/UL (ref 1.1–4.5)
LYMPHOCYTES RELATIVE PERCENT: 20.3 % (ref 20–40)
MCH RBC QN AUTO: 29.1 PG (ref 27–31)
MCHC RBC AUTO-ENTMCNC: 31.8 G/DL (ref 33–37)
MCV RBC AUTO: 91.4 FL (ref 81–99)
MONOCYTES ABSOLUTE: 0.5 K/UL (ref 0–0.9)
MONOCYTES RELATIVE PERCENT: 7.1 % (ref 0–10)
NEUTROPHILS ABSOLUTE: 4.7 K/UL (ref 1.5–7.5)
NEUTROPHILS RELATIVE PERCENT: 70.6 % (ref 50–65)
PDW BLD-RTO: 12.6 % (ref 11.5–14.5)
PERFORMED ON: ABNORMAL
PLATELET # BLD: 206 K/UL (ref 130–400)
PMV BLD AUTO: 9.5 FL (ref 9.4–12.3)
POTASSIUM REFLEX MAGNESIUM: 4.4 MMOL/L (ref 3.5–5)
RBC # BLD: 3.37 M/UL (ref 4.2–5.4)
SODIUM BLD-SCNC: 136 MMOL/L (ref 136–145)
TROPONIN: 0.08 NG/ML (ref 0–0.03)
TROPONIN: 0.1 NG/ML (ref 0–0.03)
WBC # BLD: 6.6 K/UL (ref 4.8–10.8)

## 2021-01-03 PROCEDURE — 6360000002 HC RX W HCPCS: Performed by: INTERNAL MEDICINE

## 2021-01-03 PROCEDURE — 2580000003 HC RX 258: Performed by: INTERNAL MEDICINE

## 2021-01-03 PROCEDURE — 93308 TTE F-UP OR LMTD: CPT

## 2021-01-03 PROCEDURE — 36415 COLL VENOUS BLD VENIPUNCTURE: CPT

## 2021-01-03 PROCEDURE — 99232 SBSQ HOSP IP/OBS MODERATE 35: CPT | Performed by: INTERNAL MEDICINE

## 2021-01-03 PROCEDURE — 2700000000 HC OXYGEN THERAPY PER DAY

## 2021-01-03 PROCEDURE — 80048 BASIC METABOLIC PNL TOTAL CA: CPT

## 2021-01-03 PROCEDURE — 2140000000 HC CCU INTERMEDIATE R&B

## 2021-01-03 PROCEDURE — 85025 COMPLETE CBC W/AUTO DIFF WBC: CPT

## 2021-01-03 PROCEDURE — 94640 AIRWAY INHALATION TREATMENT: CPT

## 2021-01-03 PROCEDURE — 82947 ASSAY GLUCOSE BLOOD QUANT: CPT

## 2021-01-03 PROCEDURE — 6370000000 HC RX 637 (ALT 250 FOR IP): Performed by: EMERGENCY MEDICINE

## 2021-01-03 PROCEDURE — 93005 ELECTROCARDIOGRAM TRACING: CPT | Performed by: INTERNAL MEDICINE

## 2021-01-03 PROCEDURE — 2500000003 HC RX 250 WO HCPCS: Performed by: INTERNAL MEDICINE

## 2021-01-03 PROCEDURE — 84484 ASSAY OF TROPONIN QUANT: CPT

## 2021-01-03 PROCEDURE — 6370000000 HC RX 637 (ALT 250 FOR IP): Performed by: INTERNAL MEDICINE

## 2021-01-03 RX ADMIN — GABAPENTIN 600 MG: 600 TABLET, FILM COATED ORAL at 20:05

## 2021-01-03 RX ADMIN — DULOXETINE 90 MG: 60 CAPSULE, DELAYED RELEASE ORAL at 08:30

## 2021-01-03 RX ADMIN — IPRATROPIUM BROMIDE 0.5 MG: 0.5 SOLUTION RESPIRATORY (INHALATION) at 13:57

## 2021-01-03 RX ADMIN — ARFORMOTEROL TARTRATE 15 MCG: 15 SOLUTION RESPIRATORY (INHALATION) at 06:26

## 2021-01-03 RX ADMIN — GUAIFENESIN 600 MG: 600 TABLET, EXTENDED RELEASE ORAL at 20:17

## 2021-01-03 RX ADMIN — BUDESONIDE 250 MCG: 0.25 SUSPENSION RESPIRATORY (INHALATION) at 19:13

## 2021-01-03 RX ADMIN — CETIRIZINE HYDROCHLORIDE 10 MG: 10 TABLET, FILM COATED ORAL at 20:16

## 2021-01-03 RX ADMIN — VALSARTAN 160 MG: 160 TABLET, FILM COATED ORAL at 08:30

## 2021-01-03 RX ADMIN — IPRATROPIUM BROMIDE 0.5 MG: 0.5 SOLUTION RESPIRATORY (INHALATION) at 06:26

## 2021-01-03 RX ADMIN — PANTOPRAZOLE SODIUM 40 MG: 40 TABLET, DELAYED RELEASE ORAL at 08:30

## 2021-01-03 RX ADMIN — BUMETANIDE 1 MG: 0.25 INJECTION, SOLUTION INTRAMUSCULAR; INTRAVENOUS at 08:40

## 2021-01-03 RX ADMIN — CLOPIDOGREL BISULFATE 75 MG: 75 TABLET ORAL at 08:30

## 2021-01-03 RX ADMIN — AMLODIPINE BESYLATE 5 MG: 5 TABLET ORAL at 08:30

## 2021-01-03 RX ADMIN — SODIUM CHLORIDE, PRESERVATIVE FREE 10 ML: 5 INJECTION INTRAVENOUS at 20:05

## 2021-01-03 RX ADMIN — Medication 10 MG: at 20:17

## 2021-01-03 RX ADMIN — PRAMIPEXOLE DIHYDROCHLORIDE 0.5 MG: 0.25 TABLET ORAL at 08:30

## 2021-01-03 RX ADMIN — GABAPENTIN 600 MG: 600 TABLET, FILM COATED ORAL at 13:51

## 2021-01-03 RX ADMIN — BUMETANIDE 1 MG: 0.25 INJECTION, SOLUTION INTRAMUSCULAR; INTRAVENOUS at 20:05

## 2021-01-03 RX ADMIN — PANTOPRAZOLE SODIUM 40 MG: 40 TABLET, DELAYED RELEASE ORAL at 20:06

## 2021-01-03 RX ADMIN — ARFORMOTEROL TARTRATE 15 MCG: 15 SOLUTION RESPIRATORY (INHALATION) at 19:13

## 2021-01-03 RX ADMIN — CARVEDILOL 6.25 MG: 6.25 TABLET, FILM COATED ORAL at 20:05

## 2021-01-03 RX ADMIN — CARVEDILOL 6.25 MG: 6.25 TABLET, FILM COATED ORAL at 08:30

## 2021-01-03 RX ADMIN — INSULIN LISPRO 6 UNITS: 100 INJECTION, SOLUTION INTRAVENOUS; SUBCUTANEOUS at 08:24

## 2021-01-03 RX ADMIN — MONTELUKAST SODIUM 10 MG: 10 TABLET, FILM COATED ORAL at 20:17

## 2021-01-03 RX ADMIN — Medication 30 UNITS: at 20:22

## 2021-01-03 RX ADMIN — BUDESONIDE 250 MCG: 0.25 SUSPENSION RESPIRATORY (INHALATION) at 06:27

## 2021-01-03 RX ADMIN — SODIUM CHLORIDE, PRESERVATIVE FREE 10 ML: 5 INJECTION INTRAVENOUS at 08:31

## 2021-01-03 RX ADMIN — ENOXAPARIN SODIUM 40 MG: 40 INJECTION SUBCUTANEOUS at 08:30

## 2021-01-03 RX ADMIN — IPRATROPIUM BROMIDE 0.5 MG: 0.5 SOLUTION RESPIRATORY (INHALATION) at 19:13

## 2021-01-03 RX ADMIN — ATORVASTATIN CALCIUM 40 MG: 40 TABLET, FILM COATED ORAL at 08:30

## 2021-01-03 RX ADMIN — IPRATROPIUM BROMIDE 0.5 MG: 0.5 SOLUTION RESPIRATORY (INHALATION) at 10:16

## 2021-01-03 RX ADMIN — PRAMIPEXOLE DIHYDROCHLORIDE 0.5 MG: 0.25 TABLET ORAL at 14:03

## 2021-01-03 RX ADMIN — Medication 1000 UNITS: at 08:30

## 2021-01-03 RX ADMIN — GABAPENTIN 600 MG: 600 TABLET, FILM COATED ORAL at 08:30

## 2021-01-03 RX ADMIN — Medication 30 UNITS: at 08:29

## 2021-01-03 ASSESSMENT — PAIN SCALES - GENERAL
PAINLEVEL_OUTOF10: 0

## 2021-01-03 NOTE — PROGRESS NOTES
Cardiology Daily Note Huy Spicer MD      Patient:  Nehemiah Geronimo  [id number]    Patient Active Problem List    Diagnosis Date Noted    Chest discomfort 07/14/2020     Priority: High    Elevated troponin 01/02/2021     Priority: Low    Pulmonary nodules 01/02/2021     Priority: Low    CHF (congestive heart failure), NYHA class I, acute on chronic, combined ([facility] 12/13/2020     Priority: Low    Mixed hyperlipidemia 08/13/2020     Priority: Low    Coronary artery disease involving native coronary artery of native heart without angina pectoris 08/13/2020     Priority: Low    Pulmonary edema with congestive heart failure ([facility] 07/02/2020     Priority: Low    Palliative care patient 07/02/2020     Priority: Low    Obstructive sleep apnea syndrome 06/05/2020     Priority: Low    Essential hypertension 06/05/2020     Priority: Low    Moderate persistent asthma without complication 27/28/2752     Priority: Low    Pulmonary hypertension ([facility] 03/09/2020     Priority: Low    Nonobstructive atherosclerosis of coronary artery 03/09/2020     Priority: Low    CKD (chronic kidney disease) stage 3, GFR 30-59 ml/min 03/09/2020     Priority: Low    Morbid obesity with BMI of 40.0-44.9, adult ([facility] 01/27/2020     Priority: Low    SOB (shortness of breath) 01/26/2020     Priority: Low    Pericardial effusion 01/26/2020     Priority: Low    Acute on chronic diastolic heart failure ([facility] 01/26/2020     Priority: Low    Normocytic anemia 01/26/2020     Priority: Low    Type 2 diabetes mellitus, with long-term current use of insulin ([facility] 01/26/2020     Priority: Low    COPD (chronic obstructive pulmonary disease) ([facility] 01/26/2020     Priority: Low    Hyperglycemia 01/26/2020     Priority: Low    Acute kidney injury ([facility] 01/26/2020     Priority: Low    Elevated d-dimer 01/26/2020     Priority: Low       Admit Date:  1/2/2021    Admission Problem List: Present on Admission:   Elevated troponin  Acute on chronic diastolic heart failure (HCC)   Pericardial effusion   Hyperglycemia   Type 2 diabetes mellitus, with long-term current use of insulin (HCC)   Morbid obesity with BMI of 40.0-44.9, adult (HCC)   Obstructive sleep apnea syndrome   Essential hypertension   Moderate persistent asthma without complication   Pulmonary hypertension (HCC)   CKD (chronic kidney disease) stage 3, GFR 30-59 ml/min   (Resolved) Shortness of breath   Nonobstructive atherosclerosis of coronary artery   Mixed hyperlipidemia   Normocytic anemia   Pulmonary nodules      Cardiac Specific Data:  Specialty Problems        Cardiology Problems    * (Principal) Acute on chronic diastolic heart failure (HCC)        Pericardial effusion        Nonobstructive atherosclerosis of coronary artery        Pulmonary hypertension (HCC)        Essential hypertension        Pulmonary edema with congestive heart failure (HCC)        Coronary artery disease involving native coronary artery of native heart without angina pectoris        Mixed hyperlipidemia        CHF (congestive heart failure), NYHA class I, acute on chronic, combined (HCC)              Subjective:  Ms. [facility] seen today resting comfortably denies chest pain. Troponins minimally elevated 0.05, 0.10 and 0.05. Dyspnea decreased. Blood pressure 135/71 heart 77. Objective:   /71   Pulse 77   Temp 98.6 °F (37 °C) (Temporal)   Resp 20   Ht 5' 1\" (1.549 m)   Wt 220 lb (99.8 kg)   SpO2 97%   BMI 41.57 kg/m²     No intake or output data in the 24 hours ending 01/03/21 1242    Prior to Admission medications    Medication Sig Start Date End Date Taking?  Authorizing Provider   atorvastatin (LIPITOR) 20 MG tablet Take 2 tablets by mouth daily 12/16/20  Yes Sadia Das MD   carvedilol (COREG) 3.125 MG tablet Take 2 tablets by mouth 2 times daily 12/16/20  Yes Sadia Das MD pantoprazole (PROTONIX) 20 MG tablet Take 2 tablets by mouth 2 times daily 12/16/20 1/15/21 Yes Cheryl Buchanan MD   bumetanide (BUMEX) 1 MG tablet Take 1 mg by mouth 2 times daily   Yes Jyothi Evans MD   fluticasone-vilanterol (BREO ELLIPTA) 100-25 MCG/INH AEPB inhaler Inhale 1 puff into the lungs daily 11/22/20  Yes YAMIL Angeles NP   amLODIPine (NORVASC) 5 MG tablet Take 1 tablet by mouth daily 11/22/20  Yes YAMIL Angeles NP   Insulin Degludec [facility] FLEXTOUCH) 100 UNIT/ML SOPN Inject 40 Units into the skin nightly 11/22/20  Yes YAMIL Angeles NP   Semaglutide, 1 MG/DOSE, 2 MG/1.5ML SOPN Inject 1 mg into the skin every 7 days 11/22/20  Yes YAMIL Angeles NP   levocetirizine Janna Kussmaul) 5 MG tablet Take 1 tablet by mouth nightly 11/22/20  Yes YAMIL Angeles NP   blood glucose monitor strips Test 3 times a day & as needed for symptoms of irregular blood glucose. Dispense sufficient amount for indicated testing frequency plus additional to accommodate PRN testing needs. 11/22/20  Yes YAMIL Angeles NP   pramipexole (MIRAPEX) 0.5 MG tablet Take 1 tablet by mouth 2 times daily 11/22/20  Yes YAMIL Angeles NP   gabapentin (NEURONTIN) 600 MG tablet Take 1 tablet by mouth 3 times daily for 30 days.  11/20/20 1/2/21 Yes Destini Shaw MD   tiotropium (SPIRIVA RESPIMAT) 1.25 MCG/ACT AERS inhaler Inhale 2 puffs into the lungs daily 10/19/20  Yes YAMIL Angeles NP   levalbuterol [facility] HFA) 45 MCG/ACT inhaler Inhale 2 puffs into the lungs every 4 hours as needed for Wheezing or Shortness of Breath 10/19/20  Yes YAMIL Angeles NP   levalbuterol [facility]) 0.63 MG/3ML nebulization Take 3 mLs by nebulization every 6 hours as needed for Wheezing 10/19/20 1/2/21 Yes Jose Peña, APRN - NP DULoxetine (CYMBALTA) 30 MG extended release capsule Take 30 mg by mouth daily ALONG WITH A 60 MG TABLET (BOTH TOGETHER + 90 MG)   Yes Historical Provider, MD   DULoxetine (CYMBALTA) 60 MG extended release capsule Take 60 mg by mouth daily WITH A 30 MG TABLET.  TOTAL OF 90 MG)   Yes Historical Provider, MD   Blood Glucose Monitoring Suppl (BLOOD GLUCOSE MONITOR SYSTEM) w/Device KIT 1 Device by Does not apply route three times daily 7/11/20  Yes YAMIL Fu NP   Lancets MISC 1 each by Does not apply route 3 times daily 7/11/20  Yes YAMIL Fu NP   valsartan (DIOVAN) 160 MG tablet Take 160 mg by mouth daily   Yes Historical Provider, MD   clopidogrel (PLAVIX) 75 MG tablet Take 75 mg by mouth daily   Yes Historical Provider, MD   montelukast (SINGULAIR) 10 MG tablet Take 10 mg by mouth nightly   Yes Historical Provider, MD   vitamin D (CHOLECALCIFEROL) 25 MCG (1000 UT) TABS tablet Take 1,000 Units by mouth daily   Yes Historical Provider, MD   melatonin 3 MG TABS tablet Take 10 mg by mouth nightly   Yes Historical Provider, MD        amLODIPine  5 mg Oral Daily    valsartan  160 mg Oral Daily    insulin glargine  30 Units Subcutaneous Daily    insulin lispro  0-12 Units Subcutaneous TID WC    insulin lispro  0-6 Units Subcutaneous Nightly    sodium chloride flush  10 mL Intravenous 2 times per day    enoxaparin  40 mg Subcutaneous Daily    atorvastatin  40 mg Oral Daily    clopidogrel  75 mg Oral Daily    DULoxetine  90 mg Oral Daily    gabapentin  600 mg Oral TID    pantoprazole  40 mg Oral BID    pramipexole  0.5 mg Oral BID    Vitamin D  1,000 Units Oral Daily    carvedilol  6.25 mg Oral BID    budesonide  0.25 mg Nebulization BID    And    Arformoterol Tartrate  15 mcg Nebulization BID    ipratropium  0.5 mg Nebulization 4x daily    bumetanide  1 mg Intravenous BID    insulin glargine  30 Units Subcutaneous Nightly       TELEMETRY: Sinus     Physical Exam: Physical Exam      General:  Awake, alert, NAD  Skin:  Warm and dry  Neck:  no jvd , no carotid bruits  Chest:  Clear to auscultation, no wheezing or rales  Cardiovascular:  RRR T3U6 no murmurs, clicks, gallups, or rubs  Abdomen:  Soft nontender, nondistended, bowel sounds present  Extremities:  Edema: none      Lab Data:  CBC:   Recent Labs     01/02/21  0450 01/03/21  0009   WBC 7.6 6.6   HGB 9.9* 9.8*   HCT 30.7* 30.8*   MCV 91.1 91.4    206     BMP:   Recent Labs     01/02/21  0450 01/02/21  0513 01/03/21  0533   *  --  136   K 4.0 3.8 4.4     --  101   CO2 24  --  27   BUN 19  --  21   CREATININE 1.1*  --  1.3*     LIVER PROFILE:   Recent Labs     01/02/21 0450   AST 15   ALT 14   BILITOT <0.2   ALKPHOS 120*     PT/INR: No results for input(s): PROTIME, INR in the last 72 hours. APTT:   Recent Labs     01/02/21 0450   APTT 25.7*     BNP:  No results for input(s): BNP in the last 72 hours. CK, CKMB, Troponin: @LABRCNT (CKTOTAL:3, CKMB:3, TROPONINI:3)@    IMAGING:  Xr Chest Portable    Result Date: 1/2/2021  Exam:   XR CHEST PORTABLE  Date:  1/2/2021 History:  Female, age  56 years; chest pain COMPARISON:  Chest x-ray dated December 13, 2020. Findings : Mild-to-moderate prominence of the cardiomediastinal silhouette. Interstitial edema. Trace bilateral pleural effusions. No measurable pneumothorax. The bones show no acute pathology. Impression: Fluid overload.  Signed by Dr Caleb Pan on 1/2/2021 7:48 AM    Xr Chest Portable    Result Date: 12/13/2020 XR CHEST PORTABLE 12/13/2020 12:48 AM HISTORY:   Shortness of breath  Single view. Portable upright COMPARISONS:  12/9/2020, 11/2/2020 and 7/14/2020 FINDINGS: Examination was sent to [facility] for preliminary interpretation. There is cardiomegaly. The interstitial markings are diffusely prominent. There is airspace opacities noted in the left lower lobe with may be related to the technique and patient body habitus, observe similarly on prior examinations. The bony structures are intact. Radiographically, chest is unchanged. Generous heart size with chronic lung changes most likely. Signed by Dr Pamela Castellano on 12/13/2020 9:08 AM    Xr Chest Portable    Result Date: 12/9/2020  XR CHEST PORTABLE 12/9/2020 11:20 AM History: Short of breath. Portable chest x-ray compared with 2 November 2020. Magnified heart size. Moderate thoracic spurring. Diffuse interstitial prominence is again seen. There is opacification of the retrocardiac left lower lobe though this is not significantly changed compared with 2 months ago. 1. Interstitial prominence with chronic left lower lobe opacity. No significant change from 2 months ago.  Signed by Dr Camila Will on 12/9/2020 11:21 AM    Ct Chest High Resolution    Result Date: 12/15/2020 CT CHEST HIGH RESOLUTION 12/15/2020 4:22 PM HISTORY: Shortness of breath, history of rheumatoid arthritis, questionable interstitial lung disease COMPARISON: CT scan dated 7/2/2020 DLP: 1043 mGy cm TECHNIQUE: Serial helical tomographic images of the chest were acquired. Bone and soft tissue algorithms were provided. Coronal reformatted images were also provided for review. Automated exposure control was also utilized to decrease patient radiation dose. FINDINGS: Neck base: The imaged portion of the neck and thyroid gland is unremarkable. Lungs: There are a few tiny benign-appearing nodules in the lung apices, the largest measuring only 3 mm. These are stable. There are no new or enlarging pulmonary nodules. No consolidation. No subpleural reticulation, traction bronchiectasis, honeycombing or cyst formation is evidence for underlying chronic interstitial lung disease. There is no pleural effusion. Airways are clear. Heart: The heart is normal in size. Coronary artery atheromatous calcification. There is a moderate to large pericardial effusion, which appears to be slightly increased from the earlier comparison CT scan. Vessels: Thoracic aorta is normal in caliber. Central pulmonary arteries are normal in caliber. Mediastinum: No suspicious hilar or mediastinal adenopathy. Debris identified diffusely throughout the esophagus, up to the level of the upper third of the esophagus. The esophagus is nondilated. Skeletal and soft tissues: The osseous structures of the thorax and surrounding soft tissues demonstrate no worrisome lesions or acute process. Upper abdomen: The imaged portion of the upper abdomen demonstrates no acute process. Splenic artery atheromatous calcification. 1. I do not see evidence for underlying fibrotic interstitial lung disease. 2. There is a moderate to large sized pericardial effusion. This appears to be mildly increased from the earlier comparison CT from July 2020, and I am suspicious for this being asymptomatic pericardial effusion given its size. 3. Coronary artery atheromatous calcification. 4. Gastroesophageal reflux, with layering debris in the esophagus up to the level of the proximal third of the esophagus.  Signed by Dr Macie Mays on 12/15/2020 7:05 PM    Cta Pulmonary W Contrast    Result Date: 1/2/2021 CTA PULMONARY W CONTRAST 1/2/2021 5:04 AM HISTORY: Chest pain COMPARISON: CT chest dated December 15, 2020. DLP: 739 mGy cm TECHNIQUE: Helical tomographic images of the chest were obtained after the administration of intravenous contrast following angiogram protocol. Additionally, 3D reformatted images were provided. FINDINGS:  Pulmonary arteries: There is adequate enhancement of the pulmonary arteries to evaluate for central and segmental pulmonary emboli. There are no filling defects within the main, lobar, segmental or visualized subsegmental pulmonary arteries. The pulmonary arteries are within normal limits. Aorta and great vessels: There is atherosclerosis in the aorta without aneurysm or dissection. There is atherosclerosis in the great vessels. Visualized neck base: The imaged portion of the base of the neck appears unremarkable. Lungs: 2 adjacent pulmonary nodules in the left lower lobe measuring 7 mm (image [id number], series 6). Additional smaller nodules are scattered throughout the lungs, measuring up to 3 mm, best seen in the right upper lobe. Interlobular septal thickening, most concerning for interstitial edema. Trace bilateral pleural effusions. . The trachea and bronchial tree are patent. Heart: The heart is normal in size. Small-to-moderate pericardial effusion. Coronary artery disease. . Mediastinum and lymph nodes: No enlarged mediastinal, hilar, or axillary lymph nodes are present. Skeletal and soft tissues: The osseous structures of the thorax and surrounding soft tissues demonstrate no acute process. Upper abdomen: The imaged portion of the upper abdomen demonstrates no acute process. 1.   No evidence of pulmonary embolus. 2.  Fluid overload with findings of pericardial effusion. 3.  Subcentimeter pulmonary nodules. Follow-up be obtained in 6 months to assess stability.  Signed by Dr Melly Forde on 1/2/2021 7:26 AM    Echo 2d Wo Color Doppler Complete    Result Date: 12/14/2020 Transthoracic Echocardiography Report (TTE)  Demographics   Patient Name  Parvez Ansari Date of Study         12/14/2020   MRN           [id number]          Gender                Female   Date of Birth 1960      Room Number           L-0718   Age           56 year(s)   Height:       61 inches       Referring Physician   Ap Faria   Weight:       226 pounds      Sonographer           Kurt Reddy SARA Pratt   BSA:          1.99 m^2        Interpreting          Sandor Cedeno MD                                Physician   BMI:          42.7 kg/m^2  Procedure Type of Study   TTE procedure  Study Location: Echo Lab Technical Quality: Adequate visualization Patient Status: Inpatient BP: 133/64 mmHg Indications:Dyspnea/SOB. Conclusions   Summary  Limited echocardiographic study for pericardial effusion. LV is normal in size with normal systolic function. LV ejection fraction  estimated at 60%. Moderate concentric LVH. RV is normal in size with normal systolic function. Moderate left atrial enlargement. Right atrium is normal in size. There is a localized pericardial effusion there is predominantly posterior  behind the left ventricle (measuring 2.37 cm) and lateral with minimal  effusion anteriorly. No significant effusion overlying right ventricleor  in apical location. No obvious chamber evidence of collapse to suggest  significant tamponade features from this effusion. Mitral and tricuspid  inflow were not studied. Comparison with echocardiogram from 9/30/2020 shows no significant change  in pericardial effusion. Signature   ----------------------------------------------------------------  Electronically signed by Sandor Cedeno MD(Interpreting physician)  on 12/14/2020 06:29 PM  ----------------------------------------------------------------  Allergies   - Other allergy:Reaction - Other;Severity - Unknown;Sensitivity -     Allergy(albuterol,Levoquin,metformin,asprin). - Metformin.   - Levaquin.    -

## 2021-01-03 NOTE — PROGRESS NOTES
Hospitalist Progress Note  1/3/2021 7:33 AM  Subjective:   Admit Date: 1/2/2021  PCP: YAMIL Hutchins NP    Chief Complaint: shortness of breath    Subjective: I&Os not accurately reported. Feels a little bit better. Coughing less, but asking for Mucinex. Still having right-sided pleuritic chest pain. Denies any chest pressure overnight. [facility]:  56 y.o. female with CAD, hypertension, chronic diastolic heart failure, persistent pericardial fusion, diabetes 2, morbid obesity, MARTHA noncompliant with CPAP who presented to the ER on 1/2 with progressive worsening shortness of breath. In the ER imaging and labs were consistent with volume overload. She had mild troponin elevation of 0.05, which peaked at 0.08. Cardiology was consulted.        ROS: Six point review of systems is negative except as specifically addressed above.     DIET CARDIAC; Carb Control: 4 carb choices (60 gms)/meal    Intake/Output Summary (Last 24 hours) at 1/3/2021 0733  Last data filed at 1/2/2021 1207  Gross per 24 hour   Intake    Output 500 ml   Net -500 ml     Medications:   dextrose       Current Facility-Administered Medications   Medication Dose Route Frequency Provider Last Rate Last Admin    levalbuterol (XOPENEX) nebulization 0.63 mg  0.63 mg Nebulization Q8H PRN Clare Ledezma MD   0.63 mg at 01/02/21 0522    amLODIPine (NORVASC) tablet 5 mg  5 mg Oral Daily Clare Ledezma MD   5 mg at 01/02/21 1250    valsartan (DIOVAN) tablet 160 mg  160 mg Oral Daily Clare Ledezma MD   160 mg at 01/02/21 0658    insulin glargine (LANTUS) injection vial 30 Units  30 Units Subcutaneous Daily Nathan Osborne MD   30 Units at 01/02/21 1257    glucose (GLUTOSE) 40 % oral gel 15 g  15 g Oral PRN Nathan Osborne MD        dextrose 50 % IV solution  12.5 g Intravenous PRN Nathan Osborne MD        glucagon (rDNA) injection 1 mg  1 mg Intramuscular PRN Nathan Osborne MD  dextrose 5 % solution  100 mL/hr Intravenous PRN Harmeet Winter MD        insulin lispro (HUMALOG) injection vial 0-12 Units  0-12 Units Subcutaneous TID WC Harmeet Winter MD   10 Units at 01/02/21 1816    insulin lispro (HUMALOG) injection vial 0-6 Units  0-6 Units Subcutaneous Nightly Harmeet Winter MD        sodium chloride flush 0.9 % injection 10 mL  10 mL Intravenous 2 times per day Harmeet Winter MD   10 mL at 01/02/21 1252    sodium chloride flush 0.9 % injection 10 mL  10 mL Intravenous PRN Harmeet Winter MD        enoxaparin (LOVENOX) injection 40 mg  40 mg Subcutaneous Daily Harmeet Winter MD   40 mg at 01/02/21 1251    promethazine (PHENERGAN) tablet 12.5 mg  12.5 mg Oral Q6H PRN Harmeet Winter MD        Or    ondansetron [facility]) injection 4 mg  4 mg Intravenous Q6H PRN Harmeet Winter MD        polyethylene glycol [facility]) packet 17 g  17 g Oral Daily PRN Harmeet Winter MD        acetaminophen (TYLENOL) tablet 650 mg  650 mg Oral Q6H PRN Harmeet Winter MD        Or    acetaminophen (TYLENOL) suppository 650 mg  650 mg Rectal Q6H PRN Harmeet Winter MD        atorvastatin (LIPITOR) tablet 40 mg  40 mg Oral Daily Harmeet Winter MD   40 mg at 01/02/21 1250    clopidogrel (PLAVIX) tablet 75 mg  75 mg Oral Daily Harmeet Winter MD   75 mg at 01/02/21 1250    DULoxetine (CYMBALTA) extended release capsule 90 mg  90 mg Oral Daily Harmeet Winter MD   90 mg at 01/02/21 1250    gabapentin (NEURONTIN) tablet 600 mg  600 mg Oral TID Harmeet Winter MD   600 mg at 01/02/21 2054    levalbuterol (XOPENEX) nebulization 0.63 mg  0.63 mg Nebulization Q4H PRN Harmeet Winter MD        cetirizine (ZYRTEC) tablet 10 mg  10 mg Oral Nightly PRN Harmeet Winter MD        melatonin disintegrating tablet 10 mg  10 mg Oral Nightly PRN Harmeet Winter MD        montelukast (SINGULAIR) tablet 10 mg  10 mg Oral Nightly PRN Harmeet Winter MD  pantoprazole (PROTONIX) tablet 40 mg  40 mg Oral BID Namita Cook MD   40 mg at 01/02/21 2053    pramipexole (MIRAPEX) tablet 0.5 mg  0.5 mg Oral BID Namita Cook MD   0.5 mg at 01/02/21 1816    vitamin D (CHOLECALCIFEROL) tablet 1,000 Units  1,000 Units Oral Daily Namita Cook MD   1,000 Units at 01/02/21 1250    carvedilol (COREG) tablet 6.25 mg  6.25 mg Oral BID Namita Cook MD   6.25 mg at 01/02/21 2054    budesonide (PULMICORT) nebulizer suspension 250 mcg  0.25 mg Nebulization BID Namita Cook MD   250 mcg at 01/03/21 0607    And    Arformoterol Tartrate (BROVANA) nebulizer solution 15 mcg  15 mcg Nebulization BID Namita Cook MD   15 mcg at 01/03/21 2349    ipratropium (ATROVENT) 0.02 % nebulizer solution 0.5 mg  0.5 mg Nebulization 4x daily Namita Cook MD   0.5 mg at 01/03/21 9471    guaiFENesin [facility]) extended release tablet 600 mg  600 mg Oral BID PRN Namita Cook MD        [facility]) injection 1 mg  1 mg Intravenous BID Farnaz Barkley MD   1 mg at 01/02/21 2053    insulin glargine (LANTUS) injection vial 30 Units  30 Units Subcutaneous Nightly Namita Cook MD   30 Units at 01/02/21 2054        Labs:     Recent Labs     01/02/21  0450 01/03/21  0009   WBC 7.6 6.6   RBC 3.37* 3.37*   HGB 9.9* 9.8*   HCT 30.7* 30.8*   MCV 91.1 91.4   MCH 29.4 29.1   MCHC 32.2* 31.8*    206     Recent Labs     01/02/21  0450 01/02/21  0513 01/03/21  0533   *  --  136   K 4.0 3.8 4.4   ANIONGAP 9  --  8     --  101   CO2 24  --  27   BUN 19  --  21   CREATININE 1.1*  --  1.3*   GLUCOSE 494*  --  211*   CALCIUM 8.9  --  8.9     No results for input(s): MG, PHOS in the last 72 hours. Recent Labs     01/02/21  0450   AST 15   ALT 14   BILITOT <0.2   ALKPHOS 120*     ABGs:No results for input(s): PH, PO2, PCO2, HCO3, BE, O2SAT in the last 72 hours.   Troponin T:   Recent Labs     01/02/21  1727 01/03/21  0009 01/03/21  0533   TROPONINI 0.10* 0.10* 0.08* - TTE  - continue home cardiac meds where appropriate      Type 2 diabetes with hyperglycemia  - reduced home lantus to 30 units daily, adjust as needed  - correction lispro, adjust as needed      Chronic normocytic anemia  Iron studies and most recent hospitalization not consistent with iron deficiency. Serum folate/B12 okay. Suspect chronic disease.  - stable. Monitor with CBC     Subcentimeter pulmonary nodules - needs f/u CT chest in six months by PCP. CKD III - at her baseline, which is around 1.3. monitor w/ diuresis.      Chronic problems, continue home meds where appropriate:  asthma  Morbid obesity with BMI 41.57  Hypertension  CAD  CKD 3  Hyperlipidemia  MARTHAnoncompliant with CPAP     DVT prophylaxis:  SQ Lovenox       CODE STATUS: Full Code      Signed:   John Lobato MD 1/3/2021 7:33 AM  Hospitalist

## 2021-01-03 NOTE — PLAN OF CARE
Problem: Falls - Risk of:  Goal: Will remain free from falls  1/3/2021 1434 by Alverto Pittman RN  Outcome: Ongoing  1/3/2021 0353 by Pina Boyer RN  Outcome: Ongoing  Goal: Absence of physical injury  1/3/2021 1434 by Alverto Pittman RN  Outcome: Ongoing  1/3/2021 0353 by Pina Boyer RN  Outcome: Ongoing

## 2021-01-04 LAB
ANION GAP SERPL CALCULATED.3IONS-SCNC: 9 MMOL/L (ref 7–19)
BASOPHILS ABSOLUTE: 0 K/UL (ref 0–0.2)
BASOPHILS RELATIVE PERCENT: 0.2 % (ref 0–1)
BUN BLDV-MCNC: 29 MG/DL (ref 8–23)
CALCIUM SERPL-MCNC: 8.5 MG/DL (ref 8.8–10.2)
CHLORIDE BLD-SCNC: 99 MMOL/L (ref 98–111)
CO2: 28 MMOL/L (ref 22–29)
CREAT SERPL-MCNC: 1.4 MG/DL (ref 0.5–0.9)
EKG P AXIS: 55 DEGREES
EKG P AXIS: 63 DEGREES
EKG P AXIS: 65 DEGREES
EKG P-R INTERVAL: 158 MS
EKG P-R INTERVAL: 164 MS
EKG P-R INTERVAL: 170 MS
EKG Q-T INTERVAL: 382 MS
EKG Q-T INTERVAL: 422 MS
EKG Q-T INTERVAL: 444 MS
EKG QRS DURATION: 88 MS
EKG QRS DURATION: 94 MS
EKG QRS DURATION: 94 MS
EKG QTC CALCULATION (BAZETT): 427 MS
EKG QTC CALCULATION (BAZETT): 434 MS
EKG QTC CALCULATION (BAZETT): 451 MS
EKG T AXIS: -106 DEGREES
EKG T AXIS: -111 DEGREES
EKG T AXIS: -123 DEGREES
EOSINOPHILS ABSOLUTE: 0.1 K/UL (ref 0–0.6)
EOSINOPHILS RELATIVE PERCENT: 1 % (ref 0–5)
GFR AFRICAN AMERICAN: 46
GFR NON-AFRICAN AMERICAN: 38
GLUCOSE BLD-MCNC: 113 MG/DL (ref 70–99)
GLUCOSE BLD-MCNC: 113 MG/DL (ref 70–99)
GLUCOSE BLD-MCNC: 118 MG/DL (ref 70–99)
GLUCOSE BLD-MCNC: 128 MG/DL (ref 74–109)
GLUCOSE BLD-MCNC: 134 MG/DL (ref 70–99)
HCT VFR BLD CALC: 28.1 % (ref 37–47)
HEMOGLOBIN: 9.2 G/DL (ref 12–16)
IMMATURE GRANULOCYTES #: 0 K/UL
LYMPHOCYTES ABSOLUTE: 1.3 K/UL (ref 1.1–4.5)
LYMPHOCYTES RELATIVE PERCENT: 22.1 % (ref 20–40)
MCH RBC QN AUTO: 29.8 PG (ref 27–31)
MCHC RBC AUTO-ENTMCNC: 32.7 G/DL (ref 33–37)
MCV RBC AUTO: 90.9 FL (ref 81–99)
MONOCYTES ABSOLUTE: 0.5 K/UL (ref 0–0.9)
MONOCYTES RELATIVE PERCENT: 8.6 % (ref 0–10)
NEUTROPHILS ABSOLUTE: 4 K/UL (ref 1.5–7.5)
NEUTROPHILS RELATIVE PERCENT: 67.9 % (ref 50–65)
PDW BLD-RTO: 12.6 % (ref 11.5–14.5)
PERFORMED ON: ABNORMAL
PLATELET # BLD: 167 K/UL (ref 130–400)
PMV BLD AUTO: 9.2 FL (ref 9.4–12.3)
POTASSIUM REFLEX MAGNESIUM: 4.3 MMOL/L (ref 3.5–5)
PRO-BNP: 1792 PG/ML (ref 0–900)
RBC # BLD: 3.09 M/UL (ref 4.2–5.4)
SODIUM BLD-SCNC: 136 MMOL/L (ref 136–145)
WBC # BLD: 5.8 K/UL (ref 4.8–10.8)

## 2021-01-04 PROCEDURE — 6360000002 HC RX W HCPCS: Performed by: INTERNAL MEDICINE

## 2021-01-04 PROCEDURE — 82947 ASSAY GLUCOSE BLOOD QUANT: CPT

## 2021-01-04 PROCEDURE — 99232 SBSQ HOSP IP/OBS MODERATE 35: CPT | Performed by: INTERNAL MEDICINE

## 2021-01-04 PROCEDURE — 2700000000 HC OXYGEN THERAPY PER DAY

## 2021-01-04 PROCEDURE — 93010 ELECTROCARDIOGRAM REPORT: CPT | Performed by: INTERNAL MEDICINE

## 2021-01-04 PROCEDURE — 2580000003 HC RX 258: Performed by: INTERNAL MEDICINE

## 2021-01-04 PROCEDURE — 36415 COLL VENOUS BLD VENIPUNCTURE: CPT

## 2021-01-04 PROCEDURE — 6370000000 HC RX 637 (ALT 250 FOR IP): Performed by: INTERNAL MEDICINE

## 2021-01-04 PROCEDURE — 94640 AIRWAY INHALATION TREATMENT: CPT

## 2021-01-04 PROCEDURE — 2140000000 HC CCU INTERMEDIATE R&B

## 2021-01-04 PROCEDURE — 93005 ELECTROCARDIOGRAM TRACING: CPT | Performed by: INTERNAL MEDICINE

## 2021-01-04 PROCEDURE — 6370000000 HC RX 637 (ALT 250 FOR IP): Performed by: EMERGENCY MEDICINE

## 2021-01-04 PROCEDURE — 83880 ASSAY OF NATRIURETIC PEPTIDE: CPT

## 2021-01-04 PROCEDURE — 80048 BASIC METABOLIC PNL TOTAL CA: CPT

## 2021-01-04 PROCEDURE — 85025 COMPLETE CBC W/AUTO DIFF WBC: CPT

## 2021-01-04 RX ORDER — BUMETANIDE 1 MG/1
1 TABLET ORAL 2 TIMES DAILY
Status: DISCONTINUED | OUTPATIENT
Start: 2021-01-05 | End: 2021-01-04

## 2021-01-04 RX ORDER — BUMETANIDE 0.25 MG/ML
1 INJECTION, SOLUTION INTRAMUSCULAR; INTRAVENOUS 2 TIMES DAILY
Status: DISCONTINUED | OUTPATIENT
Start: 2021-01-04 | End: 2021-01-04

## 2021-01-04 RX ORDER — BUMETANIDE 1 MG/1
1 TABLET ORAL 2 TIMES DAILY
Status: DISCONTINUED | OUTPATIENT
Start: 2021-01-04 | End: 2021-01-06 | Stop reason: HOSPADM

## 2021-01-04 RX ADMIN — SODIUM CHLORIDE, PRESERVATIVE FREE 10 ML: 5 INJECTION INTRAVENOUS at 09:18

## 2021-01-04 RX ADMIN — ARFORMOTEROL TARTRATE 15 MCG: 15 SOLUTION RESPIRATORY (INHALATION) at 06:47

## 2021-01-04 RX ADMIN — GABAPENTIN 600 MG: 600 TABLET, FILM COATED ORAL at 09:18

## 2021-01-04 RX ADMIN — Medication 30 UNITS: at 21:08

## 2021-01-04 RX ADMIN — IPRATROPIUM BROMIDE 0.5 MG: 0.5 SOLUTION RESPIRATORY (INHALATION) at 11:01

## 2021-01-04 RX ADMIN — BUDESONIDE 250 MCG: 0.25 SUSPENSION RESPIRATORY (INHALATION) at 20:03

## 2021-01-04 RX ADMIN — PANTOPRAZOLE SODIUM 40 MG: 40 TABLET, DELAYED RELEASE ORAL at 09:18

## 2021-01-04 RX ADMIN — PRAMIPEXOLE DIHYDROCHLORIDE 0.5 MG: 0.25 TABLET ORAL at 16:55

## 2021-01-04 RX ADMIN — IPRATROPIUM BROMIDE 0.5 MG: 0.5 SOLUTION RESPIRATORY (INHALATION) at 06:46

## 2021-01-04 RX ADMIN — ENOXAPARIN SODIUM 40 MG: 40 INJECTION SUBCUTANEOUS at 09:18

## 2021-01-04 RX ADMIN — PRAMIPEXOLE DIHYDROCHLORIDE 0.5 MG: 0.25 TABLET ORAL at 09:17

## 2021-01-04 RX ADMIN — Medication 30 UNITS: at 09:19

## 2021-01-04 RX ADMIN — AMLODIPINE BESYLATE 5 MG: 5 TABLET ORAL at 09:17

## 2021-01-04 RX ADMIN — ATORVASTATIN CALCIUM 40 MG: 40 TABLET, FILM COATED ORAL at 09:17

## 2021-01-04 RX ADMIN — Medication 1000 UNITS: at 09:18

## 2021-01-04 RX ADMIN — IPRATROPIUM BROMIDE 0.5 MG: 0.5 SOLUTION RESPIRATORY (INHALATION) at 14:49

## 2021-01-04 RX ADMIN — SODIUM CHLORIDE, PRESERVATIVE FREE 10 ML: 5 INJECTION INTRAVENOUS at 21:10

## 2021-01-04 RX ADMIN — CARVEDILOL 6.25 MG: 6.25 TABLET, FILM COATED ORAL at 21:08

## 2021-01-04 RX ADMIN — VALSARTAN 160 MG: 160 TABLET, FILM COATED ORAL at 09:18

## 2021-01-04 RX ADMIN — PANTOPRAZOLE SODIUM 40 MG: 40 TABLET, DELAYED RELEASE ORAL at 21:08

## 2021-01-04 RX ADMIN — BUDESONIDE 250 MCG: 0.25 SUSPENSION RESPIRATORY (INHALATION) at 06:46

## 2021-01-04 RX ADMIN — IPRATROPIUM BROMIDE 0.5 MG: 0.5 SOLUTION RESPIRATORY (INHALATION) at 20:03

## 2021-01-04 RX ADMIN — ARFORMOTEROL TARTRATE 15 MCG: 15 SOLUTION RESPIRATORY (INHALATION) at 20:03

## 2021-01-04 RX ADMIN — GABAPENTIN 600 MG: 600 TABLET, FILM COATED ORAL at 21:08

## 2021-01-04 RX ADMIN — CARVEDILOL 6.25 MG: 6.25 TABLET, FILM COATED ORAL at 09:18

## 2021-01-04 RX ADMIN — DULOXETINE 90 MG: 60 CAPSULE, DELAYED RELEASE ORAL at 09:18

## 2021-01-04 RX ADMIN — GABAPENTIN 600 MG: 600 TABLET, FILM COATED ORAL at 16:55

## 2021-01-04 RX ADMIN — CLOPIDOGREL BISULFATE 75 MG: 75 TABLET ORAL at 09:18

## 2021-01-04 ASSESSMENT — PAIN SCALES - GENERAL: PAINLEVEL_OUTOF10: 0

## 2021-01-04 NOTE — PLAN OF CARE
Problem: Falls - Risk of:  Goal: Will remain free from falls  Description: Will remain free from falls  1/3/2021 2301 by Travis Gomez RN  Outcome: Ongoing  1/3/2021 1434 by Tesha Bowden RN  Outcome: Ongoing  Goal: Absence of physical injury  Description: Absence of physical injury  1/3/2021 2301 by Travis Gomez RN  Outcome: Ongoing  1/3/2021 1434 by Tesha Bowden RN  Outcome: Ongoing

## 2021-01-04 NOTE — PROGRESS NOTES
Hospitalist Progress Note  1/4/2021 3:55 PM  Subjective:   Admit Date: 1/2/2021  PCP: YAMIL Weller NP    Chief Complaint: shortness of breath    Subjective: still without any voice but URI symptoms resolved. Still coughing but this, breathing, and pleuritic cp improved. Just took a shower w/o NC and felt very dyspneic. [facility]:  56 y.o. female with CAD, hypertension, chronic diastolic heart failure, persistent pericardial fusion, diabetes 2, morbid obesity, MARTHA noncompliant with CPAP who presented to the ER on 1/2 with progressive worsening shortness of breath. In the ER imaging and labs were consistent with volume overload. She had mild troponin elevation of 0.05, which peaked at 0.08. Cardiology was consulted. She was started on IV diuresis. TTE was repeated and showed EF 55-60%, unchanged pericardial effusion.        ROS: Six point review of systems is negative except as specifically addressed above.     DIET CARDIAC; Carb Control: 4 carb choices (60 gms)/meal    Intake/Output Summary (Last 24 hours) at 1/4/2021 1555  Last data filed at 1/4/2021 1407  Gross per 24 hour   Intake 720 ml   Output 900 ml   Net -180 ml     Medications:   dextrose       Current Facility-Administered Medications   Medication Dose Route Frequency Provider Last Rate Last Admin    bumetanide (BUMEX) injection 1 mg  1 mg Intravenous BID Monica Norris MD       [address] [place] [address] ON 1/5/2021] bumetanide (BUMEX) tablet 1 mg  1 mg Oral BID Monica Norris MD        levalbuterol Haley Don) nebulization 0.63 mg  0.63 mg Nebulization Q8H PRN Yassine Abdalla MD   0.63 mg at 01/02/21 0522    amLODIPine (NORVASC) tablet 5 mg  5 mg Oral Daily Yassine Abdalla MD   5 mg at 01/04/21 3371    valsartan (DIOVAN) tablet 160 mg  160 mg Oral Daily Yassine Abdalla MD   160 mg at 01/04/21 0918    insulin glargine (LANTUS) injection vial 30 Units  30 Units Subcutaneous Daily Sharlene Smith MD   30 Units at 01/04/21 1804  glucose (GLUTOSE) 40 % oral gel 15 g  15 g Oral PRN Harmeet Winter MD        dextrose 50 % IV solution  12.5 g Intravenous PRN Harmeet Winter MD        glucagon (rDNA) injection 1 mg  1 mg Intramuscular PRN Harmeet Winter MD        dextrose 5 % solution  100 mL/hr Intravenous PRN Harmeet Winter MD        insulin lispro (HUMALOG) injection vial 0-12 Units  0-12 Units Subcutaneous TID WC Harmeet Winter MD   6 Units at 01/03/21 0824    insulin lispro (HUMALOG) injection vial 0-6 Units  0-6 Units Subcutaneous Nightly Harmeet Winter MD        sodium chloride flush 0.9 % injection 10 mL  10 mL Intravenous 2 times per day Harmeet Winter MD   10 mL at 01/04/21 0918    sodium chloride flush 0.9 % injection 10 mL  10 mL Intravenous PRN Harmeet Winter MD        enoxaparin (LOVENOX) injection 40 mg  40 mg Subcutaneous Daily Harmeet Winter MD   40 mg at 01/04/21 0918    promethazine (PHENERGAN) tablet 12.5 mg  12.5 mg Oral Q6H PRN Harmeet Winter MD        Or    ondansetron [facility]) injection 4 mg  4 mg Intravenous Q6H PRN Harmeet Winter MD        polyethylene glycol [facility]) packet 17 g  17 g Oral Daily PRN Harmeet Winter MD        acetaminophen (TYLENOL) tablet 650 mg  650 mg Oral Q6H PRN Harmeet Winter MD        Or    acetaminophen (TYLENOL) suppository 650 mg  650 mg Rectal Q6H PRN Harmeet Winter MD        atorvastatin (LIPITOR) tablet 40 mg  40 mg Oral Daily Harmeet Winter MD   40 mg at 01/04/21 5675    clopidogrel (PLAVIX) tablet 75 mg  75 mg Oral Daily Harmeet Winter MD   75 mg at 01/04/21 1843    DULoxetine (CYMBALTA) extended release capsule 90 mg  90 mg Oral Daily Harmeet Winter MD   90 mg at 01/04/21 2861    gabapentin (NEURONTIN) tablet 600 mg  600 mg Oral TID Harmeet Winter MD   600 mg at 01/04/21 0918    levalbuterol (XOPENEX) nebulization 0.63 mg  0.63 mg Nebulization Q4H PRN Harmeet Winter MD  cetirizine (ZYRTEC) tablet 10 mg  10 mg Oral Nightly PRN Nannette Aguila MD   10 mg at 01/03/21 2016    melatonin disintegrating tablet 10 mg  10 mg Oral Nightly PRN Nannette Aguila MD   10 mg at 01/03/21 2017    montelukast (SINGULAIR) tablet 10 mg  10 mg Oral Nightly PRN Nannette Aguila MD   10 mg at 01/03/21 2017    pantoprazole (PROTONIX) tablet 40 mg  40 mg Oral BID Nannette Aguila MD   40 mg at 01/04/21 4050    pramipexole (MIRAPEX) tablet 0.5 mg  0.5 mg Oral BID Nannette Aguila MD   0.5 mg at 01/04/21 5704    vitamin D (CHOLECALCIFEROL) tablet 1,000 Units  1,000 Units Oral Daily Nannette Aguila MD   1,000 Units at 01/04/21 2702    carvedilol (COREG) tablet 6.25 mg  6.25 mg Oral BID Nannette Aguila MD   6.25 mg at 01/04/21 3105    budesonide (PULMICORT) nebulizer suspension 250 mcg  0.25 mg Nebulization BID Nannette Aguila MD   250 mcg at 01/04/21 5597    And    Arformoterol Tartrate (BROVANA) nebulizer solution 15 mcg  15 mcg Nebulization BID Nannette Aguila MD   15 mcg at 01/04/21 1861    ipratropium (ATROVENT) 0.02 % nebulizer solution 0.5 mg  0.5 mg Nebulization 4x daily Nannette Aguila MD   0.5 mg at 01/04/21 1449    guaiFENesin [facility]) extended release tablet 600 mg  600 mg Oral BID PRN Nannette Aguila MD   600 mg at 01/03/21 2017    insulin glargine (LANTUS) injection vial 30 Units  30 Units Subcutaneous Nightly Nannette Aguila MD   30 Units at 01/03/21 2022        Labs:     Recent Labs     01/02/21  0450 01/03/21  0009 01/04/21  0221   WBC 7.6 6.6 5.8   RBC 3.37* 3.37* 3.09*   HGB 9.9* 9.8* 9.2*   HCT 30.7* 30.8* 28.1*   MCV 91.1 91.4 90.9   MCH 29.4 29.1 29.8   MCHC 32.2* 31.8* 32.7*    206 167     Recent Labs     01/02/21  0450 01/02/21  0513 01/03/21  0533 01/04/21 0221   *  --  136 136   K 4.0 3.8 4.4 4.3   ANIONGAP 9  --  8 9     --  101 99   CO2 24  --  27 28   BUN 19  --  21 29*   CREATININE 1.1*  --  1.3* 1.4*   GLUCOSE 494*  --  211* 128* CALCIUM 8.9  --  8.9 8.5*     No results for input(s): MG, PHOS in the last 72 hours. Recent Labs     01/02/21  0450   AST 15   ALT 14   BILITOT <0.2   ALKPHOS 120*     ABGs:No results for input(s): PH, PO2, PCO2, HCO3, BE, O2SAT in the last 72 hours. Troponin T:   Recent Labs     01/02/21  1727 01/03/21  0009 01/03/21  0533   TROPONINI 0.10* 0.10* 0.08*     INR: No results for input(s): INR in the last 72 hours. Lactic Acid: No results for input(s): LACTA in the last 72 hours. Objective:   Vitals: BP (!) 114/58   Pulse 65   Temp 97.7 °F (36.5 °C) (Temporal)   Resp 18   Ht 5' 1\" (1.549 m)   Wt 220 lb (99.8 kg)   SpO2 93%   BMI 41.57 kg/m²   24HR INTAKE/OUTPUT:      Intake/Output Summary (Last 24 hours) at 1/4/2021 1555  Last data filed at 1/4/2021 1407  Gross per 24 hour   Intake 720 ml   Output 900 ml   Net -180 ml     General: Obese disheveled female resting on side in bed in NAD. Head: Atraumatic normocephalic. Mouth: MMM   Respiratory: Lungs clear to auscultation bilaterally. non labored   Cardiac: RRR  Abdomen: Soft, non-tender; no peritoneal signs, bowel sounds normal  Ext: no bilateral lower extremity edema  Neurologic: Awake alert and oriented x3, affect and mood appropriate. Face/tongue symmetric. Speech is fluent. No focal neurologic deficits.   Psychiatric: Anxious  Skin: No overt rashes  Hematologic: No overt bruises    Assessment and Plan:   Principal Problem:    Acute on chronic diastolic heart failure (HCC)  Active Problems:    Pericardial effusion    Normocytic anemia    Type 2 diabetes mellitus, with long-term current use of insulin ([facility])    Hyperglycemia    Morbid obesity with BMI of 40.0-44.9, adult (HCC)    Obstructive sleep apnea syndrome    Essential hypertension    Moderate persistent asthma without complication    Pulmonary hypertension (HCC)    Nonobstructive atherosclerosis of coronary artery    CKD (chronic kidney disease) stage 3, GFR 30-59 ml/min

## 2021-01-04 NOTE — PROGRESS NOTES
Cardiology Progress Note Gen Faustin MD      Patient:  Jesus Manuel Saenz  [id number]    Patient Active Problem List    Diagnosis Date Noted    Chest discomfort 07/14/2020     Priority: High    Elevated troponin 01/02/2021     Priority: Low    Pulmonary nodules 01/02/2021     Priority: Low    CHF (congestive heart failure), NYHA class I, acute on chronic, combined ([facility] 12/13/2020     Priority: Low    Mixed hyperlipidemia 08/13/2020     Priority: Low    Coronary artery disease involving native coronary artery of native heart without angina pectoris 08/13/2020     Priority: Low    Pulmonary edema with congestive heart failure ([facility] 07/02/2020     Priority: Low    Palliative care patient 07/02/2020     Priority: Low    Obstructive sleep apnea syndrome 06/05/2020     Priority: Low    Essential hypertension 06/05/2020     Priority: Low    Moderate persistent asthma without complication 65/83/1859     Priority: Low    Pulmonary hypertension ([facility] 03/09/2020     Priority: Low    Nonobstructive atherosclerosis of coronary artery 03/09/2020     Priority: Low    CKD (chronic kidney disease) stage 3, GFR 30-59 ml/min 03/09/2020     Priority: Low    Morbid obesity with BMI of 40.0-44.9, adult ([facility] 01/27/2020     Priority: Low    SOB (shortness of breath) 01/26/2020     Priority: Low     Overview Note:     Stents in the proximal circ and OM1  11/21/2019  Cath  LVEDP 25, PA 60/30, normal LVFX, mild CAD ([place], [address])  1/26/2020  Echo mild to moderate pericardial eff, normal LVF  3/7/2020  Echo large pericardial effusion  5/29/2020  Echo  Severe LVH, DD, normal LVFX, small pericardial effusion  7/15/20  lexiscan Positive for inferior lateral myocardial ischemia, EF 33%, -3% ischemic myocardium on stress, intermediate risk findings, AUC indication 16, AUC score 7, Susan Mcdermott MD)  7/15/20  Echo  Normal LVFX, moderate pericardial effusion  7/16/20  Cath  Mild CAD, normal LVFX  Pericardial effusion 01/26/2020     Priority: Low    Acute on chronic diastolic heart failure ([facility] 01/26/2020     Priority: Low    Normocytic anemia 01/26/2020     Priority: Low    Type 2 diabetes mellitus, with long-term current use of insulin ([facility] 01/26/2020     Priority: Low    COPD (chronic obstructive pulmonary disease) ([facility] 01/26/2020     Priority: Low    Hyperglycemia 01/26/2020     Priority: Low    Acute kidney injury ([facility] 01/26/2020     Priority: Low    Elevated d-dimer 01/26/2020     Priority: Low       Admit Date:  1/2/2021    Admission Problem List: Present on Admission:   Elevated troponin   Acute on chronic diastolic heart failure (HCC)   Pericardial effusion   Hyperglycemia   Type 2 diabetes mellitus, with long-term current use of insulin (HCC)   Morbid obesity with BMI of 40.0-44.9, adult ([facility]   Obstructive sleep apnea syndrome   Essential hypertension   Moderate persistent asthma without complication   Pulmonary hypertension (HCC)   CKD (chronic kidney disease) stage 3, GFR 30-59 ml/min   (Resolved) Shortness of breath   Nonobstructive atherosclerosis of coronary artery   Mixed hyperlipidemia   Normocytic anemia   Pulmonary nodules      Cardiac Specific Data:  Specialty Problems        Cardiology Problems    * (Principal) Acute on chronic diastolic heart failure (HCC)        Pericardial effusion        Nonobstructive atherosclerosis of coronary artery        Pulmonary hypertension (HCC)        Essential hypertension        Pulmonary edema with congestive heart failure (HCC)        Coronary artery disease involving native coronary artery of native heart without angina pectoris        Mixed hyperlipidemia        CHF (congestive heart failure), NYHA class I, acute on chronic, combined ([facility]            1.  Coronary artery disease, prior PCI 7/2018 to proximal to mid circumflex/OM1 ([place]) with patent stents by catheterization 7/16/2020. fluticasone-vilanterol (BREO ELLIPTA) 100-25 MCG/INH AEPB inhaler Inhale 1 puff into the lungs daily 11/22/20  Yes YAMIL Estrada NP   amLODIPine (NORVASC) 5 MG tablet Take 1 tablet by mouth daily 11/22/20  Yes YAMIL Estrada NP   Insulin Degludec [facility] FLEXTOUCH) 100 UNIT/ML SOPN Inject 40 Units into the skin nightly 11/22/20  Yes YAMIL Estrada NP   Semaglutide, 1 MG/DOSE, 2 MG/1.5ML SOPN Inject 1 mg into the skin every 7 days 11/22/20  Yes YAMIL Estrada NP   levocetirizine [place]) 5 MG tablet Take 1 tablet by mouth nightly 11/22/20  Yes YAMIL Estrada NP   blood glucose monitor strips Test 3 times a day & as needed for symptoms of irregular blood glucose. Dispense sufficient amount for indicated testing frequency plus additional to accommodate PRN testing needs. 11/22/20  Yes YAMIL Estrada NP   pramipexole (MIRAPEX) 0.5 MG tablet Take 1 tablet by mouth 2 times daily 11/22/20  Yes YAMIL Estrada NP   gabapentin (NEURONTIN) 600 MG tablet Take 1 tablet by mouth 3 times daily for 30 days. 11/20/20 1/2/21 Yes Leonora Schmid MD   tiotropium (SPIRIVA RESPIMAT) 1.25 MCG/ACT AERS inhaler Inhale 2 puffs into the lungs daily 10/19/20  Yes YAMIL Estrada NP   levalbuterol [facility] HFA) 45 MCG/ACT inhaler Inhale 2 puffs into the lungs every 4 hours as needed for Wheezing or Shortness of Breath 10/19/20  Yes YAMIL Estrada NP   levalbuterol [facility]) 0.63 MG/3ML nebulization Take 3 mLs by nebulization every 6 hours as needed for Wheezing 10/19/20 1/2/21 Yes YAMIL Estrada NP   DULoxetine (CYMBALTA) 30 MG extended release capsule Take 30 mg by mouth daily ALONG WITH A 60 MG TABLET (BOTH TOGETHER + 90 MG)   Yes Historical Nathan, MD   DULoxetine (CYMBALTA) 60 MG extended release capsule Take 60 mg by mouth daily WITH A 30 MG TABLET.  TOTAL OF 90 MG)   Yes Historical Provider, MD Blood Glucose Monitoring Suppl (BLOOD GLUCOSE MONITOR SYSTEM) w/Device KIT 1 Device by Does not apply route three times daily 7/11/20  Yes YAMIL Ramírez NP   Lancets MISC 1 each by Does not apply route 3 times daily 7/11/20  Yes YAMIL Ramírez NP   valsartan (DIOVAN) 160 MG tablet Take 160 mg by mouth daily   Yes Historical Provider, MD   clopidogrel (PLAVIX) 75 MG tablet Take 75 mg by mouth daily   Yes Historical Provider, MD   montelukast (SINGULAIR) 10 MG tablet Take 10 mg by mouth nightly   Yes Historical Provider, MD   vitamin D (CHOLECALCIFEROL) 25 MCG (1000 UT) TABS tablet Take 1,000 Units by mouth daily   Yes Historical Provider, MD   melatonin 3 MG TABS tablet Take 10 mg by mouth nightly   Yes Historical Provider, MD        bumetanide  1 mg Oral BID    amLODIPine  5 mg Oral Daily    valsartan  160 mg Oral Daily    insulin glargine  30 Units Subcutaneous Daily    insulin lispro  0-12 Units Subcutaneous TID WC    insulin lispro  0-6 Units Subcutaneous Nightly    sodium chloride flush  10 mL Intravenous 2 times per day    enoxaparin  40 mg Subcutaneous Daily    atorvastatin  40 mg Oral Daily    clopidogrel  75 mg Oral Daily    DULoxetine  90 mg Oral Daily    gabapentin  600 mg Oral TID    pantoprazole  40 mg Oral BID    pramipexole  0.5 mg Oral BID    Vitamin D  1,000 Units Oral Daily    carvedilol  6.25 mg Oral BID    budesonide  0.25 mg Nebulization BID    And    Arformoterol Tartrate  15 mcg Nebulization BID    ipratropium  0.5 mg Nebulization 4x daily    insulin glargine  30 Units Subcutaneous Nightly       TELEMETRY: Sinus     Physical Exam:      Physical Exam  Constitutional:       General: She is not in acute distress. Appearance: She is not diaphoretic. Comments: Morbidly obese   HENT:      Mouth/Throat:      Pharynx: No oropharyngeal exudate. Eyes:      General: No scleral icterus. Right eye: No discharge. Left eye: No discharge. Neck:      Thyroid: No thyromegaly. Vascular: No JVD. Cardiovascular:      Rate and Rhythm: Normal rate and regular rhythm. No extrasystoles are present. Heart sounds: Normal heart sounds, S1 normal and S2 normal. No murmur. No systolic murmur. No diastolic murmur. No friction rub. No gallop. No S3 or S4 sounds. Comments: JVP difficult to ascertain due to weight but appears mildly increased. Trace edema  No gallop noted  Pulmonary:      Effort: Pulmonary effort is normal. No respiratory distress. Breath sounds: Normal breath sounds. No wheezing or rales. Comments: Air entry present in both lung fields  Chest:      Chest wall: No tenderness. Abdominal:      General: Bowel sounds are normal. There is no distension. Palpations: Abdomen is soft. There is no mass. Tenderness: There is no abdominal tenderness. There is no guarding or rebound. Hernia: No hernia is present. Comments: No palpable organomegaly. Difficult exam due to abdominal girth   Musculoskeletal: Normal range of motion. Skin:     General: Skin is warm. Coloration: Skin is not pale. Findings: No rash. Neurological:      Mental Status: She is alert and oriented to person, place, and time. Cranial Nerves: No cranial nerve deficit. Deep Tendon Reflexes: Reflexes normal.                 Lab Data:  CBC:   Recent Labs     01/02/21  0450 01/03/21  0009 01/04/21 0221   WBC 7.6 6.6 5.8   HGB 9.9* 9.8* 9.2*   HCT 30.7* 30.8* 28.1*   MCV 91.1 91.4 90.9    206 167     BMP:   Recent Labs     01/02/21  0450 01/02/21  0513 01/03/21  0533 01/04/21  0221   *  --  136 136   K 4.0 3.8 4.4 4.3     --  101 99   CO2 24  --  27 28   BUN 19  --  21 29*   CREATININE 1.1*  --  1.3* 1.4*     LIVER PROFILE:   Recent Labs     01/02/21 0450   AST 15   ALT 14   BILITOT <0.2   ALKPHOS 120*     PT/INR: No results for input(s): PROTIME, INR in the last 72 hours.   APTT:   Recent Labs 01/02/21  0450   APTT 25.7*     BNP:  No results for input(s): BNP in the last 72 hours. CK, CKMB, Troponin: @LABRCNT (CKTOTAL:3, CKMB:3, TROPONINI:3)@    IMAGING:  Xr Chest Portable    Result Date: 1/2/2021  Exam:   XR CHEST PORTABLE  Date:  1/2/2021 History:  Female, age  56 years; chest pain COMPARISON:  Chest x-ray dated December 13, 2020. Findings : Mild-to-moderate prominence of the cardiomediastinal silhouette. Interstitial edema. Trace bilateral pleural effusions. No measurable pneumothorax. The bones show no acute pathology. Impression: Fluid overload. Signed by Dr Bri Sales on 1/2/2021 7:48 AM    Xr Chest Portable    Result Date: 12/13/2020  XR CHEST PORTABLE 12/13/2020 12:48 AM HISTORY:   Shortness of breath  Single view. Portable upright COMPARISONS:  12/9/2020, 11/2/2020 and 7/14/2020 FINDINGS: Examination was sent to [facility] for preliminary interpretation. There is cardiomegaly. The interstitial markings are diffusely prominent. There is airspace opacities noted in the left lower lobe with may be related to the technique and patient body habitus, observe similarly on prior examinations. The bony structures are intact. Radiographically, chest is unchanged. Generous heart size with chronic lung changes most likely. Signed by Dr Bashir Bae on 12/13/2020 9:08 AM    Xr Chest Portable    Result Date: 12/9/2020  XR CHEST PORTABLE 12/9/2020 11:20 AM History: Short of breath. Portable chest x-ray compared with 2 November 2020. Magnified heart size. Moderate thoracic spurring. Diffuse interstitial prominence is again seen. There is opacification of the retrocardiac left lower lobe though this is not significantly changed compared with 2 months ago. 1. Interstitial prominence with chronic left lower lobe opacity. No significant change from 2 months ago.  Signed by Dr Dalia Martinez on 12/9/2020 11:21 AM    Ct Chest High Resolution    Result Date: 12/15/2020 CT CHEST HIGH RESOLUTION 12/15/2020 4:22 PM HISTORY: Shortness of breath, history of rheumatoid arthritis, questionable interstitial lung disease COMPARISON: CT scan dated 7/2/2020 DLP: 1043 mGy cm TECHNIQUE: Serial helical tomographic images of the chest were acquired. Bone and soft tissue algorithms were provided. Coronal reformatted images were also provided for review. Automated exposure control was also utilized to decrease patient radiation dose. FINDINGS: Neck base: The imaged portion of the neck and thyroid gland is unremarkable. Lungs: There are a few tiny benign-appearing nodules in the lung apices, the largest measuring only 3 mm. These are stable. There are no new or enlarging pulmonary nodules. No consolidation. No subpleural reticulation, traction bronchiectasis, honeycombing or cyst formation is evidence for underlying chronic interstitial lung disease. There is no pleural effusion. Airways are clear. Heart: The heart is normal in size. Coronary artery atheromatous calcification. There is a moderate to large pericardial effusion, which appears to be slightly increased from the earlier comparison CT scan. Vessels: Thoracic aorta is normal in caliber. Central pulmonary arteries are normal in caliber. Mediastinum: No suspicious hilar or mediastinal adenopathy. Debris identified diffusely throughout the esophagus, up to the level of the upper third of the esophagus. The esophagus is nondilated. Skeletal and soft tissues: The osseous structures of the thorax and surrounding soft tissues demonstrate no worrisome lesions or acute process. Upper abdomen: The imaged portion of the upper abdomen demonstrates no acute process. Splenic artery atheromatous calcification. CTA PULMONARY W CONTRAST 1/2/2021 5:04 AM HISTORY: Chest pain COMPARISON: CT chest dated December 15, 2020. DLP: 739 mGy cm TECHNIQUE: Helical tomographic images of the chest were obtained after the administration of intravenous contrast following angiogram protocol. Additionally, 3D reformatted images were provided. FINDINGS:  Pulmonary arteries: There is adequate enhancement of the pulmonary arteries to evaluate for central and segmental pulmonary emboli. There are no filling defects within the main, lobar, segmental or visualized subsegmental pulmonary arteries. The pulmonary arteries are within normal limits. Aorta and great vessels: There is atherosclerosis in the aorta without aneurysm or dissection. There is atherosclerosis in the great vessels. Visualized neck base: The imaged portion of the base of the neck appears unremarkable. Lungs: 2 adjacent pulmonary nodules in the left lower lobe measuring 7 mm (image [id number], series 6). Additional smaller nodules are scattered throughout the lungs, measuring up to 3 mm, best seen in the right upper lobe. Interlobular septal thickening, most concerning for interstitial edema. Trace bilateral pleural effusions. . The trachea and bronchial tree are patent. Heart: The heart is normal in size. Small-to-moderate pericardial effusion. Coronary artery disease. . Mediastinum and lymph nodes: No enlarged mediastinal, hilar, or axillary lymph nodes are present. Skeletal and soft tissues: The osseous structures of the thorax and surrounding soft tissues demonstrate no acute process. Upper abdomen: The imaged portion of the upper abdomen demonstrates no acute process. 1.   No evidence of pulmonary embolus. 2.  Fluid overload with findings of pericardial effusion. 3.  Subcentimeter pulmonary nodules. Follow-up be obtained in 6 months to assess stability.  Signed by Dr Freddy Carranza on 1/2/2021 7:26 AM    Echo 2d Wo Color Doppler Complete    Result Date: 1/4/2021 Transthoracic Echocardiography Report (TTE)  Demographics   Patient Name  Osmin Faust Date of Study          01/03/2021   MRN           [id number]          Gender                 Female   Date of Birth 1960      Room Number            MHL-0716   Age           56 year(s)   Height:       61 inches       Referring Physician    Mariah Marte MD   Weight:       220 pounds      Sonographer            Rita SousaTAYLORESTUARDO Pratt   BSA:          1.97 m^2        Interpreting Physician Mariah Marte MD   BMI:          41.57 kg/m^2  Procedure Type of Study   TTE procedure:ECHOCARDIOGRAM COMPLETE 2D WO COLOR DOPPLER. Study Location: Echo Lab Technical Quality: Limited visualization due to body habitus. Patient Status: Inpatient Rhythm: Within normal limits BP: 135/71 mmHg Indications:Dyspnea/SOB. Conclusions   Summary  Normal left ventricular size with preserved LV function and an estimated  ejection fraction of approximately 55-60%. Severe concentric left ventricular hypertrophy. No regional wall motion abnormalities. There is a moderate circumferential pericardial effusion noted unchanged  prior study   Signature   ----------------------------------------------------------------  Electronically signed by Mariah Marte MD(Interpreting  physician) on 01/04/2021 08:00 AM  ----------------------------------------------------------------   Findings   Left Ventricle  Normal left ventricular size with preserved LV function and an estimated  ejection fraction of approximately 55-60%. Severe concentric left ventricular hypertrophy. No regional wall motion abnormalities. Pericardial Effusion  There is a moderate circumferential pericardial effusion noted unchanged  prior study  Allergies   - Other allergy:Reaction - Other;Severity - Unknown;Sensitivity -     Allergy(albuterol,Levoquin,metformin,asprin). - Metformin.   - Levaquin.   - Other allergy.   - Aspirin.  M-Mode Measurements (cm)   LVIDd: 4.44 cm LVIDs: 3.17 cm  IVSd: 1.81 cm  LVPWd: 1.69 cm  % Ejection Fraction: 55 %      Echo 2d Wo Color Doppler Complete    Result Date: 12/14/2020 Transthoracic Echocardiography Report (TTE)  Demographics   Patient Name  May Davis Date of Study         12/14/2020   MRN           [id number]          Gender                Female   Date of Birth 1960      Room Number           L-0718   Age           56 year(s)   Height:       61 inches       Referring Physician   Antionette Andrews   Weight:       226 pounds      Sonographer           Surinder EspinosaSARA Santa   BSA:          1.99 m^2        Interpreting          Windy Cedeno MD                                Physician   BMI:          42.7 kg/m^2  Procedure Type of Study   TTE procedure  Study Location: Echo Lab Technical Quality: Adequate visualization Patient Status: Inpatient BP: 133/64 mmHg Indications:Dyspnea/SOB. Conclusions   Summary  Limited echocardiographic study for pericardial effusion. LV is normal in size with normal systolic function. LV ejection fraction  estimated at 60%. Moderate concentric LVH. RV is normal in size with normal systolic function. Moderate left atrial enlargement. Right atrium is normal in size. There is a localized pericardial effusion there is predominantly posterior  behind the left ventricle (measuring 2.37 cm) and lateral with minimal  effusion anteriorly. No significant effusion overlying right ventricleor  in apical location. No obvious chamber evidence of collapse to suggest  significant tamponade features from this effusion. Mitral and tricuspid  inflow were not studied. Comparison with echocardiogram from 9/30/2020 shows no significant change  in pericardial effusion. Signature   ----------------------------------------------------------------  Electronically signed by Windy Cedeno MD(Interpreting physician)  on 12/14/2020 06:29 PM  ----------------------------------------------------------------  Allergies   - Other allergy:Reaction - Other;Severity - Unknown;Sensitivity -     Allergy(albuterol,Levoquin,metformin,asprin). - Metformin.   - Levaquin.    - Other allergy.   - Aspirin. M-Mode Measurements (cm)   LVIDd: 4.6 cm                                   LVIDs: 3.14 cm  IVSd: 1.45 cm  LVPWd: 1.48 cm  % Ejection Fraction: 60 %          Assessment and Plan: This is a 57 y. o. year old female with past medical history of coronary artery disease with prior PCI to proximal to mid circumflex/OM1 7/2018 (patent by catheterization 7/16/2020) moderate to severe LVH with preserved LV systolic function with probable diastolic heart failure, moderate predominantly localized posterior pericardial effusion that has been stable with no evidence of tamponade, severe obesity, diabetes mellitus with strongly positive rheumatoid titers presenting with acute diastolic heart failure with hypertensive presentation.     1. 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 She will need to be maintained on the dry side with good blood pressure control in order to avoid recurrent hospitalizations. Review of her labs show her baseline BNP with stable status around 1200. She had presented with a proBNP of 4331 which has since improved with diuresis. Her baseline creatinine is between 1.5-1.6 with elevated BUN when she is doing better. Would maintain on Bumex without change. Continue current blood pressure medications. Blood pressure is much better controlled. 2.  Noted troponin borderline elevated. Previous elevations of troponin with previous catheterization July 2020 with patent stents. At this time would hold off repeat catheterization. Likely troponin elevation from diastolic heart failure with severe LVH.     Davian Solis MD 1/4/2021 4:40 PM

## 2021-01-05 ENCOUNTER — APPOINTMENT (OUTPATIENT)
Dept: GENERAL RADIOLOGY | Age: 61
DRG: 291 | End: 2021-01-05
Payer: MEDICARE

## 2021-01-05 ENCOUNTER — TELEPHONE (OUTPATIENT)
Dept: INTERNAL MEDICINE CLINIC | Age: 61
End: 2021-01-05

## 2021-01-05 LAB
ANION GAP SERPL CALCULATED.3IONS-SCNC: 9 MMOL/L (ref 7–19)
BASOPHILS ABSOLUTE: 0 K/UL (ref 0–0.2)
BASOPHILS RELATIVE PERCENT: 0.4 % (ref 0–1)
BUN BLDV-MCNC: 37 MG/DL (ref 8–23)
CALCIUM SERPL-MCNC: 8.3 MG/DL (ref 8.8–10.2)
CHLORIDE BLD-SCNC: 98 MMOL/L (ref 98–111)
CO2: 27 MMOL/L (ref 22–29)
CREAT SERPL-MCNC: 1.5 MG/DL (ref 0.5–0.9)
EKG P AXIS: 70 DEGREES
EKG P-R INTERVAL: 170 MS
EKG Q-T INTERVAL: 422 MS
EKG QRS DURATION: 98 MS
EKG QTC CALCULATION (BAZETT): 436 MS
EKG T AXIS: -111 DEGREES
EOSINOPHILS ABSOLUTE: 0.1 K/UL (ref 0–0.6)
EOSINOPHILS RELATIVE PERCENT: 1.2 % (ref 0–5)
GFR AFRICAN AMERICAN: 42
GFR NON-AFRICAN AMERICAN: 35
GLUCOSE BLD-MCNC: 142 MG/DL (ref 70–99)
GLUCOSE BLD-MCNC: 146 MG/DL (ref 70–99)
GLUCOSE BLD-MCNC: 154 MG/DL (ref 70–99)
GLUCOSE BLD-MCNC: 224 MG/DL (ref 70–99)
GLUCOSE BLD-MCNC: 71 MG/DL (ref 70–99)
GLUCOSE BLD-MCNC: 80 MG/DL (ref 74–109)
HCT VFR BLD CALC: 28.1 % (ref 37–47)
HEMOGLOBIN: 9.4 G/DL (ref 12–16)
IMMATURE GRANULOCYTES #: 0 K/UL
LYMPHOCYTES ABSOLUTE: 1.4 K/UL (ref 1.1–4.5)
LYMPHOCYTES RELATIVE PERCENT: 25.1 % (ref 20–40)
MCH RBC QN AUTO: 29.8 PG (ref 27–31)
MCHC RBC AUTO-ENTMCNC: 33.5 G/DL (ref 33–37)
MCV RBC AUTO: 89.2 FL (ref 81–99)
MONOCYTES ABSOLUTE: 0.5 K/UL (ref 0–0.9)
MONOCYTES RELATIVE PERCENT: 8.8 % (ref 0–10)
NEUTROPHILS ABSOLUTE: 3.6 K/UL (ref 1.5–7.5)
NEUTROPHILS RELATIVE PERCENT: 64 % (ref 50–65)
PDW BLD-RTO: 12.6 % (ref 11.5–14.5)
PERFORMED ON: ABNORMAL
PERFORMED ON: NORMAL
PLATELET # BLD: 193 K/UL (ref 130–400)
PMV BLD AUTO: 9.5 FL (ref 9.4–12.3)
POTASSIUM REFLEX MAGNESIUM: 4.5 MMOL/L (ref 3.5–5)
RBC # BLD: 3.15 M/UL (ref 4.2–5.4)
SODIUM BLD-SCNC: 134 MMOL/L (ref 136–145)
WBC # BLD: 5.7 K/UL (ref 4.8–10.8)

## 2021-01-05 PROCEDURE — 2700000000 HC OXYGEN THERAPY PER DAY

## 2021-01-05 PROCEDURE — 2580000003 HC RX 258: Performed by: INTERNAL MEDICINE

## 2021-01-05 PROCEDURE — 80048 BASIC METABOLIC PNL TOTAL CA: CPT

## 2021-01-05 PROCEDURE — 99232 SBSQ HOSP IP/OBS MODERATE 35: CPT | Performed by: INTERNAL MEDICINE

## 2021-01-05 PROCEDURE — 71046 X-RAY EXAM CHEST 2 VIEWS: CPT

## 2021-01-05 PROCEDURE — 93010 ELECTROCARDIOGRAM REPORT: CPT | Performed by: INTERNAL MEDICINE

## 2021-01-05 PROCEDURE — 82947 ASSAY GLUCOSE BLOOD QUANT: CPT

## 2021-01-05 PROCEDURE — 6370000000 HC RX 637 (ALT 250 FOR IP): Performed by: INTERNAL MEDICINE

## 2021-01-05 PROCEDURE — 94761 N-INVAS EAR/PLS OXIMETRY MLT: CPT

## 2021-01-05 PROCEDURE — 85025 COMPLETE CBC W/AUTO DIFF WBC: CPT

## 2021-01-05 PROCEDURE — 6370000000 HC RX 637 (ALT 250 FOR IP): Performed by: EMERGENCY MEDICINE

## 2021-01-05 PROCEDURE — 36415 COLL VENOUS BLD VENIPUNCTURE: CPT

## 2021-01-05 PROCEDURE — 94640 AIRWAY INHALATION TREATMENT: CPT

## 2021-01-05 PROCEDURE — 93005 ELECTROCARDIOGRAM TRACING: CPT | Performed by: INTERNAL MEDICINE

## 2021-01-05 PROCEDURE — 2140000000 HC CCU INTERMEDIATE R&B

## 2021-01-05 PROCEDURE — 6360000002 HC RX W HCPCS: Performed by: INTERNAL MEDICINE

## 2021-01-05 RX ADMIN — SODIUM CHLORIDE, PRESERVATIVE FREE 10 ML: 5 INJECTION INTRAVENOUS at 11:19

## 2021-01-05 RX ADMIN — IPRATROPIUM BROMIDE 0.5 MG: 0.5 SOLUTION RESPIRATORY (INHALATION) at 14:23

## 2021-01-05 RX ADMIN — PANTOPRAZOLE SODIUM 40 MG: 40 TABLET, DELAYED RELEASE ORAL at 11:17

## 2021-01-05 RX ADMIN — GABAPENTIN 600 MG: 600 TABLET, FILM COATED ORAL at 14:00

## 2021-01-05 RX ADMIN — GABAPENTIN 600 MG: 600 TABLET, FILM COATED ORAL at 11:19

## 2021-01-05 RX ADMIN — ARFORMOTEROL TARTRATE 15 MCG: 15 SOLUTION RESPIRATORY (INHALATION) at 06:26

## 2021-01-05 RX ADMIN — PRAMIPEXOLE DIHYDROCHLORIDE 0.5 MG: 0.25 TABLET ORAL at 11:18

## 2021-01-05 RX ADMIN — IPRATROPIUM BROMIDE 0.5 MG: 0.5 SOLUTION RESPIRATORY (INHALATION) at 06:26

## 2021-01-05 RX ADMIN — IPRATROPIUM BROMIDE 0.5 MG: 0.5 SOLUTION RESPIRATORY (INHALATION) at 10:25

## 2021-01-05 RX ADMIN — ARFORMOTEROL TARTRATE 15 MCG: 15 SOLUTION RESPIRATORY (INHALATION) at 19:16

## 2021-01-05 RX ADMIN — CARVEDILOL 6.25 MG: 6.25 TABLET, FILM COATED ORAL at 21:07

## 2021-01-05 RX ADMIN — CARVEDILOL 6.25 MG: 6.25 TABLET, FILM COATED ORAL at 11:18

## 2021-01-05 RX ADMIN — VALSARTAN 160 MG: 160 TABLET, FILM COATED ORAL at 11:17

## 2021-01-05 RX ADMIN — AMLODIPINE BESYLATE 5 MG: 5 TABLET ORAL at 11:18

## 2021-01-05 RX ADMIN — PANTOPRAZOLE SODIUM 40 MG: 40 TABLET, DELAYED RELEASE ORAL at 21:07

## 2021-01-05 RX ADMIN — PRAMIPEXOLE DIHYDROCHLORIDE 0.5 MG: 0.25 TABLET ORAL at 16:00

## 2021-01-05 RX ADMIN — Medication 30 UNITS: at 21:10

## 2021-01-05 RX ADMIN — BUMETANIDE 1 MG: 1 TABLET ORAL at 11:18

## 2021-01-05 RX ADMIN — INSULIN LISPRO 2 UNITS: 100 INJECTION, SOLUTION INTRAVENOUS; SUBCUTANEOUS at 21:10

## 2021-01-05 RX ADMIN — IPRATROPIUM BROMIDE 0.5 MG: 0.5 SOLUTION RESPIRATORY (INHALATION) at 19:16

## 2021-01-05 RX ADMIN — DULOXETINE 90 MG: 60 CAPSULE, DELAYED RELEASE ORAL at 11:18

## 2021-01-05 RX ADMIN — Medication 30 UNITS: at 11:23

## 2021-01-05 RX ADMIN — GABAPENTIN 600 MG: 600 TABLET, FILM COATED ORAL at 21:07

## 2021-01-05 RX ADMIN — BUDESONIDE 250 MCG: 0.25 SUSPENSION RESPIRATORY (INHALATION) at 19:16

## 2021-01-05 RX ADMIN — BUDESONIDE 250 MCG: 0.25 SUSPENSION RESPIRATORY (INHALATION) at 06:27

## 2021-01-05 RX ADMIN — CLOPIDOGREL BISULFATE 75 MG: 75 TABLET ORAL at 11:17

## 2021-01-05 RX ADMIN — BUMETANIDE 1 MG: 1 TABLET ORAL at 21:07

## 2021-01-05 RX ADMIN — ATORVASTATIN CALCIUM 40 MG: 40 TABLET, FILM COATED ORAL at 11:18

## 2021-01-05 RX ADMIN — SODIUM CHLORIDE, PRESERVATIVE FREE 10 ML: 5 INJECTION INTRAVENOUS at 21:08

## 2021-01-05 RX ADMIN — Medication 1000 UNITS: at 11:18

## 2021-01-05 ASSESSMENT — PAIN SCALES - GENERAL: PAINLEVEL_OUTOF10: 0

## 2021-01-05 NOTE — TELEPHONE ENCOUNTER
[facility] has a referral from [facility] follow for [place]  ?   (If she is still out on leave  which provider would they need to put on that ? )

## 2021-01-05 NOTE — CARE COORDINATION
Spoke with patient regarding MD orders for [place] services. Patient agreeable and has chosen [facility]. Referral Faxed. [facility] [phone]. [phone]. Please notify [facility] when patient discharges and fax DC Summary,  DC med list and any new [place] orders. The Patient  was provided with a choice of provider and agrees   with the discharge plan. [x] Yes [] No    Freedom of choice list was provided with basic dialogue that supports the patient's individualized plan of care/goals, treatment preferences and shares the quality data associated with the providers.  [x] Yes [] No  Electronically signed by Mane Amador on 1/5/2021 at 4:27 PM

## 2021-01-05 NOTE — CARE COORDINATION
Pt reports had discussed SNF therapy rehab but is \"too afraid of catching Covid-19\" and wants to dc home. Pt states her spouse resides in the home and other than diabetes he can mange her care. Pt reports having a RW pta but has broken and received through medicare less than 5 years ago. SW explained medicare will not provide another RW but th ept can request the DME as self-pay and work out payment options. Pt asked to discuss with her spouse and can obtain as OP. Pt did not qualify for home O2 at this time. Pt is requesting [facility] for SN and therapy upon dc and SW notified the attending Dr Dinesh Donovan for the request.     Raquel Pearce will continue to follow and assist with further dc needs as identified.

## 2021-01-05 NOTE — PROGRESS NOTES
Hospitalist Progress Note  1/5/2021 3:39 PM  Subjective:   Admit Date: 1/2/2021  PCP: YAMIL Garcia NP    Chief Complaint: shortness of breath    Subjective: Had home 02 evaluation earlier but did not qualify. Tells me she gets dyspneic even walking a couple of feet. I discussed worsening debility and she was agreeable to [facility] referral- ordered PT/OT. Otherwise still has non productive cough. Voice returning. No LE edema or chest pain. [facility]:  56 y.o. female with CAD, hypertension, chronic diastolic heart failure, persistent pericardial fusion, diabetes 2, morbid obesity, MARTHA noncompliant with CPAP who presented to the ER on 1/2 with progressive worsening shortness of breath. In the ER imaging and labs were consistent with volume overload. She had mild troponin elevation of 0.05, which peaked at 0.08. Cardiology was consulted. She was started on IV diuresis. TTE was repeated and showed EF 55-60%, unchanged pericardial effusion. Transitioned to oral diuretics. Now on RA but still very dyspneic with exertion. PT/OT ordered today as patient agreeable to [facility]. Discharge pending this and cardiology recs. ROS: Six point review of systems is negative except as specifically addressed above.     DIET CARDIAC; Carb Control: 4 carb choices (60 gms)/meal    Intake/Output Summary (Last 24 hours) at 1/5/2021 1539  Last data filed at 1/5/2021 1313  Gross per 24 hour   Intake 840 ml   Output    Net 840 ml     Medications:   dextrose       Current Facility-Administered Medications   Medication Dose Route Frequency Provider Last Rate Last Admin    bumetanide (BUMEX) tablet 1 mg  1 mg Oral BID Heidy Chris MD   1 mg at 01/05/21 1118    levalbuterol (XOPENEX) nebulization 0.63 mg  0.63 mg Nebulization Q8H PRN Bryan Jara MD   0.63 mg at 01/02/21 0522    amLODIPine (NORVASC) tablet 5 mg  5 mg Oral Daily Bryan Jara MD   5 mg at 01/05/21 1118  valsartan (DIOVAN) tablet 160 mg  160 mg Oral Daily Jossue Oates MD   160 mg at 01/05/21 1117    insulin glargine (LANTUS) injection vial 30 Units  30 Units Subcutaneous Daily Wash MD Tony   30 Units at 01/04/21 0919    glucose (GLUTOSE) 40 % oral gel 15 g  15 g Oral PRN Wash MD Tony        dextrose 50 % IV solution  12.5 g Intravenous PRN Wash MD Tony        glucagon (rDNA) injection 1 mg  1 mg Intramuscular PRN Wash MD Tony        dextrose 5 % solution  100 mL/hr Intravenous PRN Wash MD Tony        insulin lispro (HUMALOG) injection vial 0-12 Units  0-12 Units Subcutaneous TID WC Wash MD Tony   6 Units at 01/03/21 0824    insulin lispro (HUMALOG) injection vial 0-6 Units  0-6 Units Subcutaneous Nightly Wash MD Tony        sodium chloride flush 0.9 % injection 10 mL  10 mL Intravenous 2 times per day Wash MD Tony   10 mL at 01/05/21 1119    sodium chloride flush 0.9 % injection 10 mL  10 mL Intravenous PRN Wash MD Tony        enoxaparin (LOVENOX) injection 40 mg  40 mg Subcutaneous Daily Wash MD Tony   40 mg at 01/04/21 0918    promethazine (PHENERGAN) tablet 12.5 mg  12.5 mg Oral Q6H PRN Wash MD Tony        Or    ondansetron [facility]) injection 4 mg  4 mg Intravenous Q6H PRN Wash MD Tony        polyethylene glycol [facility]) packet 17 g  17 g Oral Daily PRN Wash MD Tony        acetaminophen (TYLENOL) tablet 650 mg  650 mg Oral Q6H PRN Wash MD Tony        Or    acetaminophen (TYLENOL) suppository 650 mg  650 mg Rectal Q6H PRN Wash MD Tony        atorvastatin (LIPITOR) tablet 40 mg  40 mg Oral Daily Wash MD Tony   40 mg at 01/05/21 1118    clopidogrel (PLAVIX) tablet 75 mg  75 mg Oral Daily Wash MD Tony   75 mg at 01/05/21 1117    DULoxetine (CYMBALTA) extended release capsule 90 mg  90 mg Oral Daily Wash MD Tony   90 mg at 01/05/21 111  gabapentin (NEURONTIN) tablet 600 mg  600 mg Oral TID Pat Savage MD   600 mg at 01/05/21 1119    levalbuterol (XOPENEX) nebulization 0.63 mg  0.63 mg Nebulization Q4H PRN Pat Savage MD        cetirizine (ZYRTEC) tablet 10 mg  10 mg Oral Nightly PRN Pat Savage MD   10 mg at 01/03/21 2016    melatonin disintegrating tablet 10 mg  10 mg Oral Nightly PRN Pat Savage MD   10 mg at 01/03/21 2017    montelukast (SINGULAIR) tablet 10 mg  10 mg Oral Nightly PRN Pat Savage MD   10 mg at 01/03/21 2017    pantoprazole (PROTONIX) tablet 40 mg  40 mg Oral BID Pat Savage MD   40 mg at 01/05/21 1117    pramipexole (MIRAPEX) tablet 0.5 mg  0.5 mg Oral BID Pat Savage MD   0.5 mg at 01/05/21 1118    vitamin D (CHOLECALCIFEROL) tablet 1,000 Units  1,000 Units Oral Daily Pat Savage MD   1,000 Units at 01/05/21 1118    carvedilol (COREG) tablet 6.25 mg  6.25 mg Oral BID Pat Savage MD   6.25 mg at 01/05/21 1118    budesonide (PULMICORT) nebulizer suspension 250 mcg  0.25 mg Nebulization BID Pat Savage MD   250 mcg at 01/05/21 1434    And    Arformoterol Tartrate (BROVANA) nebulizer solution 15 mcg  15 mcg Nebulization BID Pat Savage MD   15 mcg at 01/05/21 6142    ipratropium (ATROVENT) 0.02 % nebulizer solution 0.5 mg  0.5 mg Nebulization 4x daily Pat Savage MD   0.5 mg at 01/05/21 1423    guaiFENesin [facility]) extended release tablet 600 mg  600 mg Oral BID PRN Pat Savage MD   600 mg at 01/03/21 2017    insulin glargine (LANTUS) injection vial 30 Units  30 Units Subcutaneous Nightly Pat Savage MD   30 Units at 01/04/21 2108        Labs:     Recent Labs     01/03/21  0009 01/04/21 0221 01/05/21 0157   WBC 6.6 5.8 5.7   RBC 3.37* 3.09* 3.15*   HGB 9.8* 9.2* 9.4*   HCT 30.8* 28.1* 28.1*   MCV 91.4 90.9 89.2   MCH 29.1 29.8 29.8   MCHC 31.8* 32.7* 33.5    167 193     Recent Labs     01/03/21  0533 01/04/21 0221 01/05/21 0157    136 134* K 4.4 4.3 4.5   ANIONGAP 8 9 9    99 98   CO2 27 28 27   BUN 21 29* 37*   CREATININE 1.3* 1.4* 1.5*   GLUCOSE 211* 128* 80   CALCIUM 8.9 8.5* 8.3*     No results for input(s): MG, PHOS in the last 72 hours. No results for input(s): AST, ALT, ALB, BILITOT, ALKPHOS, ALB in the last 72 hours. ABGs:No results for input(s): PH, PO2, PCO2, HCO3, BE, O2SAT in the last 72 hours. Troponin T:   Recent Labs     01/02/21  1727 01/03/21  0009 01/03/21  0533   TROPONINI 0.10* 0.10* 0.08*     INR: No results for input(s): INR in the last 72 hours. Lactic Acid: No results for input(s): LACTA in the last 72 hours. Objective:   Vitals: BP (!) 143/65   Pulse 68   Temp 97.2 °F (36.2 °C) (Temporal)   Resp 20   Ht 5' 1\" (1.549 m)   Wt 220 lb (99.8 kg)   SpO2 94%   BMI 41.57 kg/m²   24HR INTAKE/OUTPUT:      Intake/Output Summary (Last 24 hours) at 1/5/2021 1539  Last data filed at 1/5/2021 1313  Gross per 24 hour   Intake 840 ml   Output    Net 840 ml     General: Obese disheveled female resting on side in bed in NAD. Head: Atraumatic normocephalic. Mouth: MMM   Respiratory: Lungs clear to auscultation bilaterally. non labored   Cardiac: RRR  Abdomen: Soft, non-tender; no peritoneal signs, bowel sounds normal  Ext: no bilateral lower extremity edema  Neurologic: Awake alert and oriented x3, affect and mood appropriate. Face/tongue symmetric. Speech is fluent. No focal neurologic deficits.   Psychiatric: Anxious  Skin: No overt rashes  Hematologic: No overt bruises    Assessment and Plan:   Principal Problem:    Acute on chronic diastolic heart failure (HCC)  Active Problems:    Pericardial effusion    Normocytic anemia    Type 2 diabetes mellitus, with long-term current use of insulin ([facility])    Hyperglycemia    Morbid obesity with BMI of 40.0-44.9, adult ([facility])    Obstructive sleep apnea syndrome    Essential hypertension    Moderate persistent asthma without complication    Pulmonary hypertension ([facility] [address] Nonobstructive atherosclerosis of coronary artery    CKD (chronic kidney disease) stage 3, GFR 30-59 ml/min    Mixed hyperlipidemia    Elevated troponin    Pulmonary nodules  Resolved Problems:    Shortness of breath      Acute on Chronic diastolic heart failure  Pulmonary HTN  Midly elevated troponin  Pericardial effusion, chronic- TTE on 12/14 with stable effusion and no evidence of tamponade. Currently HDS. CAD - non-obstructive per cath on 7/16/2020  - diuresis, BNP improved today. Still very symptomatic w/ amubulation. Changed to PO bumex. - cardiology consulted  - tele  - TTE with unchanged pericardial effusion   - continue home cardiac meds where appropriate      Type 2 diabetes with hyperglycemia  - reduced home lantus to 30 units daily, adjust as needed  - correction lispro, adjust as needed    Acute resp failure w/ hypoxia - 2/2 HF. p02 79 on RA.  - tx as above  - did not meet criteria for home 02     Chronic normocytic anemia  Iron studies and most recent hospitalization not consistent with iron deficiency. Serum folate/B12 okay. Suspect chronic disease.  - stable. Monitor with CBC     Subcentimeter pulmonary nodules - needs f/u CT chest in six months by PCP. CKD III - at her baseline, which is around 1.3. monitor w/ diuresis.      Chronic problems, continue home meds where appropriate:  asthma  Morbid obesity with BMI 41.57  Hypertension  CAD  CKD 3  Hyperlipidemia  MARTHAnoncompliant with CPAP     DVT prophylaxis:  SQ Lovenox       CODE STATUS: Full Code    Dispo: Hopefully home soon pending cardiology recs. May want to go to [facility]- I d/w SW and ordered PT/OT. Signed:   Familia Pickett MD 1/5/2021 3:39 PM  Hospitalist

## 2021-01-06 VITALS
TEMPERATURE: 97.7 F | HEART RATE: 63 BPM | OXYGEN SATURATION: 90 % | BODY MASS INDEX: 43.28 KG/M2 | DIASTOLIC BLOOD PRESSURE: 62 MMHG | HEIGHT: 61 IN | WEIGHT: 229.25 LBS | RESPIRATION RATE: 18 BRPM | SYSTOLIC BLOOD PRESSURE: 119 MMHG

## 2021-01-06 LAB
ANION GAP SERPL CALCULATED.3IONS-SCNC: 11 MMOL/L (ref 7–19)
BASOPHILS ABSOLUTE: 0 K/UL (ref 0–0.2)
BASOPHILS RELATIVE PERCENT: 0.4 % (ref 0–1)
BUN BLDV-MCNC: 41 MG/DL (ref 8–23)
CALCIUM SERPL-MCNC: 8.7 MG/DL (ref 8.8–10.2)
CHLORIDE BLD-SCNC: 99 MMOL/L (ref 98–111)
CO2: 27 MMOL/L (ref 22–29)
CREAT SERPL-MCNC: 1.6 MG/DL (ref 0.5–0.9)
EOSINOPHILS ABSOLUTE: 0.1 K/UL (ref 0–0.6)
EOSINOPHILS RELATIVE PERCENT: 1.4 % (ref 0–5)
GFR AFRICAN AMERICAN: 39
GFR NON-AFRICAN AMERICAN: 33
GLUCOSE BLD-MCNC: 102 MG/DL (ref 70–99)
GLUCOSE BLD-MCNC: 102 MG/DL (ref 70–99)
GLUCOSE BLD-MCNC: 60 MG/DL (ref 74–109)
GLUCOSE BLD-MCNC: 87 MG/DL (ref 70–99)
HCT VFR BLD CALC: 29.6 % (ref 37–47)
HEMOGLOBIN: 9.8 G/DL (ref 12–16)
IMMATURE GRANULOCYTES #: 0 K/UL
LYMPHOCYTES ABSOLUTE: 1.7 K/UL (ref 1.1–4.5)
LYMPHOCYTES RELATIVE PERCENT: 29.9 % (ref 20–40)
MCH RBC QN AUTO: 29.3 PG (ref 27–31)
MCHC RBC AUTO-ENTMCNC: 33.1 G/DL (ref 33–37)
MCV RBC AUTO: 88.6 FL (ref 81–99)
MONOCYTES ABSOLUTE: 0.6 K/UL (ref 0–0.9)
MONOCYTES RELATIVE PERCENT: 10.9 % (ref 0–10)
NEUTROPHILS ABSOLUTE: 3.2 K/UL (ref 1.5–7.5)
NEUTROPHILS RELATIVE PERCENT: 57 % (ref 50–65)
PDW BLD-RTO: 12.6 % (ref 11.5–14.5)
PERFORMED ON: ABNORMAL
PERFORMED ON: ABNORMAL
PERFORMED ON: NORMAL
PLATELET # BLD: 231 K/UL (ref 130–400)
PMV BLD AUTO: 9.4 FL (ref 9.4–12.3)
POTASSIUM REFLEX MAGNESIUM: 4.7 MMOL/L (ref 3.5–5)
RBC # BLD: 3.34 M/UL (ref 4.2–5.4)
SODIUM BLD-SCNC: 137 MMOL/L (ref 136–145)
WBC # BLD: 5.6 K/UL (ref 4.8–10.8)

## 2021-01-06 PROCEDURE — 6370000000 HC RX 637 (ALT 250 FOR IP): Performed by: EMERGENCY MEDICINE

## 2021-01-06 PROCEDURE — 97165 OT EVAL LOW COMPLEX 30 MIN: CPT

## 2021-01-06 PROCEDURE — 6370000000 HC RX 637 (ALT 250 FOR IP): Performed by: INTERNAL MEDICINE

## 2021-01-06 PROCEDURE — 94640 AIRWAY INHALATION TREATMENT: CPT

## 2021-01-06 PROCEDURE — 6360000002 HC RX W HCPCS: Performed by: INTERNAL MEDICINE

## 2021-01-06 PROCEDURE — 85025 COMPLETE CBC W/AUTO DIFF WBC: CPT

## 2021-01-06 PROCEDURE — 2580000003 HC RX 258: Performed by: INTERNAL MEDICINE

## 2021-01-06 PROCEDURE — 97116 GAIT TRAINING THERAPY: CPT

## 2021-01-06 PROCEDURE — 82947 ASSAY GLUCOSE BLOOD QUANT: CPT

## 2021-01-06 PROCEDURE — 36415 COLL VENOUS BLD VENIPUNCTURE: CPT

## 2021-01-06 PROCEDURE — 97530 THERAPEUTIC ACTIVITIES: CPT

## 2021-01-06 PROCEDURE — 97110 THERAPEUTIC EXERCISES: CPT

## 2021-01-06 PROCEDURE — 97161 PT EVAL LOW COMPLEX 20 MIN: CPT

## 2021-01-06 PROCEDURE — 80048 BASIC METABOLIC PNL TOTAL CA: CPT

## 2021-01-06 RX ADMIN — SODIUM CHLORIDE, PRESERVATIVE FREE 10 ML: 5 INJECTION INTRAVENOUS at 09:04

## 2021-01-06 RX ADMIN — DULOXETINE 90 MG: 60 CAPSULE, DELAYED RELEASE ORAL at 09:03

## 2021-01-06 RX ADMIN — IPRATROPIUM BROMIDE 0.5 MG: 0.5 SOLUTION RESPIRATORY (INHALATION) at 10:29

## 2021-01-06 RX ADMIN — Medication 30 UNITS: at 09:09

## 2021-01-06 RX ADMIN — IPRATROPIUM BROMIDE 0.5 MG: 0.5 SOLUTION RESPIRATORY (INHALATION) at 06:24

## 2021-01-06 RX ADMIN — Medication 1000 UNITS: at 09:04

## 2021-01-06 RX ADMIN — VALSARTAN 160 MG: 160 TABLET, FILM COATED ORAL at 09:04

## 2021-01-06 RX ADMIN — BUMETANIDE 1 MG: 1 TABLET ORAL at 09:04

## 2021-01-06 RX ADMIN — PANTOPRAZOLE SODIUM 40 MG: 40 TABLET, DELAYED RELEASE ORAL at 09:04

## 2021-01-06 RX ADMIN — PRAMIPEXOLE DIHYDROCHLORIDE 0.5 MG: 0.25 TABLET ORAL at 09:04

## 2021-01-06 RX ADMIN — ATORVASTATIN CALCIUM 40 MG: 40 TABLET, FILM COATED ORAL at 09:04

## 2021-01-06 RX ADMIN — AMLODIPINE BESYLATE 5 MG: 5 TABLET ORAL at 09:04

## 2021-01-06 RX ADMIN — GABAPENTIN 600 MG: 600 TABLET, FILM COATED ORAL at 09:04

## 2021-01-06 RX ADMIN — ARFORMOTEROL TARTRATE 15 MCG: 15 SOLUTION RESPIRATORY (INHALATION) at 06:23

## 2021-01-06 RX ADMIN — ENOXAPARIN SODIUM 40 MG: 40 INJECTION SUBCUTANEOUS at 09:06

## 2021-01-06 RX ADMIN — CARVEDILOL 6.25 MG: 6.25 TABLET, FILM COATED ORAL at 09:04

## 2021-01-06 RX ADMIN — BUDESONIDE 250 MCG: 0.25 SUSPENSION RESPIRATORY (INHALATION) at 06:23

## 2021-01-06 RX ADMIN — CLOPIDOGREL BISULFATE 75 MG: 75 TABLET ORAL at 09:04

## 2021-01-06 ASSESSMENT — PAIN DESCRIPTION - ORIENTATION: ORIENTATION: RIGHT

## 2021-01-06 ASSESSMENT — PAIN DESCRIPTION - DESCRIPTORS: DESCRIPTORS: ACHING

## 2021-01-06 ASSESSMENT — PAIN DESCRIPTION - LOCATION: LOCATION: HIP

## 2021-01-06 ASSESSMENT — PAIN DESCRIPTION - PAIN TYPE: TYPE: CHRONIC PAIN

## 2021-01-06 NOTE — PROGRESS NOTES
Physical Therapy  Aileen Pierce  [id number]     01/06/21 1341   Subjective   Subjective Patient up in chair and agrees to work with therapy. Pain Screening   Patient Currently in Pain Yes   Pain Assessment   Pain Assessment 0-10   Pain Level 7   Pain Location Hip   Pain Descriptors Aching   Pain Type Chronic pain   Pain Orientation Right   Bed Mobility   Sit to Supine Stand by assistance   Transfers   Sit to Stand Contact guard assistance;Stand by assistance   Stand to sit Contact guard assistance;Stand by assistance   Ambulation   Ambulation? Yes   Ambulation 1   Surface level tile   Device Rolling Walker   Assistance Contact guard assistance   Distance 15'   Exercises   Hip Flexion 15   Hip Abduction 15   Knee Long Arc Quad 15   Ankle Pumps 15   Comments B LE ex's in sitting   Other Activities   Comment Patient did well with therapy. Patient returned to supine after treatment, positioned for comfort with all needs in reach. Short term goals   Time Frame for Short term goals 2 wks   Short term goal 1 amb 150 ft with RW, SBA   Activity Tolerance   Activity Tolerance Patient Tolerated treatment well   Safety Devices   Type of devices Call light within reach; Left in bed   Electronically signed by Nagi Zuñiga PTA on 1/6/2021 at 1:48 PM

## 2021-01-06 NOTE — PROGRESS NOTES
Cardiology Progress Note Conrado Opitz, MD      Patient:  Lenora Sandoval  [id number]    Patient Active Problem List    Diagnosis Date Noted    Chest discomfort 07/14/2020     Priority: High    Elevated troponin 01/02/2021     Priority: Low    Pulmonary nodules 01/02/2021     Priority: Low    CHF (congestive heart failure), NYHA class I, acute on chronic, combined ([facility] 12/13/2020     Priority: Low    Mixed hyperlipidemia 08/13/2020     Priority: Low    Coronary artery disease involving native coronary artery of native heart without angina pectoris 08/13/2020     Priority: Low    Pulmonary edema with congestive heart failure ([facility] 07/02/2020     Priority: Low    Palliative care patient 07/02/2020     Priority: Low    Obstructive sleep apnea syndrome 06/05/2020     Priority: Low    Essential hypertension 06/05/2020     Priority: Low    Moderate persistent asthma without complication 13/75/4735     Priority: Low    Pulmonary hypertension ([facility] 03/09/2020     Priority: Low    Nonobstructive atherosclerosis of coronary artery 03/09/2020     Priority: Low    CKD (chronic kidney disease) stage 3, GFR 30-59 ml/min 03/09/2020     Priority: Low    Morbid obesity with BMI of 40.0-44.9, adult ([facility] 01/27/2020     Priority: Low    SOB (shortness of breath) 01/26/2020     Priority: Low     Overview Note:     Stents in the proximal circ and OM1  11/21/2019  Cath  LVEDP 25, PA 60/30, normal LVFX, mild CAD ([place], [address])  1/26/2020  Echo mild to moderate pericardial eff, normal LVF  3/7/2020  Echo large pericardial effusion  5/29/2020  Echo  Severe LVH, DD, normal LVFX, small pericardial effusion  7/15/20  lexiscan Positive for inferior lateral myocardial ischemia, EF 33%, -3% ischemic myocardium on stress, intermediate risk findings, AUC indication 16, AUC score 7, Brian Allen MD)  7/15/20  Echo  Normal LVFX, moderate pericardial effusion  7/16/20  Cath  Mild CAD, normal LVFX  Pericardial effusion 01/26/2020     Priority: Low    Acute on chronic diastolic heart failure ([facility] 01/26/2020     Priority: Low    Normocytic anemia 01/26/2020     Priority: Low    Type 2 diabetes mellitus, with long-term current use of insulin ([facility] 01/26/2020     Priority: Low    COPD (chronic obstructive pulmonary disease) ([facility] 01/26/2020     Priority: Low    Hyperglycemia 01/26/2020     Priority: Low    Acute kidney injury ([facility] 01/26/2020     Priority: Low    Elevated d-dimer 01/26/2020     Priority: Low       Admit Date:  1/2/2021    Admission Problem List: Present on Admission:   Elevated troponin   Acute on chronic diastolic heart failure (HCC)   Pericardial effusion   Hyperglycemia   Type 2 diabetes mellitus, with long-term current use of insulin (HCC)   Morbid obesity with BMI of 40.0-44.9, adult ([facility]   Obstructive sleep apnea syndrome   Essential hypertension   Moderate persistent asthma without complication   Pulmonary hypertension (HCC)   CKD (chronic kidney disease) stage 3, GFR 30-59 ml/min   (Resolved) Shortness of breath   Nonobstructive atherosclerosis of coronary artery   Mixed hyperlipidemia   Normocytic anemia   Pulmonary nodules      Cardiac Specific Data:  Specialty Problems        Cardiology Problems    * (Principal) Acute on chronic diastolic heart failure (HCC)        Pericardial effusion        Nonobstructive atherosclerosis of coronary artery        Pulmonary hypertension (HCC)        Essential hypertension        Pulmonary edema with congestive heart failure (HCC)        Coronary artery disease involving native coronary artery of native heart without angina pectoris        Mixed hyperlipidemia        CHF (congestive heart failure), NYHA class I, acute on chronic, combined ([facility]            1.  Coronary artery disease, prior PCI 7/2018 to proximal to mid circumflex/OM1 ([place]) with patent stents by catheterization 7/16/2020. levocetirizine (XYZAL) 5 MG tablet Take 1 tablet by mouth nightly 11/22/20  Yes YAMIL Ramirez NP   blood glucose monitor strips Test 3 times a day & as needed for symptoms of irregular blood glucose. Dispense sufficient amount for indicated testing frequency plus additional to accommodate PRN testing needs. 11/22/20  Yes YAMIL Ramirez NP   pramipexole (MIRAPEX) 0.5 MG tablet Take 1 tablet by mouth 2 times daily 11/22/20  Yes YAMIL Ramirez NP   gabapentin (NEURONTIN) 600 MG tablet Take 1 tablet by mouth 3 times daily for 30 days. 11/20/20 1/2/21 Yes Alfonso Vaz MD   tiotropium (SPIRIVA RESPIMAT) 1.25 MCG/ACT AERS inhaler Inhale 2 puffs into the lungs daily 10/19/20  Yes YAMIL Ramirez NP   levalbuterol [facility] HFA) 45 MCG/ACT inhaler Inhale 2 puffs into the lungs every 4 hours as needed for Wheezing or Shortness of Breath 10/19/20  Yes YAMIL Ramirez NP   levalbuterol [facility]) 0.63 MG/3ML nebulization Take 3 mLs by nebulization every 6 hours as needed for Wheezing 10/19/20 1/2/21 Yes YAMIL Ramirez NP   DULoxetine (CYMBALTA) 30 MG extended release capsule Take 30 mg by mouth daily ALONG WITH A 60 MG TABLET (BOTH TOGETHER + 90 MG)   Yes Historical Provider, MD   DULoxetine (CYMBALTA) 60 MG extended release capsule Take 60 mg by mouth daily WITH A 30 MG TABLET.  TOTAL OF 90 MG)   Yes Historical Provider, MD   Blood Glucose Monitoring Suppl (BLOOD GLUCOSE MONITOR SYSTEM) w/Device KIT 1 Device by Does not apply route three times daily 7/11/20  Yes YAMIL Ramirez NP   Lancets MISC 1 each by Does not apply route 3 times daily 7/11/20  Yes YAMIL Ramirez NP   valsartan (DIOVAN) 160 MG tablet Take 160 mg by mouth daily   Yes Historical Provider, MD   clopidogrel (PLAVIX) 75 MG tablet Take 75 mg by mouth daily   Yes Historical Provider, MD Effort: Pulmonary effort is normal. No respiratory distress. Breath sounds: Normal breath sounds. No wheezing or rales. Comments: No crepitations noted  Chest:      Chest wall: No tenderness. Abdominal:      General: Bowel sounds are normal. There is no distension. Palpations: Abdomen is soft. There is no mass. Tenderness: There is no abdominal tenderness. There is no guarding or rebound. Hernia: No hernia is present. Comments: No palpable organomegaly  Difficult exam   Musculoskeletal: Normal range of motion. Skin:     General: Skin is warm. Coloration: Skin is not pale. Findings: No rash. Neurological:      Mental Status: She is alert and oriented to person, place, and time. Cranial Nerves: No cranial nerve deficit. Deep Tendon Reflexes: Reflexes normal.                 Lab Data:  CBC:   Recent Labs     01/03/21  0009 01/04/21 0221 01/05/21 0157   WBC 6.6 5.8 5.7   HGB 9.8* 9.2* 9.4*   HCT 30.8* 28.1* 28.1*   MCV 91.4 90.9 89.2    167 193     BMP:   Recent Labs     01/03/21  0533 01/04/21 0221 01/05/21 0157    136 134*   K 4.4 4.3 4.5    99 98   CO2 27 28 27   BUN 21 29* 37*   CREATININE 1.3* 1.4* 1.5*     LIVER PROFILE: No results for input(s): AST, ALT, LIPASE, BILIDIR, BILITOT, ALKPHOS in the last 72 hours. Invalid input(s): AMYLASE,  ALB  PT/INR: No results for input(s): PROTIME, INR in the last 72 hours. APTT: No results for input(s): APTT in the last 72 hours. BNP:  No results for input(s): BNP in the last 72 hours.   CK, CKMB, Troponin: @LABRCNT (CKTOTAL:3, CKMB:3, TROPONINI:3)@    IMAGING:  Xr Chest (2 Vw)    Result Date: 1/5/2021 XR CHEST (2 VW) 1/5/2021 12:51 PM HISTORY: Short of breath COMPARISON: January 2, 2021. FINDINGS: Pulmonary vascular margins are slightly indistinct. This has the appearance of mild pulmonary vascular congestion. This may be slightly improved from January 2, 2021. The cardiac silhouette is enlarged. This is unchanged. The osseous structures and surrounding soft tissues demonstrate no acute abnormality. 1. Cardiomegaly with slightly indistinct pulmonary vascular margins most consistent with mild pulmonary vascular congestion. This is slightly improved from January 2, 2021. Signed by Dr Sheridan Bianchi on 1/5/2021 2:08 PM    Xr Chest Portable    Result Date: 1/2/2021  Exam:   XR CHEST PORTABLE  Date:  1/2/2021 History:  Female, age  56 years; chest pain COMPARISON:  Chest x-ray dated December 13, 2020. Findings : Mild-to-moderate prominence of the cardiomediastinal silhouette. Interstitial edema. Trace bilateral pleural effusions. No measurable pneumothorax. The bones show no acute pathology. Impression: Fluid overload. Signed by Dr Freddy Carranza on 1/2/2021 7:48 AM    Xr Chest Portable    Result Date: 12/13/2020  XR CHEST PORTABLE 12/13/2020 12:48 AM HISTORY:   Shortness of breath  Single view. Portable upright COMPARISONS:  12/9/2020, 11/2/2020 and 7/14/2020 FINDINGS: Examination was sent to [facility] for preliminary interpretation. There is cardiomegaly. The interstitial markings are diffusely prominent. There is airspace opacities noted in the left lower lobe with may be related to the technique and patient body habitus, observe similarly on prior examinations. The bony structures are intact. Radiographically, chest is unchanged. Generous heart size with chronic lung changes most likely.  Signed by Dr Celestina Yuen on 12/13/2020 9:08 AM    Xr Chest Portable    Result Date: 12/9/2020 XR CHEST PORTABLE 12/9/2020 11:20 AM History: Short of breath. Portable chest x-ray compared with 2 November 2020. Magnified heart size. Moderate thoracic spurring. Diffuse interstitial prominence is again seen. There is opacification of the retrocardiac left lower lobe though this is not significantly changed compared with 2 months ago. 1. Interstitial prominence with chronic left lower lobe opacity. No significant change from 2 months ago.  Signed by Dr Beth Aguillon on 12/9/2020 11:21 AM    Ct Chest High Resolution    Result Date: 12/15/2020 CT CHEST HIGH RESOLUTION 12/15/2020 4:22 PM HISTORY: Shortness of breath, history of rheumatoid arthritis, questionable interstitial lung disease COMPARISON: CT scan dated 7/2/2020 DLP: 1043 mGy cm TECHNIQUE: Serial helical tomographic images of the chest were acquired. Bone and soft tissue algorithms were provided. Coronal reformatted images were also provided for review. Automated exposure control was also utilized to decrease patient radiation dose. FINDINGS: Neck base: The imaged portion of the neck and thyroid gland is unremarkable. Lungs: There are a few tiny benign-appearing nodules in the lung apices, the largest measuring only 3 mm. These are stable. There are no new or enlarging pulmonary nodules. No consolidation. No subpleural reticulation, traction bronchiectasis, honeycombing or cyst formation is evidence for underlying chronic interstitial lung disease. There is no pleural effusion. Airways are clear. Heart: The heart is normal in size. Coronary artery atheromatous calcification. There is a moderate to large pericardial effusion, which appears to be slightly increased from the earlier comparison CT scan. Vessels: Thoracic aorta is normal in caliber. Central pulmonary arteries are normal in caliber. Mediastinum: No suspicious hilar or mediastinal adenopathy. Debris identified diffusely throughout the esophagus, up to the level of the upper third of the esophagus. The esophagus is nondilated. Skeletal and soft tissues: The osseous structures of the thorax and surrounding soft tissues demonstrate no worrisome lesions or acute process. Upper abdomen: The imaged portion of the upper abdomen demonstrates no acute process. Splenic artery atheromatous calcification. 1. I do not see evidence for underlying fibrotic interstitial lung disease. 2. There is a moderate to large sized pericardial effusion. This appears to be mildly increased from the earlier comparison CT from July 2020, and I am suspicious for this being asymptomatic pericardial effusion given its size. 3. Coronary artery atheromatous calcification. 4. Gastroesophageal reflux, with layering debris in the esophagus up to the level of the proximal third of the esophagus.  Signed by Dr Emely Sims on 12/15/2020 7:05 PM    Cta Pulmonary W Contrast    Result Date: 1/2/2021 CTA PULMONARY W CONTRAST 1/2/2021 5:04 AM HISTORY: Chest pain COMPARISON: CT chest dated December 15, 2020. DLP: 739 mGy cm TECHNIQUE: Helical tomographic images of the chest were obtained after the administration of intravenous contrast following angiogram protocol. Additionally, 3D reformatted images were provided. FINDINGS:  Pulmonary arteries: There is adequate enhancement of the pulmonary arteries to evaluate for central and segmental pulmonary emboli. There are no filling defects within the main, lobar, segmental or visualized subsegmental pulmonary arteries. The pulmonary arteries are within normal limits. Aorta and great vessels: There is atherosclerosis in the aorta without aneurysm or dissection. There is atherosclerosis in the great vessels. Visualized neck base: The imaged portion of the base of the neck appears unremarkable. Lungs: 2 adjacent pulmonary nodules in the left lower lobe measuring 7 mm (image [id number], series 6). Additional smaller nodules are scattered throughout the lungs, measuring up to 3 mm, best seen in the right upper lobe. Interlobular septal thickening, most concerning for interstitial edema. Trace bilateral pleural effusions. . The trachea and bronchial tree are patent. Heart: The heart is normal in size. Small-to-moderate pericardial effusion. Coronary artery disease. . Mediastinum and lymph nodes: No enlarged mediastinal, hilar, or axillary lymph nodes are present. Skeletal and soft tissues: The osseous structures of the thorax and surrounding soft tissues demonstrate no acute process. Upper abdomen: The imaged portion of the upper abdomen demonstrates no acute process. 1.   No evidence of pulmonary embolus. 2.  Fluid overload with findings of pericardial effusion. 3.  Subcentimeter pulmonary nodules. Follow-up be obtained in 6 months to assess stability.  Signed by Dr Jean Paul Rudd on 1/2/2021 7:26 AM    Echo 2d Wo Color Doppler Complete    Result Date: 1/4/2021 Transthoracic Echocardiography Report (TTE)  Demographics   Patient Name  Jerome Mcmanus Date of Study          01/03/2021   MRN           [id number]          Gender                 Female   Date of Birth 1960      Room Number            L-0716   Age           56 year(s)   Height:       61 inches       Referring Physician    Zuleima Jo MD   Weight:       220 pounds      Sonographer            SARA Suarez   BSA:          1.97 m^2        Interpreting Physician Zuleima Jo MD   BMI:          41.57 kg/m^2  Procedure Type of Study   TTE procedure:ECHOCARDIOGRAM COMPLETE 2D WO COLOR DOPPLER. Study Location: Echo Lab Technical Quality: Limited visualization due to body habitus. Patient Status: Inpatient Rhythm: Within normal limits BP: 135/71 mmHg Indications:Dyspnea/SOB. Conclusions   Summary  Normal left ventricular size with preserved LV function and an estimated  ejection fraction of approximately 55-60%. Severe concentric left ventricular hypertrophy. No regional wall motion abnormalities. There is a moderate circumferential pericardial effusion noted unchanged  prior study   Signature   ----------------------------------------------------------------  Electronically signed by Zuleima Jo MD(Interpreting  physician) on 01/04/2021 08:00 AM  ----------------------------------------------------------------   Findings   Left Ventricle  Normal left ventricular size with preserved LV function and an estimated  ejection fraction of approximately 55-60%. Severe concentric left ventricular hypertrophy. No regional wall motion abnormalities. Pericardial Effusion  There is a moderate circumferential pericardial effusion noted unchanged  prior study  Allergies   - Other allergy:Reaction - Other;Severity - Unknown;Sensitivity -     Allergy(albuterol,Levoquin,metformin,asprin). - Metformin.   - Levaquin.   - Other allergy.   - Aspirin.  M-Mode Measurements (cm)   LVIDd: 4.44 cm LVIDs: 3.17 cm  IVSd: 1.81 cm  LVPWd: 1.69 cm  % Ejection Fraction: 55 %      Echo 2d Wo Color Doppler Complete    Result Date: 12/14/2020 Transthoracic Echocardiography Report (TTE)  Demographics   Patient Name  Marck Peguero Date of Study         12/14/2020   MRN           [id number]          Gender                Female   Date of Birth 1960      Room Number           L-0718   Age           56 year(s)   Height:       61 inches       Referring Physician   Shanna Gutierrez   Weight:       226 pounds      Sonographer           Jose Sahu SARA Pratt   BSA:          1.99 m^2        Interpreting          Ophelia Cedeno MD                                Physician   BMI:          42.7 kg/m^2  Procedure Type of Study   TTE procedure  Study Location: Echo Lab Technical Quality: Adequate visualization Patient Status: Inpatient BP: 133/64 mmHg Indications:Dyspnea/SOB. Conclusions   Summary  Limited echocardiographic study for pericardial effusion. LV is normal in size with normal systolic function. LV ejection fraction  estimated at 60%. Moderate concentric LVH. RV is normal in size with normal systolic function. Moderate left atrial enlargement. Right atrium is normal in size. There is a localized pericardial effusion there is predominantly posterior  behind the left ventricle (measuring 2.37 cm) and lateral with minimal  effusion anteriorly. No significant effusion overlying right ventricleor  in apical location. No obvious chamber evidence of collapse to suggest  significant tamponade features from this effusion. Mitral and tricuspid  inflow were not studied. Comparison with echocardiogram from 9/30/2020 shows no significant change  in pericardial effusion. Signature   ----------------------------------------------------------------  Electronically signed by Ophelia Cedeno MD(Interpreting physician)  on 12/14/2020 06:29 PM  ----------------------------------------------------------------  Allergies   - Other allergy:Reaction - Other;Severity - Unknown;Sensitivity -     Allergy(albuterol,Levoquin,metformin,asprin). - Metformin.   - Levaquin.    -

## 2021-01-06 NOTE — DISCHARGE SUMMARY
Discharge Summary    NAME: Bonnie Kelly  :  1960  MRN:  [id number]    Admit date:  2021  Discharge date:  2020    Admitting Physician: Gladys Mathur MD    Advance Directive: Full Code    Consults: Cardiology    Primary Care Physician:  YAMIL Queen - NP    Discharge Diagnoses:  Principal Problem:    Acute on chronic diastolic heart failure [facility])  Active Problems:    Pericardial effusion    Normocytic anemia    Type 2 diabetes mellitus, with long-term current use of insulin (HCC)    Hyperglycemia    Morbid obesity with BMI of 40.0-44.9, adult ([facility] [address]    Obstructive sleep apnea syndrome    Essential hypertension    Moderate persistent asthma without complication    Pulmonary hypertension (HCC)    Nonobstructive atherosclerosis of coronary artery    CKD (chronic kidney disease) stage 3, GFR 30-59 ml/min    Mixed hyperlipidemia    Elevated troponin    Pulmonary nodules      Significant Diagnostic Studies:   Xr Chest Portable    Result Date: 2021  Exam:   XR CHEST PORTABLE  Date:  2021 History:  Female, age  56 years; chest pain COMPARISON:  Chest x-ray dated 2020. Findings : Mild-to-moderate prominence of the cardiomediastinal silhouette. Interstitial edema. Trace bilateral pleural effusions. No measurable pneumothorax. The bones show no acute pathology. Impression: Fluid overload.  Signed by Dr Yaima Hunter on 2021 7:48 AM    Cta Pulmonary W Contrast    Result Date: 2021 CTA PULMONARY W CONTRAST 1/2/2021 5:04 AM HISTORY: Chest pain COMPARISON: CT chest dated December 15, 2020. DLP: 739 mGy cm TECHNIQUE: Helical tomographic images of the chest were obtained after the administration of intravenous contrast following angiogram protocol. Additionally, 3D reformatted images were provided. FINDINGS:  Pulmonary arteries: There is adequate enhancement of the pulmonary arteries to evaluate for central and segmental pulmonary emboli. There are no filling defects within the main, lobar, segmental or visualized subsegmental pulmonary arteries. The pulmonary arteries are within normal limits. Aorta and great vessels: There is atherosclerosis in the aorta without aneurysm or dissection. There is atherosclerosis in the great vessels. Visualized neck base: The imaged portion of the base of the neck appears unremarkable. Lungs: 2 adjacent pulmonary nodules in the left lower lobe measuring 7 mm (image [id number], series 6). Additional smaller nodules are scattered throughout the lungs, measuring up to 3 mm, best seen in the right upper lobe. Interlobular septal thickening, most concerning for interstitial edema. Trace bilateral pleural effusions. . The trachea and bronchial tree are patent. Heart: The heart is normal in size. Small-to-moderate pericardial effusion. Coronary artery disease. . Mediastinum and lymph nodes: No enlarged mediastinal, hilar, or axillary lymph nodes are present. Skeletal and soft tissues: The osseous structures of the thorax and surrounding soft tissues demonstrate no acute process. Upper abdomen: The imaged portion of the upper abdomen demonstrates no acute process. 1.   No evidence of pulmonary embolus. 2.  Fluid overload with findings of pericardial effusion. 3.  Subcentimeter pulmonary nodules. Follow-up be obtained in 6 months to assess stability.  Signed by Dr Paul Posadas on 1/2/2021 7:26 AM    Echo 2d Wo Color Doppler Complete    Result Date: 1/4/2021 Transthoracic Echocardiography Report (TTE)  Demographics   Patient Name  Vineet Paredes Date of Study          01/03/2021   MRN           [id number]          Gender                 Female   Date of Birth 1960      Room Number            MHL-0716   Age           56 year(s)   Height:       61 inches       Referring Physician    Jenny Tolentino MD   Weight:       220 pounds      Sonographer            Jenny Estrada RDCS Santa   BSA:          1.97 m^2        Interpreting Physician Jenny Tolentino MD   BMI:          41.57 kg/m^2  Procedure Type of Study   TTE procedure:ECHOCARDIOGRAM COMPLETE 2D WO COLOR DOPPLER. Study Location: Echo Lab Technical Quality: Limited visualization due to body habitus. Patient Status: Inpatient Rhythm: Within normal limits BP: 135/71 mmHg Indications:Dyspnea/SOB. Conclusions   Summary  Normal left ventricular size with preserved LV function and an estimated  ejection fraction of approximately 55-60%. Severe concentric left ventricular hypertrophy. No regional wall motion abnormalities. There is a moderate circumferential pericardial effusion noted unchanged  prior study   Signature   ----------------------------------------------------------------  Electronically signed by Jenny Tolentino MD(Interpreting  physician) on 01/04/2021 08:00 AM  ----------------------------------------------------------------   Findings   Left Ventricle  Normal left ventricular size with preserved LV function and an estimated  ejection fraction of approximately 55-60%. Severe concentric left ventricular hypertrophy. No regional wall motion abnormalities. Pericardial Effusion  There is a moderate circumferential pericardial effusion noted unchanged  prior study  Allergies   - Other allergy:Reaction - Other;Severity - Unknown;Sensitivity -     Allergy(albuterol,Levoquin,metformin,asprin). - Metformin.   - Levaquin.   - Other allergy.   - Aspirin.  M-Mode Measurements (cm)   LVIDd: 4.44 cm LVIDs: 3.17 cm  IVSd: 1.81 cm  LVPWd: 1.69 cm  % Ejection Fraction: 55 %        Pertinent Labs:   CBC:   Recent Labs     01/04/21 0221 01/05/21  0157 01/06/21  0301   WBC 5.8 5.7 5.6   HGB 9.2* 9.4* 9.8*    193 231     BMP:    Recent Labs     01/04/21 0221 01/05/21 0157 01/06/21  0301    134* 137   K 4.3 4.5 4.7   CL 99 98 99   CO2 28 27 27   BUN 29* 37* 41*   CREATININE 1.4* 1.5* 1.6*   GLUCOSE 128* 80 60*           Hospital Course: 56 y. o. female with CAD, hypertension, chronic diastolic heart failure, persistent pericardial fusion, diabetes 2, morbid obesity, MARTHA noncompliant with CPAP who presented to the ER on 1/2 with progressive worsening shortness of breath. In the ER imaging and labs were consistent with volume overload.  She had mild troponin elevation, which trended downward. Cardiology was consulted. She was started on IV diuresis. TTE was repeated and showed EF 55-60%, unchanged pericardial effusion. Patient improved and transition to oral diuretics. Renal function remained stable. Patient did demonstrate some deconditioning, and rehab with skilled nursing facility placement was considered, however patient refused and subsequently set up with home health. Stable for discharge on 1/6/2020. Physical Exam:  Vital Signs: /61   Pulse 67   Temp 97.1 °F (36.2 °C) (Temporal)   Resp 18   Ht 5' 1\" (1.549 m)   Wt 229 lb 4 oz (104 kg)   SpO2 100%   BMI 43.32 kg/m²   General appearance:. Alert and Cooperative   HEENT: Normocephalic. Chest: Diminished breath sounds bilaterally  Cardiac: Normal heart tones with regular rate and rhythm, S1, S2 normal.   Abdomen:soft, non-tender; normal bowel sounds, no masses, no organomegaly. Extremities: No clubbing or cyanosis. Peripheral pulses palpable. Neurologic: Grossly intact.     Discharge Medications:       Julieta Ricci \"Marcelina\"   Home Medication Instructions [id number]    Printed on:01/06/21 95 [id number] Medication Information                      amLODIPine (NORVASC) 5 MG tablet  Take 1 tablet by mouth daily             atorvastatin (LIPITOR) 20 MG tablet  Take 2 tablets by mouth daily             bumetanide (BUMEX) 1 MG tablet  Take 1 mg by mouth 2 times daily             carvedilol (COREG) 3.125 MG tablet  Take 2 tablets by mouth 2 times daily             clopidogrel (PLAVIX) 75 MG tablet  Take 75 mg by mouth daily             DULoxetine (CYMBALTA) 30 MG extended release capsule  Take 30 mg by mouth daily ALONG WITH A 60 MG TABLET (BOTH TOGETHER + 90 MG)             fluticasone-vilanterol (BREO ELLIPTA) 100-25 MCG/INH AEPB inhaler  Inhale 1 puff into the lungs daily             gabapentin (NEURONTIN) 600 MG tablet  Take 1 tablet by mouth 3 times daily for 30 days. Insulin Degludec (TRESIBA FLEXTOUCH) 100 UNIT/ML SOPN  Inject 40 Units into the skin nightly             levalbuterol (XOPENEX HFA) 45 MCG/ACT inhaler  Inhale 2 puffs into the lungs every 4 hours as needed for Wheezing or Shortness of Breath             levocetirizine (XYZAL) 5 MG tablet  Take 1 tablet by mouth nightly             melatonin 3 MG TABS tablet  Take 10 mg by mouth nightly             montelukast (SINGULAIR) 10 MG tablet  Take 10 mg by mouth nightly             pantoprazole (PROTONIX) 20 MG tablet  Take 2 tablets by mouth 2 times daily             pramipexole (MIRAPEX) 0.5 MG tablet  Take 1 tablet by mouth 2 times daily             Semaglutide, 1 MG/DOSE, 2 MG/1.5ML SOPN  Inject 1 mg into the skin every 7 days             tiotropium (SPIRIVA RESPIMAT) 1.25 MCG/ACT AERS inhaler  Inhale 2 puffs into the lungs daily             valsartan (DIOVAN) 160 MG tablet  Take 160 mg by mouth daily             vitamin D (CHOLECALCIFEROL) 25 MCG (1000 UT) TABS tablet  Take 1,000 Units by mouth daily                 Discharge Instructions: Follow up with YAMIL Wray NP. Take medications as directed. Resume activity as tolerated. Disposition: Patient is medically stable and will be discharged home. Time spent on discharge over 30 minutes.     Signed:  Johnathon Carvajal MD  1/6/2021 12:55 PM

## 2021-01-06 NOTE — PROGRESS NOTES
Social/Functional History  Social/Functional History  Lives With: Spouse  Type of Home: Trailer  Home Equipment: 4 wheeled walker  ADL Assistance: Needs assistance(Spouse puts on her socks and shoes for her for quite some time.)  Ambulation Assistance: Independent  Transfer Assistance: Independent  Additional Comments: reports R LE pain can inhibit her adl's and ambulation at times       Objective   Vision: Within Functional Limits  Hearing: Within functional limits    Orientation  Overall Orientation Status: Within Normal Limits        ADL  Feeding: Independent  Grooming: Independent  UE Bathing: Supervision  LE Bathing: Moderate assistance;Maximum assistance  UE Dressing: Minimal assistance  LE Dressing: Maximum assistance  Toileting: Moderate assistance           Transfers  Stand Step Transfers: Contact guard assistance  Sit to stand: Contact guard assistance     Cognition  Overall Cognitive Status: WFL                 LUE AROM (degrees)  LUE AROM : WFL  RUE AROM (degrees)  RUE AROM : WFL  LUE Strength  Gross LUE Strength: WFL  RUE Strength  Gross RUE Strength: WFL                   Plan   Plan  Times per week: 3-5x/week  Times per day: Daily    G-Code     OutComes Score                                                  AM-PAC Score             Goals  Short term goals  Time Frame for Short term goals: 1 week  Short term goal 1: Supervision with toilet tfers  Short term goal 2: Supervision with clothing mgmt in standing.   Long term goals  Long term goal 1: Return to PLOF       Therapy Time   Individual Concurrent Group Co-treatment   Time In           Time Out           Minutes                   Marleen Hsieh OT

## 2021-01-06 NOTE — PROGRESS NOTES
BP Location: Left Arm  Patient Currently in Pain: Yes  Oxygen Therapy  SpO2: 90 %  O2 Device: None (Room air)  Social/Functional History  Social/Functional History  Lives With: Spouse  Type of Home: Trailer  Home Equipment: 4 wheeled walker  ADL Assistance: Needs assistance(Spouse puts on her socks and shoes for her for quite some time.)  Ambulation Assistance: Independent  Transfer Assistance: Independent  Additional Comments: reports R LE pain can inhibit her adl's and ambulation at times       Objective   Vision: Within Functional Limits  Hearing: Within functional limits    Orientation  Overall Orientation Status: Within Normal Limits        ADL  Feeding: Independent  Grooming: Independent  UE Bathing: Supervision  LE Bathing: Moderate assistance;Maximum assistance  UE Dressing: Minimal assistance  LE Dressing: Maximum assistance  Toileting: Moderate assistance           Transfers  Stand Step Transfers: Contact guard assistance  Sit to stand: Contact guard assistance     Cognition  Overall Cognitive Status: WFL                 LUE AROM (degrees)  LUE AROM : WFL  RUE AROM (degrees)  RUE AROM : WFL  LUE Strength  Gross LUE Strength: WFL  RUE Strength  Gross RUE Strength: WFL                   Plan   Plan  Times per week: 3-5x/week  Times per day: Daily    G-Code     OutComes Score                                                  AM-PAC Score             Goals  Short term goals  Time Frame for Short term goals: 1 week  Short term goal 1: Supervision with toilet tfers  Short term goal 2: Supervision with clothing mgmt in standing.   Long term goals  Long term goal 1: Return to PLOF       Therapy Time   Individual Concurrent Group Co-treatment   Time In           Time Out           Minutes                   Tonie Barrientos OT

## 2021-01-06 NOTE — PROGRESS NOTES
Physical Therapy    Facility/Department: [facility] PROGRESSIVE CARE  Initial Assessment    NAME: Maylin Martinez  : 1960  MRN: [id number]    Date of Service: 2021    Discharge Recommendations:  Continue to assess pending progress(anticipate pt to be able to d/c home with  [facility])   PT Equipment Recommendations  Other: needs a new rw but states it has not been over 5 yrs since once was purchased for her by insurance    Assessment   Body structures, Functions, Activity limitations: Decreased functional mobility ; Decreased endurance; Increased pain  Assessment: Pt would benefit from skilled PT services in this setting to address her mobility deficits  Prognosis: Good  Decision Making: Low Complexity  PT Education: General Safety;Gait Training;Functional Mobility Training;Transfer Training  REQUIRES PT FOLLOW UP: Yes  Activity Tolerance  Activity Tolerance: Patient Tolerated treatment well;Patient limited by pain       Patient Diagnosis(es): The primary encounter diagnosis was Elevated troponin. Diagnoses of Shortness of breath and Acute on chronic congestive heart failure, unspecified heart failure type [facility]) were also pertinent to this visit. has a past medical history of Allergies, AMI (acute myocardial infarction) ([address] Asthma, CAD (coronary artery disease), Cerebral artery occlusion with cerebral infarction ([address] CHF (congestive heart failure) ([address] COPD (chronic obstructive pulmonary disease) ([address] Depression, Diabetes mellitus ([address] DVT (deep vein thrombosis) in pregnancy, GERD (gastroesophageal reflux disease), Hx of blood clots, Hyperlipidemia, Hypertension, Other disorders of kidney and ureter in diseases classified elsewhere, Palliative care patient, and Pneumonia. has a past surgical history that includes Tonsillectomy; Coronary angioplasty with stent (2018); Cataract removal (Bilateral); and Colonoscopy (approx ).     Restrictions     Vision/Hearing Subjective  General  Diagnosis: elevated troponin, CHF, RA  Follows Commands: Within Functional Limits  Subjective  Subjective: Pt states she plans to d/c home and will receive services to assist her at home and have her 's intermittent assist also. Pt reports she has pain radiating down her R LE that has been present for over a year and has not received a dx at this time. encouraged pt to discuss this with her MD.  Pain Screening  Patient Currently in Pain: Denies          Orientation     Social/Functional History  Social/Functional History  Lives With: Spouse  Home Equipment: 4 wheeled walker(states her rollator is broken)  Ambulation Assistance: Independent  Transfer Assistance: Independent  Additional Comments: reports R LE pain can inhibit her adl's and ambulation at times  Cognition        Objective          AROM RLE (degrees)  RLE AROM: WFL  AROM LLE (degrees)  LLE AROM : WFL  Strength Other  Other: antigravity        Bed mobility  Supine to Sit: Stand by assistance  Transfers  Sit to Stand: Contact guard assistance;Stand by assistance  Stand to sit: Contact guard assistance;Stand by assistance  Bed to Chair: Contact guard assistance  Ambulation  Ambulation?: Yes  Ambulation 1  Surface: level tile  Device: Rolling Walker  Assistance: Contact guard assistance  Quality of Gait: antlagic with WB on R LE. required rest break x 3 while amb due to R LE pain.   Gait Deviations: Slow Tara;Decreased step length;Decreased step height  Distance: 20 ft, 30 ft     Balance  Comments: able to stand and manage clothing/toileting ind        Plan   Plan  Times per week: 5-7  Plan weeks: 2  Current Treatment Recommendations: Safety Education & Training, Functional Mobility Training, Transfer Training, Gait Training, Pain Management, Positioning  Safety Devices  Type of devices: Call light within reach, Gait belt, Left in chair    G-Code       OutComes Score                                                  AM-PAC Score Goals  Short term goals  Time Frame for Short term goals: 2 wks  Short term goal 1: amb 150 ft with PRETTY WELCH       Therapy Time   Individual Concurrent Group Co-treatment   Time In           Time Out           Minutes                   Enma Cabrales PT    Electronically signed by Enma Cabrales PT on 1/6/2021 at 1:14 PM

## 2021-01-07 ENCOUNTER — TELEPHONE (OUTPATIENT)
Dept: PRIMARY CARE CLINIC | Age: 61
End: 2021-01-07

## 2021-01-07 ENCOUNTER — CARE COORDINATION (OUTPATIENT)
Dept: CASE MANAGEMENT | Age: 61
End: 2021-01-07

## 2021-01-07 LAB — BLOOD CULTURE, ROUTINE: NORMAL

## 2021-01-07 NOTE — CARE COORDINATION
[facility] Transitions Initial Follow Up Call    Call within 2 business days of discharge: Yes    Patient: Lenora Sandoval Patient : 1960   MRN: [id number]  Reason for Admission: HF, DM, CKD, Elevated Troponin  Discharge Date: 21 RARS: Readmission Risk Score: 36      Last Discharge [facility]       Complaint Diagnosis Description Type Department Provider    21 Shortness of Breath Elevated troponin . .. ED to Hosp-Admission (Discharged) (ADMITTED) L MIGUEL ANGEL Bradley MD; Jeramy Watson. .. Spoke with: [id number]: [facility]      Non-face-to-face services provided:  Reviewed encounter information for continuity of care prior to follow up Care Transitions phone call - chart notes, consults, progress notes, test results, med list, appointments, AVS, other information. Care Transitions 24 Hour Call    Schedule Follow Up Appointment with PCP: Completed  Do you have a copy of your discharge instructions?: Yes  Do you have all of your prescriptions and are they filled?: Yes  Have you been contacted by a [address]?: No  Have you scheduled your follow up appointment?: Yes  How are you going to get to your appointment?: Car - family or friend to transport  Were you discharged with any Home Care or Post Acute Services: Yes  Post Acute Services: [address] you feel like you have everything you need to keep you well at home?: Yes  Care Transitions Interventions       Placed a call to the number listed for patient and spoke with her for an initial follow up call for COVID 19 Risk Monitoring post discharge yesterday. She reported that she is fair this am.  She said she has a bad, dry, hacky cough. She said that she has not been able to knock it. She is still having some shortness of breath. Said she does not have oxygen at home and she was told in the hospital that she does not need or qualify for it.   She said she is able to be up and about and is not too bad. Did say, it is mostly when coughing. She denied any pain, dizziness, other symptoms. She said she is eating well, drinking well. She said she has all of her meds and is taking them. She was not able to do a med review because she was coughing a lot. Did say that she has not checked her blood sugar this am because she cannot find her glucometer. She also said that she is aware of her follow up appointment next week. Did not keep her too mcuh longer as she was having a cough and needed to catch up. Informed of CTC process, purpose, future calls, etc.  Ensured to have CTN contact information to be used as needed. Encouraged to call as needed for new issues, questions, etc.  Given the opportunity to ask questions and answered appropriately. CTN to follow up as indicated. Challenges to be reviewed by the provider   Additional needs identified to be addressed with provider No  none    Discussed COVID-19 related testing which was available at this time. Test results were negative. Patient informed of results, if available? Already aware. Advance Care Planning:   Does patient have an Advance Directive:  reviewed and current. Has a named healthcare decision maker as well. Was this a readmission? No    Patients top risk factors for readmission: functional physical ability and medical condition    Care Transition Nurse (CTN) contacted the patient by telephone to perform post hospital discharge assessment. Verified name and  with patient as identifiers. Provided introduction to self, and explanation of the CTN role. CTN reviewed discharge instructions, medical action plan and red flags with patient who verbalized understanding. Patient given an opportunity to ask questions and does not have any further questions or concerns at this time. Were discharge instructions available to patient? Yes.  Reviewed appropriate site of care based on symptoms and resources available to patient including: PCP, Urgent care clinics, Home health and When to call 911. The patient agrees to contact the PCP office for questions related to their healthcare. Medication reconciliation was not performed with patient, unable at this time. She verbalizes understanding of administration of home medications. Covid Risk Education    Patient has following risk factors of: heart failure, asthma, diabetes and chronic kidney disease. Education provided regarding infection prevention, and signs and symptoms of COVID-19 and when to seek medical attention with patient who verbalized understanding. Discussed exposure protocols and quarantine From CDC: Are you at higher risk for severe illness?   and given an opportunity for questions and concerns. The patient agrees to contact the COVID-19 hotline [phone] or PCP office for questions related to COVID-19. Discussed follow-up appointments. Is follow up appointment scheduled within 7 days of discharge? No, 1/15/2021, 11:30 am, [facility] for follow-up call in 3-5 days based on severity of symptoms and risk factors. Plan for next call: - disease process mgmt, symptom mgmt, diet/hydration, pain control needs, medication mgmt, activity level, home safety needs, infection control, fall precautions, seeking medical attention, who/when to call prn any needs, etc.    CTN provided contact information for future needs.         Follow Up  Future Appointments   Date Time Provider Agnes Card   1/15/2021 11:30 AM YAMIL Ramírez NP [facility]   1/20/2021  1:45 PM YAMIL Cuenca Cardio [facility]   3/5/2021  1:20 PM YAM[place] Ramírez [place] [id number] [facility]       Nils Young RN

## 2021-01-07 NOTE — TELEPHONE ENCOUNTER
[facility] Transitions Initial Follow Up Call    Outreach made within 2 business days of discharge: Yes    Patient: Shashank Tilley Patient : 1960   MRN: [id number]    Reason for Admission: Acute on chronic diastolic heart failure [facility])  Active Problems:    Pericardial effusion    Normocytic anemia  Discharge Date: 21       Spoke with: Patient    Discharge department/facility: [facility] Interactive Patient Contact:  Was patient able to fill all prescriptions: No:   Was patient instructed to bring all medications to the follow-up visit: Yes  Is patient taking all medications as directed in the discharge summary? Yes  Does patient understand their discharge instructions: Does patient have questions or concerns that need addressed prior to 7-14 day follow up office visit: yes - she states she has been out of Diovan for two weeks. She was given this while in the hospital but does not have any at home, Rx sent to [facility]. States she is feeling tired. Her appetite is good. No issues with bladder or bowels. She states she is still coughing but also states she is taking all meds as prescribed.     Scheduled appointment with PCP within 7-14 days    Follow Up  Future Appointments   Date Time Provider Agnes Card   1/15/2021 11:30 AM VerYAMIL Fatima NP [facility]   2021  1:45 PM YAMIL Mistry Cardio [facility]   3/5/2021  1:20 PM YAMIL Elizabeth NP [address]

## 2021-01-08 ENCOUNTER — TELEPHONE (OUTPATIENT)
Dept: INTERNAL MEDICINE CLINIC | Age: 61
End: 2021-01-08

## 2021-01-08 NOTE — TELEPHONE ENCOUNTER
Henery Soulier had a referral for this pt however HH is reporting that pt Is not homebound , is running errands regularly and does not need HH at this time   Pt will be discharged due to not homebound

## 2021-01-12 ENCOUNTER — CARE COORDINATION (OUTPATIENT)
Dept: CASE MANAGEMENT | Age: 61
End: 2021-01-12

## 2021-01-12 NOTE — CARE COORDINATION
[facility] Transitions Follow Up Call    2021    Patient: Scott Castañeda  Patient : 1960   MRN: [id number]   Discharge Date: 21 RARS: Readmission Risk Score: 39         Spoke with: [address] Transitions Subsequent and Final Call    Schedule Follow Up Appointment with PCP: Completed  Subsequent and Final Calls  Have your medications changed?: No  Do you have any questions related to your medications?: No  Do you currently have any active services?: No  Are you currently active with any services?: Home Health  Do you have any needs or concerns that I can assist you with?: No  Identified Barriers: None  Care Transitions Interventions  Other Interventions:         Placed a call to the home and spoke with patient. She reported that she is doing some better. She said that she is not having a lot of shortness of breath like she once was. She said she does occasionally get short o f breath with activity, walking, dressing, etc.  At rest, she is ok she said. She is using her oxygen. She said she still has a bad cough. Did note that she started coughing during call and became a little short of breath, distressed slightly until it stopped. She said that is often, but better. She said she is not having congestion, fever, aches, sore throat, etc.  She said she is checking her blood sugar and it has been a little high for her, 130-150 range. Is eating a little better, drinking a little better. NO new needs reported. Has all of her meds, is taking them as ordered. Is scheduled for follow up. To call prn issues. CTN to follow up as indicated.       Follow Up  Future Appointments   Date Time Provider Agnes Card   1/15/2021 11:30 AM [facility]   2021  1:45 PM YAMIL Palm Cardio [facility]   3/5/2021  1:20 PM YAMIL Galvin NP [facility]       Lennox Rudder, RN

## 2021-01-14 NOTE — PROGRESS NOTES
Chief Complaint  severe asthma and Sleep Apnea    Subjective    History of Present Illness      Anna Peres presents to [facility] for   Ms. Peres is a pleasant 60 year old female patient with known severe persistent Asthma, chronic pericardial effusion, struct of sleep apnea, pulmonary hypertension, GERD, anemia, morbidly obesity, CKD, anemia, CAD/stent, LVH, grade 2 diastolic dysfunction.  She has been seen by Dr. Lomax recently.  Her Breo dose was increased to 200 mcg.  She has been on Xolair in the past when she lived in [place].  She is due to follow-up with him in February 9 and he is looking at further treatment options.  She was due to have reevaluation for her obstructive sleep apnea as she has been noncompliant in the past.  This appointment has had to be rescheduled due to her recent hospitalization.  She has had several admissions and ER visits in the last 6 weeks.  While inpatient in December she had a high-res CT that was negative for interstitial lung disease.  It noted a large stable pleural effusion, gastric reflux with layering debris in the esophagus as well as a hiatal hernia.  Again while inpatient it was noted that she had an elevated inflammatory markers with an elevated rheumatoid factor and recommendations for referral to rheumatology.  This has not happened as of yet.  She also had recommendations for possible referral to GI for Nissen fundoplication.  She is continue to use Singulair, PPI, Plavix (no bleeding issues), Bumex, Xopenex HFA, Xyzal, Spiriva.  Her most recent ER visit was on Sunday.  She is currently on a prednisone taper pack.  She states that she is feeling somewhat better since Sunday however she is not back to her usual state of health.  She had another echo performed on January 17 at [facility] showing grade 2 diastolic dysfunction moderate pericardial effusion without tamponade and chronic in appearance and no change.  She  "also states that her PCP put her on Tessalon Perles most recently.       Objective   Vital Signs:   /80   Pulse 101   Temp 96.9 °F (36.1 °C)   Ht 154.9 cm (61\")   Wt 107 kg (235 lb 12.8 oz)   SpO2 93% Comment: sob ra huffing  BMI 44.55 kg/m²     Physical Exam  Constitutional:       Appearance: She is morbidly obese.   Cardiovascular:      Rate and Rhythm: Normal rate and regular rhythm.      Heart sounds: No murmur. No friction rub. No gallop.    Pulmonary:      Effort: Pulmonary effort is normal.      Breath sounds: Decreased breath sounds present.   Neurological:      Mental Status: She is alert and oriented to person, place, and time.        Result Review :   The following data was reviewed by: TERRI Yepez on 01/20/2021:  CMP    CMP 12/13/20 12/13/20 12/14/20 1/2/21 1/2/21    0130 1910  0450 0513   BUN 24 (A)  26 (A) 19    Creatinine 1.2 (A)  1.1 (A) 1.1 (A)    eGFR Non African Am 45 (A)  50 (A) 50 (A)    eGFR  Am 55 (A)  >59 >59    Sodium 137  134 (A) 134 (A)    Potassium 4.7 4.0 4.7 4.0 3.8   Chloride 100  98 101    Calcium 8.7 (A)  8.8 8.9    Albumin 3.4 (A)  3.4 (A) 3.2 (A)    Total Bilirubin 0.3  0.3 <0.2    Alkaline Phosphatase 96  91 120 (A)    AST (SGOT) 18  11 15    ALT (SGPT) 11  10 14    (A) Abnormal value       Comments are available for some flowsheets but are not being displayed.           CBC w/diff    CBC w/Diff 1/5/21 1/6/21 1/17/21   WBC 5.7 5.6 6.8   RBC 3.15 (A) 3.34 (A) 3.26 (A)   Hemoglobin 9.4 (A) 9.8 (A) 9.4 (A)   Hematocrit 28.1 (A) 29.6 (A) 30.1 (A)   MCV 89.2 88.6 92.3   MCH 29.8 29.3 28.8   MCHC 33.5 33.1 31.2 (A)   RDW 12.6 12.6 12.9   Platelets 193 231 225   Neutrophil Rel % 64.0 57.0 74.2 (A)   Lymphocyte Rel % 25.1 29.9 18.0 (A)   Monocyte Rel % 8.8 10.9 (A) 6.5   Eosinophil Rel % 1.2 1.4 0.7   Basophil Rel % 0.4 0.4 0.3   (A) Abnormal value            Data reviewed: Radiologic studies High-res CT in December negative for interstitial lung disease, " noted large pleural effusion, gastric reflux with debris in the esophagus.  Chest x-ray at [facility] on January 17, Cardiology studies Echo January 17 with grade 2 diastolic dysfunction and moderate pericardial effusion without tamponade and no change and Recent hospitalization notes As noted above in the HPI       Patient's Body mass index is 44.55 kg/m². BMI is above normal parameters. Recommendations include: referral to primary care.      Assessment and Plan    Problem List Items Addressed This Visit        Other    Pulmonary hypertension (CMS/HCC), severe     Obstructive sleep apnea      Other Visit Diagnoses     Severe persistent asthma, unspecified whether complicated    -  Primary    Relevant Medications    Fluticasone Furoate-Vilanterol (Breo Ellipta) 200-25 MCG/INH inhaler    Pericardial effusion without cardiac tamponade        Grade II diastolic dysfunction        Severe left ventricular hypertrophy        Elevated IgE level        Gastroesophageal reflux disease with esophagitis without hemorrhage        Relevant Medications    pantoprazole (PROTONIX) 20 MG EC tablet        Severe persistent asthma-continue Singulair, Breo, Xopenex, Xyzal, Spiriva.  Keep follow-up on February 9 with Dr. Lomax.  She will continue her taper prednisone dose as well.  GERD-patient currently on PPI and she has been asked to continue.  We reviewed to avoid eating or drinking 2 to 3 hours prior to going to bed.  We reviewed bland diet and handout is provided on GERD and bland diet.  We reviewed her high-res CT showing gastric reflux with debris in the esophagus.  We discussed Dr. Angelo Ayoub's recommendations for a GI consult for possible Niesen fundoplication.  This referral will come from her primary care physician if in agreement.  Elevated rheumatoid factor-patient with elevated RF, CRP, sed rate while inpatient with complaints of joint pain.  Recommend rheumatology referral.  She will discuss this further with her primary  care physician.  Chronic pericardial effusion, diastolic dysfunction, severe LVH-discussed low-sodium diet and handout provided.  Discussed foods to avoid.  Continue diuretic as prescribed and managed by PCP.    I have asked her to return in 3 months with Dr. Purdy.    Health maintenance:   Influenza vaccine: 10/2/2020      Follow Up   Return in about 3 months (around 4/20/2021) for must be with  .  Patient was given instructions and counseling regarding her condition or for health maintenance advice. Please see specific information pulled into the AVS if appropriate.     Brooklyn Hdez, APRN  1/20/2021  14:29 CST

## 2021-01-15 ENCOUNTER — OFFICE VISIT (OUTPATIENT)
Dept: PRIMARY CARE CLINIC | Age: 61
End: 2021-01-15
Payer: MEDICARE

## 2021-01-15 VITALS
HEIGHT: 61 IN | HEART RATE: 86 BPM | SYSTOLIC BLOOD PRESSURE: 138 MMHG | BODY MASS INDEX: 43.43 KG/M2 | WEIGHT: 230 LBS | OXYGEN SATURATION: 95 % | DIASTOLIC BLOOD PRESSURE: 70 MMHG | TEMPERATURE: 97.4 F

## 2021-01-15 DIAGNOSIS — E11.69 TYPE 2 DIABETES MELLITUS WITH OTHER SPECIFIED COMPLICATION, WITH LONG-TERM CURRENT USE OF INSULIN (HCC): ICD-10-CM

## 2021-01-15 DIAGNOSIS — J44.9 CHRONIC OBSTRUCTIVE PULMONARY DISEASE, UNSPECIFIED COPD TYPE (HCC): ICD-10-CM

## 2021-01-15 DIAGNOSIS — I50.43 CHF (CONGESTIVE HEART FAILURE), NYHA CLASS I, ACUTE ON CHRONIC, COMBINED (HCC): ICD-10-CM

## 2021-01-15 DIAGNOSIS — E66.01 MORBID OBESITY WITH BMI OF 40.0-44.9, ADULT (HCC): ICD-10-CM

## 2021-01-15 DIAGNOSIS — Z09 HOSPITAL DISCHARGE FOLLOW-UP: Primary | ICD-10-CM

## 2021-01-15 DIAGNOSIS — I10 ESSENTIAL HYPERTENSION: ICD-10-CM

## 2021-01-15 DIAGNOSIS — Z79.4 TYPE 2 DIABETES MELLITUS WITH OTHER SPECIFIED COMPLICATION, WITH LONG-TERM CURRENT USE OF INSULIN (HCC): ICD-10-CM

## 2021-01-15 PROCEDURE — 99495 TRANSJ CARE MGMT MOD F2F 14D: CPT | Performed by: NURSE PRACTITIONER

## 2021-01-15 PROCEDURE — 1111F DSCHRG MED/CURRENT MED MERGE: CPT | Performed by: NURSE PRACTITIONER

## 2021-01-15 RX ORDER — BENZONATATE 100 MG/1
100 CAPSULE ORAL 3 TIMES DAILY PRN
Qty: 30 CAPSULE | Refills: 0 | Status: SHIPPED | OUTPATIENT
Start: 2021-01-15 | End: 2021-01-22

## 2021-01-15 RX ORDER — OLOPATADINE HYDROCHLORIDE 7 MG/ML
1 SOLUTION OPHTHALMIC DAILY
Status: ON HOLD | COMMUNITY
Start: 2020-11-30 | End: 2021-02-23

## 2021-01-15 RX ORDER — VALSARTAN 160 MG/1
160 TABLET ORAL DAILY
Qty: 30 TABLET | Refills: 3 | Status: SHIPPED | OUTPATIENT
Start: 2021-01-15

## 2021-01-15 RX ORDER — LATANOPROST 50 UG/ML
1 SOLUTION/ DROPS OPHTHALMIC NIGHTLY
Status: ON HOLD | COMMUNITY
End: 2021-02-23

## 2021-01-15 RX ORDER — AZELASTINE 1 MG/ML
1 SPRAY, METERED NASAL 2 TIMES DAILY
COMMUNITY
Start: 2020-11-30

## 2021-01-15 ASSESSMENT — ENCOUNTER SYMPTOMS
COUGH: 1
EYES NEGATIVE: 1
SHORTNESS OF BREATH: 1
GASTROINTESTINAL NEGATIVE: 1
BACK PAIN: 1

## 2021-01-15 ASSESSMENT — PATIENT HEALTH QUESTIONNAIRE - PHQ9
1. LITTLE INTEREST OR PLEASURE IN DOING THINGS: 0
SUM OF ALL RESPONSES TO PHQ QUESTIONS 1-9: 0
2. FEELING DOWN, DEPRESSED OR HOPELESS: 0
SUM OF ALL RESPONSES TO PHQ QUESTIONS 1-9: 0

## 2021-01-15 NOTE — PATIENT INSTRUCTIONS
Call pulmonology Dr Brendon Lowe and schedule follow-up    Keep appointment with cardiology and asthma clinic

## 2021-01-17 ENCOUNTER — APPOINTMENT (OUTPATIENT)
Dept: GENERAL RADIOLOGY | Age: 61
End: 2021-01-17
Payer: MEDICARE

## 2021-01-17 ENCOUNTER — HOSPITAL ENCOUNTER (EMERGENCY)
Age: 61
Discharge: HOME | End: 2021-01-17
Attending: EMERGENCY MEDICINE
Payer: MEDICARE

## 2021-01-17 VITALS
HEIGHT: 61 IN | WEIGHT: 230 LBS | SYSTOLIC BLOOD PRESSURE: 155 MMHG | HEART RATE: 80 BPM | OXYGEN SATURATION: 99 % | BODY MASS INDEX: 43.43 KG/M2 | TEMPERATURE: 98.6 F | DIASTOLIC BLOOD PRESSURE: 90 MMHG | RESPIRATION RATE: 22 BRPM

## 2021-01-17 DIAGNOSIS — I31.39 PERICARDIAL EFFUSION: ICD-10-CM

## 2021-01-17 DIAGNOSIS — J45.901 ASTHMA WITH ACUTE EXACERBATION, UNSPECIFIED ASTHMA SEVERITY, UNSPECIFIED WHETHER PERSISTENT: Primary | ICD-10-CM

## 2021-01-17 DIAGNOSIS — I50.9 CONGESTIVE HEART FAILURE, UNSPECIFIED HF CHRONICITY, UNSPECIFIED HEART FAILURE TYPE (HCC): ICD-10-CM

## 2021-01-17 LAB
ADENOVIRUS BY PCR: NOT DETECTED
ALBUMIN SERPL-MCNC: 3.4 G/DL (ref 3.5–5.2)
ALP BLD-CCNC: 82 U/L (ref 35–104)
ALT SERPL-CCNC: 13 U/L (ref 5–33)
ANION GAP SERPL CALCULATED.3IONS-SCNC: 6 MMOL/L (ref 7–19)
APTT: 29 SEC (ref 26–36.2)
AST SERPL-CCNC: 14 U/L (ref 5–32)
BASOPHILS ABSOLUTE: 0 K/UL (ref 0–0.2)
BASOPHILS RELATIVE PERCENT: 0.3 % (ref 0–1)
BILIRUB SERPL-MCNC: <0.2 MG/DL (ref 0.2–1.2)
BORDETELLA PARAPERTUSSIS BY PCR: NOT DETECTED
BORDETELLA PERTUSSIS BY PCR: NOT DETECTED
BUN BLDV-MCNC: 28 MG/DL (ref 8–23)
CALCIUM SERPL-MCNC: 8.9 MG/DL (ref 8.8–10.2)
CHLAMYDOPHILIA PNEUMONIAE BY PCR: NOT DETECTED
CHLORIDE BLD-SCNC: 103 MMOL/L (ref 98–111)
CO2: 30 MMOL/L (ref 22–29)
CORONAVIRUS 229E BY PCR: NOT DETECTED
CORONAVIRUS HKU1 BY PCR: NOT DETECTED
CORONAVIRUS NL63 BY PCR: NOT DETECTED
CORONAVIRUS OC43 BY PCR: NOT DETECTED
CREAT SERPL-MCNC: 1.5 MG/DL (ref 0.5–0.9)
EOSINOPHILS ABSOLUTE: 0.1 K/UL (ref 0–0.6)
EOSINOPHILS RELATIVE PERCENT: 0.7 % (ref 0–5)
GFR AFRICAN AMERICAN: 42
GFR NON-AFRICAN AMERICAN: 35
GLUCOSE BLD-MCNC: 205 MG/DL (ref 74–109)
HCT VFR BLD CALC: 30.1 % (ref 37–47)
HEMOGLOBIN: 9.4 G/DL (ref 12–16)
HUMAN METAPNEUMOVIRUS BY PCR: NOT DETECTED
HUMAN RHINOVIRUS/ENTEROVIRUS BY PCR: NOT DETECTED
IMMATURE GRANULOCYTES #: 0 K/UL
INFLUENZA A BY PCR: NOT DETECTED
INFLUENZA B BY PCR: NOT DETECTED
INR BLD: 1.12 (ref 0.88–1.18)
LV EF: 58 %
LVEF MODALITY: NORMAL
LYMPHOCYTES ABSOLUTE: 1.2 K/UL (ref 1.1–4.5)
LYMPHOCYTES RELATIVE PERCENT: 18 % (ref 20–40)
MCH RBC QN AUTO: 28.8 PG (ref 27–31)
MCHC RBC AUTO-ENTMCNC: 31.2 G/DL (ref 33–37)
MCV RBC AUTO: 92.3 FL (ref 81–99)
MONOCYTES ABSOLUTE: 0.4 K/UL (ref 0–0.9)
MONOCYTES RELATIVE PERCENT: 6.5 % (ref 0–10)
MYCOPLASMA PNEUMONIAE BY PCR: NOT DETECTED
NEUTROPHILS ABSOLUTE: 5 K/UL (ref 1.5–7.5)
NEUTROPHILS RELATIVE PERCENT: 74.2 % (ref 50–65)
PARAINFLUENZA VIRUS 1 BY PCR: NOT DETECTED
PARAINFLUENZA VIRUS 2 BY PCR: NOT DETECTED
PARAINFLUENZA VIRUS 3 BY PCR: NOT DETECTED
PARAINFLUENZA VIRUS 4 BY PCR: NOT DETECTED
PDW BLD-RTO: 12.9 % (ref 11.5–14.5)
PLATELET # BLD: 225 K/UL (ref 130–400)
PMV BLD AUTO: 8.7 FL (ref 9.4–12.3)
POTASSIUM REFLEX MAGNESIUM: 4.2 MMOL/L (ref 3.5–5)
PRO-BNP: 2463 PG/ML (ref 0–900)
PROTHROMBIN TIME: 14.3 SEC (ref 12–14.6)
RBC # BLD: 3.26 M/UL (ref 4.2–5.4)
RESPIRATORY SYNCYTIAL VIRUS BY PCR: NOT DETECTED
SARS-COV-2, PCR: NOT DETECTED
SODIUM BLD-SCNC: 139 MMOL/L (ref 136–145)
TOTAL PROTEIN: 6.2 G/DL (ref 6.6–8.7)
TROPONIN: 0.03 NG/ML (ref 0–0.03)
WBC # BLD: 6.8 K/UL (ref 4.8–10.8)

## 2021-01-17 PROCEDURE — 85730 THROMBOPLASTIN TIME PARTIAL: CPT

## 2021-01-17 PROCEDURE — 0202U NFCT DS 22 TRGT SARS-COV-2: CPT

## 2021-01-17 PROCEDURE — 36415 COLL VENOUS BLD VENIPUNCTURE: CPT

## 2021-01-17 PROCEDURE — 84484 ASSAY OF TROPONIN QUANT: CPT

## 2021-01-17 PROCEDURE — 93005 ELECTROCARDIOGRAM TRACING: CPT | Performed by: PHYSICIAN ASSISTANT

## 2021-01-17 PROCEDURE — 6360000002 HC RX W HCPCS: Performed by: PHYSICIAN ASSISTANT

## 2021-01-17 PROCEDURE — 83880 ASSAY OF NATRIURETIC PEPTIDE: CPT

## 2021-01-17 PROCEDURE — 80053 COMPREHEN METABOLIC PANEL: CPT

## 2021-01-17 PROCEDURE — 96374 THER/PROPH/DIAG INJ IV PUSH: CPT

## 2021-01-17 PROCEDURE — 71045 X-RAY EXAM CHEST 1 VIEW: CPT

## 2021-01-17 PROCEDURE — 85025 COMPLETE CBC W/AUTO DIFF WBC: CPT

## 2021-01-17 PROCEDURE — 96375 TX/PRO/DX INJ NEW DRUG ADDON: CPT

## 2021-01-17 PROCEDURE — 99283 EMERGENCY DEPT VISIT LOW MDM: CPT

## 2021-01-17 PROCEDURE — 85610 PROTHROMBIN TIME: CPT

## 2021-01-17 PROCEDURE — 94640 AIRWAY INHALATION TREATMENT: CPT

## 2021-01-17 RX ORDER — PREDNISONE 10 MG/1
10 TABLET ORAL DAILY
Qty: 10 TABLET | Refills: 0 | Status: SHIPPED | OUTPATIENT
Start: 2021-01-17 | End: 2021-01-27

## 2021-01-17 RX ORDER — LEVALBUTEROL INHALATION SOLUTION 1.25 MG/3ML
1.25 SOLUTION RESPIRATORY (INHALATION) EVERY 8 HOURS PRN
Status: DISCONTINUED | OUTPATIENT
Start: 2021-01-17 | End: 2021-01-17 | Stop reason: HOSPADM

## 2021-01-17 RX ORDER — LEVALBUTEROL INHALATION SOLUTION 1.25 MG/3ML
SOLUTION RESPIRATORY (INHALATION)
Status: DISCONTINUED
Start: 2021-01-17 | End: 2021-01-17 | Stop reason: HOSPADM

## 2021-01-17 RX ORDER — LEVALBUTEROL INHALATION SOLUTION 0.63 MG/3ML
0.63 SOLUTION RESPIRATORY (INHALATION) EVERY 8 HOURS PRN
Status: DISCONTINUED | OUTPATIENT
Start: 2021-01-17 | End: 2021-01-17

## 2021-01-17 RX ORDER — FUROSEMIDE 10 MG/ML
60 INJECTION INTRAMUSCULAR; INTRAVENOUS ONCE
Status: COMPLETED | OUTPATIENT
Start: 2021-01-17 | End: 2021-01-17

## 2021-01-17 RX ORDER — METHYLPREDNISOLONE SODIUM SUCCINATE 125 MG/2ML
125 INJECTION, POWDER, LYOPHILIZED, FOR SOLUTION INTRAMUSCULAR; INTRAVENOUS ONCE
Status: COMPLETED | OUTPATIENT
Start: 2021-01-17 | End: 2021-01-17

## 2021-01-17 RX ADMIN — LEVALBUTEROL HYDROCHLORIDE 1.25 MG: 1.25 SOLUTION RESPIRATORY (INHALATION) at 13:19

## 2021-01-17 RX ADMIN — METHYLPREDNISOLONE SODIUM SUCCINATE 125 MG: 125 INJECTION, POWDER, FOR SOLUTION INTRAMUSCULAR; INTRAVENOUS at 13:00

## 2021-01-17 RX ADMIN — FUROSEMIDE 60 MG: 10 INJECTION, SOLUTION INTRAMUSCULAR; INTRAVENOUS at 13:00

## 2021-01-17 ASSESSMENT — ENCOUNTER SYMPTOMS
EYE PAIN: 0
ABDOMINAL PAIN: 0
APNEA: 0
WHEEZING: 1
SORE THROAT: 0
PHOTOPHOBIA: 0
BACK PAIN: 0
RHINORRHEA: 0
EYE DISCHARGE: 0
COUGH: 1
CHEST TIGHTNESS: 1
SHORTNESS OF BREATH: 1
NAUSEA: 0
ABDOMINAL DISTENTION: 0
COLOR CHANGE: 0

## 2021-01-17 NOTE — ED PROVIDER NOTES
[facility] EMERGENCY DEPT  eMERGENCY dEPARTMENT eNCOUnter      Pt Name: Vee Paniagua  MRN: [id number]  [place] 1960  Date of evaluation: 1/17/2021  Provider: Benjy Layne MD    [address] [place] [address]       Chief Complaint   Patient presents with    Shortness of Breath     getting worse since 01/02/2021 hx of CHF     Seen independently of PA. Pt with worsening SOA. Has ho CHF and asthma as well as pericardial effusion. Was admitted 1/2/2021 and diuresed. Pt feels no better. Says she does not feel like she is getting enough air. She has worsening cough. Her legs are starting to swell. Saw PCP 2 days ago and symptoms worsened despite adjustment of her bumex and using nebs at home. Pt treated with nebs, diuresis, steroids here. Will obtain ECHO to ensure her pericardial effusion is stable. If so, will trial outpatient treatment for asthma with steroids and nebs and close fu with pcp    PHYSICAL EXAM    (up to 7 for level 4, 8 or more for level 5)     ED Triage Vitals [01/17/21 1038]   BP Temp Temp Source Pulse Resp SpO2 Height Weight   (!) 155/90 98.6 °F (37 °C) Oral 80 22 99 % 5' 1\" (1.549 m) 230 lb (104.3 kg)       Physical Exam    DIAGNOSTIC RESULTS     EKG: [facility]'s [facility] who either signs or Co-signs this chart in the absence of a cardiologist.    NSR rate 78 inferolateral t wave inversion similar to prior    RADIOLOGY:   Non-plain film images such as CT, Ultrasound and MRI are read by the radiologist. Plain radiographic images are visualized and preliminarily interpreted by the emergencyphysician with the below findings:        Interpretation per the Radiologist below, if available at the time of thisnote:    XR CHEST PORTABLE   Final Result   . Cardiomegaly. No evidence of acute infiltrate or   effusion.    Signed by Dr Jeremy Wahl on 1/17/2021 12:01 PM            ED BEDSIDE ULTRASOUND:   Performed by ED Physician - none    LABS:  Labs Reviewed BRAIN NATRIURETIC PEPTIDE - Abnormal; Notable for the following components:       Result Value    Pro-BNP 2,463 (*)     All other components within normal limits   CBC WITH AUTO DIFFERENTIAL - Abnormal; Notable for the following components:    RBC 3.26 (*)     Hemoglobin 9.4 (*)     Hematocrit 30.1 (*)     MCHC 31.2 (*)     MPV 8.7 (*)     Neutrophils % 74.2 (*)     Lymphocytes % 18.0 (*)     All other components within normal limits   COMPREHENSIVE METABOLIC PANEL W/ REFLEX TO MG FOR LOW K - Abnormal; Notable for the following components:    CO2 30 (*)     Anion Gap 6 (*)     Glucose 205 (*)     BUN 28 (*)     CREATININE 1.5 (*)     GFR Non- 35 (*)     GFR  42 (*)     Total Protein 6.2 (*)     Alb 3.4 (*)     All other components within normal limits   RESPIRATORY PANEL, MOLECULAR, WITH COVID-19   TROPONIN   PROTIME-INR   APTT       All other labs were within normal range or not returned as of this dictation. EMERGENCY DEPARTMENT COURSE and DIFFERENTIAL DIAGNOSIS/MDM:   Vitals:    Vitals:    01/17/21 1038   BP: (!) 155/90   Pulse: 80   Resp: 22   Temp: 98.6 °F (37 °C)   TempSrc: Oral   SpO2: 99%   Weight: 230 lb (104.3 kg)   Height: 5' 1\" (1.549 m)       MDM      CONSULTS:  IP CONSULT TO HOSPITALIST    PROCEDURES:  Unless otherwise noted below, none      Procedures    FINAL IMPRESSION      1. Asthma with acute exacerbation, unspecified asthma severity, unspecified whether persistent    2. Congestive heart failure, unspecified HF chronicity, unspecified heart failure type ([facility] [address]    3. Pericardial effusion          DISPOSITION/PLAN   DISPOSITION     PATIENT REFERRED TO:  No follow-up provider specified.     DISCHARGE MEDICATIONS:  New Prescriptions    No medications on file          (Please note that portions of this note were completed with a voice recognition program.  Efforts were made to edit the dictations but occasionally words aremis-transcribed.) Michael Knight MD (electronically signed)  Attending Emergency Physician           Michael Knight MD  01/17/21 4730

## 2021-01-17 NOTE — H&P
Medicine Consult            Date: 1/17/2021        Patient Name: Nehemiah Geronimo     YOB: 1960      Age:  56 y.o.    Referring Provider: Monica NORWOOD  Reason for Consult: SOB      Chief Complaint     Chief Complaint   Patient presents with    Shortness of Breath     getting worse since 01/02/2021 hx of CHF       History Obtained From   patient    History of Present Illness 68-year-old lady with a history of coronary artery disease status post stent placement, hypertension, chronic lower extremity edema, persistent predominantly localized posterior pericardial effusion without change on serial echocardiograms, type 2 diabetes, rheumatoid arthritis, morbid obesity, obstructive sleep apnea not compliant to CPAP, CKD stage III, who presents to the hospital with concerns of shortness of breath. Patient was recently discharged from the hospital 1/6/2021 after being treated for heart failure exacerbation with diuretics. During the admission patient was evaluated by cardiology, no changes made to medication, limited echocardiogram was obtained to reevaluate pericardial effusion and no changes compared to prior, patient was hemodynamically stable with no evidence of cardiac tamponade. Patient was targeted for SNF discharge however declined SNF placement hence discharged home with home health. Presenting today with concerns of shortness of breath, stated she saw her primary care doctor couple days back, was given some medication which she thinks antibiotics with improvement in symptoms, and stated chest x-ray was obtained and was not concerning for heart failure exacerbation. Stated she has plans to see her allergist on Tuesday and PCP was hesitant to place on steroids pending allergist eval.  Patient stated today she was having worsening shortness of breathing felt as if she could not catch her breath hence presented to the emergency room for evaluation. In the ED vitals stable, O2 saturation greater than 99% on room air. Labs with no acute abnormality, creatinine at baseline of 1.5, noted to have BNP of 2400, patient last BNP in the system on discharge was 1800. Was given IV Solu-Medrol in the ED and per patient couple minutes later noticed significant improvement in her breathing. Was also given IV Lasix and consult was placed for evaluation of patient for questionable admission. Past Medical History     Past Medical History:   Diagnosis Date    Allergies     AMI (acute myocardial infarction) ([facility] [address] 09/2018    Asthma     CAD (coronary artery disease)     hx of stents    Cerebral artery occlusion with cerebral infarction ([facility] [address] 2012    R sided numbness/weakness    CHF (congestive heart failure) ([facility] [address]     COPD (chronic obstructive pulmonary disease) ([facility] [address]     Depression     Diabetes mellitus ([facility] [address]     DVT (deep vein thrombosis) in pregnancy     GERD (gastroesophageal reflux disease)     Hx of blood clots     rt leg    Hyperlipidemia     Hypertension     Other disorders of kidney and ureter in diseases classified elsewhere     Palliative care patient 07/02/2020    Pneumonia         Past Surgical History     Past Surgical History:   Procedure Laterality Date    CATARACT REMOVAL Bilateral     COLONOSCOPY  approx 2013    CORONARY ANGIOPLASTY WITH STENT PLACEMENT  2018    [facility] (3 stents)    TONSILLECTOMY          Medications Prior to Admission     Prior to Admission medications    Medication Sig Start Date End Date Taking?  Authorizing Provider   BREJAGJIT ELLIPTA 200-25 MCG/INH AEPB inhaler Inhale 1 puff into the lungs daily 12/29/20   Historical Provider, MD   PAZEO 0.7 % SOLN Place 1 drop into both eyes daily 11/30/20   Historical Provider, MD   latanoprost (XALATAN) 0.005 % ophthalmic solution Apply 1 drop to eye nightly    Historical Provider, MD   azelastine (ASTELIN) 0.1 % nasal spray 1 spray by Nasal route 2 times daily 11/30/20   Historical Provider, MD   valsartan (DIOVAN) 160 MG tablet Take 1 tablet by mouth daily 1/15/21   YAMIL Balbuena NP   benzonatate (TESSALON) 100 MG capsule Take 1 capsule by mouth 3 times daily as needed for Cough 1/15/21 1/22/21  YAMIL Balbuena NP   atorvastatin (LIPITOR) 20 MG tablet Take 2 tablets by mouth daily 12/16/20   Brionna Murray MD carvedilol (COREG) 3.125 MG tablet Take 2 tablets by mouth 2 times daily 12/16/20   Nadine Mendez MD   pantoprazole (PROTONIX) 20 MG tablet Take 2 tablets by mouth 2 times daily 12/16/20 1/15/21  Nadine Mendez MD   bumetanide (BUMEX) 1 MG tablet Take 1 mg by mouth 2 times daily    Historical Provider, MD   amLODIPine (NORVASC) 5 MG tablet Take 1 tablet by mouth daily 11/22/20   YAMIL Hammonds NP   Insulin Degludec [facility]) 100 UNIT/ML SOPN Inject 40 Units into the skin nightly 11/22/20   YAMIL Hammonds NP   Semaglutide, 1 MG/DOSE, 2 MG/1.5ML SOPN Inject 1 mg into the skin every 7 days 11/22/20   YAMIL Hammonds NP   levocetirizine (XYZAL) 5 MG tablet Take 1 tablet by mouth nightly 11/22/20   YAMIL Hammonds NP   pramipexole (MIRAPEX) 0.5 MG tablet Take 1 tablet by mouth 2 times daily 11/22/20   YAMIL Hammonds NP   gabapentin (NEURONTIN) 600 MG tablet Take 1 tablet by mouth 3 times daily for 30 days.  11/20/20 1/2/21  Aimee Jordan MD   tiotropium (SPIRIVA RESPIMAT) 1.25 MCG/ACT AERS inhaler Inhale 2 puffs into the lungs daily 10/19/20   YAMIL Hammonds NP   levalbuterol [facility]) 45 MCG/ACT inhaler Inhale 2 puffs into the lungs every 4 hours as needed for Wheezing or Shortness of Breath 10/19/20   YAMIL Hammonds NP   DULoxetine (CYMBALTA) 30 MG extended release capsule Take 30 mg by mouth daily ALONG WITH A 60 MG TABLET (BOTH TOGETHER + 90 MG)    Historical Provider, MD   clopidogrel (PLAVIX) 75 MG tablet Take 75 mg by mouth daily    Historical Provider, MD   montelukast (SINGULAIR) 10 MG tablet Take 10 mg by mouth nightly    Historical Provider, MD   vitamin D (CHOLECALCIFEROL) 25 MCG (1000 UT) TABS tablet Take 1,000 Units by mouth daily    Historical Provider, MD   melatonin 3 MG TABS tablet Take 10 mg by mouth nightly    Historical Provider, MD        Allergies Albuterol, Levaquin [levofloxacin], Metformin and related, and Aspirin    Social History     Social History     Tobacco History     Smoking Status  Former Smoker Quit date  1/1/2005 Smoking Frequency  1 pack/day for 30 years (27 pk yrs)    Smokeless Tobacco Use  Never Used          Alcohol History     Alcohol Use Status  Never          Drug Use     Drug Use Status  Never          Sexual Activity     Sexually Active  Not Asked                Family History     Family History   Problem Relation Age of Onset    Colon Cancer Neg Hx     Colon Polyps Neg Hx        Review of Systems   Review of Systems: 16 point system reviewed and negative except as stated above. Physical Exam   BP (!) 155/90   Pulse 80   Temp 98.6 °F (37 °C) (Oral)   Resp 22   Ht 5' 1\" (1.549 m)   Wt 230 lb (104.3 kg)   SpO2 99%   BMI 43.46 kg/m²       Physical Exam  General: Morbid obesity, alert, well-developed, no acute distress sitting upright on bedside chair   HEENT: Atraumatic normocephalic, range of motion normal, no JVD, no tracheal deviation noted. Cardiac: Normal S1-S2 no murmurs rub or gallop. Respiratory: Effort normal, breath sounds normal, clear To auscultation bilaterally, no rhonchi or rales, no wheezing  Abdomen: Truncal obesity, soft, positive bowel sounds in all quadrants, no distention, nontender to palpation. MSK/extremities: no tenderness, +2 lower extremity edema, no deformity, moves all extremities  Skin: Warm, no erythema noted, no bruising and no lesions noted. Psych: Affect normal and good eye contact, behavioral normal, thought content normal and judgment normal  Neurological: No focal deficits, alert and conversant, no formal disorientation noted. No sensory deficits, no abnormal coordination.         Labs      Recent Results (from the past 24 hour(s))   Respiratory Panel, Molecular, with COVID-19 (Restricted: peds pts or suitable admitted adults)    Collection Time: 01/17/21 12:09 PM Specimen: Nasopharyngeal   Result Value Ref Range    Adenovirus by PCR Not Detected Not Detected    Bordetella parapertussis by PCR Not Detected Not Detected    Bordetella pertussis by PCR Not Detected Not Detected    Chlamydophilia pneumoniae by PCR Not Detected Not Detected    Coronavirus 229E by PCR Not Detected Not Detected    Coronavirus HKU1 by PCR Not Detected Not Detected    Coronavirus NL63 by PCR Not Detected Not Detected    Coronavirus OC43 by PCR Not Detected Not Detected    SARS-CoV-2, PCR Not Detected Not Detected    Human Metapneumovirus by PCR Not Detected Not Detected    Human Rhinovirus/Enterovirus by PCR Not Detected Not Detected    Influenza A by PCR Not Detected Not Detected    Influenza B by PCR Not Detected Not Detected    Mycoplasma pneumoniae by PCR Not Detected Not Detected    Parainfluenza Virus 1 by PCR Not Detected Not Detected    Parainfluenza Virus 2 by PCR Not Detected Not Detected    Parainfluenza Virus 3 by PCR Not Detected Not Detected    Parainfluenza Virus 4 by PCR Not Detected Not Detected    Respiratory Syncytial Virus by PCR Not Detected Not Detected   TROPONIN    Collection Time: 01/17/21 12:20 PM   Result Value Ref Range    Troponin 0.03 0.00 - 0.03 ng/mL   PROBNP, N-TERMINAL    Collection Time: 01/17/21 12:20 PM   Result Value Ref Range    Pro-BNP 2,463 (H) 0 - 900 pg/mL   CBC Auto Differential    Collection Time: 01/17/21 12:20 PM   Result Value Ref Range    WBC 6.8 4.8 - 10.8 K/uL    RBC 3.26 (L) 4.20 - 5.40 M/uL    Hemoglobin 9.4 (L) 12.0 - 16.0 g/dL    Hematocrit 30.1 (L) 37.0 - 47.0 %    MCV 92.3 81.0 - 99.0 fL    MCH 28.8 27.0 - 31.0 pg    MCHC 31.2 (L) 33.0 - 37.0 g/dL    RDW 12.9 11.5 - 14.5 %    Platelets 954 699 - 171 K/uL    MPV 8.7 (L) 9.4 - 12.3 fL    Neutrophils % 74.2 (H) 50.0 - 65.0 %    Lymphocytes % 18.0 (L) 20.0 - 40.0 %    Monocytes % 6.5 0.0 - 10.0 %    Eosinophils % 0.7 0.0 - 5.0 %    Basophils % 0.3 0.0 - 1.0 % Neutrophils Absolute 5.0 1.5 - 7.5 K/uL    Immature Granulocytes # 0.0 K/uL    Lymphocytes Absolute 1.2 1.1 - 4.5 K/uL    Monocytes Absolute 0.40 0.00 - 0.90 K/uL    Eosinophils Absolute 0.10 0.00 - 0.60 K/uL    Basophils Absolute 0.00 0.00 - 0.20 K/uL   Comprehensive Metabolic Panel w/ Reflex to MG    Collection Time: 01/17/21 12:20 PM   Result Value Ref Range    Sodium 139 136 - 145 mmol/L    Potassium reflex Magnesium 4.2 3.5 - 5.0 mmol/L    Chloride 103 98 - 111 mmol/L    CO2 30 (H) 22 - 29 mmol/L    Anion Gap 6 (L) 7 - 19 mmol/L    Glucose 205 (H) 74 - 109 mg/dL    BUN 28 (H) 8 - 23 mg/dL    CREATININE 1.5 (H) 0.5 - 0.9 mg/dL    GFR Non-African American 35 (A) >60    GFR  42 (L) >59    Calcium 8.9 8.8 - 10.2 mg/dL    Total Protein 6.2 (L) 6.6 - 8.7 g/dL    Alb 3.4 (L) 3.5 - 5.2 g/dL    Total Bilirubin <0.2 0.2 - 1.2 mg/dL    Alkaline Phosphatase 82 35 - 104 U/L    ALT 13 5 - 33 U/L    AST 14 5 - 32 U/L   Protime-INR    Collection Time: 01/17/21 12:20 PM   Result Value Ref Range    Protime 14.3 12.0 - 14.6 sec    INR 1.12 0.88 - 1.18   APTT    Collection Time: 01/17/21 12:20 PM   Result Value Ref Range    aPTT 29.0 26.0 - 36.2 sec        Imaging/Diagnostics Last 24 Hours   Xr Chest Portable    Result Date: 1/17/2021  EXAMINATION: Chest one view 1/17/2021 HISTORY: Wheezing with shortness of air. FINDINGS: Today's exam is compared to a previous study of 1/5/2021. There is cardiomegaly. There is no evidence of acute consolidative pneumonia or effusion. There is spondylosis in the mid and lower thoracic spine. . Cardiomegaly. No evidence of acute infiltrate or effusion.  Signed by Dr Doyle Fowler on 1/17/2021 12:01 PM      Assessment      Dyspnea likely secondary to reactive airway disease  Chronic diastolic heart failure  CKD stage III  Hypertension  Morbid obesity  Chronic lower extremity edema  Persistent pericardial effusion with no changes on recent echocardiogram.    Plan Patient stated some improvement in breathing with Solu-Medrol given in the emergency room. Dyspnea not related to heart failure as patient not in exacerbation, patient has chronic elevation of baseline BNP, renal function currently at baseline, and patient with chronic lower extremity edema. Patient will benefit from oral prednisone on discharge from the emergency room, was advised to continue current diuretic dose as patient stated no increase in swelling in her lower extremity from baseline. Would recommend limited echo in the emergency room prior to discharge to evaluate pericardial effusion though vitals stable and no concerns of tamponade phenomena. Patient was encouraged to keep her outpatient appointment on Tuesday with the allergist as well as continue following up outpatient with her primary care doctor. Patient currently states she is at her baseline, also molecular respiratory panel obtained in the ED which returned negative. Discussion was held with [facility] in the ED in terms of my recommendations and informed that patient does not need admission to the hospital at this time. Please do not hesitate to keep me informed if any changes in patient clinical status needing reassessment or change in current plan. Electronically signed by   Jermaine Rogers   Internal Medicine Hospitalist  On 1/17/2021  At 2:16 PM    EMR Dragon/Transcription disclaimer:   Much of this encounter note is an electronic transcription/translation of spoken language to printed text.  The electronic translation of spoken language may permit erroneous, or at times, nonsensical words or phrases to be inadvertently transcribed; although attempts have made to review the note for such errors, some may still exist.        Consultations Ordered:  IP CONSULT TO HOSPITALIST    Electronically signed by Jermaine Rogers MD on 1/17/21 at 2:01 PM CST

## 2021-01-17 NOTE — ED PROVIDER NOTES
[facility] [place] - [facility] EMERGENCY DEPT  eMERGENCYdEPARTMENT eNCOUnter      Pt Name: Lafe Curling  MRN: [id number]  [place] 1960  Date of evaluation: 1/17/2021  Provider:CUCO Quinn    CHIEF COMPLAINT       Chief Complaint   Patient presents with    Shortness of Breath     getting worse since 01/02/2021 hx of CHF         HISTORY OF PRESENT ILLNESS  (Location/Symptom, Timing/Onset, Context/Setting, Quality, Duration, Modifying Factors, Severity.)   Lafe Curling is a 56 y.o. female who presents to the emergency department wheezing and shortness of breath on exam she has been doing her leave albuterol every 4 hours she was recently discharged with CHF diastolic dysfunction had a echocardiogram supporting a moderate pericardial effusion she is having to sit upright she feels like he is having worsening swelling in her legs and is orthopneic. Afebrile here on room air baseline 99% oxygenation with no tachycardia. She denies any chest pain but there is a mild pleuritic component recent CTA was also negative. She denies that this is new from her last visit with us. HPI    Nursing Notes were reviewed and I agree. REVIEW OF SYSTEMS    (2-9 systems for level 4, 10 or more for level 5)     Review of Systems   Constitutional: Negative for activity change, appetite change, chills and fever. HENT: Negative for congestion, postnasal drip, rhinorrhea and sore throat. Eyes: Negative for photophobia, pain, discharge and visual disturbance. Respiratory: Positive for cough, chest tightness, shortness of breath and wheezing. Negative for apnea. Cardiovascular: Negative for chest pain and leg swelling. Gastrointestinal: Negative for abdominal distention, abdominal pain and nausea. Genitourinary: Negative for vaginal bleeding. Musculoskeletal: Negative for arthralgias, back pain, joint swelling, neck pain and neck stiffness. Skin: Negative for color change and rash. Neurological: Negative for dizziness, syncope, facial asymmetry and headaches. Hematological: Negative for adenopathy. Does not bruise/bleed easily. Psychiatric/Behavioral: Negative for agitation, behavioral problems and confusion. Except as noted above the remainder of the review of systems was reviewed and negative.        PAST MEDICAL HISTORY     Past Medical History:   Diagnosis Date    Allergies     AMI (acute myocardial infarction) ([facility] [address] 09/2018    Asthma     CAD (coronary artery disease)     hx of stents    Cerebral artery occlusion with cerebral infarction ([facility] [address] 2012    R sided numbness/weakness    CHF (congestive heart failure) ([facility] [address]     COPD (chronic obstructive pulmonary disease) ([facility] [address]     Depression     Diabetes mellitus ([facility] [address]     DVT (deep vein thrombosis) in pregnancy     GERD (gastroesophageal reflux disease)     Hx of blood clots     rt leg    Hyperlipidemia     Hypertension     Other disorders of kidney and ureter in diseases classified elsewhere     Palliative care patient 07/02/2020    Pneumonia          SURGICAL HISTORY       Past Surgical History:   Procedure Laterality Date    CATARACT REMOVAL Bilateral     COLONOSCOPY  approx 2013    CORONARY ANGIOPLASTY WITH STENT PLACEMENT  2018    in [address] [facility] (3 stents)    TONSILLECTOMY           CURRENT MEDICATIONS       Discharge Medication List as of 1/17/2021  5:17 PM      CONTINUE these medications which have NOT CHANGED    Details   BREO ELLIPTA 200-25 MCG/INH AEPB inhaler Inhale 1 puff into the lungs daily, DAWHistorical Med      PAZEO 0.7 % SOLN Place 1 drop into both eyes daily, DAWHistorical Med      latanoprost (XALATAN) 0.005 % ophthalmic solution Apply 1 drop to eye nightlyHistorical Med      azelastine (ASTELIN) 0.1 % nasal spray 1 spray by Nasal route 2 times dailyHistorical Med      valsartan (DIOVAN) 160 MG tablet Take 1 tablet by mouth daily, Disp-30 tablet, R-3Normal benzonatate (TESSALON) 100 MG capsule Take 1 capsule by mouth 3 times daily as needed for Cough, Disp-30 capsule, R-0Normal      atorvastatin (LIPITOR) 20 MG tablet Take 2 tablets by mouth daily, Disp-30 tablet, R-3NO PRINT      carvedilol (COREG) 3.125 MG tablet Take 2 tablets by mouth 2 times daily, Disp-60 tablet, R-2NO PRINT      pantoprazole (PROTONIX) 20 MG tablet Take 2 tablets by mouth 2 times daily, Disp-120 tablet, R-0Print      bumetanide (BUMEX) 1 MG tablet Take 1 mg by mouth 2 times dailyHistorical Med      amLODIPine (NORVASC) 5 MG tablet Take 1 tablet by mouth daily, Disp-30 tablet,R-1Normal      Insulin Degludec (TRESIBA FLEXTOUCH) 100 UNIT/ML SOPN Inject 40 Units into the skin nightly, Disp-4 pen,R-2Normal      Semaglutide, 1 MG/DOSE, 2 MG/1.5ML SOPN Inject 1 mg into the skin every 7 days, Disp-2 pen,R-2Normal      levocetirizine (XYZAL) 5 MG tablet Take 1 tablet by mouth nightly, Disp-30 tablet,R-3Normal      pramipexole (MIRAPEX) 0.5 MG tablet Take 1 tablet by mouth 2 times daily, Disp-60 tablet,R-2Normal      gabapentin (NEURONTIN) 600 MG tablet Take 1 tablet by mouth 3 times daily for 30 days. , Disp-90 tablet, R-2Phone In      tiotropium (SPIRIVA RESPIMAT) 1.25 MCG/ACT AERS inhaler Inhale 2 puffs into the lungs daily, Disp-1 Inhaler,R-3Normal      levalbuterol (XOPENEX HFA) 45 MCG/ACT inhaler Inhale 2 puffs into the lungs every 4 hours as needed for Wheezing or Shortness of Breath, Disp-1 Inhaler,R-0Normal      DULoxetine (CYMBALTA) 30 MG extended release capsule Take 30 mg by mouth daily ALONG WITH A 60 MG TABLET (BOTH TOGETHER + 90 MG)Historical Med      clopidogrel (PLAVIX) 75 MG tablet Take 75 mg by mouth dailyHistorical Med      montelukast (SINGULAIR) 10 MG tablet Take 10 mg by mouth nightlyHistorical Med      vitamin D (CHOLECALCIFEROL) 25 MCG (1000 UT) TABS tablet Take 1,000 Units by mouth dailyHistorical Med      melatonin 3 MG TABS tablet Take 10 mg by mouth nightlyHistorical Med ALLERGIES     Albuterol, Levaquin [levofloxacin], Metformin and related, and Aspirin    FAMILY HISTORY       Family History   Problem Relation Age of Onset    Colon Cancer Neg Hx     Colon Polyps Neg Hx           SOCIAL HISTORY       Social History     Socioeconomic History    Marital status:      Spouse name: Not on file    Number of children: Not on file    Years of education: Not on file    Highest education level: Not on file   Occupational History    Not on file   Social Needs    Financial resource strain: Not on file    Food insecurity     Worry: Not on file     Inability: Not on file    Transportation needs     Medical: Not on file     Non-medical: Not on file   Tobacco Use    Smoking status: Former Smoker     Packs/day: 1.00     Years: 30.00     Pack years: 30.00     Quit date:      Years since quittin.0    Smokeless tobacco: Never Used   Substance and Sexual Activity    Alcohol use: Never     Frequency: Never    Drug use: Never    Sexual activity: Not on file   Lifestyle    Physical activity     Days per week: Not on file     Minutes per session: Not on file    Stress: Not on file   Relationships    Social connections     Talks on phone: Not on file     Gets together: Not on file     Attends Latter-day service: Not on file     Active member of club or organization: Not on file     Attends meetings of clubs or organizations: Not on file     Relationship status: Not on file    Intimate partner violence     Fear of current or ex partner: Not on file     Emotionally abused: Not on file     Physically abused: Not on file     Forced sexual activity: Not on file   Other Topics Concern    Not on file   Social History Narrative     17 years second Edith Nation has 3 daughtersPresently in collegeShe is worked as a sales associateQuit smoking  denies alcohol consumption or substance usagePhysically sedentary       SCREENINGS           PHYSICAL EXAM (up to 7 forlevel 4, 8 or more for level 5)     ED Triage Vitals [01/17/21 1038]   BP Temp Temp Source Pulse Resp SpO2 Height Weight   (!) 155/90 98.6 °F (37 °C) Oral 80 22 99 % 5' 1\" (1.549 m) 230 lb (104.3 kg)       Physical Exam  Vitals signs and nursing note reviewed. Constitutional:       General: She is not in acute distress. Appearance: She is well-developed. She is obese. She is not diaphoretic. HENT:      Head: Normocephalic and atraumatic. Right Ear: External ear normal.      Left Ear: External ear normal.      Mouth/Throat:      Pharynx: No oropharyngeal exudate. Eyes:      General:         Right eye: No discharge. Left eye: No discharge. Pupils: Pupils are equal, round, and reactive to light. Neck:      Musculoskeletal: Normal range of motion and neck supple. Thyroid: No thyromegaly. Cardiovascular:      Rate and Rhythm: Normal rate and regular rhythm. Heart sounds: Normal heart sounds. No murmur. No friction rub. Pulmonary:      Effort: Pulmonary effort is normal. No respiratory distress. Breath sounds: No stridor. Examination of the right-upper field reveals wheezing. Examination of the left-upper field reveals wheezing. Wheezing present. Abdominal:      General: Bowel sounds are normal. There is no distension. Palpations: Abdomen is soft. Tenderness: There is no abdominal tenderness. Musculoskeletal: Normal range of motion. Skin:     General: Skin is warm and dry. Capillary Refill: Capillary refill takes less than 2 seconds. Findings: No rash. Neurological:      General: No focal deficit present. Mental Status: She is alert and oriented to person, place, and time. Cranial Nerves: No cranial nerve deficit. Sensory: No sensory deficit. Coordination: Coordination normal.   Psychiatric:         Behavior: Behavior normal.         Thought Content:  Thought content normal.           DIAGNOSTIC RESULTS RADIOLOGY:   Non-plain film images such as CT, Ultrasound and MRI are read by the radiologist. Plain radiographic images are visualized and preliminarilyinterpreted by No att. providers found with the below findings:      Interpretation per the Radiologist below, if available at the time of this note:    XR CHEST PORTABLE   Final Result   . Cardiomegaly. No evidence of acute infiltrate or   effusion. Signed by Dr Becka Torres on 1/17/2021 12:01 PM          LABS:  Labs Reviewed   BRAIN NATRIURETIC PEPTIDE - Abnormal; Notable for the following components:       Result Value    Pro-BNP 2,463 (*)     All other components within normal limits   CBC WITH AUTO DIFFERENTIAL - Abnormal; Notable for the following components:    RBC 3.26 (*)     Hemoglobin 9.4 (*)     Hematocrit 30.1 (*)     MCHC 31.2 (*)     MPV 8.7 (*)     Neutrophils % 74.2 (*)     Lymphocytes % 18.0 (*)     All other components within normal limits   COMPREHENSIVE METABOLIC PANEL W/ REFLEX TO MG FOR LOW K - Abnormal; Notable for the following components:    CO2 30 (*)     Anion Gap 6 (*)     Glucose 205 (*)     BUN 28 (*)     CREATININE 1.5 (*)     GFR Non- 35 (*)     GFR  42 (*)     Total Protein 6.2 (*)     Alb 3.4 (*)     All other components within normal limits   RESPIRATORY PANEL, MOLECULAR, WITH COVID-19   TROPONIN   PROTIME-INR   APTT       All other labs were within normal range or notreturned as of this dictation.     RE-ASSESSMENT        EMERGENCY DEPARTMENT COURSE and DIFFERENTIAL DIAGNOSIS/MDM:   Vitals:    Vitals:    01/17/21 1038   BP: (!) 155/90   Pulse: 80   Resp: 22   Temp: 98.6 °F (37 °C)   TempSrc: Oral   SpO2: 99%   Weight: 230 lb (104.3 kg)   Height: 5' 1\" (1.549 m)       MDM Per hospitalist  she has seen the patient and is agreeable no indication for admission she recommends emergent echo which we do per Dr. Yesenia Gaona interpretation no changes in pericardial effusion patient has slightly elevated BNP which we will advise her to follow with PCP to adjust her Bumex. No changes in kidney function she feels significantly better after Solu-Medrol injection which we will send her home with some prednisolone and she will continue with her every 4 nebulizer and albuterol treatments for the next minimum 3 to 7 days. PROCEDURES:    Procedures      FINAL IMPRESSION      1. Asthma with acute exacerbation, unspecified asthma severity, unspecified whether persistent    2. Congestive heart failure, unspecified HF chronicity, unspecified heart failure type ([facility] [address]    3.  Pericardial effusion          DISPOSITION/PLAN   DISPOSITION Decision To Discharge 01/17/2021 05:16:32 PM      PATIENT REFERRED TO:  [facility] EMERGENCY DEPT  Wally Benz  [phone]    If symptoms worsen    YAMIL Angeles   [phone]      adjust COPD meds      DISCHARGE MEDICATIONS:  Discharge Medication List as of 1/17/2021  5:17 PM      START taking these medications    Details   predniSONE (DELTASONE) 10 MG tablet Take 1 tablet by mouth daily for 10 days, Disp-10 tablet, R-0Print             (Please note that portions of this note were completed with a voice recognition program.  Efforts were made to edit the dictations but occasionallywords are mis-transcribed.)    Ankita Mccormack [address], [place]  01/17/21 2057

## 2021-01-18 LAB
EKG P AXIS: 68 DEGREES
EKG P-R INTERVAL: 170 MS
EKG Q-T INTERVAL: 422 MS
EKG QRS DURATION: 92 MS
EKG QTC CALCULATION (BAZETT): 453 MS
EKG T AXIS: -97 DEGREES

## 2021-01-18 PROCEDURE — 93010 ELECTROCARDIOGRAM REPORT: CPT | Performed by: INTERNAL MEDICINE

## 2021-01-19 ENCOUNTER — CARE COORDINATION (OUTPATIENT)
Dept: CASE MANAGEMENT | Age: 61
End: 2021-01-19

## 2021-01-19 NOTE — CARE COORDINATION
[facility] Transitions Follow Up Call    2021    Patient: Tadeo Asencio  Patient : 1960   MRN: [id number]    Discharge Date: 21 RARS: Readmission Risk Score: 39         Spoke with: [id number] Transitions Subsequent and Final Call    Schedule Follow Up Appointment with PCP: Completed  Subsequent and Final Calls  Do you have any ongoing symptoms?: No  Have your medications changed?: No  Do you have any questions related to your medications?: No  Do you currently have any active services?: No  Are you currently active with any services?: Home Health  Do you have any needs or concerns that I can assist you with?: No  Identified Barriers: None  Care Transitions Interventions  Other Interventions:       Placed a call to the home and spoke with patient. She reported that she is a lot better today than she was a couple of days ago when she was in the ED. She said she was having a great deal of shortness of breath the other day, but is a lot better today. She said she is still having a rattly cough but it is better. She denied any fever, sore throat, other symptoms. Reported that her blood sugar is still running a little high, but feels it is the steroids. She is eating and drinking well. She is up and about, tolerating activity well. No new needs reported or noted. Discussed COVID 19 Risk and other information. To call prn issues. CTN to follow up as indicated.          Follow Up  Future Appointments   Date Time Provider Agnes Card   2021  1:45 PM YAMIL Fields [facility]   2021 10:30 AM YAMIL Gresham NP [address]   3/5/2021  1:20 PM YAMIL Gresham NP [facility]       Anamaria Magallon RN

## 2021-01-20 ENCOUNTER — OFFICE VISIT (OUTPATIENT)
Dept: PULMONOLOGY | Facility: CLINIC | Age: 61
End: 2021-01-20

## 2021-01-20 ENCOUNTER — OFFICE VISIT (OUTPATIENT)
Dept: CARDIOLOGY CLINIC | Age: 61
End: 2021-01-20
Payer: MEDICARE

## 2021-01-20 VITALS
BODY MASS INDEX: 44.52 KG/M2 | OXYGEN SATURATION: 93 % | HEIGHT: 61 IN | WEIGHT: 235.8 LBS | DIASTOLIC BLOOD PRESSURE: 80 MMHG | SYSTOLIC BLOOD PRESSURE: 160 MMHG | HEART RATE: 101 BPM | TEMPERATURE: 96.9 F

## 2021-01-20 VITALS
WEIGHT: 237 LBS | DIASTOLIC BLOOD PRESSURE: 64 MMHG | SYSTOLIC BLOOD PRESSURE: 124 MMHG | BODY MASS INDEX: 44.75 KG/M2 | HEIGHT: 61 IN | OXYGEN SATURATION: 98 % | HEART RATE: 77 BPM

## 2021-01-20 DIAGNOSIS — R76.8 ELEVATED IGE LEVEL: ICD-10-CM

## 2021-01-20 DIAGNOSIS — I27.20 PULMONARY HYPERTENSION (HCC): ICD-10-CM

## 2021-01-20 DIAGNOSIS — J45.50 SEVERE PERSISTENT ASTHMA, UNSPECIFIED WHETHER COMPLICATED: Primary | ICD-10-CM

## 2021-01-20 DIAGNOSIS — I50.32 CHRONIC DIASTOLIC CONGESTIVE HEART FAILURE (HCC): ICD-10-CM

## 2021-01-20 DIAGNOSIS — I10 ESSENTIAL HYPERTENSION: ICD-10-CM

## 2021-01-20 DIAGNOSIS — G47.33 OBSTRUCTIVE SLEEP APNEA: ICD-10-CM

## 2021-01-20 DIAGNOSIS — I51.89 GRADE II DIASTOLIC DYSFUNCTION: ICD-10-CM

## 2021-01-20 DIAGNOSIS — I31.39 PERICARDIAL EFFUSION WITHOUT CARDIAC TAMPONADE: ICD-10-CM

## 2021-01-20 DIAGNOSIS — K21.00 GASTROESOPHAGEAL REFLUX DISEASE WITH ESOPHAGITIS WITHOUT HEMORRHAGE: ICD-10-CM

## 2021-01-20 DIAGNOSIS — I25.10 NONOBSTRUCTIVE ATHEROSCLEROSIS OF CORONARY ARTERY: ICD-10-CM

## 2021-01-20 DIAGNOSIS — I31.39 PERICARDIAL EFFUSION: Primary | ICD-10-CM

## 2021-01-20 DIAGNOSIS — I51.7 SEVERE LEFT VENTRICULAR HYPERTROPHY: ICD-10-CM

## 2021-01-20 PROCEDURE — G8417 CALC BMI ABV UP PARAM F/U: HCPCS | Performed by: CLINICAL NURSE SPECIALIST

## 2021-01-20 PROCEDURE — 99214 OFFICE O/P EST MOD 30 MIN: CPT | Performed by: CLINICAL NURSE SPECIALIST

## 2021-01-20 PROCEDURE — 1036F TOBACCO NON-USER: CPT | Performed by: CLINICAL NURSE SPECIALIST

## 2021-01-20 PROCEDURE — G8427 DOCREV CUR MEDS BY ELIG CLIN: HCPCS | Performed by: CLINICAL NURSE SPECIALIST

## 2021-01-20 PROCEDURE — 99214 OFFICE O/P EST MOD 30 MIN: CPT | Performed by: NURSE PRACTITIONER

## 2021-01-20 PROCEDURE — G8482 FLU IMMUNIZE ORDER/ADMIN: HCPCS | Performed by: CLINICAL NURSE SPECIALIST

## 2021-01-20 PROCEDURE — 3017F COLORECTAL CA SCREEN DOC REV: CPT | Performed by: CLINICAL NURSE SPECIALIST

## 2021-01-20 RX ORDER — METHYLPREDNISOLONE 4 MG/1
TABLET ORAL
COMMUNITY
Start: 2020-12-19

## 2021-01-20 RX ORDER — BENZONATATE 100 MG/1
100 CAPSULE ORAL
COMMUNITY
Start: 2021-01-15 | End: 2021-01-22

## 2021-01-20 RX ORDER — DULOXETIN HYDROCHLORIDE 60 MG/1
CAPSULE, DELAYED RELEASE ORAL
COMMUNITY
Start: 2020-12-29

## 2021-01-20 RX ORDER — AZELASTINE 1 MG/ML
1 SPRAY, METERED NASAL
COMMUNITY
Start: 2020-11-30

## 2021-01-20 RX ORDER — PANTOPRAZOLE SODIUM 20 MG/1
40 TABLET, DELAYED RELEASE ORAL
COMMUNITY
Start: 2020-12-16

## 2021-01-20 RX ORDER — LATANOPROST 50 UG/ML
1 SOLUTION/ DROPS OPHTHALMIC
COMMUNITY

## 2021-01-20 NOTE — PATIENT INSTRUCTIONS
Plan  Daily weights- if you gain 2-3 lbs over night or 5-6 in a week, then take an extra bumex- take 2mg in the AM   Will get referral in for Rheumatology if not already done  Follow up in 4 weeks    Call with any questions or concerns  Follow up with YAMIL Hutchins - NP for non cardiac problems  Report any new problems  Cardiovascular Fitness-Exercise as tolerated. Cardiac / Healthy Diet- low sodium especially  With traveling   Continue current medications as directed  Continue plan of treatment  It is always recommended that you bring your medications bottles with you to each visit - this is for your safety!

## 2021-01-20 NOTE — PROGRESS NOTES
[address] [place] [address], [address]  Phone: [phone]  Fax: [phone]    OFFICE VISIT:  2021    Irina Barrera - : 1960    Reason For Visit:  Cale Brown is a 56 y.o. female who is here for Follow-Up from Hospital (SOB on exertion,coughing,edema in legs and feet,chest tightness), Shortness of Breath, and Congestive Heart Failure  Stents in the proximal circ and OM1  2019  Cath  LVEDP 25, PA 60/30, normal LVFX, mild CAD ([place], [address])  2020  Echo mild to moderate pericardial eff, normal LVF  3/7/2020  Echo large pericardial effusion  2020  Echo  Severe LVH, DD, normal LVFX, small pericardial effusion  7/15/20  lexiscan Positive for inferior lateral myocardial ischemia, EF 33%, -3% ischemic myocardium on stress, intermediate risk findings, AUC indication 16, AUC score 7, (MD [place])  7/15/20  Echo  Normal LVFX, moderate pericardial effusion  20  Cath  Mild CAD, normal LVFX    Pericardial effusion noted on echo in September. Procedure for pericardiocentesis canceled after improvement thought to be mild to moderate  Has been found to have positive rheumatoid factor and is to see rheumatology    Patient presented to the emergency room 2020 with multiple symptoms. No acute findings with the exception of probable pneumonia and treated as an outpatient    Patient was seen again in the emergency room 2020 found to have hyperglycemia with some mild acute on chronic failure, discharged home    Patient was admitted to the hospital 20 for worsening fatigue and chest pain. Repeat echo shows preserved LV function with EF 60%. Mild concentric LVH. Localized effusion prominently posterior behind the left ventricle no significant evidence of tamponade.      She can present to the emergency room on 2021 with shortness of breath that was progressively worsening for 3 weeks Consult by Dr. Иван Rangel. Stable echo, CTA negative for PE. Acute on chronic diastolic heart failure. Recommended diuresis    She is her in follow up. She states she continues to have ongoing chest pain/ tightness and shortness of breath. She saw her pulmonologist earlier today. No change in medications. She is currently on steroids. She reports they tested her and she does not need oxygen  She sleeps with her head elevated on 4 pillows. Occasionally has some PND. She has a chronic nonproductive cough. She states she has begun to have some more fluid retention. She does not weigh daily. Reports taking medications regularly    She is still not seen or been referred to rheumatology. She reports that YAMIL Mckeon is going to contact her primary care to facilitate that. She is planning on leaving this week to go visit family in [place]. Cale Brown denies syncope, presyncope, arrhythmia,  The patient denies numbness or weakness to suggest cerebrovascular accident or transient ischemic attack. YAMIL Griggs - NP is PCP.     Irina Rust has the following history as recorded in [facility]:    Patient Active Problem List    Diagnosis Date Noted    Chest discomfort 07/14/2020     Priority: High    Elevated troponin 01/02/2021    Pulmonary nodules 01/02/2021    CHF (congestive heart failure), NYHA class I, acute on chronic, combined ([address] 12/13/2020    Mixed hyperlipidemia 08/13/2020    Coronary artery disease involving native coronary artery of native heart without angina pectoris 08/13/2020    Pulmonary edema with congestive heart failure ([address] 07/02/2020    Palliative care patient 07/02/2020    Obstructive sleep apnea 06/05/2020    Essential hypertension 06/05/2020    Moderate persistent asthma without complication 17/81/9453    Pulmonary hypertension ([address] 03/09/2020    Nonobstructive atherosclerosis of coronary artery 03/09/2020  CKD (chronic kidney disease) stage 3, GFR 30-59 ml/min 2020    Morbid obesity with BMI of 40.0-44.9, adult ([facility] 2020    SOB (shortness of breath) 2020    Pericardial effusion 2020    Acute on chronic diastolic heart failure ([facility] 2020    Normocytic anemia 2020    Type 2 diabetes mellitus, with long-term current use of insulin ([facility] 2020    COPD (chronic obstructive pulmonary disease) ([facility] 2020    Hyperglycemia 2020    Acute kidney injury ([facility] 2020    Elevated d-dimer 2020     Past Medical History:   Diagnosis Date    Allergies     AMI (acute myocardial infarction) ([facility] 2018    Asthma     CAD (coronary artery disease)     hx of stents    Cerebral artery occlusion with cerebral infarction ([facility]     R sided numbness/weakness    CHF (congestive heart failure) (HCC)     COPD (chronic obstructive pulmonary disease) ([facility]     Depression     Diabetes mellitus ([facility]     DVT (deep vein thrombosis) in pregnancy     GERD (gastroesophageal reflux disease)     Hx of blood clots     rt leg    Hyperlipidemia     Hypertension     Other disorders of kidney and ureter in diseases classified elsewhere     Palliative care patient 2020    Pneumonia      Past Surgical History:   Procedure Laterality Date    CATARACT REMOVAL Bilateral     COLONOSCOPY  approx     CORONARY ANGIOPLASTY WITH STENT PLACEMENT  2018    in [address]- OM, OM ans Circ (3 stents)    TONSILLECTOMY       Family History   Problem Relation Age of Onset    Colon Cancer Neg Hx     Colon Polyps Neg Hx      Social History     Tobacco Use    Smoking status: Former Smoker     Packs/day: 1.00     Years: 30.00     Pack years: 30.00     Quit date:      Years since quittin.0    Smokeless tobacco: Never Used   Substance Use Topics    Alcohol use: Never     Frequency: Never      Current Outpatient Medications   Medication Sig Dispense Refill  predniSONE (DELTASONE) 10 MG tablet Take 1 tablet by mouth daily for 10 days 10 tablet 0    BREO ELLIPTA 200-25 MCG/INH AEPB inhaler Inhale 1 puff into the lungs daily      latanoprost (XALATAN) 0.005 % ophthalmic solution Apply 1 drop to eye nightly      azelastine (ASTELIN) 0.1 % nasal spray 1 spray by Nasal route 2 times daily      valsartan (DIOVAN) 160 MG tablet Take 1 tablet by mouth daily 30 tablet 3    benzonatate (TESSALON) 100 MG capsule Take 1 capsule by mouth 3 times daily as needed for Cough 30 capsule 0    atorvastatin (LIPITOR) 20 MG tablet Take 2 tablets by mouth daily 30 tablet 3    carvedilol (COREG) 3.125 MG tablet Take 2 tablets by mouth 2 times daily 60 tablet 2    pantoprazole (PROTONIX) 20 MG tablet Take 2 tablets by mouth 2 times daily 120 tablet 0    bumetanide (BUMEX) 1 MG tablet Take 1 mg by mouth 2 times daily      amLODIPine (NORVASC) 5 MG tablet Take 1 tablet by mouth daily 30 tablet 1    Insulin Degludec (TRESIBA FLEXTOUCH) 100 UNIT/ML SOPN Inject 40 Units into the skin nightly 4 pen 2    Semaglutide, 1 MG/DOSE, 2 MG/1.5ML SOPN Inject 1 mg into the skin every 7 days 2 pen 2    levocetirizine (XYZAL) 5 MG tablet Take 1 tablet by mouth nightly 30 tablet 3    pramipexole (MIRAPEX) 0.5 MG tablet Take 1 tablet by mouth 2 times daily 60 tablet 2    gabapentin (NEURONTIN) 600 MG tablet Take 1 tablet by mouth 3 times daily for 30 days.  90 tablet 2    tiotropium (SPIRIVA RESPIMAT) 1.25 MCG/ACT AERS inhaler Inhale 2 puffs into the lungs daily 1 Inhaler 3    levalbuterol (XOPENEX HFA) 45 MCG/ACT inhaler Inhale 2 puffs into the lungs every 4 hours as needed for Wheezing or Shortness of Breath 1 Inhaler 0    DULoxetine (CYMBALTA) 30 MG extended release capsule Take 30 mg by mouth daily ALONG WITH A 60 MG TABLET (BOTH TOGETHER + 90 MG)      clopidogrel (PLAVIX) 75 MG tablet Take 75 mg by mouth daily      montelukast (SINGULAIR) 10 MG tablet Take 10 mg by mouth nightly  vitamin D (CHOLECALCIFEROL) 25 MCG (1000 UT) TABS tablet Take 1,000 Units by mouth daily      melatonin 3 MG TABS tablet Take 10 mg by mouth nightly      PAZEO 0.7 % SOLN Place 1 drop into both eyes daily       No current facility-administered medications for this visit. Allergies: Albuterol, Levaquin [levofloxacin], Metformin and related, and Aspirin    Review of Systems  Constitutional  no significant activity change, appetite change, or unexpected weight change. No fever, chills or diaphoresis. + fatigue. HEENT  no significant rhinorrhea or epistaxis. No tinnitus or significant hearing loss. Eyes  no sudden vision change or amaurosis. Respiratory  no significant wheezing, stridor, apnea + cough. + dyspnea on exertion + shortness of breath. Cardiovascular + chest pain + orthopnea + PND. No sensation of arrhythmia or slow heart rate. No claudication + leg edema. Gastrointestinal  no abdominal swelling or pain. No blood in stool. No severe constipation, diarrhea, nausea, or vomiting. Genitourinary  no difficulty urinating, dysuria, frequency, or urgency. No flank pain or hematuria. Musculoskeletal  no back pain, gait disturbance, or myalgia. Skin  no color change or rash. No pallor. No new surgical incision. Neurologic  no speech difficulty, facial asymmetry or lateralizing weakness. No seizures, presyncope, syncope, or significant dizziness. Hematologic  no easy bruising or excessive bleeding. Psychiatric  no severe anxiety or insomnia. No confusion. All other review of systems are negative. Objective  Vital Signs - /64   Pulse 77   Ht 5' 1\" (1.549 m)   Wt 237 lb (107.5 kg)   SpO2 98%   BMI 44.78 kg/m²   General - Darren Hernandez is alert, cooperative, and pleasant. Well groomed. No acute distress. Body habitus is morbidly obese. HEENT  The head is normocephalic. No circumoral cyanosis.   Dentition is normal. EYES -  No Xanthelasma, no arcus senilis, no conjunctival hemorrhages or discharge. Neck - Supple, without increased jugular venous pressures. No carotid bruits. No mass. Respiratory - Lungs are clear bilaterally. No wheezes or rales. Normal effort without use of accessory muscles. Cardiovascular  Heart has regular rhythm and rate. No murmurs, rubs or gallops. + pedal pulses and no varicosities. Abdominal -  Soft, nontender, nondistended. Bowel sounds are intact. Extremities - No clubbing, cyanosis, mild nonpitting BLE  edema. Musculoskeletal -  No clubbing . No Osler's nodes. Gait normal .  No kyphosis or scoliosis. Skin -  no statis ulcers or dermatitis. Neurological - No focal signs are identified. Oriented to person, place and time. Psychiatric -  Appropriate affect and mood. Assessment:     Diagnosis Orders   1. Pericardial effusion     2. Nonobstructive atherosclerosis of coronary artery     3. Essential hypertension     4. Pulmonary hypertension ([facility]     5. Chronic diastolic congestive heart failure ([facility]       Data:  BP Readings from Last 3 Encounters:   01/20/21 124/64   01/17/21 (!) 155/90   01/15/21 138/70    Pulse Readings from Last 3 Encounters:   01/20/21 77   01/17/21 80   01/15/21 86        Wt Readings from Last 3 Encounters:   01/20/21 237 lb (107.5 kg)   01/17/21 230 lb (104.3 kg)   01/15/21 230 lb (104.3 kg)   Reviewed multiple admissions testing including repeat echoes to evaluate her pericardial effusion. This has been documented as stable and does not impact her LV function and thought to be causing most of her symptoms    Last 2D echo 1/17/2021  LV is normal in size with preserved LV systolic function. LV ejection   fraction estimated at 55 to 60%. Moderate concentric LVH. Grade 2 diastolic dysfunction. RV appears normal in size with normal systolic function. Mild to moderate left atrial enlargement. Right atrium appears normal in size. Aortic valve is trileaflet with normal leaflet mobility. No stenosis or   regurgitation. Mitral valve is mildly thickened with normal leaflet mobility. Mild mitral   regurgitation. No stenosis. Trace tricuspid regurgitation. Moderate pericardial effusion that appears localized predominantly   posteriorly with minimal effusion anteriorly and apically is noted. No   evidence of tamponade features by echocardiographic criteria. Review of   echo from 1/3/2021 shows no significant change. Blood pressure and heart rate well controlled. Current medical manage includes beta-blocker, CCB and ARB  On statin or Plavix, not on aspirin due to allergy    She does have ongoing shortness of breath that is multifactorial-including obesity, anemia, chronic heart failure and pulmonary disease   Had follow-up with her pulmonology practitioner earlier today with no change in treatment. Remains on inhalers  We discussed fluid retention and need for diuresis. Currently she is taking Bumex daily. We discussed the importance of low-sodium diet as well as daily weights and uptitrating her diuretic as appropriate. Last BNP was elevated at 2463. Looking back historically she runs a little high typically around the 1500 range    We discussed travel and potential for worsening fluid retention. Recommended if on extended car rides needs to get out frequently and walk around. Avoid fast food that has excessive sodium. Drink plenty of water and take extra diuretic as directed    Patient does not currently think she has rheumatology appointment or referral.  We will go ahead and put that in to help facilitate that process. States taking medications as prescribed  Stable cardiovascular status. No evidence of overt heart failure, angina or dysrhythmia.      Plan  Daily weights- if you gain 2-3 lbs over night or 5-6 in a week, then take an extra bumex- take 2mg in the AM Will get referral in for Rheumatology if not already done  Follow up in 4 weeks    Call with any questions or concerns  Follow up with Rahel Granados, YAMIL - NP for non cardiac problems  Report any new problems  Cardiovascular Fitness-Exercise as tolerated. Cardiac / Healthy Diet- low sodium especially  With traveling   Continue current medications as directed  Continue plan of treatment  It is always recommended that you bring your medications bottles with you to each visit - this is for your safety! YAMIL Lanza    EMR dragon/transcription disclaimer: Much of this encounter note is electronic transcription/translation of spoken language to printed tach. Electronic translation of spoken language may be erroneous, or at times, nonsensical words or phrases may be inadvertently transcribed.  Although, I have reviewed the note for such errors, some may still exist.

## 2021-01-26 ENCOUNTER — CARE COORDINATION (OUTPATIENT)
Dept: CASE MANAGEMENT | Age: 61
End: 2021-01-26

## 2021-01-26 NOTE — CARE COORDINATION
[facility] Transitions Follow Up Call    2021    Patient: Nadiya Nichols  Patient : 1960   MRN: [id number]    Discharge Date: 21 RARS: Readmission Risk Score: 39         Spoke with: [address] Transitions Subsequent and Final Call    Subsequent and Final Calls  Do you have any ongoing symptoms?: No  Have your medications changed?: No  Do you have any questions related to your medications?: No  Do you currently have any active services?: No  Are you currently active with any services?: Home Health  Do you have any needs or concerns that I can assist you with?: No  Identified Barriers: None  Care Transitions Interventions  Other Interventions: Follow Up  Future Appointments   Date Time Provider Agnes Card   2021 10:30 AM YAMIL Fischer NP [facility]   2021  9:15 AM YAMIL Blackmon Cardio [facility]   3/5/2021  1:20 PM YAMIL Fischer NP [address] a call to the patient for a follow up call. She reported that she is doing fine. She said she still has a little bit of a nonproductive cough. She denied any shortness of breath or other symptoms. She said he appetite is good, drinking well. She said her blood sugar is stable. She said she is tolerating activity well. She is not aware of any other symptoms and feels that she is ok for discharge. To call prn any issues. Has access to CTN contact information. Patient resolved from the Care Transitions episode on 2021.   Patient/family has been provided the following resources and education related to COVID-19:                         Signs, symptoms and red flags related to COVID-19            CDC exposure and quarantine guidelines            [facility] exposure contact - [phone]            Contact for their local Department of Health                 Patient currently reports that the following symptoms have improved:  all symptoms resolved except slight non productive cough. No further outreach scheduled with this CTN. Episode of Care resolved. Patient has this CTN contact information if future needs arise.     Sarahi Giron RN

## 2021-01-27 DIAGNOSIS — J44.9 CHRONIC OBSTRUCTIVE PULMONARY DISEASE, UNSPECIFIED COPD TYPE (HCC): ICD-10-CM

## 2021-01-27 DIAGNOSIS — R26.81 UNSTEADY GAIT: Primary | ICD-10-CM

## 2021-02-01 PROBLEM — R79.89 ELEVATED TROPONIN: Status: RESOLVED | Noted: 2021-01-02 | Resolved: 2021-02-01

## 2021-02-01 PROBLEM — R77.8 ELEVATED TROPONIN: Status: RESOLVED | Noted: 2021-01-02 | Resolved: 2021-02-01

## 2021-02-02 ENCOUNTER — LAB (OUTPATIENT)
Dept: LAB | Facility: HOSPITAL | Age: 61
End: 2021-02-02

## 2021-02-02 ENCOUNTER — HOSPITAL ENCOUNTER (OUTPATIENT)
Dept: SLEEP MEDICINE | Facility: HOSPITAL | Age: 61
Discharge: HOME | End: 2021-02-02

## 2021-02-02 ENCOUNTER — TRANSCRIBE ORDERS (OUTPATIENT)
Dept: LAB | Facility: HOSPITAL | Age: 61
End: 2021-02-02

## 2021-02-02 VITALS
DIASTOLIC BLOOD PRESSURE: 84 MMHG | WEIGHT: 235 LBS | HEIGHT: 61 IN | OXYGEN SATURATION: 97 % | RESPIRATION RATE: 18 BRPM | BODY MASS INDEX: 44.37 KG/M2 | HEART RATE: 82 BPM | SYSTOLIC BLOOD PRESSURE: 161 MMHG

## 2021-02-02 DIAGNOSIS — Z01.818 PRE-OP TESTING: Primary | ICD-10-CM

## 2021-02-02 DIAGNOSIS — G47.33 OBSTRUCTIVE SLEEP APNEA: ICD-10-CM

## 2021-02-02 LAB — SARS-COV-2 RNA PNL SPEC NAA+PROBE: NOT DETECTED

## 2021-02-02 PROCEDURE — C9803 HOPD COVID-19 SPEC COLLECT: HCPCS | Performed by: NURSE PRACTITIONER

## 2021-02-02 PROCEDURE — 95811 POLYSOM 6/>YRS CPAP 4/> PARM: CPT

## 2021-02-02 PROCEDURE — 95811 POLYSOM 6/>YRS CPAP 4/> PARM: CPT | Performed by: PSYCHIATRY & NEUROLOGY

## 2021-02-02 PROCEDURE — 87635 SARS-COV-2 COVID-19 AMP PRB: CPT | Performed by: NURSE PRACTITIONER

## 2021-02-08 ENCOUNTER — TELEPHONE (OUTPATIENT)
Dept: SLEEP MEDICINE | Facility: HOSPITAL | Age: 61
End: 2021-02-08

## 2021-02-08 NOTE — TELEPHONE ENCOUNTER
Spoke to Ms. Peres and went over the results of her re-titration study performed 02/02/2021.  Questions answered.  Ms. Peres chose [facility] as her DME. Orders for her CPAP settings and an overnight pulse oximetry will be sent to [facility] this afternoon.  Ms. Peres has a compliance appointment with Desmond Sr PA-C at the sleep clinic located in the Neurology office on April 16th, at 2:45PM.    LEILANI Mackenzie.T, RPSGT, RST

## 2021-02-11 ENCOUNTER — TELEMEDICINE (OUTPATIENT)
Dept: PRIMARY CARE CLINIC | Age: 61
End: 2021-02-11
Payer: MEDICARE

## 2021-02-11 DIAGNOSIS — G62.9 NEUROPATHY: ICD-10-CM

## 2021-02-11 DIAGNOSIS — K21.00 GASTROESOPHAGEAL REFLUX DISEASE WITH ESOPHAGITIS WITHOUT HEMORRHAGE: Primary | ICD-10-CM

## 2021-02-11 DIAGNOSIS — M05.79 RHEUMATOID ARTHRITIS INVOLVING MULTIPLE SITES WITH POSITIVE RHEUMATOID FACTOR (HCC): ICD-10-CM

## 2021-02-11 PROCEDURE — G8428 CUR MEDS NOT DOCUMENT: HCPCS | Performed by: NURSE PRACTITIONER

## 2021-02-11 PROCEDURE — 99214 OFFICE O/P EST MOD 30 MIN: CPT | Performed by: NURSE PRACTITIONER

## 2021-02-11 PROCEDURE — 3017F COLORECTAL CA SCREEN DOC REV: CPT | Performed by: NURSE PRACTITIONER

## 2021-02-11 ASSESSMENT — ENCOUNTER SYMPTOMS
SHORTNESS OF BREATH: 1
SPUTUM PRODUCTION: 1
WHEEZING: 1

## 2021-02-11 NOTE — PROGRESS NOTES
Lillian Dior (:  1960) is a 56 y.o. female,Established patient, here for evaluation of the following chief complaint(s): Shortness of Breath and Cough      ASSESSMENT/PLAN:  1. Gastroesophageal reflux disease with esophagitis without hemorrhage  -     Josie Zuh MD, Gastroenterology, [place]  2. Rheumatoid arthritis involving multiple sites with positive rheumatoid factor ([facility]  -     External Referral To Rheumatology  3. Neuropathy  -     gabapentin (NEURONTIN) 600 MG tablet; Take 1 tablet by mouth 3 times daily for 30 days. , Disp-90 tablet, R-0Normal      Return if symptoms worsen or fail to improve, for Keep scheduled appt . SUBJECTIVE/OBJECTIVE:  Shortness of Breath  This is a chronic problem. The current episode started more than 1 year ago. The problem occurs every few minutes. The problem has been waxing and waning. Associated symptoms include leg swelling, sputum production and wheezing. Pertinent negatives include no chest pain, ear pain, fever, headaches, hemoptysis, leg pain, orthopnea, rhinorrhea, sore throat, swollen glands or vomiting. The symptoms are aggravated by any activity, exercise, emotional upset, weather changes and lying flat. She has tried ipratropium inhalers, beta agonist inhalers, body position changes, rest and steroid inhalers for the symptoms. The treatment provided moderate relief. Her past medical history is significant for allergies, asthma, CAD and a heart failure. Most recent follow-up with pulmonology states:       Next appt is scheduled for 21 with Dr. Tex Saenz. Patient is scheduled for a Cardiology follow-up on 21. [place] video conference yesterday - eosinophils are low and insurance won't approve medications and solaire wont improve because IGG levels are high per patient. Labs to be completed. Patient reports a follow-up with neurology on 21. Review of Systems   Constitutional: Positive for fatigue. Negative for fever. HENT: Positive for congestion and postnasal drip. Negative for ear pain, rhinorrhea and sore throat. Eyes: Negative. Respiratory: Positive for cough, sputum production, shortness of breath and wheezing. Negative for hemoptysis. Cardiovascular: Positive for leg swelling. Negative for chest pain, palpitations and orthopnea. Gastrointestinal: Negative. Negative for vomiting. Genitourinary: Negative. Musculoskeletal: Positive for arthralgias and back pain. Skin: Negative. Neurological: Negative for headaches. Psychiatric/Behavioral: Negative for self-injury and suicidal ideas. No flowsheet data found.      Physical Exam    [INSTRUCTIONS:  \"[x]\" Indicates a positive item  \"[]\" Indicates a negative item  -- DELETE ALL ITEMS NOT EXAMINED]    Constitutional: [x] Appears well-developed and well-nourished [x] No apparent distress      [] Abnormal -     Mental status: [x] Alert and awake  [x] Oriented to person/place/time [x] Able to follow commands    [] Abnormal -     Eyes:   EOM    [x]  Normal    [] Abnormal -   Sclera  [x]  Normal    [] Abnormal -          Discharge [x]  None visible   [] Abnormal -     HENT: [x] Normocephalic, atraumatic  [] Abnormal -   [x] Mouth/Throat: Mucous membranes are moist    External Ears [x] Normal  [] Abnormal -    Neck: [x] No visualized mass [] Abnormal -     Pulmonary/Chest: [x] Respiratory effort normal   [x] No visualized signs of difficulty breathing or respiratory distress        [] Abnormal -      Musculoskeletal:   [x] Normal gait with no signs of ataxia         [x] Normal range of motion of neck        [] Abnormal -     Neurological:        [x] No Facial Asymmetry (Cranial nerve 7 motor function) (limited exam due to video visit)          [x] No gaze palsy        [] Abnormal -          Skin:        [x] No significant exanthematous lesions or discoloration noted on facial skin         [] Abnormal -            Psychiatric:       [x] Normal Affect [] Abnormal -        [x] No Hallucinations    Other pertinent observable physical exam findings:-    On this date 02/17/21 I have spent 30 minutes reviewing previous notes, test results and face to face (virtual) with the patient discussing the diagnosis and importance of compliance with the treatment plan as well as documenting on the day of the visit. Yue Suarez is a 56 y.o. female being evaluated by a Virtual Visit (video visit) encounter to address concerns as mentioned above. A caregiver was present when appropriate. Due to this being a TeleHealth encounter (During [facility] public health emergency), evaluation of the following organ systems was limited: Vitals/Constitutional/EENT/Resp/CV/GI//MS/Neuro/Skin/Heme-Lymph-Imm. Pursuant to the emergency declaration under the [address] [place] [address] authority and the [employer] Act, this Virtual Visit was conducted with patient's (and/or legal guardian's) consent, to reduce the patient's risk of exposure to COVID-19 and provide necessary medical care. The patient (and/or legal guardian) has also been advised to contact this office for worsening conditions or problems, and seek emergency medical treatment and/or call 911 if deemed necessary. Patient identification was verified at the start of the visit: Yes    Services were provided through a video synchronous discussion virtually to substitute for in-person clinic visit. Patient was located at home and provider was located in office or at home. An electronic signature was used to authenticate this note.     --Gina Turner, YAMIL - NP

## 2021-02-12 RX ORDER — GABAPENTIN 600 MG/1
600 TABLET ORAL 3 TIMES DAILY
Qty: 90 TABLET | Refills: 0 | Status: SHIPPED | OUTPATIENT
Start: 2021-02-12 | End: 2021-03-26 | Stop reason: SDUPTHER

## 2021-02-17 ASSESSMENT — ENCOUNTER SYMPTOMS
RHINORRHEA: 0
HEMOPTYSIS: 0
ORTHOPNEA: 0
COUGH: 1
VOMITING: 0
GASTROINTESTINAL NEGATIVE: 1
BACK PAIN: 1
SORE THROAT: 0
EYES NEGATIVE: 1
SWOLLEN GLANDS: 0

## 2021-02-19 ENCOUNTER — HOSPITAL ENCOUNTER (EMERGENCY)
Age: 61
Discharge: HOME | End: 2021-02-20
Attending: EMERGENCY MEDICINE
Payer: MEDICARE

## 2021-02-19 ENCOUNTER — APPOINTMENT (OUTPATIENT)
Dept: GENERAL RADIOLOGY | Age: 61
End: 2021-02-19
Payer: MEDICARE

## 2021-02-19 DIAGNOSIS — G47.33 OBSTRUCTIVE SLEEP APNEA: ICD-10-CM

## 2021-02-19 DIAGNOSIS — E11.65 TYPE 2 DIABETES MELLITUS WITH HYPERGLYCEMIA, WITH LONG-TERM CURRENT USE OF INSULIN (HCC): ICD-10-CM

## 2021-02-19 DIAGNOSIS — Z79.4 TYPE 2 DIABETES MELLITUS WITH HYPERGLYCEMIA, WITH LONG-TERM CURRENT USE OF INSULIN (HCC): ICD-10-CM

## 2021-02-19 DIAGNOSIS — I50.33 ACUTE ON CHRONIC DIASTOLIC CHF (CONGESTIVE HEART FAILURE) (HCC): ICD-10-CM

## 2021-02-19 DIAGNOSIS — R06.02 SHORTNESS OF BREATH: Primary | ICD-10-CM

## 2021-02-19 DIAGNOSIS — J44.9 CHRONIC OBSTRUCTIVE PULMONARY DISEASE, UNSPECIFIED COPD TYPE (HCC): ICD-10-CM

## 2021-02-19 DIAGNOSIS — N18.9 CHRONIC RENAL IMPAIRMENT, UNSPECIFIED CKD STAGE: ICD-10-CM

## 2021-02-19 DIAGNOSIS — Z78.9 HAS RUN OUT OF MEDICATIONS: ICD-10-CM

## 2021-02-19 PROCEDURE — 71045 X-RAY EXAM CHEST 1 VIEW: CPT

## 2021-02-19 PROCEDURE — 93005 ELECTROCARDIOGRAM TRACING: CPT

## 2021-02-19 PROCEDURE — 99284 EMERGENCY DEPT VISIT MOD MDM: CPT

## 2021-02-19 PROCEDURE — 96375 TX/PRO/DX INJ NEW DRUG ADDON: CPT

## 2021-02-19 PROCEDURE — 96374 THER/PROPH/DIAG INJ IV PUSH: CPT

## 2021-02-20 VITALS
RESPIRATION RATE: 20 BRPM | WEIGHT: 230 LBS | SYSTOLIC BLOOD PRESSURE: 142 MMHG | HEART RATE: 78 BPM | DIASTOLIC BLOOD PRESSURE: 55 MMHG | BODY MASS INDEX: 43.43 KG/M2 | HEIGHT: 61 IN | TEMPERATURE: 98.4 F | OXYGEN SATURATION: 94 %

## 2021-02-20 LAB
ALBUMIN SERPL-MCNC: 3.3 G/DL (ref 3.5–5.2)
ALP BLD-CCNC: 111 U/L (ref 35–104)
ALT SERPL-CCNC: 12 U/L (ref 5–33)
ANION GAP SERPL CALCULATED.3IONS-SCNC: 8 MMOL/L (ref 7–19)
AST SERPL-CCNC: 13 U/L (ref 5–32)
BASOPHILS ABSOLUTE: 0 K/UL (ref 0–0.2)
BASOPHILS RELATIVE PERCENT: 0.5 % (ref 0–1)
BILIRUB SERPL-MCNC: 0.3 MG/DL (ref 0.2–1.2)
BUN BLDV-MCNC: 19 MG/DL (ref 8–23)
CALCIUM SERPL-MCNC: 8.9 MG/DL (ref 8.8–10.2)
CHLORIDE BLD-SCNC: 99 MMOL/L (ref 98–111)
CHP ED QC CHECK: NORMAL
CO2: 30 MMOL/L (ref 22–29)
CREAT SERPL-MCNC: 1.1 MG/DL (ref 0.5–0.9)
EOSINOPHILS ABSOLUTE: 0.1 K/UL (ref 0–0.6)
EOSINOPHILS RELATIVE PERCENT: 1.8 % (ref 0–5)
GFR AFRICAN AMERICAN: >59
GFR NON-AFRICAN AMERICAN: 50
GLUCOSE BLD-MCNC: 311 MG/DL
GLUCOSE BLD-MCNC: 311 MG/DL (ref 70–99)
GLUCOSE BLD-MCNC: 455 MG/DL (ref 74–109)
HCT VFR BLD CALC: 32.1 % (ref 37–47)
HEMOGLOBIN: 10.1 G/DL (ref 12–16)
IMMATURE GRANULOCYTES #: 0 K/UL
LYMPHOCYTES ABSOLUTE: 1.4 K/UL (ref 1.1–4.5)
LYMPHOCYTES RELATIVE PERCENT: 22 % (ref 20–40)
MCH RBC QN AUTO: 28.9 PG (ref 27–31)
MCHC RBC AUTO-ENTMCNC: 31.5 G/DL (ref 33–37)
MCV RBC AUTO: 92 FL (ref 81–99)
MONOCYTES ABSOLUTE: 0.5 K/UL (ref 0–0.9)
MONOCYTES RELATIVE PERCENT: 7.3 % (ref 0–10)
NEUTROPHILS ABSOLUTE: 4.3 K/UL (ref 1.5–7.5)
NEUTROPHILS RELATIVE PERCENT: 67.9 % (ref 50–65)
PDW BLD-RTO: 13.5 % (ref 11.5–14.5)
PERFORMED ON: ABNORMAL
PLATELET # BLD: 217 K/UL (ref 130–400)
PMV BLD AUTO: 9.3 FL (ref 9.4–12.3)
POTASSIUM REFLEX MAGNESIUM: 3.7 MMOL/L (ref 3.5–5)
PRO-BNP: 3060 PG/ML (ref 0–900)
RBC # BLD: 3.49 M/UL (ref 4.2–5.4)
SARS-COV-2, NAAT: NOT DETECTED
SODIUM BLD-SCNC: 137 MMOL/L (ref 136–145)
TOTAL PROTEIN: 6.3 G/DL (ref 6.6–8.7)
WBC # BLD: 6.3 K/UL (ref 4.8–10.8)

## 2021-02-20 PROCEDURE — 83880 ASSAY OF NATRIURETIC PEPTIDE: CPT

## 2021-02-20 PROCEDURE — 87635 SARS-COV-2 COVID-19 AMP PRB: CPT

## 2021-02-20 PROCEDURE — 80053 COMPREHEN METABOLIC PANEL: CPT

## 2021-02-20 PROCEDURE — 85025 COMPLETE CBC W/AUTO DIFF WBC: CPT

## 2021-02-20 PROCEDURE — 36415 COLL VENOUS BLD VENIPUNCTURE: CPT

## 2021-02-20 PROCEDURE — 6360000002 HC RX W HCPCS: Performed by: EMERGENCY MEDICINE

## 2021-02-20 PROCEDURE — 6370000000 HC RX 637 (ALT 250 FOR IP): Performed by: EMERGENCY MEDICINE

## 2021-02-20 PROCEDURE — 82947 ASSAY GLUCOSE BLOOD QUANT: CPT

## 2021-02-20 RX ORDER — FUROSEMIDE 10 MG/ML
60 INJECTION INTRAMUSCULAR; INTRAVENOUS ONCE
Status: COMPLETED | OUTPATIENT
Start: 2021-02-20 | End: 2021-02-20

## 2021-02-20 RX ORDER — NITROGLYCERIN 0.4 MG/1
0.4 TABLET SUBLINGUAL ONCE
Status: COMPLETED | OUTPATIENT
Start: 2021-02-20 | End: 2021-02-20

## 2021-02-20 RX ORDER — BUMETANIDE 1 MG/1
1 TABLET ORAL DAILY
Qty: 5 TABLET | Refills: 0 | Status: SHIPPED | OUTPATIENT
Start: 2021-02-20 | End: 2021-02-23

## 2021-02-20 RX ADMIN — INSULIN HUMAN 5 UNITS: 100 INJECTION, SOLUTION PARENTERAL at 00:08

## 2021-02-20 RX ADMIN — NITROGLYCERIN 0.4 MG: 0.4 TABLET, ORALLY DISINTEGRATING SUBLINGUAL at 02:07

## 2021-02-20 RX ADMIN — FUROSEMIDE 60 MG: 10 INJECTION, SOLUTION INTRAMUSCULAR; INTRAVENOUS at 00:08

## 2021-02-20 ASSESSMENT — ENCOUNTER SYMPTOMS
DIARRHEA: 0
COUGH: 0
RHINORRHEA: 0
VOICE CHANGE: 0
EYE REDNESS: 0
VOMITING: 0
SHORTNESS OF BREATH: 1
EYE PAIN: 0
ABDOMINAL PAIN: 0

## 2021-02-20 NOTE — ED NOTES
Bed: 05  Expected date:   Expected time:   Means of arrival: [facility]  Comments:  EMS: Truong Maxwell RN  02/19/21 9011

## 2021-02-20 NOTE — ED NOTES
Pt  answering phone and to bring pt pants for pt to wear home for discharge.      [facility]  02/20/21 8812

## 2021-02-20 NOTE — ED NOTES
Pt attempting to call her  for ride home for discharge.  not answering phone at this time.      [facility]  02/20/21 7527

## 2021-02-20 NOTE — ED PROVIDER NOTES
[facility] EMERGENCY DEPT  EMERGENCY DEPARTMENT ENCOUNTER      Pt Name: Scott Castañeda  MRN: [id number]  [place] 1960  Date of evaluation: 2/19/2021  Provider: Mj Pugh MD    CHIEF COMPLAINT       Chief Complaint   Patient presents with    Shortness of Breath     pt states she has been SOA for two days progressivly getting worse, and 1hr ago, became much worse. pt hx of copd and asthma. HISTORY OF PRESENT ILLNESS   (Location/Symptom, Timing/Onset,Context/Setting, Quality, Duration, Modifying Factors, Severity)  Note limiting factors. Scott Castañeda is a 59 y.o. female who presents to the emergency department with complaint of shortness of breath worsening over the last 2 days associated with bilateral lower extremity swelling. Patient has a history of chronic diastolic heart failure as well as COPD and asthma. Has not had any wheezing or cough recently. States she ran out of her Bumex today. Patient has a CPAP that she wears at night but states she has not been able to tolerate it for more than 45 minutes to an hour at night recently. States she has been taking her inhalers without significant improvement in shortness of breath. Shortness of breath worsens with laying flat or with exertion    HPI    NursingNotes were reviewed. REVIEW OF SYSTEMS    (2-9 systems for level 4, 10 or more for level 5)     Review of Systems   Constitutional: Negative for fatigue and fever. HENT: Negative for congestion, rhinorrhea and voice change. Eyes: Negative for pain and redness. Respiratory: Positive for shortness of breath. Negative for cough. Cardiovascular: Positive for leg swelling. Negative for chest pain. Gastrointestinal: Negative for abdominal pain, diarrhea and vomiting. Endocrine: Negative. Genitourinary: Negative. Musculoskeletal: Negative for arthralgias and gait problem. Skin: Negative for rash and wound. Neurological: Negative for weakness and headaches. Hematological: Negative. Psychiatric/Behavioral: Negative. All other systems reviewed and are negative. A complete review of systems was performed and is negative except as noted above in the HPI.        PAST MEDICAL HISTORY     Past Medical History:   Diagnosis Date    Allergies     AMI (acute myocardial infarction) ([address] 09/2018    Asthma     CAD (coronary artery disease)     hx of stents    Cerebral artery occlusion with cerebral infarction ([address] 2012    R sided numbness/weakness    CHF (congestive heart failure) ([address]     COPD (chronic obstructive pulmonary disease) ([address]     Depression     Diabetes mellitus ([address]     DVT (deep vein thrombosis) in pregnancy     GERD (gastroesophageal reflux disease)     Hx of blood clots     rt leg    Hyperlipidemia     Hypertension     Other disorders of kidney and ureter in diseases classified elsewhere     Palliative care patient 07/02/2020    Pneumonia          SURGICAL HISTORY       Past Surgical History:   Procedure Laterality Date    CATARACT REMOVAL Bilateral     COLONOSCOPY  approx 2013    CORONARY ANGIOPLASTY WITH STENT PLACEMENT  2018    [facility] (3 stents)    TONSILLECTOMY           CURRENT MEDICATIONS       Previous Medications    AMLODIPINE (NORVASC) 5 MG TABLET    Take 1 tablet by mouth daily    ATORVASTATIN (LIPITOR) 20 MG TABLET    Take 2 tablets by mouth daily    AZELASTINE (ASTELIN) 0.1 % NASAL SPRAY    1 spray by Nasal route 2 times daily    BREO ELLIPTA 200-25 MCG/INH AEPB INHALER    Inhale 1 puff into the lungs daily    BUMETANIDE (BUMEX) 1 MG TABLET    Take 1 mg by mouth 2 times daily    CARVEDILOL (COREG) 3.125 MG TABLET    Take 2 tablets by mouth 2 times daily    CLOPIDOGREL (PLAVIX) 75 MG TABLET    Take 75 mg by mouth daily    DULOXETINE (CYMBALTA) 30 MG EXTENDED RELEASE CAPSULE    Take 30 mg by mouth daily ALONG WITH A 60 MG TABLET (BOTH TOGETHER + 90 MG) GABAPENTIN (NEURONTIN) 600 MG TABLET    Take 1 tablet by mouth 3 times daily for 30 days.     INSULIN DEGLUDEC (TRESIBA FLEXTOUCH) 100 UNIT/ML SOPN    Inject 40 Units into the skin nightly    LATANOPROST (XALATAN) 0.005 % OPHTHALMIC SOLUTION    Apply 1 drop to eye nightly    LEVALBUTEROL (XOPENEX HFA) 45 MCG/ACT INHALER    Inhale 2 puffs into the lungs every 4 hours as needed for Wheezing or Shortness of Breath    LEVOCETIRIZINE (XYZAL) 5 MG TABLET    Take 1 tablet by mouth nightly    MELATONIN 3 MG TABS TABLET    Take 10 mg by mouth nightly    MONTELUKAST (SINGULAIR) 10 MG TABLET    Take 10 mg by mouth nightly    PANTOPRAZOLE (PROTONIX) 20 MG TABLET    Take 2 tablets by mouth 2 times daily    PAZEO 0.7 % SOLN    Place 1 drop into both eyes daily    PRAMIPEXOLE (MIRAPEX) 0.5 MG TABLET    Take 1 tablet by mouth 2 times daily    SEMAGLUTIDE, 1 MG/DOSE, 2 MG/1.5ML SOPN    Inject 1 mg into the skin every 7 days    TIOTROPIUM (SPIRIVA RESPIMAT) 1.25 MCG/ACT AERS INHALER    Inhale 2 puffs into the lungs daily    VALSARTAN (DIOVAN) 160 MG TABLET    Take 1 tablet by mouth daily    VITAMIN D (CHOLECALCIFEROL) 25 MCG (1000 UT) TABS TABLET    Take 1,000 Units by mouth daily       ALLERGIES     Albuterol, Levaquin [levofloxacin], Metformin and related, and Aspirin    FAMILY HISTORY       Family History   Problem Relation Age of Onset    Colon Cancer Neg Hx     Colon Polyps Neg Hx           SOCIAL HISTORY       Social History     Socioeconomic History    Marital status:      Spouse name: Not on file    Number of children: Not on file    Years of education: Not on file    Highest education level: Not on file   Occupational History    Not on file   Social Needs    Financial resource strain: Not on file    Food insecurity     Worry: Not on file     Inability: Not on file    Transportation needs     Medical: Not on file     Non-medical: Not on file   Tobacco Use    Smoking status: Former Smoker Packs/day: 1.00     Years: 30.00     Pack years: 30.00     Quit date:      Years since quittin.1    Smokeless tobacco: Never Used   Substance and Sexual Activity    Alcohol use: Never     Frequency: Never    Drug use: Never    Sexual activity: Not on file   Lifestyle    Physical activity     Days per week: Not on file     Minutes per session: Not on file    Stress: Not on file   Relationships    Social connections     Talks on phone: Not on file     Gets together: Not on file     Attends Yazidi service: Not on file     Active member of club or organization: Not on file     Attends meetings of clubs or organizations: Not on file     Relationship status: Not on file    Intimate partner violence     Fear of current or ex partner: Not on file     Emotionally abused: Not on file     Physically abused: Not on file     Forced sexual activity: Not on file   Other Topics Concern    Not on file   Social History Narrative     17 years second Kalli Charleso has 3 daughtersPresently in collegeShe is worked as a sales associateQuit smoking 2004 denies alcohol consumption or substance usagePhysically sedentary       SCREENINGS             PHYSICAL EXAM    (up to 7 for level 4, 8 or more for level 5)     ED Triage Vitals   BP Temp Temp Source Pulse Resp SpO2 Height Weight   21 2326 21 2326 21 2326 21 2326 21 2322 21 2326 21 2322 21 232   (!) 170/63 98.4 °F (36.9 °C) Oral 88 24 93 % 5' 1\" (1.549 m) 230 lb (104.3 kg)       Physical Exam  Vitals signs and nursing note reviewed. Constitutional:       General: She is not in acute distress. Appearance: She is well-developed. She is obese. She is not toxic-appearing or diaphoretic. HENT:      Head: Normocephalic and atraumatic. Eyes:      General: No scleral icterus. Right eye: No discharge. Left eye: No discharge. Pupils: Pupils are equal, round, and reactive to light.    Neck: Musculoskeletal: Normal range of motion. Cardiovascular:      Rate and Rhythm: Normal rate and regular rhythm. Pulmonary:      Effort: Tachypnea and accessory muscle usage present. No respiratory distress. Breath sounds: No stridor. No wheezing or rhonchi. Abdominal:      General: There is no distension. Musculoskeletal: Normal range of motion. General: No deformity. Skin:     General: Skin is warm and dry. Neurological:      Mental Status: She is alert and oriented to person, place, and time. GCS: GCS eye subscore is 4. GCS verbal subscore is 5. GCS motor subscore is 6. Cranial Nerves: No cranial nerve deficit. Motor: No abnormal muscle tone. Psychiatric:         Behavior: Behavior normal.         Thought Content:  Thought content normal.         Judgment: Judgment normal.         DIAGNOSTIC RESULTS     EKG: All EKG's are interpreted by the Emergency Department Physician who either signs or Co-signs this chart in the absence of a cardiologist.        RADIOLOGY:   Non-plain film images such as CT, Ultrasound and MRI are read by the radiologist. [place] images are visualized and preliminarily interpreted by the emergency physician with the below findings:        Interpretation per the Radiologist below, if available at the time of this note:    XR CHEST PORTABLE    (Results Pending)         ED BEDSIDE ULTRASOUND:   Performed by ED Physician - none    LABS:  Labs Reviewed   CBC WITH AUTO DIFFERENTIAL - Abnormal; Notable for the following components:       Result Value    RBC 3.49 (*)     Hemoglobin 10.1 (*)     Hematocrit 32.1 (*)     MCHC 31.5 (*)     MPV 9.3 (*)     Neutrophils % 67.9 (*)     All other components within normal limits   COMPREHENSIVE METABOLIC PANEL W/ REFLEX TO MG FOR LOW K - Abnormal; Notable for the following components:    CO2 30 (*)     Glucose 455 (*)     CREATININE 1.1 (*)     GFR Non- 50 (*)     Total Protein 6.3 (*) Albumin 3.3 (*)     Alkaline Phosphatase 111 (*)     All other components within normal limits   BRAIN NATRIURETIC PEPTIDE - Abnormal; Notable for the following components:    Pro-BNP 3,060 (*)     All other components within normal limits   POCT GLUCOSE - Abnormal; Notable for the following components:    POC Glucose 311 (*)     All other components within normal limits   POCT GLUCOSE - Normal   COVID-19, RAPID       All other labs were within normal range or not returned as of this dictation. Medications   furosemide (LASIX) injection 60 mg (60 mg Intravenous Given 2/20/21 0008)   insulin regular (HUMULIN R;NOVOLIN R) injection 5 Units (5 Units Intravenous Given 2/20/21 0008)   nitroGLYCERIN (NITROSTAT) SL tablet 0.4 mg (0.4 mg Sublingual Given 2/20/21 0207)       EMERGENCY DEPARTMENT COURSE and DIFFERENTIALDIAGNOSIS/MDM:   Vitals:    Vitals:    02/19/21 2326 02/20/21 0130 02/20/21 0208 02/20/21 0214   BP: (!) 170/63 (!) 181/88 (!) 176/50 (!) 142/55   Pulse: 88 77 86 78   Resp:  18 18 20   Temp: 98.4 °F (36.9 °C)      TempSrc: Oral      SpO2: 93% 93% 94% 94%   Weight:       Height:           [facility]    ED Course as of Feb 20 0309   Fri Feb 19, 2021   2358 Chest x-ray shows cardiomegaly without consolidation or pulmonary edema. Appears similar without any significant changes compared to most recent x-ray. EKG shows sinus rhythm with a rate of 90. There are anterior septal Q waves present with no findings of acute ischemia or infarction    [UBALDO]   Sat Feb 20, 2021   0002 Renal function is improved from recent baseline. BNP is greater than 3000 which is higher than recent values. Blood glucose elevated at 465. Patient given 5 units of insulin.     [UBALDO]      ED Course User Index  [UBALDO] Shantanu Garzon MD Pt was given nitro with improvement of BP and for treatment of acute on chronic diastolic CHF, along with lasix. Pt with O2 sat in mid 90s on RA which did improve some after treatment here as well. Plan for d/c with plan to add extra 1mg daily of bumex for 5 days to normal dosing and f/u next week for re-eval.      CONSULTS:  None    PROCEDURES:  Unless otherwise notedbelow, none     Procedures      FINAL IMPRESSION     1. Shortness of breath    2. Acute on chronic diastolic CHF (congestive heart failure) ([address]    3. Type 2 diabetes mellitus with hyperglycemia, with long-term current use of insulin ([address]    4. Chronic obstructive pulmonary disease, unspecified COPD type ([address]    5. Has run out of medications    6. Obstructive sleep apnea    7.  Chronic renal impairment, unspecified CKD stage          DISPOSITION/PLAN   DISPOSITION        PATIENT REFERRED TO:  [address] [place] [address] [facility]  [facility]  [phone]    If symptoms worsen    Y[facility]IL [facility] - [address]  [phone]    Schedule an appointment as soon as possible for a visit in 1 week        DISCHARGE MEDICATIONS:  New Prescriptions    BUMETANIDE (BUMEX) 1 MG TABLET    Take 1 tablet by mouth daily for 5 days In addition to normal dosing          (Please note that portions of this note were completed with a voice recognition program.  Efforts were made to edit the dictations butoccasionally words are mis-transcribed.)    Geo Abdalla MD (electronically signed)  AttendingEmerWhite River Medical Centercy Physician         Geo An MD  02/20/21 3980

## 2021-02-22 ENCOUNTER — CARE COORDINATION (OUTPATIENT)
Dept: CARE COORDINATION | Age: 61
End: 2021-02-22

## 2021-02-22 NOTE — CARE COORDINATION
Patient contacted regarding Renuka Batters. Discussed COVID-19 related testing which was available at this time. Test results were negative. Patient informed of results, if available? Yes    Care Transition Nurse/ Ambulatory Care Manager contacted the patient by telephone to perform post discharge assessment. Call within 2 business days of discharge: Yes. Verified name and  with patient as identifiers. Provided introduction to self, and explanation of the CTN/ACM role, and reason for call due to risk factors for infection and/or exposure to COVID-19. Symptoms reviewed with patient who verbalized the following symptoms: shortness of breath, no new symptoms and no worsening symptoms. Due to no new or worsening symptoms encounter was not routed to provider for escalation. Discussed follow-up appointments. If no appointment was previously scheduled, appointment scheduling offered: [facility] follow up appointment(s):   Future Appointments   Date Time Provider Agnes Card   2021  9:15 AM YAMIL Guillermo Cardio [facility]   3/5/2021  1:20 PM Claire Beltran, [address] [place] [address] AND [facility]       Non-face-to-face services provided:  Obtained and reviewed discharge summary and/or continuity of care documents     Advance Care Planning:   Does patient have an Advance Directive:  reviewed and current. Patient has following risk factors of: heart failure, COPD and asthma. CTN/ACM reviewed discharge instructions, medical action plan and red flags such as increased shortness of breath, increasing fever and signs of decompensation with patient who verbalized understanding. Discussed exposure protocols and quarantine with CDC Guidelines What to do if you are sick with coronavirus disease .  Patient was given an opportunity for questions and concerns.  The patient agrees to contact the [facility] exposure line [phone], [facility] department [facility] Department of Health: ([phone]) and PCP office for questions related to their healthcare. CTN/ACM provided contact information for future needs. Reviewed and educated patient on any new and changed medications related to discharge diagnosis     Patient/family/caregiver given information for [employer] [facility] and agrees to enroll Gurwinder declined. Plan for follow-up call in 5-7 days based on severity of symptoms and risk factors. Patient states that she continues with shortness of breath, however it has improved. Patient has a follow up scheduled with her PCP on 3/5/2021. Patient agrees to drink plenty of fluids, eat nutritious meals, and to get plenty of rest.  Patient agrees to call PCP if experiencing new or worsening symptoms or will call 911 for severe or life threatening symptoms. Patient agrees to follow COVID-19 precautions.

## 2021-02-23 ENCOUNTER — APPOINTMENT (OUTPATIENT)
Dept: CT IMAGING | Age: 61
DRG: 280 | End: 2021-02-23
Payer: MEDICARE

## 2021-02-23 ENCOUNTER — APPOINTMENT (OUTPATIENT)
Dept: GENERAL RADIOLOGY | Age: 61
DRG: 280 | End: 2021-02-23
Payer: MEDICARE

## 2021-02-23 ENCOUNTER — HOSPITAL ENCOUNTER (INPATIENT)
Age: 61
LOS: 2 days | Discharge: HOME | DRG: 280 | End: 2021-02-25
Attending: EMERGENCY MEDICINE | Admitting: INTERNAL MEDICINE
Payer: MEDICARE

## 2021-02-23 DIAGNOSIS — I50.32 CHRONIC DIASTOLIC CONGESTIVE HEART FAILURE (HCC): ICD-10-CM

## 2021-02-23 DIAGNOSIS — R07.9 CHEST PAIN, UNSPECIFIED TYPE: ICD-10-CM

## 2021-02-23 DIAGNOSIS — R77.8 ELEVATED TROPONIN: ICD-10-CM

## 2021-02-23 DIAGNOSIS — I50.9 ACUTE ON CHRONIC CONGESTIVE HEART FAILURE, UNSPECIFIED HEART FAILURE TYPE (HCC): Primary | ICD-10-CM

## 2021-02-23 DIAGNOSIS — I25.10 CORONARY ARTERY DISEASE INVOLVING NATIVE CORONARY ARTERY OF NATIVE HEART, ANGINA PRESENCE UNSPECIFIED: ICD-10-CM

## 2021-02-23 DIAGNOSIS — J44.9 CHRONIC OBSTRUCTIVE PULMONARY DISEASE, UNSPECIFIED COPD TYPE (HCC): ICD-10-CM

## 2021-02-23 DIAGNOSIS — N18.32 STAGE 3B CHRONIC KIDNEY DISEASE (HCC): ICD-10-CM

## 2021-02-23 DIAGNOSIS — Z79.4 TYPE 2 DIABETES MELLITUS WITH OTHER SPECIFIED COMPLICATION, WITH LONG-TERM CURRENT USE OF INSULIN (HCC): ICD-10-CM

## 2021-02-23 DIAGNOSIS — E11.69 TYPE 2 DIABETES MELLITUS WITH OTHER SPECIFIED COMPLICATION, WITH LONG-TERM CURRENT USE OF INSULIN (HCC): ICD-10-CM

## 2021-02-23 DIAGNOSIS — R06.02 SHORTNESS OF BREATH: ICD-10-CM

## 2021-02-23 PROBLEM — I21.4 NSTEMI (NON-ST ELEVATED MYOCARDIAL INFARCTION) (HCC): Status: ACTIVE | Noted: 2021-02-23

## 2021-02-23 LAB
ALBUMIN SERPL-MCNC: 3.5 G/DL (ref 3.5–5.2)
ALP BLD-CCNC: 116 U/L (ref 35–104)
ALT SERPL-CCNC: 14 U/L (ref 5–33)
ANION GAP SERPL CALCULATED.3IONS-SCNC: 4 MMOL/L (ref 7–19)
APTT: 27.6 SEC (ref 26–36.2)
AST SERPL-CCNC: 11 U/L (ref 5–32)
BASOPHILS ABSOLUTE: 0 K/UL (ref 0–0.2)
BASOPHILS RELATIVE PERCENT: 0.3 % (ref 0–1)
BILIRUB SERPL-MCNC: 0.3 MG/DL (ref 0.2–1.2)
BUN BLDV-MCNC: 20 MG/DL (ref 8–23)
CALCIUM SERPL-MCNC: 9.1 MG/DL (ref 8.8–10.2)
CHLORIDE BLD-SCNC: 99 MMOL/L (ref 98–111)
CHOLESTEROL, TOTAL: 252 MG/DL (ref 160–199)
CO2: 34 MMOL/L (ref 22–29)
CREAT SERPL-MCNC: 1 MG/DL (ref 0.5–0.9)
D DIMER: 0.97 UG/ML FEU (ref 0–0.48)
EOSINOPHILS ABSOLUTE: 0.1 K/UL (ref 0–0.6)
EOSINOPHILS RELATIVE PERCENT: 1.1 % (ref 0–5)
GFR AFRICAN AMERICAN: >59
GFR NON-AFRICAN AMERICAN: 56
GLUCOSE BLD-MCNC: 253 MG/DL (ref 70–99)
GLUCOSE BLD-MCNC: 294 MG/DL (ref 74–109)
HBA1C MFR BLD: 8.3 % (ref 4–6)
HCT VFR BLD CALC: 32.9 % (ref 37–47)
HDLC SERPL-MCNC: 49 MG/DL (ref 65–121)
HEMOGLOBIN: 10.4 G/DL (ref 12–16)
IMMATURE GRANULOCYTES #: 0 K/UL
INR BLD: 1.01 (ref 0.88–1.18)
LDL CHOLESTEROL CALCULATED: 156 MG/DL
LIPASE: 31 U/L (ref 13–60)
LYMPHOCYTES ABSOLUTE: 1.7 K/UL (ref 1.1–4.5)
LYMPHOCYTES RELATIVE PERCENT: 26.1 % (ref 20–40)
MAGNESIUM: 1.9 MG/DL (ref 1.6–2.4)
MCH RBC QN AUTO: 29.3 PG (ref 27–31)
MCHC RBC AUTO-ENTMCNC: 31.6 G/DL (ref 33–37)
MCV RBC AUTO: 92.7 FL (ref 81–99)
MONOCYTES ABSOLUTE: 0.5 K/UL (ref 0–0.9)
MONOCYTES RELATIVE PERCENT: 7.2 % (ref 0–10)
NEUTROPHILS ABSOLUTE: 4.2 K/UL (ref 1.5–7.5)
NEUTROPHILS RELATIVE PERCENT: 64.8 % (ref 50–65)
PDW BLD-RTO: 13.6 % (ref 11.5–14.5)
PERFORMED ON: ABNORMAL
PHOSPHORUS: 2.9 MG/DL (ref 2.5–4.5)
PLATELET # BLD: 252 K/UL (ref 130–400)
PMV BLD AUTO: 9.2 FL (ref 9.4–12.3)
POTASSIUM REFLEX MAGNESIUM: 4.1 MMOL/L (ref 3.5–5)
PRO-BNP: 2699 PG/ML (ref 0–900)
PROTHROMBIN TIME: 13.3 SEC (ref 12–14.6)
RBC # BLD: 3.55 M/UL (ref 4.2–5.4)
SARS-COV-2, NAAT: NOT DETECTED
SODIUM BLD-SCNC: 137 MMOL/L (ref 136–145)
TOTAL PROTEIN: 6.5 G/DL (ref 6.6–8.7)
TRIGL SERPL-MCNC: 235 MG/DL (ref 0–149)
TROPONIN: 0.05 NG/ML (ref 0–0.03)
TROPONIN: 0.06 NG/ML (ref 0–0.03)
TSH REFLEX FT4: 3.61 UIU/ML (ref 0.35–5.5)
WBC # BLD: 6.4 K/UL (ref 4.8–10.8)

## 2021-02-23 PROCEDURE — 82947 ASSAY GLUCOSE BLOOD QUANT: CPT

## 2021-02-23 PROCEDURE — 99283 EMERGENCY DEPT VISIT LOW MDM: CPT

## 2021-02-23 PROCEDURE — 85610 PROTHROMBIN TIME: CPT

## 2021-02-23 PROCEDURE — 6360000002 HC RX W HCPCS: Performed by: NURSE PRACTITIONER

## 2021-02-23 PROCEDURE — 36415 COLL VENOUS BLD VENIPUNCTURE: CPT

## 2021-02-23 PROCEDURE — 85379 FIBRIN DEGRADATION QUANT: CPT

## 2021-02-23 PROCEDURE — 96374 THER/PROPH/DIAG INJ IV PUSH: CPT

## 2021-02-23 PROCEDURE — 85025 COMPLETE CBC W/AUTO DIFF WBC: CPT

## 2021-02-23 PROCEDURE — 2700000000 HC OXYGEN THERAPY PER DAY

## 2021-02-23 PROCEDURE — 84484 ASSAY OF TROPONIN QUANT: CPT

## 2021-02-23 PROCEDURE — 83735 ASSAY OF MAGNESIUM: CPT

## 2021-02-23 PROCEDURE — 80061 LIPID PANEL: CPT

## 2021-02-23 PROCEDURE — 84443 ASSAY THYROID STIM HORMONE: CPT

## 2021-02-23 PROCEDURE — 83690 ASSAY OF LIPASE: CPT

## 2021-02-23 PROCEDURE — 83036 HEMOGLOBIN GLYCOSYLATED A1C: CPT

## 2021-02-23 PROCEDURE — 80053 COMPREHEN METABOLIC PANEL: CPT

## 2021-02-23 PROCEDURE — 6370000000 HC RX 637 (ALT 250 FOR IP): Performed by: PHYSICIAN ASSISTANT

## 2021-02-23 PROCEDURE — 84100 ASSAY OF PHOSPHORUS: CPT

## 2021-02-23 PROCEDURE — 6370000000 HC RX 637 (ALT 250 FOR IP): Performed by: NURSE PRACTITIONER

## 2021-02-23 PROCEDURE — 83880 ASSAY OF NATRIURETIC PEPTIDE: CPT

## 2021-02-23 PROCEDURE — 71275 CT ANGIOGRAPHY CHEST: CPT

## 2021-02-23 PROCEDURE — 6360000002 HC RX W HCPCS: Performed by: HOSPITALIST

## 2021-02-23 PROCEDURE — 2140000000 HC CCU INTERMEDIATE R&B

## 2021-02-23 PROCEDURE — 87635 SARS-COV-2 COVID-19 AMP PRB: CPT

## 2021-02-23 PROCEDURE — 93005 ELECTROCARDIOGRAM TRACING: CPT | Performed by: HOSPITALIST

## 2021-02-23 PROCEDURE — 85730 THROMBOPLASTIN TIME PARTIAL: CPT

## 2021-02-23 PROCEDURE — 71045 X-RAY EXAM CHEST 1 VIEW: CPT

## 2021-02-23 PROCEDURE — 6370000000 HC RX 637 (ALT 250 FOR IP): Performed by: HOSPITALIST

## 2021-02-23 PROCEDURE — 2500000003 HC RX 250 WO HCPCS: Performed by: HOSPITALIST

## 2021-02-23 PROCEDURE — 6360000004 HC RX CONTRAST MEDICATION: Performed by: HOSPITALIST

## 2021-02-23 RX ORDER — HEPARIN SODIUM 1000 [USP'U]/ML
40 INJECTION, SOLUTION INTRAVENOUS; SUBCUTANEOUS PRN
Status: DISCONTINUED | OUTPATIENT
Start: 2021-02-23 | End: 2021-02-25 | Stop reason: HOSPADM

## 2021-02-23 RX ORDER — ONDANSETRON 4 MG/1
4 TABLET, ORALLY DISINTEGRATING ORAL EVERY 8 HOURS PRN
Status: DISCONTINUED | OUTPATIENT
Start: 2021-02-23 | End: 2021-02-25 | Stop reason: HOSPADM

## 2021-02-23 RX ORDER — PANTOPRAZOLE SODIUM 40 MG/1
40 TABLET, DELAYED RELEASE ORAL 2 TIMES DAILY
Status: DISCONTINUED | OUTPATIENT
Start: 2021-02-23 | End: 2021-02-25 | Stop reason: HOSPADM

## 2021-02-23 RX ORDER — HEPARIN SODIUM 10000 [USP'U]/100ML
5-30 INJECTION, SOLUTION INTRAVENOUS CONTINUOUS
Status: DISCONTINUED | OUTPATIENT
Start: 2021-02-23 | End: 2021-02-25

## 2021-02-23 RX ORDER — SODIUM CHLORIDE 0.9 % (FLUSH) 0.9 %
10 SYRINGE (ML) INJECTION EVERY 12 HOURS SCHEDULED
Status: DISCONTINUED | OUTPATIENT
Start: 2021-02-23 | End: 2021-02-25 | Stop reason: HOSPADM

## 2021-02-23 RX ORDER — ATORVASTATIN CALCIUM 80 MG/1
80 TABLET, FILM COATED ORAL NIGHTLY
Status: DISCONTINUED | OUTPATIENT
Start: 2021-02-23 | End: 2021-02-25 | Stop reason: HOSPADM

## 2021-02-23 RX ORDER — ACETAMINOPHEN 325 MG/1
650 TABLET ORAL EVERY 6 HOURS PRN
Status: DISCONTINUED | OUTPATIENT
Start: 2021-02-23 | End: 2021-02-25 | Stop reason: HOSPADM

## 2021-02-23 RX ORDER — VITAMIN B COMPLEX
1000 TABLET ORAL DAILY
Status: DISCONTINUED | OUTPATIENT
Start: 2021-02-23 | End: 2021-02-25 | Stop reason: HOSPADM

## 2021-02-23 RX ORDER — VALSARTAN 160 MG/1
160 TABLET ORAL DAILY
Status: DISCONTINUED | OUTPATIENT
Start: 2021-02-23 | End: 2021-02-25 | Stop reason: HOSPADM

## 2021-02-23 RX ORDER — KETOTIFEN FUMARATE 0.35 MG/ML
1 SOLUTION/ DROPS OPHTHALMIC 2 TIMES DAILY
Status: DISCONTINUED | OUTPATIENT
Start: 2021-02-23 | End: 2021-02-25 | Stop reason: HOSPADM

## 2021-02-23 RX ORDER — MELATONIN 10 MG
10 CAPSULE ORAL NIGHTLY
Status: DISCONTINUED | OUTPATIENT
Start: 2021-02-23 | End: 2021-02-25 | Stop reason: HOSPADM

## 2021-02-23 RX ORDER — DEXTROSE MONOHYDRATE 50 MG/ML
100 INJECTION, SOLUTION INTRAVENOUS PRN
Status: DISCONTINUED | OUTPATIENT
Start: 2021-02-23 | End: 2021-02-25 | Stop reason: HOSPADM

## 2021-02-23 RX ORDER — GABAPENTIN 600 MG/1
600 TABLET ORAL 3 TIMES DAILY
Status: DISCONTINUED | OUTPATIENT
Start: 2021-02-23 | End: 2021-02-25 | Stop reason: HOSPADM

## 2021-02-23 RX ORDER — CLOPIDOGREL BISULFATE 75 MG/1
75 TABLET ORAL DAILY
Status: DISCONTINUED | OUTPATIENT
Start: 2021-02-23 | End: 2021-02-25 | Stop reason: HOSPADM

## 2021-02-23 RX ORDER — BUDESONIDE 0.5 MG/2ML
0.5 INHALANT ORAL 2 TIMES DAILY
Status: DISCONTINUED | OUTPATIENT
Start: 2021-02-23 | End: 2021-02-23

## 2021-02-23 RX ORDER — AMLODIPINE BESYLATE 5 MG/1
5 TABLET ORAL DAILY
Status: DISCONTINUED | OUTPATIENT
Start: 2021-02-24 | End: 2021-02-25 | Stop reason: HOSPADM

## 2021-02-23 RX ORDER — NITROGLYCERIN 0.4 MG/1
0.4 TABLET SUBLINGUAL EVERY 5 MIN PRN
Status: DISCONTINUED | OUTPATIENT
Start: 2021-02-23 | End: 2021-02-25 | Stop reason: HOSPADM

## 2021-02-23 RX ORDER — PRAMIPEXOLE DIHYDROCHLORIDE 0.25 MG/1
0.5 TABLET ORAL 2 TIMES DAILY
Status: DISCONTINUED | OUTPATIENT
Start: 2021-02-23 | End: 2021-02-25 | Stop reason: HOSPADM

## 2021-02-23 RX ORDER — HEPARIN SODIUM 1000 [USP'U]/ML
80 INJECTION, SOLUTION INTRAVENOUS; SUBCUTANEOUS PRN
Status: DISCONTINUED | OUTPATIENT
Start: 2021-02-23 | End: 2021-02-25 | Stop reason: HOSPADM

## 2021-02-23 RX ORDER — ARFORMOTEROL TARTRATE 15 UG/2ML
15 SOLUTION RESPIRATORY (INHALATION) 2 TIMES DAILY
Status: DISCONTINUED | OUTPATIENT
Start: 2021-02-23 | End: 2021-02-23

## 2021-02-23 RX ORDER — BUDESONIDE AND FORMOTEROL FUMARATE DIHYDRATE 160; 4.5 UG/1; UG/1
2 AEROSOL RESPIRATORY (INHALATION) 2 TIMES DAILY
Status: DISCONTINUED | OUTPATIENT
Start: 2021-02-23 | End: 2021-02-23 | Stop reason: CLARIF

## 2021-02-23 RX ORDER — NICOTINE POLACRILEX 4 MG
15 LOZENGE BUCCAL PRN
Status: DISCONTINUED | OUTPATIENT
Start: 2021-02-23 | End: 2021-02-25 | Stop reason: HOSPADM

## 2021-02-23 RX ORDER — BUDESONIDE AND FORMOTEROL FUMARATE DIHYDRATE 160; 4.5 UG/1; UG/1
2 AEROSOL RESPIRATORY (INHALATION) 2 TIMES DAILY
Status: DISCONTINUED | OUTPATIENT
Start: 2021-02-23 | End: 2021-02-25 | Stop reason: HOSPADM

## 2021-02-23 RX ORDER — ACETAMINOPHEN 650 MG/1
650 SUPPOSITORY RECTAL EVERY 6 HOURS PRN
Status: DISCONTINUED | OUTPATIENT
Start: 2021-02-23 | End: 2021-02-25 | Stop reason: HOSPADM

## 2021-02-23 RX ORDER — ONDANSETRON 2 MG/ML
4 INJECTION INTRAMUSCULAR; INTRAVENOUS EVERY 6 HOURS PRN
Status: DISCONTINUED | OUTPATIENT
Start: 2021-02-23 | End: 2021-02-25 | Stop reason: HOSPADM

## 2021-02-23 RX ORDER — FUROSEMIDE 10 MG/ML
60 INJECTION INTRAMUSCULAR; INTRAVENOUS ONCE
Status: COMPLETED | OUTPATIENT
Start: 2021-02-23 | End: 2021-02-23

## 2021-02-23 RX ORDER — POLYETHYLENE GLYCOL 3350 17 G/17G
17 POWDER, FOR SOLUTION ORAL DAILY PRN
Status: DISCONTINUED | OUTPATIENT
Start: 2021-02-23 | End: 2021-02-25 | Stop reason: HOSPADM

## 2021-02-23 RX ORDER — HEPARIN SODIUM 1000 [USP'U]/ML
80 INJECTION, SOLUTION INTRAVENOUS; SUBCUTANEOUS ONCE
Status: COMPLETED | OUTPATIENT
Start: 2021-02-23 | End: 2021-02-23

## 2021-02-23 RX ORDER — MONTELUKAST SODIUM 10 MG/1
10 TABLET ORAL NIGHTLY
Status: DISCONTINUED | OUTPATIENT
Start: 2021-02-23 | End: 2021-02-25 | Stop reason: HOSPADM

## 2021-02-23 RX ORDER — LEVALBUTEROL INHALATION SOLUTION 0.63 MG/3ML
0.63 SOLUTION RESPIRATORY (INHALATION) EVERY 4 HOURS PRN
Status: DISCONTINUED | OUTPATIENT
Start: 2021-02-23 | End: 2021-02-25 | Stop reason: HOSPADM

## 2021-02-23 RX ORDER — LATANOPROST 50 UG/ML
1 SOLUTION/ DROPS OPHTHALMIC DAILY
Status: DISCONTINUED | OUTPATIENT
Start: 2021-02-24 | End: 2021-02-25 | Stop reason: HOSPADM

## 2021-02-23 RX ORDER — ASPIRIN 325 MG
325 TABLET ORAL ONCE
Status: DISCONTINUED | OUTPATIENT
Start: 2021-02-23 | End: 2021-02-23

## 2021-02-23 RX ORDER — DULOXETIN HYDROCHLORIDE 30 MG/1
30 CAPSULE, DELAYED RELEASE ORAL DAILY
Status: DISCONTINUED | OUTPATIENT
Start: 2021-02-23 | End: 2021-02-25 | Stop reason: HOSPADM

## 2021-02-23 RX ORDER — DEXTROSE MONOHYDRATE 25 G/50ML
12.5 INJECTION, SOLUTION INTRAVENOUS PRN
Status: DISCONTINUED | OUTPATIENT
Start: 2021-02-23 | End: 2021-02-25 | Stop reason: HOSPADM

## 2021-02-23 RX ORDER — SODIUM CHLORIDE 0.9 % (FLUSH) 0.9 %
10 SYRINGE (ML) INJECTION PRN
Status: DISCONTINUED | OUTPATIENT
Start: 2021-02-23 | End: 2021-02-25 | Stop reason: HOSPADM

## 2021-02-23 RX ORDER — AZELASTINE 1 MG/ML
1 SPRAY, METERED NASAL 2 TIMES DAILY
Status: DISCONTINUED | OUTPATIENT
Start: 2021-02-23 | End: 2021-02-23 | Stop reason: CLARIF

## 2021-02-23 RX ORDER — NITROGLYCERIN 20 MG/100ML
5-200 INJECTION INTRAVENOUS CONTINUOUS
Status: DISCONTINUED | OUTPATIENT
Start: 2021-02-23 | End: 2021-02-25

## 2021-02-23 RX ORDER — INSULIN GLARGINE 100 [IU]/ML
40 INJECTION, SOLUTION SUBCUTANEOUS NIGHTLY
Status: DISCONTINUED | OUTPATIENT
Start: 2021-02-23 | End: 2021-02-25 | Stop reason: HOSPADM

## 2021-02-23 RX ORDER — CARVEDILOL 6.25 MG/1
6.25 TABLET ORAL 2 TIMES DAILY
Status: DISCONTINUED | OUTPATIENT
Start: 2021-02-23 | End: 2021-02-25 | Stop reason: HOSPADM

## 2021-02-23 RX ORDER — BUMETANIDE 0.25 MG/ML
2 INJECTION, SOLUTION INTRAMUSCULAR; INTRAVENOUS 2 TIMES DAILY
Status: DISCONTINUED | OUTPATIENT
Start: 2021-02-23 | End: 2021-02-25

## 2021-02-23 RX ADMIN — HEPARIN SODIUM 18 UNITS/KG/HR: 10000 INJECTION, SOLUTION INTRAVENOUS at 20:14

## 2021-02-23 RX ADMIN — PRAMIPEXOLE DIHYDROCHLORIDE 0.5 MG: 0.25 TABLET ORAL at 22:29

## 2021-02-23 RX ADMIN — NITROGLYCERIN 5 MCG/MIN: 20 INJECTION INTRAVENOUS at 22:37

## 2021-02-23 RX ADMIN — FUROSEMIDE 60 MG: 10 INJECTION, SOLUTION INTRAMUSCULAR; INTRAVENOUS at 18:52

## 2021-02-23 RX ADMIN — ATORVASTATIN CALCIUM 80 MG: 80 TABLET, FILM COATED ORAL at 22:27

## 2021-02-23 RX ADMIN — BUMETANIDE 2 MG: 0.25 INJECTION, SOLUTION INTRAMUSCULAR; INTRAVENOUS at 22:30

## 2021-02-23 RX ADMIN — HEPARIN SODIUM 8340 UNITS: 1000 INJECTION INTRAVENOUS; SUBCUTANEOUS at 20:15

## 2021-02-23 RX ADMIN — IOPAMIDOL 90 ML: 755 INJECTION, SOLUTION INTRAVENOUS at 20:04

## 2021-02-23 RX ADMIN — MONTELUKAST SODIUM 10 MG: 10 TABLET, FILM COATED ORAL at 22:27

## 2021-02-23 RX ADMIN — CARVEDILOL 6.25 MG: 6.25 TABLET, FILM COATED ORAL at 22:28

## 2021-02-23 RX ADMIN — PANTOPRAZOLE SODIUM 40 MG: 40 TABLET, DELAYED RELEASE ORAL at 22:27

## 2021-02-23 RX ADMIN — GABAPENTIN 600 MG: 600 TABLET, FILM COATED ORAL at 22:27

## 2021-02-23 RX ADMIN — Medication 40 UNITS: at 22:28

## 2021-02-23 RX ADMIN — INSULIN LISPRO 2 UNITS: 100 INJECTION, SOLUTION INTRAVENOUS; SUBCUTANEOUS at 22:28

## 2021-02-23 RX ADMIN — BUDESONIDE AND FORMOTEROL FUMARATE DIHYDRATE 2 PUFF: 160; 4.5 AEROSOL RESPIRATORY (INHALATION) at 22:34

## 2021-02-23 RX ADMIN — NITROGLYCERIN 0.4 MG: 0.4 TABLET, ORALLY DISINTEGRATING SUBLINGUAL at 18:52

## 2021-02-23 RX ADMIN — Medication 10 MG: at 22:27

## 2021-02-23 ASSESSMENT — PAIN SCALES - GENERAL: PAINLEVEL_OUTOF10: 4

## 2021-02-23 NOTE — ED TRIAGE NOTES
Pt here with SOB starting about 2 hours PTA  Pt states she was seen here Friday for similar and noted to be COVID negative

## 2021-02-24 ENCOUNTER — APPOINTMENT (OUTPATIENT)
Dept: NUCLEAR MEDICINE | Age: 61
DRG: 280 | End: 2021-02-24
Payer: MEDICARE

## 2021-02-24 LAB
ANION GAP SERPL CALCULATED.3IONS-SCNC: 7 MMOL/L (ref 7–19)
APTT: 126.1 SEC (ref 26–36.2)
APTT: 127.6 SEC (ref 26–36.2)
APTT: 94.1 SEC (ref 26–36.2)
APTT: >200 SEC (ref 26–36.2)
BUN BLDV-MCNC: 23 MG/DL (ref 8–23)
CALCIUM SERPL-MCNC: 8.8 MG/DL (ref 8.8–10.2)
CHLORIDE BLD-SCNC: 97 MMOL/L (ref 98–111)
CO2: 34 MMOL/L (ref 22–29)
CREAT SERPL-MCNC: 1.4 MG/DL (ref 0.5–0.9)
EKG P AXIS: 43 DEGREES
EKG P AXIS: 53 DEGREES
EKG P AXIS: 79 DEGREES
EKG P-R INTERVAL: 170 MS
EKG P-R INTERVAL: 172 MS
EKG P-R INTERVAL: 172 MS
EKG Q-T INTERVAL: 408 MS
EKG Q-T INTERVAL: 418 MS
EKG Q-T INTERVAL: 466 MS
EKG QRS DURATION: 94 MS
EKG QRS DURATION: 94 MS
EKG QRS DURATION: 96 MS
EKG QTC CALCULATION (BAZETT): 444 MS
EKG QTC CALCULATION (BAZETT): 460 MS
EKG QTC CALCULATION (BAZETT): 469 MS
EKG T AXIS: -119 DEGREES
EKG T AXIS: -170 DEGREES
EKG T AXIS: -84 DEGREES
GFR AFRICAN AMERICAN: 46
GFR NON-AFRICAN AMERICAN: 38
GLUCOSE BLD-MCNC: 174 MG/DL (ref 70–99)
GLUCOSE BLD-MCNC: 217 MG/DL (ref 70–99)
GLUCOSE BLD-MCNC: 218 MG/DL (ref 70–99)
GLUCOSE BLD-MCNC: 225 MG/DL (ref 74–109)
GLUCOSE BLD-MCNC: 226 MG/DL (ref 70–99)
HBA1C MFR BLD: 8.2 % (ref 4–6)
HCT VFR BLD CALC: 28.6 % (ref 37–47)
HEMOGLOBIN: 9.2 G/DL (ref 12–16)
MCH RBC QN AUTO: 29.3 PG (ref 27–31)
MCHC RBC AUTO-ENTMCNC: 32.2 G/DL (ref 33–37)
MCV RBC AUTO: 91.1 FL (ref 81–99)
OCCULT BLOOD QC: NORMAL
OCCULT BLOOD SCREENING: NORMAL
PDW BLD-RTO: 13.8 % (ref 11.5–14.5)
PERFORMED ON: ABNORMAL
PLATELET # BLD: 214 K/UL (ref 130–400)
PMV BLD AUTO: 9.4 FL (ref 9.4–12.3)
POTASSIUM REFLEX MAGNESIUM: 4.1 MMOL/L (ref 3.5–5)
RBC # BLD: 3.14 M/UL (ref 4.2–5.4)
SODIUM BLD-SCNC: 138 MMOL/L (ref 136–145)
TROPONIN: 0.06 NG/ML (ref 0–0.03)
WBC # BLD: 5.7 K/UL (ref 4.8–10.8)

## 2021-02-24 PROCEDURE — 93005 ELECTROCARDIOGRAM TRACING: CPT | Performed by: NURSE PRACTITIONER

## 2021-02-24 PROCEDURE — 6360000002 HC RX W HCPCS: Performed by: INTERNAL MEDICINE

## 2021-02-24 PROCEDURE — 3430000000 HC RX DIAGNOSTIC RADIOPHARMACEUTICAL: Performed by: INTERNAL MEDICINE

## 2021-02-24 PROCEDURE — G0328 FECAL BLOOD SCRN IMMUNOASSAY: HCPCS

## 2021-02-24 PROCEDURE — 93017 CV STRESS TEST TRACING ONLY: CPT

## 2021-02-24 PROCEDURE — 2500000003 HC RX 250 WO HCPCS: Performed by: HOSPITALIST

## 2021-02-24 PROCEDURE — 99222 1ST HOSP IP/OBS MODERATE 55: CPT | Performed by: INTERNAL MEDICINE

## 2021-02-24 PROCEDURE — 6370000000 HC RX 637 (ALT 250 FOR IP): Performed by: HOSPITALIST

## 2021-02-24 PROCEDURE — 83036 HEMOGLOBIN GLYCOSYLATED A1C: CPT

## 2021-02-24 PROCEDURE — 84484 ASSAY OF TROPONIN QUANT: CPT

## 2021-02-24 PROCEDURE — 6360000002 HC RX W HCPCS: Performed by: HOSPITALIST

## 2021-02-24 PROCEDURE — 2140000000 HC CCU INTERMEDIATE R&B

## 2021-02-24 PROCEDURE — 2700000000 HC OXYGEN THERAPY PER DAY

## 2021-02-24 PROCEDURE — 94640 AIRWAY INHALATION TREATMENT: CPT

## 2021-02-24 PROCEDURE — 6370000000 HC RX 637 (ALT 250 FOR IP): Performed by: PHYSICIAN ASSISTANT

## 2021-02-24 PROCEDURE — 36415 COLL VENOUS BLD VENIPUNCTURE: CPT

## 2021-02-24 PROCEDURE — 85730 THROMBOPLASTIN TIME PARTIAL: CPT

## 2021-02-24 PROCEDURE — 82947 ASSAY GLUCOSE BLOOD QUANT: CPT

## 2021-02-24 PROCEDURE — A9500 TC99M SESTAMIBI: HCPCS | Performed by: INTERNAL MEDICINE

## 2021-02-24 PROCEDURE — 80048 BASIC METABOLIC PNL TOTAL CA: CPT

## 2021-02-24 PROCEDURE — 93010 ELECTROCARDIOGRAM REPORT: CPT | Performed by: INTERNAL MEDICINE

## 2021-02-24 PROCEDURE — 85027 COMPLETE CBC AUTOMATED: CPT

## 2021-02-24 PROCEDURE — 2580000003 HC RX 258: Performed by: HOSPITALIST

## 2021-02-24 RX ADMIN — IPRATROPIUM BROMIDE 0.5 MG: 0.5 SOLUTION RESPIRATORY (INHALATION) at 06:16

## 2021-02-24 RX ADMIN — CLOPIDOGREL BISULFATE 75 MG: 75 TABLET ORAL at 09:14

## 2021-02-24 RX ADMIN — BUMETANIDE 2 MG: 0.25 INJECTION, SOLUTION INTRAMUSCULAR; INTRAVENOUS at 12:21

## 2021-02-24 RX ADMIN — TETRAKIS(2-METHOXYISOBUTYLISOCYANIDE)COPPER(I) TETRAFLUOROBORATE 10 MILLICURIE: 1 INJECTION, POWDER, LYOPHILIZED, FOR SOLUTION INTRAVENOUS at 13:17

## 2021-02-24 RX ADMIN — PRAMIPEXOLE DIHYDROCHLORIDE 0.5 MG: 0.25 TABLET ORAL at 16:41

## 2021-02-24 RX ADMIN — BUDESONIDE AND FORMOTEROL FUMARATE DIHYDRATE 2 PUFF: 160; 4.5 AEROSOL RESPIRATORY (INHALATION) at 09:15

## 2021-02-24 RX ADMIN — VALSARTAN 160 MG: 160 TABLET, FILM COATED ORAL at 09:15

## 2021-02-24 RX ADMIN — Medication 10 MG: at 21:16

## 2021-02-24 RX ADMIN — INSULIN LISPRO 2 UNITS: 100 INJECTION, SOLUTION INTRAVENOUS; SUBCUTANEOUS at 17:31

## 2021-02-24 RX ADMIN — DULOXETINE HYDROCHLORIDE 30 MG: 30 CAPSULE, DELAYED RELEASE ORAL at 09:14

## 2021-02-24 RX ADMIN — KETOTIFEN FUMARATE 1 DROP: 0.35 SOLUTION/ DROPS OPHTHALMIC at 21:16

## 2021-02-24 RX ADMIN — BUMETANIDE 2 MG: 0.25 INJECTION, SOLUTION INTRAMUSCULAR; INTRAVENOUS at 21:16

## 2021-02-24 RX ADMIN — AMLODIPINE BESYLATE 5 MG: 5 TABLET ORAL at 09:14

## 2021-02-24 RX ADMIN — Medication 1000 UNITS: at 09:15

## 2021-02-24 RX ADMIN — GABAPENTIN 600 MG: 600 TABLET, FILM COATED ORAL at 09:14

## 2021-02-24 RX ADMIN — MONTELUKAST SODIUM 10 MG: 10 TABLET, FILM COATED ORAL at 21:16

## 2021-02-24 RX ADMIN — Medication 40 UNITS: at 21:20

## 2021-02-24 RX ADMIN — HEPARIN SODIUM 13 UNITS/KG/HR: 10000 INJECTION, SOLUTION INTRAVENOUS at 12:36

## 2021-02-24 RX ADMIN — PANTOPRAZOLE SODIUM 40 MG: 40 TABLET, DELAYED RELEASE ORAL at 09:14

## 2021-02-24 RX ADMIN — IPRATROPIUM BROMIDE 0.5 MG: 0.5 SOLUTION RESPIRATORY (INHALATION) at 14:10

## 2021-02-24 RX ADMIN — PANTOPRAZOLE SODIUM 40 MG: 40 TABLET, DELAYED RELEASE ORAL at 21:16

## 2021-02-24 RX ADMIN — CARVEDILOL 6.25 MG: 6.25 TABLET, FILM COATED ORAL at 21:16

## 2021-02-24 RX ADMIN — INSULIN LISPRO 1 UNITS: 100 INJECTION, SOLUTION INTRAVENOUS; SUBCUTANEOUS at 12:21

## 2021-02-24 RX ADMIN — SODIUM CHLORIDE, PRESERVATIVE FREE 10 ML: 5 INJECTION INTRAVENOUS at 09:17

## 2021-02-24 RX ADMIN — KETOTIFEN FUMARATE 1 DROP: 0.35 SOLUTION/ DROPS OPHTHALMIC at 09:15

## 2021-02-24 RX ADMIN — ATORVASTATIN CALCIUM 80 MG: 80 TABLET, FILM COATED ORAL at 21:16

## 2021-02-24 RX ADMIN — IPRATROPIUM BROMIDE 0.5 MG: 0.5 SOLUTION RESPIRATORY (INHALATION) at 18:20

## 2021-02-24 RX ADMIN — PRAMIPEXOLE DIHYDROCHLORIDE 0.5 MG: 0.25 TABLET ORAL at 09:14

## 2021-02-24 RX ADMIN — LATANOPROST 1 DROP: 50 SOLUTION OPHTHALMIC at 14:40

## 2021-02-24 RX ADMIN — BUDESONIDE AND FORMOTEROL FUMARATE DIHYDRATE 2 PUFF: 160; 4.5 AEROSOL RESPIRATORY (INHALATION) at 22:52

## 2021-02-24 RX ADMIN — REGADENOSON 0.4 MG: 0.08 INJECTION, SOLUTION INTRAVENOUS at 14:59

## 2021-02-24 RX ADMIN — INSULIN LISPRO 1 UNITS: 100 INJECTION, SOLUTION INTRAVENOUS; SUBCUTANEOUS at 21:20

## 2021-02-24 RX ADMIN — TETRAKIS(2-METHOXYISOBUTYLISOCYANIDE)COPPER(I) TETRAFLUOROBORATE 30 MILLICURIE: 1 INJECTION, POWDER, LYOPHILIZED, FOR SOLUTION INTRAVENOUS at 13:17

## 2021-02-24 RX ADMIN — CARVEDILOL 6.25 MG: 6.25 TABLET, FILM COATED ORAL at 09:23

## 2021-02-24 RX ADMIN — SODIUM CHLORIDE, PRESERVATIVE FREE 10 ML: 5 INJECTION INTRAVENOUS at 21:26

## 2021-02-24 RX ADMIN — GABAPENTIN 600 MG: 600 TABLET, FILM COATED ORAL at 14:40

## 2021-02-24 RX ADMIN — GABAPENTIN 600 MG: 600 TABLET, FILM COATED ORAL at 21:16

## 2021-02-24 ASSESSMENT — ENCOUNTER SYMPTOMS
BACK PAIN: 0
SHORTNESS OF BREATH: 1
COUGH: 0
BLOOD IN STOOL: 0
CHEST TIGHTNESS: 1
CONSTIPATION: 0
DIARRHEA: 0
VOMITING: 0
ABDOMINAL DISTENTION: 0
WHEEZING: 0
EYE DISCHARGE: 0

## 2021-02-24 NOTE — PROGRESS NOTES
Pt up on side of bed for supper meal. Pt has no c/o pain. No c/o SOA. Feeling better today. HR 69 sinus. Lungs CTA at present.  Electronically signed by Scarlett Castro RN on 2/24/2021 at 5:36 PM

## 2021-02-24 NOTE — PROGRESS NOTES
[facility] Hospitalists    Patient:  Patricia Erickson  YOB: 1960  Date of Service: 2/24/2021  MRN: [id number]   Acct: [de-identified]   Primary Care Physician: YAMIL Lemos NP  Advance Directive: Full Code  Admit Date: 2/23/2021       Hospital Day: 1  Portions of this note have been copied forward, however, changed to reflect the most current clinical status of this patient. CHIEF COMPLAINT Chest pain, dyspnea    SUBJECTIVE: Mrs. Shultz has no new complaints. States she feels better today. Dyspnea improved. No further chest pain. Was nauseated earlier but this has resolved and she just had a bowel movement. Output inaccurate due to leaking of purewick and patient unable to make it to [facility] Course: The patient is a 59 y.o. female with PMH CAD, COPD, DVT, HTN, DM 2,diastolic CHF, CVA, asthma who presented to [facility] ED complaining of dyspnea and chest pain that have been worsening over the course of the last week associated with edema, weight gain, orthopnea and paroxysmal nocturnal dyspnea. She presented to [facility] ED on 02/19/2021 with similar complaint after she had run out of Bumex. She received nitroglycerin and Lasix. Dyspnea improved and she was tolerating room air and was discharged home with additional lasix. Patient stated she felt much better over the weekend and actually planned a trip today to [place] with her . She fell asleep in the car and awoke gasping for air with worsening edema in her BLE's. She denies fever, cough, body aches or chills. She does not recall if she took her medications today. She does take Plavix for history of MI as well as stroke. Has allergy to Aspirin. Work up in ED concerning for elevated troponin and D-dimer. CXR reveals volume overload. CTA chest is negative for pulmonary embolism. Patient admitted to Hospitalist service, continued on IV Bumex, Heparin gtt. Was initially placed on nitroglycerin gtt but this has been weaned. Nenita Felixjennifer performed 02/24/2021    Review of Systems:   14 point review of systems is negative except as specifically addressed above. Objective:   VITALS:  /62   Pulse 66   Temp 97 °F (36.1 °C) (Temporal)   Resp 18   Wt 222 lb 11.2 oz (101 kg)   SpO2 96%   BMI 42.08 kg/m²   24HR INTAKE/OUTPUT:      Intake/Output Summary (Last 24 hours) at 2/24/2021 1051  Last data filed at 2/24/2021 8708  Gross per 24 hour   Intake 150 ml   Output 2600 ml   Net -2450 ml     General appearance: alert, appears stated age, cooperative and no distress  Head: Normocephalic, without obvious abnormality, atraumatic  Eyes: conjunctivae/corneas clear. PERRL, EOM's intact. Ears: normal external ears and nose, throat without exudate  Neck: no adenopathy, no carotid bruit, + JVD, supple, symmetrical, trachea midline   Lungs: increased air entry throughout, soft bibasilar rales, no rhonchi or wheezing   Heart: regular rate and rhythm, S1, S2 normal, no murmur  Abdomen:soft, obese, non-tender; non-distended, normal bowel sounds no masses  Extremities:1-2+ bilateral lower extremity edema,  No erythema, no tenderness to palpation  Skin: Skin color, texture, turgor normal. No rashes or lesions  Lymphatic: No palpable lymph node enlargment  Neurologic: Alert and oriented X 3, normal strength and tone.  No focal deficits  Psychiatric: Alert and oriented, thought content appropriate, normal insight, mood appropriate    Medications:      heparin (PORCINE) Infusion 14 Units/kg/hr (02/24/21 3940)    dextrose      nitroGLYCERIN Stopped (02/24/21 2082)      atorvastatin  80 mg Oral Nightly    bumetanide  2 mg Intravenous BID    carvedilol  6.25 mg Oral BID    clopidogrel  75 mg Oral Daily    DULoxetine  30 mg Oral Daily    gabapentin  600 mg Oral TID    latanoprost  1 drop Ophthalmic Daily    melatonin  10 mg Oral Nightly    montelukast  10 mg Oral Nightly    pantoprazole  40 mg Oral BID    pramipexole  0.5 mg Oral BID    ketotifen  1 drop Both Eyes BID    Vitamin D  1,000 Units Oral Daily    valsartan  160 mg Oral Daily    ipratropium  0.5 mg Nebulization 4x daily    amLODIPine  5 mg Oral Daily    sodium chloride flush  10 mL Intravenous 2 times per day    insulin lispro  0-6 Units Subcutaneous TID WC    insulin lispro  0-3 Units Subcutaneous Nightly    insulin glargine  40 Units Subcutaneous Nightly    budesonide-formoterol  2 puff Inhalation BID     technetium sestamibi, technetium sestamibi, regadenoson, nitroGLYCERIN, heparin (porcine), heparin (porcine), levalbuterol, sodium chloride flush, acetaminophen **OR** acetaminophen, ondansetron **OR** ondansetron, polyethylene glycol, nitroGLYCERIN, glucose, dextrose, glucagon (rDNA), dextrose  Diet NPO, After Midnight Exceptions are: Sips of Water with Meds, Ice Chips     Lab and other Data:     Recent Labs     02/23/21  1740 02/24/21  0312   WBC 6.4 5.7   HGB 10.4* 9.2*    214     Recent Labs     02/23/21  1740 02/24/21  0536    138   K 4.1 4.1   CL 99 97*   CO2 34* 34*   BUN 20 23   CREATININE 1.0* 1.4*   GLUCOSE 294* 225*     Recent Labs     02/23/21  1740   AST 11   ALT 14   BILITOT 0.3   ALKPHOS 116*     Troponin T:   Recent Labs     02/23/21  1740 02/23/21  2240 02/24/21  0536   TROPONINI 0.06* 0.05* 0.06*     INR:   Recent Labs     02/23/21  1710   INR 1.01     A1C:   Recent Labs     02/24/21  0312   LABA1C 8.2*     RAD:   Xr Chest Portable  Impression: Fluid overload. Signed by Dr Lula Ribeiro on 2/23/2021 6:14 PM    Cta Pulmonary W Contrast  1. No evidence of pulmonary embolus. 2.  Fluid overload with pericardial effusion. . Signed by Dr Lula Ribeiro on 2/23/2021 8:35 PM    Micro: COVID-19 negative    Assessment/Plan   Principal Problem:    NSTEMI (non-ST elevated myocardial infarction) [facility])  Active Problems:    Elevated d-dimer    Acute on chronic diastolic heart failure ([address]    Type 2 diabetes mellitus, with long-term current use of insulin ([facility]    COPD (chronic obstructive pulmonary disease) ([facility]    Morbid obesity with BMI of 40.0-44.9, adult ([facility])    Obstructive sleep apnea    Essential hypertension    Moderate persistent asthma without complication    Pulmonary hypertension ([facility])    CKD (chronic kidney disease) stage 3, GFR 30-59 ml/min    Coronary artery disease involving native coronary artery of native heart without angina pectoris  Resolved Problems:    * No resolved hospital problems. *    Principal Problem:    NSTEMI (non-ST elevated myocardial infarction) ([facility] w/ known CAD-elevated troponin, heparin gtt continued, Cardiology following, Wei Montano pending, Nitroglycerin gtt stopped.  Continue Lipitor, Plavix, Coreg, Valsartan      Active Problems:    Elevated d-dimer-CTA chest negative for PE       Acute on chronic diastolic heart failure ([facility])-continue IV Bumex, I/O's, daily weights, symptomatically improved, output inaccurate        Type 2 diabetes mellitus, with long-term current use of insulin ([facility])-basal insulin resumed, add SSI, hypoglycemia tx orders, accuchecks, stable       COPD (chronic obstructive pulmonary disease) ([facility])-no exacerbation       Morbid obesity with BMI of 40.0-44.9, adult ([facility])-noted       Obstructive sleep apnea-CPAP ordered       Essential hypertension-patient does not recall taking her medications today, continue Norvasc, Coreg, Diovan, Bumex and monitor       Moderate persistent asthma without complication-no exacerbation       Pulmonary hypertension ([facility])-noted       CKD (chronic kidney disease) stage 3, GFR 30-59 ml/min-slight increase in creatinine, continue close monitoring renal parameters while on IV diuretic therapy    Further orders per clinical course/attending     DVT Prophylaxis: Heparin gtt     GI prophylaxis: Protonix     Nola Posada PA-C

## 2021-02-24 NOTE — CONSULTS
Palliative care in to see pt. Pt just returned from testing and is sleepy. Call made to pt spouse for additional information. Spouse tells me pt needs \"a lot of help at home\". Reports pt has someone coming in 3 x a week to assist with her needs, cleaning and cooking. He tells me pt needs a motorized chair to amb and they are trying to get one for her. Currently pt has a Rolator that is broken. He states pt gets around the house\"fidel slowly\". Pt presented to ED with c/o SOA/CP. Hx of CHF, Asthma, COPD, obesity. Recently had heart cath in July 2020. RN reports pt has been inc of BB. Goal is for pt to return to her home on [place].   Electronically signed by Maikel Melendez RN on 2/24/2021 at 2:31 PM

## 2021-02-24 NOTE — CONSULTS
[facility]  Cardiology Consult      Requesting MD:  Albert Lui MD   Admit Status:  Inpatient [101]       History obtained from:   ? Patient  ?  Other (specify):     PROBLEM LIST:    Patient Active Problem List    Diagnosis Date Noted    Chest discomfort 07/14/2020     Priority: High    NSTEMI (non-ST elevated myocardial infarction) ([facility] 02/23/2021     Priority: Low    Pulmonary nodules 01/02/2021     Priority: Low    CHF (congestive heart failure), NYHA class I, acute on chronic, combined ([facility] 12/13/2020     Priority: Low    Mixed hyperlipidemia 08/13/2020     Priority: Low    Coronary artery disease involving native coronary artery of native heart without angina pectoris 08/13/2020     Priority: Low    Pulmonary edema with congestive heart failure ([facility] 07/02/2020     Priority: Low    Palliative care patient 07/02/2020     Priority: Low    Obstructive sleep apnea 06/05/2020     Priority: Low    Essential hypertension 06/05/2020     Priority: Low    Moderate persistent asthma without complication 58/99/5853     Priority: Low    Pulmonary hypertension ([facility] 03/09/2020     Priority: Low    Nonobstructive atherosclerosis of coronary artery 03/09/2020     Priority: Low    CKD (chronic kidney disease) stage 3, GFR 30-59 ml/min 03/09/2020     Priority: Low    Morbid obesity with BMI of 40.0-44.9, adult ([facility] 01/27/2020     Priority: Low    SOB (shortness of breath) 01/26/2020     Priority: Low     Overview Note:     Stents in the proximal circ and OM1  11/21/2019  Cath  LVEDP 25, PA 60/30, normal LVFX, mild CAD ([place], [address])  1/26/2020  Echo mild to moderate pericardial eff, normal LVF  3/7/2020  Echo large pericardial effusion  5/29/2020  Echo  Severe LVH, DD, normal LVFX, small pericardial effusion  7/15/20  lexiscan Positive for inferior lateral myocardial ischemia, EF 33%, -3% ischemic myocardium on stress, intermediate risk findings, AUC indication 16, AUC score 7, Jim Muniz MD)  7/15/20  Echo  Normal LVFX, moderate pericardial effusion  7/16/20  Cath  Mild CAD, normal LVFX        Pericardial effusion 01/26/2020     Priority: Low    Acute on chronic diastolic heart failure ([facility] 01/26/2020     Priority: Low    Normocytic anemia 01/26/2020     Priority: Low    Type 2 diabetes mellitus, with long-term current use of insulin ([facility] 01/26/2020     Priority: Low    COPD (chronic obstructive pulmonary disease) ([facility] 01/26/2020     Priority: Low    Hyperglycemia 01/26/2020     Priority: Low    Acute kidney injury ([facility] 01/26/2020     Priority: Low    Elevated d-dimer 01/26/2020     Priority: Low     1.  Coronary artery disease, prior PCI 7/2018 to proximal to mid circumflex/OM1 ([place]) with patent stents by catheterization 7/16/2020.  2.  Hypertension with moderate to severe concentric LVH with apical component, moderate left atrial enlargement and significant diastolic dysfunction. 3.  Persistent predominantly localized posterior pericardial effusion without change on serial echocardiograms. 4.  Diabetes mellitus. 5.  Rheumatoid arthritis with strongly positive rheumatoid titers. 6.  Morbid obesity.     PRESENTATION: Pawan Zhou is a 59y.o. year old female who presents with acute worsening in shortness of breath with chest pain and progressive leg swelling over 1 day. Symptomatically better with diuresis. Frequent emergency room visits. Patient was just in the emergency room 2/19/2021 as she ran out of [facility]. She was diuresed and discharged home with Lasix. She apparently went to [place] yesterday with her  driving. She had possibly forgotten to take her medications I did not have it with her on the trip. On the way back she suddenly became progressively worse with shortness of breath and chest discomfort and managed to make it back to [place] where she was readmitted.   She reports lapses in memory and possibly missing her medications. She does have a home health aide who comes 3 times a week. She possibly takes the medicines on those days but might be missing them on other days. BNP is 2699, slightly less than when she presented 2/2019 at 3060. Her last admission 1/17/2020 the BNP was 2463. Troponins are persistently borderline positive which is unchanged. She had undergone cardiac catheterization in July for similar presentation with positive troponins and noted patent stents. Her total cholesterol is elevated at 252 and she is reportedly on Lipitor. REVIEW OF SYSTEMS:  Review of Systems   Constitutional: Negative for activity change, fatigue and fever. HENT: Negative for ear pain, hearing loss and tinnitus. Eyes: Negative for discharge and visual disturbance. Respiratory: Positive for chest tightness and shortness of breath. Negative for cough and wheezing. Cardiovascular: Positive for leg swelling. Negative for chest pain and palpitations. Gastrointestinal: Negative for abdominal distention, blood in stool, constipation, diarrhea and vomiting. Endocrine: Negative for cold intolerance, heat intolerance, polydipsia and polyuria. Genitourinary: Negative for dysuria and hematuria. Musculoskeletal: Negative for arthralgias, back pain and myalgias. Skin: Negative for pallor and rash. Neurological: Negative for seizures, syncope, weakness and headaches. Psychiatric/Behavioral: Negative for behavioral problems and dysphoric mood.        Past Medical History:      Diagnosis Date    Allergies     AMI (acute myocardial infarction) ([facility] 09/2018    Asthma     CAD (coronary artery disease)     hx of stents    Cerebral artery occlusion with cerebral infarction ([facility] 2012    R sided numbness/weakness    CHF (congestive heart failure) ([address]     COPD (chronic obstructive pulmonary disease) ([address]     Depression     Diabetes mellitus ([address]     DVT (deep vein thrombosis) in pregnancy  GERD (gastroesophageal reflux disease)     Hx of blood clots     rt leg    Hyperlipidemia     Hypertension     Other disorders of kidney and ureter in diseases classified elsewhere     Palliative care patient 2020    Pneumonia        Past Surgical History:      Procedure Laterality Date    CATARACT REMOVAL Bilateral     COLONOSCOPY  approx 2013    CORONARY ANGIOPLASTY WITH STENT PLACEMENT  2018    in [facility] (3 stents)    TONSILLECTOMY         Allergies:  Albuterol, Levaquin [levofloxacin], Metformin and related, and Aspirin    Past Social History:  Social History     Socioeconomic History    Marital status:      Spouse name: Not on file    Number of children: Not on file    Years of education: Not on file    Highest education level: Not on file   Occupational History    Not on file   Social Needs    Financial resource strain: Not on file    Food insecurity     Worry: Not on file     Inability: Not on file    Transportation needs     Medical: Not on file     Non-medical: Not on file   Tobacco Use    Smoking status: Former Smoker     Packs/day: 1.00     Years: 30.00     Pack years: 30.00     Quit date:      Years since quittin.1    Smokeless tobacco: Never Used   Substance and Sexual Activity    Alcohol use: Never     Frequency: Never    Drug use: Never    Sexual activity: Not on file   Lifestyle    Physical activity     Days per week: Not on file     Minutes per session: Not on file    Stress: Not on file   Relationships    Social connections     Talks on phone: Not on file     Gets together: Not on file     Attends [employer] service: Not on file     Active member of club or organization: Not on file     Attends meetings of clubs or organizations: Not on file     Relationship status: Not on file    Intimate partner violence     Fear of current or ex partner: Not on file     Emotionally abused: Not on file     Physically abused: Not on file Forced sexual activity: Not on file   Other Topics Concern    Not on file   Social History Narrative     17 years second Kalli Chao has 3 daughtersPresently in collegeShe is worked as a sales associateQuit smoking 2004 denies alcohol consumption or substance usagePhysically sedentary       Family History:       Problem Relation Age of Onset    Colon Cancer Neg Hx     Colon Polyps Neg Hx        Home Meds:  Prior to Admission medications    Medication Sig Start Date End Date Taking? Authorizing Provider   gabapentin (NEURONTIN) 600 MG tablet Take 1 tablet by mouth 3 times daily for 30 days.  2/12/21 3/14/21 Yes YAMIL Siu NP   Maria Luz Frame 073-44 MCG/INH AEPB inhaler Inhale 1 puff into the lungs daily 12/29/20  Yes Historical Provider, MD   azelastine (ASTELIN) 0.1 % nasal spray 1 spray by Nasal route 2 times daily 11/30/20  Yes Historical Provider, MD   valsartan (DIOVAN) 160 MG tablet Take 1 tablet by mouth daily 1/15/21  Yes YAMIL Galvin NP   atorvastatin (LIPITOR) 20 MG tablet Take 2 tablets by mouth daily 12/16/20  Yes Tyree Valdez MD   carvedilol (COREG) 3.125 MG tablet Take 2 tablets by mouth 2 times daily 12/16/20  Yes Tyree Valdez MD   pantoprazole (PROTONIX) 20 MG tablet Take 2 tablets by mouth 2 times daily 12/16/20 2/23/21 Yes Tyree Valdez MD   bumetanide (BUMEX) 1 MG tablet Take 1 mg by mouth 2 times daily   Yes Historical Provider, MD   amLODIPine (NORVASC) 5 MG tablet Take 1 tablet by mouth daily 11/22/20  Yes YAMIL Galvin NP   Insulin Degludec (TRESIBA FLEXTOUCH) 100 UNIT/ML SOPN Inject 40 Units into the skin nightly 11/22/20  Yes YAMIL Galvin NP   Semaglutide, 1 MG/DOSE, 2 MG/1.5ML SOPN Inject 1 mg into the skin every 7 days 11/22/20  Yes YAMIL Galvin NP   levocetirizine (XYZAL) 5 MG tablet Take 1 tablet by mouth nightly 11/22/20  Yes Jayson Cordova, APRN - NP   pramipexole ([address]) 0.5 MG tablet Take 1 tablet by mouth 2 times daily 11/22/20  Yes YAMIL Estrada NP   tiotropium [facility] RESPIMAT) 1.25 MCG/ACT AERS inhaler Inhale 2 puffs into the lungs daily 10/19/20  Yes YAMIL Estrada NP   levalbuterol Gerri Blare HFA) 45 MCG/ACT inhaler Inhale 2 puffs into the lungs every 4 hours as needed for Wheezing or Shortness of Breath 10/19/20  Yes YAMIL Estrada NP   DULoxetine (CYMBALTA) 30 MG extended release capsule Take 30 mg by mouth daily ALONG WITH A 60 MG TABLET (BOTH TOGETHER + 90 MG)   Yes Historical Provider, MD   clopidogrel (PLAVIX) 75 MG tablet Take 75 mg by mouth daily   Yes Historical Provider, MD   montelukast (SINGULAIR) 10 MG tablet Take 10 mg by mouth nightly   Yes Historical Provider, MD   vitamin D (CHOLECALCIFEROL) 25 MCG (1000 UT) TABS tablet Take 1,000 Units by mouth daily   Yes Historical Provider, MD   melatonin 3 MG TABS tablet Take 10 mg by mouth nightly   Yes Historical Provider, MD       Current Meds:   atorvastatin  80 mg Oral Nightly    bumetanide  2 mg Intravenous BID    carvedilol  6.25 mg Oral BID    clopidogrel  75 mg Oral Daily    DULoxetine  30 mg Oral Daily    gabapentin  600 mg Oral TID    latanoprost  1 drop Ophthalmic Daily    melatonin  10 mg Oral Nightly    montelukast  10 mg Oral Nightly    pantoprazole  40 mg Oral BID    pramipexole  0.5 mg Oral BID    ketotifen  1 drop Both Eyes BID    Vitamin D  1,000 Units Oral Daily    valsartan  160 mg Oral Daily    ipratropium  0.5 mg Nebulization 4x daily    amLODIPine  5 mg Oral Daily    sodium chloride flush  10 mL Intravenous 2 times per day    insulin lispro  0-6 Units Subcutaneous TID WC    insulin lispro  0-3 Units Subcutaneous Nightly    insulin glargine  40 Units Subcutaneous Nightly    budesonide-formoterol  2 puff Inhalation BID       Current Infused Meds:   heparin (PORCINE) Infusion 13 Units/kg/hr (02/24/21 1236)    dextrose      nitroGLYCERIN Coloration: Skin is not pale. Findings: No rash. Neurological:      Mental Status: She is alert and oriented to person, place, and time. Cranial Nerves: No cranial nerve deficit. Deep Tendon Reflexes: Reflexes normal.           Labs:  Recent Labs     02/23/21  1740 02/24/21  0312   WBC 6.4 5.7   HGB 10.4* 9.2*    214       Recent Labs     02/23/21  1740 02/24/21  0536    138   K 4.1 4.1   CL 99 97*   CO2 34* 34*   BUN 20 23   CREATININE 1.0* 1.4*   LABGLOM 56* 38*   MG 1.9  --    CALCIUM 9.1 8.8   PHOS 2.9  --        CK, CKMB, Troponin: @LABRCNT (CKTOTAL:3, CKMB:3, TROPONINI:3)@    Last 3 BNP:          IMAGING:  Xr Chest Portable    Result Date: 2/23/2021  Exam:   XR CHEST PORTABLE  Date:  2/23/2021 History:  Female, age  59 years; chest pain COMPARISON:  Chest x-ray dated September 19, 2021 Findings : Moderate cardiomegaly. Interstitial edema. Small left and trace right pleural effusions. No measurable pneumothorax. The bones show no acute pathology. Impression: Fluid overload. Signed by Dr Kurt Barragan on 2/23/2021 6:14 PM    Xr Chest Portable    Result Date: 2/20/2021  EXAM: XR CHEST PORTABLE -- 2/19/2021 10:30 PM HISTORY: 61 years, Female, shortness of air COMPARISON: 1/17/2021 TECHNIQUE:  1 images. Frontal view of the chest. FINDINGS:  No pneumothorax or pleural effusion. Perihilar vascular prominence without overt edema. No focal consolidation. Similar enlarged cardiac silhouette compared to 1/17/2021. Upper mediastinum within normal limits. Calcified aortic atherosclerosis. No acute bony finding. 1. Similar enlarged cardiac silhouette compared to prior. Consider cardiomegaly versus pericardial effusion. 2. Perihilar vascular prominence without overt edema. Signed by Dr Merry Dave on 2/20/2021 7:14 AM    Cta Pulmonary W Contrast    Result Date: 2/23/2021  CTA PULMONARY W CONTRAST 2/23/2021 6:45 PM HISTORY: Chest pain COMPARISON: CTA chest dated January 2, 2021. DLP: 801 mGy cm TECHNIQUE: Helical tomographic images of the chest were obtained after the administration of intravenous contrast following angiogram protocol. Additionally, 3D reformatted images were provided. FINDINGS:  Pulmonary arteries: There is adequate enhancement of the pulmonary arteries to evaluate for central and segmental pulmonary emboli. There are no filling defects within the main, lobar, segmental or visualized subsegmental pulmonary arteries. The pulmonary arteries are within normal limits. Aorta and great vessels: There is atherosclerosis in the aorta without aneurysm or dissection. There is atherosclerosis in the great vessels. Coronary artery disease. Visualized neck base: The imaged portion of the base of the neck appears unremarkable. Lungs: Interstitial edema. Trace bilateral pleural effusions. . The trachea and bronchial tree are patent. Heart: Cardiomegaly. Mild to moderate pericardial effusion. . This is similar to minimally increased. . Mediastinum and lymph nodes: No enlarged mediastinal, hilar, or axillary lymph nodes are present. Skeletal and soft tissues: The osseous structures of the thorax and surrounding soft tissues demonstrate no acute process. Upper abdomen: The imaged portion of the upper abdomen demonstrates no acute process. 1.   No evidence of pulmonary embolus. 2.  Fluid overload with pericardial effusion. . Signed by Dr Mukesh Barton on 2/23/2021 8:35 PM          Assessment and Plan:     This is Acili[place].Jacob y.o. year old female with past medical history of coronary artery disease with prior PCI to proximal to mid circumflex/OM1 7/2018 (patent by catheterization 7/16/2020) moderate to severe LVH with preserved LV systolic function with diastolic heart failure, moderate predominantly localized posterior pericardial effusion that has been stable with no evidence of tamponade, severe obesity, diabetes mellitus with strongly positive rheumatoid titers presenting with acute diastolic heart failure with hypertensive presentation with recurrent emergency room visits and likely noncompliance with treatment. 1.  History supports noncompliance with treatment as etiology of her multiple presentations. She does have memory difficulty and does not remember taking her medications on some days. She likely needs better home health aide. Unclear if  can support her in this regard. I have discussed with her the absolute need for compliance with treatment. She has severe diastolic dysfunction with LVH and hypertension and needs to be compliant. 2.  Mild troponin release which is consistent with prior presentations with patent stents and likely related to diastolic wall stress. 3.  Monitor renal functions and transition to oral diuretics.     Electronically signed by Miranda Rosenthal MD on 2/24/2021 at 4:52 PM

## 2021-02-24 NOTE — PROGRESS NOTES
Pt now returned from [facility].  Electronically signed by Roseline Davila RN on 2/24/2021 at 11:45 AM

## 2021-02-24 NOTE — PROGRESS NOTES
PHARMACY NOTE  Yimi Golden was ordered Astelin Nasal Spray. Per the [address], this medication is non-formulary and not stocked by pharmacy. It has been discontinued. The medication can be reordered at discharge.      Electronically signed by Jose Kang [address] [place] [address] on 2/23/2021 at 9:26 PM

## 2021-02-24 NOTE — ED PROVIDER NOTES
[facility]  eMERGENCY dEPARTMENT eNCOUnter      Pt Name: Chrystal Antonio  MRN: [id number]  Radha 1960  Date of evaluation: 2/23/2021  Provider: Gayathri Quintana, [address]       Chief Complaint   Patient presents with    Shortness of Breath    Chest Pain         HISTORY OF PRESENT ILLNESS   (Location/Symptom, Timing/Onset,Context/Setting, Quality, Duration, Modifying Factors, Severity)  Note limiting factors. Chrystal Antonio is a 59 y.o. female who presents to the emergency department with left sided chest pain that radiates into her left arm that started suddenly. Was riding in the car. Started at [employer] today. + short of breath. Worse with activity. No fever or cough. Recently neg for covid. History of cardiac stents, CHF. HAd a heart cath 7/2020 with noncritical disease. Also asthma and COPD and obesity    HPI    NursingNotes were reviewed. REVIEW OF SYSTEMS    (2-9 systems for level 4, 10 or more for level 5)     Review of Systems    Except as noted above the remainder of the review of systems was reviewed and negative.        PAST MEDICAL HISTORY     Past Medical History:   Diagnosis Date    Allergies     AMI (acute myocardial infarction) ([address] 09/2018    Asthma     CAD (coronary artery disease)     hx of stents    Cerebral artery occlusion with cerebral infarction ([address] 2012    R sided numbness/weakness    CHF (congestive heart failure) ([address]     COPD (chronic obstructive pulmonary disease) ([address]     Depression     Diabetes mellitus ([address]     DVT (deep vein thrombosis) in pregnancy     GERD (gastroesophageal reflux disease)     Hx of blood clots     rt leg    Hyperlipidemia     Hypertension     Other disorders of kidney and ureter in diseases classified elsewhere     Palliative care patient 07/02/2020    Pneumonia          SURGICALHISTORY       Past Surgical History:   Procedure Laterality Date    CATARACT REMOVAL Bilateral  COLONOSCOPY  approx 2013    CORONARY ANGIOPLASTY WITH STENT PLACEMENT  2018    in [address] [facility] (3 stents)    TONSILLECTOMY           CURRENT MEDICATIONS       Previous Medications    AMLODIPINE (NORVASC) 5 MG TABLET    Take 1 tablet by mouth daily    ATORVASTATIN (LIPITOR) 20 MG TABLET    Take 2 tablets by mouth daily    AZELASTINE (ASTELIN) 0.1 % NASAL SPRAY    1 spray by Nasal route 2 times daily    BREO ELLIPTA 200-25 MCG/INH AEPB INHALER    Inhale 1 puff into the lungs daily    BUMETANIDE (BUMEX) 1 MG TABLET    Take 1 mg by mouth 2 times daily    CARVEDILOL (COREG) 3.125 MG TABLET    Take 2 tablets by mouth 2 times daily    CLOPIDOGREL (PLAVIX) 75 MG TABLET    Take 75 mg by mouth daily    DULOXETINE (CYMBALTA) 30 MG EXTENDED RELEASE CAPSULE    Take 30 mg by mouth daily ALONG WITH A 60 MG TABLET (BOTH TOGETHER + 90 MG)    GABAPENTIN (NEURONTIN) 600 MG TABLET    Take 1 tablet by mouth 3 times daily for 30 days.     INSULIN DEGLUDEC (TRESIBA FLEXTOUCH) 100 UNIT/ML SOPN    Inject 40 Units into the skin nightly    LATANOPROST (XALATAN) 0.005 % OPHTHALMIC SOLUTION    Apply 1 drop to eye nightly    LEVALBUTEROL (XOPENEX HFA) 45 MCG/ACT INHALER    Inhale 2 puffs into the lungs every 4 hours as needed for Wheezing or Shortness of Breath    LEVOCETIRIZINE (XYZAL) 5 MG TABLET    Take 1 tablet by mouth nightly    MELATONIN 3 MG TABS TABLET    Take 10 mg by mouth nightly    MONTELUKAST (SINGULAIR) 10 MG TABLET    Take 10 mg by mouth nightly    PANTOPRAZOLE (PROTONIX) 20 MG TABLET    Take 2 tablets by mouth 2 times daily    PAZEO 0.7 % SOLN    Place 1 drop into both eyes daily    PRAMIPEXOLE (MIRAPEX) 0.5 MG TABLET    Take 1 tablet by mouth 2 times daily    SEMAGLUTIDE, 1 MG/DOSE, 2 MG/1.5ML SOPN    Inject 1 mg into the skin every 7 days    TIOTROPIUM (SPIRIVA RESPIMAT) 1.25 MCG/ACT AERS INHALER    Inhale 2 puffs into the lungs daily    VALSARTAN (DIOVAN) 160 MG TABLET    Take 1 tablet by mouth daily BP Temp Temp Source Pulse Resp SpO2 Height Weight   02/23/21 1739 02/23/21 1741 02/23/21 1741 02/23/21 1739 02/23/21 1739 02/23/21 1739 -- 02/23/21 1739   (!) 171/98 98 °F (36.7 °C) Oral 87 18 96 %  230 lb (104.3 kg)       Physical Exam  Vitals signs and nursing note reviewed. Constitutional:       Appearance: She is well-developed. She is obese. HENT:      Head: Normocephalic and atraumatic. Eyes:      General: No scleral icterus. Right eye: No discharge. Left eye: No discharge. Neck:      Musculoskeletal: Normal range of motion and neck supple. Cardiovascular:      Rate and Rhythm: Normal rate and regular rhythm. Heart sounds: Normal heart sounds. Pulmonary:      Effort: Accessory muscle usage present. No respiratory distress. Breath sounds: Decreased breath sounds present. Abdominal:      Palpations: Abdomen is soft. Skin:     General: Skin is warm and dry. Neurological:      General: No focal deficit present. Mental Status: She is alert and oriented to person, place, and time. Psychiatric:         Behavior: Behavior normal.         DIAGNOSTIC RESULTS     EKG: All EKG's are interpreted by the Emergency Department Physician who either signs or Co-signsthis chart in the absence of a cardiologist.  75 Sinus rhythm old anteroseptal infarct. T wave depressions in aVL and aVF read at 1740 by Dr. Serrano Primer:   Alannah Maxim such as CT, Ultrasound and MRI are read by the radiologist. Micheal Crow radiographic images are visualized and preliminarily interpreted by the emergency physician with the below findings:      Interpretation per the Radiologist below, if available at the time of this note:    XR CHEST PORTABLE   Final Result   Impression:   Fluid overload.    Signed by Dr Cate Crabtree on 2/23/2021 6:14 PM      CTA PULMONARY W CONTRAST    (Results Pending)         ED BEDSIDEULTRASOUND:   Performed by ED Physician -none    LABS:  Labs Reviewed CBC WITH AUTO DIFFERENTIAL - Abnormal; Notable for the following components:       Result Value    RBC 3.55 (*)     Hemoglobin 10.4 (*)     Hematocrit 32.9 (*)     MCHC 31.6 (*)     MPV 9.2 (*)     All other components within normal limits   COMPREHENSIVE METABOLIC PANEL W/ REFLEX TO MG FOR LOW K - Abnormal; Notable for the following components:    CO2 34 (*)     Anion Gap 4 (*)     Glucose 294 (*)     CREATININE 1.0 (*)     GFR Non- 56 (*)     Total Protein 6.5 (*)     Alkaline Phosphatase 116 (*)     All other components within normal limits   TROPONIN - Abnormal; Notable for the following components:    Troponin 0.06 (*)     All other components within normal limits   BRAIN NATRIURETIC PEPTIDE - Abnormal; Notable for the following components:    Pro-BNP 2,699 (*)     All other components within normal limits   D-DIMER, QUANTITATIVE - Abnormal; Notable for the following components:    D-Dimer, Quant 0.97 (*)     All other components within normal limits   COVID-19, RAPID   PROTIME-INR   LIPASE   APTT   APTT   HEMOGLOBIN A1C   TSH WITH REFLEX TO FT4   LIPID PANEL   MAGNESIUM   PHOSPHORUS       All other labs were within normal range or not returned as of this dictation. EMERGENCY DEPARTMENT COURSE and DIFFERENTIALDIAGNOSIS/MDM:   Vitals:    Vitals:    02/23/21 1739 02/23/21 1741 02/23/21 1830 02/23/21 1904   BP: (!) 171/98  (!) 174/82 (!) 175/67   Pulse: 87  78 76   Resp: 18  18 20   Temp:  98 °F (36.7 °C)     TempSrc:  Oral     SpO2: 96%  97% 95%   Weight: 230 lb (104.3 kg)              MDM  Pt refuses asprin because of rash that she says is bad. Spoke to Dr Mehnaz Gipson who will admit the pt. Will start on heparin drip after a CTA. CONSULTS:  IP CONSULT TO CARDIOLOGY    PROCEDURES:  Unless otherwise noted below, none     Procedures    FINAL IMPRESSION      1.  Acute on chronic congestive heart failure, unspecified heart failure type (HCC)    2. Elevated troponin 3. Chest pain, unspecified type    4. Shortness of breath    5. Type 2 diabetes mellitus with other specified complication, with long-term current use of insulin ([facility]    6. Chronic obstructive pulmonary disease, unspecified COPD type ([facility]    7. Coronary artery disease involving native coronary artery of native heart, angina presence unspecified        DISPOSITION/PLAN   DISPOSITION        PATIENT REFERRED TO:  No follow-up provider specified.     DISCHARGE MEDICATIONS:  New Prescriptions    No medications on file          (Please note that portions of this note were completed with a voice recognitionprogram.  Efforts were made to edit the dictations but occasionally words are mis-transcribed.)    YAMIL Warner (electronically signed)          YAMIL Warner  02/23/21 5652

## 2021-02-24 NOTE — H&P
[facility] - History & Physical      PCP: YAMIL Griggs NP    Date of Admission: 2/23/2021    Date of Service: 2/23/2021    Chief Complaint:  Chest pain, dyspnea    History Of Present Illness: The patient is a 59 y.o. female with PMH CAD, COPD, DVT, HTN, DM 2,diastolic CHF, CVA, asthma who presented to [facility] ED complaining of dyspnea and chest pain that have been worsening over the course of the last week associated with edema, weight gain, orthopnea and paroxysmal nocturnal dyspnea. She presented to [facility] ED on 02/19/2021 with similar complaint after she had run out of Bumex. She received nitroglycerin and Lasix. Dyspnea improved and she was tolerating room air and was discharged home with additional lasix. Patient stated she felt much better over the weekend and actually planned a trip today to [place] with her . She fell asleep in the car and awoke gasping for air with worsening edema in her BLE's. She denies fever, cough, body aches or chills. She does not recall if she took her medications today. She does take Plavix for history of MI as well as stroke. Has allergy to Aspirin. Work up in ED concerning for elevated troponin and D-dimer. CXR reveals volume overload. CTA chest is pending.      Past Medical History:        Diagnosis Date    Allergies     AMI (acute myocardial infarction) ([address] 09/2018    Asthma     CAD (coronary artery disease)     hx of stents    Cerebral artery occlusion with cerebral infarction ([address] 2012    R sided numbness/weakness    CHF (congestive heart failure) ([address]     COPD (chronic obstructive pulmonary disease) ([address]     Depression     Diabetes mellitus ([address]     DVT (deep vein thrombosis) in pregnancy     GERD (gastroesophageal reflux disease)     Hx of blood clots     rt leg    Hyperlipidemia     Hypertension     Other disorders of kidney and ureter in diseases classified elsewhere     Palliative care patient 07/02/2020   [place] Pneumonia      Past Surgical History:        Procedure Laterality Date    CATARACT REMOVAL Bilateral     COLONOSCOPY  approx 2013    CORONARY ANGIOPLASTY WITH STENT PLACEMENT  2018    [address] [place] [place] [address] (3 stents)    TONSILLECTOMY         Home Medications:  Prior to Admission medications    Medication Sig Start Date End Date Taking? Authorizing Provider   gabapentin (NEURONTIN) 600 MG tablet Take 1 tablet by mouth 3 times daily for 30 days.  2/12/21 3/14/21  YAMIL Travis NP   Elisha Flores 661-38 MCG/INH AEPB inhaler Inhale 1 puff into the lungs daily 12/29/20   Historical Provider, MD   PAZEO 0.7 % SOLN Place 1 drop into both eyes daily 11/30/20   Historical Provider, MD   latanoprost (XALATAN) 0.005 % ophthalmic solution Apply 1 drop to eye nightly    Historical Provider, MD   azelastine (ASTELIN) 0.1 % nasal spray 1 spray by Nasal route 2 times daily 11/30/20   Historical Provider, MD   valsartan (DIOVAN) 160 MG tablet Take 1 tablet by mouth daily 1/15/21   YAMIL Travis NP   atorvastatin (LIPITOR) 20 MG tablet Take 2 tablets by mouth daily 12/16/20   Yessenia Sepulveda MD   carvedilol (COREG) 3.125 MG tablet Take 2 tablets by mouth 2 times daily 12/16/20   Yessenia Sepulveda MD   pantoprazole (PROTONIX) 20 MG tablet Take 2 tablets by mouth 2 times daily 12/16/20 1/20/21  Yessenia Sepulveda MD   bumetanide (BUMEX) 1 MG tablet Take 1 mg by mouth 2 times daily    Historical Provider, MD   amLODIPine (NORVASC) 5 MG tablet Take 1 tablet by mouth daily 11/22/20   YAMIL Travis NP   Insulin Degludec (TRESIBA FLEXTOUCH) 100 UNIT/ML SOPN Inject 40 Units into the skin nightly 11/22/20   YAMIL Travis NP   Semaglutide, 1 MG/DOSE, 2 MG/1.5ML SOPN Inject 1 mg into the skin every 7 days 11/22/20   YAMIL Travis NP   levocetirizine Serene Mindlazaro) 5 MG tablet Take 1 tablet by mouth nightly 11/22/20   YAMIL Travis - NP pramipexole (MIRAPEX) 0.5 MG tablet Take 1 tablet by mouth 2 times daily 11/22/20   YAMIL Chowdhury NP   tiotropium [facility] RESPIMAT) 1.25 MCG/ACT AERS inhaler Inhale 2 puffs into the lungs daily 10/19/20   YAMIL Chowdhury NP   levalbuterol Teretha Amharic HFA) 45 MCG/ACT inhaler Inhale 2 puffs into the lungs every 4 hours as needed for Wheezing or Shortness of Breath 10/19/20   YAMIL Chwodhury NP   DULoxetine (CYMBALTA) 30 MG extended release capsule Take 30 mg by mouth daily ALONG WITH A 60 MG TABLET (BOTH TOGETHER + 90 MG)    Historical Provider, MD   clopidogrel (PLAVIX) 75 MG tablet Take 75 mg by mouth daily    Historical Provider, MD   montelukast (SINGULAIR) 10 MG tablet Take 10 mg by mouth nightly    Historical Provider, MD   vitamin D (CHOLECALCIFEROL) 25 MCG (1000 UT) TABS tablet Take 1,000 Units by mouth daily    Historical Provider, MD   melatonin 3 MG TABS tablet Take 10 mg by mouth nightly    Historical Provider, MD       Allergies:    Albuterol, Levaquin [levofloxacin], Metformin and related, and Aspirin    Social History:    The patient currently lives at home  Tobacco:   reports that she quit smoking about 16 years ago. She has a 30.00 pack-year smoking history. She has never used smokeless tobacco.  Alcohol:   reports no history of alcohol use.   Illicit Drugs: denies    Family History:      Problem Relation Age of Onset    Colon Cancer Neg Hx     Colon Polyps Neg Hx      Review of Systems:   Constitutional / general:  Denies fever / chills / sweats  Head:  Denies headache / neck stiffness / trauma / visual change  Eyes:  Denies blurry vision / acute visual change or loss / itching / redness  ENT: Denies sore throat / hoarseness / nasal drainage / ear pain  CV:  + chest pain / palpitations/+ orthopnea +paroxysmal nocturnal dyspnea  Respiratory:  Denies cough /+ shortness of breath / sputum / hemoptysis  GI: Denies nausea / vomiting / abdominal pain / diarrhea / constipation  :  Denies dysuria / hesitancy / urgency / hematuria   Neuro: Denies paralysis / syncope / seizure / dysphagia / headache / paresthesias  Musculoskeletal:  Denies muscle weakness /joint stiffness / pain  Vascular: +edema / claudication / varicosities  Heme / endocrine:Denies easy bruising / bleeding / excessive sweating / heat or cold intolerance  Psychiatric:  Denies depression / anxiety / insomnia / mood changes  Skin:  Denies new rashes / lesions / skin hair or nail changes    14 point review of systems is negative except as specifically addressed above. Physical Examination:  BP (!) 175/67   Pulse 76   Temp 98 °F (36.7 °C) (Oral)   Resp 20   Wt 230 lb (104.3 kg)   SpO2 95%   BMI 43.46 kg/m²   General appearance: alert, appears stated age, cooperative and no distress  Head: Normocephalic, without obvious abnormality, atraumatic  Eyes: conjunctivae/corneas clear. PERRL, EOM's intact. Ears: normal external ears and nose, throat without exudate  Neck: no adenopathy, no carotid bruit, + JVD, supple, symmetrical, trachea midline and thyroid not enlarged, symmetric, no tenderness/mass/nodules  Lungs: diminished air entry throughout with scattered wheezing and accessory muscle use, tachypneic   Heart: regular rate and rhythm, S1, S2 normal, no murmur  Abdomen:soft, obese, non-tender; non-distended, normal bowel sounds no masses, no organomegaly  Extremities:1-2+ bilateral lower extremity edema,  No erythema, no tenderness to palpation  Skin: Skin color, texture, turgor normal. No rashes or lesions  Lymphatic: No palpable lymph node enlargment  Neurologic: Alert and oriented X 3, normal strength and tone. Normal symmetric reflexes.  Mental status: Alert, oriented, thought content appropriate  Cranial nerves:II-XII Grossly intact Sensory: normal Motor:grossly normal  Psychiatric: Alert and oriented, thought content appropriate, normal insight, mood appropriate    Diagnostic Data:  CBC:  Recent

## 2021-02-24 NOTE — PROGRESS NOTES
To [facility] for Lexiscan. Pt has no c/o pain. Heparin gtt continues infusing at 14units/kg/hr.  Electronically signed by Kathryn Ramirez RN on 2/24/2021 at 9:54 AM

## 2021-02-25 VITALS
OXYGEN SATURATION: 92 % | TEMPERATURE: 97 F | RESPIRATION RATE: 18 BRPM | HEART RATE: 64 BPM | WEIGHT: 227.1 LBS | BODY MASS INDEX: 42.91 KG/M2 | DIASTOLIC BLOOD PRESSURE: 80 MMHG | SYSTOLIC BLOOD PRESSURE: 146 MMHG

## 2021-02-25 LAB
ANION GAP SERPL CALCULATED.3IONS-SCNC: 7 MMOL/L (ref 7–19)
APTT: 54.8 SEC (ref 26–36.2)
APTT: 55 SEC (ref 26–36.2)
BUN BLDV-MCNC: 32 MG/DL (ref 8–23)
CALCIUM SERPL-MCNC: 8.5 MG/DL (ref 8.8–10.2)
CHLORIDE BLD-SCNC: 97 MMOL/L (ref 98–111)
CO2: 35 MMOL/L (ref 22–29)
CREAT SERPL-MCNC: 1.5 MG/DL (ref 0.5–0.9)
GFR AFRICAN AMERICAN: 42
GFR NON-AFRICAN AMERICAN: 35
GLUCOSE BLD-MCNC: 153 MG/DL (ref 74–109)
GLUCOSE BLD-MCNC: 159 MG/DL (ref 70–99)
GLUCOSE BLD-MCNC: 203 MG/DL (ref 70–99)
HCT VFR BLD CALC: 27.1 % (ref 37–47)
HEMOGLOBIN: 8.8 G/DL (ref 12–16)
MCH RBC QN AUTO: 29.1 PG (ref 27–31)
MCHC RBC AUTO-ENTMCNC: 32.5 G/DL (ref 33–37)
MCV RBC AUTO: 89.7 FL (ref 81–99)
PDW BLD-RTO: 13.9 % (ref 11.5–14.5)
PERFORMED ON: ABNORMAL
PERFORMED ON: ABNORMAL
PLATELET # BLD: 202 K/UL (ref 130–400)
PMV BLD AUTO: 9.3 FL (ref 9.4–12.3)
POTASSIUM REFLEX MAGNESIUM: 4.3 MMOL/L (ref 3.5–5)
RBC # BLD: 3.02 M/UL (ref 4.2–5.4)
SODIUM BLD-SCNC: 139 MMOL/L (ref 136–145)
WBC # BLD: 5.5 K/UL (ref 4.8–10.8)

## 2021-02-25 PROCEDURE — 6360000002 HC RX W HCPCS: Performed by: HOSPITALIST

## 2021-02-25 PROCEDURE — 85027 COMPLETE CBC AUTOMATED: CPT

## 2021-02-25 PROCEDURE — 2500000003 HC RX 250 WO HCPCS: Performed by: HOSPITALIST

## 2021-02-25 PROCEDURE — 36415 COLL VENOUS BLD VENIPUNCTURE: CPT

## 2021-02-25 PROCEDURE — 82947 ASSAY GLUCOSE BLOOD QUANT: CPT

## 2021-02-25 PROCEDURE — 6370000000 HC RX 637 (ALT 250 FOR IP): Performed by: HOSPITALIST

## 2021-02-25 PROCEDURE — 94640 AIRWAY INHALATION TREATMENT: CPT

## 2021-02-25 PROCEDURE — 99232 SBSQ HOSP IP/OBS MODERATE 35: CPT | Performed by: INTERNAL MEDICINE

## 2021-02-25 PROCEDURE — 85730 THROMBOPLASTIN TIME PARTIAL: CPT

## 2021-02-25 PROCEDURE — 6370000000 HC RX 637 (ALT 250 FOR IP): Performed by: PHYSICIAN ASSISTANT

## 2021-02-25 PROCEDURE — 80048 BASIC METABOLIC PNL TOTAL CA: CPT

## 2021-02-25 PROCEDURE — 2580000003 HC RX 258: Performed by: HOSPITALIST

## 2021-02-25 RX ORDER — NITROGLYCERIN 0.4 MG/1
TABLET SUBLINGUAL
Qty: 25 TABLET | Refills: 0 | Status: SHIPPED | OUTPATIENT
Start: 2021-02-25 | End: 2021-04-27 | Stop reason: SDUPTHER

## 2021-02-25 RX ORDER — BUMETANIDE 2 MG/1
2 TABLET ORAL 2 TIMES DAILY
Qty: 60 TABLET | Refills: 0 | Status: SHIPPED | OUTPATIENT
Start: 2021-02-25 | End: 2021-02-25 | Stop reason: SDUPTHER

## 2021-02-25 RX ORDER — BUMETANIDE 2 MG/1
2 TABLET ORAL 2 TIMES DAILY
Qty: 60 TABLET | Refills: 0 | Status: SHIPPED | OUTPATIENT
Start: 2021-02-25 | End: 2021-04-27 | Stop reason: SDUPTHER

## 2021-02-25 RX ORDER — NITROGLYCERIN 0.4 MG/1
TABLET SUBLINGUAL
Qty: 25 TABLET | Refills: 0 | Status: SHIPPED | OUTPATIENT
Start: 2021-02-25 | End: 2021-02-25

## 2021-02-25 RX ORDER — BUMETANIDE 1 MG/1
2 TABLET ORAL 2 TIMES DAILY
Status: DISCONTINUED | OUTPATIENT
Start: 2021-02-25 | End: 2021-02-25 | Stop reason: HOSPADM

## 2021-02-25 RX ADMIN — Medication 1000 UNITS: at 09:06

## 2021-02-25 RX ADMIN — PRAMIPEXOLE DIHYDROCHLORIDE 0.5 MG: 0.25 TABLET ORAL at 09:07

## 2021-02-25 RX ADMIN — IPRATROPIUM BROMIDE 0.5 MG: 0.5 SOLUTION RESPIRATORY (INHALATION) at 06:15

## 2021-02-25 RX ADMIN — INSULIN LISPRO 1 UNITS: 100 INJECTION, SOLUTION INTRAVENOUS; SUBCUTANEOUS at 09:05

## 2021-02-25 RX ADMIN — BUDESONIDE AND FORMOTEROL FUMARATE DIHYDRATE 2 PUFF: 160; 4.5 AEROSOL RESPIRATORY (INHALATION) at 09:07

## 2021-02-25 RX ADMIN — VALSARTAN 160 MG: 160 TABLET, FILM COATED ORAL at 09:06

## 2021-02-25 RX ADMIN — AMLODIPINE BESYLATE 5 MG: 5 TABLET ORAL at 09:07

## 2021-02-25 RX ADMIN — DULOXETINE HYDROCHLORIDE 30 MG: 30 CAPSULE, DELAYED RELEASE ORAL at 09:07

## 2021-02-25 RX ADMIN — SODIUM CHLORIDE, PRESERVATIVE FREE 10 ML: 5 INJECTION INTRAVENOUS at 09:08

## 2021-02-25 RX ADMIN — CARVEDILOL 6.25 MG: 6.25 TABLET, FILM COATED ORAL at 09:07

## 2021-02-25 RX ADMIN — GABAPENTIN 600 MG: 600 TABLET, FILM COATED ORAL at 09:07

## 2021-02-25 RX ADMIN — IPRATROPIUM BROMIDE 0.5 MG: 0.5 SOLUTION RESPIRATORY (INHALATION) at 10:22

## 2021-02-25 RX ADMIN — PANTOPRAZOLE SODIUM 40 MG: 40 TABLET, DELAYED RELEASE ORAL at 09:07

## 2021-02-25 RX ADMIN — INSULIN LISPRO 2 UNITS: 100 INJECTION, SOLUTION INTRAVENOUS; SUBCUTANEOUS at 12:12

## 2021-02-25 RX ADMIN — CLOPIDOGREL BISULFATE 75 MG: 75 TABLET ORAL at 09:07

## 2021-02-25 RX ADMIN — BUMETANIDE 2 MG: 0.25 INJECTION, SOLUTION INTRAMUSCULAR; INTRAVENOUS at 09:07

## 2021-02-25 NOTE — PROGRESS NOTES
Cardiology Progress Note Vesna Kapoor MD      Patient:  Ab Jeremy  [id number]    Patient Active Problem List    Diagnosis Date Noted    Chest discomfort 07/14/2020     Priority: High    NSTEMI (non-ST elevated myocardial infarction) ([facility] 02/23/2021     Priority: Low    Pulmonary nodules 01/02/2021     Priority: Low    CHF (congestive heart failure), NYHA class I, acute on chronic, combined ([facility] 12/13/2020     Priority: Low    Mixed hyperlipidemia 08/13/2020     Priority: Low    Coronary artery disease involving native coronary artery of native heart without angina pectoris 08/13/2020     Priority: Low    Pulmonary edema with congestive heart failure ([facility] 07/02/2020     Priority: Low    Palliative care patient 07/02/2020     Priority: Low    Obstructive sleep apnea 06/05/2020     Priority: Low    Essential hypertension 06/05/2020     Priority: Low    Moderate persistent asthma without complication 68/72/9175     Priority: Low    Pulmonary hypertension ([facility] 03/09/2020     Priority: Low    Nonobstructive atherosclerosis of coronary artery 03/09/2020     Priority: Low    CKD (chronic kidney disease) stage 3, GFR 30-59 ml/min 03/09/2020     Priority: Low    Morbid obesity with BMI of 40.0-44.9, adult ([facility] 01/27/2020     Priority: Low    SOB (shortness of breath) 01/26/2020     Priority: Low     Overview Note:     Stents in the proximal circ and OM1  11/21/2019  Cath  LVEDP 25, PA 60/30, normal LVFX, mild CAD ([place], [address])  1/26/2020  Echo mild to moderate pericardial eff, normal LVF  3/7/2020  Echo large pericardial effusion  5/29/2020  Echo  Severe LVH, DD, normal LVFX, small pericardial effusion  7/15/20  lexiscan Positive for inferior lateral myocardial ischemia, EF 33%, -3% ischemic myocardium on stress, intermediate risk findings, AUC indication 16, AUC score 7, Barbara Wallis MD)  7/15/20  Echo  Normal LVFX, moderate pericardial effusion  7/16/20  Cath  Mild CAD, normal LVFX        Pericardial effusion 01/26/2020     Priority: Low    Acute on chronic diastolic heart failure ([facility] 01/26/2020     Priority: Low    Normocytic anemia 01/26/2020     Priority: Low    Type 2 diabetes mellitus, with long-term current use of insulin ([facility] 01/26/2020     Priority: Low    COPD (chronic obstructive pulmonary disease) ([facility] 01/26/2020     Priority: Low    Hyperglycemia 01/26/2020     Priority: Low    Acute kidney injury ([facility] 01/26/2020     Priority: Low    Elevated d-dimer 01/26/2020     Priority: Low       Admit Date:  2/23/2021    Admission Problem List: Present on Admission:   NSTEMI (non-ST elevated myocardial infarction) ([facility]   Elevated d-dimer   Acute on chronic diastolic heart failure (HCC)   Type 2 diabetes mellitus, with long-term current use of insulin ([facility]   COPD (chronic obstructive pulmonary disease) ([facility]   Morbid obesity with BMI of 40.0-44.9, adult ([facility]   Obstructive sleep apnea   Essential hypertension   Moderate persistent asthma without complication   Pulmonary hypertension (HCC)   CKD (chronic kidney disease) stage 3, GFR 30-59 ml/min   Coronary artery disease involving native coronary artery of native heart without angina pectoris      Cardiac Specific Data:  Specialty Problems        Cardiology Problems    Acute on chronic diastolic heart failure (HCC)        Pericardial effusion        Nonobstructive atherosclerosis of coronary artery        Pulmonary hypertension (HCC)        Essential hypertension        Pulmonary edema with congestive heart failure (HCC)        Coronary artery disease involving native coronary artery of native heart without angina pectoris        Mixed hyperlipidemia        CHF (congestive heart failure), NYHA class I, acute on chronic, combined ([facility])        * (Principal) NSTEMI (non-ST elevated myocardial infarction) ([facility]            1.  Coronary artery disease, prior PCI 7/2018 to proximal to mid circumflex/OM1 ([place]) with patent stents by catheterization 7/16/2020.  2.  Hypertension with moderate to severe concentric LVH with apical component, moderate left atrial enlargement and significant diastolic dysfunction. 3.  Persistent predominantly localized posterior pericardial effusion without change on serial echocardiograms. 4.  Diabetes mellitus. 5.  Rheumatoid arthritis with strongly positive rheumatoid titers. 6.  Morbid obesity. 7.  Medication compliance issues with forgetfulness.     Subjective:  Ms. Shultz is doing much better. She is ambulating in the room without any shortness of breath. She wishes to go home. Objective:   BP (!) 146/80   Pulse 64   Temp 97 °F (36.1 °C) (Temporal)   Resp 18   Wt 227 lb 1.6 oz (103 kg)   SpO2 92%   BMI 42.91 kg/m²       Intake/Output Summary (Last 24 hours) at 2/25/2021 1755  Last data filed at 2/25/2021 1320  Gross per 24 hour   Intake 370 ml   Output 1900 ml   Net -1530 ml       Prior to Admission medications    Medication Sig Start Date End Date Taking? Authorizing Provider   nitroGLYCERIN (NITROSTAT) 0.4 MG SL tablet up to max of 3 total doses. If no relief after 1 dose, call 911. 2/25/21  Yes Mary Morillo PA-C   bumetanide (BUMEX) 2 MG tablet Take 1 tablet by mouth 2 times daily 2/25/21  Yes Mary Morillo PA-C   gabapentin (NEURONTIN) 600 MG tablet Take 1 tablet by mouth 3 times daily for 30 days.  2/12/21 3/14/21 Yes YAMIL Barbosa -04 MCG/INH AEPB inhaler Inhale 1 puff into the lungs daily 12/29/20  Yes Historical Provider, MD   azelastine (ASTELIN) 0.1 % nasal spray 1 spray by Nasal route 2 times daily 11/30/20  Yes Historical Provider, MD   valsartan (DIOVAN) 160 MG tablet Take 1 tablet by mouth daily 1/15/21  Yes YAMIL Fischer NP   atorvastatin (LIPITOR) 20 MG tablet Take 2 tablets by mouth daily 12/16/20  Yes Shanna Gutierrez MD   carvedilol (COREG) 3.125 MG tablet Take 2 tablets by mouth 2 times daily 12/16/20  Yes Larua Meza MD   pantoprazole (PROTONIX) 20 MG tablet Take 2 tablets by mouth 2 times daily 12/16/20 2/23/21 Yes Laura Meza MD   amLODIPine (NORVASC) 5 MG tablet Take 1 tablet by mouth daily 11/22/20  Yes Vernadine Mantle, APRN - NP   Insulin Degludec [facility]) 100 UNIT/ML SOPN Inject 40 Units into the skin nightly 11/22/20  Yes Vernadine Mantle, APRN - NP   Semaglutide, 1 MG/DOSE, 2 MG/1.5ML SOPN Inject 1 mg into the skin every 7 days 11/22/20  Yes Vernadine Mantle, APRN - NP   levocetirizine (XYZAL) 5 MG tablet Take 1 tablet by mouth nightly 11/22/20  Yes Vernadine Mantle, APRN - NP   pramipexole (MIRAPEX) 0.5 MG tablet Take 1 tablet by mouth 2 times daily 11/22/20  Yes Vernadine Mantle, APRN - NP   tiotropium (SPIRIVA RESPIMAT) 1.25 MCG/ACT AERS inhaler Inhale 2 puffs into the lungs daily 10/19/20  Yes Vernadine Mantle, APRN - NP   levalbuterol [facility]) 45 MCG/ACT inhaler Inhale 2 puffs into the lungs every 4 hours as needed for Wheezing or Shortness of Breath 10/19/20  Yes Vernadine Mantle, APRN - NP   DULoxetine (CYMBALTA) 30 MG extended release capsule Take 30 mg by mouth daily ALONG WITH A 60 MG TABLET (BOTH TOGETHER + 90 MG)   Yes Historical Provider, MD   clopidogrel (PLAVIX) 75 MG tablet Take 75 mg by mouth daily   Yes Historical Provider, MD   montelukast (SINGULAIR) 10 MG tablet Take 10 mg by mouth nightly   Yes Historical Provider, MD   vitamin D (CHOLECALCIFEROL) 25 MCG (1000 UT) TABS tablet Take 1,000 Units by mouth daily   Yes Historical Provider, MD   melatonin 3 MG TABS tablet Take 10 mg by mouth nightly   Yes Historical Provider, MD           TELEMETRY: Sinus     Physical Exam:      Physical Exam  Constitutional:       General: She is not in acute distress. Appearance: She is obese. She is not diaphoretic.       Comments: Morbid obesity with short stature   HENT:      Mouth/Throat:      Pharynx: No oropharyngeal exudate. Eyes:      General: No scleral icterus. Right eye: No discharge. Left eye: No discharge. Neck:      Thyroid: No thyromegaly. Vascular: No JVD. Cardiovascular:      Rate and Rhythm: Normal rate and regular rhythm. No extrasystoles are present. Heart sounds: Normal heart sounds, S1 normal and S2 normal. No murmur. No systolic murmur. No diastolic murmur. No friction rub. No gallop. No S3 or S4 sounds. Comments: JVP difficult to discern  Trace edema    Pulmonary:      Effort: Pulmonary effort is normal. No respiratory distress. Breath sounds: Normal breath sounds. No wheezing or rales. Chest:      Chest wall: No tenderness. Abdominal:      General: Bowel sounds are normal. There is no distension. Palpations: Abdomen is soft. There is no mass. Tenderness: There is no abdominal tenderness. There is no guarding or rebound. Hernia: No hernia is present. Comments: No palpable organomegaly   Musculoskeletal: Normal range of motion. Skin:     General: Skin is warm. Coloration: Skin is not pale. Findings: No rash. Neurological:      Mental Status: She is alert and oriented to person, place, and time. Cranial Nerves: No cranial nerve deficit.       Deep Tendon Reflexes: Reflexes normal.                 Lab Data:  CBC:   Recent Labs     02/23/21  1740 02/24/21  0312 02/25/21  0629   WBC 6.4 5.7 5.5   HGB 10.4* 9.2* 8.8*   HCT 32.9* 28.6* 27.1*   MCV 92.7 91.1 89.7    214 202     BMP:   Recent Labs     02/23/21  1740 02/24/21  0536 02/25/21  0629    138 139   K 4.1 4.1 4.3   CL 99 97* 97*   CO2 34* 34* 35*   PHOS 2.9  --   --    BUN 20 23 32*   CREATININE 1.0* 1.4* 1.5*     LIVER PROFILE:   Recent Labs     02/23/21  1740   AST 11   ALT 14   LIPASE 31   BILITOT 0.3   ALKPHOS 116*     PT/INR:   Recent Labs     02/23/21  1710   PROTIME 13.3   INR 1.01     APTT:   Recent Labs     02/24/21  1818 02/25/21  0050 02/25/21  6811 APTT 127.6* 54.8* 55.0*     BNP:  No results for input(s): BNP in the last 72 hours. CK, CKMB, Troponin: @LABRCNT (CKTOTAL:3, CKMB:3, TROPONINI:3)@    IMAGING:  Xr Chest Portable    Result Date: 2/23/2021  Exam:   XR CHEST PORTABLE  Date:  2/23/2021 History:  Female, age  59 years; chest pain COMPARISON:  Chest x-ray dated September 19, 2021 Findings : Moderate cardiomegaly. Interstitial edema. Small left and trace right pleural effusions. No measurable pneumothorax. The bones show no acute pathology. Impression: Fluid overload. Signed by Dr Yaima Hunter on 2/23/2021 6:14 PM    Xr Chest Portable    Result Date: 2/20/2021  EXAM: XR CHEST PORTABLE -- 2/19/2021 10:30 PM HISTORY: 61 years, Female, shortness of air COMPARISON: 1/17/2021 TECHNIQUE:  1 images. Frontal view of the chest. FINDINGS:  No pneumothorax or pleural effusion. Perihilar vascular prominence without overt edema. No focal consolidation. Similar enlarged cardiac silhouette compared to 1/17/2021. Upper mediastinum within normal limits. Calcified aortic atherosclerosis. No acute bony finding. 1. Similar enlarged cardiac silhouette compared to prior. Consider cardiomegaly versus pericardial effusion. 2. Perihilar vascular prominence without overt edema. Signed by Dr Rosie Lazaro on 2/20/2021 7:14 AM    Cta Pulmonary W Contrast    Result Date: 2/23/2021  CTA PULMONARY W CONTRAST 2/23/2021 6:45 PM HISTORY: Chest pain COMPARISON: CTA chest dated January 2, 2021. DLP: 801 mGy cm TECHNIQUE: Helical tomographic images of the chest were obtained after the administration of intravenous contrast following angiogram protocol. Additionally, 3D reformatted images were provided. FINDINGS:  Pulmonary arteries: There is adequate enhancement of the pulmonary arteries to evaluate for central and segmental pulmonary emboli. There are no filling defects within the main, lobar, segmental or visualized subsegmental pulmonary arteries.  The pulmonary arteries are within normal limits. Aorta and great vessels: There is atherosclerosis in the aorta without aneurysm or dissection. There is atherosclerosis in the great vessels. Coronary artery disease. Visualized neck base: The imaged portion of the base of the neck appears unremarkable. Lungs: Interstitial edema. Trace bilateral pleural effusions. . The trachea and bronchial tree are patent. Heart: Cardiomegaly. Mild to moderate pericardial effusion. . This is similar to minimally increased. . Mediastinum and lymph nodes: No enlarged mediastinal, hilar, or axillary lymph nodes are present. Skeletal and soft tissues: The osseous structures of the thorax and surrounding soft tissues demonstrate no acute process. Upper abdomen: The imaged portion of the upper abdomen demonstrates no acute process. 1.   No evidence of pulmonary embolus. 2.  Fluid overload with pericardial effusion. . Signed by Dr Venkatesh Sierra on 2/23/2021 8:35 PM    Nm Myocardial Spect Rest Exercise Or Rx    Result Date: 2/24/2021  [facility] Nuclear Stress Test Report Procedure date: 02/24/21 Indications: chest pain Procedure: Stress was performed with injection of 0.4 mg Lexiscan. Vital signs and EKG were monitored. Technetium-99 sestamibi was injected in divided doses, approximately 10 mCi and 30 mCi respectively for rest and stress imaging. The patient was discharged in stable condition. Results: Patient had symptoms of dyspnea during infusion that resolved in recovery. Baseline EKG showed normal sinus rhythm with t-wave inversion V5, V6 changes. During stress there were no significant EKG changes or rhythm changes. Baseline and peak blood pressures were 131/45, and 139/56 respectively. Baseline and peak heart rates were 62 and  72 respectively. Lexiscan/Cardiolyte Nuclear Medicine Report Date of Procedure: 2/24/2021 The patient was injected with 73.1 millicuries (mCi) of Technetium (Tc99m).   After an appropriate level of stress the patient was re-injected with 66.35 millicuries (mCi) of Technetium (Tc99m). Repeat gated images were then performed per standard protocol. Findings: 1. Analysis of the the stress and rest images reveals small area of reduced uptake in the inferolateral distribution that appears predominantly fixed on stress and rest images. 2.  Analysis of the gated images reveals grossly normal left ventricular function with a calculated ejection fraction of 59 %. Impression: There is possible small inferolateral infarct with minimal ischemia, with a calculated ejection fraction of 59 %. Suggest: Correlates with EKG showing lateral Q waves. Would Continue medical management at this time Signed by Dr Davian Solis on 2/24/2021 6:43 PM        Assessment and Plan: This is [age]re.[age] y.o. year old female with past medical history of coronary artery disease with prior PCI to proximal to mid circumflex/OM1 7/2018 (patent by catheterization 7/16/2020) moderate to severe LVH with preserved LV systolic function with diastolic heart failure, moderate predominantly localized posterior pericardial effusion that has been stable with no evidence of tamponade, severe obesity, diabetes mellitus with strongly positive rheumatoid titers presenting with acute diastolic heart failure with hypertensive presentation with recurrent emergency room visits and likely noncompliance with treatment. 1.  Significant improvement in symptoms since restarting her medications. I have stressed again need for compliance with patient. She will need to make a system in order to take her medications. She reports she frequently forgets her morning pills. She reports she will set an alarm. Switch to oral Bumex at 2 mg twice daily. Continue antihypertensive therapy. Patient to follow-up as an outpatient.     Davian Solis MD 2/25/2021 5:55 PM

## 2021-02-25 NOTE — PROGRESS NOTES
Visited with pt to provide spiritual care. Pt had her bags packed and was prepared for her discharge. Provided spiritual care with sustaining presence, nurtured hope, and prayer. Pt expressed gratitude for spiritual care.     Electronically signed by Satish Herron on 2/25/2021 at 2:44 PM

## 2021-02-25 NOTE — DISCHARGE SUMMARY
Cruzito Letters  :  1960  MRN:  [id number]    Admit date:  2021  Discharge date:  2021    Discharging Physician:  Agnes Duarte MD    Advance Directive: Full Code    Consults: Dr. Cedeno-Cardiology     Primary Care Physician:  YAMIL Dawson - NP    Discharge Diagnoses:    Principal Problem:    NSTEMI (non-ST elevated myocardial infarction) [facility])  Active Problems:    Elevated d-dimer    Acute on chronic diastolic heart failure ([facility]    Type 2 diabetes mellitus, with long-term current use of insulin (HCC)    COPD (chronic obstructive pulmonary disease) ([facility]    Morbid obesity with BMI of 40.0-44.9, adult ([facility]    Obstructive sleep apnea    Essential hypertension    Moderate persistent asthma without complication    Pulmonary hypertension (HCC)    CKD (chronic kidney disease) stage 3, GFR 30-59 ml/min    Coronary artery disease involving native coronary artery of native heart without angina pectoris  Resolved Problems:    * No resolved hospital problems. *    Portions of this note have been copied forward, however, changed to reflect the most current clinical status of this patient. Hospital Course: The patient is a 59 y. o. female with PMH CAD, COPD, DVT, HTN, DM 2,diastolic CHF, CVA, asthma who presented to [facility] ED complaining of dyspnea and chest pain that have been worsening over the course of the last week associated with edema, weight gain, orthopnea and paroxysmal nocturnal dyspnea. She presented to [address] [place] [address] ED on 2021 with similar complaint after she had run out of Bumex. She received nitroglycerin and Lasix. Dyspnea improved and she was tolerating room air and was discharged home with additional lasix. Patient initially felt better then had worsening of symptoms and also admitted to frequently forgetting to take her home medications unless prompted by family/friends. Work up in ED concerning for elevated troponin and D-dimer. CXR revealed volume overload.  CTA chest is negative for pulmonary embolism.     Patient admitted to Hospitalist service, continued on IV Bumex, Heparin gtt. Was initially placed on nitroglycerin gtt but this has been weaned. Keenan Manzanares performed 02/24/2021 which revealed EF 59%, possible small inferolateral infarct with minimal ischemia. Medical management recommended. Bumex increased to 2 mg twice daily. A repeat BMP ordered to monitor renal function as outpatient with increase in diuretic therapy. Patient does not wish for home health as she is not home bound at this time. She was counseled on need to take medications as prescribed. At this time she is stable for discharge home. Significant Diagnostic Studies:   Xr Chest Portable  Impression: Fluid overload. Signed by Dr Lizz Borrero on 2/23/2021 6:14 PM    Cta Pulmonary W Contrast  1. No evidence of pulmonary embolus. 2.  Fluid overload with pericardial effusion. . Signed by Dr Lizz Borrero on 2/23/2021 8:35 PM    Nm Myocardial Spect Rest Exercise Or Rx  Impression: There is possible small inferolateral infarct with minimal ischemia, with a calculated ejection fraction of 59 %. Suggest: Correlates with EKG showing lateral Q waves. Would Continue medical management at this time Signed by Dr Heidy Chris on 2/24/2021 6:43 PM    Pertinent Labs:   CBC:   Recent Labs     02/23/21  1740 02/24/21  0312 02/25/21  0629   WBC 6.4 5.7 5.5   HGB 10.4* 9.2* 8.8*    214 202     BMP:    Recent Labs     02/23/21  1740 02/24/21  0536 02/25/21  0629    138 139   K 4.1 4.1 4.3   CL 99 97* 97*   CO2 34* 34* 35*   BUN 20 23 32*   CREATININE 1.0* 1.4* 1.5*   GLUCOSE 294* 225* 153*     INR:   Recent Labs     02/23/21  1710   INR 1.01     Physical Exam:  Vital Signs: BP (!) 146/80   Pulse 64   Temp 97 °F (36.1 °C) (Temporal)   Resp 18   Wt 227 lb 1.6 oz (103 kg)   SpO2 92%   BMI 42.91 kg/m²   General appearance:Alert and Cooperative   HEENT: Normocephalic.   Chest: diminished air entry at bases otherwise clear to auscultation bilaterally without wheezes or rhonchi. Cardiac: Normal heart tones with regular rate and rhythm, S1, S2 normal. No murmurs, gallops, or rubs auscultated. Abdomen:soft, non-tender; non-distended normal bowel sounds no masses, no organomegaly. Extremities: No clubbing or cyanosis. No peripheral edema. Peripheral pulses palpable. Neurologic: Grossly intact. Discharge Medications:       Tutu Millan \"Marcelina\"   Home Medication Instructions [facility]    Printed on:21 [id number]   Medication Information                      amLODIPine (NORVASC) 5 MG tablet  Take 1 tablet by mouth daily             atorvastatin (LIPITOR) 20 MG tablet  Take 2 tablets by mouth daily             azelastine (ASTELIN) 0.1 % nasal spray  1 spray by Nasal route 2 times daily             BREO ELLIPTA 200-25 MCG/INH AEPB inhaler  Inhale 1 puff into the lungs daily             bumetanide (BUMEX) 2 MG tablet  Take 1 tablet by mouth 2 times daily             carvedilol (COREG) 3.125 MG tablet  Take 2 tablets by mouth 2 times daily             clopidogrel (PLAVIX) 75 MG tablet  Take 75 mg by mouth daily             DULoxetine (CYMBALTA) 30 MG extended release capsule  Take 30 mg by mouth daily ALONG WITH A 60 MG TABLET (BOTH TOGETHER + 90 MG)             gabapentin (NEURONTIN) 600 MG tablet  Take 1 tablet by mouth 3 times daily for 30 days. Insulin Degludec (TRESIBA FLEXTOUCH) 100 UNIT/ML SOPN  Inject 40 Units into the skin nightly             levalbuterol (XOPENEX HFA) 45 MCG/ACT inhaler  Inhale 2 puffs into the lungs every 4 hours as needed for Wheezing or Shortness of Breath             levocetirizine (XYZAL) 5 MG tablet  Take 1 tablet by mouth nightly             melatonin 3 MG TABS tablet  Take 10 mg by mouth nightly             montelukast (SINGULAIR) 10 MG tablet  Take 10 mg by mouth nightly             nitroGLYCERIN (NITROSTAT) 0.4 MG SL tablet  up to max of 3 total doses.  If no relief after 1 dose, call 911. pantoprazole (PROTONIX) 20 MG tablet  Take 2 tablets by mouth 2 times daily             pramipexole (MIRAPEX) 0.5 MG tablet  Take 1 tablet by mouth 2 times daily             Semaglutide, 1 MG/DOSE, 2 MG/1.5ML SOPN  Inject 1 mg into the skin every 7 days             tiotropium (SPIRIVA RESPIMAT) 1.25 MCG/ACT AERS inhaler  Inhale 2 puffs into the lungs daily             valsartan (DIOVAN) 160 MG tablet  Take 1 tablet by mouth daily             vitamin D (CHOLECALCIFEROL) 25 MCG (1000 UT) TABS tablet  Take 1,000 Units by mouth daily               Discharge Instructions: Follow up with YAMIL Nye NP in 3-5 days. Take medications as directed. Resume activity as tolerated. Diet: DIET CARDIAC; Carb Control: 4 carb choices (60 gms)/meal; No Caffeine     Disposition: Patient is medically stable and will be discharged home. Time spent on discharge 45 minutes spent in assessing patient, reviewing medications, discussion with nursing, confirming safe discharge plan and preparation of discharge summary.     Signed:  Gumaro Medina PA-C

## 2021-02-25 NOTE — PROGRESS NOTES
CLINICAL PHARMACY NOTE: MEDS TO [address] [place] Patient?: No  Total # of Prescriptions Filled: 2   The following medications were delivered to the patient:  · Nitro 0.4 Sub  · Bumetanide 2 mg  Total # of Interventions Completed: 0  Time Spent (min): 15    Additional Documentation:    Handed scripts to patient.

## 2021-02-26 ENCOUNTER — CARE COORDINATION (OUTPATIENT)
Dept: CASE MANAGEMENT | Age: 61
End: 2021-02-26

## 2021-02-26 LAB
LV EF: 59 %
LVEF MODALITY: NORMAL

## 2021-02-26 NOTE — CARE COORDINATION
Eddie 45 Transitions Initial Follow Up Call    Call within 2 business days of discharge: Yes    Patient: Irina Parkinson Patient : 1960   MRN: [id number]  Reason for Admission: NSTEMI  Discharge Date: 21 RARS: Readmission Risk Score: 42      Last Discharge [facility]       Complaint Diagnosis Description Type Department Provider    21 Shortness of Breath; Chest Pain Acute on chronic congestive heart failure, unspecified heart failure type ([address] . .. ED to Hosp-Admission (Discharged) (ADMITTED) [facility] MIGUEL ANGEL Hassan MD; Miles Jarvis Pi. .. Spoke with: N/A    Facility: [address] [place] [address]    Non-face-to-face services provided:  Reviewed encounter information for continuity of care prior to follow up Care Transitions phone call - chart notes, consults, progress notes, test results, med list, appointments, AVS, other information. Care Transitions 24 Hour Call    Do you have all of your prescriptions and are they filled?: Yes  Post Acute Services:  [facility] Transitions Interventions         Follow Up  Future Appointments   Date Time Provider Agnes Card   3/5/2021  1:30 PM YAMIL Ramírez - NP [facility]   3/18/2021 11:30 AM YAMIL Cuenca [facility] Cardio [facility]     Attempted to make contact with patient/caregiver for an initial follow up call post discharge without success. Unable to leave a message regarding intent of call and call back information. Call went to an unidentifiable voice mail. CTN to try again at a later time.      Nils Young RN

## 2021-03-01 ENCOUNTER — CARE COORDINATION (OUTPATIENT)
Dept: CASE MANAGEMENT | Age: 61
End: 2021-03-01

## 2021-03-01 DIAGNOSIS — I21.4 NSTEMI (NON-ST ELEVATED MYOCARDIAL INFARCTION) (HCC): Primary | ICD-10-CM

## 2021-03-01 PROCEDURE — 1111F DSCHRG MED/CURRENT MED MERGE: CPT | Performed by: NURSE PRACTITIONER

## 2021-03-01 NOTE — CARE COORDINATION
Eddie 45 Transitions Initial Follow Up Call    Call within 2 business days of discharge: Yes    Patient: Nia Tanner Patient : 1960   MRN: [id number]  Reason for Admission: NSTEMI  Discharge Date: 21 RARS: Readmission Risk Score: 42      Last Discharge [facility]       Complaint Diagnosis Description Type Department Provider    21 Shortness of Breath; Chest Pain Acute on chronic congestive heart failure, unspecified heart failure type ([address] . .. ED to Hosp-Admission (Discharged) (ADMITTED) AMILCAR Park MD; Sindhu Rosa Pi. .. Spoke with: [id number] [facility] [address]: [facility]    Non-face-to-face services provided:  Reviewed encounter information for continuity of care prior to follow up Care Transitions phone call - chart notes, consults, progress notes, test results, med list, appointments, AVS, other information. Care Transitions 24 Hour Call    Schedule Follow Up Appointment with PCP: Completed  Do you have any ongoing symptoms?: No  Do you have a copy of your discharge instructions?: Yes  Do you have all of your prescriptions and are they filled?: Yes  Have you been contacted by a [id number] [employer] Pharmacist?: No  Have you scheduled your follow up appointment?: Yes  How are you going to get to your appointment?: Car - family or friend to transport  Were you discharged with any Home Care or Post Acute Services: No  Post Acute Services: [address] you feel like you have everything you need to keep you well at home?: Yes  Care Transitions Interventions         Follow Up  Future Appointments   Date Time Provider Agnes Card   3/5/2021  1:30 PM YAMIL Jordan NP [facility] [address]   3/10/2021  1:00 PM YAMIL Cuba NP [address]   3/18/2021 11:30 AM YAMIL Ewing Cardio [address] a call to the number listed for patient and spoke with her for an initial follow up call post discharge.   She reported that she is doing well. She denied any chest pain, palpitations, shortness of breath, cough, congestion, edema, dizziness, lightheadedness. I have talked with her before following hospital stays and she sounds stronger this time. She is able to carry on full conversation, sentences without shortness of breath or weakness noted. She said she is not using oxygen. She is up and about, doing simple activities. She said she is eating and drinking well. She said she has not checked her blood sugar since getting home from the hospital.  She has supplies to do it, just has not taken the time. She said she has all of her meds and is taking them as ordered. Med review completed. She is aware of her hospital follow up appointment. Did discuss COVID 19 risk, signs, infection control, etc.  She voiced understanding. Informed of CTC process, purpose, future calls, etc.  Ensured to have CTN contact information to be used as needed. Encouraged to call as needed for new issues, questions, etc.  Given the opportunity to ask questions and answered appropriately. CTN to follow up as indicated. Challenges to be reviewed by the provider   Additional needs identified to be addressed with provider No  none    Discussed COVID-19 related testing which was available at this time. Test results were negative, 2021  Patient informed of results, if available? Already aware. Method of communication with provider : none    Advance Care Planning:   Does patient have an Advance Directive:  reviewed and current. Patient has a current health care decision maker. Was this a readmission? Yes  Patient stated reason for admission: \"I could not breath, my chest hurt. \"  Patients top risk factors for readmission: functional physical ability and medical condition    Care Transition Nurse (CTN) contacted the patient by telephone to perform post hospital discharge assessment. Verified name and  with patient as identifiers.  Provided introduction to self, and explanation of the CTN role. CTN reviewed discharge instructions, medical action plan and red flags with patient who verbalized understanding. Patient given an opportunity to ask questions and does not have any further questions or concerns at this time. Were discharge instructions available to patient? Yes. Reviewed appropriate site of care based on symptoms and resources available to patient including: PCP, Urgent care clinics, Home health and When to call 911. The patient agrees to contact the PCP office for questions related to their healthcare. Medication reconciliation was performed with patient, who verbalizes understanding of administration of home medications. Advised obtaining a 90-day supply of all daily and as-needed medications. Covid Risk Education    Patient has following risk factors of: heart failure, COPD, asthma, pneumonia, acute respiratory failure, diabetes and chronic kidney disease. Education provided regarding infection prevention, and signs and symptoms of COVID-19 and when to seek medical attention with patient who verbalized understanding. Discussed exposure protocols and quarantine From CDC: Are you at higher risk for severe illness?   and given an opportunity for questions and concerns. The patient agrees to contact the COVID-19 hotline 943-200-8320 or PCP office for questions related to COVID-19. For more information on steps you can take to protect yourself, see CDC's How to Protect Yourself     Patient/family/caregiver given information for Rachel Jara and agrees to enroll no  Patient's preferred e-mail: declines  Patient's preferred phone number: declines    Discussed follow-up appointments. Plan for follow-up call in 3-5 days based on severity of symptoms and risk factors.   Plan for next call: - disease process mgmt, symptom mgmt, diet/hydration, pain control needs, medication mgmt, activity level, home safety needs, infection control, fall precautions, seeking medical attention, who/when to call prn any needs, etc.    CTN provided contact information for future needs.         Lorelei Mello RN

## 2021-03-04 ENCOUNTER — CARE COORDINATION (OUTPATIENT)
Dept: CASE MANAGEMENT | Age: 61
End: 2021-03-04

## 2021-03-04 NOTE — CARE COORDINATION
Eddie 45 Transitions Follow Up Call    3/4/2021    Patient: Caren Guaman  Patient : 1960   MRN: [id number]     Discharge Date: 21 RARS: Readmission Risk Score: 43         Spoke with: N/A    Care Transitions Subsequent and Final Call    Subsequent and Final Calls  Are you currently active with any services?: Home Health  Care Transitions Interventions  Other Interventions: Follow Up  Future Appointments   Date Time Provider Agnes Card   3/5/2021  1:30 PM YAMIL Donovan NP [facility]   3/10/2021  1:00 PM YAMIL Wolfe NP [facility]   3/18/2021 11:30 AM YAMIL Obrien [facility] Cardio [facility]     Attempted to make contact with patient/caregiver for a routine follow up call without success. Unable to leave a message regarding intent of call and call back information. Call went to an unidentifiable voice mail. Will try again at a later time.          Jovi Moon RN

## 2021-03-10 ENCOUNTER — OFFICE VISIT (OUTPATIENT)
Dept: GASTROENTEROLOGY | Age: 61
End: 2021-03-10
Payer: MEDICARE

## 2021-03-10 VITALS
DIASTOLIC BLOOD PRESSURE: 60 MMHG | HEART RATE: 72 BPM | BODY MASS INDEX: 41.42 KG/M2 | OXYGEN SATURATION: 97 % | SYSTOLIC BLOOD PRESSURE: 118 MMHG | HEIGHT: 61 IN | WEIGHT: 219.4 LBS

## 2021-03-10 DIAGNOSIS — R13.10 DYSPHAGIA, UNSPECIFIED TYPE: ICD-10-CM

## 2021-03-10 DIAGNOSIS — K21.9 CHRONIC GERD: Primary | ICD-10-CM

## 2021-03-10 DIAGNOSIS — K59.00 CONSTIPATION, UNSPECIFIED CONSTIPATION TYPE: ICD-10-CM

## 2021-03-10 DIAGNOSIS — R19.7 DIARRHEA, UNSPECIFIED TYPE: ICD-10-CM

## 2021-03-10 PROCEDURE — 3017F COLORECTAL CA SCREEN DOC REV: CPT | Performed by: NURSE PRACTITIONER

## 2021-03-10 PROCEDURE — 99214 OFFICE O/P EST MOD 30 MIN: CPT | Performed by: NURSE PRACTITIONER

## 2021-03-10 PROCEDURE — 1036F TOBACCO NON-USER: CPT | Performed by: NURSE PRACTITIONER

## 2021-03-10 PROCEDURE — G8427 DOCREV CUR MEDS BY ELIG CLIN: HCPCS | Performed by: NURSE PRACTITIONER

## 2021-03-10 PROCEDURE — G8417 CALC BMI ABV UP PARAM F/U: HCPCS | Performed by: NURSE PRACTITIONER

## 2021-03-10 PROCEDURE — G8482 FLU IMMUNIZE ORDER/ADMIN: HCPCS | Performed by: NURSE PRACTITIONER

## 2021-03-10 PROCEDURE — 1111F DSCHRG MED/CURRENT MED MERGE: CPT | Performed by: NURSE PRACTITIONER

## 2021-03-10 RX ORDER — PANTOPRAZOLE SODIUM 20 MG/1
20 TABLET, DELAYED RELEASE ORAL 2 TIMES DAILY
Qty: 60 TABLET | Refills: 3 | Status: SHIPPED | OUTPATIENT
Start: 2021-03-10 | End: 2021-04-09

## 2021-03-10 ASSESSMENT — ENCOUNTER SYMPTOMS
ABDOMINAL DISTENTION: 1
CONSTIPATION: 1
ANAL BLEEDING: 0
VOMITING: 0
TROUBLE SWALLOWING: 0
COUGH: 0
CHOKING: 0
SHORTNESS OF BREATH: 1
ABDOMINAL PAIN: 0
DIARRHEA: 1
BLOOD IN STOOL: 0
RECTAL PAIN: 0
NAUSEA: 0

## 2021-03-10 NOTE — PROGRESS NOTES
Subjective:     Patient ID: Jesus Manuel Saenz is a 59 y.o. female  PCP: YAMIL Lloyd - NP  Referring Provider: MARIA L Hughes    HPI  Patient presents to the office today with the following complaints: Gastroesophageal Reflux      GERD  Paitent complains of heartburn/reflux. Symptoms have been present for years. Symptoms occur irregularly and have stayed the same. This has been associated with difficulty swallowing. She denies heartburn, hematemesis and melena. Medical therapy in the past has included proton pump inhibitors. She is taking Protonix 20 mg po BID. She also c/o stools alternate between constipation and diarrhea. She denies any blood in stool. Clearance for colonoscopy was denied due to significant cardiac history. She has tried fiber supplements in the past with good results in regulating stools. Pt has had multiple admissions in the last few months. Hx CHF  Hx MI  Daily use of Plavix    She reports having BE in 2019 in [address] [place] [address] Colonoscopy 2013   Denies any family history of colon cancer or colon polyps    Assessment:     1. Chronic GERD    2. Dysphagia, unspecified type    3. Constipation, unspecified constipation type    4. Diarrhea, unspecified type            Plan:   - Schedule Esophagram  - Attempt to get BE results from [place], [place] done in 2019   - Continue PPI  - High Fiber Diet/Fiber supplements    Pt is not a candidate for sedation due to cardiac history, will get Esophagram and plan dependent upon those results. Consider increasing PPI to 40 mg BID x 3 months, however, GFR is decreased.     Orders  Orders Placed This Encounter   Procedures    [place] ESOPHAGRAM     Standing Status:   Future     Standing Expiration Date:   3/10/2022     Order Specific Question:   Reason for exam:     Answer:   trouble swallowing     Medications  Orders Placed This Encounter   Medications    pantoprazole (PROTONIX) 20 MG tablet     Sig: Take 1 tablet by mouth 2 times daily Dispense:  60 tablet     Refill:  3         Patient History:     Past Medical History:   Diagnosis Date    Allergies     [facility]    Asthma     CAD (coronary artery disease)     hx of stents    Cerebral artery occlusion with cerebral infarction [facility]     R sided numbness/weakness    CHF (congestive heart failure) (HCC)     COPD (chronic obstructive pulmonary disease) [facility]     Depression     Diabetes mellitus ([facility]     DVT (deep vein thrombosis) in pregnancy     GERD (gastroesophageal reflux disease)     Heart attack ([facility] 2021    Hx of blood clots     rt leg    Hyperlipidemia     Hypertension     Other disorders of kidney and ureter in diseases classified elsewhere     Palliative care patient 2020    Pneumonia        Past Surgical History:   Procedure Laterality Date    CATARACT REMOVAL Bilateral     COLONOSCOPY  approx     CORONARY ANGIOPLASTY WITH STENT PLACEMENT  2018    in  [address] [facility] (3 stents)    TONSILLECTOMY         Family History   Problem Relation Age of Onset    Colon Cancer Neg Hx     Colon Polyps Neg Hx     Esophageal Cancer Neg Hx     Liver Cancer Neg Hx     Liver Disease Neg Hx     Rectal Cancer Neg Hx     Stomach Cancer Neg Hx        Social History     Socioeconomic History    Marital status:      Spouse name: None    Number of children: None    Years of education: None    Highest education level: None   Occupational History    None   Social Needs    Financial resource strain: None    Food insecurity     Worry: None     Inability: None    Transportation needs     Medical: None     Non-medical: None   Tobacco Use    Smoking status: Former Smoker     Packs/day: 1.00     Years: 30.00     Pack years: 30.00     Quit date:      Years since quittin.1    Smokeless tobacco: Never Used   Substance and Sexual Activity    Alcohol use: Never     Frequency: Never    Drug use: Never    Sexual activity: None   Lifestyle    Physical activity     Days per week: None     Minutes per session: None    Stress: None   Relationships    Social connections     Talks on phone: None     Gets together: None     Attends Confucianist service: None     Active member of club or organization: None     Attends meetings of clubs or organizations: None     Relationship status: None    Intimate partner violence     Fear of current or ex partner: None     Emotionally abused: None     Physically abused: None     Forced sexual activity: None   Other Topics Concern    None   Social History Narrative     17 years second L[place] Merlin has 3 daughtersPresently in collegeShe is worked as a sales associateQuit smoking 2004 denies alcohol consumption or substance usagePhysically sedentary       Current Outpatient Medications   Medication Sig Dispense Refill    pantoprazole (PROTONIX) 20 MG tablet Take 1 tablet by mouth 2 times daily 60 tablet 3    nitroGLYCERIN (NITROSTAT) 0.4 MG SL tablet up to max of 3 total doses. If no relief after 1 dose, call 911. 25 tablet 0    bumetanide (BUMEX) 2 MG tablet Take 1 tablet by mouth 2 times daily 60 tablet 0    gabapentin (NEURONTIN) 600 MG tablet Take 1 tablet by mouth 3 times daily for 30 days.  90 tablet 0    BREO ELLIPTA 200-25 MCG/INH AEPB inhaler Inhale 1 puff into the lungs daily      azelastine (ASTELIN) 0.1 % nasal spray 1 spray by Nasal route 2 times daily      valsartan (DIOVAN) 160 MG tablet Take 1 tablet by mouth daily 30 tablet 3    atorvastatin (LIPITOR) 20 MG tablet Take 2 tablets by mouth daily 30 tablet 3    carvedilol (COREG) 3.125 MG tablet Take 2 tablets by mouth 2 times daily 60 tablet 2    amLODIPine (NORVASC) 5 MG tablet Take 1 tablet by mouth daily 30 tablet 1    Insulin Degludec (TRESIBA FLEXTOUCH) 100 UNIT/ML SOPN Inject 40 Units into the skin nightly 4 pen 2    Semaglutide, 1 MG/DOSE, 2 MG/1.5ML SOPN Inject 1 mg into the skin every 7 days 2 pen 2  levocetirizine (XYZAL) 5 MG tablet Take 1 tablet by mouth nightly 30 tablet 3    pramipexole (MIRAPEX) 0.5 MG tablet Take 1 tablet by mouth 2 times daily 60 tablet 2    tiotropium (SPIRIVA RESPIMAT) 1.25 MCG/ACT AERS inhaler Inhale 2 puffs into the lungs daily 1 Inhaler 3    levalbuterol (XOPENEX HFA) 45 MCG/ACT inhaler Inhale 2 puffs into the lungs every 4 hours as needed for Wheezing or Shortness of Breath 1 Inhaler 0    DULoxetine (CYMBALTA) 30 MG extended release capsule Take 30 mg by mouth daily ALONG WITH A 60 MG TABLET (BOTH TOGETHER + 90 MG)      clopidogrel (PLAVIX) 75 MG tablet Take 75 mg by mouth daily      montelukast (SINGULAIR) 10 MG tablet Take 10 mg by mouth nightly      vitamin D (CHOLECALCIFEROL) 25 MCG (1000 UT) TABS tablet Take 1,000 Units by mouth daily      melatonin 3 MG TABS tablet Take 10 mg by mouth nightly       No current facility-administered medications for this visit. Allergies   Allergen Reactions    Albuterol Shortness Of Breath    Levaquin [Levofloxacin] Shortness Of Breath and Swelling    Metformin And Related Diarrhea    Aspirin Rash       Review of Systems   Constitutional: Negative for activity change, appetite change, fatigue, fever and unexpected weight change. HENT: Negative for trouble swallowing. Respiratory: Positive for shortness of breath. Negative for cough and choking. Cardiovascular: Positive for leg swelling. Negative for chest pain. Gastrointestinal: Positive for abdominal distention (bloating), constipation and diarrhea. Negative for abdominal pain, anal bleeding, blood in stool, nausea, rectal pain and vomiting. Musculoskeletal: Positive for gait problem. Allergic/Immunologic: Negative for food allergies. All other systems reviewed and are negative.         Objective:     /60 (Site: Left Upper Arm, Position: Sitting, Cuff Size: Medium Adult)   Pulse 72   Ht 5' 1\" (1.549 m)   Wt 219 lb 6.4 oz (99.5 kg)   SpO2 97% BMI 41.46 kg/m²     Physical Exam  Vitals signs reviewed. Constitutional:       General: She is not in acute distress. Appearance: She is well-developed. HENT:      Head: Normocephalic and atraumatic. Right Ear: External ear normal.      Left Ear: External ear normal.      Nose: Nose normal.      Comments: Mask on      Mouth/Throat:      Comments: Mask on  Eyes:      Conjunctiva/sclera: Conjunctivae normal.      Pupils: Pupils are equal, round, and reactive to light. Neck:      Musculoskeletal: Normal range of motion and neck supple. Cardiovascular:      Rate and Rhythm: Normal rate and regular rhythm. Heart sounds: Normal heart sounds. No murmur. No friction rub. No gallop. Pulmonary:      Effort: Pulmonary effort is normal. No respiratory distress. Breath sounds: Decreased breath sounds present. Abdominal:      General: Bowel sounds are normal. There is no distension. Palpations: Abdomen is soft. There is no mass. Tenderness: There is no abdominal tenderness. There is no guarding or rebound. Musculoskeletal: Normal range of motion. Skin:     General: Skin is warm and dry. Findings: No rash. Nails: There is no clubbing. Neurological:      Mental Status: She is alert and oriented to person, place, and time. Gait: Gait abnormal.   Psychiatric:         Behavior: Behavior normal.         Thought Content:  Thought content normal.

## 2021-03-10 NOTE — PATIENT INSTRUCTIONS
Patient Education        Swallowing Study: About This Test  What is it? A swallowing study is a test that shows what your throat and esophagus do while you swallow. The test uses X-rays in real time (fluoroscopy) to film as you swallow. You'll swallow a substance called barium that is mixed with liquid and food. The barium shows the movements of your throat and esophagus on the X-ray while you swallow. Why is this test done? The test helps your doctor see why you're having trouble swallowing. After treatment, it can also show your doctor if the treatment worked. How do you prepare for the test?  Your doctor may tell you not to eat anything after midnight the night before the test.  How is the test done? Before the test  · Remove any jewelry that might get in the way of the X-ray picture. · You may need to take off all or most of your clothes around the area being X-rayed. · You may be given a gown to wear during the test.  · A lead shield will be placed over your pelvic area to protect it from radiation. During the test  · You will stand or sit in front of the X-ray machine while the test is done. · The doctor and a speech pathologist will guide you through a series of swallowing steps. · Depending on the type of study, you will swallow liquid mixed with barium or solid foods coated with barium. · While you swallow, the doctor and speech pathologist will watch the video screen. They may ask you to take different positions to see how they affect your swallowing. The X-rays are recorded so they can be looked at later. How does having a swallowing study feel? You won't feel any pain from the X-ray. The barium liquid is thick and chalky, and some people find it hard to swallow. A sweet flavor, like chocolate or strawberry, is used to make it easier to drink. How long does the test take? The test will take about 20 to 30 minutes.   What happens after the test?  · You will probably be able to go home right away.  · You can go back to your usual activities right away. · You may feel bloated and a little sick to your stomach. · You may have light-colored stools for a few days after the test while the barium leaves your body. · When the swallowing study is done, you may eat and drink whatever you like, unless your doctor tells you not to. · The barium may cause constipation. Drink plenty of water for a couple of days after the test. You may take a laxative if needed. Call your doctor if you haven't had a bowel movement in 2 to 3 days after the test.  Follow-up care is a key part of your treatment and safety. Be sure to make and go to all appointments, and call your doctor if you are having problems. It's also a good idea to keep a list of the medicines you take. Ask your doctor when you can expect to have your test results. Where can you learn more? Go to [email or web address]. org and sign in to your [employer] account. Enter O477 in the Cloud[employer] box to learn more about \"Swallowing Study: About This Test.\"     If you do not have an account, please click on the \"Sign Up Now\" link. Current as of: December 9, 2019               Content Version: 12.6  © 5696-1699 [employer]. Care instructions adapted under license by [facility] ([facility]). If you have questions about a medical condition or this instruction, always ask your healthcare professional. [place] [address] any warranty or liability for your use of this information.

## 2021-03-11 ENCOUNTER — CARE COORDINATION (OUTPATIENT)
Dept: CASE MANAGEMENT | Age: 61
End: 2021-03-11

## 2021-03-11 ENCOUNTER — BULK ORDERING (OUTPATIENT)
Dept: CASE MANAGEMENT | Facility: OTHER | Age: 61
End: 2021-03-11

## 2021-03-11 DIAGNOSIS — Z23 IMMUNIZATION DUE: ICD-10-CM

## 2021-03-11 NOTE — CARE COORDINATION
Eddie 45 Transitions Follow Up Call    3/11/2021    Patient: Nadiya Nichols  Patient : 1960   MRN: [id number]  Discharge Date: 21 RARS: Readmission Risk Score: 43         Spoke with: [address] Transitions Subsequent and Final Call    Schedule Follow Up Appointment with PCP: Completed  Subsequent and Final Calls  Do you have any questions related to your medications?: No  Do you currently have any active services?: No  Are you currently active with any services?: Home Health  Do you have any needs or concerns that I can assist you with?: No  Identified Barriers: None  Care Transitions Interventions  Other Interventions: Follow Up  Future Appointments   Date Time Provider Agnes Card   3/15/2021 10:00 AM YAMIL Fischer NP [facility]   3/16/2021  9:00 AM [facility] XRAY RM 2 [facility]   3/18/2021 11:30 AM YAMIL Blackmon [facility]     Placed a call to the home and spoke with patient. She said she is doing well except for GI symptoms. Said she is having issues with GERD and bloating. She said she saw GI yesterday and they are going to try to do some testing next week, but she is not a candidate for sedation, so she is not sure what the plan is going to be. She said she is eating, but not like she thinks she should be. She is nibbling. She said her blood sugar has been good. She denied any respiratory issues. Elvira Mems that has finally resolved itself. No other issues reported. To call prn issues that CTN cn help with. CTN to end episode today and patient is stable from COVID 19 Risk standpoint.         Concha Hickman RN

## 2021-03-16 ENCOUNTER — HOSPITAL ENCOUNTER (OUTPATIENT)
Dept: GENERAL RADIOLOGY | Age: 61
Discharge: HOME | End: 2021-03-16
Payer: MEDICARE

## 2021-03-16 DIAGNOSIS — K21.9 CHRONIC GERD: ICD-10-CM

## 2021-03-16 DIAGNOSIS — R13.10 DYSPHAGIA, UNSPECIFIED TYPE: ICD-10-CM

## 2021-03-16 PROCEDURE — 74220 X-RAY XM ESOPHAGUS 1CNTRST: CPT

## 2021-03-16 NOTE — RESULT ENCOUNTER NOTE
Dysmotility noted in esophagus, tablet lodged at GE junction. EGD recommended at this time. Will need pulmonary and cardiac clearance prior. She is a sedation risk, may need to discuss with anesthesia as well.

## 2021-03-18 ENCOUNTER — OFFICE VISIT (OUTPATIENT)
Dept: CARDIOLOGY CLINIC | Age: 61
End: 2021-03-18
Payer: MEDICARE

## 2021-03-18 VITALS
BODY MASS INDEX: 42.1 KG/M2 | SYSTOLIC BLOOD PRESSURE: 118 MMHG | DIASTOLIC BLOOD PRESSURE: 58 MMHG | HEIGHT: 61 IN | HEART RATE: 70 BPM | WEIGHT: 223 LBS

## 2021-03-18 DIAGNOSIS — I50.33 ACUTE ON CHRONIC DIASTOLIC CONGESTIVE HEART FAILURE (HCC): Primary | ICD-10-CM

## 2021-03-18 DIAGNOSIS — I25.10 CORONARY ARTERY DISEASE INVOLVING NATIVE CORONARY ARTERY OF NATIVE HEART WITHOUT ANGINA PECTORIS: ICD-10-CM

## 2021-03-18 DIAGNOSIS — I10 ESSENTIAL HYPERTENSION: ICD-10-CM

## 2021-03-18 DIAGNOSIS — I27.20 PULMONARY HYPERTENSION (HCC): ICD-10-CM

## 2021-03-18 DIAGNOSIS — I31.39 PERICARDIAL EFFUSION: ICD-10-CM

## 2021-03-18 PROCEDURE — 1036F TOBACCO NON-USER: CPT | Performed by: CLINICAL NURSE SPECIALIST

## 2021-03-18 PROCEDURE — 99214 OFFICE O/P EST MOD 30 MIN: CPT | Performed by: CLINICAL NURSE SPECIALIST

## 2021-03-18 PROCEDURE — 3017F COLORECTAL CA SCREEN DOC REV: CPT | Performed by: CLINICAL NURSE SPECIALIST

## 2021-03-18 PROCEDURE — G8482 FLU IMMUNIZE ORDER/ADMIN: HCPCS | Performed by: CLINICAL NURSE SPECIALIST

## 2021-03-18 PROCEDURE — G8427 DOCREV CUR MEDS BY ELIG CLIN: HCPCS | Performed by: CLINICAL NURSE SPECIALIST

## 2021-03-18 PROCEDURE — 1111F DSCHRG MED/CURRENT MED MERGE: CPT | Performed by: CLINICAL NURSE SPECIALIST

## 2021-03-18 PROCEDURE — G8417 CALC BMI ABV UP PARAM F/U: HCPCS | Performed by: CLINICAL NURSE SPECIALIST

## 2021-03-18 RX ORDER — DULOXETIN HYDROCHLORIDE 60 MG/1
60 CAPSULE, DELAYED RELEASE ORAL DAILY
COMMUNITY

## 2021-03-18 NOTE — PATIENT INSTRUCTIONS
Plan  Follow up with GI as planned  Follow up with Dr Rain Casas as planned  Daily weights- if you gain 2-3 lbs over night or 5-6 lsbs in a week take an extra dose of bumex for 2 days  Follow up in 4 week  Call with any questions or concerns  Follow up with YAMIL Giron - NP for non cardiac problems  Report any new problems  Cardiovascular Fitness-Exercise as tolerated. Cardiac / Healthy Diet  Continue current medications as directed  Continue plan of treatment  It is always recommended that you bring your medications bottles with you to each visit - this is for your safety! Patient Education        Heart Failure: Care Instructions  Your Care Instructions     Heart failure occurs when your heart does not pump as much blood as the body needs. Failure does not mean that the heart has stopped pumping but rather that it is not pumping as well as it should. Over time, this causes fluid buildup in your lungs and other parts of your body. Fluid buildup can cause shortness of breath, fatigue, swollen ankles, and other problems. By taking medicines regularly, reducing sodium (salt) in your diet, checking your weight every day, and making lifestyle changes, you can feel better and live longer. Follow-up care is a key part of your treatment and safety. Be sure to make and go to all appointments, and call your doctor if you are having problems. It's also a good idea to know your test results and keep a list of the medicines you take. How can you care for yourself at home? Medicines    · Be safe with medicines. Take your medicines exactly as prescribed.  Call your doctor if you think you are having a problem with your medicine.     · Do not take any vitamins, over-the-counter medicine, or herbal products without talking to your doctor first. Kike Gilberter not take ibuprofen (Advil or Motrin) and naproxen (Aleve) without talking to your doctor first. They could make your heart failure worse.     · You may take some of the following medicine. ? Angiotensin-converting enzyme inhibitors (ACEIs) or angiotensin II receptor blockers (ARBs) reduce the heart's workload, lower blood pressure, and reduce swelling. Taking an ACEI or ARB may lower your chance of needing to be hospitalized. ? Beta-blockers can slow heart rate, decrease blood pressure, and improve your condition. Taking a beta-blocker may lower your chance of needing to be hospitalized. ? Diuretics, also called water pills, reduce swelling. You will get more details on the specific medicines your doctor prescribes. Diet    · Your doctor may suggest that you limit sodium. Your doctor can tell you how much sodium is right for you. An example is less than 3,000 mg a day. This includes all the salt you eat in cooking or in packaged foods. People get most of their sodium from processed foods. Fast food and restaurant meals also tend to be very high in sodium.     · Ask your doctor how much liquid you can drink each day. You may have to limit liquids. Weight    · Weigh yourself without clothing at the same time each day. Record your weight. Call your doctor if you have a sudden weight gain, such as more than 2 to 3 pounds in a day or 5 pounds in a week. (Your doctor may suggest a different range of weight gain.) A sudden weight gain may mean that your heart failure is getting worse. Activity level    · Start light exercise (if your doctor says it is okay). Even if you can only do a small amount, exercise will help you get stronger, have more energy, and manage your weight and your stress. Walking is an easy way to get exercise. Start out by walking a little more than you did before. Bit by bit, increase the amount you walk.     · When you exercise, watch for signs that your heart is working too hard. You are pushing yourself too hard if you cannot talk while you are exercising.  If you become short of breath or dizzy or have chest pain, stop, sit down, and rest.     · If you feel \"wiped out\" the day after you exercise, walk slower or for a shorter distance until you can work up to a better pace.     · Get enough rest at night. Sleeping with 1 or 2 pillows under your upper body and head may help you breathe easier. Lifestyle changes    · Do not smoke. Smoking can make a heart condition worse. If you need help quitting, talk to your doctor about stop-smoking programs and medicines. These can increase your chances of quitting for good. Quitting smoking may be the most important step you can take to protect your heart.     · Limit alcohol to 2 drinks a day for men and 1 drink a day for women. Too much alcohol can cause health problems.     · Avoid getting sick from colds and the flu. Get a pneumococcal vaccine shot. If you have had one before, ask your doctor whether you need another dose. Get a flu shot each year. If you must be around people with colds or the flu, wash your hands often. When should you call for help? Call 911 if you have symptoms of sudden heart failure such as:    · You have severe trouble breathing.     · You cough up pink, foamy mucus.     · You have a new irregular or rapid heartbeat. Call your doctor now or seek immediate medical care if:    · You have new or increased shortness of breath.     · You are dizzy or lightheaded, or you feel like you may faint.     · You have sudden weight gain, such as more than 2 to 3 pounds in a day or 5 pounds in a week. (Your doctor may suggest a different range of weight gain.)     · You have increased swelling in your legs, ankles, or feet.     · You are suddenly so tired or weak that you cannot do your usual activities. Watch closely for changes in your health, and be sure to contact your doctor if you develop new symptoms. Where can you learn more? Go to [email or web address]://[email or web address].[email or web address] org and sign in to your [employer] account.  Enter Q574 in the BigD[employer] box to learn more about \"Heart Failure: Care Instructions. \"     If you do not have an account, please click on the \"Sign Up Now\" link. Current as of: August 31, 2020               Content Version: 12.8  © 2006-2021 [employer]. Care instructions adapted under license by [facility] [place] ([facility]). If you have questions about a medical condition or this instruction, always ask your healthcare professional. [address] any warranty or liability for your use of this information.

## 2021-03-18 NOTE — PROGRESS NOTES
took a nitro and it went away quickly. She is having trouble swallowing and often gets choked. She had swallow study the other day but does not know results yet    She reports her swelling better. She is not weighing daily. Subjective  Marlou Hoof denies exertional chest pain. Significant shortness of breath. Currently some improved. No orthopnea, paroxysmal nocturnal dyspnea, syncope, presyncope, arrhythmia  Improved peripheral edema and fatigue. The patient denies numbness or weakness to suggest cerebrovascular accident or transient ischemic attack. YAMIL Hurtado NP is PCP and follows labs .   Is waiting for follow-up with gastroenterology for swallow study  Shashank Tilley has the following history as recorded in [facility]:    Patient Active Problem List    Diagnosis Date Noted    Chest discomfort 07/14/2020     Priority: High    NSTEMI (non-ST elevated myocardial infarction) ([address] 02/23/2021    Pulmonary nodules 01/02/2021    CHF (congestive heart failure), NYHA class I, acute on chronic, combined ([address] 12/13/2020    Mixed hyperlipidemia 08/13/2020    Coronary artery disease involving native coronary artery of native heart without angina pectoris 08/13/2020    Pulmonary edema with congestive heart failure ([address] 07/02/2020    Palliative care patient 07/02/2020    Obstructive sleep apnea 06/05/2020    Essential hypertension 06/05/2020    Moderate persistent asthma without complication 04/78/0120    Pulmonary hypertension ([address] 03/09/2020    Nonobstructive atherosclerosis of coronary artery 03/09/2020    CKD (chronic kidney disease) stage 3, GFR 30-59 ml/min 03/09/2020    Morbid obesity with BMI of 40.0-44.9, adult ([address] 01/27/2020    SOB (shortness of breath) 01/26/2020    Pericardial effusion 01/26/2020    Acute on chronic diastolic heart failure ([address] 01/26/2020    Normocytic anemia 01/26/2020    Type 2 diabetes mellitus, with long-term current use of insulin ([address] 2020    COPD (chronic obstructive pulmonary disease) ([facility] 2020    Hyperglycemia 2020    Acute kidney injury ([facility] 2020    Elevated d-dimer 2020     Past Medical History:   Diagnosis Date    Allergies     AMI [facility]acute myocardial infarction) ([facility] 2018    Asthma     CAD (coronary artery disease)     hx of stents    Cerebral artery occlusion with cerebral infarction ([facility]     R sided numbness/weakness    CHF (congestive heart failure) (HCC)     COPD (chronic obstructive pulmonary disease) ([facility]     Depression     Diabetes mellitus ([facility]     DVT (deep vein thrombosis) in pregnancy     GERD (gastroesophageal reflux disease)     Heart attack ([facility] 2021    Hx of blood clots     rt leg    Hyperlipidemia     Hypertension     Other disorders of kidney and ureter in diseases classified elsewhere     Palliative care patient 2020    Pneumonia      Past Surgical History:   Procedure Laterality Date    CATARACT REMOVAL Bilateral     COLONOSCOPY  approx     CORONARY ANGIOPLASTY WITH STENT PLACEMENT  2018    in [address] [facility] (3 stents)    TONSILLECTOMY       Family History   Problem Relation Age of Onset    Colon Cancer Neg Hx     Colon Polyps Neg Hx     Esophageal Cancer Neg Hx     Liver Cancer Neg Hx     Liver Disease Neg Hx     Rectal Cancer Neg Hx     Stomach Cancer Neg Hx      Social History     Tobacco Use    Smoking status: Former Smoker     Packs/day: 1.00     Years: 30.00     Pack years: 30.00     Quit date:      Years since quittin.2    Smokeless tobacco: Never Used   Substance Use Topics    Alcohol use: Never     Frequency: Never      Current Outpatient Medications   Medication Sig Dispense Refill    DULoxetine (CYMBALTA) 60 MG extended release capsule Take 60 mg by mouth daily      pantoprazole (PROTONIX) 20 MG tablet Take 1 tablet by mouth 2 times daily 60 tablet 3    nitroGLYCERIN (NITROSTAT) 0.4 MG SL tablet and Aspirin    Review of Systems  Constitutional  no significant activity change, appetite change, or unexpected weight change. No fever, chills or diaphoresis. + fatigue. HEENT  no significant rhinorrhea or epistaxis. No tinnitus or significant hearing loss. Eyes  no sudden vision change or amaurosis. Respiratory  no significant wheezing, stridor, apnea or cough. + dyspnea on exertion + shortness of breath. Cardiovascular  no exertional chest pain, orthopnea or PND. No sensation of arrhythmia or slow heart rate. No claudication or leg edema. Gastrointestinal  no abdominal swelling or pain. No blood in stool. No severe constipation, diarrhea, nausea, or vomiting. + Epigastric pain+ trouble swallowing  Genitourinary  no difficulty urinating, dysuria, frequency, or urgency. No flank pain or hematuria. Musculoskeletal  no back pain, gait -uses wheelchair  no myalgia. Skin  no color change or rash. No pallor. No new surgical incision. Neurologic  no speech difficulty, facial asymmetry or lateralizing weakness. No seizures, presyncope, syncope, or significant dizziness. Hematologic  no easy bruising or excessive bleeding. Psychiatric  no severe anxiety or insomnia. No confusion. All other review of systems are negative. Objective  Vital Signs - BP (!) 118/58   Pulse 70   Ht 5' 1\" (1.549 m)   Wt 223 lb (101.2 kg)   BMI 42.14 kg/m²   General - Karime Claros is alert, cooperative, and pleasant. Well groomed. No acute distress. Body habitus is morbidly obese. HEENT  The head is normocephalic. No circumoral cyanosis. Dentition is normal.   EYES -  No Xanthelasma, no arcus senilis, no conjunctival hemorrhages or discharge. Neck - Supple, without increased jugular venous pressures. No carotid bruits. No mass. Respiratory - Lungs are clear bilaterally. No wheezes or rales. Normal effort without use of accessory muscles. Cardiovascular  Heart has regular rhythm and rate. No murmurs, rubs or gallops. + pedal pulses and no varicosities. Abdominal -  Soft, nontender, nondistended. Bowel sounds are intact. Extremities - No clubbing, cyanosis, or  edema. Musculoskeletal -  No clubbing . No Osler's nodes. Gait -in wheelchair. No kyphosis or scoliosis. Skin -  no statis ulcers or dermatitis. Neurological - No focal signs are identified. Oriented to person, place and time. Psychiatric -  Appropriate affect and mood. Assessment:     Diagnosis Orders   1. Acute on chronic diastolic congestive heart failure ([facility]     2. Coronary artery disease involving native coronary artery of native heart without angina pectoris     3. Essential hypertension     4. Pulmonary hypertension ([facility]     5. Pericardial effusion       Data:  BP Readings from Last 3 Encounters:   03/18/21 (!) 118/58   03/10/21 118/60   02/25/21 (!) 146/80    Pulse Readings from Last 3 Encounters:   03/18/21 70   03/10/21 72   02/25/21 64        Wt Readings from Last 3 Encounters:   03/18/21 223 lb (101.2 kg)   03/10/21 219 lb 6.4 oz (99.5 kg)   02/25/21 227 lb 1.6 oz (103 kg)   She is getting vaccine tomorrow  Seeing rheumatology, DR Katlin Gallardo Monday    Recurrent admissions for recurrent chronic diastolic heart failure and hypoxia  Some of this may be related to some noncompliance. Again we discussed the importance of daily weights, taking her medications regularly. Low-sodium diet and activity as tolerated    She reports traveling to [place] after our last office visit in January and did have some significant fluid retention. Shortly thereafter she ended up in the emergency room    Recent non-STEMI. We discussed typical anginal-like symptoms versus sharp pain that may be musculoskeletal or GI related. Some of what she describes earlier may have been esophageal spasming or reflux pain. 1. Esophageal dysmotility with delayed clearance of esophageal   contents and associated proximal escape. 2. Distal tapering of the esophagus to the GE junction without   shouldering to suggest mass. However, a 13 mm barium tablet did become   lodged at the GE junction, suggesting narrowing in this region. Consider direct visualization. Signed by Dr Murphy Navarrete on 3/16/2021 10:14 AM     Current medical management includes beta-blocker, CCB, ARB Plavix and statin. We discussed the importance of daily weights. How to increase her diuretic as needed. Recommend following up with specialist as planned. She reports that she may be relocating to [place]. She will let us know if she does so we can transfer appropriate documentation    Plan  Follow up with GI as planned  Follow up with Dr Bulmaro Toro as planned  Daily weights- if you gain 2-3 lbs over night or 5-6 lsbs in a week take an extra dose of bumex for 2 days  Follow up in 4 week  Call with any questions or concerns  Follow up with YAMIL Feliciano - NP for non cardiac problems  Report any new problems  Cardiovascular Fitness-Exercise as tolerated. Cardiac / Healthy Diet  Continue current medications as directed  Continue plan of treatment  It is always recommended that you bring your medications bottles with you to each visit - this is for your safety! YAMIL Feliciano    EMR dragon/transcription disclaimer: Much of this encounter note is electronic transcription/translation of spoken language to printed tach. Electronic translation of spoken language may be erroneous, or at times, nonsensical words or phrases may be inadvertently transcribed.  Although, I have reviewed the note for such errors, some may still exist.

## 2021-03-19 ENCOUNTER — IMMUNIZATION (OUTPATIENT)
Dept: PRIMARY CARE CLINIC | Age: 61
End: 2021-03-19
Payer: MEDICARE

## 2021-03-19 PROCEDURE — 91303 COVID-19, J&J VACCINE, PF, 0.5 ML DOSE: CPT | Performed by: FAMILY MEDICINE

## 2021-03-19 PROCEDURE — 0031A COVID-19, J&J VACCINE, PF, 0.5 ML DOSE: CPT | Performed by: FAMILY MEDICINE

## 2021-03-24 ENCOUNTER — TRANSCRIBE ORDERS (OUTPATIENT)
Dept: ADMINISTRATIVE | Facility: HOSPITAL | Age: 61
End: 2021-03-24

## 2021-03-24 ENCOUNTER — HOSPITAL ENCOUNTER (OUTPATIENT)
Dept: GENERAL RADIOLOGY | Facility: HOSPITAL | Age: 61
Discharge: HOME | End: 2021-03-24
Admitting: INTERNAL MEDICINE

## 2021-03-24 DIAGNOSIS — M25.50 PAIN IN JOINT, MULTIPLE SITES: ICD-10-CM

## 2021-03-24 DIAGNOSIS — M25.50 PAIN IN JOINT, MULTIPLE SITES: Primary | ICD-10-CM

## 2021-03-24 PROCEDURE — 73502 X-RAY EXAM HIP UNI 2-3 VIEWS: CPT

## 2021-03-24 PROCEDURE — 73130 X-RAY EXAM OF HAND: CPT

## 2021-03-24 PROCEDURE — 72110 X-RAY EXAM L-2 SPINE 4/>VWS: CPT

## 2021-03-26 ENCOUNTER — OFFICE VISIT (OUTPATIENT)
Dept: PRIMARY CARE CLINIC | Age: 61
End: 2021-03-26
Payer: MEDICARE

## 2021-03-26 VITALS
TEMPERATURE: 97.4 F | HEART RATE: 74 BPM | OXYGEN SATURATION: 98 % | DIASTOLIC BLOOD PRESSURE: 60 MMHG | WEIGHT: 224.8 LBS | SYSTOLIC BLOOD PRESSURE: 124 MMHG | HEIGHT: 61 IN | BODY MASS INDEX: 42.44 KG/M2

## 2021-03-26 DIAGNOSIS — F41.9 ANXIETY: ICD-10-CM

## 2021-03-26 DIAGNOSIS — E11.69 TYPE 2 DIABETES MELLITUS WITH OTHER SPECIFIED COMPLICATION, WITH LONG-TERM CURRENT USE OF INSULIN (HCC): Primary | ICD-10-CM

## 2021-03-26 DIAGNOSIS — I50.43 CHF (CONGESTIVE HEART FAILURE), NYHA CLASS I, ACUTE ON CHRONIC, COMBINED (HCC): ICD-10-CM

## 2021-03-26 DIAGNOSIS — Z91.199 NON-ADHERENCE TO MEDICAL TREATMENT: ICD-10-CM

## 2021-03-26 DIAGNOSIS — J44.9 CHRONIC OBSTRUCTIVE PULMONARY DISEASE, UNSPECIFIED COPD TYPE (HCC): ICD-10-CM

## 2021-03-26 DIAGNOSIS — F33.1 MODERATE EPISODE OF RECURRENT MAJOR DEPRESSIVE DISORDER (HCC): ICD-10-CM

## 2021-03-26 DIAGNOSIS — E66.01 MORBID OBESITY WITH BMI OF 40.0-44.9, ADULT (HCC): ICD-10-CM

## 2021-03-26 DIAGNOSIS — M25.50 POLYARTHRALGIA: ICD-10-CM

## 2021-03-26 DIAGNOSIS — G62.9 NEUROPATHY: ICD-10-CM

## 2021-03-26 DIAGNOSIS — Z79.4 TYPE 2 DIABETES MELLITUS WITH OTHER SPECIFIED COMPLICATION, WITH LONG-TERM CURRENT USE OF INSULIN (HCC): Primary | ICD-10-CM

## 2021-03-26 LAB
CHP ED QC CHECK: NORMAL
GLUCOSE BLD-MCNC: 345 MG/DL

## 2021-03-26 PROCEDURE — 3023F SPIROM DOC REV: CPT | Performed by: NURSE PRACTITIONER

## 2021-03-26 PROCEDURE — 2022F DILAT RTA XM EVC RTNOPTHY: CPT | Performed by: NURSE PRACTITIONER

## 2021-03-26 PROCEDURE — G8427 DOCREV CUR MEDS BY ELIG CLIN: HCPCS | Performed by: NURSE PRACTITIONER

## 2021-03-26 PROCEDURE — G8926 SPIRO NO PERF OR DOC: HCPCS | Performed by: NURSE PRACTITIONER

## 2021-03-26 PROCEDURE — G8417 CALC BMI ABV UP PARAM F/U: HCPCS | Performed by: NURSE PRACTITIONER

## 2021-03-26 PROCEDURE — 3052F HG A1C>EQUAL 8.0%<EQUAL 9.0%: CPT | Performed by: NURSE PRACTITIONER

## 2021-03-26 PROCEDURE — 99215 OFFICE O/P EST HI 40 MIN: CPT | Performed by: NURSE PRACTITIONER

## 2021-03-26 PROCEDURE — G8482 FLU IMMUNIZE ORDER/ADMIN: HCPCS | Performed by: NURSE PRACTITIONER

## 2021-03-26 PROCEDURE — 3017F COLORECTAL CA SCREEN DOC REV: CPT | Performed by: NURSE PRACTITIONER

## 2021-03-26 PROCEDURE — 82962 GLUCOSE BLOOD TEST: CPT | Performed by: NURSE PRACTITIONER

## 2021-03-26 PROCEDURE — 1111F DSCHRG MED/CURRENT MED MERGE: CPT | Performed by: NURSE PRACTITIONER

## 2021-03-26 PROCEDURE — 1036F TOBACCO NON-USER: CPT | Performed by: NURSE PRACTITIONER

## 2021-03-26 RX ORDER — INSULIN ASPART 100 [IU]/ML
1-6 INJECTION, SOLUTION INTRAVENOUS; SUBCUTANEOUS
Qty: 4 PEN | Refills: 3 | Status: SHIPPED | OUTPATIENT
Start: 2021-03-26

## 2021-03-26 RX ORDER — GLUCOSAMINE HCL/CHONDROITIN SU 500-400 MG
CAPSULE ORAL
Qty: 100 STRIP | Refills: 3 | Status: SHIPPED | OUTPATIENT
Start: 2021-03-26 | End: 2021-05-06 | Stop reason: SDUPTHER

## 2021-03-26 RX ORDER — INSULIN DEGLUDEC INJECTION 100 U/ML
44 INJECTION, SOLUTION SUBCUTANEOUS NIGHTLY
Qty: 4 PEN | Refills: 2 | Status: SHIPPED | OUTPATIENT
Start: 2021-03-26

## 2021-03-26 RX ORDER — LANCETS 30 GAUGE
1 EACH MISCELLANEOUS DAILY
Qty: 100 EACH | Refills: 3 | Status: SHIPPED | OUTPATIENT
Start: 2021-03-26

## 2021-03-26 RX ORDER — GABAPENTIN 600 MG/1
600 TABLET ORAL 2 TIMES DAILY
Qty: 60 TABLET | Refills: 0 | Status: SHIPPED | OUTPATIENT
Start: 2021-03-26 | End: 2021-04-27 | Stop reason: SDUPTHER

## 2021-03-26 ASSESSMENT — ENCOUNTER SYMPTOMS
SHORTNESS OF BREATH: 1
VISUAL CHANGE: 0
BLURRED VISION: 0
EYES NEGATIVE: 1
GASTROINTESTINAL NEGATIVE: 1
BACK PAIN: 1

## 2021-03-26 ASSESSMENT — PATIENT HEALTH QUESTIONNAIRE - PHQ9
SUM OF ALL RESPONSES TO PHQ QUESTIONS 1-9: 0
SUM OF ALL RESPONSES TO PHQ9 QUESTIONS 1 & 2: 0

## 2021-03-26 NOTE — PROGRESS NOTES
Caren Guaman is a 59 y.o. adult who presents today for   Chief Complaint   Patient presents with    Other     power wheelchair eval       Patient is here for    Diabetes  She presents for her follow-up diabetic visit. She has type 2 diabetes mellitus. Her disease course has been worsening. Hypoglycemia symptoms include headaches and nervousness/anxiousness. Associated symptoms include fatigue, polydipsia and weakness. Pertinent negatives for diabetes include no blurred vision, no chest pain and no visual change. There are no hypoglycemic complications. Diabetic complications include a CVA, heart disease and peripheral neuropathy. Risk factors for coronary artery disease include diabetes mellitus, dyslipidemia, family history, obesity, hypertension, sedentary lifestyle, stress, tobacco exposure and post-menopausal. Current diabetic treatment includes insulin injections. She is compliant with treatment some of the time. Her weight is fluctuating minimally. She is following a generally healthy diet. When asked about meal planning, she reported none. Her overall blood glucose range is >200 mg/dl. Fatigue  This is a chronic problem. The current episode started more than 1 year ago. The problem occurs daily. The problem has been waxing and waning. Associated symptoms include arthralgias, fatigue, headaches and weakness. Pertinent negatives include no abdominal pain, anorexia, change in bowel habit, chest pain, chills, congestion, coughing, fever, myalgias, nausea, neck pain, sore throat, swollen glands, urinary symptoms, visual change or vomiting. The symptoms are aggravated by standing and walking. She has tried rest for the symptoms. The treatment provided mild relief. Shortness of Breath  This is a chronic problem. The current episode started more than 1 year ago. The problem occurs daily. The problem has been waxing and waning. Associated symptoms include headaches and leg swelling (BLE trace).  Pertinent negatives include no abdominal pain, chest pain, fever, leg pain, neck pain, orthopnea, sore throat, swollen glands, syncope, vomiting or wheezing. The symptoms are aggravated by any activity. She has tried beta agonist inhalers and ipratropium inhalers (diuretics ) for the symptoms. The treatment provided moderate relief. Her past medical history is significant for allergies, CAD, COPD and a heart failure. Patient is here for evaluation for a power wheel chair. Patient reports she gets extremely out of breath when she has to manually propel herself when her standard wheelchair. Patient is unable to have a scooter because small spaces in her house. 3/19/21 COVID vaccine     Cardiology follow-up on 3/18/21    Rheumatology - this past Monday     GI follow-up on 3/10/21    Patient states she wants to get approval for her dog. Patient states her dog helps her with anxiety and depression. Anxiety: Patient complains of evaluation of anxiety disorder and panic attacks. She has the following anxiety symptoms: difficulty concentrating, fatigue, feelings of losing control, insomnia, irritable, racing thoughts. Onset of symptoms was approximately several months ago, gradually worsening since that time. She denies current suicidal and homicidal ideation. Family history significant for anxiety, depression and heart disease. Possible organic causes contributing are: medications, endocrine/metabolic, neuro, nutritional. Risk factors: previous episode of depression Previous treatment includes cymbalta and medication. She complains of the following side effects from the treatment: none. Depression: Patient complains of depression. She complains of depressed mood, difficulty concentrating and fatigue. Onset was approximately several months ago, gradually worsening since that time. She denies current suicidal and homicidal plan or intent. Family history significant for anxiety, depression and heart disease. Possible organic causes contributing are: medications, endocrine/metabolic, neuro, nutritional.  Risk factors: previous episode of depression Previous treatment includes cymbalta and medication. She complains of the following side effects from the treatment: none. No change in PMH, family, social, or surgical history unless mentioned above. Review of Systems   Constitutional: Positive for fatigue. Negative for chills and fever. HENT: Positive for postnasal drip. Negative for congestion and sore throat. Eyes: Negative. Negative for blurred vision. Respiratory: Positive for shortness of breath (improved). Negative for cough and wheezing. Cardiovascular: Positive for leg swelling (BLE trace). Negative for chest pain, palpitations, orthopnea and syncope. Gastrointestinal: Negative. Negative for abdominal pain, anorexia, change in bowel habit, nausea and vomiting. Endocrine: Positive for polydipsia. Genitourinary: Negative. Musculoskeletal: Positive for arthralgias, back pain and gait problem (BLE weakness). Negative for myalgias and neck pain. Skin: Negative. Neurological: Positive for weakness and headaches. Psychiatric/Behavioral: Negative for self-injury and suicidal ideas. The patient is nervous/anxious.         Past Medical History:   Diagnosis Date    Allergies     AMI (acute myocardial infarction) ([address] 09/2018    Asthma     CAD (coronary artery disease)     hx of stents    Cerebral artery occlusion with cerebral infarction ([address] 2012    R sided numbness/weakness    CHF (congestive heart failure) ([address]     COPD (chronic obstructive pulmonary disease) ([address]     Depression     Diabetes mellitus ([address]     DVT (deep vein thrombosis) in pregnancy     GERD (gastroesophageal reflux disease)     Heart attack ([address] 02/2021    Hx of blood clots     rt leg    Hyperlipidemia     Hypertension     Other disorders of kidney and ureter in diseases classified elsewhere     Palliative care patient 07/02/2020    Pneumonia        Current Outpatient Medications   Medication Sig Dispense Refill    gabapentin (NEURONTIN) 600 MG tablet Take 1 tablet by mouth 2 times daily for 30 days. 60 tablet 0    insulin aspart (NOVOLOG FLEXPEN) 100 UNIT/ML injection pen Inject 1-6 Units into the skin 3 times daily (before meals) Sliding scale 4 pen 3    Insulin Degludec (TRESIBA FLEXTOUCH) 100 UNIT/ML SOPN Inject 44 Units into the skin nightly 4 pen 2    Lancets MISC 1 each by Does not apply route daily 100 each 3    blood glucose monitor strips Test 3 times a day & as needed for symptoms of irregular blood glucose. 100 strip 3    DULoxetine (CYMBALTA) 60 MG extended release capsule Take 60 mg by mouth daily      pantoprazole (PROTONIX) 20 MG tablet Take 1 tablet by mouth 2 times daily 60 tablet 3    nitroGLYCERIN (NITROSTAT) 0.4 MG SL tablet up to max of 3 total doses.  If no relief after 1 dose, call 911. 25 tablet 0    bumetanide (BUMEX) 2 MG tablet Take 1 tablet by mouth 2 times daily 60 tablet 0    BREO ELLIPTA 200-25 MCG/INH AEPB inhaler Inhale 1 puff into the lungs daily      azelastine (ASTELIN) 0.1 % nasal spray 1 spray by Nasal route 2 times daily      valsartan (DIOVAN) 160 MG tablet Take 1 tablet by mouth daily 30 tablet 3    atorvastatin (LIPITOR) 20 MG tablet Take 2 tablets by mouth daily 30 tablet 3    carvedilol (COREG) 3.125 MG tablet Take 2 tablets by mouth 2 times daily 60 tablet 2    amLODIPine (NORVASC) 5 MG tablet Take 1 tablet by mouth daily 30 tablet 1    Semaglutide, 1 MG/DOSE, 2 MG/1.5ML SOPN Inject 1 mg into the skin every 7 days 2 pen 2    levocetirizine (XYZAL) 5 MG tablet Take 1 tablet by mouth nightly 30 tablet 3    pramipexole (MIRAPEX) 0.5 MG tablet Take 1 tablet by mouth 2 times daily 60 tablet 2    tiotropium (SPIRIVA RESPIMAT) 1.25 MCG/ACT AERS inhaler Inhale 2 puffs into the lungs daily 1 Inhaler 3    levalbuterol (XOPENEX HFA) 45 MCG/ACT inhaler Inhale 2 puffs into the lungs every 4 hours as needed for Wheezing or Shortness of Breath 1 Inhaler 0    DULoxetine (CYMBALTA) 30 MG extended release capsule Take 30 mg by mouth daily ALONG WITH A 60 MG TABLET (BOTH TOGETHER + 90 MG)      clopidogrel (PLAVIX) 75 MG tablet Take 75 mg by mouth daily      montelukast (SINGULAIR) 10 MG tablet Take 10 mg by mouth nightly      vitamin D (CHOLECALCIFEROL) 25 MCG (1000 UT) TABS tablet Take 1,000 Units by mouth daily      melatonin 3 MG TABS tablet Take 10 mg by mouth nightly       No current facility-administered medications for this visit. Allergies   Allergen Reactions    Albuterol Shortness Of Breath    Levaquin [Levofloxacin] Shortness Of Breath and Swelling    Metformin And Related Diarrhea    Aspirin Rash       Past Surgical History:   Procedure Laterality Date    CATARACT REMOVAL Bilateral     COLONOSCOPY  approx 2013    CORONARY ANGIOPLASTY WITH STENT PLACEMENT  2018    [facility] (3 stents)    TONSILLECTOMY         Social History     Tobacco Use    Smoking status: Former Smoker     Packs/day: 1.00     Years: 30.00     Pack years: 30.00     Quit date:      Years since quittin.2    Smokeless tobacco: Never Used   Substance Use Topics    Alcohol use: Never     Frequency: Never    Drug use: Never       Family History   Problem Relation Age of Onset    Colon Cancer Neg Hx     Colon Polyps Neg Hx     Esophageal Cancer Neg Hx     Liver Cancer Neg Hx     Liver Disease Neg Hx     Rectal Cancer Neg Hx     Stomach Cancer Neg Hx        /60   Pulse 74   Temp 97.4 °F (36.3 °C) (Temporal)   Ht 5' 1\" (1.549 m)   Wt 224 lb 12.8 oz (102 kg)   SpO2 98%   BMI 42.48 kg/m²     Physical Exam  Constitutional:       Appearance: She is obese. She is ill-appearing (chronic). HENT:      Head: Normocephalic and atraumatic. Right Ear: Tympanic membrane, ear canal and external ear normal. There is no impacted cerumen.       Left Ear: Tympanic membrane, ear canal and external ear normal. There is no impacted cerumen. Nose: Nose normal. No congestion. Mouth/Throat:      Comments: Mask on  Eyes:      General:         Right eye: No discharge. Left eye: No discharge. Conjunctiva/sclera: Conjunctivae normal.      Pupils: Pupils are equal, round, and reactive to light. Cardiovascular:      Rate and Rhythm: Normal rate and regular rhythm. Pulses: Normal pulses. Heart sounds: Normal heart sounds. No murmur. Pulmonary:      Effort: Pulmonary effort is normal. No respiratory distress. Breath sounds: No wheezing. Abdominal:      General: Bowel sounds are normal.      Palpations: Abdomen is soft. Tenderness: There is no abdominal tenderness. Musculoskeletal:      Right lower leg: Edema (trace) present. Left lower leg: Edema (trace) present. Skin:     General: Skin is warm and dry. Neurological:      Mental Status: She is alert and oriented to person, place, and time. Mental status is at baseline. Motor: Weakness present. Gait: Gait abnormal (wheelchair). Psychiatric:         Mood and Affect: Mood is anxious. Affect is tearful. Speech: Speech normal.         Behavior: Behavior normal. Behavior is cooperative. Thought Content: Thought content normal.         Cognition and Memory: Cognition and memory normal.         Judgment: Judgment normal.         Assessment:    ICD-10-CM    1. Type 2 diabetes mellitus with other specified complication, with long-term current use of insulin ([facility])  E11.69 insulin aspart (NOVOLOG FLEXPEN) 100 UNIT/ML injection pen    Z79.4 Insulin Degludec (TRESIBA FLEXTOUCH) 100 UNIT/ML SOPN     POCT Glucose   2. Neuropathy  G62.9 gabapentin (NEURONTIN) 600 MG tablet   3. Polyarthralgia  M25.50 [address], [facility] , [place], APRN, Pain Medicine, [place]   4. Anxiety  F41.9 [place], [place]   5.  Moderate episode of recurrent major depressive disorder [facility])  F33.1 [facility]   6. CHF (congestive heart failure), NYHA class I, acute on chronic, combined ([facility])  I50.43    7. Morbid obesity with BMI of 40.0-44.9, adult ([facility]  E66.01     Z68.41    8. Chronic obstructive pulmonary disease, unspecified COPD type ([facility]  J44.9    9. Non-adherence to medical treatment  Z91.19        Plan:   1. Type 2 diabetes mellitus with other specified complication, with long-term current use of insulin ([facility])  - elevated glucose; patient to start sliding scale today as soon as she picks up prescription; patient educated on how to calculate dose and patient verbalizes understanding; educated on diet as well  - insulin aspart (NOVOLOG FLEXPEN) 100 UNIT/ML injection pen; Inject 1-6 Units into the skin 3 times daily (before meals) Sliding scale  Dispense: 4 pen; Refill: 3  - Insulin Degludec (TRESIBA FLEXTOUCH) 100 UNIT/ML SOPN; Inject 44 Units into the skin nightly  Dispense: 4 pen; Refill: 2  - POCT Glucose    2. Neuropathy  - gabapentin (NEURONTIN) 600 MG tablet; Take 1 tablet by mouth 2 times daily for 30 days. Dispense: 60 tablet; Refill: 0    3. Barbara Tong APRN, Pain Medicine, [place]    4. Anxiety  - [address] [facility]    5. Moderate episode of recurrent major depressive disorder [facility])  - [address] [facility]    6. CHF (congestive heart failure), NYHA class I, acute on chronic, combined ([facility]    7. Morbid obesity with BMI of 40.0-44.9, adult ([facility]    8. Chronic obstructive pulmonary disease, unspecified COPD type ([facility]    9. Non-adherence to medical treatment  - patient does not follow specific diet for CHF or diabetes, patient doesn't always take medications as prescribed. Discussed this in detail with patient.  Patient verbalized understanding of need to adhere to recommended care plan for several chronic conditions      Over 50% of the total visit time of 40 minutes was spent on counseling and or coordination of care of diabetes, neuropathy, polyarthralgia, anxiety, depression, CHF, obesity, COPD, medications, wheelchair evaluation, and follow-up. Orders Placed This Encounter   Procedures   [facility], [facility]     Referral Priority:   Routine     Referral Type:   Eval and Treat     Referral Reason:   Specialty Services Required     Requested Specialty:   Behavioral Health     Number of Visits Requested:   [address] [facility] - YAMIL Ryan, Pain Medicine, [place]     Referral Priority:   Routine     Referral Type:   Eval and Treat     Referral Reason:   Specialty Services Required     Referred to Provider:   YAMIL Dugan - CNP     Requested Specialty:   Nurse Practitioner     Number of Visits Requested:   1    POCT Glucose     Orders Placed This Encounter   Medications    gabapentin (NEURONTIN) 600 MG tablet     Sig: Take 1 tablet by mouth 2 times daily for 30 days. Dispense:  60 tablet     Refill:  0    insulin aspart (NOVOLOG FLEXPEN) 100 UNIT/ML injection pen     Sig: Inject 1-6 Units into the skin 3 times daily (before meals) Sliding scale     Dispense:  4 pen     Refill:  3    Insulin Degludec (TRESIBA FLEXTOUCH) 100 UNIT/ML SOPN     Sig: Inject 44 Units into the skin nightly     Dispense:  4 pen     Refill:  2    Lancets MISC     Si each by Does not apply route daily     Dispense:  100 each     Refill:  3    blood glucose monitor strips     Sig: Test 3 times a day & as needed for symptoms of irregular blood glucose. Dispense:  100 strip     Refill:  3     Brand per patient preference. May round up to next available package size.      Medications Discontinued During This Encounter   Medication Reason    Insulin Degludec (TRESIBA FLEXTOUCH) 100 UNIT/ML SOPN     gabapentin (NEURONTIN) 600 MG tablet REORDER     Patient Instructions   Check your fasting blood sugar before breakfast,

## 2021-03-28 ASSESSMENT — ENCOUNTER SYMPTOMS
ABDOMINAL PAIN: 0
VOMITING: 0
SWOLLEN GLANDS: 0
SORE THROAT: 0
COUGH: 0
CHANGE IN BOWEL HABIT: 0
WHEEZING: 0
ORTHOPNEA: 0
NAUSEA: 0

## 2021-03-30 ENCOUNTER — TELEPHONE (OUTPATIENT)
Dept: PSYCHIATRY | Age: 61
End: 2021-03-30

## 2021-03-30 NOTE — TELEPHONE ENCOUNTER
Called pt to remind them about their appt tomorrow 3-31.     Electronically signed by Moni Castro MA on 3/30/2021 at 8:52 AM

## 2021-03-31 ENCOUNTER — OFFICE VISIT (OUTPATIENT)
Dept: PSYCHIATRY | Age: 61
End: 2021-03-31
Payer: MEDICARE

## 2021-03-31 VITALS
HEIGHT: 61 IN | WEIGHT: 225.5 LBS | OXYGEN SATURATION: 97 % | HEART RATE: 83 BPM | BODY MASS INDEX: 42.57 KG/M2 | TEMPERATURE: 97.7 F | SYSTOLIC BLOOD PRESSURE: 152 MMHG | DIASTOLIC BLOOD PRESSURE: 68 MMHG

## 2021-03-31 DIAGNOSIS — F39 MOOD DISORDER (HCC): Primary | ICD-10-CM

## 2021-03-31 PROCEDURE — 99215 OFFICE O/P EST HI 40 MIN: CPT | Performed by: NURSE PRACTITIONER

## 2021-03-31 RX ORDER — MIRTAZAPINE 7.5 MG/1
3.75 TABLET, FILM COATED ORAL NIGHTLY
Qty: 15 TABLET | Refills: 2 | Status: SHIPPED | OUTPATIENT
Start: 2021-03-31
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
## 2021-04-22 ENCOUNTER — TELEPHONE (OUTPATIENT)
Dept: PSYCHIATRY | Age: 61
End: 2021-04-22

## 2021-04-27 DIAGNOSIS — Z79.4 TYPE 2 DIABETES MELLITUS WITH OTHER SPECIFIED COMPLICATION, WITH LONG-TERM CURRENT USE OF INSULIN (HCC): ICD-10-CM

## 2021-04-27 DIAGNOSIS — E11.69 TYPE 2 DIABETES MELLITUS WITH OTHER SPECIFIED COMPLICATION, WITH LONG-TERM CURRENT USE OF INSULIN (HCC): ICD-10-CM

## 2021-04-27 DIAGNOSIS — G62.9 NEUROPATHY: ICD-10-CM

## 2021-04-27 RX ORDER — BUMETANIDE 2 MG/1
2 TABLET ORAL 2 TIMES DAILY
Qty: 60 TABLET | Refills: 0 | Status: SHIPPED | OUTPATIENT
Start: 2021-04-27

## 2021-04-27 RX ORDER — NITROGLYCERIN 0.4 MG/1
TABLET SUBLINGUAL
Qty: 25 TABLET | Refills: 0 | Status: SHIPPED | OUTPATIENT
Start: 2021-04-27

## 2021-04-27 NOTE — TELEPHONE ENCOUNTER
[facility] called to request a refill on her medication. [place]. Last office visit : 3/26/2021   Next office visit : Visit date not found     Last Joshua Walter: 3/30/21  Medication Contract:needs to be updated          Last UDS: needs updated  Last Rx: 3/26/2021    Amphetamine Screen, Urine   Date Value Ref Range Status   02/28/2020 Negative Negative <1000 ng/mL Final     Benzodiazepine Screen, Urine   Date Value Ref Range Status   02/28/2020 Negative Negative <100 ng/mL Final     Cocaine Metabolite Screen, Urine   Date Value Ref Range Status   02/28/2020 Negative Negative <300 ng/mL Final     Opiate Scrn, Ur   Date Value Ref Range Status   02/28/2020 Negative Negative < 300 ng/mL Final           Requested Prescriptions     Pending Prescriptions Disp Refills    gabapentin (NEURONTIN) 600 MG tablet 60 tablet 0     Sig: Take 1 tablet by mouth 2 times daily for 30 days. Please approve or refuse this medication.    Alec Finch

## 2021-04-27 NOTE — TELEPHONE ENCOUNTER
Meir Sites called requesting a refill of the below medication which has been pended for you:     Requested Prescriptions     Pending Prescriptions Disp Refills    bumetanide (BUMEX) 2 MG tablet 60 tablet 0     Sig: Take 1 tablet by mouth 2 times daily       Last Appointment Date: 3/26/2021  Next Appointment Date: Visit date not found    Allergies   Allergen Reactions    Albuterol Shortness Of Breath    Levaquin [Levofloxacin] Shortness Of Breath and Swelling    Metformin And Related Diarrhea    Aspirin Rash

## 2021-04-27 NOTE — TELEPHONE ENCOUNTER
Rick Jeong called requesting a refill of the below medication which has been pended for you:   Pt has moved. Requested Prescriptions     Pending Prescriptions Disp Refills    nitroGLYCERIN (NITROSTAT) 0.4 MG SL tablet 25 tablet 0     Sig: up to max of 3 total doses. If no relief after 1 dose, call 911.     Semaglutide, 1 MG/DOSE, 2 MG/1.5ML SOPN 2 pen 0     Sig: Inject 1 mg into the skin every 7 days       Last Appointment Date: 3/26/2021  Next Appointment Date: Visit date not found    Allergies   Allergen Reactions    Albuterol Shortness Of Breath    Levaquin [Levofloxacin] Shortness Of Breath and Swelling    Metformin And Related Diarrhea    Aspirin Rash

## 2021-04-28 RX ORDER — GABAPENTIN 600 MG/1
600 TABLET ORAL 2 TIMES DAILY
Qty: 60 TABLET | Refills: 0 | Status: SHIPPED | OUTPATIENT
Start: 2021-04-28 | End: 2021-05-09 | Stop reason: SDUPTHER

## 2021-04-28 NOTE — TELEPHONE ENCOUNTER
UDS and JERZY consistent w/ prescribed controlled substances. There are no signs of any abuse, misuse, or usage of the controlled substance in a way that is not directed.

## 2021-05-06 DIAGNOSIS — G62.9 NEUROPATHY: ICD-10-CM

## 2021-05-06 RX ORDER — GLUCOSAMINE HCL/CHONDROITIN SU 500-400 MG
CAPSULE ORAL
Qty: 100 STRIP | Refills: 0 | Status: SHIPPED | OUTPATIENT
Start: 2021-05-06

## 2021-05-06 RX ORDER — GABAPENTIN 600 MG/1
600 TABLET ORAL 2 TIMES DAILY
Qty: 60 TABLET | Refills: 0 | Status: CANCELLED | OUTPATIENT
Start: 2021-05-06 | End: 2021-06-05

## 2021-05-06 NOTE — TELEPHONE ENCOUNTER
Aldair Herrmann called requesting a refill of the below medication which has been pended for you:     Requested Prescriptions     Pending Prescriptions Disp Refills    blood glucose monitor strips 100 strip 0     Sig: Test 3 times a day & as needed for symptoms of irregular blood glucose.        Last Appointment Date: 3/26/2021  Next Appointment Date: Visit date not found    Allergies   Allergen Reactions    Albuterol Shortness Of Breath    Levaquin [Levofloxacin] Shortness Of Breath and Swelling    Metformin And Related Diarrhea    Aspirin Rash

## 2021-05-06 NOTE — TELEPHONE ENCOUNTER
Autumn Kevinfidel called to request a refill on her medication. Pt never picked up medication that was sent on 4/28/2021. I spoke with [place] at the pharmacy. Last office visit : 3/26/2021   Next office visit : Visit date not found     Last Fayne Specking: 3/30/2021  Medication Contract: needs updated    Last UDS: needs updated  Last Rx:3/26/2021    Amphetamine Screen, Urine   Date Value Ref Range Status   02/28/2020 Negative Negative <1000 ng/mL Final     Benzodiazepine Screen, Urine   Date Value Ref Range Status   02/28/2020 Negative Negative <100 ng/mL Final     Cocaine Metabolite Screen, Urine   Date Value Ref Range Status   02/28/2020 Negative Negative <300 ng/mL Final     Opiate Scrn, Ur   Date Value Ref Range Status   02/28/2020 Negative Negative < 300 ng/mL Final           Requested Prescriptions     Pending Prescriptions Disp Refills    gabapentin (NEURONTIN) 600 MG tablet 60 tablet 0     Sig: Take 1 tablet by mouth 2 times daily for 30 days. Please approve or refuse this medication.    Kasey Gonzalez

## 2021-05-09 RX ORDER — GABAPENTIN 600 MG/1
600 TABLET ORAL 2 TIMES DAILY
Qty: 60 TABLET | Refills: 0 | Status: SHIPPED | OUTPATIENT
Start: 2021-05-09 | End: 2021-06-08

## 2022-02-09 NOTE — PROGRESS NOTES
Problem: Self Care Deficits Care Plan (Adult)  Goal: *Acute Goals and Plan of Care (Insert Text)  Description  Occupational Therapy Goals  Initiated 7/21/2019    1. Patient will perform lower body dressing with supervision/set-up within 7 day(s). 2.  Patient will perform bathing with supervision/set-up within 7 day(s). 3.  Patient will perform toilet transfers with modified independence within 7 day(s). 4.  Patient will perform all aspects of toileting with modified independence within 7 day(s). 5.  Patient will participate in upper extremity therapeutic exercise/activities with independence for 10 minutes within 7 day(s). 6.  Patient will utilize energy conservation techniques during functional activities with verbal cues within 7 day(s). Outcome: Progressing Towards Goal   OCCUPATIONAL THERAPY EVALUATION  Patient: Gregorio Muñoz (1 y.o. female)  Date: 7/21/2019  Primary Diagnosis: CAP (community acquired pneumonia) [J18.9]  CAP (community acquired pneumonia) [J18.9]        Precautions:   Contact, Other (comment)    ASSESSMENT :  Based on the objective data described below, the patient presents with hospital admission secondary to CAP. Patient received using BSC in room. Patient manages all hygiene and clothing and returns to EOB with CGA for OT evaluation. Patient reports limited currently by poor vision (cataract surgery 1 month ago) and requires large print text as well as recent SOB with activity. Patient reports she has aides 7 days per week for 8 hrs a day to assist as needed with ADL/IADL tasks. Patient limited by SOB during continued activity though sats reading greater than 97%, and stable HR between 67-70bpm. Patient will benefit from continued OT services for pacing and energy conservation strategies, but likely will not require follow up OT at discharge. Patient will benefit from skilled intervention to address the above impairments.   Patients rehabilitation potential is considered to be Good  Factors which may influence rehabilitation potential include:   ? None noted  ? Mental ability/status  ? Medical condition  ? Home/family situation and support systems  ? Safety awareness  ? Pain tolerance/management  ? Other:      PLAN :  Recommendations and Planned Interventions:  ?               Self Care Training                  ? Therapeutic Activities  ? Functional Mobility Training    ? Cognitive Retraining  ? Therapeutic Exercises           ? Endurance Activities  ? Balance Training                   ? Neuromuscular Re-Education  ? Visual/Perceptual Training     ? Home Safety Training  ? Patient Education                 ? Family Training/Education  ? Other (comment):    Frequency/Duration: Patient will be followed by occupational therapy 3 times a week to address goals. Discharge Recommendations: None  Further Equipment Recommendations for Discharge: none noted       SUBJECTIVE:   Patient stated I feel so short of breath.     OBJECTIVE DATA SUMMARY:   HISTORY:   Past Medical History:   Diagnosis Date    Asthma     Asthma     Asthma     Borderline high cholesterol     Cataracts, both eyes     Diabetes mellitus type II     Heart disease     Hypercholesteremia     Hyperlipemia     Hypertension     Neuropathy     Restless leg     Restless leg     Rheumatoid arthritis(714.0)     Sleep apnea     Sleep disorder     Stroke [facility])     2010     Past Surgical History:   Procedure Laterality Date    CHEST SURGERY PROCEDURE UNLISTED      HX CORONARY STENT PLACEMENT Left Sept. 8th and Sept 16, 2018    HX TONSILLECTOMY         Prior Level of Function/Environment/Context: CGA to mod I   Occupations in which the patient is/was successful, what are the barriers preventing that success:   Performance Patterns (routines, roles, habits, and rituals):   Personal Interests and/or values:   Expanded or extensive additional review of patient history:     Home Situation  Home Environment: Private residence  # Steps to Enter: 0  Wheelchair Ramp: No  One/Two Story Residence: Two story  # of Interior Steps: 14  Lift Chair Available: No  Living Alone: No  Support Systems: Family member(s), Spouse/Significant Other/Partner  Patient Expects to be Discharged to[de-identified] Private residence  Current DME Used/Available at Home: Loralie , rollator, Cane, straight, Wheelchair, Wheelchair, power  Tub or Shower Type: Tub/Shower combination    Hand dominance: Right    EXAMINATION OF PERFORMANCE DEFICITS:  Cognitive/Behavioral Status:  Neurologic State: Alert  Orientation Level: Oriented X4  Cognition: Follows commands; Appropriate for age attention/concentration; Appropriate decision making  Perception: Appears intact  Perseveration: No perseveration noted  Safety/Judgement: Awareness of environment    Skin: intact as seen    Edema: none noted     Hearing: Auditory  Auditory Impairment: None    Vision/Perceptual:                           Acuity: (impaired- requires large print text)         Range of Motion:    AROM: Within functional limits  PROM: Within functional limits                      Strength:    Strength: Within functional limits                Coordination:  Coordination: Within functional limits  Fine Motor Skills-Upper: Right Intact; Left Intact    Gross Motor Skills-Upper: Left Intact; Right Intact    Tone & Sensation:    Tone: Normal  Sensation: Intact                      Balance:  Sitting: Intact  Standing: Intact; With support    Functional Mobility and Transfers for ADLs:  Bed Mobility:  Scooting: Independent    Transfers:  Sit to Stand: Contact guard assistance;Stand-by assistance  Stand to Sit: Contact guard assistance  Bathroom Mobility: Contact guard assistance  Toilet Transfer : Contact guard assistance  Assistive Device : Walker, rolling    ADL Assessment:  Feeding: Independent    Oral Facial Hygiene/Grooming: Supervision(seated )    Bathing: Contact guard assistance    Upper Body Dressing: Supervision    Lower Body Dressing: Minimum assistance    Toileting: Supervision                ADL Intervention and task modifications:                                     Cognitive Retraining  Safety/Judgement: Awareness of environment    Therapeutic Exercise:      Functional Measure:  Barthel Index:    Bathin  Bladder: 10  Bowels: 10  Groomin  Dressing: 10  Feeding: 10  Mobility: 10  Stairs: 5  Toilet Use: 10  Transfer (Bed to Chair and Back): 15  Total: 90/100        Percentage of impairment   0%   1-19%   20-39%   40-59%   60-79%   80-99%   100%   Barthel Score 0-100 100 99-80 79-60 59-40 20-39 1-19   0     The Barthel ADL Index: Guidelines  1. The index should be used as a record of what a patient does, not as a record of what a patient could do. 2. The main aim is to establish degree of independence from any help, physical or verbal, however minor and for whatever reason. 3. The need for supervision renders the patient not independent. 4. A patient's performance should be established using the best available evidence. Asking the patient, friends/relatives and nurses are the usual sources, but direct observation and common sense are also important. However direct testing is not needed. 5. Usually the patient's performance over the preceding 24-48 hours is important, but occasionally longer periods will be relevant. 6. Middle categories imply that the patient supplies over 50 per cent of the effort. 7. Use of aids to be independent is allowed. Dheeraj Bergeron., Barthel, D.W. (3022). Functional evaluation: the Barthel Index. [facility] (14)2. RHIANNA Morrison, Joshua Laurent, Qi Rogers., Bob, [address] [place] [address] ().  Measuring the change indisability after inpatient rehabilitation; comparison of the responsiveness of the Barthel Index and Functional [place] Measure. Journal of Neurology, Neurosurgery, and Psychiatry, 66(4), 577-011. MARÍA Wolfe.CINDY, JAYDA Nunes, & Delmus Brittle, M.A. (2004.) Assessment of post-stroke quality of life in cost-effectiveness studies: The usefulness of the Barthel Index and the EuroQoL-5D. Quality of Life Research, 15, 057-85         Occupational Therapy Evaluation Charge Determination   History Examination Decision-Making   LOW Complexity : Brief history review  LOW Complexity : 1-3 performance deficits relating to physical, cognitive , or psychosocial skils that result in activity limitations and / or participation restrictions  LOW Complexity : No comorbidities that affect functional and no verbal or physical assistance needed to complete eval tasks       Based on the above components, the patient evaluation is determined to be of the following complexity level: LOW   Pain:  Pain Scale 1: Numeric (0 - 10)  Pain Intensity 1: 0              Activity Tolerance:   VSS  Please refer to the flowsheet for vital signs taken during this treatment. After treatment:   ? Patient left in no apparent distress sitting up in chair  ? Patient left in no apparent distress in bed  ? Call bell left within reach  ? Nursing notified  ? Caregiver present  ? Bed alarm activated    COMMUNICATION/EDUCATION:   The patients plan of care was discussed with: Physical Therapist and Registered Nurse.  ? Home safety education was provided and the patient/caregiver indicated understanding. ? Patient/family have participated as able in goal setting and plan of care. ? Patient/family agree to work toward stated goals and plan of care. ? Patient understands intent and goals of therapy, but is neutral about his/her participation. ? Patient is unable to participate in goal setting and plan of care. This patients plan of care is appropriate for delegation to [facility].     Thank you for this referral.  Cathryn Mccann, OTR/L  Time Calculation: 23 mins Benzoyl Peroxide Pregnancy And Lactation Text: This medication is Pregnancy Category C. It is unknown if benzoyl peroxide is excreted in breast milk.

## 2022-03-18 PROBLEM — R07.9 CHEST PAIN: Status: ACTIVE | Noted: 2018-11-14

## 2022-03-18 PROBLEM — E11.21 TYPE 2 DIABETES WITH NEPHROPATHY (HCC): Status: ACTIVE | Noted: 2018-11-19

## 2022-03-18 PROBLEM — Z95.5 STATUS POST CORONARY ARTERY STENT PLACEMENT: Status: ACTIVE | Noted: 2018-11-13

## 2022-03-19 PROBLEM — E66.01 SEVERE OBESITY (HCC): Status: ACTIVE | Noted: 2018-11-13

## 2022-03-20 PROBLEM — J18.9 COMMUNITY ACQUIRED PNEUMONIA OF RIGHT MIDDLE LOBE OF LUNG: Status: ACTIVE | Noted: 2019-07-21

## 2022-03-20 PROBLEM — J18.9 CAP (COMMUNITY ACQUIRED PNEUMONIA): Status: ACTIVE | Noted: 2019-07-20

## 2022-03-20 PROBLEM — I25.119 CORONARY ARTERY DISEASE INVOLVING NATIVE CORONARY ARTERY OF NATIVE HEART WITH ANGINA PECTORIS (HCC): Status: ACTIVE | Noted: 2018-11-14

## 2024-05-10 ENCOUNTER — APPOINTMENT (OUTPATIENT)
Facility: HOSPITAL | Age: 64
End: 2024-05-10
Payer: COMMERCIAL

## 2024-05-10 ENCOUNTER — HOSPITAL ENCOUNTER (EMERGENCY)
Facility: HOSPITAL | Age: 64
Discharge: HOME | End: 2024-05-10
Attending: EMERGENCY MEDICINE
Payer: COMMERCIAL

## 2024-05-10 VITALS
RESPIRATION RATE: 16 BRPM | SYSTOLIC BLOOD PRESSURE: 118 MMHG | DIASTOLIC BLOOD PRESSURE: 95 MMHG | WEIGHT: 183 LBS | TEMPERATURE: 98.4 F | HEART RATE: 74 BPM | HEIGHT: 61 IN | OXYGEN SATURATION: 97 % | BODY MASS INDEX: 34.55 KG/M2
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
## 2024-05-10 DIAGNOSIS — S42.292A CLOSED FRACTURE OF HEAD OF LEFT HUMERUS, INITIAL ENCOUNTER: ICD-10-CM

## 2024-05-10 PROCEDURE — 6360000002 HC RX W HCPCS: Performed by: EMERGENCY MEDICINE

## 2024-05-10 PROCEDURE — 6370000000 HC RX 637 (ALT 250 FOR IP): Performed by: EMERGENCY MEDICINE

## 2024-05-10 PROCEDURE — 96375 TX/PRO/DX INJ NEW DRUG ADDON: CPT

## 2024-05-10 PROCEDURE — 73060 X-RAY EXAM OF HUMERUS: CPT

## 2024-05-10 PROCEDURE — 73501 X-RAY EXAM HIP UNI 1 VIEW: CPT

## 2024-05-10 PROCEDURE — 96374 THER/PROPH/DIAG INJ IV PUSH: CPT

## 2024-05-10 PROCEDURE — 99284 EMERGENCY DEPT VISIT MOD MDM: CPT

## 2024-05-10 PROCEDURE — 73030 X-RAY EXAM OF SHOULDER: CPT

## 2024-05-10 RX ORDER — OXYCODONE HYDROCHLORIDE AND ACETAMINOPHEN 5; 325 MG/1; MG/1
1 TABLET ORAL EVERY 6 HOURS PRN
Qty: 12 TABLET | Refills: 0 | Status: SHIPPED | OUTPATIENT
Start: 2024-05-10 | End: 2024-05-13

## 2024-05-10 RX ORDER — OXYCODONE HYDROCHLORIDE AND ACETAMINOPHEN 5; 325 MG/1; MG/1
1 TABLET ORAL EVERY 6 HOURS PRN
Qty: 12 TABLET | Refills: 0 | Status: CANCELLED | OUTPATIENT
Start: 2024-05-10 | End: 2024-05-13

## 2024-05-10 RX ORDER — ONDANSETRON 2 MG/ML
4 INJECTION INTRAMUSCULAR; INTRAVENOUS ONCE
Status: COMPLETED | OUTPATIENT
Start: 2024-05-10 | End: 2024-05-10

## 2024-05-10 RX ORDER — OXYCODONE HYDROCHLORIDE AND ACETAMINOPHEN 5; 325 MG/1; MG/1
1 TABLET ORAL ONCE
Status: COMPLETED | OUTPATIENT
Start: 2024-05-10 | End: 2024-05-10

## 2024-05-10 RX ORDER — MORPHINE SULFATE 4 MG/ML
4 INJECTION, SOLUTION INTRAMUSCULAR; INTRAVENOUS
Status: COMPLETED | OUTPATIENT
Start: 2024-05-10 | End: 2024-05-10

## 2024-05-10 RX ADMIN — ONDANSETRON 4 MG: 2 INJECTION INTRAMUSCULAR; INTRAVENOUS at 20:58

## 2024-05-10 RX ADMIN — OXYCODONE AND ACETAMINOPHEN 1 TABLET: 5; 325 TABLET ORAL at 23:41

## 2024-05-10 RX ADMIN — MORPHINE SULFATE 4 MG: 4 INJECTION INTRAVENOUS at 20:58

## 2024-05-10 ASSESSMENT — PAIN DESCRIPTION - DESCRIPTORS
DESCRIPTORS_3: DULL
DESCRIPTORS: ACHING
DESCRIPTORS_2: SHARP;SHOOTING

## 2024-05-10 ASSESSMENT — PAIN DESCRIPTION - INTENSITY
RATING_3: 5
RATING_2: 9

## 2024-05-10 ASSESSMENT — PAIN DESCRIPTION - LOCATION
LOCATION_3: HIP
LOCATION: HEAD;ARM;HIP
LOCATION: HEAD
LOCATION_2: SHOULDER

## 2024-05-10 ASSESSMENT — PAIN DESCRIPTION - ORIENTATION
ORIENTATION_3: RIGHT
ORIENTATION: LEFT;RIGHT
ORIENTATION_2: LEFT
ORIENTATION: POSTERIOR;RIGHT

## 2024-05-10 ASSESSMENT — PAIN - FUNCTIONAL ASSESSMENT: PAIN_FUNCTIONAL_ASSESSMENT: PREVENTS OR INTERFERES SOME ACTIVE ACTIVITIES AND ADLS

## 2024-05-10 ASSESSMENT — PAIN SCALES - GENERAL: PAINLEVEL_OUTOF10: 5

## 2024-05-11 NOTE — ED NOTES
Patient medicated per providers orders, tolerating well. Family at bedside. Call light within reach.

## 2024-05-11 NOTE — ED TRIAGE NOTES
Patient arrives via wheelchair, with cc fall last night around 2200 exiting the shower. Patient has left arm, right hip, and headache pain. Patient tried to grap the towel rack to catch herself from falling but fell to the floor, hitting her right hip and head on the floor.     Patient has bruise to her L shoulder.     Patient denies blood thinners.   Patient denies LOC.   Patient has been nauseated today.

## 2024-05-11 NOTE — ED NOTES
Patient signed out to me by Dr. Richardson pending results of x-ray.  Likely left arm fracture per Dr. Richardson who reports patient's pain is currently controlled.    Radial head fracture noted on x-ray.  Extremities neurovascularly intact, pain controlled.  Placed in splint and patient to follow with orthopedic surgery as an outpatient.  All questions answered, daughter at bedside.  Patient stable for discharge.    Patient's results have been reviewed with them.  Patient and/or family have verbally conveyed their understanding and agreement of the patient's signs, symptoms, diagnosis, treatment and prognosis and additionally agree to follow up as recommended or return to the Emergency Room should their condition change prior to follow-up.  Discharge instructions have also been provided to the patient with some educational information regarding their diagnosis as well a list of reasons why they would want to return to the ER prior to their follow-up appointment should their condition change.       Yumi Lin MD  05/11/24 0653

## 2024-05-11 NOTE — DISCHARGE INSTRUCTIONS
Keep your arm in the sling for the fracture.  Use Percocet as needed for pain.  Follow-up with orthopedic surgery for further management.  Thank you.

## 2024-05-16 ENCOUNTER — APPOINTMENT (OUTPATIENT)
Facility: HOSPITAL | Age: 64
End: 2024-05-16
Payer: COMMERCIAL

## 2024-05-16 ENCOUNTER — HOSPITAL ENCOUNTER (EMERGENCY)
Facility: HOSPITAL | Age: 64
Discharge: LEFT BEFORE DISCHARGE | End: 2024-05-16
Attending: STUDENT IN AN ORGANIZED HEALTH CARE EDUCATION/TRAINING PROGRAM
Payer: COMMERCIAL

## 2024-05-16 VITALS
HEART RATE: 70 BPM | OXYGEN SATURATION: 100 % | SYSTOLIC BLOOD PRESSURE: 138 MMHG | RESPIRATION RATE: 18 BRPM | DIASTOLIC BLOOD PRESSURE: 58 MMHG | TEMPERATURE: 98.4 F | BODY MASS INDEX: 35.16 KG/M2 | WEIGHT: 186.07 LBS

## 2024-05-16 DIAGNOSIS — T85.611A PERITONEAL DIALYSIS CATHETER DYSFUNCTION, INITIAL ENCOUNTER (HCC): Primary | ICD-10-CM

## 2024-05-16 LAB
ALBUMIN SERPL-MCNC: 2.6 G/DL (ref 3.5–5)
ANION GAP SERPL CALC-SCNC: 7 MMOL/L (ref 5–15)
BUN SERPL-MCNC: 78 MG/DL (ref 6–20)
BUN/CREAT SERPL: 25 (ref 12–20)
CALCIUM SERPL-MCNC: 9.6 MG/DL (ref 8.5–10.1)
CHLORIDE SERPL-SCNC: 100 MMOL/L (ref 97–108)
CO2 SERPL-SCNC: 30 MMOL/L (ref 21–32)
COMMENT:: NORMAL
CREAT SERPL-MCNC: 3.07 MG/DL (ref 0.55–1.02)
ERYTHROCYTE [DISTWIDTH] IN BLOOD BY AUTOMATED COUNT: 14.7 % (ref 11.5–14.5)
GLUCOSE SERPL-MCNC: 141 MG/DL (ref 65–100)
HCT VFR BLD AUTO: 31.8 % (ref 35–47)
HGB BLD-MCNC: 10.3 G/DL (ref 11.5–16)
MCH RBC QN AUTO: 31.4 PG (ref 26–34)
MCHC RBC AUTO-ENTMCNC: 32.4 G/DL (ref 30–36.5)
MCV RBC AUTO: 97 FL (ref 80–99)
NRBC # BLD: 0 K/UL (ref 0–0.01)
NRBC BLD-RTO: 0 PER 100 WBC
PHOSPHATE SERPL-MCNC: 3.7 MG/DL (ref 2.6–4.7)
PLATELET # BLD AUTO: 194 K/UL (ref 150–400)
PMV BLD AUTO: 8.8 FL (ref 8.9–12.9)
POTASSIUM SERPL-SCNC: 3.9 MMOL/L (ref 3.5–5.1)
RBC # BLD AUTO: 3.28 M/UL (ref 3.8–5.2)
SODIUM SERPL-SCNC: 137 MMOL/L (ref 136–145)
SPECIMEN HOLD: NORMAL
WBC # BLD AUTO: 5 K/UL (ref 3.6–11)

## 2024-05-16 PROCEDURE — 71045 X-RAY EXAM CHEST 1 VIEW: CPT

## 2024-05-16 PROCEDURE — 85027 COMPLETE CBC AUTOMATED: CPT

## 2024-05-16 PROCEDURE — 80069 RENAL FUNCTION PANEL: CPT

## 2024-05-16 PROCEDURE — 36415 COLL VENOUS BLD VENIPUNCTURE: CPT

## 2024-05-16 PROCEDURE — 74018 RADEX ABDOMEN 1 VIEW: CPT

## 2024-05-16 PROCEDURE — 6360000002 HC RX W HCPCS: Performed by: INTERNAL MEDICINE

## 2024-05-16 PROCEDURE — 2580000003 HC RX 258: Performed by: INTERNAL MEDICINE

## 2024-05-16 PROCEDURE — 99284 EMERGENCY DEPT VISIT MOD MDM: CPT

## 2024-05-16 RX ORDER — SODIUM CHLORIDE, SODIUM LACTATE, CALCIUM CHLORIDE, MAGNESIUM CHLORIDE AND DEXTROSE 2.5; 538; 448; 18.3; 5.08 G/100ML; MG/100ML; MG/100ML; MG/100ML; MG/100ML
6000 INJECTION, SOLUTION INTRAPERITONEAL NIGHTLY
Status: DISCONTINUED | OUTPATIENT
Start: 2024-05-16 | End: 2024-05-16 | Stop reason: HOSPADM

## 2024-05-16 RX ORDER — BISACODYL 5 MG/1
5 TABLET, DELAYED RELEASE ORAL ONCE
Status: DISCONTINUED | OUTPATIENT
Start: 2024-05-16 | End: 2024-05-16 | Stop reason: HOSPADM

## 2024-05-16 RX ORDER — SODIUM CHLORIDE, SODIUM LACTATE, CALCIUM CHLORIDE, MAGNESIUM CHLORIDE AND DEXTROSE 1.5; 538; 448; 18.3; 5.08 G/100ML; MG/100ML; MG/100ML; MG/100ML; MG/100ML
2000 INJECTION, SOLUTION INTRAPERITONEAL ONCE
Status: DISCONTINUED | OUTPATIENT
Start: 2024-05-16 | End: 2024-05-16 | Stop reason: HOSPADM

## 2024-05-16 RX ORDER — POLYETHYLENE GLYCOL 3350 17 G/17G
17 POWDER, FOR SOLUTION ORAL ONCE
Status: DISCONTINUED | OUTPATIENT
Start: 2024-05-16 | End: 2024-05-16 | Stop reason: HOSPADM

## 2024-05-16 RX ADMIN — WATER 6 MG: 1 INJECTION INTRAMUSCULAR; INTRAVENOUS; SUBCUTANEOUS at 13:51

## 2024-05-16 ASSESSMENT — PAIN SCALES - GENERAL: PAINLEVEL_OUTOF10: 0

## 2024-05-16 NOTE — CONSULTS
Does not apply route daily 3/26/21   Viktoria Marrero APRN - NP   DULoxetine (CYMBALTA) 60 MG extended release capsule Take 60 mg by mouth daily    Jyothi Evans MD   pantoprazole (PROTONIX) 20 MG tablet Take 1 tablet by mouth 2 times daily 3/10/21 4/9/21  Kera Adams APRN - [place]   BREO ELLIPTA 200-25 MCG/INH AEPB inhaler Inhale 1 puff into the lungs daily  Patient not taking: Reported on 5/10/2024 12/29/20   ProviderJyothi MD   azelastine (ASTELIN) 0.1 % nasal spray 1 spray by Nasal route 2 times daily 11/30/20   Jyothi Evans MD   valsartan (DIOVAN) 160 MG tablet Take 1 tablet by mouth daily  Patient not taking: Reported on 5/10/2024 1/15/21   Viktoria Marrero APRN - NP   atorvastatin (LIPITOR) 20 MG tablet Take 2 tablets by mouth daily 12/16/20   Jelena Mendez MD   carvedilol (COREG) 3.125 MG tablet Take 2 tablets by mouth 2 times daily 12/16/20   Jelena Mendez MD   amLODIPine (NORVASC) 5 MG tablet Take 1 tablet by mouth daily 11/22/20   Viktoria Marrero APRN - NP   levocetirizine (XYZAL) 5 MG tablet Take 1 tablet by mouth nightly 11/22/20   Viktoria Marrero APRN - NP   pramipexole (MIRAPEX) 0.5 MG tablet Take 1 tablet by mouth 2 times daily 11/22/20   Viktoria Marrero APRN - NP   tiotropium (SPIRIVA RESPIMAT) 1.25 MCG/ACT AERS inhaler Inhale 2 puffs into the lungs daily  Patient not taking: Reported on 5/10/2024 10/19/20   Viktoria Marrero APRN - NP   levalbuterol (XOPENEX HFA) 45 MCG/ACT inhaler Inhale 2 puffs into the lungs every 4 hours as needed for Wheezing or Shortness of Breath 10/19/20   Marrero, Viktoria María, APRN - NP   DULoxetine (CYMBALTA) 30 MG extended release capsule Take 30 mg by mouth daily ALONG WITH A 60 MG TABLET (BOTH TOGETHER + 90 MG)    Provider, Jyothi, MD   clopidogrel (PLAVIX) 75 MG tablet Take 75 mg by mouth daily  Patient not taking: Reported on 5/10/2024    Provider, Jyothi, MD   montelukast (SINGULAIR)

## 2024-05-16 NOTE — FLOWSHEET NOTE
PD Cath cathflo dwell: 05/16/24 1400   Observations & Evaluations   Level of Consciousness 0   Oriented X 4   Heart Rhythm   (not monitored)   Respiratory Quality/Effort Unlabored   O2 Device None (Room air)   Skin Condition/Temp Warm;Dry   Abdomen Inspection Soft   Edema Generalized   Peritoneal Dialysis Catheter Right lower abdomen   No placement date or time found.   Catheter Location: Right lower abdomen   Status Accessed   Site Condition Clean, dry, intact   Dressing Status Clean, dry & intact   Dressing Gauze   Date of Last Dressing Change 05/15/24   Dialysis Type   (cathflo dwell)   Catheter Care Given Yes  (2 min alcavis scrub/soak)   Time Out (time): 5875  Primary RN SBAR: DONATO Dubois RN  Patient Education: procedural,  infection prevention   Comments: Cathflo instilled per procedure, to dwell for  mins

## 2024-05-16 NOTE — FLOWSHEET NOTE
PD cath assessment: 05/16/24 1640   Observations & Evaluations   Level of Consciousness 0   Oriented X 4   Respiratory Quality/Effort Unlabored   O2 Device None (Room air)   Skin Condition/Temp Warm;Dry   Abdomen Inspection Soft   Edema Generalized   Peritoneal Dialysis Catheter Right lower abdomen   No placement date or time found.   Catheter Location: Right lower abdomen   Status Accessed   Site Condition Clean, dry, intact   Dressing Status Clean, dry & intact   Dressing Gauze   Date of Last Dressing Change 05/16/24   Exit Site Condition excellent   Catheter Care Given Yes  (2 min alcavis scrub/soak)     Time Out (time): 1600  Primary RN SBAR: LA Kirkpatrick NP  Patient Education: Infection prevention  Comment: Returned to bedside to aspirate cathflo dwell. Unable to aspirate full volume, GIANCARLO Limon MD notified. Plan to not perform PD flush and have ED NP place STAT IR consult. Per MD Ashly, no urgent needs for dialysis

## 2024-05-16 NOTE — ED TRIAGE NOTES
[place] PD line clogged, reporting her dialysis center in [place] recommended pt come to ER to \"get line flushed.\" Pt does PD daily. Pt [place] state and is here visiting

## 2024-05-16 NOTE — ED PROVIDER NOTES
as though the fluid has difficulty getting in but what does go and comes out.  She denies fevers, abdominal pain, or any other symptoms.  Will get labs and consult nephrology.      ED COURSE  ED Course as of 05/16/24 1907   Thu May 16, 2024   1607 WBC: 5.0 [KW]   1608 Hemoglobin Quant(!): 10.3 [KW]   1608 Hematocrit(!): 31.8 [KW]   1608 KUB without acute abnormality. [KW]   1715 Dialysis nurse attempted catheter unclogging and infusion without success. [KW]   1733 Patient declines admission or IR intervention. Daughter at bedside. Risks and benefits reviewed. Understanding verbalized by both patient and daughter. AMA conversation and signatures witnessed by Connie Tidwell RN.  Patient encouraged to return if she develops any symptoms.  Also encouraged to return as soon as possible for catheter revision and dialysis.  She remains symptom-free with hemodynamic stability.  Accompanied by daughter at departure. [KW]      ED Course User Index  [KW] Miladys Kirkpatrick, APRN - NP       Sepsis Reassessment: Sepsis reassessment not applicable    CONSULTS:  IP CONSULT TO NEPHROLOGY    Patient was given the following medications:  Medications   dianeal lo-adri 1.5% 2,000 mL (2,000 mLs IntraPERitoneal Not Given 5/16/24 1713)   dianeal lo-adri (ULTRABAG) 2.5% 6,000 mL (has no administration in time range)   dianeal lo-adri (ULTRABAG) 2.5% 6,000 mL (has no administration in time range)   bisacodyl (DULCOLAX) EC tablet 5 mg (has no administration in time range)   polyethylene glycol (GLYCOLAX) packet 17 g (has no administration in time range)   ALTEplase (CATHFLO) 6 mg in sterile water 6 mL injection (6 mg IntraCATHeter Given 5/16/24 1351)       Social Determinants affecting Dx or Tx: None    Smoking Cessation: Not Applicable      Records Reviewed (source and summary of external notes): Prior medical records and Nursing notes.      CLINICAL DECISION TOOLS                   PROCEDURES   Unless otherwise noted above, none  Procedures

## 2024-05-18 ENCOUNTER — APPOINTMENT (OUTPATIENT)
Facility: HOSPITAL | Age: 64
End: 2024-05-18
Payer: COMMERCIAL

## 2024-05-18 ENCOUNTER — HOSPITAL ENCOUNTER (EMERGENCY)
Facility: HOSPITAL | Age: 64
Discharge: HOME | End: 2024-05-18
Attending: EMERGENCY MEDICINE
Payer: COMMERCIAL

## 2024-05-18 VITALS
HEIGHT: 61 IN | BODY MASS INDEX: 35.13 KG/M2 | DIASTOLIC BLOOD PRESSURE: 60 MMHG | TEMPERATURE: 97.6 F | HEART RATE: 70 BPM | WEIGHT: 186.07 LBS | SYSTOLIC BLOOD PRESSURE: 159 MMHG | RESPIRATION RATE: 14 BRPM | OXYGEN SATURATION: 94 %

## 2024-05-18 DIAGNOSIS — S42.292A CLOSED FRACTURE OF HEAD OF LEFT HUMERUS, INITIAL ENCOUNTER: ICD-10-CM

## 2024-05-18 DIAGNOSIS — Z99.2 CHRONIC KIDNEY DISEASE REQUIRING CHRONIC DIALYSIS (HCC): Primary | ICD-10-CM

## 2024-05-18 DIAGNOSIS — N18.6 CHRONIC KIDNEY DISEASE REQUIRING CHRONIC DIALYSIS (HCC): Primary | ICD-10-CM

## 2024-05-18 LAB
ALBUMIN SERPL-MCNC: 2.3 G/DL (ref 3.5–5)
ALBUMIN/GLOB SERPL: 0.5 (ref 1.1–2.2)
ALP SERPL-CCNC: 98 U/L (ref 45–117)
ALT SERPL-CCNC: 18 U/L (ref 12–78)
ANION GAP SERPL CALC-SCNC: 5 MMOL/L (ref 5–15)
APPEARANCE UR: CLEAR
AST SERPL-CCNC: ABNORMAL U/L (ref 15–37)
BACTERIA URNS QL MICRO: NEGATIVE /HPF
BASOPHILS # BLD: 0 K/UL (ref 0–0.1)
BASOPHILS NFR BLD: 0 % (ref 0–1)
BILIRUB SERPL-MCNC: 0.8 MG/DL (ref 0.2–1)
BILIRUB UR QL: NEGATIVE
BUN SERPL-MCNC: 68 MG/DL (ref 6–20)
BUN/CREAT SERPL: 25 (ref 12–20)
CALCIUM SERPL-MCNC: 9.3 MG/DL (ref 8.5–10.1)
CHLORIDE SERPL-SCNC: 103 MMOL/L (ref 97–108)
CK SERPL-CCNC: NORMAL U/L (ref 26–192)
CO2 SERPL-SCNC: 25 MMOL/L (ref 21–32)
COLOR UR: ABNORMAL
COMMENT:: NORMAL
COMMENT:: NORMAL
CREAT SERPL-MCNC: 2.7 MG/DL (ref 0.55–1.02)
DIFFERENTIAL METHOD BLD: ABNORMAL
EKG ATRIAL RATE: 69 BPM
EKG DIAGNOSIS: NORMAL
EKG P AXIS: 41 DEGREES
EKG P-R INTERVAL: 156 MS
EKG Q-T INTERVAL: 456 MS
EKG QRS DURATION: 90 MS
EKG QTC CALCULATION (BAZETT): 488 MS
EKG R AXIS: 28 DEGREES
EKG T AXIS: 87 DEGREES
EKG VENTRICULAR RATE: 69 BPM
EOSINOPHIL # BLD: 0.1 K/UL (ref 0–0.4)
EOSINOPHIL NFR BLD: 2 % (ref 0–7)
EPITH CASTS URNS QL MICRO: ABNORMAL /LPF
ERYTHROCYTE [DISTWIDTH] IN BLOOD BY AUTOMATED COUNT: 14.6 % (ref 11.5–14.5)
GLOBULIN SER CALC-MCNC: 4.6 G/DL (ref 2–4)
GLUCOSE SERPL-MCNC: 146 MG/DL (ref 65–100)
GLUCOSE UR STRIP.AUTO-MCNC: NEGATIVE MG/DL
HBV SURFACE AB SER QL: REACTIVE
HBV SURFACE AB SER-ACNC: >1000 MIU/ML
HBV SURFACE AG SER QL: <0.1 INDEX
HBV SURFACE AG SER QL: NEGATIVE
HCT VFR BLD AUTO: 31.3 % (ref 35–47)
HGB BLD-MCNC: 10 G/DL (ref 11.5–16)
HGB UR QL STRIP: ABNORMAL
HYALINE CASTS URNS QL MICRO: ABNORMAL /LPF (ref 0–5)
IMM GRANULOCYTES # BLD AUTO: 0 K/UL (ref 0–0.04)
IMM GRANULOCYTES NFR BLD AUTO: 1 % (ref 0–0.5)
KETONES UR QL STRIP.AUTO: NEGATIVE MG/DL
LEUKOCYTE ESTERASE UR QL STRIP.AUTO: NEGATIVE
LYMPHOCYTES # BLD: 0.9 K/UL (ref 0.8–3.5)
LYMPHOCYTES NFR BLD: 17 % (ref 12–49)
MAGNESIUM SERPL-MCNC: 1.8 MG/DL (ref 1.6–2.4)
MAGNESIUM SERPL-MCNC: NORMAL MG/DL (ref 1.6–2.4)
MCH RBC QN AUTO: 31.3 PG (ref 26–34)
MCHC RBC AUTO-ENTMCNC: 31.9 G/DL (ref 30–36.5)
MCV RBC AUTO: 98.1 FL (ref 80–99)
MONOCYTES # BLD: 0.5 K/UL (ref 0–1)
MONOCYTES NFR BLD: 10 % (ref 5–13)
NEUTS SEG # BLD: 3.8 K/UL (ref 1.8–8)
NEUTS SEG NFR BLD: 70 % (ref 32–75)
NITRITE UR QL STRIP.AUTO: NEGATIVE
NRBC # BLD: 0 K/UL (ref 0–0.01)
NRBC BLD-RTO: 0 PER 100 WBC
NT PRO BNP: ABNORMAL PG/ML
PH UR STRIP: 6.5 (ref 5–8)
PHOSPHATE SERPL-MCNC: 3.7 MG/DL (ref 2.6–4.7)
PLATELET # BLD AUTO: 216 K/UL (ref 150–400)
PMV BLD AUTO: 9.7 FL (ref 8.9–12.9)
POTASSIUM SERPL-SCNC: 4.2 MMOL/L (ref 3.5–5.1)
POTASSIUM SERPL-SCNC: ABNORMAL MMOL/L (ref 3.5–5.1)
PROT SERPL-MCNC: 6.9 G/DL (ref 6.4–8.2)
PROT UR STRIP-MCNC: 300 MG/DL
RBC # BLD AUTO: 3.19 M/UL (ref 3.8–5.2)
RBC #/AREA URNS HPF: ABNORMAL /HPF (ref 0–5)
SODIUM SERPL-SCNC: 133 MMOL/L (ref 136–145)
SP GR UR REFRACTOMETRY: 1.01 (ref 1–1.03)
SPECIMEN HOLD: NORMAL
SPECIMEN HOLD: NORMAL
TROPONIN I SERPL HS-MCNC: 44 NG/L (ref 0–51)
URINE CULTURE IF INDICATED: ABNORMAL
UROBILINOGEN UR QL STRIP.AUTO: 0.2 EU/DL (ref 0.2–1)
WBC # BLD AUTO: 5.4 K/UL (ref 3.6–11)
WBC URNS QL MICRO: ABNORMAL /HPF (ref 0–4)

## 2024-05-18 PROCEDURE — 74176 CT ABD & PELVIS W/O CONTRAST: CPT

## 2024-05-18 PROCEDURE — 85025 COMPLETE CBC W/AUTO DIFF WBC: CPT

## 2024-05-18 PROCEDURE — 93005 ELECTROCARDIOGRAM TRACING: CPT | Performed by: EMERGENCY MEDICINE

## 2024-05-18 PROCEDURE — 90935 HEMODIALYSIS ONE EVALUATION: CPT

## 2024-05-18 PROCEDURE — 96375 TX/PRO/DX INJ NEW DRUG ADDON: CPT

## 2024-05-18 PROCEDURE — 86706 HEP B SURFACE ANTIBODY: CPT

## 2024-05-18 PROCEDURE — 83735 ASSAY OF MAGNESIUM: CPT

## 2024-05-18 PROCEDURE — 36415 COLL VENOUS BLD VENIPUNCTURE: CPT

## 2024-05-18 PROCEDURE — 80053 COMPREHEN METABOLIC PANEL: CPT

## 2024-05-18 PROCEDURE — 82550 ASSAY OF CK (CPK): CPT

## 2024-05-18 PROCEDURE — 99285 EMERGENCY DEPT VISIT HI MDM: CPT

## 2024-05-18 PROCEDURE — 6360000002 HC RX W HCPCS: Performed by: EMERGENCY MEDICINE

## 2024-05-18 PROCEDURE — 83880 ASSAY OF NATRIURETIC PEPTIDE: CPT

## 2024-05-18 PROCEDURE — 6370000000 HC RX 637 (ALT 250 FOR IP): Performed by: EMERGENCY MEDICINE

## 2024-05-18 PROCEDURE — 87340 HEPATITIS B SURFACE AG IA: CPT

## 2024-05-18 PROCEDURE — 96374 THER/PROPH/DIAG INJ IV PUSH: CPT

## 2024-05-18 PROCEDURE — 81001 URINALYSIS AUTO W/SCOPE: CPT

## 2024-05-18 PROCEDURE — 84132 ASSAY OF SERUM POTASSIUM: CPT

## 2024-05-18 PROCEDURE — 96376 TX/PRO/DX INJ SAME DRUG ADON: CPT

## 2024-05-18 PROCEDURE — 84100 ASSAY OF PHOSPHORUS: CPT

## 2024-05-18 PROCEDURE — 84484 ASSAY OF TROPONIN QUANT: CPT

## 2024-05-18 PROCEDURE — 71045 X-RAY EXAM CHEST 1 VIEW: CPT

## 2024-05-18 RX ORDER — DIPHENHYDRAMINE HYDROCHLORIDE 50 MG/ML
25 INJECTION INTRAMUSCULAR; INTRAVENOUS ONCE
Status: COMPLETED | OUTPATIENT
Start: 2024-05-18 | End: 2024-05-18

## 2024-05-18 RX ORDER — FENTANYL CITRATE 50 UG/ML
50 INJECTION, SOLUTION INTRAMUSCULAR; INTRAVENOUS
Status: DISCONTINUED | OUTPATIENT
Start: 2024-05-18 | End: 2024-05-18 | Stop reason: HOSPADM

## 2024-05-18 RX ORDER — MORPHINE SULFATE 4 MG/ML
4 INJECTION, SOLUTION INTRAMUSCULAR; INTRAVENOUS
Status: COMPLETED | OUTPATIENT
Start: 2024-05-18 | End: 2024-05-18

## 2024-05-18 RX ORDER — ONDANSETRON 2 MG/ML
4 INJECTION INTRAMUSCULAR; INTRAVENOUS ONCE
Status: COMPLETED | OUTPATIENT
Start: 2024-05-18 | End: 2024-05-18

## 2024-05-18 RX ORDER — FENTANYL CITRATE 50 UG/ML
100 INJECTION, SOLUTION INTRAMUSCULAR; INTRAVENOUS ONCE
Status: COMPLETED | OUTPATIENT
Start: 2024-05-18 | End: 2024-05-18

## 2024-05-18 RX ORDER — ACETAMINOPHEN 500 MG
1000 TABLET ORAL
Status: COMPLETED | OUTPATIENT
Start: 2024-05-18 | End: 2024-05-18

## 2024-05-18 RX ORDER — HYDROCODONE BITARTRATE AND ACETAMINOPHEN 5; 325 MG/1; MG/1
1 TABLET ORAL EVERY 6 HOURS PRN
Qty: 12 TABLET | Refills: 0 | Status: SHIPPED | OUTPATIENT
Start: 2024-05-18 | End: 2024-05-21

## 2024-05-18 RX ADMIN — FENTANYL CITRATE 100 MCG: 50 INJECTION INTRAMUSCULAR; INTRAVENOUS at 12:09

## 2024-05-18 RX ADMIN — FENTANYL CITRATE 50 MCG: 50 INJECTION INTRAMUSCULAR; INTRAVENOUS at 14:08

## 2024-05-18 RX ADMIN — DIPHENHYDRAMINE HYDROCHLORIDE 25 MG: 50 INJECTION INTRAMUSCULAR; INTRAVENOUS at 09:01

## 2024-05-18 RX ADMIN — ONDANSETRON 4 MG: 2 INJECTION INTRAMUSCULAR; INTRAVENOUS at 08:46

## 2024-05-18 RX ADMIN — ACETAMINOPHEN 1000 MG: 500 TABLET ORAL at 09:00

## 2024-05-18 RX ADMIN — MORPHINE SULFATE 4 MG: 4 INJECTION, SOLUTION INTRAMUSCULAR; INTRAVENOUS at 08:46

## 2024-05-18 ASSESSMENT — PAIN DESCRIPTION - FREQUENCY
FREQUENCY: CONTINUOUS

## 2024-05-18 ASSESSMENT — PAIN - FUNCTIONAL ASSESSMENT
PAIN_FUNCTIONAL_ASSESSMENT: ACTIVITIES ARE NOT PREVENTED
PAIN_FUNCTIONAL_ASSESSMENT: PREVENTS OR INTERFERES WITH MANY ACTIVE NOT PASSIVE ACTIVITIES
PAIN_FUNCTIONAL_ASSESSMENT: PREVENTS OR INTERFERES WITH MANY ACTIVE NOT PASSIVE ACTIVITIES

## 2024-05-18 ASSESSMENT — PAIN DESCRIPTION - ONSET
ONSET: ON-GOING
ONSET: ON-GOING

## 2024-05-18 ASSESSMENT — PAIN DESCRIPTION - LOCATION
LOCATION: ARM
LOCATION: SHOULDER

## 2024-05-18 ASSESSMENT — PAIN DESCRIPTION - DESCRIPTORS
DESCRIPTORS: PENETRATING
DESCRIPTORS: ACHING
DESCRIPTORS: ACHING

## 2024-05-18 ASSESSMENT — PAIN DESCRIPTION - PAIN TYPE
TYPE: ACUTE PAIN
TYPE: ACUTE PAIN

## 2024-05-18 ASSESSMENT — PAIN SCALES - GENERAL
PAINLEVEL_OUTOF10: 10

## 2024-05-18 ASSESSMENT — PAIN DESCRIPTION - ORIENTATION
ORIENTATION: LEFT
ORIENTATION: RIGHT;ANTERIOR

## 2024-05-18 NOTE — FLOWSHEET NOTE
Primary RN SBAR: Amisha Ferrer RN  Patient Education provided: Dialysis treatment order, access care  Preferred Education method and Primary language: Verbal, English  Hospital associated wait time; reason: none  Comment: First HD treatment. Has non functioning PD cath.  Patient had broken/injured left shoulder. AVF on left lower arm, requiring sedation to access AVF due to pain. Cannulated with 17G needle without issues. , Dr. Guerrero made aware.    Hepatitis B Surface Ag   Date/Time Value Ref Range Status   05/18/2024 12:49 PM <0.10 Index Final     Hep B S Ab   Date/Time Value Ref Range Status   05/18/2024 12:49 PM >1000.00 mIU/mL Final      PRE HD TREATMENT   05/18/24 1216   Vital Signs   BP (!) 154/45   Temp 98 °F (36.7 °C)   Pulse 67   Respirations 21   SpO2 98 %   Pain Assessment   Pain Assessment 0-10   Pain Level 10  (when arm is moved)   Pain Location Shoulder   Pain Orientation Left   Patient's Stated Pain Goal 0 - No pain   Treatment   Time On 1236   Time Off 1536   Treatment Goal 2.5L, 3 hours   Observations & Evaluations   Level of Consciousness 0   Oriented X 4   Heart Rhythm Regular  (sinus rhythm)   Respiratory Quality/Effort Dyspnea with exertion   O2 Device Nasal cannula   Bilateral Breath Sounds Diminished   Skin Color Ecchymosis (comment)  (left upper arm)   Skin Condition/Temp Warm   Abdomen Inspection Soft  (PD cath)   Edema Generalized   Edema Generalized +1   Technical Checks   Dialysis Machine No. 01   RO Machine Number [id number]   Dialyzer Lot No. [id number]   Tubing Lot Number [id number]   All Connections Secure Yes   NS Bag Yes   Saline Line Double Clamped Yes   Dialyzer Revaclear 300   Prime Volume (mL) 200 mL   ICEBOAT I;C;E;B;O;A;T   RO Machine Log Sheet Completed Yes   Machine Alarm Self Test Completed;Passed   Air Foam Detector Tested;Proper Function;pH Reading   Extracorporeal Circuit Tested for Integrity Yes   Machine Conductivity 13.8   Bleach Test (Neg) Yes   Dialysis Bath   K+

## 2024-05-18 NOTE — PROGRESS NOTES
Appreciate being called by the ED.  Patient with signs of fluid overload.  Usually ESRD on PD.  PD output was not working.  Clinical and radiological evidence of fluid overload with hypoxic respiratory failure.  Unfortunately with left humeral head fracture and we may need to cannulate left upper extremity aVF which will be painful for the patient.  Request appropriate sedation so she could tolerate the procedure.  [facility] Acute team called.  3 hours of dialysis prescription put in.  Will be seeing the patient soon.

## 2024-05-18 NOTE — CONSULTS
Hyaline Casts, UA 0-2 0 - 5 /lpf   Potassium    Collection Time: 05/18/24  8:48 AM   Result Value Ref Range    Potassium 4.2 3.5 - 5.1 mmol/L   Magnesium    Collection Time: 05/18/24  8:48 AM   Result Value Ref Range    Magnesium 1.8 1.6 - 2.4 mg/dL   Extra Tubes Hold    Collection Time: 05/18/24  8:48 AM   Result Value Ref Range    Specimen HOld 1LAV     Comment:        Add-on orders for these samples will be processed based on acceptable specimen integrity and analyte stability, which may vary by analyte.   Hepatitis B Surface Antigen    Collection Time: 05/18/24 12:49 PM   Result Value Ref Range    Hepatitis B Surface Ag <0.10 Index    Hep B S Ag Interp Negative NEG     Hepatitis B Surface Antibody    Collection Time: 05/18/24 12:49 PM   Result Value Ref Range    Hep B S Ab >1000.00 mIU/mL    Hep B S Ab Interp REACTIVE (A) NR           XR CHEST PORTABLE   Final Result   Moderate cardiomegaly. Probable pulmonary vascular congestive   changes.         CT ABDOMEN PELVIS WO CONTRAST Additional Contrast? None   Final Result   No bowel obstruction, ileus or perforation. No intra-abdominal   abscess              Patient Active Problem List   Diagnosis    SOB (shortness of breath)    Pericardial effusion    Acute on chronic diastolic heart failure (HCC)    Normocytic anemia    Type 2 diabetes mellitus, with long-term current use of insulin (HCC)    COPD (chronic obstructive pulmonary disease) (HCC)    Hyperglycemia    Acute kidney injury (HCC)    Elevated d-dimer    Morbid obesity with BMI of 40.0-44.9, adult (HCC)    Obstructive sleep apnea    Essential hypertension    Moderate persistent asthma without complication    Pulmonary hypertension (HCC)    Nonobstructive atherosclerosis of coronary artery    CKD (chronic kidney disease) stage 3, GFR 30-59 ml/min (HCC)    Pulmonary edema with congestive heart failure (HCC)    Palliative care patient    Chest discomfort    Mixed hyperlipidemia    Coronary artery disease  involving native coronary artery of native heart without angina pectoris    CHF (congestive heart failure), NYHA class I, acute on chronic, combined ([facility])    Pulmonary nodules    NSTEMI (non-ST elevated myocardial infarction) ([facility])    Neuropathy of right lower extremity    Restless leg syndrome    Acute on chronic diastolic CHF (congestive heart failure) ([facility])    Type 2 diabetes with nephropathy ([facility])    Chest pain    Status post coronary artery stent placement    Essential hypertension with goal blood pressure less than 130/85    Severe obesity ([facility])    Obstructive sleep apnea    DM type 2, uncontrolled, with neuropathy    Hyperlipidemia    Pericardial effusion with cardiac tamponade    TIA (transient ischemic attack)    Coronary artery disease involving native coronary artery of native heart with angina pectoris ([facility])    Community acquired pneumonia of right middle lobe of lung    Asthma    CAP (community acquired pneumonia)      Prior     Care Plan discussed with:  Patient x    Family      RN x    Dialysis RN x    Consultant:     ED x    Intensivist     Hospitalist         Comments   >50% of visit spent in counseling and coordination of care         This dictation was done by dragon, computer voice recognition software.  Often unanticipated grammatical, syntax, phones and other interpretive errors are inadvertently transcribed.  Please excuse errors that have escaped final proofreading. Please contact me if you suspect dictation or transcription errors.      Signed By: Loren Guerrero MD     May 18, 2024       Dr Loren Guerrero    Office No- [id number]  [facility]  [address]  [address]  [place] [address]    Fax: [phone]

## 2024-05-18 NOTE — ED PROVIDER NOTES
EMERGENCY DEPARTMENT PHYSICIAN NOTE     Patient: Marisela Vizcaino     Time of Service: 2024  7:29 AM     Chief complaint:   Chief Complaint   Patient presents with    Shortness of Breath    Nausea    Constipation        HISTORY:  Patient is a 64 y.o. female who presents to the emergency department with complaints of peritoneal dialysis catheter problem.      Past Medical History:   Diagnosis Date    Allergies     AMI (acute myocardial infarction) (HCC) 2018    Asthma     CAD (coronary artery disease)     hx of stents    Cerebral artery occlusion with cerebral infarction (HCC)     R sided numbness/weakness    CHF (congestive heart failure) (HCC)     COPD (chronic obstructive pulmonary disease) (HCC)     Depression     Diabetes mellitus (HCC)     DVT (deep vein thrombosis) in pregnancy     GERD (gastroesophageal reflux disease)     Heart attack (HCC) 2021    Hx of blood clots     rt leg    Hyperlipidemia     Hypertension     Other disorders of kidney and ureter in diseases classified elsewhere     Palliative care patient 2020    Pneumonia         Past Surgical History:   Procedure Laterality Date    CATARACT REMOVAL Bilateral     COLONOSCOPY  approx 2013    CORONARY ANGIOPLASTY WITH STENT PLACEMENT  2018    in [facility] (3 stents)    TONSILLECTOMY          Family History   Problem Relation Age of Onset    Colon Cancer Neg Hx     Colon Polyps Neg Hx     Esophageal Cancer Neg Hx     Liver Cancer Neg Hx     Liver Disease Neg Hx     Rectal Cancer Neg Hx     Stomach Cancer Neg Hx         Social History     Socioeconomic History    Marital status:    Tobacco Use    Smoking status: Former     Current packs/day: 0.00     Average packs/day: 1 pack/day for 30.0 years (30.0 ttl pk-yrs)     Types: Cigarettes     Start date:      Quit date:      Years since quittin.3    Smokeless tobacco: Never   Vaping Use    Vaping Use: Never used   Substance and Sexual Activity     segment elevations.    While patient was in the emerged part she did become hypoxic down to 86% she was placed in 2 L nasal cannula.  Nephrology was consulted and did arrange for hemodialysis patient has become hypoxic wearing oxygen, and appears to be fluid overloaded.  Findings were discussed with the patient at bedside.  She was given morphine and fentanyl boluses during the dialysis procedure to help with pain control because of her left shoulder fracture.    Patient will be handed to my colleague, Dr. Cortes, pending hemodialysis and final disposition.      It is my clinical impression today patient presents for: CKD requiring dialysis, left humeral head fx        I've discussed my findings, impression in detail with the patient at the bedside.  I have provided the opportunity to ask questions, and have addressed all questions at the bedside.    Expectations, return precautions verbalized at bedside.                      * Document created with voice recognition software which can lead to typographical errors.    Amount and/or Complexity of Data Reviewed  Labs: ordered. Decision-making details documented in ED Course.  Radiology: ordered.  ECG/medicine tests: ordered.    Risk  OTC drugs.  Prescription drug management.          Procedures       DISPOSITION: Decision To Discharge 05/18/2024 04:25:55 PM    CLINICAL IMPRESSION:   1. Chronic kidney disease requiring chronic dialysis (HCC)    2. Closed fracture of head of left humerus, initial encounter         Further personalized recommendations for outpatient care as below.        Prescriptions provided to the patient:     Discharge Medication List as of 5/18/2024  4:26 PM        START taking these medications    Details   HYDROcodone-acetaminophen (NORCO) 5-325 MG per tablet Take 1 tablet by mouth every 6 hours as needed for Pain for up to 3 days. Intended supply: 3 days. Take lowest dose possible to manage pain Max Daily Amount: 4 tablets, Disp-12 tablet,

## 2024-05-18 NOTE — ED PROVIDER NOTES
Change of shift. Care of patient taken over from Dr. Santos; H&P reviewed, handoff complete.      Patient from [place] here visiting family, dialysis patient presented today for dialysis.  Moderately volume overloaded.  Receiving dialysis, plan from Dr. Machado was to discharge once dialysis is completed. Patient's completed dialysis, she does have a known left humeral head fracture.  Has ran out of her pain medicine.  She is more controlled and is returning here shortly.  I think it is reasonable to give her a small course of Norco for her pain and have her follow-up with her orthopedic in [place].  Directed the patient return to the ER if she is unable to get dialysis next 2 to 3 days.  Return as needed    LABORATORY TESTS:  Recent Results (from the past 12 hour(s))   CBC with Auto Differential    Collection Time: 05/18/24  7:29 AM   Result Value Ref Range    WBC 5.4 3.6 - 11.0 K/uL    RBC 3.19 (L) 3.80 - 5.20 M/uL    Hemoglobin 10.0 (L) 11.5 - 16.0 g/dL    Hematocrit 31.3 (L) 35.0 - 47.0 %    MCV 98.1 80.0 - 99.0 FL    MCH 31.3 26.0 - 34.0 PG    MCHC 31.9 30.0 - 36.5 g/dL    RDW 14.6 (H) 11.5 - 14.5 %    Platelets 216 150 - 400 K/uL    MPV 9.7 8.9 - 12.9 FL    Nucleated RBCs 0.0 0  WBC    nRBC 0.00 0.00 - 0.01 K/uL    Neutrophils % 70 32 - 75 %    Lymphocytes % 17 12 - 49 %    Monocytes % 10 5 - 13 %    Eosinophils % 2 0 - 7 %    Basophils % 0 0 - 1 %    Immature Granulocytes % 1 (H) 0.0 - 0.5 %    Neutrophils Absolute 3.8 1.8 - 8.0 K/UL    Lymphocytes Absolute 0.9 0.8 - 3.5 K/UL    Monocytes Absolute 0.5 0.0 - 1.0 K/UL    Eosinophils Absolute 0.1 0.0 - 0.4 K/UL    Basophils Absolute 0.0 0.0 - 0.1 K/UL    Immature Granulocytes Absolute 0.0 0.00 - 0.04 K/UL    Differential Type AUTOMATED     Comprehensive Metabolic Panel    Collection Time: 05/18/24  7:29 AM   Result Value Ref Range    Sodium 133 (L) 136 - 145 mmol/L    Potassium HEMOLYZED,RECOLLECT REQUESTED 3.5 - 5.1 mmol/L    Chloride 103 97 - 108 mmol/L    abscess             MEDICATIONS GIVEN:   Medications   fentaNYL (SUBLIMAZE) injection 50 mcg (50 mcg IntraVENous Given 5/18/24 1408)   ondansetron (ZOFRAN) injection 4 mg (4 mg IntraVENous Given 5/18/24 0846)   morphine (PF) injection 4 mg (4 mg IntraVENous Given 5/18/24 0846)   diphenhydrAMINE (BENADRYL) injection 25 mg (25 mg IntraVENous Given 5/18/24 0901)   acetaminophen (TYLENOL) tablet 1,000 mg (1,000 mg Oral Given 5/18/24 0900)   fentaNYL (SUBLIMAZE) injection 100 mcg (100 mcg IntraVENous Given 5/18/24 1209)       IMPRESSION:   1. Chronic kidney disease requiring chronic dialysis (HCC)    2. Closed fracture of head of left humerus, initial encounter        PLAN:   PATIENT REFERRED TO:   Marta Boykin MD  [address] [place] [address] [address]  [facility] [address]  [phone]          [place] [address]  [phone]        DISCHARGE MEDICATIONS:  New Prescriptions    HYDROCODONE-ACETAMINOPHEN (NORCO) 5-325 MG PER TABLET    Take 1 tablet by mouth every 6 hours as needed for Pain for up to 3 days. Intended supply: 3 days. Take lowest dose possible to manage pain Max Daily Amount: 4 tablets     Return to the ED if worse    (Please note that portions of this note were completed with a voice recognition program.  Efforts were made to edit the dictations but occasionally words are mis-transcribed.)    Alvarez Cortes DO (electronically signed)       Alvarez Cortes DO  05/18/24 0175

## 2024-05-18 NOTE — DISCHARGE INSTRUCTIONS
Please follow up in [place] for your dialysis need and your humeral head fracture. Return if needed or you cannot get dialysis before this time come back to the ER.

## 2024-05-18 NOTE — ED TRIAGE NOTES
Pt arrives to ED with c/o SOB, nausea that started an hour ago. Pt does peritoneal dialysis at home daily. Pt's last dialysis was on Wednesday. Was seen on 5/16 due to clogged PD line.Pt has fistula in her left arm. Pt reports her left shoulder is fractured, but she does not wear a sling     Pt also endorses constipation after taking percocet for L arm pain. Last BM 3 days ago. Pt took stool softener about 24 hr ago.

## 2024-05-18 NOTE — ED NOTES
Bedside shift change report given to Delmis RN and Amisha RN (oncoming nurse) by Jose RN (offgoing nurse). Report included the following information Nurse Handoff Report, Index, ED Encounter Summary, ED SBAR, Adult Overview, MAR, and Neuro Assessment.

## 2024-05-23 NOTE — ED PROVIDER NOTES
portions of this note were completed with a voice recognition program.  Efforts were made to edit the dictations but occasionally words are mis-transcribed.)    Jeimy Richardson DO (electronically signed)  Emergency Attending Physician / Physician Assistant / Nurse Practitioner            Jeimy Richardson DO  05/22/24 [id number]

## 2024-05-24 ENCOUNTER — HOSPITAL ENCOUNTER (OUTPATIENT)
Facility: HOSPITAL | Age: 64
Setting detail: OBSERVATION
Discharge: HOME | End: 2024-05-26
Attending: EMERGENCY MEDICINE | Admitting: INTERNAL MEDICINE
Payer: COMMERCIAL

## 2024-05-24 ENCOUNTER — APPOINTMENT (OUTPATIENT)
Facility: HOSPITAL | Age: 64
End: 2024-05-24
Payer: COMMERCIAL

## 2024-05-24 DIAGNOSIS — J42 CHRONIC BRONCHITIS, UNSPECIFIED CHRONIC BRONCHITIS TYPE (HCC): ICD-10-CM

## 2024-05-24 DIAGNOSIS — Z99.2 ESRD (END STAGE RENAL DISEASE) ON DIALYSIS (HCC): ICD-10-CM

## 2024-05-24 DIAGNOSIS — R07.9 CHEST PAIN, UNSPECIFIED TYPE: Primary | ICD-10-CM

## 2024-05-24 DIAGNOSIS — N18.6 ESRD (END STAGE RENAL DISEASE) ON DIALYSIS (HCC): ICD-10-CM

## 2024-05-24 PROCEDURE — 71045 X-RAY EXAM CHEST 1 VIEW: CPT

## 2024-05-24 PROCEDURE — 83880 ASSAY OF NATRIURETIC PEPTIDE: CPT

## 2024-05-24 PROCEDURE — 85025 COMPLETE CBC W/AUTO DIFF WBC: CPT

## 2024-05-24 PROCEDURE — 84484 ASSAY OF TROPONIN QUANT: CPT

## 2024-05-24 PROCEDURE — 99285 EMERGENCY DEPT VISIT HI MDM: CPT

## 2024-05-24 PROCEDURE — 93005 ELECTROCARDIOGRAM TRACING: CPT | Performed by: INTERNAL MEDICINE

## 2024-05-24 PROCEDURE — 80053 COMPREHEN METABOLIC PANEL: CPT

## 2024-05-24 PROCEDURE — 83735 ASSAY OF MAGNESIUM: CPT

## 2024-05-24 PROCEDURE — 83690 ASSAY OF LIPASE: CPT

## 2024-05-24 RX ORDER — ASPIRIN 81 MG/1
324 TABLET, CHEWABLE ORAL
Status: ACTIVE | OUTPATIENT
Start: 2024-05-24 | End: 2024-05-25

## 2024-05-24 RX ORDER — MORPHINE SULFATE 4 MG/ML
4 INJECTION, SOLUTION INTRAMUSCULAR; INTRAVENOUS
Status: COMPLETED | OUTPATIENT
Start: 2024-05-24 | End: 2024-05-25

## 2024-05-24 RX ORDER — NITROGLYCERIN 0.4 MG/1
0.4 TABLET SUBLINGUAL EVERY 5 MIN PRN
Status: DISCONTINUED | OUTPATIENT
Start: 2024-05-24 | End: 2024-05-26 | Stop reason: HOSPADM

## 2024-05-24 ASSESSMENT — PAIN DESCRIPTION - ONSET: ONSET: SUDDEN

## 2024-05-24 ASSESSMENT — PAIN DESCRIPTION - ORIENTATION: ORIENTATION: MID

## 2024-05-24 ASSESSMENT — PAIN DESCRIPTION - LOCATION: LOCATION: CHEST

## 2024-05-24 ASSESSMENT — PAIN - FUNCTIONAL ASSESSMENT: PAIN_FUNCTIONAL_ASSESSMENT: 0-10

## 2024-05-24 ASSESSMENT — PAIN DESCRIPTION - PAIN TYPE: TYPE: ACUTE PAIN

## 2024-05-24 ASSESSMENT — PAIN DESCRIPTION - FREQUENCY: FREQUENCY: INTERMITTENT

## 2024-05-24 ASSESSMENT — PAIN DESCRIPTION - DESCRIPTORS: DESCRIPTORS: ACHING

## 2024-05-24 ASSESSMENT — PAIN SCALES - GENERAL: PAINLEVEL_OUTOF10: 7

## 2024-05-25 ENCOUNTER — APPOINTMENT (OUTPATIENT)
Facility: HOSPITAL | Age: 64
End: 2024-05-25
Attending: STUDENT IN AN ORGANIZED HEALTH CARE EDUCATION/TRAINING PROGRAM
Payer: COMMERCIAL

## 2024-05-25 LAB
ALBUMIN SERPL-MCNC: 2.6 G/DL (ref 3.5–5)
ALBUMIN/GLOB SERPL: 0.7 (ref 1.1–2.2)
ALP SERPL-CCNC: 130 U/L (ref 45–117)
ALT SERPL-CCNC: 15 U/L (ref 12–78)
ANION GAP SERPL CALC-SCNC: 5 MMOL/L (ref 5–15)
AST SERPL-CCNC: 16 U/L (ref 15–37)
B PERT DNA SPEC QL NAA+PROBE: NOT DETECTED
BASOPHILS # BLD: 0 K/UL (ref 0–0.1)
BASOPHILS NFR BLD: 0 % (ref 0–1)
BILIRUB SERPL-MCNC: 0.3 MG/DL (ref 0.2–1)
BORDETELLA PARAPERTUSSIS BY PCR: NOT DETECTED
BUN SERPL-MCNC: 58 MG/DL (ref 6–20)
BUN/CREAT SERPL: 18 (ref 12–20)
C PNEUM DNA SPEC QL NAA+PROBE: NOT DETECTED
CALCIUM SERPL-MCNC: 9.1 MG/DL (ref 8.5–10.1)
CHLORIDE SERPL-SCNC: 106 MMOL/L (ref 97–108)
CO2 SERPL-SCNC: 26 MMOL/L (ref 21–32)
COMMENT:: NORMAL
CREAT SERPL-MCNC: 3.29 MG/DL (ref 0.55–1.02)
CRP SERPL-MCNC: 0.82 MG/DL (ref 0–0.3)
DIFFERENTIAL METHOD BLD: ABNORMAL
ECHO AO ASC DIAM: 1 CM
ECHO AO ASCENDING AORTA INDEX: 0.55 CM/M2
ECHO AO ROOT DIAM: 3 CM
ECHO AO ROOT INDEX: 1.65 CM/M2
ECHO BSA: 1.9 M2
ECHO LA DIAMETER INDEX: 1.87 CM/M2
ECHO LA DIAMETER: 3.4 CM
ECHO LA TO AORTIC ROOT RATIO: 1.13
ECHO LV FRACTIONAL SHORTENING: 29 % (ref 28–44)
ECHO LV INTERNAL DIMENSION DIASTOLE INDEX: 2.69 CM/M2
ECHO LV INTERNAL DIMENSION DIASTOLIC: 4.9 CM (ref 3.9–5.3)
ECHO LV INTERNAL DIMENSION SYSTOLIC INDEX: 1.92 CM/M2
ECHO LV INTERNAL DIMENSION SYSTOLIC: 3.5 CM
ECHO LV IVSD: 1.2 CM (ref 0.6–0.9)
ECHO LV MASS 2D: 226.4 G (ref 67–162)
ECHO LV MASS INDEX 2D: 124.4 G/M2 (ref 43–95)
ECHO LV POSTERIOR WALL DIASTOLIC: 1.2 CM (ref 0.6–0.9)
ECHO LV RELATIVE WALL THICKNESS RATIO: 0.49
ECHO LVOT AREA: 2.8 CM2
ECHO LVOT DIAM: 1.9 CM
ECHO TV REGURGITANT MAX VELOCITY: 2.16 M/S
ECHO TV REGURGITANT PEAK GRADIENT: 19 MMHG
EOSINOPHIL # BLD: 0.1 K/UL (ref 0–0.4)
EOSINOPHIL NFR BLD: 1 % (ref 0–7)
ERYTHROCYTE [DISTWIDTH] IN BLOOD BY AUTOMATED COUNT: 14.3 % (ref 11.5–14.5)
ERYTHROCYTE [SEDIMENTATION RATE] IN BLOOD: 65 MM/HR (ref 0–30)
EST. AVERAGE GLUCOSE BLD GHB EST-MCNC: 114 MG/DL
FLUAV SUBTYP SPEC NAA+PROBE: NOT DETECTED
FLUBV RNA SPEC QL NAA+PROBE: NOT DETECTED
GLOBULIN SER CALC-MCNC: 4 G/DL (ref 2–4)
GLUCOSE BLD STRIP.AUTO-MCNC: 115 MG/DL (ref 65–117)
GLUCOSE BLD STRIP.AUTO-MCNC: 123 MG/DL (ref 65–117)
GLUCOSE BLD STRIP.AUTO-MCNC: 133 MG/DL (ref 65–117)
GLUCOSE BLD STRIP.AUTO-MCNC: 88 MG/DL (ref 65–117)
GLUCOSE SERPL-MCNC: 177 MG/DL (ref 65–100)
HADV DNA SPEC QL NAA+PROBE: NOT DETECTED
HBA1C MFR BLD: 5.6 % (ref 4–5.6)
HCOV 229E RNA SPEC QL NAA+PROBE: NOT DETECTED
HCOV HKU1 RNA SPEC QL NAA+PROBE: NOT DETECTED
HCOV NL63 RNA SPEC QL NAA+PROBE: NOT DETECTED
HCOV OC43 RNA SPEC QL NAA+PROBE: NOT DETECTED
HCT VFR BLD AUTO: 31.8 % (ref 35–47)
HGB BLD-MCNC: 10.3 G/DL (ref 11.5–16)
HMPV RNA SPEC QL NAA+PROBE: NOT DETECTED
HPIV1 RNA SPEC QL NAA+PROBE: NOT DETECTED
HPIV2 RNA SPEC QL NAA+PROBE: NOT DETECTED
HPIV3 RNA SPEC QL NAA+PROBE: NOT DETECTED
HPIV4 RNA SPEC QL NAA+PROBE: NOT DETECTED
IMM GRANULOCYTES # BLD AUTO: 0 K/UL (ref 0–0.04)
IMM GRANULOCYTES NFR BLD AUTO: 0 % (ref 0–0.5)
LIPASE SERPL-CCNC: 92 U/L (ref 13–75)
LYMPHOCYTES # BLD: 1.2 K/UL (ref 0.8–3.5)
LYMPHOCYTES NFR BLD: 19 % (ref 12–49)
M PNEUMO DNA SPEC QL NAA+PROBE: NOT DETECTED
MAGNESIUM SERPL-MCNC: 2 MG/DL (ref 1.6–2.4)
MCH RBC QN AUTO: 31 PG (ref 26–34)
MCHC RBC AUTO-ENTMCNC: 32.4 G/DL (ref 30–36.5)
MCV RBC AUTO: 95.8 FL (ref 80–99)
MONOCYTES # BLD: 0.5 K/UL (ref 0–1)
MONOCYTES NFR BLD: 8 % (ref 5–13)
NEUTS SEG # BLD: 4.5 K/UL (ref 1.8–8)
NEUTS SEG NFR BLD: 72 % (ref 32–75)
NRBC # BLD: 0 K/UL (ref 0–0.01)
NRBC BLD-RTO: 0 PER 100 WBC
NT PRO BNP: ABNORMAL PG/ML
PLATELET # BLD AUTO: 235 K/UL (ref 150–400)
PMV BLD AUTO: 8.2 FL (ref 8.9–12.9)
POTASSIUM SERPL-SCNC: 4.1 MMOL/L (ref 3.5–5.1)
PROT SERPL-MCNC: 6.6 G/DL (ref 6.4–8.2)
RBC # BLD AUTO: 3.32 M/UL (ref 3.8–5.2)
RSV RNA SPEC QL NAA+PROBE: NOT DETECTED
RV+EV RNA SPEC QL NAA+PROBE: DETECTED
SARS-COV-2 RNA RESP QL NAA+PROBE: NOT DETECTED
SERVICE CMNT-IMP: ABNORMAL
SERVICE CMNT-IMP: ABNORMAL
SERVICE CMNT-IMP: NORMAL
SERVICE CMNT-IMP: NORMAL
SODIUM SERPL-SCNC: 137 MMOL/L (ref 136–145)
SPECIMEN HOLD: NORMAL
TROPONIN I SERPL HS-MCNC: 48 NG/L (ref 0–51)
TROPONIN I SERPL HS-MCNC: 52 NG/L (ref 0–51)
TROPONIN I SERPL HS-MCNC: 58 NG/L (ref 0–51)
WBC # BLD AUTO: 6.3 K/UL (ref 3.6–11)

## 2024-05-25 PROCEDURE — 6370000000 HC RX 637 (ALT 250 FOR IP): Performed by: EMERGENCY MEDICINE

## 2024-05-25 PROCEDURE — 6370000000 HC RX 637 (ALT 250 FOR IP): Performed by: INTERNAL MEDICINE

## 2024-05-25 PROCEDURE — 6370000000 HC RX 637 (ALT 250 FOR IP): Performed by: STUDENT IN AN ORGANIZED HEALTH CARE EDUCATION/TRAINING PROGRAM

## 2024-05-25 PROCEDURE — 93308 TTE F-UP OR LMTD: CPT

## 2024-05-25 PROCEDURE — G0378 HOSPITAL OBSERVATION PER HR: HCPCS

## 2024-05-25 PROCEDURE — 36415 COLL VENOUS BLD VENIPUNCTURE: CPT

## 2024-05-25 PROCEDURE — 86140 C-REACTIVE PROTEIN: CPT

## 2024-05-25 PROCEDURE — 85652 RBC SED RATE AUTOMATED: CPT

## 2024-05-25 PROCEDURE — 2700000000 HC OXYGEN THERAPY PER DAY

## 2024-05-25 PROCEDURE — 94761 N-INVAS EAR/PLS OXIMETRY MLT: CPT

## 2024-05-25 PROCEDURE — 6360000002 HC RX W HCPCS: Performed by: INTERNAL MEDICINE

## 2024-05-25 PROCEDURE — 83036 HEMOGLOBIN GLYCOSYLATED A1C: CPT

## 2024-05-25 PROCEDURE — 2580000003 HC RX 258: Performed by: INTERNAL MEDICINE

## 2024-05-25 PROCEDURE — 82962 GLUCOSE BLOOD TEST: CPT

## 2024-05-25 PROCEDURE — 96374 THER/PROPH/DIAG INJ IV PUSH: CPT

## 2024-05-25 PROCEDURE — 6360000002 HC RX W HCPCS: Performed by: EMERGENCY MEDICINE

## 2024-05-25 PROCEDURE — 96372 THER/PROPH/DIAG INJ SC/IM: CPT

## 2024-05-25 PROCEDURE — 0202U NFCT DS 22 TRGT SARS-COV-2: CPT

## 2024-05-25 RX ORDER — ASPIRIN 81 MG/1
81 TABLET, CHEWABLE ORAL DAILY
Status: DISCONTINUED | OUTPATIENT
Start: 2024-05-26 | End: 2024-05-26 | Stop reason: HOSPADM

## 2024-05-25 RX ORDER — CARVEDILOL 3.12 MG/1
3.12 TABLET ORAL 2 TIMES DAILY
Status: DISCONTINUED | OUTPATIENT
Start: 2024-05-25 | End: 2024-05-25

## 2024-05-25 RX ORDER — SODIUM CHLORIDE 0.9 % (FLUSH) 0.9 %
5-40 SYRINGE (ML) INJECTION EVERY 12 HOURS SCHEDULED
Status: DISCONTINUED | OUTPATIENT
Start: 2024-05-25 | End: 2024-05-26 | Stop reason: HOSPADM

## 2024-05-25 RX ORDER — CETIRIZINE HYDROCHLORIDE 10 MG/1
5 TABLET ORAL
Status: DISCONTINUED | OUTPATIENT
Start: 2024-05-25 | End: 2024-05-26 | Stop reason: HOSPADM

## 2024-05-25 RX ORDER — SODIUM CHLORIDE 0.9 % (FLUSH) 0.9 %
5-40 SYRINGE (ML) INJECTION PRN
Status: DISCONTINUED | OUTPATIENT
Start: 2024-05-25 | End: 2024-05-26 | Stop reason: HOSPADM

## 2024-05-25 RX ORDER — ONDANSETRON 4 MG/1
4 TABLET, ORALLY DISINTEGRATING ORAL EVERY 8 HOURS PRN
Status: DISCONTINUED | OUTPATIENT
Start: 2024-05-25 | End: 2024-05-26 | Stop reason: HOSPADM

## 2024-05-25 RX ORDER — ACETAMINOPHEN 500 MG
1000 TABLET ORAL EVERY 8 HOURS
Status: DISCONTINUED | OUTPATIENT
Start: 2024-05-25 | End: 2024-05-26 | Stop reason: HOSPADM

## 2024-05-25 RX ORDER — LIDOCAINE AND PRILOCAINE 25; 25 MG/G; MG/G
CREAM TOPICAL AS NEEDED
Status: DISCONTINUED | OUTPATIENT
Start: 2024-05-25 | End: 2024-05-26 | Stop reason: HOSPADM

## 2024-05-25 RX ORDER — ENOXAPARIN SODIUM 100 MG/ML
30 INJECTION SUBCUTANEOUS DAILY
Status: DISCONTINUED | OUTPATIENT
Start: 2024-05-25 | End: 2024-05-26

## 2024-05-25 RX ORDER — BUMETANIDE 1 MG/1
2 TABLET ORAL 2 TIMES DAILY
Status: DISCONTINUED | OUTPATIENT
Start: 2024-05-25 | End: 2024-05-26 | Stop reason: HOSPADM

## 2024-05-25 RX ORDER — LANOLIN ALCOHOL/MO/W.PET/CERES
9 CREAM (GRAM) TOPICAL NIGHTLY PRN
Status: DISCONTINUED | OUTPATIENT
Start: 2024-05-25 | End: 2024-05-26 | Stop reason: HOSPADM

## 2024-05-25 RX ORDER — CARVEDILOL 3.12 MG/1
3.12 TABLET ORAL 2 TIMES DAILY
Status: DISCONTINUED | OUTPATIENT
Start: 2024-05-25 | End: 2024-05-26 | Stop reason: HOSPADM

## 2024-05-25 RX ORDER — INSULIN LISPRO 100 [IU]/ML
0-8 INJECTION, SOLUTION INTRAVENOUS; SUBCUTANEOUS
Status: DISCONTINUED | OUTPATIENT
Start: 2024-05-25 | End: 2024-05-26 | Stop reason: HOSPADM

## 2024-05-25 RX ORDER — ONDANSETRON 2 MG/ML
4 INJECTION INTRAMUSCULAR; INTRAVENOUS EVERY 6 HOURS PRN
Status: DISCONTINUED | OUTPATIENT
Start: 2024-05-25 | End: 2024-05-26 | Stop reason: HOSPADM

## 2024-05-25 RX ORDER — LORAZEPAM 1 MG/1
1 TABLET ORAL
Status: COMPLETED | OUTPATIENT
Start: 2024-05-25 | End: 2024-05-25

## 2024-05-25 RX ORDER — INSULIN LISPRO 100 [IU]/ML
0-4 INJECTION, SOLUTION INTRAVENOUS; SUBCUTANEOUS NIGHTLY
Status: DISCONTINUED | OUTPATIENT
Start: 2024-05-25 | End: 2024-05-26 | Stop reason: HOSPADM

## 2024-05-25 RX ORDER — OXYCODONE HYDROCHLORIDE 5 MG/1
10 TABLET ORAL
Status: COMPLETED | OUTPATIENT
Start: 2024-05-25 | End: 2024-05-25

## 2024-05-25 RX ORDER — ATORVASTATIN CALCIUM 20 MG/1
40 TABLET, FILM COATED ORAL NIGHTLY
Status: DISCONTINUED | OUTPATIENT
Start: 2024-05-25 | End: 2024-05-26 | Stop reason: HOSPADM

## 2024-05-25 RX ORDER — AZELASTINE 1 MG/ML
1 SPRAY, METERED NASAL 2 TIMES DAILY
Status: DISCONTINUED | OUTPATIENT
Start: 2024-05-25 | End: 2024-05-25

## 2024-05-25 RX ORDER — ACETAMINOPHEN 650 MG/1
650 SUPPOSITORY RECTAL EVERY 6 HOURS PRN
Status: DISCONTINUED | OUTPATIENT
Start: 2024-05-25 | End: 2024-05-25

## 2024-05-25 RX ORDER — OXYCODONE HYDROCHLORIDE 5 MG/1
5 TABLET ORAL EVERY 4 HOURS PRN
Status: DISCONTINUED | OUTPATIENT
Start: 2024-05-25 | End: 2024-05-26 | Stop reason: HOSPADM

## 2024-05-25 RX ORDER — DEXTROSE MONOHYDRATE 100 MG/ML
INJECTION, SOLUTION INTRAVENOUS CONTINUOUS PRN
Status: DISCONTINUED | OUTPATIENT
Start: 2024-05-25 | End: 2024-05-26 | Stop reason: HOSPADM

## 2024-05-25 RX ORDER — VITAMIN B COMPLEX
1000 TABLET ORAL DAILY
Status: DISCONTINUED | OUTPATIENT
Start: 2024-05-25 | End: 2024-05-26 | Stop reason: HOSPADM

## 2024-05-25 RX ORDER — AMLODIPINE BESYLATE 5 MG/1
5 TABLET ORAL DAILY
Status: DISCONTINUED | OUTPATIENT
Start: 2024-05-25 | End: 2024-05-26 | Stop reason: HOSPADM

## 2024-05-25 RX ORDER — DULOXETIN HYDROCHLORIDE 30 MG/1
60 CAPSULE, DELAYED RELEASE ORAL DAILY
Status: DISCONTINUED | OUTPATIENT
Start: 2024-05-25 | End: 2024-05-26 | Stop reason: HOSPADM

## 2024-05-25 RX ORDER — MIRTAZAPINE 15 MG/1
7.5 TABLET, FILM COATED ORAL NIGHTLY
Status: DISCONTINUED | OUTPATIENT
Start: 2024-05-25 | End: 2024-05-26 | Stop reason: HOSPADM

## 2024-05-25 RX ORDER — PANTOPRAZOLE SODIUM 20 MG/1
20 TABLET, DELAYED RELEASE ORAL 2 TIMES DAILY
Status: DISCONTINUED | OUTPATIENT
Start: 2024-05-25 | End: 2024-05-25 | Stop reason: CLARIF

## 2024-05-25 RX ORDER — LIDOCAINE 4 G/G
1 PATCH TOPICAL DAILY
Status: DISCONTINUED | OUTPATIENT
Start: 2024-05-25 | End: 2024-05-26 | Stop reason: HOSPADM

## 2024-05-25 RX ORDER — INSULIN GLARGINE 100 [IU]/ML
44 INJECTION, SOLUTION SUBCUTANEOUS NIGHTLY
Status: DISCONTINUED | OUTPATIENT
Start: 2024-05-25 | End: 2024-05-26 | Stop reason: HOSPADM

## 2024-05-25 RX ORDER — MORPHINE SULFATE 4 MG/ML
2 INJECTION, SOLUTION INTRAMUSCULAR; INTRAVENOUS ONCE
Status: DISCONTINUED | OUTPATIENT
Start: 2024-05-25 | End: 2024-05-26 | Stop reason: HOSPADM

## 2024-05-25 RX ORDER — PANTOPRAZOLE SODIUM 40 MG/1
40 TABLET, DELAYED RELEASE ORAL
Status: DISCONTINUED | OUTPATIENT
Start: 2024-05-25 | End: 2024-05-26 | Stop reason: HOSPADM

## 2024-05-25 RX ORDER — ACETAMINOPHEN 325 MG/1
650 TABLET ORAL EVERY 6 HOURS PRN
Status: DISCONTINUED | OUTPATIENT
Start: 2024-05-25 | End: 2024-05-25

## 2024-05-25 RX ORDER — POLYETHYLENE GLYCOL 3350 17 G/17G
17 POWDER, FOR SOLUTION ORAL DAILY PRN
Status: DISCONTINUED | OUTPATIENT
Start: 2024-05-25 | End: 2024-05-26 | Stop reason: HOSPADM

## 2024-05-25 RX ORDER — SODIUM CHLORIDE 9 MG/ML
INJECTION, SOLUTION INTRAVENOUS PRN
Status: DISCONTINUED | OUTPATIENT
Start: 2024-05-25 | End: 2024-05-26 | Stop reason: HOSPADM

## 2024-05-25 RX ORDER — MIRTAZAPINE 7.5 MG/1
3.75 TABLET, FILM COATED ORAL NIGHTLY
Status: DISCONTINUED | OUTPATIENT
Start: 2024-05-25 | End: 2024-05-25

## 2024-05-25 RX ORDER — CARVEDILOL 6.25 MG/1
6.25 TABLET ORAL 2 TIMES DAILY
Status: DISCONTINUED | OUTPATIENT
Start: 2024-05-25 | End: 2024-05-25

## 2024-05-25 RX ORDER — PRAMIPEXOLE DIHYDROCHLORIDE 0.25 MG/1
0.5 TABLET ORAL 2 TIMES DAILY
Status: DISCONTINUED | OUTPATIENT
Start: 2024-05-25 | End: 2024-05-26 | Stop reason: HOSPADM

## 2024-05-25 RX ORDER — MONTELUKAST SODIUM 10 MG/1
10 TABLET ORAL NIGHTLY
Status: DISCONTINUED | OUTPATIENT
Start: 2024-05-25 | End: 2024-05-26 | Stop reason: HOSPADM

## 2024-05-25 RX ADMIN — PRAMIPEXOLE DIHYDROCHLORIDE 0.5 MG: 0.25 TABLET ORAL at 12:20

## 2024-05-25 RX ADMIN — DULOXETINE HYDROCHLORIDE 60 MG: 30 CAPSULE, DELAYED RELEASE ORAL at 08:45

## 2024-05-25 RX ADMIN — BUMETANIDE 2 MG: 1 TABLET ORAL at 05:05

## 2024-05-25 RX ADMIN — MONTELUKAST 10 MG: 10 TABLET, FILM COATED ORAL at 21:40

## 2024-05-25 RX ADMIN — NITROGLYCERIN 0.4 MG: 0.4 TABLET, ORALLY DISINTEGRATING SUBLINGUAL at 04:37

## 2024-05-25 RX ADMIN — ACETAMINOPHEN 1000 MG: 500 TABLET ORAL at 13:08

## 2024-05-25 RX ADMIN — LORAZEPAM 1 MG: 1 TABLET ORAL at 16:47

## 2024-05-25 RX ADMIN — Medication 1000 UNITS: at 08:44

## 2024-05-25 RX ADMIN — MORPHINE SULFATE 4 MG: 4 INJECTION INTRAVENOUS at 00:02

## 2024-05-25 RX ADMIN — BUMETANIDE 2 MG: 1 TABLET ORAL at 21:36

## 2024-05-25 RX ADMIN — NITROGLYCERIN 0.4 MG: 0.4 TABLET, ORALLY DISINTEGRATING SUBLINGUAL at 04:55

## 2024-05-25 RX ADMIN — ACETAMINOPHEN 650 MG: 325 TABLET ORAL at 05:05

## 2024-05-25 RX ADMIN — OXYCODONE 10 MG: 5 TABLET ORAL at 16:47

## 2024-05-25 RX ADMIN — PRAMIPEXOLE DIHYDROCHLORIDE 0.5 MG: 0.25 TABLET ORAL at 21:40

## 2024-05-25 RX ADMIN — CETIRIZINE HYDROCHLORIDE 5 MG: 10 TABLET, FILM COATED ORAL at 08:45

## 2024-05-25 RX ADMIN — ATORVASTATIN CALCIUM 40 MG: 20 TABLET, FILM COATED ORAL at 21:36

## 2024-05-25 RX ADMIN — CARVEDILOL 3.12 MG: 3.12 TABLET, FILM COATED ORAL at 21:35

## 2024-05-25 RX ADMIN — CARVEDILOL 3.12 MG: 3.12 TABLET, FILM COATED ORAL at 08:45

## 2024-05-25 RX ADMIN — ACETAMINOPHEN 1000 MG: 500 TABLET ORAL at 21:35

## 2024-05-25 RX ADMIN — ATORVASTATIN CALCIUM 40 MG: 20 TABLET, FILM COATED ORAL at 03:55

## 2024-05-25 RX ADMIN — AMLODIPINE BESYLATE 5 MG: 5 TABLET ORAL at 08:45

## 2024-05-25 RX ADMIN — OXYCODONE 5 MG: 5 TABLET ORAL at 13:08

## 2024-05-25 RX ADMIN — ENOXAPARIN SODIUM 30 MG: 100 INJECTION SUBCUTANEOUS at 08:45

## 2024-05-25 RX ADMIN — PANTOPRAZOLE SODIUM 40 MG: 40 TABLET, DELAYED RELEASE ORAL at 08:45

## 2024-05-25 RX ADMIN — LIDOCAINE AND PRILOCAINE: 25; 25 CREAM TOPICAL at 17:21

## 2024-05-25 RX ADMIN — SODIUM CHLORIDE, PRESERVATIVE FREE 10 ML: 5 INJECTION INTRAVENOUS at 08:45

## 2024-05-25 RX ADMIN — MIRTAZAPINE 7.5 MG: 15 TABLET, FILM COATED ORAL at 21:36

## 2024-05-25 RX ADMIN — SODIUM CHLORIDE, PRESERVATIVE FREE 10 ML: 5 INJECTION INTRAVENOUS at 21:36

## 2024-05-25 ASSESSMENT — PAIN DESCRIPTION - ORIENTATION
ORIENTATION: LEFT
ORIENTATION: LEFT;ANTERIOR
ORIENTATION: LEFT
ORIENTATION: LEFT
ORIENTATION: MID

## 2024-05-25 ASSESSMENT — PAIN SCALES - GENERAL
PAINLEVEL_OUTOF10: 3
PAINLEVEL_OUTOF10: 5
PAINLEVEL_OUTOF10: 8
PAINLEVEL_OUTOF10: 5
PAINLEVEL_OUTOF10: 0
PAINLEVEL_OUTOF10: 2
PAINLEVEL_OUTOF10: 7
PAINLEVEL_OUTOF10: 7
PAINLEVEL_OUTOF10: 0

## 2024-05-25 ASSESSMENT — PAIN DESCRIPTION - DESCRIPTORS
DESCRIPTORS: PRESSURE
DESCRIPTORS: ACHING
DESCRIPTORS: SHARP
DESCRIPTORS: ACHING;SHARP
DESCRIPTORS: TENDER;ACHING

## 2024-05-25 ASSESSMENT — PAIN DESCRIPTION - LOCATION
LOCATION: ARM;CHEST
LOCATION: ARM
LOCATION: CHEST

## 2024-05-25 ASSESSMENT — PAIN DESCRIPTION - PAIN TYPE: TYPE: ACUTE PAIN

## 2024-05-25 ASSESSMENT — HEART SCORE: ECG: NORMAL

## 2024-05-25 ASSESSMENT — PAIN DESCRIPTION - FREQUENCY: FREQUENCY: INTERMITTENT

## 2024-05-25 NOTE — DIALYSIS
No hemodialysis treatment today. Patient venous site was unattainable. Cannulated x 2 without success. Arm has fading ecchymosis. No problem with arterial stick. Patient arm is to be resting tonight. Patient is to be attempted again on Sunday for hemodialysis per Pavel Joy, NP

## 2024-05-25 NOTE — CONSULTS
[facility]    Marisela Vizcaino  YOB: 1960          Assessment & Plan:     ESRD on [facility] in [place]  Poor PD catheter function  L arm fracture  L wrist AVF  CP  DM2    Rec:  HD today and TTS. Could go home over holiday weekend after dialysis today  Will need pain meds to allow us to access her AVF. D/w hospitalist team       Subjective:   CC: ESRD  HPI: [place] for dialysis needs. She was hospitalized about a week ago at [facility] and seen by another group. PD was not working at that time and they accessed her L wrist AVF with sedation and did HD. After discharge, the patient took laxatives and was able to do PD but only by forcefully squeezing the fluid into her abdomen. She is admitted with CP.  ROS: No n/v, No abd pain, no constipation, +L arm pain  Current Facility-Administered Medications   Medication Dose Route Frequency    sodium chloride flush 0.9 % injection 5-40 mL  5-40 mL IntraVENous 2 times per day    sodium chloride flush 0.9 % injection 5-40 mL  5-40 mL IntraVENous PRN    0.9 % sodium chloride infusion   IntraVENous PRN    ondansetron (ZOFRAN-ODT) disintegrating tablet 4 mg  4 mg Oral Q8H PRN    Or    ondansetron (ZOFRAN) injection 4 mg  4 mg IntraVENous Q6H PRN    acetaminophen (TYLENOL) tablet 650 mg  650 mg Oral Q6H PRN    Or    acetaminophen (TYLENOL) suppository 650 mg  650 mg Rectal Q6H PRN    polyethylene glycol (GLYCOLAX) packet 17 g  17 g Oral Daily PRN    [START ON 5/26/2024] aspirin chewable tablet 81 mg  81 mg Oral Daily    atorvastatin (LIPITOR) tablet 40 mg  40 mg Oral Nightly    enoxaparin Sodium (LOVENOX) injection 30 mg  30 mg SubCUTAneous Daily    pramipexole (MIRAPEX) tablet 0.5 mg  0.5 mg Oral BID    melatonin tablet 9 mg  9 mg Oral Nightly PRN    cetirizine (ZYRTEC) tablet 5 mg  5 mg Oral Q48H    montelukast (SINGULAIR) tablet 10 mg  10 mg Oral Nightly    amLODIPine (NORVASC) tablet 5

## 2024-05-25 NOTE — PROGRESS NOTES
Patient presented with chest pain that is resolved now. Trop unimpressive (plus expected elev in s/o ESRD). Not fluid overloaded. EKG ok.  - cards  - recheck trop to make sure its flat  - ECHO ordered (cards can remove if unnecessary)  - NPO for possible ischemia eval today. Otherwise likely discharge with outpatient ischemia eval. Will wait to hear from cards  - ESR/CRP and MRI chest (h/o CABG with wound infection. Eval for osteo - unlikely). If ESR/CRP unimpressive will DC MRI    Addendum:    D/w below plan with Dr Jones about getting patient dialyzed while inpatient. Dysfunctioning PD cath. Fisula on left arm, that is fractured. Will need sedation and pain control to get through HD.  - ativan 1mg and oxy 10mg PRN prior to HD  - watch for cardiorespiratory sedation  - If gets HD today, wont need PD until Tuesday    Addendum 2:    - ESR/CRP elevated but not osteo level - cancel MRI  - Trop trended down - stop trending  - ECHO getting done currently  - Awaiting cards eval  - Will get HD today but I suspect if there's nothing exciting on ECHO, she is still on track for discharge today  - She is very tender over her sternum. I suspect delayed healing is causing her symptoms. She should follow up with her surgeon for  this    Jody Salmon MD

## 2024-05-25 NOTE — ED NOTES
..ED TO INPATIENT SBAR HANDOFF    Patient Name: Marisela Vizcaino   :  1960  64 y.o.   MRN:  [id number]  ED Room #:  ER17/17  Family/Caregiver Present yes       Situation  Code Status: Full Code     Allergies: Albuterol, Levaquin [levofloxacin], Metformin and related, and Aspirin  Weight: Patient Vitals for the past 96 hrs (Last 3 readings):   Weight   24 2353 85.7 kg (189 lb)     Arrived from: home  Chief Complaint:   Chief Complaint   Patient presents with    Chest Pain     Hospital Problem/Diagnosis:  Principal Problem:    Chest pain  Resolved Problems:    * No resolved hospital problems. *    Imaging:   XR CHEST PORTABLE   Final Result   No acute process.        Abnormal labs:   Abnormal Labs Reviewed   BRAIN NATRIURETIC PEPTIDE - Abnormal; Notable for the following components:       Result Value    NT Pro-BNP 12,969 (*)     All other components within normal limits   CBC WITH AUTO DIFFERENTIAL - Abnormal; Notable for the following components:    RBC 3.32 (*)     Hemoglobin 10.3 (*)     Hematocrit 31.8 (*)     MPV 8.2 (*)     All other components within normal limits   COMPREHENSIVE METABOLIC PANEL - Abnormal; Notable for the following components:    Glucose 177 (*)     BUN 58 (*)     Creatinine 3.29 (*)     Est, Glom Filt Rate 15 (*)     Alk Phosphatase 130 (*)     Albumin 2.6 (*)     Albumin/Globulin Ratio 0.7 (*)     All other components within normal limits   TROPONIN - Abnormal; Notable for the following components:    Troponin, High Sensitivity 58 (*)     All other components within normal limits   LIPASE - Abnormal; Notable for the following components:    Lipase 92 (*)     All other components within normal limits     Abnormal Assessment Findings: Chest pain, dyspnea with exertion    SAFETY    Mobility: new onset weakness  and a recent fall   ED Fall Risk: Age > 70: No, Altered Mental Status, Intoxication with alcohol or substance confusion (Disorientation, impaired judgment, poor safety

## 2024-05-25 NOTE — PLAN OF CARE
[facility]         cc:   Sebastián Cervantes M.D.   Dr. Julius      SERVICE:   THORACIC SURGERY.      PREOPERATIVE DIAGNOSIS:   CYSTIC LARGE MASS, RIGHT LOWER LOBE.      POSTOPERATIVE DIAGNOSIS:   NECROTIC CYSTIC MASS INCORPORATING AT LEAST HALF OF THE RIGHT LOWER LOBE   EXTENDING TO THE INFRAHILAR REGION.      PROCEDURE:   1. BRONCHOSCOPY.   2. RIGHT THORACOSCOPY.   3. LYSIS OF ADHESIONS.   4. RIGHT LOWER LOBECTOMY.   5. RIGHT UPPER LOBE WEDGE RESECTION.      SURGEON:   SEBASTIÁN CERVANTES M.D.      ASSISTANT:   DR. MENDOZA.      ANESTHESIA:   GENERAL.      HISTORY:   Ms. Moctezuma is a 60-year-old woman, who has developed a recent cough and a   recent evaluation showed a large mass in the right lung.  In retrospect, this   was a cystic mass, this was present 8 years ago.  However, clearly was   enlarged with some associated atelectasis in the superior aspect of the mass.   PET scan showed increased avidity in the atelectatic portion and the scar   portion.  She was recommended for surgery as this mass was approximately 8 x 8   cm in size and despite being present for a long time, it is now causing   symptoms and has gotten larger.  She understood the plans and risks and agreed   to proceed.      OPERATIVE PROCEDURE:   The patient was placed in supine position.  After adequate anesthesia was   accomplished, a bronchoscopy was performed.  Of note, a time-out was done   prior to the bronchoscopy.  The bronch revealed normal distal trachea, normal   main trachea, left main bronchus, upper and lower lobe orifices were normal.   The right main bronchus was normal.  There was some mild narrowing of the   right upper lobe posterior segment orifice, the right lower lobe basilar   segments were normal.  The lobe orifice was normal.  There was some narrowing   and obliteration of the superior segment orifice.  At this time, the double-   lumen tube was placed, and its position confirmed    Problem: Pain  Goal: Verbalizes/displays adequate comfort level or baseline comfort level  Outcome: Progressing     Problem: Safety - Adult  Goal: Free from fall injury  Outcome: Progressing      by bronchoscopy, and the   patient was placed in the left-side down, right-side up position.  She was   prepped and draped in usual fashion.  Another timeout was performed.  A small   incision was made in the anterior axillary line.  The chest cavity was entered   under direct vision.  The 10 mm scope was advanced and an additional incision   was made superior, a wound protector was placed.  This was the working                              Operative Report - 1   incision.  Multiple adhesions were taken down near the apex of the superior   segment of the right lower lobe and also posteriorly.  After these adhesions   were taken down, the fissure between the upper and lower lobe was dissected.   There were extensive adhesions in this area.  After this was dissected free, a   small resection since there seemed to be some scar tissue extending from the   mass into the right upper lobe.  Wedge resection of right upper lobe was taken   to afford the completion of this fissure.  At this point, the adhesions were   taken down between the middle lobe and lower lobe and then an attempt was made   to see if we could do a wedge resection of the superior segment of the lower   lobe.  This was not possible because the cystic portion of the mass was opened   and drained in hopes that we could limit the dissection of the right lower   lobe.  Chocolatey material was removed, and despite removing the fluid, there   was still inability to extend and evaluate the entire expanse of the cystic   necrotic area.  Therefore, the inferior pulmonary vein was dissected free and   it was clear that a right lower lobectomy was required.  This was encircled   and an Endo MALU was placed; and at this point, attention was turned further   dissection in the fissure.  The vessel to the lower lobe was taken with an   Endo MALU stapler and then the bronchus was transected.  Prior to transecting   the bronchus, the lung was inflated to document the  middle and upper lobes had   good aeration.  At this point, the fissure between the upper and lower lobe   was completed using the Endo MALU; and at this point, the entire specimen was   completely removed.  There were additional posterior adhesions taken down.   This specimen was then sent to pathology.  The mass was at least 8 cm in   diameter and was cystic and involved most of the right lower lobe.  Frozen   section of the wall of the mass showed benign necrotic tissue.  Hemostasis was   then obtained.  There was 1 intrabronchial node which was dissected out, and   no other nodes were removed as this appeared to be a benign process.  At this   point, after adequate hemostasis was accomplished, fluid was then instilled   into the right chest cavity along, was inflated, no big air leak was   identified.  At this point, chest tube was brought through a separate stab   incision and then all of the thoracoscopy incisions were closed with layers of   Vicryl with a subcuticular skin closure.  Of note, at that time, after an   additional superior incision was made, another posterior incision was made for   instrumentation and staplers.  All 3 of these incisions were closed with   Vicryl.  The chest tube was brought through a separate incision.  The patient   was awakened.  The chest x-ray showed good expansion of the right lung.  She   was taken to the surgical ICU in stable condition.      _______________________________________   JEREMY Borden VIRGINIA D   MRN#:  [id number]   ACCT#:  [id number]   DATE OF SURGERY:  04/03/2017   ROOM:  SICU   SVC:  ICU   DICTATED BY: Tyson Cervantes M.D.   DD: 04/03/2017 DT: 04/04/2017 TD: 17:16 TT: 00:00 K#: [id number]                                    REPORT OF OPERATION                           Operative Report - 2

## 2024-05-25 NOTE — PLAN OF CARE
Problem: Pain  Goal: Verbalizes/displays adequate comfort level or baseline comfort level  5/25/2024 1555 by Nadia Burleson, RN  Outcome: Not Progressing  Flowsheets (Taken 5/25/2024 1555)  Verbalizes/displays adequate comfort level or baseline comfort level:   Encourage patient to monitor pain and request assistance   Assess pain using appropriate pain scale   Administer analgesics based on type and severity of pain and evaluate response   Implement non-pharmacological measures as appropriate and evaluate response   Consider cultural and social influences on pain and pain management  5/25/2024 0613 by Barbara Barksdale, RN  Outcome: Progressing     Problem: Pain  Goal: Verbalizes/displays adequate comfort level or baseline comfort level  5/25/2024 1555 by Nadia Burleson, RN  Outcome: Not Progressing  Flowsheets (Taken 5/25/2024 1555)  Verbalizes/displays adequate comfort level or baseline comfort level:   Encourage patient to monitor pain and request assistance   Assess pain using appropriate pain scale   Administer analgesics based on type and severity of pain and evaluate response   Implement non-pharmacological measures as appropriate and evaluate response   Consider cultural and social influences on pain and pain management  5/25/2024 0613 by Barbara Barksdale, RN  Outcome: Progressing

## 2024-05-25 NOTE — PROGRESS NOTES
0445 Hospitalist Dana martinez served d/t patient concerned about receiving PD at 0600.  Patient also needs stronger pain med order for c/o pain to [facility].

## 2024-05-25 NOTE — PROGRESS NOTES
[facility]  [address] [place] [address] [phone]        Hospitalist Progress Note      NAME: Marisela Vizcaino   :  1960  MRM:  [id number]    Date/Time of service: 2024  3:04 PM       Subjective:     Chief Complaint:  Patient was personally seen and examined by me during this time period.  Chart reviewed.  F/up CP.    CP much better. No SOB.       Objective:       Vitals:       Last 24hrs VS reviewed since prior progress note. Most recent are:    Vitals:    24 1338   BP:    Pulse:    Resp: 16   Temp:    SpO2:      SpO2 Readings from Last 6 Encounters:   24 100%   24 94%   24 100%   05/10/24 97%   21 97%   21 98%          Intake/Output Summary (Last 24 hours) at 2024 1504  Last data filed at 2024 1403  Gross per 24 hour   Intake --   Output 1000 ml   Net -1000 ml        Exam:     Physical Exam:    Gen:   in no acute distress  HEENT:  Pink conjunctivae, EOMI, hearing intact to voice, moist mucous membranes  Neck:  Supple, without masses, thyroid non-tender  Resp:  No accessory muscle use, clear breath sounds without wheezes rales or rhonchi  Card:  No murmurs, normal S1, S2 without thrills, bruits or peripheral edema. Mid sternotomy scar healed. No s/s infection. Tender along incision line  Abd:  Soft, non-tender, non-distended  Musc:  No cyanosis or clubbing  Skin:  No rashes or ulcers, skin turgor is good  Neuro:   follows commands appropriately  Psych:  Good insight, oriented to person, place and time, alert      Medications Reviewed: (see below)    Lab Data Reviewed: (see below)    ______________________________________________________________________    Medications:     Current Facility-Administered Medications   Medication Dose Route Frequency    sodium chloride flush 0.9 % injection 5-40 mL  5-40 mL IntraVENous 2 times per day    sodium chloride flush 0.9 % injection 5-40 mL  5-40 mL IntraVENous PRN

## 2024-05-25 NOTE — PROGRESS NOTES
Pharmacist adjusted Cetirizine hospital alternative for home medication levo cetirizine to 5 mg every 48 hours for Peritoneal dialysis.    Thank you,      Marixa Tyler, PharmD, BCPS

## 2024-05-25 NOTE — ED TRIAGE NOTES
Pt. Presents with mid-sternal chest pain onset of 1hr ago; pt reports sitting and watching TV when the chest pain began, describes aching and heavy in nature, denies radiating to other places. Pt. Reports reproducible pain with movement. Pt. Has hx of CABG in December 2024 in [place] where she is from, is compliant with her medications. Pt. Has ESRD, on peritoneal dialysis and has become increasingly weak at home with multiple falls; has a left humerus fx on arrival; sling applied in ED. Pt. Also requires supplemental O2 at baseline, is maintaining well at 100% on her 2LNC. Pt. Also c/o some nausea. A/OX4,  RR even and unlabored. VSS

## 2024-05-25 NOTE — ED PROVIDER NOTES
[facility] EMERGENCY DEPT  EMERGENCY DEPARTMENT ENCOUNTER        CHIEF COMPLAINT       Chief Complaint   Patient presents with    Chest Pain         HISTORY OF PRESENT ILLNESS      64y F with hx of CAD, CHF, ESRD on PD here with chest pain and shortness of breath. Started earlier tonight. Feels like prior pain with CAD. Is in the middle of the chest. Doesn't radiate. Unsure if she's had a fever. PD has been going ok the past few days. No vomiting. Some nausea. No abd pain.    Review of External Medical Records:     Nursing Notes were reviewed.    REVIEW OF SYSTEMS       Review of Systems   Constitutional: (-) weight loss.   HEENT: (-) stiff neck   Eyes: (-) discharge.   Respiratory: (-) cough.    Cardiovascular: (-) syncope.   Gastrointestinal: (-) blood in stool.   Genitourinary: (-) hematuria.  Musculoskeletal: (-) myalgias.   Neurological: (-) seizure.   Skin: (-) petechiae  Lymph/Immunologic: (-) enlarged lymph nodes  All other systems reviewed and are negative.            PAST MEDICAL HISTORY     Past Medical History:   Diagnosis Date    Allergies     AMI (acute myocardial infarction) (HCC) 09/2018    Asthma     CAD (coronary artery disease)     hx of stents    Cerebral artery occlusion with cerebral infarction (HCC) 2012    R sided numbness/weakness    CHF (congestive heart failure) (HCC)     COPD (chronic obstructive pulmonary disease) (HCC)     Depression     Diabetes mellitus (HCC)     DVT (deep vein thrombosis) in pregnancy     GERD (gastroesophageal reflux disease)     Heart attack (HCC) 02/2021    Hx of blood clots     rt leg    Hyperlipidemia     Hypertension     Other disorders of kidney and ureter in diseases classified elsewhere     Palliative care patient 07/02/2020    Pneumonia          SURGICAL HISTORY       Past Surgical History:   Procedure Laterality Date    CATARACT REMOVAL Bilateral     COLONOSCOPY  approx 2013    CORONARY ANGIOPLASTY WITH STENT PLACEMENT  2018    in [facility]

## 2024-05-26 ENCOUNTER — APPOINTMENT (OUTPATIENT)
Dept: VASCULAR SURGERY | Facility: HOSPITAL | Age: 64
End: 2024-05-26
Attending: STUDENT IN AN ORGANIZED HEALTH CARE EDUCATION/TRAINING PROGRAM
Payer: COMMERCIAL

## 2024-05-26 VITALS
HEIGHT: 60 IN | RESPIRATION RATE: 16 BRPM | DIASTOLIC BLOOD PRESSURE: 105 MMHG | SYSTOLIC BLOOD PRESSURE: 150 MMHG | BODY MASS INDEX: 36.91 KG/M2 | OXYGEN SATURATION: 96 % | HEART RATE: 80 BPM | WEIGHT: 188 LBS | TEMPERATURE: 97.6 F

## 2024-05-26 LAB
ALBUMIN SERPL-MCNC: 2.4 G/DL (ref 3.5–5)
ANION GAP SERPL CALC-SCNC: 5 MMOL/L (ref 5–15)
BUN SERPL-MCNC: 63 MG/DL (ref 6–20)
BUN/CREAT SERPL: 19 (ref 12–20)
CALCIUM SERPL-MCNC: 8.5 MG/DL (ref 8.5–10.1)
CHLORIDE SERPL-SCNC: 106 MMOL/L (ref 97–108)
CO2 SERPL-SCNC: 26 MMOL/L (ref 21–32)
CREAT SERPL-MCNC: 3.26 MG/DL (ref 0.55–1.02)
ERYTHROCYTE [DISTWIDTH] IN BLOOD BY AUTOMATED COUNT: 14 % (ref 11.5–14.5)
GLUCOSE BLD STRIP.AUTO-MCNC: 102 MG/DL (ref 65–117)
GLUCOSE BLD STRIP.AUTO-MCNC: 96 MG/DL (ref 65–117)
GLUCOSE SERPL-MCNC: 119 MG/DL (ref 65–100)
HCT VFR BLD AUTO: 33.2 % (ref 35–47)
HGB BLD-MCNC: 10.3 G/DL (ref 11.5–16)
MCH RBC QN AUTO: 30.9 PG (ref 26–34)
MCHC RBC AUTO-ENTMCNC: 31 G/DL (ref 30–36.5)
MCV RBC AUTO: 99.7 FL (ref 80–99)
NRBC # BLD: 0 K/UL (ref 0–0.01)
NRBC BLD-RTO: 0 PER 100 WBC
PHOSPHATE SERPL-MCNC: 5.2 MG/DL (ref 2.6–4.7)
PLATELET # BLD AUTO: 208 K/UL (ref 150–400)
PMV BLD AUTO: 8.7 FL (ref 8.9–12.9)
POTASSIUM SERPL-SCNC: 4.4 MMOL/L (ref 3.5–5.1)
RBC # BLD AUTO: 3.33 M/UL (ref 3.8–5.2)
SERVICE CMNT-IMP: NORMAL
SERVICE CMNT-IMP: NORMAL
SODIUM SERPL-SCNC: 137 MMOL/L (ref 136–145)
WBC # BLD AUTO: 5.2 K/UL (ref 3.6–11)

## 2024-05-26 PROCEDURE — 2700000000 HC OXYGEN THERAPY PER DAY

## 2024-05-26 PROCEDURE — 2580000003 HC RX 258: Performed by: INTERNAL MEDICINE

## 2024-05-26 PROCEDURE — 94761 N-INVAS EAR/PLS OXIMETRY MLT: CPT

## 2024-05-26 PROCEDURE — 6370000000 HC RX 637 (ALT 250 FOR IP): Performed by: STUDENT IN AN ORGANIZED HEALTH CARE EDUCATION/TRAINING PROGRAM

## 2024-05-26 PROCEDURE — 36415 COLL VENOUS BLD VENIPUNCTURE: CPT

## 2024-05-26 PROCEDURE — G0378 HOSPITAL OBSERVATION PER HR: HCPCS

## 2024-05-26 PROCEDURE — 82962 GLUCOSE BLOOD TEST: CPT

## 2024-05-26 PROCEDURE — 6370000000 HC RX 637 (ALT 250 FOR IP): Performed by: INTERNAL MEDICINE

## 2024-05-26 PROCEDURE — 85027 COMPLETE CBC AUTOMATED: CPT

## 2024-05-26 PROCEDURE — 80069 RENAL FUNCTION PANEL: CPT

## 2024-05-26 PROCEDURE — 96372 THER/PROPH/DIAG INJ SC/IM: CPT

## 2024-05-26 PROCEDURE — 6360000002 HC RX W HCPCS: Performed by: INTERNAL MEDICINE

## 2024-05-26 RX ORDER — ATORVASTATIN CALCIUM 40 MG/1
40 TABLET, FILM COATED ORAL NIGHTLY
Qty: 30 TABLET | Refills: 3 | Status: SHIPPED | OUTPATIENT
Start: 2024-05-26

## 2024-05-26 RX ORDER — LORAZEPAM 1 MG/1
1 TABLET ORAL
Status: DISCONTINUED | OUTPATIENT
Start: 2024-05-26 | End: 2024-05-26 | Stop reason: HOSPADM

## 2024-05-26 RX ORDER — LIDOCAINE 4 G/G
1 PATCH TOPICAL DAILY
Qty: 14 EACH | Refills: 0 | Status: SHIPPED | OUTPATIENT
Start: 2024-05-26 | End: 2024-06-09

## 2024-05-26 RX ORDER — OXYCODONE HYDROCHLORIDE 5 MG/1
10 TABLET ORAL
Status: DISCONTINUED | OUTPATIENT
Start: 2024-05-26 | End: 2024-05-26 | Stop reason: HOSPADM

## 2024-05-26 RX ORDER — ACETAMINOPHEN 500 MG
1000 TABLET ORAL EVERY 8 HOURS
Qty: 84 TABLET | Refills: 0 | Status: SHIPPED | OUTPATIENT
Start: 2024-05-26 | End: 2024-06-09

## 2024-05-26 RX ORDER — HEPARIN SODIUM 5000 [USP'U]/ML
5000 INJECTION, SOLUTION INTRAVENOUS; SUBCUTANEOUS EVERY 8 HOURS SCHEDULED
Status: DISCONTINUED | OUTPATIENT
Start: 2024-05-27 | End: 2024-05-26 | Stop reason: HOSPADM

## 2024-05-26 RX ORDER — ASPIRIN 81 MG/1
81 TABLET, CHEWABLE ORAL DAILY
Qty: 30 TABLET | Refills: 3 | Status: SHIPPED | OUTPATIENT
Start: 2024-05-26

## 2024-05-26 RX ADMIN — Medication 1000 UNITS: at 09:29

## 2024-05-26 RX ADMIN — ACETAMINOPHEN 1000 MG: 500 TABLET ORAL at 12:58

## 2024-05-26 RX ADMIN — ENOXAPARIN SODIUM 30 MG: 100 INJECTION SUBCUTANEOUS at 09:29

## 2024-05-26 RX ADMIN — AMLODIPINE BESYLATE 5 MG: 5 TABLET ORAL at 09:28

## 2024-05-26 RX ADMIN — OXYCODONE 5 MG: 5 TABLET ORAL at 09:27

## 2024-05-26 RX ADMIN — SODIUM CHLORIDE, PRESERVATIVE FREE 10 ML: 5 INJECTION INTRAVENOUS at 09:29

## 2024-05-26 RX ADMIN — PANTOPRAZOLE SODIUM 40 MG: 40 TABLET, DELAYED RELEASE ORAL at 05:29

## 2024-05-26 RX ADMIN — PRAMIPEXOLE DIHYDROCHLORIDE 0.5 MG: 0.25 TABLET ORAL at 09:28

## 2024-05-26 RX ADMIN — ASPIRIN 81 MG: 81 TABLET, CHEWABLE ORAL at 09:29

## 2024-05-26 RX ADMIN — CARVEDILOL 3.12 MG: 3.12 TABLET, FILM COATED ORAL at 09:29

## 2024-05-26 RX ADMIN — BUMETANIDE 2 MG: 1 TABLET ORAL at 09:28

## 2024-05-26 RX ADMIN — DULOXETINE HYDROCHLORIDE 60 MG: 30 CAPSULE, DELAYED RELEASE ORAL at 09:28

## 2024-05-26 RX ADMIN — ACETAMINOPHEN 1000 MG: 500 TABLET ORAL at 04:48

## 2024-05-26 ASSESSMENT — PAIN SCALES - GENERAL
PAINLEVEL_OUTOF10: 0
PAINLEVEL_OUTOF10: 4
PAINLEVEL_OUTOF10: 6
PAINLEVEL_OUTOF10: 0

## 2024-05-26 ASSESSMENT — PAIN DESCRIPTION - DESCRIPTORS: DESCRIPTORS: ACHING

## 2024-05-26 ASSESSMENT — PAIN DESCRIPTION - LOCATION: LOCATION: ARM

## 2024-05-26 ASSESSMENT — PAIN DESCRIPTION - ORIENTATION: ORIENTATION: LEFT

## 2024-05-26 NOTE — DISCHARGE SUMMARY
Hospitalist Discharge Summary     Patient ID:  Marisela Vizcaino  [id number]  64 y.o.  1960    Admit date: 5/24/2024    Discharge date and time: 5/26/2024    Admission Diagnoses: Chest pain [R07.9]  ESRD (end stage renal disease) on dialysis (HCC) [N18.6, Z99.2]  Chest pain, unspecified type [R07.9]    Discharge Diagnoses:    Principal Problem:    Chest pain  Resolved Problems:    * No resolved hospital problems. *         Hospital Course:   Chest pain / h/o CAD s/p CABG c/b wound dehiscence: POA. S/p previous stents to left circumflex and obtuse marginal branch.  S/p CABG on 12/8/2023. Chest pain was acute and resolved. ACS ruled out with unimpressive tropx2 (appropriate mildly elevated for ESRD) and nonischemic EKG. There was concern for osteo of sternum POA but inflammatory markers are only mildly elevated (explained by rhinovirus). No s/s infection otherwise. CXR clear. I suspect delayed healing is causing her symptoms - she also tested positive for rhinovirus which is likely contributing. She should follow up with her surgeon for her postop pain. Low suspicion for osteo. Cardiology initially consulted but did not think patient warranted an evaluation. ECHO ok with normal EF and wall motion. A1c at goal. Last LDL on file not at goal  - scheduled tylenol and lidocaine patch for pain control  - cards follow up  - increase atorvastatin, cont ASA and coreg    HTN: POA. Controlled and even on the lower end ofnormal with coreg and norvasc only.  - DC norvasc  - cont coreg     End-stage renal disease on peritoneal dialysis: POA. Chronic. Problematic PD cath (functioning but needing a little pressure). Fisula on left arm, that is fractured. Attempted Hd multiple times with no luck. Labs stable over 3 days  - Cont PD  -nephro follow up     Obstructive sleep apnea: POA. Chronic. Patient was unable to tolerate CPAP.  She does use oxygen continuously at 2 L/min at home.  - O2 PRN     Paroxysmal atrial fibrillation:

## 2024-05-26 NOTE — PROGRESS NOTES
Discharge today after HD. Have ordered sedative and pain med to be used PRN before HD (fistulised arm is fractured). Watch for cardioresp depression    Jody Salmon MD

## 2024-05-26 NOTE — CONSULTS
[facility]    Marisela Vizcaino  YOB: 1960          Assessment & Plan:     ESRD on CCPD in [place]  Poor PD catheter function  L arm fracture  L wrist AVF  CP  DM2    Rec:  Unable to do HD last pm due to difficult stick  To try again today  IF not successful, options are try PD or get CVC. Hopefully will work today.       Subjective:   CC: ESRD  HPI: HD not done last night due to difficult stick. To try again today.  Current Facility-Administered Medications   Medication Dose Route Frequency    LORazepam (ATIVAN) tablet 1 mg  1 mg Oral Once PRN    oxyCODONE (ROXICODONE) immediate release tablet 10 mg  10 mg Oral Once PRN    sodium chloride flush 0.9 % injection 5-40 mL  5-40 mL IntraVENous 2 times per day    sodium chloride flush 0.9 % injection 5-40 mL  5-40 mL IntraVENous PRN    0.9 % sodium chloride infusion   IntraVENous PRN    ondansetron (ZOFRAN-ODT) disintegrating tablet 4 mg  4 mg Oral Q8H PRN    Or    ondansetron (ZOFRAN) injection 4 mg  4 mg IntraVENous Q6H PRN    polyethylene glycol (GLYCOLAX) packet 17 g  17 g Oral Daily PRN    aspirin chewable tablet 81 mg  81 mg Oral Daily    atorvastatin (LIPITOR) tablet 40 mg  40 mg Oral Nightly    enoxaparin Sodium (LOVENOX) injection 30 mg  30 mg SubCUTAneous Daily    pramipexole (MIRAPEX) tablet 0.5 mg  0.5 mg Oral BID    melatonin tablet 9 mg  9 mg Oral Nightly PRN    cetirizine (ZYRTEC) tablet 5 mg  5 mg Oral Q48H    montelukast (SINGULAIR) tablet 10 mg  10 mg Oral Nightly    amLODIPine (NORVASC) tablet 5 mg  5 mg Oral Daily    insulin glargine (LANTUS) injection vial 44 Units  44 Units SubCUTAneous Nightly    DULoxetine (CYMBALTA) extended release capsule 60 mg  60 mg Oral Daily    glucose chewable tablet 16 g  4 tablet Oral PRN    dextrose bolus 10% 125 mL  125 mL IntraVENous PRN    Or    dextrose bolus 10% 250 mL  250 mL IntraVENous PRN    glucagon injection 1 mg  1 mg SubCUTAneous PRN

## 2024-05-26 NOTE — PROGRESS NOTES
[facility] Lovenox Dosing 05/26/24  Lovenox dose change per protocol  Physician: Viky  ESRD on PD/HD    [facility] Protocol  Enoxaparin prophylaxis dosing (medically ill, surgical patients)   Patient Weight (kg)     50 and below 51 - 100 101 - 150 151 - 174 175 or greater         Estimated CrCl  (ml/min) 30 or greater   30 mg SUBQ daily   40 mg SUBQ daily (or 30 mg BID for ortho) 30 mg SUBQ BID  40 mg SUBQ BID 60mg SUBQ BID      15-29 UFH 5000 units SUBQ BID   30 mg SUBQ daily 30 mg SUBQ daily 40 mg SUBQ daily   60 mg SUBQ daily      Less than 15 or Dialysis UFH 5000 units SUBQ BID   UFH 5000 units SUBQ TID UFH 7500 units SUBQ TID     Estimated Creatinine Clearance: 16 mL/min (A) (based on SCr of 3.26 mg/dL (H)).  Current dose: Lovenox 30 mg SC daily  Recommendation: Heparin 5000 units SC q8hr per protocol    Thank you  Pharmacist  Machelle Shaw, PharmD   [phone]

## 2024-05-26 NOTE — PLAN OF CARE
Problem: Safety - Adult  Goal: Free from fall injury  Outcome: Progressing  Flowsheets (Taken 5/26/2024 1154)  Free From Fall Injury: Instruct family/caregiver on patient safety     Problem: Pain  Goal: Verbalizes/displays adequate comfort level or baseline comfort level  Outcome: Not Progressing  Flowsheets (Taken 5/25/2024 1555)  Verbalizes/displays adequate comfort level or baseline comfort level:   Encourage patient to monitor pain and request assistance   Assess pain using appropriate pain scale   Administer analgesics based on type and severity of pain and evaluate response   Implement non-pharmacological measures as appropriate and evaluate response   Consider cultural and social influences on pain and pain management

## 2024-05-26 NOTE — PROGRESS NOTES
D/w IR doc Dr Ignacio. He recommended evaluating fistula with US  - if occluded, Dr Ignacio recommended placing a permanent catheter and get vasc to evaluate fistula but I neglected to tell him patients PD cath is partially functioning. Another option if fistula not patent is to continue using semifunctioning PD cath and get the above plan done outpatient in [place]  - if patent, will need more experienced HD nurse to retry access      Addendum:  - D/w Dr Jones. Wether or not US shows patency, 3 experienced HD nurses have tried so the fistula is functionally not patent  - He and I both agree the most reasonable plan is to discharge the patient and allow her to keep doing PD (albeit with some difficulty) until seen in [place] by her nephro and vasc as well as PCP  - Discharge order placed        Jody Salmon MD

## 2024-05-26 NOTE — DISCHARGE INSTRUCTIONS
HOSPITALIST DISCHARGE INSTRUCTIONS  NAME:  Marisela Vizcaino   :  1960   MRN:  [id number]     Date/Time:  2024 7:14 AM    ADMIT DATE: 2024     DISCHARGE DATE: 2024     DISCHARGE DIAGNOSIS:  Chest pain due to delayed wound healing post CABG exacerbated by rhinovirus infection    DISCHARGE INSTRUCTIONS:  Thank you for allowing us to participate in your care. Your discharging Hospitalist is Jody Salmon MD. You were admitted for evaluation and treatment of the above. You had cardiac testing that showed you werent having a heart attack. You were also having a peritoneal cath dysfunction so we had to give you hemodialysis. Your meds were changed, please take asprescribed and follow up with your PCP, cardiologist and nephrologist      MEDICATIONS:    It is important that you take the medication exactly as they are prescribed.   Keep your medication in the bottles provided by the pharmacist and keep a list of the medication names, dosages, and times to be taken in your wallet.   Do not take other medications without consulting your doctor.             If you experience any of the following symptoms then please call your primary care physician or return to the emergency room if you cannot get hold of your doctor:  Fever, chills, nausea, vomiting, diarrhea, change in mentation, falling, bleeding, shortness of breath    Follow Up:  Please call the below provider to arrange hospital follow up appointment      Your PCP    Follow up      Your cardiologist and cardiac surgeon    Follow up      Your nephrologist    Follow up          Information obtained by :  I understand that if any problems occur once I am at home I am to contact my physician.    I understand and acknowledge receipt of the instructions indicated above.                                                                                                                                           Physician's or R.N.'s Signature

## 2024-05-27 LAB
EKG ATRIAL RATE: 69 BPM
EKG DIAGNOSIS: NORMAL
EKG P AXIS: 51 DEGREES
EKG P-R INTERVAL: 182 MS
EKG Q-T INTERVAL: 438 MS
EKG QRS DURATION: 80 MS
EKG QTC CALCULATION (BAZETT): 469 MS
EKG R AXIS: -33 DEGREES
EKG T AXIS: 69 DEGREES
EKG VENTRICULAR RATE: 69 BPM